# Patient Record
Sex: FEMALE | Race: WHITE | NOT HISPANIC OR LATINO | Employment: OTHER | URBAN - METROPOLITAN AREA
[De-identification: names, ages, dates, MRNs, and addresses within clinical notes are randomized per-mention and may not be internally consistent; named-entity substitution may affect disease eponyms.]

---

## 2017-01-05 ENCOUNTER — OFFICE VISIT (OUTPATIENT)
Dept: BEHAVIORAL HEALTH | Facility: PHYSICIAN GROUP | Age: 62
End: 2017-01-05
Payer: COMMERCIAL

## 2017-01-05 VITALS — TEMPERATURE: 97.9 F | RESPIRATION RATE: 16 BRPM | HEART RATE: 76 BPM

## 2017-01-05 DIAGNOSIS — F41.1 GENERALIZED ANXIETY DISORDER: ICD-10-CM

## 2017-01-05 DIAGNOSIS — F51.01 PRIMARY INSOMNIA: ICD-10-CM

## 2017-01-05 PROCEDURE — 99204 OFFICE O/P NEW MOD 45 MIN: CPT | Performed by: PSYCHIATRY & NEUROLOGY

## 2017-01-05 RX ORDER — TRAZODONE HYDROCHLORIDE 50 MG/1
TABLET ORAL
Qty: 60 TAB | Refills: 5 | Status: SHIPPED | OUTPATIENT
Start: 2017-01-05 | End: 2017-07-24 | Stop reason: SDUPTHER

## 2017-01-05 RX ORDER — LORAZEPAM 1 MG/1
TABLET ORAL
Qty: 45 TAB | Refills: 2 | Status: SHIPPED | OUTPATIENT
Start: 2017-01-05 | End: 2017-06-08 | Stop reason: SDUPTHER
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
## 2017-01-06 DIAGNOSIS — M10.9 GOUT, ARTHRITIS: ICD-10-CM

## 2017-01-06 RX ORDER — ALLOPURINOL 300 MG/1
TABLET ORAL
Qty: 30 TAB | Refills: 0 | Status: CANCELLED | OUTPATIENT
Start: 2017-01-06

## 2017-01-06 NOTE — TELEPHONE ENCOUNTER
Was the patient seen in the last year in this department? Yes   Last seen Dr. Stephenson 05/29/15    Does patient have an active prescription for medications requested? No     Received Request Via: Patient     RX prescribed by Dr. Good. Patient states she does not know who that M.D. Is.   Patient informed that Dr. Stephenson has not prescribed allopurinol but insist a request be sent stating Dr. Stephenson is her PCP.   Please advise.

## 2017-01-07 NOTE — TELEPHONE ENCOUNTER
I an not willing to auth this or any med until I see her in the office.   Please reprepare this Rx for allopurinol 300 mg with Dr Wallace as provider and route to him for approval or decline since he has been seeing her.  CEFERINO

## 2017-01-09 ENCOUNTER — APPOINTMENT (OUTPATIENT)
Dept: PHYSICAL MEDICINE AND REHAB | Facility: MEDICAL CENTER | Age: 62
End: 2017-01-09
Payer: COMMERCIAL

## 2017-01-09 DIAGNOSIS — M10.9 GOUT, ARTHRITIS: ICD-10-CM

## 2017-01-09 RX ORDER — ALLOPURINOL 300 MG/1
TABLET ORAL
Qty: 30 TAB | Refills: 0 | Status: SHIPPED | OUTPATIENT
Start: 2017-01-09 | End: 2017-01-24 | Stop reason: SDUPTHER

## 2017-01-09 NOTE — TELEPHONE ENCOUNTER
Normal recent kidney function.    Refill done.    Charly Wallace MD  [facility]  [address] [place] [id number]

## 2017-01-09 NOTE — TELEPHONE ENCOUNTER
Was the patient seen in the last year in this department? Yes     Does patient have an active prescription for medications requested? No     Received Request Via: Patient     RX prescribed by Dr. Good. Patient states she does not know who that M.D. Is.  Please advise.

## 2017-01-13 DIAGNOSIS — M54.16 LEFT LUMBAR RADICULOPATHY: ICD-10-CM

## 2017-01-13 RX ORDER — CARISOPRODOL 350 MG/1
350 TABLET ORAL 2 TIMES DAILY PRN
Qty: 90 TAB | Refills: 0 | Status: CANCELLED | OUTPATIENT
Start: 2017-01-13

## 2017-01-13 RX ORDER — CARISOPRODOL 350 MG/1
TABLET ORAL
Qty: 90 TAB | Refills: 0
Start: 2017-01-13

## 2017-01-13 RX ORDER — CARISOPRODOL 350 MG/1
350 TABLET ORAL EVERY 8 HOURS PRN
Qty: 100 TAB | Refills: 0
Start: 2017-01-13

## 2017-01-13 NOTE — TELEPHONE ENCOUNTER
Was the patient seen in the last year in this department? Yes     Does patient have an active prescription for medications requested? No     Received Request Via: Patient      placed in Dr. Stephenson's in-basket

## 2017-01-14 NOTE — TELEPHONE ENCOUNTER
Refill declined.Please advise pt and pharmacy.  1. I have not seen pt in over a year.  2. Controlled substance Rx need to be filled at office visits.   Alix Stephenson MD  [facility] [address] [place] [address]

## 2017-01-14 NOTE — TELEPHONE ENCOUNTER
Refill declined  Controlled substances need to be filled at office visits  Alix Stephenson MD  [facility] [address] [place] [address]

## 2017-01-16 ENCOUNTER — OFFICE VISIT (OUTPATIENT)
Dept: MEDICAL GROUP | Age: 62
End: 2017-01-16
Payer: COMMERCIAL

## 2017-01-16 VITALS
DIASTOLIC BLOOD PRESSURE: 86 MMHG | HEART RATE: 81 BPM | SYSTOLIC BLOOD PRESSURE: 118 MMHG | BODY MASS INDEX: 28.54 KG/M2 | TEMPERATURE: 98.9 F | WEIGHT: 145.4 LBS | HEIGHT: 60 IN | OXYGEN SATURATION: 93 %

## 2017-01-16 DIAGNOSIS — Z12.31 SCREENING MAMMOGRAM, ENCOUNTER FOR: ICD-10-CM

## 2017-01-16 DIAGNOSIS — M62.830 BACK SPASM: ICD-10-CM

## 2017-01-16 DIAGNOSIS — Z74.09 REDUCED MOBILITY: ICD-10-CM

## 2017-01-16 DIAGNOSIS — M81.0 OSTEOPOROSIS, SENILE: ICD-10-CM

## 2017-01-16 DIAGNOSIS — M96.1 FAILED BACK SYNDROME: ICD-10-CM

## 2017-01-16 PROCEDURE — 99214 OFFICE O/P EST MOD 30 MIN: CPT | Performed by: FAMILY MEDICINE

## 2017-01-16 RX ORDER — TIZANIDINE 4 MG/1
4 TABLET ORAL 2 TIMES DAILY
Qty: 60 TAB | Refills: 5 | Status: SHIPPED | OUTPATIENT
Start: 2017-01-16 | End: 2017-04-25

## 2017-01-16 RX ORDER — CARISOPRODOL 350 MG/1
TABLET ORAL
Qty: 13 TAB | Refills: 0 | Status: SHIPPED | OUTPATIENT
Start: 2017-01-16 | End: 2017-04-25

## 2017-01-16 NOTE — MR AVS SNAPSHOT
"        Marium Renteria   2017 1:10 PM   Office Visit   MRN: [id number]    Department:  [address] [place] [address]   Dept Phone:  [phone]    Description:  Female : 1955   Provider:  Alix Stephenson M.D.           Reason for Visit     Back Pain med refill soma      Allergies as of 2017     Allergen Noted Reactions    Bee Venom 2009   Anaphylaxis    Asa [Aspirin] 2013   Unspecified    Pt states \"It tears up my stomach\".    Codeine 2012   Itching    Pt states \"I itch so much\".    Contrast Media With Iodine [Iodine] 2016       Hydrocodone-Ibuprofen 2015       \"Extreme itching\"    Ibuprofen 12/15/2015   Unspecified    Pt states \"It tears up my stomach\".    Milk [Lac Bovis] 2016       Norco [Apap-Fd&C Yellow #10 [place]-Hydrocodone] 2014   Itching    Pt states \"I itch all over\".    Other Drug 2014   Unspecified    VISTRIL  Pt states \"I get forgetful\".    Other Environmental 12/15/2015   Rash    adhesive    Other Food 12/15/2015   Unspecified    sourdough bread  Pt states \"My face and mouth gets all tingling\".      Pcn [Penicillins] 2009   Hives    Sulfa Drugs 2009   Hives    Talwin 2012   Unspecified    Pt states \"I get forgetful\".    Vicodin [Hydrocodone-Acetaminophen] 2013   Itching    Pt states \"I itch so much\".      You were diagnosed with     Back spasm   [914233]       Reduced mobility   [322069]       Failed back syndrome   [825497]       Screening mammogram, encounter for   [9383234]       Osteoporosis, senile   [259366]         Vital Signs     Blood Pressure Pulse Temperature Height Weight Body Mass Index    118/86 mmHg 81 37.2 °C (98.9 °F) 1.524 m (5') 65.953 kg (145 lb 6.4 oz) 28.40 kg/m2    Oxygen Saturation Last Menstrual Period Breastfeeding? Smoking Status          93% 10/02/1980 No Current Every Day Smoker        Basic Information     Date Of Birth Sex Race Ethnicity Preferred Language    1955 Female White " Non- English      Your appointments     Mar 01, 2017  3:30 PM   Follow Up Med Management with Roddy Cooley M.D.   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   12/11/2009 - Present    Symptomatic menopausal or female climacteric states N95.1   12/11/2009 - Present    Hypoparathyroidism (CMS-HCC) E20.9   12/11/2009 - Present    Tobacco abuse Z72.0   12/11/2009 - Present    Sialolithiasis, ductal K11.5   7/21/2012 - Present    Serum calcium elevated E83.52   10/8/2012 - Present    Gout, arthritis M10.9   5/24/2013 - Present    ELISHA (generalized anxiety disorder) F41.1   8/28/2013 - Present    Chronic diarrhea K52.9   9/30/2013 - Present    Insomnia G47.00   5/23/2014 - Present    Hyperlipidemia E78.5   6/5/2015 - Present    History of alcohol abuse Z87.898   12/7/2015 - Present    Lumbar scoliosis M41.9   12/17/2015 - Present    Left lumbar radiculopathy M54.16   6/23/2016 - Present    Neuropathy (CMS-HCC) G62.9   6/23/2016 - Present    Depression F32.9   6/27/2016 - Present    Constipation K59.00   6/27/2016 - Present    Hypokalemia E87.6   6/27/2016 - Present    Osteoarthritis of cervical spine with myelopathy M47.12   9/14/2016 - Present    Failed back syndrome M96.1   9/14/2016 - Present    Preventative health care Z00.00   10/7/2016 - Present    Cervical lymphadenopathy R59.0   10/7/2016 - Present      Health Maintenance        Date Due Completion Dates    MAMMOGRAM 6/24/2014 6/24/2013, 8/5/2010, 8/5/2010    BONE DENSITY 6/7/2015 6/7/2013    PAP  SMEAR 6/20/2016 6/20/2013 (Done)    Override on 6/20/2013: Done (scanned in Media. Read at [facility])    IMM INFLUENZA (1) 9/1/2016 11/21/2006, 10/12/2004, 12/29/2003    IMM ZOSTER VACCINE 10/2/2016 ---    COLONOSCOPY 8/20/2022 8/20/2012 (Done)    Override on 8/20/2012: Done    IMM DTaP/Tdap/Td Vaccine (2 - Td) 1/29/2023 1/29/2013, 5/24/2005            Current Immunizations     Hep A/HEP B Combined Vaccine (TwinRix) 5/24/2005    Influenza TIV (IM) 11/21/2006, 10/12/2004, 12/29/2003    Pneumococcal Vaccine (UF)Historical Data 10/29/2008    Pneumococcal polysaccharide vaccine (PPSV-23) 10/29/2008    TD Vaccine 5/24/2005    Tdap Vaccine 1/29/2013      Below and/or attached are the medications your provider expects you to take. Review all of your home medications and newly ordered medications with your provider and/or pharmacist. Follow medication instructions as directed by your provider and/or pharmacist. Please keep your medication list with you and share with your provider. Update the information when medications are discontinued, doses are changed, or new medications (including over-the-counter products) are added; and carry medication information at all times in the event of emergency situations     Allergies:  BEE VENOM - Anaphylaxis     ASA - Unspecified     CODEINE - Itching     CONTRAST MEDIA WITH IODINE - (reactions not documented)     HYDROCODONE-IBUPROFEN - (reactions not documented)     IBUPROFEN - Unspecified     MILK - (reactions not documented)     NORCO - Itching     OTHER DRUG - Unspecified     OTHER ENVIRONMENTAL - Rash     OTHER FOOD - Unspecified     PCN - Hives     SULFA DRUGS - Hives     TALWIN - Unspecified     VICODIN - Itching               Medications  Valid as of: January 16, 2017 -  2:04 PM    Generic Name Brand Name Tablet Size Instructions for use    Allopurinol (Tab) ZYLOPRIM 300 MG TAKE ONE TABLET BY MOUTH ONCE DAILY        Calcium Carbonate Antacid   Take 3,500 mg by mouth 2 Times a  Day.        Carbidopa-Levodopa (Tab) SINEMET  MG Take 1 Tab by mouth every day.        Carisoprodol (Tab) SOMA 350 MG 1 po daily x 7 days then 1/2 po daily for 8 days then 1/2 po every other day for 4 doses then stop.        Ergocalciferol (Cap) DRISDOL 97390 UNITS Take 5,000 Units by mouth 1 time daily as needed. On Wed and Sun        Gabapentin (Cap) NEURONTIN 300 MG Take 2 Caps by mouth 3 times a day. .        Levothyroxine Sodium (Tab) SYNTHROID 88 MCG Take 88 mcg by mouth Every morning on an empty stomach. 75 mcg        LORazepam (Tab) ATIVAN 1 MG One to two tabs by mouth at night        Meclizine HCl (Tab) ANTIVERT 12.5 MG Take 1 Tab by mouth 3 times a day as needed for Dizziness.        Polyethylene Glycol 3350 (Pack) MIRALAX  Take 1 Packet by mouth every day.        Potassium Chloride Charo CR (Tab CR) K-DUR 10 MEQ Take 10 mEq by mouth 3 times a day. Twice on Mon and Fri., Tuesday, Wednesday,Thursday, Saturday and Sunday once a day        TiZANidine HCl (Tab) ZANAFLEX 4 MG Take 1 Tab by mouth 2 times a day. As needed for back spasm        TraZODone HCl (Tab) DESYREL 50 MG One to two tabs by mouth at night for sleep.        .                 Medicines prescribed today were sent to:     [id number] - [place] [address]    Phone: [phone] Fax: [phone]    Open 24 Hours?: No      Medication refill instructions:       If your prescription bottle indicates you have medication refills left, it is not necessary to call your provider’s office. Please contact your pharmacy and they will refill your medication.    If your prescription bottle indicates you do not have any refills left, you may request refills at any time through one of the following ways: The online [employer] system (except Urgent Care), by calling your provider’s office, or by asking your pharmacy to contact your provider’s office with a refill request. Medication refills are processed only  during regular business hours and may not be available until the next business day. Your provider may request additional information or to have a follow-up visit with you prior to refilling your medication.   *Please Note: Medication refills are assigned a new Rx number when refilled electronically. Your pharmacy may indicate that no refills were authorized even though a new prescription for the same medication is available at the pharmacy. Please request the medicine by name with the pharmacy before contacting your provider for a refill.        Your To Do List     Future Labs/Procedures Complete By Expires    DS-BONE DENSITY STUDY (DEXA)  As directed 7/19/2017    MA-SCREEN MAMMO W/CAD-BILAT  As directed 2/16/2018      Referral     A referral request has been sent to our patient care coordination department. Please allow 3-5 business days for us to process this request and contact you either by phone or mail. If you do not hear from us by the 5th business day, please call us at [phone].        Instructions    Soma taper: 1 po daily x 7 days then 1/2 po daily for 8 days then 1/2 po every other day for 4 doses then stop.    Call [phone] to schedule mammogram/DEXA    Call [phone] to schedule the power chair evaluation       Other Notes About Your Plan     Fall risk done 12/7/15             CoinJar Access Code: [email or web address]  Expires: 1/25/2017  8:46 AM    CoinJar  A secure, online tool to manage your health information     [employer]’s CoinJar® is a secure, online tool that connects you to your personalized health information from the privacy of your home -- day or night - making it very easy for you to manage your healthcare. Once the activation process is completed, you can even access your medical information using the CoinJar micheal, which is available for free in the Apple Micheal store or Google Play store.     CoinJar provides the following levels of access (as shown below):   My Chart Features   [facility]  Primary Care Doctor [facility]  Specialists [facility]  Urgent  Care [facility]  Primary Care  Doctor   Email your healthcare team securely and privately 24/7 X X X    Manage appointments: schedule your next appointment; view details of past/upcoming appointments X      Request prescription refills. X      View recent personal medical records, including lab and immunizations X X X X   View health record, including health history, allergies, medications X X X X   Read reports about your outpatient visits, procedures, consult and ER notes X X X X   See your discharge summary, which is a recap of your hospital and/or ER visit that includes your diagnosis, lab results, and care plan. X X       How to register for Proximex:  1. Go to  [email or web address].  2. Click on the Sign Up Now box, which takes you to the New Member Sign Up page. You will need to provide the following information:  a. Enter your Proximex Access Code exactly as it appears at the top of this page. (You will not need to use this code after you’ve completed the sign-up process. If you do not sign up before the expiration date, you must request a new code.)   b. Enter your date of birth.   c. Enter your home email address.   d. Click Submit, and follow the next screen’s instructions.  3. Create a Proximex ID. This will be your Proximex login ID and cannot be changed, so think of one that is secure and easy to remember.  4. Create a Proximex password. You can change your password at any time.  5. Enter your Password Reset Question and Answer. This can be used at a later time if you forget your password.   6. Enter your e-mail address. This allows you to receive e-mail notifications when new information is available in Proximex.  7. Click Sign Up. You can now view your health information.    For assistance activating your Proximex account, call [phone]

## 2017-01-16 NOTE — PATIENT INSTRUCTIONS
Soma taper: 1 po daily x 7 days then 1/2 po daily for 8 days then 1/2 po every other day for 4 doses then stop.    Call [phone] to schedule mammogram/DEXA    Call [phone] to schedule the power chair evaluation

## 2017-01-16 NOTE — PROGRESS NOTES
Subjective:     Chief Complaint   Patient presents with   • Back Pain     med refill soma     Marium Renteria is a 61 y.o. female here today for issues listed below    Chronic back and neck pain.  Sees Dr Macdonald.  Using lumbar brace and walker.  Had two surgeries last year. Still not doing well. Not on chronic narcotics currently but has been on chronic benzo for anxiety and chronic soma for spasm. REports Baclofen not effective.  Has been referred to psych for medication management to identify non-benzo options or appropriateness of ongoing benzo use.  Eval for power chair and mobility issues.  Marium Renteria is a 61 y.o. year old female who is having mobility issues in her home AND use of a cane, walker, or manual wheelchair will not meet needs  HPI:  • Signs and symptoms that limit ambulation/mobility (muscle weakness from failed back surgery.  • Diagnoses that are responsible for the signs and symptoms (chroinc DDD lumbar)  • Medications or other treatments prescribed for the signs and symptoms: current lumbar support.  • Progression of mobility difficulty since surgery in 2016  • Other diagnoses that may relate to ambulatory problems: lower extremity weakness  Cane or walker will not meet patient's mobility needs in the home for the following reasons:  • Lower Extremity (LE) strength of 2/5 from deconditioning after back surgery  • Poor balance or postural stability from radicular pain and neuropathy  Manual wheelchair will not meet patient's mobility needs in the home for the following reasons:   • Decreased ROM of hips/knees from injury, degenerative disease      Allergies: Bee venom; Asa; Codeine; Contrast media with iodine; Hydrocodone-ibuprofen; Ibuprofen; Milk; Norco; Other drug; Other environmental; Other food; Pcn; Sulfa drugs; Talwin; and Vicodin  Current medicines (including changes today)  Current Outpatient Prescriptions   Medication Sig Dispense Refill   • tizanidine (ZANAFLEX) 4 MG  Tab Take 1 Tab by mouth 2 times a day. As needed for back spasm 60 Tab 5   • carisoprodol (SOMA) 350 MG Tab 1 po daily x 7 days then 1/2 po daily for 8 days then 1/2 po every other day for 4 doses then stop. 13 Tab 0   • allopurinol (ZYLOPRIM) 300 MG Tab TAKE ONE TABLET BY MOUTH ONCE DAILY 30 Tab 0   • trazodone (DESYREL) 50 MG Tab One to two tabs by mouth at night for sleep. 60 Tab 5   • lorazepam (ATIVAN) 1 MG Tab One to two tabs by mouth at night 45 Tab 2   • carbidopa-levodopa (SINEMET)  MG Tab Take 1 Tab by mouth every day. 30 Tab 0   • gabapentin (NEURONTIN) 300 MG Cap Take 2 Caps by mouth 3 times a day. . 180 Cap 3   • potassium chloride SA (K-DUR) 10 MEQ Tab CR Take 10 mEq by mouth 3 times a day. Twice on Mon and Fri., Tuesday, Wednesday,Thursday, Saturday and Sunday once a day     • vitamin D, Ergocalciferol, (DRISDOL) 66797 UNITS Cap capsule Take 5,000 Units by mouth 1 time daily as needed. On Wed and Sun     • Calcium Carbonate Antacid (CALCIUM ANTACID PO) Take 3,500 mg by mouth 2 Times a Day.     • meclizine (ANTIVERT) 12.5 MG TABS Take 1 Tab by mouth 3 times a day as needed for Dizziness. 90 Tab 5   • polyethylene glycol/lytes (MIRALAX) Pack Take 1 Packet by mouth every day. 1 Each 3   • levothyroxine (SYNTHROID) 88 MCG Tab Take 88 mcg by mouth Every morning on an empty stomach. 75 mcg       No current facility-administered medications for this visit.       She  has a past medical history of Mixed hyperlipidemia (12/11/2009); Unspecified essential hypertension (12/11/2009); Postsurgical hypothyroidism (12/11/2009); Osteoporosis, unspecified (12/11/2009); Lumbago (12/11/2009); Symptomatic menopausal or female climacteric states (12/11/2009); Hypoparathyroidism (CMS-HCC) (12/11/2009); Other B-complex deficiencies (12/11/2009); Tobacco abuse (12/11/2009); Alcohol abuse, continuous drinking behavior (12/11/2009); Gout; Parotid cyst; Sialolithiasis, ductal (7/21/2012); Arthritis; Helicobacter pylori  (H. pylori) infection (9/11/2012); Serum calcium elevated (10/8/2012); GOUT (5/24/2013); Chronic diarrhea (9/30/2013); Hyperlipidemia (6/5/2015); Gynecological disorder; Bowel habit changes; High cholesterol; Restless leg syndrome; Psychiatric problem; and Anxiety.  Health Maintenance: none since 2013.   ROS  Denies constipation, nausea.  No red flag of cauda equina.     Objective:     Blood pressure 118/86, pulse 81, temperature 37.2 °C (98.9 °F), height 1.524 m (5'), weight 65.953 kg (145 lb 6.4 oz), last menstrual period 10/02/1980, SpO2 93 %, not currently breastfeeding. Body mass index is 28.4 kg/(m^2).  Physical Exam:  Alert, oriented in no acute distress.  Eye contact is good, speech goal directed, affect calm    Lower extremity weakness. 2-3/5.  Wearing lumbar support. Needs 1 person assist to stand.     Results reviewed from CT 12/28/16. (post-op change in lumbar spine)    Assessment and Plan:     A/P:  Failed back syndrome, lumbar DDD causing mobility issues in their home AND use of a cane, walker, or manual wheelchair will not meet needs  • Dx = Z74.09. Impaired Mobility   • Plan: order mobility / power chair assessment   Treatment Changes: pt advised that will need specialty eval for continued controlled substance Rx.  No benzo for chronic use from PCP. WIll need to be from Psych.  No soma for chronic use from PCP. Will taper over 2 weeks. May use tizanidine. If not effective, will need soma from a pain specialist or spine specialist.    Marium was seen today for back pain.    Diagnoses and all orders for this visit:    Back spasm  -     tizanidine (ZANAFLEX) 4 MG Tab; Take 1 Tab by mouth 2 times a day. As needed for back spasm  -     carisoprodol (SOMA) 350 MG Tab; 1 po daily x 7 days then 1/2 po daily for 8 days then 1/2 po every other day for 4 doses then stop.  -     REFERRAL TO PHYSICAL THERAPY Reason for Therapy: Eval/Treat/Report    Reduced mobility  -     REFERRAL TO PHYSICAL THERAPY Reason for  Therapy: Eval/Treat/Report    Failed back syndrome  -     REFERRAL TO PHYSICAL THERAPY Reason for Therapy: Eval/Treat/Report    Screening mammogram, encounter for  -     MA-SCREEN MAMMO W/CAD-BILAT; Future    Osteoporosis, senile  -     DS-BONE DENSITY STUDY (DEXA); Future        Followup: Return in about 4 months (around 5/16/2017) for recheck DEXA. sooner should new symptoms or problems arise.

## 2017-01-16 NOTE — TELEPHONE ENCOUNTER
Spoke with patient and let them know Alix Stephenson M.D.'s message.   Future Appointments       Provider Department Center    1/16/2017 1:10 PM Alix Stephenson M.D. [place] [address] [address]    3/1/2017 3:30 PM Roddy Cooley M.D. [facility] [address] [place] [address]

## 2017-01-19 ENCOUNTER — TELEPHONE (OUTPATIENT)
Dept: MEDICAL GROUP | Age: 62
End: 2017-01-19

## 2017-01-19 NOTE — Clinical Note
[facility]  Alix Stephenson M.D.  [address]  [facility] [address]  Fax: [phone] Authorization for Release/Disclosure of Protected Health Information   Name: MARIUM RENTERIA : 1955 SSN: [id number]   Address: [address] [place] [address]  [place] [address] Phone:    [phone] (home)    I authorize the entity listed below to release/disclose the PHI below to [facility]/Alix Setphenson M.D.   Provider or Entity Name:  [facility]     Address   City, [facility]   Phone:      Fax:  [phone]   Reason for request: continuity of care   Information to be released:    [  ] LAST COLONOSCOPY, including any PATH REPORT [X  ] LAST DEXA  [  ] LAST MAMMOGRAM  [  ] LAST PAP [  ] RETINA EXAM REPORT  [  ] IMMUNIZATION RECORDS  [  ] Release all info      [  ] Check here and initial the line next to each item to release ALL health information INCLUDING  _____ Care and treatment for drug and / or alcohol abuse  _____ HIV testing, infection status, or AIDS  _____ Genetic Testing    DATES OF SERVICE OR TIME PERIOD TO BE DISCLOSED: _____________  I understand and acknowledge that:  * This Authorization may be revoked at any time by you in writing, except if your health information has already been used or disclosed.  * Your health information that will be used or disclosed as a result of you signing this authorization could be re-disclosed by the recipient. If this occurs, your re-disclosed health information may no longer be protected by State or Federal laws.  * You may refuse to sign this Authorization. Your refusal will not affect your ability to obtain treatment.  * This Authorization becomes effective upon signing and will  on (date) __________. If no date is indicated, this Authorization will  one (1) year from the signature date.    Name: Marium Renteria    Signature:   continuity of care   Date: 2017

## 2017-01-19 NOTE — TELEPHONE ENCOUNTER
1. Caller Name: Marium                                         Call Back Number: [phone] (home)         Patient approves a detailed voicemail message: no    Patient states she refuses to do the mammogram ordered and wants Dr. Stephenson to review her previous MRI and/or DEXA because she doesn't feel this DEXA order is a necessity.  Patient states she is concerned about her finances in regards to medical.  Please advise.

## 2017-01-20 NOTE — TELEPHONE ENCOUNTER
Phone Number Called: [phone] (home)     Message: Spoke with patient she declines to have them done until her feeling come back in legs. She does not want anymore wellness checks until then.      Patient stated she may have had the Dexa done at [facility] in 2015.

## 2017-01-20 NOTE — TELEPHONE ENCOUNTER
As I mentioned at her office visit -  The last DEXA that I have results for was done 2013. It showed osteoporosis so a recheck 2 years later is my recommendation. If she completed a DEXA since then please obtain results.  MRI is not a good indicator of bone density. It shows that she has the cages in her spine but does not tell me the density of the bones in her hips or other areas of her low spine without surgical hardware.   The order and diagnosis code are on my order. She can contact her insurance for information about coverage.  Alix Stephenson MD  [facility] [address] [place] [address]

## 2017-01-24 NOTE — TELEPHONE ENCOUNTER
Was the patient seen in the last year in this department? Yes     Does patient have an active prescription for medications requested? Yes -Allopurinol    Received Request Via: Pharmacy

## 2017-01-25 RX ORDER — ALLOPURINOL 300 MG/1
TABLET ORAL
Qty: 30 TAB | Refills: 0 | Status: SHIPPED | OUTPATIENT
Start: 2017-01-25 | End: 2017-02-07 | Stop reason: SDUPTHER

## 2017-02-07 DIAGNOSIS — M62.830 BACK SPASM: ICD-10-CM

## 2017-02-07 RX ORDER — ALLOPURINOL 300 MG/1
TABLET ORAL
Qty: 30 TAB | Refills: 5 | Status: SHIPPED | OUTPATIENT
Start: 2017-02-07 | End: 2017-08-29 | Stop reason: SDUPTHER

## 2017-02-07 RX ORDER — CARISOPRODOL 350 MG/1
TABLET ORAL
Qty: 13 TAB | Refills: 0 | OUTPATIENT
Start: 2017-02-07

## 2017-02-07 RX ORDER — CARISOPRODOL 350 MG/1
TABLET ORAL
Qty: 13 TAB | Refills: 0
Start: 2017-02-07

## 2017-02-07 NOTE — TELEPHONE ENCOUNTER
Was the patient seen in the last year in this department? Yes     Does patient have an active prescription for medications requested? No     Received Request Via: Pharmacy     Pt states that the SOMA was not suppose to come here the allopurinol was suppose to be faxed to Dr. Stephenson.

## 2017-02-07 NOTE — TELEPHONE ENCOUNTER
Refill declined.  Please advise pt and pharmacy.  Pt has already been advised that primary care will not offer additional fills of this medicine.   Alix Stephenson MD  [facility] [address] [place] [address]

## 2017-02-07 NOTE — TELEPHONE ENCOUNTER
Phone Number Called: [phone] (home)     Message: Pt states that this medication was not suppose to go to Dr. Stephenson.     Left Message for patient to call back: no

## 2017-02-08 NOTE — TELEPHONE ENCOUNTER
Refill done and sent electronically to pharmacy selected for encounter.  Alix Stephenson MD  [facility] [address] [place] [address]

## 2017-02-13 ENCOUNTER — TELEPHONE (OUTPATIENT)
Dept: MEDICAL GROUP | Age: 62
End: 2017-02-13

## 2017-02-13 DIAGNOSIS — G89.29 CHRONIC MIDLINE LOW BACK PAIN WITH SCIATICA, SCIATICA LATERALITY UNSPECIFIED: ICD-10-CM

## 2017-02-13 DIAGNOSIS — M54.40 CHRONIC MIDLINE LOW BACK PAIN WITH SCIATICA, SCIATICA LATERALITY UNSPECIFIED: ICD-10-CM

## 2017-02-13 DIAGNOSIS — M96.1 FAILED BACK SYNDROME: ICD-10-CM

## 2017-02-13 DIAGNOSIS — G25.89 OTHER EXTRAPYRAMIDAL DISEASE AND ABNORMAL MOVEMENT DISORDER: ICD-10-CM

## 2017-02-13 NOTE — TELEPHONE ENCOUNTER
Phone Number Called: [facility]    Message:  Called [facility] and got their faxed # for [phone]    Left Message for patient to call back: N\A

## 2017-02-13 NOTE — TELEPHONE ENCOUNTER
1. Caller Name: Marium Renteria                                         Call Back Number: [phone] (home)       Patient approves a detailed voicemail message: yes    Patient called left a message requesting a letter of verification for her need for a electric wheel chair. She stated that [facility] has one for her and she needs a letter stating the need for the electric wheel chair. Please advise

## 2017-02-13 NOTE — TELEPHONE ENCOUNTER
Face to Face Note  -  Durable Medical Equipment    Alix Stephenson M.D. - NPI: [id number]  I certify that this patient is under my care and that they had a durable medical equipment(DME)face to face encounter by myself that meets the physician DME face-to-face encounter requirements with this patient on: 1/16/17    Date of encounter:   Patient:                    MRN:                       Date of Birth:          1/16/17  Marium Renteria  [id number]  1955     The encounter with the patient was in whole, or in part, for the following medical condition, which is the primary reason for durable medical equipment:  1. Chronic midline low back pain with sciatica, sciatica laterality unspecified  DME Other   2. Other extrapyramidal disease and abnormal movement disorder  DME Other   3. Failed back syndrome  DME Other         I certify that, based on my findings, the following durable medical equipment is medically necessary:  Other DME Equipment - electric wheelchair.           My Clinical findings support the need for the above equipment due to:  Abnormal Gait, Frequent Falls    Supporting Symptoms: pain, movement disorder

## 2017-02-13 NOTE — TELEPHONE ENCOUNTER
Phone Number Called: [phone] (home)     Message: Spoke with patient and relayed message that letter was done faxed to [facility].    Left Message for patient to call back: N\A

## 2017-02-13 NOTE — TELEPHONE ENCOUNTER
Order written Please print, then fax to [facility] or contact pt to  order   Alix Stephenson MD  [facility] [address] [place] [address]

## 2017-02-13 NOTE — TELEPHONE ENCOUNTER
Phone Number Called: [phone] (home)     Message: Spoke with patient to let her know the letter was faxed to [facility].    Left Message for patient to call back: N\A

## 2017-03-01 ENCOUNTER — OFFICE VISIT (OUTPATIENT)
Dept: BEHAVIORAL HEALTH | Facility: PHYSICIAN GROUP | Age: 62
End: 2017-03-01
Payer: COMMERCIAL

## 2017-03-01 VITALS
WEIGHT: 145 LBS | TEMPERATURE: 98.1 F | BODY MASS INDEX: 28.47 KG/M2 | HEIGHT: 60 IN | DIASTOLIC BLOOD PRESSURE: 80 MMHG | RESPIRATION RATE: 18 BRPM | HEART RATE: 78 BPM | SYSTOLIC BLOOD PRESSURE: 120 MMHG

## 2017-03-01 DIAGNOSIS — F41.1 GAD (GENERALIZED ANXIETY DISORDER): ICD-10-CM

## 2017-03-01 DIAGNOSIS — F51.01 PRIMARY INSOMNIA: ICD-10-CM

## 2017-03-01 PROCEDURE — 99213 OFFICE O/P EST LOW 20 MIN: CPT | Performed by: PSYCHIATRY & NEUROLOGY

## 2017-03-02 NOTE — PROGRESS NOTES
[facility]  PSYCHIATRIC FOLLOW-UP NOTE    Name: Marium Renteria  MRN: [id number]  : 1955  Age: 61 y.o.  Date of assessment: 3/1/2017  PCP: Alix Stephenson M.D.  Persons in attendance: Patient    REASON FOR VISIT/CHIEF COMPLAINT (as stated by Patient):  Marium Renteria is a 61 y.o., White female, attending follow-up appointment for   Chief Complaint   Patient presents with   • Anxiety     The patient is seen today for follow up on her chronic pain, anxiety, and insomnia.    .      HISTORY OF PRESENT ILLNESS:    This is a 62 yo female, on wheel chair, in pain all the time, and on pain medication. The patient is not sleeping well with one trazodone 50 mg at night and she is taking lorazepam 0.5 mg at night. The patient is waking up early morning a she takes the other half of lorazepam. The patient has an appointment with ortho in three weeks.       PSYCHOSOCIAL CHANGES SINCE PREVIOUS CONTACT:  Anxiety and insomnia    RESPONSE TO TREATMENT:  Fair.    MEDICATION SIDE EFFECTS:  Pain medication and benzo.    REVIEW OF SYSTEMS:        Constitutional positive - chronic back pain   Eyes negative   Ears/Nose/Mouth/Throat negative   Cardiovascular positive - hypertension   Respiratory negative   Gastrointestinal negative   Genitourinary negative   Muscular negative   Integumentary negative   Neurological positive - RLS,    Endocrine positive - hyperlipidemia, hypoparathyroidism, GOUT   Hematologic/Lymphatic negative       PSYCHIATRIC EXAMINATION/MENTAL STATUS  /80 mmHg  Pulse 78  Temp(Src) 36.7 °C (98.1 °F)  Resp 18  Ht 1.524 m (5')  Wt 65.772 kg (145 lb)  BMI 28.32 kg/m2  [facility] 10/02/1980  Participation: Active verbal participation, Attentive and Engaged  Grooming:Casual  Orientation: Alert and Fully Oriented  Eye contact: Good  Behavior:Calm   Mood: Anxious  Affect: Flexible and Full range  Thought process: Logical and Goal-directed  Thought content:  Within normal limits  Speech:  Rate within normal limits and Volume within normal limits  Perception:  Within normal limits  Memory:  No gross evidence of memory deficits  Insight: Good  Judgment: Good  Family/couple interaction observations:   Other:    Current risk:    Suicide: Low   Homicide: Low   Self-harm: Low  Relapse: Low  Other:   Crisis Safety Plan reviewed?Yes  If evidence of imminent risk is present, intervention/plan:    Medical Records/Labs/Diagnostic Tests Reviewed: yes.    Medical Records/Labs/Diagnostic Tests Ordered: none.    DIAGNOSTIC IMPRESSION(S):  1. ELISHA (generalized anxiety disorder)    2. Primary insomnia           ASSESSMENT AND PLAN:  ELISHA  Insomnia.    Increase trazodone 50 mg two at night  Lorazepam 1 mh as needed at night for sleep  Follow up in two months.    More than 50% of face-to-face time in this 30 minute visit was spent in psychotherapy/counseling.    Topics addressed include:  The patient came on a wheel chair and she is in pain all the time.  Good sleep hygiene  The patient is on opioid and lorazepam and she understood the interaction. The patient agreed to cut down on lorazepam.    Roddy Cooley M.D.

## 2017-03-14 RX ORDER — GABAPENTIN 300 MG/1
CAPSULE ORAL
Qty: 180 CAP | Refills: 3 | Status: SHIPPED | OUTPATIENT
Start: 2017-03-14 | End: 2017-08-13 | Stop reason: SDUPTHER

## 2017-04-21 ENCOUNTER — HOSPITAL ENCOUNTER (OUTPATIENT)
Facility: MEDICAL CENTER | Age: 62
End: 2017-04-21
Attending: ORTHOPAEDIC SURGERY | Admitting: ORTHOPAEDIC SURGERY
Payer: COMMERCIAL

## 2017-04-25 ENCOUNTER — OFFICE VISIT (OUTPATIENT)
Dept: MEDICAL GROUP | Facility: PHYSICIAN GROUP | Age: 62
End: 2017-04-25
Payer: COMMERCIAL

## 2017-04-25 VITALS
OXYGEN SATURATION: 92 % | WEIGHT: 163 LBS | DIASTOLIC BLOOD PRESSURE: 60 MMHG | HEIGHT: 63 IN | HEART RATE: 74 BPM | RESPIRATION RATE: 16 BRPM | BODY MASS INDEX: 28.88 KG/M2 | SYSTOLIC BLOOD PRESSURE: 100 MMHG | TEMPERATURE: 99.3 F

## 2017-04-25 DIAGNOSIS — Z76.89 ENCOUNTER TO ESTABLISH CARE: ICD-10-CM

## 2017-04-25 DIAGNOSIS — F33.1 MODERATE EPISODE OF RECURRENT MAJOR DEPRESSIVE DISORDER (HCC): ICD-10-CM

## 2017-04-25 DIAGNOSIS — E20.9 HYPOPARATHYROIDISM, UNSPECIFIED HYPOPARATHYROIDISM TYPE (HCC): ICD-10-CM

## 2017-04-25 DIAGNOSIS — G62.9 NEUROPATHY: ICD-10-CM

## 2017-04-25 DIAGNOSIS — F13.20 BENZODIAZEPINE DEPENDENCE, CONTINUOUS (HCC): ICD-10-CM

## 2017-04-25 DIAGNOSIS — F41.1 GAD (GENERALIZED ANXIETY DISORDER): ICD-10-CM

## 2017-04-25 DIAGNOSIS — M96.1 FAILED BACK SYNDROME: ICD-10-CM

## 2017-04-25 DIAGNOSIS — F17.210 CIGARETTE NICOTINE DEPENDENCE, UNCOMPLICATED: ICD-10-CM

## 2017-04-25 DIAGNOSIS — F11.20 OPIOID DEPENDENCE, CONTINUOUS (HCC): ICD-10-CM

## 2017-04-25 DIAGNOSIS — M10.9 GOUT, ARTHRITIS: ICD-10-CM

## 2017-04-25 DIAGNOSIS — G25.89 OTHER EXTRAPYRAMIDAL DISEASE AND ABNORMAL MOVEMENT DISORDER: ICD-10-CM

## 2017-04-25 DIAGNOSIS — E89.0 POSTSURGICAL HYPOTHYROIDISM: ICD-10-CM

## 2017-04-25 DIAGNOSIS — E83.52 SERUM CALCIUM ELEVATED: ICD-10-CM

## 2017-04-25 DIAGNOSIS — F51.01 PRIMARY INSOMNIA: ICD-10-CM

## 2017-04-25 PROCEDURE — 99215 OFFICE O/P EST HI 40 MIN: CPT | Performed by: NURSE PRACTITIONER

## 2017-04-25 RX ORDER — CALCITRIOL 0.25 UG/1
0.25 CAPSULE, LIQUID FILLED ORAL DAILY
COMMUNITY
End: 2018-09-19

## 2017-04-25 RX ORDER — MORPHINE SULFATE 30 MG/1
30 TABLET, FILM COATED, EXTENDED RELEASE ORAL 3 TIMES DAILY
COMMUNITY
Start: 2017-09-02 | End: 2018-09-19

## 2017-04-25 RX ORDER — POTASSIUM CHLORIDE 1125 MG/1
15 TABLET, EXTENDED RELEASE ORAL
COMMUNITY
End: 2017-07-20

## 2017-04-25 RX ORDER — LEVOTHYROXINE SODIUM 0.07 MG/1
75 TABLET ORAL
COMMUNITY
End: 2017-07-20

## 2017-04-25 RX ORDER — OXYCODONE HYDROCHLORIDE 20 MG/1
20 TABLET ORAL EVERY 4 HOURS PRN
Status: ON HOLD | COMMUNITY
End: 2017-08-09

## 2017-04-25 RX ORDER — LEVOTHYROXINE SODIUM 112 UG/1
112 TABLET ORAL
COMMUNITY
End: 2017-07-20

## 2017-04-25 NOTE — ASSESSMENT & PLAN NOTE
Followed by endocrinology, managed with Synthroid 75 mcg x 6 days, 112 mcg Sunday, brand name only

## 2017-04-25 NOTE — PROGRESS NOTES
Chief Complaint   Patient presents with   • Establish Care     New Pt est care/ Seema Pt     Marium Renteria is a 61 y.o. female here to establish care and for evaluation and management of the following:  HPI:    Previous PCP Alix Stephenson MD, [facility], [facility]  Postsurgical hypothyroidism  Followed by endocrinology, managed with Synthroid 75 mcg x 6 days, 112 mcg Sunday, brand name only      Gout, arthritis  Managed with allopurinol 300 mg daily, no recent flares.    Depression  Followed by psychiatry.    ELISHA (generalized anxiety disorder)  Followed by psychiatry.    Insomnia  Followed by psychiatry. Managed with trazodone  mg.    Hypoparathyroidism (CMS-HCC)  Followed by endocrinology, accidental parathyroidectomy hypoparathyroidism secondary to that. Serial calcium and albumin levels are monitored.     Serum calcium elevated  Followed by endocrinology, accidental parathyroidectomy hypoparathyroidism secondary to that. Serial calcium and albumin levels are monitored.     Other extrapyramidal disease and abnormal movement disorder   Managed with sinemet    Benzodiazepine dependence, continuous (CMS-HCC)  Followed by psychiatry.    Opioid dependence, continuous (CMS-HCC)  Followed by pain management.    Neuropathy (CMS-HCC)  Followed by neurology/pain management.      She would like to have pain and psychiatric medications prescribed by PCP -    Current medicines (including changes today)  Current Outpatient Prescriptions   Medication Sig Dispense Refill   • carisoprodol (SOMA) 350 MG Tab Take 350 mg by mouth every 8 hours as needed for Muscle Spasms.     • calcitRIOL (ROCALTROL) 0.25 MCG Cap Take 0.25 mcg by mouth every day.     • Cholecalciferol (VITAMIN D3) 1000 UNITS Cap Take  by mouth.     • Calcium Carbonate Antacid (TUMS PO) Take  by mouth.     • morphine ER (MS CONTIN) 30 MG Tab CR tablet Take 30 mg by mouth every 12 hours.     • levothyroxine (SYNTHROID) 112 MCG Tab Take 112 mcg by mouth Every morning  on an empty stomach.     • levothyroxine (SYNTHROID) 75 MCG Tab Take 75 mcg by mouth Every morning on an empty stomach.     • Oxycodone HCl 20 MG Tab Take  by mouth.     • potassium chloride SA (KLOR-CON M15) 15 MEQ tablet Take 15 mEq by mouth 2 times a day.     • EPINEPHrine (EPIPEN 2-MIRELA INJ) by Injection route.     • gabapentin (NEURONTIN) 300 MG Cap TAKE TWO CAPSULES BY MOUTH THREE TIMES A  Cap 3   • allopurinol (ZYLOPRIM) 300 MG Tab TAKE ONE TABLET BY MOUTH DAILY 30 Tab 5   • trazodone (DESYREL) 50 MG Tab One to two tabs by mouth at night for sleep. 60 Tab 5   • lorazepam (ATIVAN) 1 MG Tab One to two tabs by mouth at night 45 Tab 2   • polyethylene glycol/lytes (MIRALAX) Pack Take 1 Packet by mouth every day. 1 Each 3   • carbidopa-levodopa (SINEMET)  MG Tab TAKE ONE TABLET BY MOUTH DAILY (GENERIC SINEMET) 30 Tab 3   • vitamin D, Ergocalciferol, (DRISDOL) 01955 UNITS Cap capsule Take 5,000 Units by mouth 1 time daily as needed. On Wed and Sun     • Calcium Carbonate Antacid (CALCIUM ANTACID PO) Take 3,500 mg by mouth 2 Times a Day.       No current facility-administered medications for this visit.     She  has a past medical history of Mixed hyperlipidemia (12/11/2009); Unspecified essential hypertension (12/11/2009); Postsurgical hypothyroidism (12/11/2009); Osteoporosis, unspecified (12/11/2009); Lumbago (12/11/2009); Symptomatic menopausal or female climacteric states (12/11/2009); Hypoparathyroidism (CMS-[facility]) (12/11/2009); Other B-complex deficiencies (12/11/2009); Tobacco abuse (12/11/2009); Alcohol abuse, continuous drinking behavior (12/11/2009); Gout; Parotid cyst; Sialolithiasis, ductal (7/21/2012); Arthritis; Helicobacter pylori (H. pylori) infection (9/11/2012); Serum calcium elevated (10/8/2012); GOUT (5/24/2013); Chronic diarrhea (9/30/2013); Hyperlipidemia (6/5/2015); Gynecological disorder; Bowel habit changes; High cholesterol; Restless leg syndrome; Psychiatric problem; and  Anxiety.  She  has past surgical history that includes thyroidectomy (); parathyroidectomy (); salpingo-oophorectomy, unilateral; primary c section; hysterectomy, vaginal; dental surgery; colon resection (); appendectomy; colonoscopy with polyp (2012); gastroscopy with biopsy (2012); lumbar fusion posterior (2015); lumbar decompression (2015); and lumbar laminectomy diskectomy (2015).  Social History     Social History   • Marital Status:      Spouse Name: N/A   • Number of Children: 1   • Years of Education: N/A     Occupational History   • none Other     Social History Main Topics   • Smoking status: Current Every Day Smoker -- 1.00 packs/day for 47 years     Types: Cigarettes     Start date: 1970   • Smokeless tobacco: Never Used      Comment: quit during pregnancy, now 2-3 cig daily   • Alcohol Use: 1.2 oz/week     2 Cans of beer per week      Comment: quit drinking daily 3 years ago, now drinks only on weekend   • Drug Use: No   • Sexual Activity: Not Currently     Other Topics Concern   • None     Social History Narrative     Family History   Problem Relation Age of Onset   • Genetic Mother      Alzheimer's   • Alcohol/Drug Father      Family Status   Relation Status Death Age   • Mother  74     pneumo, alzheimers   • Father  37     etoh   • Daughter Alive    • Sister Alive      estranged   • Brother Alive      estranged - never met       Health maintenance reviewed and updated. Mammogram ordered by previous pcp , she declines, last done 6. Educated on the importance of health maintenance including cancer screenings and immunizations.     ROS  Constitutional: Negative for fever, chills, and unexplained weight loss.   HENT: Negative for ear pain, nosebleeds, and sore throat.  Eyes: Negative for blurred vision.   Respiratory: Negative for cough, sputum production, and wheezing.   Cardiovascular: Negative for chest pain, palpitations.  "  Gastrointestinal: Negative for nausea, vomiting, abdominal pain,  diarrhea.   Genitourinary: Negative for dysuria, urgency, and frequency.   Musculoskeletal: positive for myalgias and chronic back pain.   Skin: Negative for rash and itching.   Neurological: Negative for dizziness.   Endo/Heme/Allergies: Does not bruise/bleed easily.   All other systems reviewed and are negative except as in HPI.     Objective:   Blood pressure 100/60, pulse 74, temperature 37.4 °C (99.3 °F), resp. rate 16, height 1.588 m (5' 2.52\"), weight 73.936 kg (163 lb), last menstrual period 10/02/1980, SpO2 92 %. Body mass index is 29.32 kg/(m^2).  Physical Exam:  Constitutional: Alert, oriented, in no acute distress.    Psych: Eye contact is good.  Skin: Warm, dry, no rashes in visible areas.  HEENT: PERRL, conjunctiva and sclera clear.  Nares patent with no significant congestion or drainage.  Normal pinnae  Neck: Supple, trachea midline.   Lungs: Normal effort and respirations. Clear to auscultation bilaterally.  CV: Regular rate and rhythm.  Lower extremities: no edema.  MS: seated in wheelchair.      Assessment and Plan:   The following treatment plan was discussed    She would like to have pain and psychiatric medications prescribed by PCP - discussed upcoming surgery, high dose opiate therapy and need for ongoing pain management. Since she continues benzodiazapine medications with opiates she will continue to follow with psychiatry. Reviewed risks of medications today.    1. Gout, arthritis  Labs ordered, continue current medications and f/u with any acute flares.    2. Postsurgical hypothyroidism  3. Hypoparathyroidism, unspecified hypoparathyroidism type (CMS-HCC)  4. Serum calcium elevated  Stable. Continue current medicines. Monitor labs regularly. Follow-up with specialist as directed.    5. Moderate episode of recurrent major depressive disorder (CMS-HCC)  6. Primary insomnia  7. ELISHA (generalized anxiety disorder)  8. " Benzodiazepine dependence, continuous (CMS-[facility])  Stable. Continue current medicines. Monitor labs regularly. Follow-up with specialist as directed.    9. Failed back syndrome  10. Opioid dependence, continuous (CMS-[facility])  11. Neuropathy (CMS-[facility])  12. Other extrapyramidal disease and abnormal movement disorder  Stable. Continue current medicines. Monitor labs regularly. Follow-up with specialist as directed.    13. Cigarette nicotine dependence, uncomplicated  Advised smoking cessation.    14. Encounter to establish care    Records requested. Will be reviewed upon receipt.   Followup: Return in about 6 months (around 10/25/2017).  Sooner if needed or with any new problems.       Patient was seen for 40 minutes, more than 50% of time spent in face to face review, consultation, counseling, and arranging future evaluation and follow up of medical conditions and care.     Take all medications as directed.  Report any side effects of medications.   Report any new symptoms.  Follow up as advised.  Have labs or other studies as ordered done as directed prior to follow up.  Please keep all appointments for any referrals given.    Please note this dictation was created using voice recognition software. Every reasonable attempt has been made to correct obvious errors, however there may be errors of grammar and possibly content that were not discovered before finalizing the note.

## 2017-04-25 NOTE — MR AVS SNAPSHOT
"        Marium Renteria   2017 12:55 PM   Office Visit   MRN: [id number]    Department:  [facility], [place]   Dept Phone:  [phone]    Description:  Female : 1955   Provider:  OLIMPIA Edwards           Reason for Visit     Establish Care New Pt est care/ [place] Pt      Allergies as of 2017     Allergen Noted Reactions    Bee Venom 2009   Anaphylaxis    Asa [Aspirin] 2013   Unspecified    Pt states \"It tears up my stomach\".    Codeine 2012   Itching    Pt states \"I itch so much\".    Contrast Media With Iodine [Iodine] 2016       Hydrocodone-Ibuprofen 2015       \"Extreme itching\"    Ibuprofen 12/15/2015   Unspecified    Pt states \"It tears up my stomach\".    Milk [Lac Bovis] 2016       Norco [Apap-Fd&C Yellow #10 [place]-Hydrocodone] 2014   Itching    Pt states \"I itch all over\".    Other Drug 2014   Unspecified    VISTRIL  Pt states \"I get forgetful\".    Other Environmental 12/15/2015   Rash    adhesive    Other Food 12/15/2015   Unspecified    sourdough bread  Pt states \"My face and mouth gets all tingling\".      Pcn [Penicillins] 2009   Hives    Sulfa Drugs 2009   Hives    Talwin 2012   Unspecified    Pt states \"I get forgetful\".    Vicodin [Hydrocodone-Acetaminophen] 2013   Itching    Pt states \"I itch so much\".      You were diagnosed with     Postsurgical hypothyroidism   [244.0.ICD-9-CM]       Gout, arthritis   [062737]       Failed back syndrome   [254737]         Vital Signs     Blood Pressure Pulse Temperature Respirations Height Weight    100/60 mmHg 74 37.4 °C (99.3 °F) 16 1.588 m (5' 2.52\") 73.936 kg (163 lb)    Body Mass Index Oxygen Saturation Last Menstrual Period Smoking Status          29.32 kg/m2 92% 10/02/1980 Current Every Day Smoker        Basic Information     Date Of Birth Sex Race Ethnicity Preferred Language    1955 Female White Non- English      Your appointments     Apr " 27, 2017  3:00 PM   Follow Up Med Management with Roddy Cooley M.D.   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   12/11/2009 - Present    Symptomatic menopausal or female climacteric states N95.1   12/11/2009 - Present    Hypoparathyroidism (CMS-HCC) E20.9   12/11/2009 - Present    Tobacco abuse Z72.0   12/11/2009 - Present    Sialolithiasis, ductal K11.5   7/21/2012 - Present    Serum calcium elevated E83.52   10/8/2012 - Present    Gout, arthritis M10.9   5/24/2013 - Present    ELISHA (generalized anxiety disorder) F41.1   8/28/2013 - Present    Insomnia G47.00   5/23/2014 - Present    Hyperlipidemia E78.5   6/5/2015 - Present    History of alcohol abuse Z87.898   12/7/2015 - Present    Lumbar scoliosis M41.9   12/17/2015 - Present    Left lumbar radiculopathy M54.16   6/23/2016 - Present    Neuropathy (CMS-HCC) G62.9   6/23/2016 - Present    Depression F32.9   6/27/2016 - Present    Constipation K59.00   6/27/2016 - Present    Hypokalemia E87.6   6/27/2016 - Present    Osteoarthritis of cervical spine with myelopathy M47.12   9/14/2016 - Present    Failed back syndrome M96.1   9/14/2016 - Present    Preventative health care Z00.00   10/7/2016 - Present    Cervical lymphadenopathy R59.0   10/7/2016 - Present      Health Maintenance        Date Due Completion Dates    MAMMOGRAM 6/24/2014 6/24/2013, 8/5/2010, 8/5/2010    IMM ZOSTER VACCINE 10/2/2016 ---    BONE DENSITY 2/2/2017 2/2/2015, 6/7/2013    COLONOSCOPY 8/20/2022 8/20/2012 (Done), 8/1/2012    Override  on 8/20/2012: Done    IMM DTaP/Tdap/Td Vaccine (2 - Td) 1/29/2023 1/29/2013, 5/24/2005            Current Immunizations     Hep A/HEP B Combined Vaccine (TwinRix) 5/24/2005    Influenza TIV (IM) 11/21/2006, 10/12/2004, 12/29/2003    Pneumococcal Vaccine (UF)Historical Data 10/29/2008    Pneumococcal polysaccharide vaccine (PPSV-23) 10/29/2008    TD Vaccine 5/24/2005    Tdap Vaccine 1/29/2013      Below and/or attached are the medications your provider expects you to take. Review all of your home medications and newly ordered medications with your provider and/or pharmacist. Follow medication instructions as directed by your provider and/or pharmacist. Please keep your medication list with you and share with your provider. Update the information when medications are discontinued, doses are changed, or new medications (including over-the-counter products) are added; and carry medication information at all times in the event of emergency situations     Allergies:  BEE VENOM - Anaphylaxis     ASA - Unspecified     CODEINE - Itching     CONTRAST MEDIA WITH IODINE - (reactions not documented)     HYDROCODONE-IBUPROFEN - (reactions not documented)     IBUPROFEN - Unspecified     MILK - (reactions not documented)     NORCO - Itching     OTHER DRUG - Unspecified     OTHER ENVIRONMENTAL - Rash     OTHER FOOD - Unspecified     PCN - Hives     SULFA DRUGS - Hives     TALWIN - Unspecified     VICODIN - Itching               Medications  Valid as of: April 25, 2017 -  2:13 PM    Generic Name Brand Name Tablet Size Instructions for use    Allopurinol (Tab) ZYLOPRIM 300 MG TAKE ONE TABLET BY MOUTH DAILY        Calcitriol (Cap) ROCALTROL 0.25 MCG Take 0.25 mcg by mouth every day.        Calcium Carbonate Antacid   Take 3,500 mg by mouth 2 Times a Day.        Calcium Carbonate Antacid   Take  by mouth.        Carbidopa-Levodopa (Tab) SINEMET  MG TAKE ONE TABLET BY MOUTH DAILY (GENERIC SINEMET)        Cholecalciferol (Cap)  Vitamin D3 1000 UNITS Take  by mouth.        EPINEPHrine   by Injection route.        Ergocalciferol (Cap) DRISDOL 93181 UNITS Take 5,000 Units by mouth 1 time daily as needed. On Wed and Sun        Gabapentin (Cap) NEURONTIN 300 MG TAKE TWO CAPSULES BY MOUTH THREE TIMES A DAY        Levothyroxine Sodium (Tab) SYNTHROID 112 MCG Take 112 mcg by mouth Every morning on an empty stomach.        Levothyroxine Sodium (Tab) SYNTHROID 75 MCG Take 75 mcg by mouth Every morning on an empty stomach.        LORazepam (Tab) ATIVAN 1 MG One to two tabs by mouth at night        Meclizine HCl (Tab) ANTIVERT 12.5 MG Take 1 Tab by mouth 3 times a day as needed for Dizziness.        Morphine Sulfate (Tab CR) MS CONTIN 30 MG Take 30 mg by mouth every 12 hours.        OxyCODONE HCl (Tab) Oxycodone HCl 20 MG Take  by mouth.        Polyethylene Glycol 3350 (Pack) MIRALAX  Take 1 Packet by mouth every day.        Potassium Chloride Charo CR (Tab CR) K-DUR 10 MEQ Take 10 mEq by mouth 3 times a day. Twice on Mon and Fri., Tuesday, Wednesday,Thursday, Saturday and Sunday once a day        Potassium Chloride Charo CR (Tab CR) KLOR-CON M15 15 MEQ Take 15 mEq by mouth 2 times a day.        TraZODone HCl (Tab) DESYREL 50 MG One to two tabs by mouth at night for sleep.        .                 Medicines prescribed today were sent to:     [id number] - [place] - [address] [place] [address]    Phone: [phone] Fax: [phone]    Open 24 Hours?: No      Medication refill instructions:       If your prescription bottle indicates you have medication refills left, it is not necessary to call your provider’s office. Please contact your pharmacy and they will refill your medication.    If your prescription bottle indicates you do not have any refills left, you may request refills at any time through one of the following ways: The online myMedScore system (except Urgent Care), by calling your provider’s office, or by asking  your pharmacy to contact your provider’s office with a refill request. Medication refills are processed only during regular business hours and may not be available until the next business day. Your provider may request additional information or to have a follow-up visit with you prior to refilling your medication.   *Please Note: Medication refills are assigned a new Rx number when refilled electronically. Your pharmacy may indicate that no refills were authorized even though a new prescription for the same medication is available at the pharmacy. Please request the medicine by name with the pharmacy before contacting your provider for a refill.        Other Notes About Your Plan     Fall risk done 12/7/15             [email or web address] Access Code: [email or web address]  Expires: 5/21/2017 10:54 AM    Wukong.com  A secure, online tool to manage your health information     [employer]’s Wukong.com® is a secure, online tool that connects you to your personalized health information from the privacy of your home -- day or night - making it very easy for you to manage your healthcare. Once the activation process is completed, you can even access your medical information using the Wukong.com micheal, which is available for free in the Apple Micheal store or Google Play store.     Wukong.com provides the following levels of access (as shown below):   My Chart Features   [facility] Doctor [facility]  Specialists [facility]  Urgent  Care Non-[facility]  Primary Care  Doctor   Email your healthcare team securely and privately 24/7 X X X    Manage appointments: schedule your next appointment; view details of past/upcoming appointments X      Request prescription refills. X      View recent personal medical records, including lab and immunizations X X X X   View health record, including health history, allergies, medications X X X X   Read reports about your outpatient visits, procedures, consult and ER notes X X X X   See your discharge summary, which is a  recap of your hospital and/or ER visit that includes your diagnosis, lab results, and care plan. X X       How to register for Flapshare:  1. Go to  [email or web address].  2. Click on the Sign Up Now box, which takes you to the New Member Sign Up page. You will need to provide the following information:  a. Enter your Flapshare Access Code exactly as it appears at the top of this page. (You will not need to use this code after you’ve completed the sign-up process. If you do not sign up before the expiration date, you must request a new code.)   b. Enter your date of birth.   c. Enter your home email address.   d. Click Submit, and follow the next screen’s instructions.  3. Create a Flapshare ID. This will be your Flapshare login ID and cannot be changed, so think of one that is secure and easy to remember.  4. Create a Flapshare password. You can change your password at any time.  5. Enter your Password Reset Question and Answer. This can be used at a later time if you forget your password.   6. Enter your e-mail address. This allows you to receive e-mail notifications when new information is available in Flapshare.  7. Click Sign Up. You can now view your health information.    For assistance activating your Flapshare account, call [phone]        Quit Tobacco Information     Do you want to quit using tobacco?    Quitting tobacco decreases risks of cancer, heart and lung disease, increases life expectancy, improves sense of taste and smell, and increases spending money, among other benefits.    If you are thinking about quitting, we can help.  • [employer] Quit Tobacco Program: 489.788.6845  o Program occurs weekly for four weeks and includes pharmacist consultation on products to support quitting smoking or chewing tobacco. A provider referral is needed for pharmacist consultation.  • Tobacco Users Help Hotline: 4-800-QUIT-NOW (975-7032) or https://[place].quitlogix.org/  o Free, confidential telephone and online  coaching for [place] residents. Sessions are designed on a schedule that is convenient for you. Eligible clients receive free nicotine replacement therapy.  • Nationally: www.smokefree.gov  o Information and professional assistance to support both immediate and long-term needs as you become, and remain, a non-smoker. Smokefree.gov allows you to choose the help that best fits your needs.

## 2017-04-26 ENCOUNTER — HOSPITAL ENCOUNTER (OUTPATIENT)
Facility: MEDICAL CENTER | Age: 62
End: 2017-04-26
Attending: NURSE PRACTITIONER
Payer: COMMERCIAL

## 2017-04-26 ENCOUNTER — HOSPITAL ENCOUNTER (OUTPATIENT)
Dept: RADIOLOGY | Facility: MEDICAL CENTER | Age: 62
End: 2017-04-26
Attending: ORTHOPAEDIC SURGERY | Admitting: ORTHOPAEDIC SURGERY
Payer: COMMERCIAL

## 2017-04-26 DIAGNOSIS — M10.9 GOUT, ARTHRITIS: ICD-10-CM

## 2017-04-26 DIAGNOSIS — Z01.812 PRE-PROCEDURAL LABORATORY EXAMINATION: ICD-10-CM

## 2017-04-26 DIAGNOSIS — Z01.810 PRE-OPERATIVE CARDIOVASCULAR EXAMINATION: ICD-10-CM

## 2017-04-26 DIAGNOSIS — Z01.811 PRE-OPERATIVE RESPIRATORY EXAMINATION: ICD-10-CM

## 2017-04-26 PROBLEM — F11.20 OPIOID DEPENDENCE, CONTINUOUS (HCC): Status: ACTIVE | Noted: 2017-04-26

## 2017-04-26 PROBLEM — F13.20 BENZODIAZEPINE DEPENDENCE, CONTINUOUS (HCC): Status: ACTIVE | Noted: 2017-04-26

## 2017-04-26 LAB
ANION GAP SERPL CALC-SCNC: 7 MMOL/L (ref 0–11.9)
APPEARANCE UR: ABNORMAL
APTT PPP: 33.5 SEC (ref 24.7–36)
BACTERIA #/AREA URNS HPF: ABNORMAL /HPF
BASOPHILS # BLD AUTO: 0.5 % (ref 0–1.8)
BASOPHILS # BLD: 0.03 K/UL (ref 0–0.12)
BILIRUB UR QL STRIP.AUTO: NEGATIVE
BUN SERPL-MCNC: 7 MG/DL (ref 8–22)
CALCIUM SERPL-MCNC: 9.1 MG/DL (ref 8.5–10.5)
CHLORIDE SERPL-SCNC: 100 MMOL/L (ref 96–112)
CO2 SERPL-SCNC: 30 MMOL/L (ref 20–33)
COLOR UR: YELLOW
CREAT SERPL-MCNC: 0.68 MG/DL (ref 0.5–1.4)
CULTURE IF INDICATED INDCX: NO UA CULTURE
EKG IMPRESSION: NORMAL
EOSINOPHIL # BLD AUTO: 0.05 K/UL (ref 0–0.51)
EOSINOPHIL NFR BLD: 0.8 % (ref 0–6.9)
EPI CELLS #/AREA URNS HPF: ABNORMAL /HPF
ERYTHROCYTE [DISTWIDTH] IN BLOOD BY AUTOMATED COUNT: 49.7 FL (ref 35.9–50)
ERYTHROCYTE [SEDIMENTATION RATE] IN BLOOD BY WESTERGREN METHOD: 36 MM/HOUR (ref 0–30)
GFR SERPL CREATININE-BSD FRML MDRD: >60 ML/MIN/1.73 M 2
GLUCOSE SERPL-MCNC: 92 MG/DL (ref 65–99)
GLUCOSE UR STRIP.AUTO-MCNC: NEGATIVE MG/DL
HCT VFR BLD AUTO: 46.8 % (ref 37–47)
HGB BLD-MCNC: 16.1 G/DL (ref 12–16)
IMM GRANULOCYTES # BLD AUTO: 0.03 K/UL (ref 0–0.11)
IMM GRANULOCYTES NFR BLD AUTO: 0.5 % (ref 0–0.9)
INR PPP: 1.02 (ref 0.87–1.13)
KETONES UR STRIP.AUTO-MCNC: NEGATIVE MG/DL
LEUKOCYTE ESTERASE UR QL STRIP.AUTO: NEGATIVE
LYMPHOCYTES # BLD AUTO: 1.44 K/UL (ref 1–4.8)
LYMPHOCYTES NFR BLD: 23.9 % (ref 22–41)
MCH RBC QN AUTO: 35.7 PG (ref 27–33)
MCHC RBC AUTO-ENTMCNC: 34.4 G/DL (ref 33.6–35)
MCV RBC AUTO: 103.8 FL (ref 81.4–97.8)
MICRO URNS: ABNORMAL
MONOCYTES # BLD AUTO: 0.36 K/UL (ref 0–0.85)
MONOCYTES NFR BLD AUTO: 6 % (ref 0–13.4)
NEUTROPHILS # BLD AUTO: 4.11 K/UL (ref 2–7.15)
NEUTROPHILS NFR BLD: 68.3 % (ref 44–72)
NITRITE UR QL STRIP.AUTO: NEGATIVE
NRBC # BLD AUTO: 0 K/UL
NRBC BLD AUTO-RTO: 0 /100 WBC
PH UR STRIP.AUTO: 6.5 [PH]
PLATELET # BLD AUTO: 169 K/UL (ref 164–446)
PMV BLD AUTO: 9.9 FL (ref 9–12.9)
POTASSIUM SERPL-SCNC: 3.6 MMOL/L (ref 3.6–5.5)
PROT UR QL STRIP: NEGATIVE MG/DL
PROTHROMBIN TIME: 13.7 SEC (ref 12–14.6)
RBC # BLD AUTO: 4.51 M/UL (ref 4.2–5.4)
RBC # URNS HPF: ABNORMAL /HPF
RBC UR QL AUTO: NEGATIVE
SODIUM SERPL-SCNC: 137 MMOL/L (ref 135–145)
SP GR UR STRIP.AUTO: 1
URATE SERPL-MCNC: 2.2 MG/DL (ref 1.9–8.2)
WBC # BLD AUTO: 6 K/UL (ref 4.8–10.8)

## 2017-04-26 PROCEDURE — 93010 ELECTROCARDIOGRAM REPORT: CPT | Performed by: INTERNAL MEDICINE

## 2017-04-26 PROCEDURE — 85025 COMPLETE CBC W/AUTO DIFF WBC: CPT

## 2017-04-26 PROCEDURE — 36415 COLL VENOUS BLD VENIPUNCTURE: CPT

## 2017-04-26 PROCEDURE — 85652 RBC SED RATE AUTOMATED: CPT

## 2017-04-26 PROCEDURE — 81001 URINALYSIS AUTO W/SCOPE: CPT

## 2017-04-26 PROCEDURE — 80048 BASIC METABOLIC PNL TOTAL CA: CPT

## 2017-04-26 PROCEDURE — 85610 PROTHROMBIN TIME: CPT

## 2017-04-26 PROCEDURE — 85730 THROMBOPLASTIN TIME PARTIAL: CPT

## 2017-04-26 PROCEDURE — 84550 ASSAY OF BLOOD/URIC ACID: CPT

## 2017-04-26 PROCEDURE — 71020 DX-CHEST-2 VIEWS: CPT

## 2017-04-26 PROCEDURE — 93005 ELECTROCARDIOGRAM TRACING: CPT

## 2017-04-26 RX ORDER — CARISOPRODOL 350 MG/1
350 TABLET ORAL 3 TIMES DAILY PRN
COMMUNITY
End: 2018-05-01 | Stop reason: SDUPTHER

## 2017-04-26 NOTE — ASSESSMENT & PLAN NOTE
Followed by endocrinology, accidental parathyroidectomy hypoparathyroidism secondary to that. Serial calcium and albumin levels are monitored.

## 2017-04-27 ENCOUNTER — APPOINTMENT (OUTPATIENT)
Dept: BEHAVIORAL HEALTH | Facility: PHYSICIAN GROUP | Age: 62
End: 2017-04-27
Payer: COMMERCIAL

## 2017-06-08 RX ORDER — LORAZEPAM 1 MG/1
TABLET ORAL
Qty: 45 TAB | Refills: 1 | Status: ON HOLD
Start: 2017-06-08 | End: 2017-08-07

## 2017-06-09 NOTE — TELEPHONE ENCOUNTER
Was the patient seen in the last year in this department? Yes     Does patient have an active prescription for medications requested? No     Received Request Via: Pharmacy      Pt met protocol?: Yes, labs and ov 4/17

## 2017-06-22 RX ORDER — EPINEPHRINE 0.3 MG/.3ML
0.3 INJECTION SUBCUTANEOUS ONCE
Qty: 1 EACH | Refills: 1 | Status: SHIPPED | OUTPATIENT
Start: 2017-06-22 | End: 2017-06-22

## 2017-06-22 NOTE — TELEPHONE ENCOUNTER
Was the patient seen in the last year in this department? Yes     Does patient have an active prescription for medications requested? No     Received Request Via: Pharmacy      Pt met protocol?: Yes    LAST OV 04/25/2017

## 2017-07-20 DIAGNOSIS — Z01.812 PRE-OPERATIVE LABORATORY EXAMINATION: ICD-10-CM

## 2017-07-20 DIAGNOSIS — Z01.810 PRE-OPERATIVE CARDIOVASCULAR EXAMINATION: ICD-10-CM

## 2017-07-20 LAB
ANION GAP SERPL CALC-SCNC: 8 MMOL/L (ref 0–11.9)
APPEARANCE UR: CLEAR
APTT PPP: 31.4 SEC (ref 24.7–36)
BASOPHILS # BLD AUTO: 0.5 % (ref 0–1.8)
BASOPHILS # BLD: 0.03 K/UL (ref 0–0.12)
BILIRUB UR QL STRIP.AUTO: NEGATIVE
BUN SERPL-MCNC: 4 MG/DL (ref 8–22)
CALCIUM SERPL-MCNC: 9.5 MG/DL (ref 8.5–10.5)
CHLORIDE SERPL-SCNC: 101 MMOL/L (ref 96–112)
CO2 SERPL-SCNC: 27 MMOL/L (ref 20–33)
COLOR UR: YELLOW
CREAT SERPL-MCNC: 0.6 MG/DL (ref 0.5–1.4)
EOSINOPHIL # BLD AUTO: 0.05 K/UL (ref 0–0.51)
EOSINOPHIL NFR BLD: 0.8 % (ref 0–6.9)
ERYTHROCYTE [DISTWIDTH] IN BLOOD BY AUTOMATED COUNT: 51.1 FL (ref 35.9–50)
ERYTHROCYTE [SEDIMENTATION RATE] IN BLOOD BY WESTERGREN METHOD: 18 MM/HOUR (ref 0–30)
GFR SERPL CREATININE-BSD FRML MDRD: >60 ML/MIN/1.73 M 2
GLUCOSE SERPL-MCNC: 82 MG/DL (ref 65–99)
GLUCOSE UR STRIP.AUTO-MCNC: NEGATIVE MG/DL
HCT VFR BLD AUTO: 48.1 % (ref 37–47)
HGB BLD-MCNC: 16.4 G/DL (ref 12–16)
IMM GRANULOCYTES # BLD AUTO: 0.03 K/UL (ref 0–0.11)
IMM GRANULOCYTES NFR BLD AUTO: 0.5 % (ref 0–0.9)
INR PPP: 1.05 (ref 0.87–1.13)
KETONES UR STRIP.AUTO-MCNC: NEGATIVE MG/DL
LEUKOCYTE ESTERASE UR QL STRIP.AUTO: NEGATIVE
LYMPHOCYTES # BLD AUTO: 1.47 K/UL (ref 1–4.8)
LYMPHOCYTES NFR BLD: 24.9 % (ref 22–41)
MCH RBC QN AUTO: 34.5 PG (ref 27–33)
MCHC RBC AUTO-ENTMCNC: 34.1 G/DL (ref 33.6–35)
MCV RBC AUTO: 101.1 FL (ref 81.4–97.8)
MICRO URNS: NORMAL
MONOCYTES # BLD AUTO: 0.37 K/UL (ref 0–0.85)
MONOCYTES NFR BLD AUTO: 6.3 % (ref 0–13.4)
NEUTROPHILS # BLD AUTO: 3.95 K/UL (ref 2–7.15)
NEUTROPHILS NFR BLD: 67 % (ref 44–72)
NITRITE UR QL STRIP.AUTO: NEGATIVE
NRBC # BLD AUTO: 0 K/UL
NRBC BLD AUTO-RTO: 0 /100 WBC
PH UR STRIP.AUTO: 6.5 [PH]
PLATELET # BLD AUTO: 147 K/UL (ref 164–446)
PMV BLD AUTO: 10.5 FL (ref 9–12.9)
POTASSIUM SERPL-SCNC: 3.8 MMOL/L (ref 3.6–5.5)
PROT UR QL STRIP: NEGATIVE MG/DL
PROTHROMBIN TIME: 14 SEC (ref 12–14.6)
RBC # BLD AUTO: 4.76 M/UL (ref 4.2–5.4)
RBC UR QL AUTO: NEGATIVE
SODIUM SERPL-SCNC: 136 MMOL/L (ref 135–145)
SP GR UR STRIP.AUTO: 1.01
UROBILINOGEN UR STRIP.AUTO-MCNC: 0.2 MG/DL
WBC # BLD AUTO: 5.9 K/UL (ref 4.8–10.8)

## 2017-07-20 PROCEDURE — 80048 BASIC METABOLIC PNL TOTAL CA: CPT

## 2017-07-20 PROCEDURE — 85730 THROMBOPLASTIN TIME PARTIAL: CPT

## 2017-07-20 PROCEDURE — 85610 PROTHROMBIN TIME: CPT

## 2017-07-20 PROCEDURE — 81003 URINALYSIS AUTO W/O SCOPE: CPT

## 2017-07-20 PROCEDURE — 85025 COMPLETE CBC W/AUTO DIFF WBC: CPT

## 2017-07-20 PROCEDURE — 36415 COLL VENOUS BLD VENIPUNCTURE: CPT

## 2017-07-20 PROCEDURE — 85652 RBC SED RATE AUTOMATED: CPT

## 2017-07-20 RX ORDER — POTASSIUM CHLORIDE 750 MG/1
10-20 CAPSULE, EXTENDED RELEASE ORAL SEE ADMIN INSTRUCTIONS
Status: ON HOLD | COMMUNITY
End: 2017-08-07

## 2017-07-20 RX ORDER — ACETAMINOPHEN 500 MG
500-1000 TABLET ORAL EVERY 6 HOURS PRN
COMMUNITY
End: 2018-09-19

## 2017-07-20 RX ORDER — EPINEPHRINE 0.3 MG/.3ML
0.3 INJECTION SUBCUTANEOUS PRN
Status: ON HOLD | COMMUNITY
End: 2017-08-07

## 2017-07-20 RX ORDER — LEVOTHYROXINE SODIUM 88 UG/1
88 TABLET ORAL
Status: ON HOLD | COMMUNITY
End: 2017-08-07

## 2017-07-20 RX ORDER — COVID-19 ANTIGEN TEST
220 KIT MISCELLANEOUS EVERY 12 HOURS
COMMUNITY
End: 2018-09-19

## 2017-07-20 RX ORDER — LIDOCAINE 50 MG/G
2 PATCH TOPICAL SEE ADMIN INSTRUCTIONS
COMMUNITY
End: 2018-09-19

## 2017-07-20 RX ORDER — MECLIZINE HCL 12.5 MG/1
12.5 TABLET ORAL 2 TIMES DAILY
COMMUNITY
End: 2017-12-13 | Stop reason: SDUPTHER

## 2017-07-24 RX ORDER — TRAZODONE HYDROCHLORIDE 50 MG/1
TABLET ORAL
Qty: 60 TAB | Refills: 4 | Status: SHIPPED | OUTPATIENT
Start: 2017-07-24 | End: 2018-01-14 | Stop reason: SDUPTHER

## 2017-08-02 ENCOUNTER — RX ONLY (OUTPATIENT)
Age: 62
Setting detail: RX ONLY
End: 2017-08-02

## 2017-08-02 PROBLEM — C44.311 BASAL CELL CARCINOMA OF SKIN OF NOSE: Status: ACTIVE | Noted: 2017-08-02

## 2017-08-02 PROBLEM — C44.519 BASAL CELL CARCINOMA OF SKIN OF OTHER PART OF TRUNK: Status: ACTIVE | Noted: 2017-08-02

## 2017-08-07 ENCOUNTER — APPOINTMENT (OUTPATIENT)
Dept: RADIOLOGY | Facility: MEDICAL CENTER | Age: 62
DRG: 908 | End: 2017-08-07
Attending: ORTHOPAEDIC SURGERY
Payer: COMMERCIAL

## 2017-08-07 ENCOUNTER — HOSPITAL ENCOUNTER (INPATIENT)
Facility: MEDICAL CENTER | Age: 62
LOS: 4 days | DRG: 908 | End: 2017-08-11
Attending: ORTHOPAEDIC SURGERY | Admitting: ORTHOPAEDIC SURGERY
Payer: COMMERCIAL

## 2017-08-07 DIAGNOSIS — M54.40 ACUTE BILATERAL LOW BACK PAIN WITH SCIATICA, SCIATICA LATERALITY UNSPECIFIED: ICD-10-CM

## 2017-08-07 PROBLEM — Z96.649 PAIN DUE TO HIP JOINT PROSTHESIS (HCC): Status: ACTIVE | Noted: 2017-08-07

## 2017-08-07 PROBLEM — T84.84XA PAIN DUE TO HIP JOINT PROSTHESIS (HCC): Status: ACTIVE | Noted: 2017-08-07

## 2017-08-07 PROCEDURE — A9270 NON-COVERED ITEM OR SERVICE: HCPCS

## 2017-08-07 PROCEDURE — 700102 HCHG RX REV CODE 250 W/ 637 OVERRIDE(OP): Performed by: ORTHOPAEDIC SURGERY

## 2017-08-07 PROCEDURE — 700102 HCHG RX REV CODE 250 W/ 637 OVERRIDE(OP)

## 2017-08-07 PROCEDURE — 502626 HCHG SURGIFLO HEMOSTATIC MATRIX 6ML: Performed by: ORTHOPAEDIC SURGERY

## 2017-08-07 PROCEDURE — 700101 HCHG RX REV CODE 250

## 2017-08-07 PROCEDURE — 160035 HCHG PACU - 1ST 60 MINS PHASE I: Performed by: ORTHOPAEDIC SURGERY

## 2017-08-07 PROCEDURE — 0QP104Z REMOVAL OF INTERNAL FIXATION DEVICE FROM SACRUM, OPEN APPROACH: ICD-10-PCS | Performed by: ORTHOPAEDIC SURGERY

## 2017-08-07 PROCEDURE — 160031 HCHG SURGERY MINUTES - 1ST 30 MINS LEVEL 5: Performed by: ORTHOPAEDIC SURGERY

## 2017-08-07 PROCEDURE — 160002 HCHG RECOVERY MINUTES (STAT): Performed by: ORTHOPAEDIC SURGERY

## 2017-08-07 PROCEDURE — 160036 HCHG PACU - EA ADDL 30 MINS PHASE I: Performed by: ORTHOPAEDIC SURGERY

## 2017-08-07 PROCEDURE — A9270 NON-COVERED ITEM OR SERVICE: HCPCS | Performed by: ORTHOPAEDIC SURGERY

## 2017-08-07 PROCEDURE — 0KBG0ZZ EXCISION OF LEFT TRUNK MUSCLE, OPEN APPROACH: ICD-10-PCS | Performed by: ORTHOPAEDIC SURGERY

## 2017-08-07 PROCEDURE — 0KCF0ZZ EXTIRPATION OF MATTER FROM RIGHT TRUNK MUSCLE, OPEN APPROACH: ICD-10-PCS | Performed by: ORTHOPAEDIC SURGERY

## 2017-08-07 PROCEDURE — 700111 HCHG RX REV CODE 636 W/ 250 OVERRIDE (IP): Performed by: ORTHOPAEDIC SURGERY

## 2017-08-07 PROCEDURE — 700101 HCHG RX REV CODE 250: Performed by: ORTHOPAEDIC SURGERY

## 2017-08-07 PROCEDURE — A4314 CATH W/DRAINAGE 2-WAY LATEX: HCPCS | Performed by: ORTHOPAEDIC SURGERY

## 2017-08-07 PROCEDURE — 0KBF0ZZ EXCISION OF RIGHT TRUNK MUSCLE, OPEN APPROACH: ICD-10-PCS | Performed by: ORTHOPAEDIC SURGERY

## 2017-08-07 PROCEDURE — 0SG00K1 FUSION OF LUMBAR VERTEBRAL JOINT WITH NONAUTOLOGOUS TISSUE SUBSTITUTE, POSTERIOR APPROACH, POSTERIOR COLUMN, OPEN APPROACH: ICD-10-PCS | Performed by: ORTHOPAEDIC SURGERY

## 2017-08-07 PROCEDURE — 501838 HCHG SUTURE GENERAL: Performed by: ORTHOPAEDIC SURGERY

## 2017-08-07 PROCEDURE — 500885 HCHG PACK, JACKSON TABLE: Performed by: ORTHOPAEDIC SURGERY

## 2017-08-07 PROCEDURE — L0631 LSO SAG R AN/POS PNL PRE CST: HCPCS

## 2017-08-07 PROCEDURE — 160048 HCHG OR STATISTICAL LEVEL 1-5: Performed by: ORTHOPAEDIC SURGERY

## 2017-08-07 PROCEDURE — 0QP004Z REMOVAL OF INTERNAL FIXATION DEVICE FROM LUMBAR VERTEBRA, OPEN APPROACH: ICD-10-PCS | Performed by: ORTHOPAEDIC SURGERY

## 2017-08-07 PROCEDURE — 500811 HCHG LENS/HOOD FOR SPACESUIT: Performed by: ORTHOPAEDIC SURGERY

## 2017-08-07 PROCEDURE — 72020 X-RAY EXAM OF SPINE 1 VIEW: CPT

## 2017-08-07 PROCEDURE — 502000 HCHG MISC OR IMPLANTS RC 0278: Performed by: ORTHOPAEDIC SURGERY

## 2017-08-07 PROCEDURE — 500858 HCHG NEEDLE, KYPHOPLASTY 11GA: Performed by: ORTHOPAEDIC SURGERY

## 2017-08-07 PROCEDURE — 160042 HCHG SURGERY MINUTES - EA ADDL 1 MIN LEVEL 5: Performed by: ORTHOPAEDIC SURGERY

## 2017-08-07 PROCEDURE — 700112 HCHG RX REV CODE 229: Performed by: ORTHOPAEDIC SURGERY

## 2017-08-07 PROCEDURE — 770006 HCHG ROOM/CARE - MED/SURG/GYN SEMI*

## 2017-08-07 PROCEDURE — 700111 HCHG RX REV CODE 636 W/ 250 OVERRIDE (IP)

## 2017-08-07 PROCEDURE — 0KCG0ZZ EXTIRPATION OF MATTER FROM LEFT TRUNK MUSCLE, OPEN APPROACH: ICD-10-PCS | Performed by: ORTHOPAEDIC SURGERY

## 2017-08-07 PROCEDURE — 160009 HCHG ANES TIME/MIN: Performed by: ORTHOPAEDIC SURGERY

## 2017-08-07 DEVICE — IMPLANTABLE DEVICE: Type: IMPLANTABLE DEVICE | Site: SPINE LUMBAR | Status: FUNCTIONAL

## 2017-08-07 RX ORDER — CALCITRIOL 0.25 UG/1
0.25 CAPSULE, LIQUID FILLED ORAL DAILY
Status: DISCONTINUED | OUTPATIENT
Start: 2017-08-08 | End: 2017-08-11 | Stop reason: HOSPADM

## 2017-08-07 RX ORDER — BISACODYL 10 MG
10 SUPPOSITORY, RECTAL RECTAL
Status: DISCONTINUED | OUTPATIENT
Start: 2017-08-07 | End: 2017-08-11 | Stop reason: HOSPADM

## 2017-08-07 RX ORDER — BACITRACIN 50000 [IU]/1
INJECTION, POWDER, FOR SOLUTION INTRAMUSCULAR
Status: DISCONTINUED | OUTPATIENT
Start: 2017-08-07 | End: 2017-08-07 | Stop reason: HOSPADM

## 2017-08-07 RX ORDER — MECLIZINE HYDROCHLORIDE 25 MG/1
12.5 TABLET ORAL 2 TIMES DAILY
Status: DISCONTINUED | OUTPATIENT
Start: 2017-08-07 | End: 2017-08-11 | Stop reason: HOSPADM

## 2017-08-07 RX ORDER — ONDANSETRON 2 MG/ML
4 INJECTION INTRAMUSCULAR; INTRAVENOUS EVERY 4 HOURS PRN
Status: DISCONTINUED | OUTPATIENT
Start: 2017-08-07 | End: 2017-08-11 | Stop reason: HOSPADM

## 2017-08-07 RX ORDER — OXYCODONE HYDROCHLORIDE 10 MG/1
20 TABLET ORAL
Status: DISCONTINUED | OUTPATIENT
Start: 2017-08-07 | End: 2017-08-08

## 2017-08-07 RX ORDER — LORAZEPAM 0.5 MG/1
1-2 TABLET ORAL
Status: DISCONTINUED | OUTPATIENT
Start: 2017-08-07 | End: 2017-08-07

## 2017-08-07 RX ORDER — DIPHENHYDRAMINE HCL 25 MG
25 TABLET ORAL EVERY 6 HOURS PRN
Status: DISCONTINUED | OUTPATIENT
Start: 2017-08-07 | End: 2017-08-11 | Stop reason: HOSPADM

## 2017-08-07 RX ORDER — POLYETHYLENE GLYCOL 3350 17 G/17G
17 POWDER, FOR SOLUTION ORAL DAILY
COMMUNITY
End: 2018-09-12

## 2017-08-07 RX ORDER — OXYCODONE HYDROCHLORIDE 10 MG/1
10 TABLET ORAL
Status: DISCONTINUED | OUTPATIENT
Start: 2017-08-07 | End: 2017-08-11 | Stop reason: HOSPADM

## 2017-08-07 RX ORDER — DIPHENHYDRAMINE HYDROCHLORIDE 50 MG/ML
25 INJECTION INTRAMUSCULAR; INTRAVENOUS EVERY 6 HOURS PRN
Status: DISCONTINUED | OUTPATIENT
Start: 2017-08-07 | End: 2017-08-11 | Stop reason: HOSPADM

## 2017-08-07 RX ORDER — NICOTINE 21 MG/24HR
21 PATCH, TRANSDERMAL 24 HOURS TRANSDERMAL
Status: DISCONTINUED | OUTPATIENT
Start: 2017-08-07 | End: 2017-08-11 | Stop reason: HOSPADM

## 2017-08-07 RX ORDER — SODIUM CHLORIDE, SODIUM LACTATE, POTASSIUM CHLORIDE, CALCIUM CHLORIDE 600; 310; 30; 20 MG/100ML; MG/100ML; MG/100ML; MG/100ML
INJECTION, SOLUTION INTRAVENOUS CONTINUOUS
Status: DISCONTINUED | OUTPATIENT
Start: 2017-08-07 | End: 2017-08-11 | Stop reason: HOSPADM

## 2017-08-07 RX ORDER — VANCOMYCIN HYDROCHLORIDE 500 MG/10ML
INJECTION, POWDER, LYOPHILIZED, FOR SOLUTION INTRAVENOUS
Status: DISCONTINUED | OUTPATIENT
Start: 2017-08-07 | End: 2017-08-07 | Stop reason: HOSPADM

## 2017-08-07 RX ORDER — CALCIUM CARBONATE 500 MG/1
1000 TABLET, CHEWABLE ORAL DAILY
Status: DISCONTINUED | OUTPATIENT
Start: 2017-08-08 | End: 2017-08-11 | Stop reason: HOSPADM

## 2017-08-07 RX ORDER — POTASSIUM CHLORIDE 750 MG/1
10-20 TABLET, EXTENDED RELEASE ORAL DAILY
COMMUNITY
End: 2017-10-12 | Stop reason: SDUPTHER

## 2017-08-07 RX ORDER — MORPHINE SULFATE 30 MG/1
30 TABLET, FILM COATED, EXTENDED RELEASE ORAL EVERY 12 HOURS
Status: DISCONTINUED | OUTPATIENT
Start: 2017-08-07 | End: 2017-08-11 | Stop reason: HOSPADM

## 2017-08-07 RX ORDER — GABAPENTIN 300 MG/1
CAPSULE ORAL
Status: COMPLETED
Start: 2017-08-07 | End: 2017-08-07

## 2017-08-07 RX ORDER — ACETAMINOPHEN 500 MG
TABLET ORAL
Status: COMPLETED
Start: 2017-08-07 | End: 2017-08-07

## 2017-08-07 RX ORDER — LEVOTHYROXINE SODIUM 88 UG/1
44-88 TABLET ORAL
COMMUNITY
End: 2017-11-03 | Stop reason: SDUPTHER

## 2017-08-07 RX ORDER — SODIUM CHLORIDE AND POTASSIUM CHLORIDE 150; 900 MG/100ML; MG/100ML
INJECTION, SOLUTION INTRAVENOUS CONTINUOUS
Status: DISCONTINUED | OUTPATIENT
Start: 2017-08-07 | End: 2017-08-11 | Stop reason: HOSPADM

## 2017-08-07 RX ORDER — AMOXICILLIN 250 MG
1 CAPSULE ORAL NIGHTLY
Status: DISCONTINUED | OUTPATIENT
Start: 2017-08-07 | End: 2017-08-11 | Stop reason: HOSPADM

## 2017-08-07 RX ORDER — LORAZEPAM 1 MG/1
2 TABLET ORAL
Status: DISCONTINUED | OUTPATIENT
Start: 2017-08-07 | End: 2017-08-11 | Stop reason: HOSPADM

## 2017-08-07 RX ORDER — POTASSIUM CHLORIDE 20 MEQ/1
10-20 TABLET, EXTENDED RELEASE ORAL DAILY
Status: DISCONTINUED | OUTPATIENT
Start: 2017-08-07 | End: 2017-08-11 | Stop reason: HOSPADM

## 2017-08-07 RX ORDER — NAPROXEN 250 MG/1
250 TABLET ORAL EVERY 12 HOURS
Status: DISCONTINUED | OUTPATIENT
Start: 2017-08-07 | End: 2017-08-11 | Stop reason: HOSPADM

## 2017-08-07 RX ORDER — POLYETHYLENE GLYCOL 3350 17 G/17G
1 POWDER, FOR SOLUTION ORAL 2 TIMES DAILY PRN
Status: DISCONTINUED | OUTPATIENT
Start: 2017-08-07 | End: 2017-08-11 | Stop reason: HOSPADM

## 2017-08-07 RX ORDER — ACETAMINOPHEN 500 MG
1000 TABLET ORAL EVERY 6 HOURS PRN
Status: DISCONTINUED | OUTPATIENT
Start: 2017-08-07 | End: 2017-08-11 | Stop reason: HOSPADM

## 2017-08-07 RX ORDER — CARISOPRODOL 350 MG/1
350 TABLET ORAL 3 TIMES DAILY PRN
Status: DISCONTINUED | OUTPATIENT
Start: 2017-08-07 | End: 2017-08-11 | Stop reason: HOSPADM

## 2017-08-07 RX ORDER — LIDOCAINE 50 MG/G
2 PATCH TOPICAL SEE ADMIN INSTRUCTIONS
Status: DISCONTINUED | OUTPATIENT
Start: 2017-08-07 | End: 2017-08-11 | Stop reason: HOSPADM

## 2017-08-07 RX ORDER — ACETAMINOPHEN 500 MG
500-1000 TABLET ORAL EVERY 6 HOURS PRN
Status: DISCONTINUED | OUTPATIENT
Start: 2017-08-07 | End: 2017-08-11 | Stop reason: HOSPADM

## 2017-08-07 RX ORDER — LEVOTHYROXINE SODIUM 88 UG/1
88 TABLET ORAL
Status: DISCONTINUED | OUTPATIENT
Start: 2017-08-08 | End: 2017-08-11 | Stop reason: HOSPADM

## 2017-08-07 RX ORDER — MORPHINE SULFATE 10 MG/ML
5 INJECTION, SOLUTION INTRAMUSCULAR; INTRAVENOUS
Status: DISCONTINUED | OUTPATIENT
Start: 2017-08-07 | End: 2017-08-11 | Stop reason: HOSPADM

## 2017-08-07 RX ORDER — LORAZEPAM 1 MG/1
1 TABLET ORAL
Status: DISCONTINUED | OUTPATIENT
Start: 2017-08-07 | End: 2017-08-11 | Stop reason: HOSPADM

## 2017-08-07 RX ORDER — ONDANSETRON 4 MG/1
4 TABLET, ORALLY DISINTEGRATING ORAL EVERY 4 HOURS PRN
Status: DISCONTINUED | OUTPATIENT
Start: 2017-08-07 | End: 2017-08-11 | Stop reason: HOSPADM

## 2017-08-07 RX ORDER — POLYETHYLENE GLYCOL 3350 17 G/17G
1 POWDER, FOR SOLUTION ORAL DAILY
Status: DISCONTINUED | OUTPATIENT
Start: 2017-08-07 | End: 2017-08-11 | Stop reason: HOSPADM

## 2017-08-07 RX ORDER — LIDOCAINE AND PRILOCAINE 25; 25 MG/G; MG/G
1 CREAM TOPICAL
Status: COMPLETED | OUTPATIENT
Start: 2017-08-07 | End: 2017-08-07

## 2017-08-07 RX ORDER — LORAZEPAM 1 MG/1
1-2 TABLET ORAL
COMMUNITY
End: 2018-05-14 | Stop reason: SDUPTHER

## 2017-08-07 RX ORDER — BUPIVACAINE HYDROCHLORIDE AND EPINEPHRINE 5; 5 MG/ML; UG/ML
INJECTION, SOLUTION EPIDURAL; INTRACAUDAL; PERINEURAL
Status: DISCONTINUED | OUTPATIENT
Start: 2017-08-07 | End: 2017-08-07 | Stop reason: HOSPADM

## 2017-08-07 RX ORDER — OXYCODONE HCL 20 MG/1
TABLET, FILM COATED, EXTENDED RELEASE ORAL
Status: COMPLETED
Start: 2017-08-07 | End: 2017-08-07

## 2017-08-07 RX ORDER — OXYCODONE HCL 5 MG/5 ML
SOLUTION, ORAL ORAL
Status: COMPLETED
Start: 2017-08-07 | End: 2017-08-07

## 2017-08-07 RX ORDER — KETOROLAC TROMETHAMINE 30 MG/ML
30 INJECTION, SOLUTION INTRAMUSCULAR; INTRAVENOUS EVERY 6 HOURS PRN
Status: DISCONTINUED | OUTPATIENT
Start: 2017-08-07 | End: 2017-08-11 | Stop reason: HOSPADM

## 2017-08-07 RX ORDER — ENEMA 19; 7 G/133ML; G/133ML
1 ENEMA RECTAL
Status: DISCONTINUED | OUTPATIENT
Start: 2017-08-07 | End: 2017-08-11 | Stop reason: HOSPADM

## 2017-08-07 RX ORDER — CEFAZOLIN SODIUM 2 G/100ML
2 INJECTION, SOLUTION INTRAVENOUS EVERY 8 HOURS
Status: COMPLETED | OUTPATIENT
Start: 2017-08-07 | End: 2017-08-07

## 2017-08-07 RX ORDER — LABETALOL HYDROCHLORIDE 5 MG/ML
10 INJECTION, SOLUTION INTRAVENOUS
Status: DISCONTINUED | OUTPATIENT
Start: 2017-08-07 | End: 2017-08-11 | Stop reason: HOSPADM

## 2017-08-07 RX ORDER — AMOXICILLIN 250 MG
1 CAPSULE ORAL
Status: DISCONTINUED | OUTPATIENT
Start: 2017-08-07 | End: 2017-08-11 | Stop reason: HOSPADM

## 2017-08-07 RX ORDER — HYDRALAZINE HYDROCHLORIDE 20 MG/ML
10 INJECTION INTRAMUSCULAR; INTRAVENOUS
Status: DISCONTINUED | OUTPATIENT
Start: 2017-08-07 | End: 2017-08-11 | Stop reason: HOSPADM

## 2017-08-07 RX ORDER — DEXAMETHASONE SODIUM PHOSPHATE 4 MG/ML
6 INJECTION, SOLUTION INTRA-ARTICULAR; INTRALESIONAL; INTRAMUSCULAR; INTRAVENOUS; SOFT TISSUE EVERY 6 HOURS PRN
Status: DISCONTINUED | OUTPATIENT
Start: 2017-08-07 | End: 2017-08-11 | Stop reason: HOSPADM

## 2017-08-07 RX ORDER — DOCUSATE SODIUM 100 MG/1
100 CAPSULE, LIQUID FILLED ORAL 2 TIMES DAILY
Status: DISCONTINUED | OUTPATIENT
Start: 2017-08-07 | End: 2017-08-11 | Stop reason: HOSPADM

## 2017-08-07 RX ORDER — PROMETHAZINE HYDROCHLORIDE 25 MG/1
12.5-25 TABLET ORAL EVERY 4 HOURS PRN
Status: DISCONTINUED | OUTPATIENT
Start: 2017-08-07 | End: 2017-08-11 | Stop reason: HOSPADM

## 2017-08-07 RX ORDER — DIAZEPAM 5 MG/ML
5 INJECTION, SOLUTION INTRAMUSCULAR; INTRAVENOUS EVERY 6 HOURS PRN
Status: DISCONTINUED | OUTPATIENT
Start: 2017-08-07 | End: 2017-08-07

## 2017-08-07 RX ORDER — LIDOCAINE HYDROCHLORIDE 10 MG/ML
INJECTION, SOLUTION INFILTRATION; PERINEURAL
Status: COMPLETED
Start: 2017-08-07 | End: 2017-08-07

## 2017-08-07 RX ORDER — DIAZEPAM 5 MG/1
5 TABLET ORAL EVERY 6 HOURS PRN
Status: DISCONTINUED | OUTPATIENT
Start: 2017-08-07 | End: 2017-08-11 | Stop reason: HOSPADM

## 2017-08-07 RX ORDER — PROMETHAZINE HYDROCHLORIDE 25 MG/1
12.5-25 SUPPOSITORY RECTAL EVERY 4 HOURS PRN
Status: DISCONTINUED | OUTPATIENT
Start: 2017-08-07 | End: 2017-08-11 | Stop reason: HOSPADM

## 2017-08-07 RX ORDER — LABETALOL HYDROCHLORIDE 5 MG/ML
INJECTION, SOLUTION INTRAVENOUS
Status: COMPLETED
Start: 2017-08-07 | End: 2017-08-07

## 2017-08-07 RX ORDER — TRAZODONE HYDROCHLORIDE 50 MG/1
100 TABLET ORAL
Status: DISCONTINUED | OUTPATIENT
Start: 2017-08-07 | End: 2017-08-11 | Stop reason: HOSPADM

## 2017-08-07 RX ORDER — GABAPENTIN 300 MG/1
600 CAPSULE ORAL 3 TIMES DAILY
Status: DISCONTINUED | OUTPATIENT
Start: 2017-08-07 | End: 2017-08-11 | Stop reason: HOSPADM

## 2017-08-07 RX ORDER — LIDOCAINE HYDROCHLORIDE 10 MG/ML
0.5 INJECTION, SOLUTION INFILTRATION; PERINEURAL
Status: COMPLETED | OUTPATIENT
Start: 2017-08-07 | End: 2017-08-07

## 2017-08-07 RX ORDER — ALLOPURINOL 300 MG/1
300 TABLET ORAL DAILY
Status: DISCONTINUED | OUTPATIENT
Start: 2017-08-07 | End: 2017-08-11 | Stop reason: HOSPADM

## 2017-08-07 RX ADMIN — OXYCODONE HYDROCHLORIDE 10 MG: 5 SOLUTION ORAL at 11:55

## 2017-08-07 RX ADMIN — ALLOPURINOL 300 MG: 300 TABLET ORAL at 13:21

## 2017-08-07 RX ADMIN — CARISOPRODOL 350 MG: 350 TABLET ORAL at 15:40

## 2017-08-07 RX ADMIN — SODIUM CHLORIDE, SODIUM LACTATE, POTASSIUM CHLORIDE, CALCIUM CHLORIDE 730 ML: 600; 310; 30; 20 INJECTION, SOLUTION INTRAVENOUS at 07:45

## 2017-08-07 RX ADMIN — OXYCODONE HYDROCHLORIDE 20 MG: 10 TABLET ORAL at 22:59

## 2017-08-07 RX ADMIN — CEFAZOLIN SODIUM 2 G: 2 INJECTION, SOLUTION INTRAVENOUS at 15:38

## 2017-08-07 RX ADMIN — OXYCODONE HYDROCHLORIDE 20 MG: 10 TABLET ORAL at 17:34

## 2017-08-07 RX ADMIN — CARBIDOPA AND LEVODOPA 3 TABLET: 25; 100 TABLET ORAL at 13:23

## 2017-08-07 RX ADMIN — POLYETHYLENE GLYCOL 3350 1 PACKET: 17 POWDER, FOR SOLUTION ORAL at 13:24

## 2017-08-07 RX ADMIN — MORPHINE SULFATE 30 MG: 30 TABLET, EXTENDED RELEASE ORAL at 20:17

## 2017-08-07 RX ADMIN — LIDOCAINE HYDROCHLORIDE 0.5 ML: 10 INJECTION, SOLUTION INFILTRATION; PERINEURAL at 07:30

## 2017-08-07 RX ADMIN — OXYCODONE HYDROCHLORIDE 20 MG: 20 TABLET, FILM COATED, EXTENDED RELEASE ORAL at 07:15

## 2017-08-07 RX ADMIN — MORPHINE SULFATE 5 MG: 10 INJECTION INTRAVENOUS at 20:11

## 2017-08-07 RX ADMIN — MECLIZINE HYDROCHLORIDE 12.5 MG: 25 TABLET ORAL at 20:18

## 2017-08-07 RX ADMIN — CARBIDOPA AND LEVODOPA 2 TABLET: 25; 100 TABLET ORAL at 20:19

## 2017-08-07 RX ADMIN — CARISOPRODOL 350 MG: 350 TABLET ORAL at 22:59

## 2017-08-07 RX ADMIN — MORPHINE SULFATE 30 MG: 30 TABLET, EXTENDED RELEASE ORAL at 13:21

## 2017-08-07 RX ADMIN — GABAPENTIN 600 MG: 300 CAPSULE ORAL at 15:19

## 2017-08-07 RX ADMIN — LABETALOL HYDROCHLORIDE 10 MG: 5 INJECTION, SOLUTION INTRAVENOUS at 10:10

## 2017-08-07 RX ADMIN — ACETAMINOPHEN 100 MG: 500 TABLET, FILM COATED ORAL at 07:15

## 2017-08-07 RX ADMIN — DOCUSATE SODIUM 100 MG: 100 CAPSULE ORAL at 20:18

## 2017-08-07 RX ADMIN — CEFAZOLIN SODIUM 2 G: 2 INJECTION, SOLUTION INTRAVENOUS at 22:07

## 2017-08-07 RX ADMIN — STANDARDIZED SENNA CONCENTRATE AND DOCUSATE SODIUM 1 TABLET: 8.6; 5 TABLET, FILM COATED ORAL at 20:19

## 2017-08-07 RX ADMIN — NICOTINE 21 MG: 21 PATCH, EXTENDED RELEASE TRANSDERMAL at 13:21

## 2017-08-07 RX ADMIN — GABAPENTIN 600 MG: 300 CAPSULE ORAL at 20:18

## 2017-08-07 RX ADMIN — NAPROXEN 250 MG: 250 TABLET ORAL at 20:19

## 2017-08-07 RX ADMIN — POTASSIUM CHLORIDE AND SODIUM CHLORIDE: 900; 150 INJECTION, SOLUTION INTRAVENOUS at 13:20

## 2017-08-07 RX ADMIN — GABAPENTIN 300 MG: 300 CAPSULE ORAL at 07:00

## 2017-08-07 RX ADMIN — POTASSIUM CHLORIDE 20 MEQ: 1500 TABLET, EXTENDED RELEASE ORAL at 13:20

## 2017-08-07 ASSESSMENT — PATIENT HEALTH QUESTIONNAIRE - PHQ9
SUM OF ALL RESPONSES TO PHQ9 QUESTIONS 1 AND 2: 0
SUM OF ALL RESPONSES TO PHQ QUESTIONS 1-9: 0
1. LITTLE INTEREST OR PLEASURE IN DOING THINGS: NOT AT ALL
2. FEELING DOWN, DEPRESSED, IRRITABLE, OR HOPELESS: NOT AT ALL

## 2017-08-07 ASSESSMENT — PAIN SCALES - GENERAL
PAINLEVEL_OUTOF10: 0
PAINLEVEL_OUTOF10: 10
PAINLEVEL_OUTOF10: 0
PAINLEVEL_OUTOF10: 8

## 2017-08-07 ASSESSMENT — LIFESTYLE VARIABLES
TOTAL SCORE: 0
CONSUMPTION TOTAL: NEGATIVE
ALCOHOL_USE: YES
AVERAGE NUMBER OF DAYS PER WEEK YOU HAVE A DRINK CONTAINING ALCOHOL: 1
HAVE YOU EVER FELT YOU SHOULD CUT DOWN ON YOUR DRINKING: NO
PACK_YEARS: 25
TOTAL SCORE: 0
EVER_SMOKED: YES
EVER_SMOKED: YES
EVER HAD A DRINK FIRST THING IN THE MORNING TO STEADY YOUR NERVES TO GET RID OF A HANGOVER: NO
EVER FELT BAD OR GUILTY ABOUT YOUR DRINKING: NO
ON A TYPICAL DAY WHEN YOU DRINK ALCOHOL HOW MANY DRINKS DO YOU HAVE: 1
TOTAL SCORE: 0
HAVE PEOPLE ANNOYED YOU BY CRITICIZING YOUR DRINKING: NO
HOW MANY TIMES IN THE PAST YEAR HAVE YOU HAD 5 OR MORE DRINKS IN A DAY: 0

## 2017-08-07 ASSESSMENT — COPD QUESTIONNAIRES
HAVE YOU SMOKED AT LEAST 100 CIGARETTES IN YOUR ENTIRE LIFE: YES
DURING THE PAST 4 WEEKS HOW MUCH DID YOU FEEL SHORT OF BREATH: NONE/LITTLE OF THE TIME
COPD SCREENING SCORE: 4
DO YOU EVER COUGH UP ANY MUCUS OR PHLEGM?: NO/ONLY WITH OCCASIONAL COLDS OR INFECTIONS

## 2017-08-07 NOTE — IP AVS SNAPSHOT
Kobojo Access Code: [email or web address]  Expires: 8/16/2017  4:09 AM    Your email address is not on file at [facility].  Email Addresses are required for you to sign up for Kobojo, please contact [phone] to verify your personal information and to provide your email address prior to attempting to register for Kobojo.    Marium Hartmann Dexter  [address] [address] [place] [address]    Kobojo  A secure, online tool to manage your health information     [employer]’s Kobojo® is a secure, online tool that connects you to your personalized health information from the privacy of your home -- day or night - making it very easy for you to manage your healthcare. Once the activation process is completed, you can even access your medical information using the Kobojo micheal, which is available for free in the Apple Micheal store or Google Play store.     To learn more about Kobojo, visit [email or web address]    There are two levels of access available (as shown below):   My Chart Features  [facility] Primary Care Doctor [facility]  Specialists [facility]  Urgent  Care Non-[facility] Primary Care Doctor   Email your healthcare team securely and privately 24/7 X X X    Manage appointments: schedule your next appointment; view details of past/upcoming appointments X      Request prescription refills. X      View recent personal medical records, including lab and immunizations X X X X   View health record, including health history, allergies, medications X X X X   Read reports about your outpatient visits, procedures, consult and ER notes X X X X   See your discharge summary, which is a recap of your hospital and/or ER visit that includes your diagnosis, lab results, and care plan X X  X     How to register for Kobojo:  Once your e-mail address has been verified, follow the following steps to sign up for Kobojo.     1. Go to  [email or web address]  2. Click on the Sign Up Now box, which takes you to the New Member Sign Up page.  You will need to provide the following information:  a. Enter your Buzzmove Access Code exactly as it appears at the top of this page. (You will not need to use this code after you’ve completed the sign-up process. If you do not sign up before the expiration date, you must request a new code.)   b. Enter your date of birth.   c. Enter your home email address.   d. Click Submit, and follow the next screen’s instructions.  3. Create a [employer] Alarist ID. This will be your Buzzmove login ID and cannot be changed, so think of one that is secure and easy to remember.  4. Create a Buzzmove password. You can change your password at any time.  5. Enter your Password Reset Question and Answer. This can be used at a later time if you forget your password.   6. Enter your e-mail address. This allows you to receive e-mail notifications when new information is available in Buzzmove.  7. Click Sign Up. You can now view your health information.    For assistance activating your Buzzmove account, call [phone]

## 2017-08-07 NOTE — IP AVS SNAPSHOT
Home Care Instructions                                                                                                                  Name:Marium Renteria  Medical Record Number:[id number]  CSN: [id number]    YOB: 1955   Age: 61 y.o.  Sex: female  HT:1.524 m (5') WT: 71.668 kg (158 lb)          Admit Date: 8/7/2017     Discharge Date:   Today's Date: 8/11/2017  Attending Doctor:  Garrett Sexton M.D.                  Allergies:  Bee venom; Asa; Coconut oil; Codeine; Contrast media with iodine; Hydrocodone-ibuprofen; Ibuprofen; Milk; Norco; Other drug; Other environmental; Other food; Pcn; Sulfa drugs; Talwin; Tape; and Vicodin            Discharge Instructions       Discharge Instructions    Discharged to other by medical transportation with self. Discharged via wheelchair, hospital escort: Yes.  Special equipment needed: Not Applicable    Be sure to schedule a follow-up appointment with your primary care doctor or any specialists as instructed.     Discharge Plan:   Diet Plan: Discussed  Activity Level: Discussed  Smoking Cessation Offered: Patient Refused  Confirmed Follow up Appointment: Patient to Call and Schedule Appointment  Confirmed Symptoms Management: Discussed  Medication Reconciliation Updated: Yes  Influenza Vaccine Indication: Patient Refuses    I understand that a diet low in cholesterol, fat, and sodium is recommended for good health. Unless I have been given specific instructions below for another diet, I accept this instruction as my diet prescription.   Other diet: Regluar    Special Instructions: None    · Is patient discharged on Warfarin / Coumadin?   No     · Is patient Post Blood Transfusion?  No    Depression / Suicide Risk    As you are discharged from this [facility] facility, it is important to learn how to keep safe from harming yourself.    Recognize the warning signs:  · Abrupt changes in personality, positive or negative- including increase in energy   · Giving  away possessions  · Change in eating patterns- significant weight changes-  positive or negative  · Change in sleeping patterns- unable to sleep or sleeping all the time   · Unwillingness or inability to communicate  · Depression  · Unusual sadness, discouragement and loneliness  · Talk of wanting to die  · Neglect of personal appearance   · Rebelliousness- reckless behavior  · Withdrawal from people/activities they love  · Confusion- inability to concentrate     If you or a loved one observes any of these behaviors or has concerns about self-harm, here's what you can do:  · Talk about it- your feelings and reasons for harming yourself  · Remove any means that you might use to hurt yourself (examples: pills, rope, extension cords, firearm)  · Get professional help from the community (Mental Health, Substance Abuse, psychological counseling)  · Do not be alone:Call your Safe Contact- someone whom you trust who will be there for you.  · Call your local CRISIS HOTLINE 153-5012 or 023-984-5561  · Call your local Children's Mobile Crisis Response Team [place] (063) 633-6209 or wwwTouchOfModern.com  · Call the toll free National Suicide Prevention Hotlines   · National Suicide Prevention Lifeline 621-571-THEF (1228)  · National Hope Line Network 800-SUICIDE (586-1600)    Spinal Fusion  Spinal fusion is a procedure to make 2 or more of the bones in your spinal column (vertebrae) grow together (fuse). This procedure stops movement between the vertebrae and can relieve pain and prevent deformity.   Spinal fusion is used to treat the following conditions:  · Fractures of the spine.  · Herniated disk (the spongy material [cartilage] between the vertebrae).  · Abnormal curvatures of the spine, such as scoliosis or kyphosis.  · A weak or an unstable spine, caused by infections or tumor.  RISKS AND COMPLICATIONS  Complications associated with spinal fusion are rare, but they can occur. Possible complications  include:  · Bleeding.  · Infection near the incision.  · Nerve damage. Signs of nerve damage are back pain, pain in one or both legs, weakness, or numbness.  · Spinal fluid leakage.  · Blood clot in your leg, which can move to your lungs.  · Difficulty controlling urination or bowel movements.  BEFORE THE PROCEDURE  · A medical evaluation will be done. This will include a physical exam, blood tests, and imaging exams.  · You will talk with an anesthesiologist. This is the person who will be in charge of the anesthesia during the procedure. Spinal fusion usually requires that you are asleep during the procedure (general anesthesia).  · You will need to stop taking certain medicines, particularly those associated with an increased risk of bleeding. Ask your caregiver about changing or stopping your regular medicines.  · If you smoke, you will need to stop at least 2 weeks before the procedure. Smoking can slow down the healing process, especially fusion of the vertebrae, and increase the risk of complications.  · Do not eat or drink anything for at least 8 hours before the procedure.  PROCEDURE   A cut (incision) is made over the vertebrae that will be fused. The back muscles are  from the vertebrae. If you are having this procedure to treat a herniated disk, the disc material pressing on the nerve root is removed (decompression). The area where the disk is removed is then filled with extra bone. Bone from another part of your body (autogenous bone) or bone from a bone donor (allograft bone) may be used. The extra bone promotes fusion between the vertebrae. Sometimes, specific medicines are added to the fusion area to promote bone healing. In most cases, screws and rods or metal plates will be used to attach the vertebrae to stabilize them while they fuse.   AFTER THE PROCEDURE   · You will stay in a recovery area until the anesthesia has worn off. Your blood pressure and pulse will be checked  "frequently.  · You will be given antibiotics to prevent infection.  · You may continue to receive fluids through an intravenous (IV) tube while you are still in the hospital.  · Pain after surgery is normal. You will be given pain medicine.  · You will be taught how to move correctly and how to stand and walk. While in bed, you will be instructed to turn frequently, using a \"log rolling\" technique, in which the entire body is moved without twisting the back.     This information is not intended to replace advice given to you by your health care provider. Make sure you discuss any questions you have with your health care provider.     Document Released: 09/15/2004 Document Revised: 03/11/2013 Document Reviewed: 03/01/2012  [employer] Interactive Patient Education ©2016 [employer]      Follow-up Information     1. Schedule an appointment as soon as possible for a visit with Garrett Sexton M.D..    Specialty:  Orthopaedics    Why:  Follow up appointment    Contact information    [address]  [address]  [place] [address]  [phone]          2. Follow up with OLIMPIA Edwards.    Specialty:  Family Medicine    Contact information    [address]  [address]  [place] [address] [phone]           Discharge Medication Instructions:    Below are the medications your physician expects you to take upon discharge:    Review all your home medications and newly ordered medications with your doctor and/or pharmacist. Follow medication instructions as directed by your doctor and/or pharmacist.    Please keep your medication list with you and share with your physician.               Medication List      START taking these medications        Instructions    Morning Afternoon Evening Bedtime    diphenhydrAMINE 25 MG Tabs   Commonly known as:  BENADRYL        Take 1 tablet by mouth every 6 hours as needed for Itching.   Dose:  25 mg                         MG Caps   Last time this was given:  100 " mg on 8/11/2017  8:53 AM        Take 100 mg by mouth 2 Times a Day.   Dose:  100 mg                        nicotine 21 MG/24HR Pt24   Last time this was given:  21 mg on 8/11/2017  5:29 AM   Commonly known as:  NICODERM        Apply 1 Patch to skin as directed every 24 hours.   Dose:  1 Patch                        nicotine polacrilex 2 MG Gum   Commonly known as:  NICORETTE        Take 1 Each by mouth every 1 hour as needed (For nicotine urge).   Dose:  2 mg                          CHANGE how you take these medications        Instructions    Morning Afternoon Evening Bedtime    * lorazepam 1 MG Tabs   What changed:  Another medication with the same name was added. Make sure you understand how and when to take each.   Last time this was given:  2 mg on 8/10/2017  8:55 PM   Commonly known as:  ATIVAN        Take 1-2 mg by mouth at bedtime as needed for Anxiety.   Dose:  1-2 mg                        * lorazepam 2 MG tablet   What changed:  You were already taking a medication with the same name, and this prescription was added. Make sure you understand how and when to take each.   Last time this was given:  2 mg on 8/10/2017  8:55 PM   Commonly known as:  ATIVAN        Take 1 Tab by mouth at bedtime as needed (Anxiety, Agitatoin, Insomnia).   Dose:  2 mg                        oxycodone 30 MG immediate release tablet   What changed:    - medication strength  - how much to take  - when to take this  - reasons to take this   Last time this was given:  30 mg on 8/11/2017  1:35 PM   Commonly known as:  OXY-IR        Take 1 Tab by mouth every 3 hours as needed for Moderate Pain.   Dose:  30 mg                        * Notice:  This list has 2 medication(s) that are the same as other medications prescribed for you. Read the directions carefully, and ask your doctor or other care provider to review them with you.      CONTINUE taking these medications        Instructions    Morning Afternoon Evening Bedtime     acetaminophen 500 MG Tabs   Last time this was given:  1,000 mg on 8/9/2017  5:13 AM   Commonly known as:  TYLENOL        Take 500-1,000 mg by mouth every 6 hours as needed.   Dose:  500-1000 mg                        ALEVE 220 MG Caps   Generic drug:  Naproxen Sodium        Take 220 mg by mouth every 12 hours.   Dose:  220 mg                        allopurinol 300 MG Tabs   Last time this was given:  300 mg on 8/10/2017  8:56 PM   Commonly known as:  ZYLOPRIM        TAKE ONE TABLET BY MOUTH DAILY                        calcitRIOL 0.25 MCG Caps   Last time this was given:  0.25 mcg on 8/11/2017  8:53 AM   Commonly known as:  ROCALTROL        Take 0.25 mcg by mouth every day.   Dose:  0.25 mcg                        carbidopa-levodopa  MG Tabs   Last time this was given:  3 Tabs on 8/11/2017  2:28 PM   Commonly known as:  SINEMET        TAKE ONE TABLET BY MOUTH DAILY                        carisoprodol 350 MG Tabs   Last time this was given:  350 mg on 8/10/2017  1:15 PM   Commonly known as:  SOMA        Take 350 mg by mouth 3 times a day as needed for Muscle Spasms.   Dose:  350 mg                        gabapentin 300 MG Caps   Last time this was given:  600 mg on 8/11/2017  2:28 PM   Commonly known as:  NEURONTIN        TAKE TWO CAPSULES BY MOUTH THREE TIMES A DAY                        levothyroxine 88 MCG Tabs   Last time this was given:  88 mcg on 8/11/2017  5:29 AM   Commonly known as:  SYNTHROID        Take 44-88 mcg by mouth Every morning on an empty stomach. Brand name only . 44 mcg on Sundays. All other days 88 mcg   Dose:  44-88 mcg                        lidocaine 5 % Ptch   Commonly known as:  LIDODERM        Apply 2 Patches to skin as directed See Admin Instructions. 2 at a time 12 hrs on, 12 hrs off   Dose:  2 Patch                        meclizine 12.5 MG Tabs   Last time this was given:  12.5 mg on 8/10/2017  8:54 PM   Commonly known as:  ANTIVERT        Take 12.5 mg by mouth 2 Times a Day.    Dose:  12.5 mg                        morphine ER 30 MG Tbcr tablet   Last time this was given:  30 mg on 8/11/2017  8:53 AM   Commonly known as:  MS CONTIN        Take 30 mg by mouth every 12 hours.   Dose:  30 mg                        polyethylene glycol/lytes Pack   Last time this was given:  1 Packet on 8/11/2017 12:09 PM   Commonly known as:  MIRALAX        Take 17 g by mouth every day.   Dose:  17 g                        potassium chloride SA 10 MEQ Tbcr   Last time this was given:  20 mEq on 8/11/2017  8:53 AM   Commonly known as:  K-DUR        Take 10-20 mEq by mouth every day. 20 meq Mondays and Fridays. All other days 10 meq   Dose:  10-20 mEq                        trazodone 50 MG Tabs   Last time this was given:  100 mg on 8/10/2017  8:56 PM   Commonly known as:  DESYREL        TAKE ONE TO TWO TABLETS BY MOUTH EVERY NIGHT AT BEDTIME FOR SLEEP                        TUMS PO   Last time this was given:  1,000 mg on 8/11/2017  8:53 AM        Take 1,200 mg by mouth every day. Needs to take BERRY flavor   Dose:  1200 mg                        Vitamin D3 1000 UNITS Caps        Take 5,000 Units by mouth every day.   Dose:  5000 Units                             Where to Get Your Medications      Information about where to get these medications is not yet available     ! Ask your nurse or doctor about these medications    - diphenhydrAMINE 25 MG Tabs  -  MG Caps  - lorazepam 2 MG tablet  - nicotine 21 MG/24HR Pt24  - nicotine polacrilex 2 MG Gum  - oxycodone 30 MG immediate release tablet            Orders for after discharge     DME Walker    Complete by:  As directed        REFERRAL TO SKILLED NURSING FACILITY    Complete by:  As directed              Instructions           Diet / Nutrition:    Follow any diet instructions given to you by your doctor or the dietician, including how much salt (sodium) you are allowed each day.    If you are overweight, talk to your doctor about a weight reduction  plan.    Activity:    Remain physically active following your doctor's instructions about exercise and activity.    Rest often.     Any time you become even a little tired or short of breath, SIT DOWN and rest.    Worsening Symptoms:    Report any of the following signs and symptoms to the doctor's office immediately:    *Pain of jaw, arm, or neck  *Chest pain not relieved by medication                               *Dizziness or loss of consciousness  *Difficulty breathing even when at rest   *More tired than usual                                       *Bleeding drainage or swelling of surgical site  *Swelling of feet, ankles, legs or stomach                 *Fever (>100ºF)  *Pink or blood tinged sputum  *Weight gain (3lbs/day or 5lbs /week)           *Shock from internal defibrillator (if applicable)  *Palpitations or irregular heartbeats                *Cool and/or numb extremities    Stroke Awareness    Common Risk Factors for Stroke include:    Age  Atrial Fibrillation  Carotid Artery Stenosis  Diabetes Mellitus  Excessive alcohol consumption  High blood pressure  Overweight   Physical inactivity  Smoking    Warning signs and symptoms of a stroke include:    *Sudden numbness or weakness of the face, arm or leg (especially on one side of the body).  *Sudden confusion, trouble speaking or understanding.  *Sudden trouble seeing in one or both eyes.  *Sudden trouble walking, dizziness, loss of balance or coordination.Sudden severe headache with no known cause.    It is very important to get treatment quickly when a stroke occurs. If you experience any of the above warning signs, call 911 immediately.                   Disclaimer         Quit Smoking / Tobacco Use:    I understand the use of any tobacco products increases my chance of suffering from future heart disease or stroke and could cause other illnesses which may shorten my life. Quitting the use of tobacco products is the single most important thing I can  do to improve my health. For further information on smoking / tobacco cessation call a Toll Free Quit Line at 1-371.817.6026 (*National Cancer [place]) or 1-587.522.9878 (American Lung Association) or you can access the web based program at www.lungusa.org.    [place] Tobacco Users Help Line:  (849) 516-8338       Toll Free: 1-983.553.3981  Quit Tobacco Program [facility] (390)025-4433    Crisis Hotline:    [place] Crisis Hotline:  9-622-QURFKCN or 1-835.206.7147    [place] Crisis Hotline:    1-328.559.3518 or 763-319-9149    Discharge Survey:   Thank you for choosing [facility]. We hope we did everything we could to make your hospital stay a pleasant one. You may be receiving a phone survey and we would appreciate your time and participation in answering the questions. Your input is very valuable to us in our efforts to improve our service to our patients and their families.        My signature on this form indicates that:    1. I have reviewed and understand the above information.  2. My questions regarding this information have been answered to my satisfaction.  3. I have formulated a plan with my discharge nurse to obtain my prescribed medications for home.                  Disclaimer         __________________________________                     __________       ________                       Patient Signature                                                 Date                    Time

## 2017-08-07 NOTE — OP REPORT
DATE OF SERVICE:  08/07/2017    SERVICE:  Spinal surgery.    PREOPERATIVE DIAGNOSES:  1.  Painful hardware, L1-S1.  2.  Presence of postoperative seroma.    POSTOPERATIVE DIAGNOSES:  1.  Painful hardware, L1-S1.  2.  Presence of postoperative seroma.  3.  Solid fusion, L1-L3 and L4-S1.    PROCEDURES PERFORMED:  1.  Deep segmental hardware removal, L1-S1  2.  Fusion exploration, L1-L3 and L4-S1.  3.  Greater than 1 hour use of operating room fluoroscopy.  4.  Irrigation and debridement.    SURGEON:  Garrett Sexton MD    ASSISTANT SURGEON:  Moncho Quinonez MD    ANESTHETIC:  General.    ANESTHESIOLOGIST:  Maryse Licona MD    COMPLICATIONS:  None.    BLOOD LOSS:  Less than 50 mL.    DESCRIPTION OF PROCEDURE:  After informed consent was obtained, the patient   was brought to the operating room and given general endotracheal anesthetic.    A Parker catheter was placed.  She was then turned into the prone position and   prepped and draped in the usual sterile fashion after Ancef, vancomycin, and   tranexamic acid were given.  After the field was draped, a time-out was called   correctly identifying the patient and the procedure to be done.  We then   brought in C-arm fluoroscopy to localize our incision.  We injected 10 mL of   0.5% Marcaine with epinephrine in the area of the incision.  We then made our   incision in the midline through the patient's old scar and then carefully   dissected down through subcutaneous tissue to the fascial layer.  The fascia   was then incised.  We dissected down on a crosslink.  We then followed the   crosslink out laterally and extended our dissection proximally and distally   carefully exposing the hardware and being careful to avoid the central canal.    The hardware was then completely exposed.  There did appear to be a   significant amount of bone graft.  Because of the fact that the patient is a   smoker and physiologically compromises to some degree, I believe the fusion   was not  as exuberant as it would be most patients; however, there did appear   to be a solid fusion.  We then proceeded to remove all hardware.  First, we   removed the set screws, then we removed the rods and crosslinks and then the   pedicle screws were then removed.  We then debrided any nonviable looking   tissue.  Excisional debridement was carried out.  We then examined the fusion   mass.  There did appear to be sparse fusion mass, but it did appear to be   complete; we placed gearshifts in L1 and L2, stressed the articulation and   found no significant movement.  We did the same thing at L2-3.  It was then   checked at L4-L5 and L5-S1 as well.  Again, there appeared to be no motion and   a solid fusion, but because of this sparse nature of the bone, we did place   bone graft in the voids left by the removed screws and the debridement   performed of any nonviable looking soft tissue.  So, we did place allograft   bone and beta tricalcium phosphate in the troughs.  I did extend the fusion   through L3-L4, which had not previously been fused because of the potential   for breakdown at L3 because of the fusion segments above and below it.  This   was done after irrigating.  Bone graft was placed, and we then closed the   wound in layers over 1000 mg of vancomycin powder, which was distributed   equally below and above the fascia.  The fascia was closed with interrupted #1   Vicryl sutures, subcutaneous tissue was closed with 2-0 Vicryl, and skin was   closed with staples.  We did inject 20 mL of 0.5% Marcaine with epinephrine as   local anesthetic.  The procedure was terminated.  The wound dressing was   applied.  No complications were experienced.  C-arm fluoroscopy was used   throughout the procedure to localize our hardware and document that it had   been removed.  The patient was aroused from general anesthesia and brought in   stable condition to the recovery room.  No complications were experienced and   blood loss  was less than 50 mL and this was confirmed with the   anesthesiologist.       ____________________________________     MD DIMPLE MON / JATIN    DD:  08/07/2017 09:35:16  DT:  08/07/2017 09:57:24    D#:  [id number]  Job#:  [id number]

## 2017-08-07 NOTE — OR SURGEON
Operative Report    PreOp Diagnosis: L1-s1 painful hardware    PostOp Diagnosis: same    Procedure(s):  LUMBAR FUSION POSTERIOR EXPLORATION - Wound Class: Clean with Drain  HARDWARE REMOVAL NEURO DEEP L1-L3, L4-S1, SEROMA REMOVAL - Wound Class: Clean with Drain    Surgeon(s):  ZAID Cat M.D.    Anesthesiologist/Type of Anesthesia:  Anesthesiologist: Maryse Licona M.D./General    Surgical Staff:  Circulator: Tiffani South R.N.  Relief Circulator: Nicky Rios R.N.  Scrub Person: Jia Pop  Radiology Technologist: Mustapha KEY Gross    Specimens:  none    Estimated Blood Loss: less than 50cc    Findings: solid fusion    Complications: none        8/7/2017 9:27 AM Grarett Sexton

## 2017-08-07 NOTE — IP AVS SNAPSHOT
" <p align=\"LEFT\"><IMG SRC=\"//[email or web address]\" alt=\"Image\" WIDTH=\"50%\" HEIGHT=\"200\" BORDER=\"\"></p>                   Name:Marium Renteria  Medical Record Number:[id number]  CSN: [id number]    YOB: 1955   Age: 61 y.o.  Sex: female  HT:1.524 m (5') WT: 71.668 kg (158 lb)          Admit Date: 8/7/2017     Discharge Date:   Today's Date: 8/11/2017  Attending Doctor:  Garrett Sexton M.D.                  Allergies:  Bee venom; Asa; Coconut oil; Codeine; Contrast media with iodine; Hydrocodone-ibuprofen; Ibuprofen; Milk; Norco; Other drug; Other environmental; Other food; Pcn; Sulfa drugs; Talwin; Tape; and Vicodin          Follow-up Information     1. Schedule an appointment as soon as possible for a visit with Garrett Sexton M.D..    Specialty:  Orthopaedics    Why:  Follow up appointment    Contact information    [address] [address]  [address]  [place] [address]  [phone]          2. Follow up with OLIMPIA Edwards.    Specialty:  Family Medicine    Contact information    [address]  [address]  [place] [address] [phone]           Medication List      Take these Medications        Instructions    acetaminophen 500 MG Tabs   Commonly known as:  TYLENOL    Take 500-1,000 mg by mouth every 6 hours as needed.   Dose:  500-1000 mg       ALEVE 220 MG Caps   Generic drug:  Naproxen Sodium    Take 220 mg by mouth every 12 hours.   Dose:  220 mg       allopurinol 300 MG Tabs   Commonly known as:  ZYLOPRIM    TAKE ONE TABLET BY MOUTH DAILY       calcitRIOL 0.25 MCG Caps   Commonly known as:  ROCALTROL    Take 0.25 mcg by mouth every day.   Dose:  0.25 mcg       carbidopa-levodopa  MG Tabs   Commonly known as:  SINEMET    TAKE ONE TABLET BY MOUTH DAILY       carisoprodol 350 MG Tabs   Commonly known as:  SOMA    Take 350 mg by mouth 3 times a day as needed for Muscle Spasms.   Dose:  350 mg       diphenhydrAMINE 25 MG Tabs   Commonly known as:  BENADRYL    Take 1 tablet " by mouth every 6 hours as needed for Itching.   Dose:  25 mg        MG Caps    Take 100 mg by mouth 2 Times a Day.   Dose:  100 mg       gabapentin 300 MG Caps   Commonly known as:  NEURONTIN    TAKE TWO CAPSULES BY MOUTH THREE TIMES A DAY       levothyroxine 88 MCG Tabs   Commonly known as:  SYNTHROID    Take 44-88 mcg by mouth Every morning on an empty stomach. Brand name only . 44 mcg on Sundays. All other days 88 mcg   Dose:  44-88 mcg       lidocaine 5 % Ptch   Commonly known as:  LIDODERM    Apply 2 Patches to skin as directed See Admin Instructions. 2 at a time 12 hrs on, 12 hrs off   Dose:  2 Patch       * lorazepam 1 MG Tabs   What changed:  Another medication with the same name was added. Make sure you understand how and when to take each.   Commonly known as:  ATIVAN    Take 1-2 mg by mouth at bedtime as needed for Anxiety.   Dose:  1-2 mg       * lorazepam 2 MG tablet   What changed:  You were already taking a medication with the same name, and this prescription was added. Make sure you understand how and when to take each.   Commonly known as:  ATIVAN    Take 1 Tab by mouth at bedtime as needed (Anxiety, Agitatoin, Insomnia).   Dose:  2 mg       meclizine 12.5 MG Tabs   Commonly known as:  ANTIVERT    Take 12.5 mg by mouth 2 Times a Day.   Dose:  12.5 mg       morphine ER 30 MG Tbcr tablet   Commonly known as:  MS CONTIN    Take 30 mg by mouth every 12 hours.   Dose:  30 mg       nicotine 21 MG/24HR Pt24   Commonly known as:  NICODERM    Apply 1 Patch to skin as directed every 24 hours.   Dose:  1 Patch       nicotine polacrilex 2 MG Gum   Commonly known as:  NICORETTE    Take 1 Each by mouth every 1 hour as needed (For nicotine urge).   Dose:  2 mg       oxycodone 30 MG immediate release tablet   What changed:    - medication strength  - how much to take  - when to take this  - reasons to take this   Commonly known as:  OXY-IR    Take 1 Tab by mouth every 3 hours as needed for Moderate Pain.      Dose:  30 mg       polyethylene glycol/lytes Pack   Commonly known as:  MIRALAX    Take 17 g by mouth every day.   Dose:  17 g       potassium chloride SA 10 MEQ Tbcr   Commonly known as:  K-DUR    Take 10-20 mEq by mouth every day. 20 meq Mondays and Fridays. All other days 10 meq   Dose:  10-20 mEq       trazodone 50 MG Tabs   Commonly known as:  DESYREL    TAKE ONE TO TWO TABLETS BY MOUTH EVERY NIGHT AT BEDTIME FOR SLEEP       TUMS PO    Take 1,200 mg by mouth every day. Needs to take BERRY flavor   Dose:  1200 mg       Vitamin D3 1000 UNITS Caps    Take 5,000 Units by mouth every day.   Dose:  5000 Units       * Notice:  This list has 2 medication(s) that are the same as other medications prescribed for you. Read the directions carefully, and ask your doctor or other care provider to review them with you.

## 2017-08-07 NOTE — PROGRESS NOTES
Size large off the shelf Deroyal lumbar corset back support brace has been delivered to pt's bedside to be worn when up out of bed.

## 2017-08-08 LAB
ANION GAP SERPL CALC-SCNC: 8 MMOL/L (ref 0–11.9)
BUN SERPL-MCNC: 6 MG/DL (ref 8–22)
CALCIUM SERPL-MCNC: 8.2 MG/DL (ref 8.5–10.5)
CHLORIDE SERPL-SCNC: 105 MMOL/L (ref 96–112)
CO2 SERPL-SCNC: 26 MMOL/L (ref 20–33)
CREAT SERPL-MCNC: 0.64 MG/DL (ref 0.5–1.4)
ERYTHROCYTE [DISTWIDTH] IN BLOOD BY AUTOMATED COUNT: 52.1 FL (ref 35.9–50)
GFR SERPL CREATININE-BSD FRML MDRD: >60 ML/MIN/1.73 M 2
GLUCOSE SERPL-MCNC: 133 MG/DL (ref 65–99)
HCT VFR BLD AUTO: 43.5 % (ref 37–47)
HGB BLD-MCNC: 14.7 G/DL (ref 12–16)
MCH RBC QN AUTO: 34 PG (ref 27–33)
MCHC RBC AUTO-ENTMCNC: 33.8 G/DL (ref 33.6–35)
MCV RBC AUTO: 100.7 FL (ref 81.4–97.8)
PLATELET # BLD AUTO: 119 K/UL (ref 164–446)
PMV BLD AUTO: 10.5 FL (ref 9–12.9)
POTASSIUM SERPL-SCNC: 3.7 MMOL/L (ref 3.6–5.5)
RBC # BLD AUTO: 4.32 M/UL (ref 4.2–5.4)
SODIUM SERPL-SCNC: 139 MMOL/L (ref 135–145)
WBC # BLD AUTO: 8.4 K/UL (ref 4.8–10.8)

## 2017-08-08 PROCEDURE — 700102 HCHG RX REV CODE 250 W/ 637 OVERRIDE(OP): Performed by: ORTHOPAEDIC SURGERY

## 2017-08-08 PROCEDURE — 85027 COMPLETE CBC AUTOMATED: CPT

## 2017-08-08 PROCEDURE — A9270 NON-COVERED ITEM OR SERVICE: HCPCS | Performed by: ORTHOPAEDIC SURGERY

## 2017-08-08 PROCEDURE — 36415 COLL VENOUS BLD VENIPUNCTURE: CPT

## 2017-08-08 PROCEDURE — 80048 BASIC METABOLIC PNL TOTAL CA: CPT

## 2017-08-08 PROCEDURE — 700111 HCHG RX REV CODE 636 W/ 250 OVERRIDE (IP): Performed by: ORTHOPAEDIC SURGERY

## 2017-08-08 PROCEDURE — G8979 MOBILITY GOAL STATUS: HCPCS | Mod: CI

## 2017-08-08 PROCEDURE — G8978 MOBILITY CURRENT STATUS: HCPCS | Mod: CK

## 2017-08-08 PROCEDURE — 770006 HCHG ROOM/CARE - MED/SURG/GYN SEMI*

## 2017-08-08 PROCEDURE — 700112 HCHG RX REV CODE 229: Performed by: ORTHOPAEDIC SURGERY

## 2017-08-08 PROCEDURE — 700101 HCHG RX REV CODE 250: Performed by: ORTHOPAEDIC SURGERY

## 2017-08-08 PROCEDURE — 97162 PT EVAL MOD COMPLEX 30 MIN: CPT

## 2017-08-08 RX ORDER — OXYCODONE HYDROCHLORIDE 10 MG/1
25 TABLET ORAL
Status: DISCONTINUED | OUTPATIENT
Start: 2017-08-08 | End: 2017-08-09

## 2017-08-08 RX ADMIN — OXYCODONE HYDROCHLORIDE 25 MG: 10 TABLET ORAL at 17:26

## 2017-08-08 RX ADMIN — VITAMIN D, TAB 1000IU (100/BT) 5000 UNITS: 25 TAB at 08:57

## 2017-08-08 RX ADMIN — NICOTINE 21 MG: 21 PATCH, EXTENDED RELEASE TRANSDERMAL at 04:10

## 2017-08-08 RX ADMIN — DOCUSATE SODIUM 100 MG: 100 CAPSULE ORAL at 08:57

## 2017-08-08 RX ADMIN — CARISOPRODOL 350 MG: 350 TABLET ORAL at 21:59

## 2017-08-08 RX ADMIN — MECLIZINE HYDROCHLORIDE 12.5 MG: 25 TABLET ORAL at 08:55

## 2017-08-08 RX ADMIN — MECLIZINE HYDROCHLORIDE 12.5 MG: 25 TABLET ORAL at 21:04

## 2017-08-08 RX ADMIN — POLYETHYLENE GLYCOL 3350 1 PACKET: 17 POWDER, FOR SOLUTION ORAL at 08:57

## 2017-08-08 RX ADMIN — LORAZEPAM 2 MG: 1 TABLET ORAL at 00:37

## 2017-08-08 RX ADMIN — MORPHINE SULFATE 5 MG: 10 INJECTION INTRAVENOUS at 00:30

## 2017-08-08 RX ADMIN — CALCITRIOL 0.25 MCG: 0.25 CAPSULE, LIQUID FILLED ORAL at 08:58

## 2017-08-08 RX ADMIN — ALLOPURINOL 300 MG: 300 TABLET ORAL at 21:00

## 2017-08-08 RX ADMIN — MORPHINE SULFATE 30 MG: 30 TABLET, EXTENDED RELEASE ORAL at 20:55

## 2017-08-08 RX ADMIN — OXYCODONE HYDROCHLORIDE 20 MG: 10 TABLET ORAL at 11:14

## 2017-08-08 RX ADMIN — POTASSIUM CHLORIDE AND SODIUM CHLORIDE: 900; 150 INJECTION, SOLUTION INTRAVENOUS at 04:07

## 2017-08-08 RX ADMIN — CARBIDOPA AND LEVODOPA 3 TABLET: 25; 100 TABLET ORAL at 21:04

## 2017-08-08 RX ADMIN — GABAPENTIN 600 MG: 300 CAPSULE ORAL at 08:56

## 2017-08-08 RX ADMIN — CARBIDOPA AND LEVODOPA 3 TABLET: 25; 100 TABLET ORAL at 04:17

## 2017-08-08 RX ADMIN — LEVOTHYROXINE SODIUM 88 MCG: 88 TABLET ORAL at 04:17

## 2017-08-08 RX ADMIN — POTASSIUM CHLORIDE 20 MEQ: 1500 TABLET, EXTENDED RELEASE ORAL at 08:56

## 2017-08-08 RX ADMIN — OXYCODONE HYDROCHLORIDE 20 MG: 10 TABLET ORAL at 07:55

## 2017-08-08 RX ADMIN — NAPROXEN 250 MG: 250 TABLET ORAL at 08:57

## 2017-08-08 RX ADMIN — POLYETHYLENE GLYCOL 3350 1 PACKET: 17 POWDER, FOR SOLUTION ORAL at 17:17

## 2017-08-08 RX ADMIN — GABAPENTIN 600 MG: 300 CAPSULE ORAL at 14:22

## 2017-08-08 RX ADMIN — MORPHINE SULFATE 30 MG: 30 TABLET, EXTENDED RELEASE ORAL at 07:55

## 2017-08-08 RX ADMIN — GABAPENTIN 600 MG: 300 CAPSULE ORAL at 20:58

## 2017-08-08 RX ADMIN — DOCUSATE SODIUM 100 MG: 100 CAPSULE ORAL at 20:56

## 2017-08-08 RX ADMIN — STANDARDIZED SENNA CONCENTRATE AND DOCUSATE SODIUM 1 TABLET: 8.6; 5 TABLET, FILM COATED ORAL at 20:55

## 2017-08-08 RX ADMIN — OXYCODONE HYDROCHLORIDE 25 MG: 10 TABLET ORAL at 14:22

## 2017-08-08 RX ADMIN — NAPROXEN 250 MG: 250 TABLET ORAL at 20:55

## 2017-08-08 RX ADMIN — MORPHINE SULFATE 5 MG: 10 INJECTION INTRAVENOUS at 03:49

## 2017-08-08 ASSESSMENT — COGNITIVE AND FUNCTIONAL STATUS - GENERAL
SUGGESTED CMS G CODE MODIFIER MOBILITY: CK
CLIMB 3 TO 5 STEPS WITH RAILING: A LOT
STANDING UP FROM CHAIR USING ARMS: A LITTLE
TURNING FROM BACK TO SIDE WHILE IN FLAT BAD: A LOT
MOBILITY SCORE: 15
WALKING IN HOSPITAL ROOM: A LITTLE
MOVING FROM LYING ON BACK TO SITTING ON SIDE OF FLAT BED: A LITTLE
MOVING TO AND FROM BED TO CHAIR: A LOT

## 2017-08-08 ASSESSMENT — PAIN SCALES - GENERAL
PAINLEVEL_OUTOF10: 10
PAINLEVEL_OUTOF10: 9
PAINLEVEL_OUTOF10: 10
PAINLEVEL_OUTOF10: 8
PAINLEVEL_OUTOF10: 8
PAINLEVEL_OUTOF10: 10
PAINLEVEL_OUTOF10: 8
PAINLEVEL_OUTOF10: 9
PAINLEVEL_OUTOF10: 8
PAINLEVEL_OUTOF10: 10

## 2017-08-08 ASSESSMENT — GAIT ASSESSMENTS
ASSISTIVE DEVICE: FRONT WHEEL WALKER
GAIT LEVEL OF ASSIST: MINIMAL ASSIST
DISTANCE (FEET): 5

## 2017-08-08 NOTE — DISCHARGE PLANNING
TCN met with patient and  at bedside to discuss the care teams recommendation for SNF. Following an extensive discussion patient is agreeable to SNF and elected sending out a blanket referral to all local facilities but states [facility] or any facility that allows smoking would be preferred. Choice signed and faxed to [facility]. TCN to follow for DC planning.

## 2017-08-08 NOTE — CARE PLAN
Problem: Communication  Goal: The ability to communicate needs accurately and effectively will improve  Outcome: PROGRESSING AS EXPECTED    Problem: Skin Integrity  Goal: Risk for impaired skin integrity will decrease  Outcome: PROGRESSING AS EXPECTED

## 2017-08-08 NOTE — THERAPY
"Physical Therapy Evaluation completed.   Bed Mobility:  Supine to Sit: Moderate Assist  Transfers: Sit to Stand: Minimal Assist  Gait: Level Of Assist: Minimal Assist with Front-Wheel Walker       Plan of Care: Will benefit from Physical Therapy 3 times per week  Discharge Recommendations: Equipment: Front-Wheel Walker. Post-acute therapy Discharge to a transitional care facility for continued skilled therapy services or Discharge to home with outpatient or home health for additional skilled therapy services.    Ms. Renteria is a 60 y/o female who presents s/p L1-S1 hardwear removal, fushion posteroir exploration,L1-L3, L4-S1, and seroma removal. She presents with significant lower extremity weakness, impaired dynamic balance, gross motor coordination deficits, and decreased safety awareness. These impairments negatively impact her ability to perform gait, transfers, and bed mobility at her prior level of function and prevent her safe discharge home. She demonstrated drug seeking behaviors, requesting morphine shot multiple times througout session despite being pre-medicated by nursing. No facial grimace or overt signs of pain. She was resistant to LSO initially, educated on stability it provides and how it can decresae pain. Was then agreeable. Pt is self limiting and demonstrated improved mobility than her self report. Continually stated her L LE was paralyzed, yet moved it without assist for minimal gait and transfers. At this time she is not safe to discharge home with spouse, but with physical therapy services has potential to improve her mobility and allow for discharge home. Will continue to follow for improved funcitonal mobility and more accurate DC plan.     See \"Rehab Therapy-Acute\" Patient Summary Report for complete documentation.     "

## 2017-08-08 NOTE — PROGRESS NOTES
Progress Note               Author: Garrett Sexton Date & Time created: 2017  12:40 PM     Interval History:  Pt with multiple complaints pain included    Review of Systems:  ROS    Physical Exam:  Physical Exam   Neurological:   Sensation and motor intact  Dressing changed and dry       Labs:        Invalid input(s): [id number]      Recent Labs      17   0832   SODIUM  139   POTASSIUM  3.7   CHLORIDE  105   CO2  26   BUN  6*   CREATININE  0.64   CALCIUM  8.2*     Recent Labs      17   0832   GLUCOSE  133*     Recent Labs      17   0147   RBC  4.32   HEMOGLOBIN  14.7   HEMATOCRIT  43.5   PLATELETCT  119*     Recent Labs      17   0147   WBC  8.4     Hemodynamics:  Temp (24hrs), Av.5 °C (97.7 °F), Min:36 °C (96.8 °F), Max:37.1 °C (98.8 °F)  Temperature: 36.6 °C (97.8 °F)  Pulse  Av.4  Min: 61  Max: 97   Blood Pressure: 107/81 mmHg     Respiratory:    Respiration: 16, Pulse Oximetry: 99 %     Work Of Breathing / Effort: Mild  RUL Breath Sounds: Clear, RML Breath Sounds: Diminished, RLL Breath Sounds: Diminished, RADHA Breath Sounds: Clear, LLL Breath Sounds: Diminished  Fluids:    Intake/Output Summary (Last 24 hours) at 17 1240  Last data filed at 17 1200   Gross per 24 hour   Intake    600 ml   Output   4500 ml   Net  -3900 ml     Weight: 71.668 kg (158 lb)  GI/Nutrition:  Orders Placed This Encounter   Procedures   • DIET ORDER     Standing Status: Standing      Number of Occurrences: 1      Standing Expiration Date:      Order Specific Question:  Diet:     Answer:  Regular [1]     Medical Decision Making, by Problem:  Active Hospital Problems    Diagnosis   • 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

## 2017-08-08 NOTE — PROGRESS NOTES
The night has been primarily pain management, she has been able to fall asleep in-between doses. She continues to reports high pain scores (8-10). She is requesting IV meds, I have emphasized the importance of trying oral before IV when possible.

## 2017-08-08 NOTE — PROGRESS NOTES
Pt arrived on floor, drowsy but easy to arouse, and oriented x 4. On mask 5L NC and all lung sounds are clear and diminished. Pain is 10 of 10. Skin has surgical incision on lumbar and sacral region with dressing clean, dry and intact. Bandage on right side of nose, left lateral clavicle, top of right hand, and calf of right leg. Pt stated a doctor did a biopsy on these four sites. Will administer all meds and complete pt admission chart. Will continue to monitor pt.

## 2017-08-09 LAB
ANION GAP SERPL CALC-SCNC: 6 MMOL/L (ref 0–11.9)
BUN SERPL-MCNC: 6 MG/DL (ref 8–22)
CALCIUM SERPL-MCNC: 7.9 MG/DL (ref 8.5–10.5)
CHLORIDE SERPL-SCNC: 106 MMOL/L (ref 96–112)
CO2 SERPL-SCNC: 26 MMOL/L (ref 20–33)
CREAT SERPL-MCNC: 0.58 MG/DL (ref 0.5–1.4)
ERYTHROCYTE [DISTWIDTH] IN BLOOD BY AUTOMATED COUNT: 58.6 FL (ref 35.9–50)
GFR SERPL CREATININE-BSD FRML MDRD: >60 ML/MIN/1.73 M 2
GLUCOSE SERPL-MCNC: 144 MG/DL (ref 65–99)
HCT VFR BLD AUTO: 42.6 % (ref 37–47)
HGB BLD-MCNC: 13.7 G/DL (ref 12–16)
MCH RBC QN AUTO: 34.8 PG (ref 27–33)
MCHC RBC AUTO-ENTMCNC: 32.2 G/DL (ref 33.6–35)
MCV RBC AUTO: 108.1 FL (ref 81.4–97.8)
PLATELET # BLD AUTO: 95 K/UL (ref 164–446)
PMV BLD AUTO: 11.3 FL (ref 9–12.9)
POTASSIUM SERPL-SCNC: 4 MMOL/L (ref 3.6–5.5)
RBC # BLD AUTO: 3.94 M/UL (ref 4.2–5.4)
SODIUM SERPL-SCNC: 138 MMOL/L (ref 135–145)
WBC # BLD AUTO: 6 K/UL (ref 4.8–10.8)

## 2017-08-09 PROCEDURE — 51798 US URINE CAPACITY MEASURE: CPT

## 2017-08-09 PROCEDURE — 700111 HCHG RX REV CODE 636 W/ 250 OVERRIDE (IP): Performed by: ORTHOPAEDIC SURGERY

## 2017-08-09 PROCEDURE — 85027 COMPLETE CBC AUTOMATED: CPT

## 2017-08-09 PROCEDURE — A9270 NON-COVERED ITEM OR SERVICE: HCPCS | Performed by: ORTHOPAEDIC SURGERY

## 2017-08-09 PROCEDURE — G8988 SELF CARE GOAL STATUS: HCPCS | Mod: CI

## 2017-08-09 PROCEDURE — 770006 HCHG ROOM/CARE - MED/SURG/GYN SEMI*

## 2017-08-09 PROCEDURE — 80048 BASIC METABOLIC PNL TOTAL CA: CPT

## 2017-08-09 PROCEDURE — 36415 COLL VENOUS BLD VENIPUNCTURE: CPT

## 2017-08-09 PROCEDURE — 700102 HCHG RX REV CODE 250 W/ 637 OVERRIDE(OP): Performed by: ORTHOPAEDIC SURGERY

## 2017-08-09 PROCEDURE — 97165 OT EVAL LOW COMPLEX 30 MIN: CPT

## 2017-08-09 PROCEDURE — G8987 SELF CARE CURRENT STATUS: HCPCS | Mod: CJ

## 2017-08-09 PROCEDURE — 700105 HCHG RX REV CODE 258: Performed by: ORTHOPAEDIC SURGERY

## 2017-08-09 PROCEDURE — 700112 HCHG RX REV CODE 229: Performed by: ORTHOPAEDIC SURGERY

## 2017-08-09 RX ORDER — NICOTINE 21 MG/24HR
1 PATCH, TRANSDERMAL 24 HOURS TRANSDERMAL EVERY 24 HOURS
Qty: 30 PATCH
Start: 2017-08-09 | End: 2017-09-11

## 2017-08-09 RX ORDER — OXYCODONE HYDROCHLORIDE 30 MG/1
30 TABLET ORAL
Status: DISCONTINUED | OUTPATIENT
Start: 2017-08-09 | End: 2017-08-11 | Stop reason: HOSPADM

## 2017-08-09 RX ORDER — SODIUM CHLORIDE 9 MG/ML
INJECTION, SOLUTION INTRAVENOUS
Status: ACTIVE
Start: 2017-08-09 | End: 2017-08-09

## 2017-08-09 RX ORDER — OXYCODONE HYDROCHLORIDE 30 MG/1
30 TABLET ORAL
Qty: 30 TAB | Refills: 0 | Status: ON HOLD
Start: 2017-08-09 | End: 2018-10-02

## 2017-08-09 RX ORDER — LORAZEPAM 2 MG/1
2 TABLET ORAL
Qty: 30 TAB | Refills: 0
Start: 2017-08-09 | End: 2017-12-22 | Stop reason: SDUPTHER

## 2017-08-09 RX ORDER — SODIUM CHLORIDE 9 MG/ML
1000 INJECTION, SOLUTION INTRAVENOUS ONCE
Status: COMPLETED | OUTPATIENT
Start: 2017-08-09 | End: 2017-08-09

## 2017-08-09 RX ORDER — DIPHENHYDRAMINE HCL 25 MG
25 TABLET ORAL EVERY 6 HOURS PRN
Qty: 30 TAB | Refills: 0
Start: 2017-08-09 | End: 2018-09-19

## 2017-08-09 RX ORDER — PSEUDOEPHEDRINE HCL 30 MG
100 TABLET ORAL 2 TIMES DAILY
Qty: 60 CAP
Start: 2017-08-09 | End: 2018-09-19

## 2017-08-09 RX ADMIN — POTASSIUM CHLORIDE 20 MEQ: 1500 TABLET, EXTENDED RELEASE ORAL at 08:25

## 2017-08-09 RX ADMIN — GABAPENTIN 600 MG: 300 CAPSULE ORAL at 21:51

## 2017-08-09 RX ADMIN — SODIUM CHLORIDE 1000 ML: 9 INJECTION, SOLUTION INTRAVENOUS at 08:19

## 2017-08-09 RX ADMIN — CARBIDOPA AND LEVODOPA 3 TABLET: 25; 100 TABLET ORAL at 05:13

## 2017-08-09 RX ADMIN — MAGNESIUM HYDROXIDE 30 ML: 400 SUSPENSION ORAL at 08:25

## 2017-08-09 RX ADMIN — NAPROXEN 250 MG: 250 TABLET ORAL at 21:51

## 2017-08-09 RX ADMIN — NICOTINE 21 MG: 21 PATCH, EXTENDED RELEASE TRANSDERMAL at 05:13

## 2017-08-09 RX ADMIN — MORPHINE SULFATE 5 MG: 10 INJECTION INTRAVENOUS at 12:33

## 2017-08-09 RX ADMIN — DOCUSATE SODIUM 100 MG: 100 CAPSULE ORAL at 08:24

## 2017-08-09 RX ADMIN — GABAPENTIN 600 MG: 300 CAPSULE ORAL at 08:24

## 2017-08-09 RX ADMIN — MECLIZINE HYDROCHLORIDE 12.5 MG: 25 TABLET ORAL at 21:51

## 2017-08-09 RX ADMIN — KETOROLAC TROMETHAMINE 30 MG: 30 INJECTION, SOLUTION INTRAMUSCULAR at 08:21

## 2017-08-09 RX ADMIN — GABAPENTIN 600 MG: 300 CAPSULE ORAL at 15:39

## 2017-08-09 RX ADMIN — VITAMIN D, TAB 1000IU (100/BT) 5000 UNITS: 25 TAB at 08:25

## 2017-08-09 RX ADMIN — ALLOPURINOL 300 MG: 300 TABLET ORAL at 21:51

## 2017-08-09 RX ADMIN — OXYCODONE HYDROCHLORIDE 25 MG: 10 TABLET ORAL at 10:38

## 2017-08-09 RX ADMIN — POLYETHYLENE GLYCOL 3350 1 PACKET: 17 POWDER, FOR SOLUTION ORAL at 08:18

## 2017-08-09 RX ADMIN — KETOROLAC TROMETHAMINE 30 MG: 30 INJECTION, SOLUTION INTRAMUSCULAR at 17:32

## 2017-08-09 RX ADMIN — CARBIDOPA AND LEVODOPA 3 TABLET: 25; 100 TABLET ORAL at 21:51

## 2017-08-09 RX ADMIN — LEVOTHYROXINE SODIUM 88 MCG: 88 TABLET ORAL at 05:13

## 2017-08-09 RX ADMIN — CALCITRIOL 0.25 MCG: 0.25 CAPSULE, LIQUID FILLED ORAL at 08:24

## 2017-08-09 RX ADMIN — MECLIZINE HYDROCHLORIDE 12.5 MG: 25 TABLET ORAL at 08:24

## 2017-08-09 RX ADMIN — ACETAMINOPHEN 1000 MG: 500 TABLET ORAL at 05:13

## 2017-08-09 RX ADMIN — OXYCODONE HYDROCHLORIDE 30 MG: 30 TABLET ORAL at 17:32

## 2017-08-09 RX ADMIN — MORPHINE SULFATE 5 MG: 10 INJECTION INTRAVENOUS at 22:05

## 2017-08-09 RX ADMIN — CARBIDOPA AND LEVODOPA 3 TABLET: 25; 100 TABLET ORAL at 15:39

## 2017-08-09 RX ADMIN — CARISOPRODOL 350 MG: 350 TABLET ORAL at 15:40

## 2017-08-09 ASSESSMENT — COGNITIVE AND FUNCTIONAL STATUS - GENERAL
DRESSING REGULAR LOWER BODY CLOTHING: A LITTLE
TOILETING: A LITTLE
SUGGESTED CMS G CODE MODIFIER DAILY ACTIVITY: CJ
HELP NEEDED FOR BATHING: A LITTLE
DAILY ACTIVITIY SCORE: 21

## 2017-08-09 ASSESSMENT — PAIN SCALES - GENERAL
PAINLEVEL_OUTOF10: 10
PAINLEVEL_OUTOF10: 10
PAINLEVEL_OUTOF10: ASSUMED PAIN PRESENT
PAINLEVEL_OUTOF10: 10
PAINLEVEL_OUTOF10: 9
PAINLEVEL_OUTOF10: 10

## 2017-08-09 ASSESSMENT — ACTIVITIES OF DAILY LIVING (ADL): TOILETING: INDEPENDENT

## 2017-08-09 NOTE — DISCHARGE PLANNING
[facility] is able to take the patient if insurance will auth the stay.

## 2017-08-09 NOTE — DISCHARGE PLANNING
Current documentation indicates a potential for acute inpatient rehabilitation. Please consider a rehabilitation consult/referral to assist with discharge planning.

## 2017-08-09 NOTE — DISCHARGE SUMMARY
DATE OF ADMISSION:  08/07/2017    DATE OF DISCHARGE:  08/10/2017    DISCHARGE DIAGNOSES:  Status post L1-S1 deep segmental hardware removal and   postoperative seroma evacuation for painful hardware and seroma.    DISCHARGE MEDICATIONS:  Please see the Epic reconciliation medication list for   all the patient's medications.    FOLLOWUP:  She is to follow with me Dr. Garrett Sexton, [phone] in approximately   2 weeks.    HISTORY OF PRESENT ILLNESS AND HOSPITAL COURSE:  Patient is a 61-year-old lady   with a chief complaint of pain in the area of hardware.  She also had a very   small seroma on MRI.  I explained to her that the seroma is very unlikely to   be causing any symptoms at all.  However, she did complain of hardware pain   and requested the seroma be evacuated and the hardware be removed.  This took   place on the day of admission underwent without complication.  She did have a   solid fusion and was seen on evaluation during surgery.    She does have a long history of having to use high dose very strong narcotics,   so she has a tremendous amount of tolerance and dependence.  While in the   hospital, she has done relatively well.  She has had no operative or   postoperative complications.  At this point in time, we are discharging her to   skilled nursing facility.  She is to be seen by me in approximately 2 weeks   in my clinic.  She is weightbearing as tolerated and should use a walker as   needed.       ____________________________________     MD DIMPLE MON / JATIN    DD:  08/09/2017 12:45:34  DT:  08/09/2017 13:21:54    D#:  [id number]  Job#:  [id number]

## 2017-08-09 NOTE — PROGRESS NOTES
Patient has had an uneventful night, I have lectured again on the importance of sticking to oral pain medications. Plans to malia/rose marie duran in the am.

## 2017-08-09 NOTE — DISCHARGE PLANNING
Medical Social Work    SW met with pt and spouse to obtain final SNF choice. Pt selected [facility]. Pt would like to know co-pay amount before being transferred. If it is too much she will go home instead. SW notified Elidia from [facility] to obtain auth.

## 2017-08-09 NOTE — THERAPY
"Occupational Therapy Evaluation completed.   Functional Status:  CGA supine>sit EOB vc for log roll, SBA to don socks seated EOB using figure 4, mod A to don LSO reports several different braces ( pt very inattentive through out hyper verbose) SBA sit>Stand walking in room w/fww, self limiting bx, declined to stay up in chair, SBA sit>supine BTB vc for log roll, spouse present and very distracted w/poor application of spinal precautions even after education and verbal/tactile directions;  RN aware of session and pts efforts   Plan of Care: Will benefit from Occupational Therapy 3 times per week  Discharge Recommendations:  Equipment: Will Continue to Assess for Equipment Needs. Post-acute therapy Discharge to a transitional care facility for continued skilled therapy services. and Discharge to home with outpatient or home health for additional skilled therapy services.    61 yr old female w/hx of chronic back pain and previous sx, now post op hardware removal, pt presents w/poor pain control, poor activity tolerance and poor application of spinal precautions impacting independence and safety w/ADL's pt will benefit from acute OT currently recommend post acute prior to d/c home     See \"Rehab Therapy-Acute\" Patient Summary Report for complete documentation.    "

## 2017-08-09 NOTE — DISCHARGE PLANNING
[facility] can take the patient if they can obtain insurance auth.

## 2017-08-09 NOTE — PROGRESS NOTES
Report received from the night nurse at 0700. Assumed care. Reviewed labs and medications.   Pt is A&O x 4.   Medicated per MAR  toradol given per MAR for pain  Pt recent BP 93/62. Getting bolus right now. Will recheck the BP  CS intact  Parker cathetar out this morning at 0530. Needs to void by 1130.     Patient in bed resting. Safety precautions in place. Bed in lowered and locked position. Call light within reach. Updated on plan of care.

## 2017-08-09 NOTE — PROGRESS NOTES
Progress Note               Author: Garrett Sexton Date & Time created: 2017  12:40 PM     Interval History:  Pt c/o pain  Review of Systems:  ROS    Physical Exam:  Physical Exam   Neurological:   Sensation and motor intact b le  Dressing dry       Labs:        Invalid input(s): [id number]      Recent Labs      17   0832  17   0846   SODIUM  139  138   POTASSIUM  3.7  4.0   CHLORIDE  105  106   CO2  26  26   BUN  6*  6*   CREATININE  0.64  0.58   CALCIUM  8.2*  7.9*     Recent Labs      17   0832  17   0846   GLUCOSE  133*  144*     Recent Labs      17   0147  17   0305   RBC  4.32  3.94*   HEMOGLOBIN  14.7  13.7   HEMATOCRIT  43.5  42.6   PLATELETCT  119*  95*     Recent Labs      17   0147  17   0305   WBC  8.4  6.0     Hemodynamics:  Temp (24hrs), Av.7 °C (98 °F), Min:36.1 °C (96.9 °F), Max:36.9 °C (98.5 °F)  Temperature: 36.9 °C (98.4 °F)  Pulse  Av.9  Min: 60  Max: 97   Blood Pressure: 110/71 mmHg     Respiratory:    Respiration: 18, Pulse Oximetry: 95 %        RUL Breath Sounds: Clear, RML Breath Sounds: Diminished, RLL Breath Sounds: Diminished, RADHA Breath Sounds: Clear, LLL Breath Sounds: Diminished  Fluids:    Intake/Output Summary (Last 24 hours) at 17 1240  Last data filed at 17 0400   Gross per 24 hour   Intake      0 ml   Output   3700 ml   Net  -3700 ml        GI/Nutrition:  Orders Placed This Encounter   Procedures   • DIET ORDER     Standing Status: Standing      Number of Occurrences: 1      Standing Expiration Date:      Order Specific Question:  Diet:     Answer:  Regular [1]     Medical Decision Making, by Problem:  Active Hospital Problems    Diagnosis   • 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

## 2017-08-09 NOTE — PROGRESS NOTES
Pt  Govind called and very upset regarding her pain and that she is not able to get the ice packs. RN told her  that RN provided pt 2 ice packs for the back that are in place and extra pillows for comfort. RN told the  that pt has received toradol, gabapentin, oxycodone 25 mg and morphine for the pain. Educated the pt on deep breathing exercise to help relieve some pain.   Discussed plan with the .

## 2017-08-10 LAB
ANION GAP SERPL CALC-SCNC: 11 MMOL/L (ref 0–11.9)
BUN SERPL-MCNC: 7 MG/DL (ref 8–22)
CALCIUM SERPL-MCNC: 8.1 MG/DL (ref 8.5–10.5)
CHLORIDE SERPL-SCNC: 103 MMOL/L (ref 96–112)
CO2 SERPL-SCNC: 23 MMOL/L (ref 20–33)
CREAT SERPL-MCNC: 0.55 MG/DL (ref 0.5–1.4)
ERYTHROCYTE [DISTWIDTH] IN BLOOD BY AUTOMATED COUNT: 56 FL (ref 35.9–50)
GFR SERPL CREATININE-BSD FRML MDRD: >60 ML/MIN/1.73 M 2
GLUCOSE SERPL-MCNC: 110 MG/DL (ref 65–99)
HCT VFR BLD AUTO: 41.8 % (ref 37–47)
HGB BLD-MCNC: 13.8 G/DL (ref 12–16)
MCH RBC QN AUTO: 34.9 PG (ref 27–33)
MCHC RBC AUTO-ENTMCNC: 33 G/DL (ref 33.6–35)
MCV RBC AUTO: 105.8 FL (ref 81.4–97.8)
PLATELET # BLD AUTO: 108 K/UL (ref 164–446)
PMV BLD AUTO: 10.7 FL (ref 9–12.9)
POTASSIUM SERPL-SCNC: 3.7 MMOL/L (ref 3.6–5.5)
RBC # BLD AUTO: 3.95 M/UL (ref 4.2–5.4)
SODIUM SERPL-SCNC: 137 MMOL/L (ref 135–145)
WBC # BLD AUTO: 6.1 K/UL (ref 4.8–10.8)

## 2017-08-10 PROCEDURE — 80048 BASIC METABOLIC PNL TOTAL CA: CPT

## 2017-08-10 PROCEDURE — 700102 HCHG RX REV CODE 250 W/ 637 OVERRIDE(OP): Performed by: ORTHOPAEDIC SURGERY

## 2017-08-10 PROCEDURE — 700111 HCHG RX REV CODE 636 W/ 250 OVERRIDE (IP): Performed by: ORTHOPAEDIC SURGERY

## 2017-08-10 PROCEDURE — 85027 COMPLETE CBC AUTOMATED: CPT

## 2017-08-10 PROCEDURE — A9270 NON-COVERED ITEM OR SERVICE: HCPCS | Performed by: ORTHOPAEDIC SURGERY

## 2017-08-10 PROCEDURE — 770006 HCHG ROOM/CARE - MED/SURG/GYN SEMI*

## 2017-08-10 PROCEDURE — 36415 COLL VENOUS BLD VENIPUNCTURE: CPT

## 2017-08-10 RX ADMIN — TRAZODONE HYDROCHLORIDE 100 MG: 50 TABLET ORAL at 20:56

## 2017-08-10 RX ADMIN — CARBIDOPA AND LEVODOPA 3 TABLET: 25; 100 TABLET ORAL at 13:15

## 2017-08-10 RX ADMIN — KETOROLAC TROMETHAMINE 30 MG: 30 INJECTION, SOLUTION INTRAMUSCULAR at 16:43

## 2017-08-10 RX ADMIN — GABAPENTIN 600 MG: 300 CAPSULE ORAL at 20:54

## 2017-08-10 RX ADMIN — KETOROLAC TROMETHAMINE 30 MG: 30 INJECTION, SOLUTION INTRAMUSCULAR at 02:48

## 2017-08-10 RX ADMIN — NICOTINE 21 MG: 21 PATCH, EXTENDED RELEASE TRANSDERMAL at 05:51

## 2017-08-10 RX ADMIN — ANTACID TABLETS 1000 MG: 500 TABLET, CHEWABLE ORAL at 09:03

## 2017-08-10 RX ADMIN — OXYCODONE HYDROCHLORIDE 30 MG: 30 TABLET ORAL at 19:55

## 2017-08-10 RX ADMIN — MORPHINE SULFATE 5 MG: 10 INJECTION INTRAVENOUS at 11:03

## 2017-08-10 RX ADMIN — POTASSIUM CHLORIDE 20 MEQ: 1500 TABLET, EXTENDED RELEASE ORAL at 09:04

## 2017-08-10 RX ADMIN — VITAMIN D, TAB 1000IU (100/BT) 5000 UNITS: 25 TAB at 09:08

## 2017-08-10 RX ADMIN — GABAPENTIN 600 MG: 300 CAPSULE ORAL at 09:04

## 2017-08-10 RX ADMIN — KETOROLAC TROMETHAMINE 30 MG: 30 INJECTION, SOLUTION INTRAMUSCULAR at 09:22

## 2017-08-10 RX ADMIN — POLYETHYLENE GLYCOL 3350 1 PACKET: 17 POWDER, FOR SOLUTION ORAL at 19:55

## 2017-08-10 RX ADMIN — GABAPENTIN 600 MG: 300 CAPSULE ORAL at 16:43

## 2017-08-10 RX ADMIN — OXYCODONE HYDROCHLORIDE 30 MG: 30 TABLET ORAL at 13:15

## 2017-08-10 RX ADMIN — NAPROXEN 250 MG: 250 TABLET ORAL at 20:57

## 2017-08-10 RX ADMIN — LEVOTHYROXINE SODIUM 88 MCG: 88 TABLET ORAL at 05:51

## 2017-08-10 RX ADMIN — CALCITRIOL 0.25 MCG: 0.25 CAPSULE, LIQUID FILLED ORAL at 09:03

## 2017-08-10 RX ADMIN — MECLIZINE HYDROCHLORIDE 12.5 MG: 25 TABLET ORAL at 20:54

## 2017-08-10 RX ADMIN — MECLIZINE HYDROCHLORIDE 12.5 MG: 25 TABLET ORAL at 09:14

## 2017-08-10 RX ADMIN — CARBIDOPA AND LEVODOPA 1 TABLET: 25; 100 TABLET ORAL at 05:51

## 2017-08-10 RX ADMIN — CARISOPRODOL 350 MG: 350 TABLET ORAL at 13:15

## 2017-08-10 RX ADMIN — ALLOPURINOL 300 MG: 300 TABLET ORAL at 20:56

## 2017-08-10 RX ADMIN — LORAZEPAM 2 MG: 1 TABLET ORAL at 20:55

## 2017-08-10 ASSESSMENT — PAIN SCALES - GENERAL
PAINLEVEL_OUTOF10: 10
PAINLEVEL_OUTOF10: 10
PAINLEVEL_OUTOF10: 9
PAINLEVEL_OUTOF10: 10
PAINLEVEL_OUTOF10: 10
PAINLEVEL_OUTOF10: 6
PAINLEVEL_OUTOF10: 9
PAINLEVEL_OUTOF10: 10

## 2017-08-10 NOTE — CARE PLAN
Problem: Safety  Goal: Will remain free from injury  Outcome: PROGRESSING AS EXPECTED  Pt able to ambulate with FWW and one person assist, tolerating well.     Problem: Pain Management  Goal: Pain level will decrease to patient’s comfort goal  Outcome: PROGRESSING AS EXPECTED  Pain is well managed with prn pain medication, tolerating well

## 2017-08-10 NOTE — DISCHARGE PLANNING
Medical Social Work    SW attempted to call Elidia from [facility] at [phone] to obtain co-pay amount. No answer, left message.

## 2017-08-10 NOTE — PROGRESS NOTES
Pt was requesting gel ice pack prescription. RN received the prescription from Dr. Sexton and put it in pt drawer and it got mixed up with the another pt in bed 2 prescriptions. RN is unable to find the prescription. Will have to obtain a new prescription tomorrow. Pt notified.

## 2017-08-10 NOTE — DISCHARGE PLANNING
Medical Social Work    CNA came to pt stating that pt and pt's spouse were upset with this SW and feel that this SW was not doing her job. Pt and spouse found that their insurance [employer] only contracts with [facility] and [facility]. They are upset that this SW did not inform them of this. Additionally, they state that this SW did not inform them that [facility] had accepted them. At the time of choice, this SW did not know insurance's preferred providers. When WAQAR met with pt and spouse to inform of facilities that had accepted them, [facility] had not accepted pt yet.     WAQAR spoke with Mark from [facility] at [phone] who reported that she did not know [facility] a preferred provider until pt's spouse had called her. Mark verified that they have accepted pt pending auth.     WAQAR attempted to call [facility] admissions at to inform them to no longer pursue auth.    WAQAR notified charge nurse of D/C plan.     Plan: Pt to transfer to [facility] pending insurance auth.

## 2017-08-10 NOTE — PROGRESS NOTES
Patient was reporting severe pain on her mid lower back. Patient states pain was a 10/10 aching. Patient was grimacing and moaning. Patient was given morphine and her pain as relieved instantaneously. Ambulated patient to bathroom and she voided. Patient did not report and dizziness upon ambulation. Patient was taken back to bed where she took a nap.

## 2017-08-10 NOTE — CARE PLAN
Problem: Communication  Goal: The ability to communicate needs accurately and effectively will improve  Intervention: Educate patient and significant other/support system about the plan of care, procedures, treatments, medications and allow for questions  Discussed POC with pt including medications and night shift routine.       Problem: Pain Management  Goal: Pain level will decrease to patient’s comfort goal  Intervention: Follow pain managment plan developed in collaboration with patient and Interdisciplinary Team  Pt states that oral pain medications are not working for her, spent some time educating pt on the medications and the need to try and stay away from IV narcotics. Pt still refusing oral pain medications.

## 2017-08-10 NOTE — CARE PLAN
"Problem: Safety  Goal: Will remain free from injury  Outcome: PROGRESSING AS EXPECTED  Safety precautions in place. Call light within reach. Bed is low and in locked position. Hourly rounding in place.     Problem: Discharge Barriers/Planning  Goal: Patient’s continuum of care needs will be met  Outcome: PROGRESSING AS EXPECTED  Pt notified that PT/OT is recommending SNF. Pt  wants her to go to home with home health. Pt states \"we have not decided yet what we need to do\". Pt is pending insurance authorization for SNF.     Problem: Pain Management  Goal: Pain level will decrease to patient’s comfort goal  Outcome: PROGRESSING SLOWER THAN EXPECTED  Pt complains of 9-10/10 pain. Pt was given scheduled gabapentin, morphine, soma, toradol, and oxycodone. Pt still rates pain 10/10. Ice pack provided for comfort. Pt educated on deep breathing exercise to help relieve some pain. Extra pillows were provided. Frequent repositioning to help with the pain in use. Dr. Sexton notified of 10/10 pain during rounds in the morning. Oxycodone increased from 25 mg to 30 mg.         "

## 2017-08-10 NOTE — DISCHARGE PLANNING
Medical Social Work    SW spoke with Elidia from [facility] who reported that insurance stated their preferred providers are [facility] and [facility]. [facility] had accepted pt earlier. SW called [facility] admissions at [phone] and notified them to obtain insurance auth.

## 2017-08-10 NOTE — PROGRESS NOTES
Just received report from night shift nurse. Patient is A&Ox4 and is sitting up on bed eating breakfast. Patient is on RA. Patient reported severe pain on lower back. Pain reported a 10/10, will look into what pain meds to give. Patient is eating as a distraction and is resting with pillows for comfort. Patient is still deciding if she would liked to be discharged home or go to a SNF. Will see if SW can come and see her and answer her concerns about the costs and all the options she has. Bed rails are up, bed is locked and call light is within reach.

## 2017-08-10 NOTE — PROGRESS NOTES
Progress Note               Author: Garrett Sexton Date & Time created: 8/10/2017  3:03 PM     Interval History:  Working on finding skilled facility    Review of Systems:  ROS    Physical Exam:  Physical Exam   Neurological:   Sensation and motor intact  Dressing dry       Labs:        Invalid input(s): [id number]      Recent Labs      17   0832  17   0846  08/10/17   0921   SODIUM  139  138  137   POTASSIUM  3.7  4.0  3.7   CHLORIDE  105  106  103   CO2  26  26   --    BUN  6*  6*   --    CREATININE  0.64  0.58   --    CALCIUM  8.2*  7.9*   --      Recent Labs      17   0832  17   0846   GLUCOSE  133*  144*     Recent Labs      17   0147  17   0305  08/10/17   0224   RBC  4.32  3.94*  3.95*   HEMOGLOBIN  14.7  13.7  13.8   HEMATOCRIT  43.5  42.6  41.8   PLATELETCT  119*  95*  108*     Recent Labs      17   01417   0305  08/10/17   0224   WBC  8.4  6.0  6.1     Hemodynamics:  Temp (24hrs), Av.8 °C (98.3 °F), Min:36.5 °C (97.7 °F), Max:37.3 °C (99.1 °F)  Temperature: 37.3 °C (99.1 °F)  Pulse  Av.4  Min: 50  Max: 97   Blood Pressure: 118/71 mmHg     Respiratory:    Respiration: 18, Pulse Oximetry: 95 %     Work Of Breathing / Effort: Mild  RUL Breath Sounds: Clear, RML Breath Sounds: Diminished, RLL Breath Sounds: Diminished, RADHA Breath Sounds: Clear, LLL Breath Sounds: Diminished  Fluids:    Intake/Output Summary (Last 24 hours) at 08/10/17 1503  Last data filed at 08/10/17 1140   Gross per 24 hour   Intake    600 ml   Output   1550 ml   Net   -950 ml        GI/Nutrition:  Orders Placed This Encounter   Procedures   • DIET ORDER     Standing Status: Standing      Number of Occurrences: 1      Standing Expiration Date:      Order Specific Question:  Diet:     Answer:  Regular [1]     Medical Decision Making, by Problem:  Active Hospital Problems    Diagnosis   • 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

## 2017-08-11 VITALS
DIASTOLIC BLOOD PRESSURE: 81 MMHG | TEMPERATURE: 97.3 F | SYSTOLIC BLOOD PRESSURE: 132 MMHG | HEART RATE: 62 BPM | OXYGEN SATURATION: 92 % | RESPIRATION RATE: 17 BRPM | BODY MASS INDEX: 31.02 KG/M2 | WEIGHT: 158 LBS | HEIGHT: 60 IN

## 2017-08-11 LAB
ANION GAP SERPL CALC-SCNC: 10 MMOL/L (ref 0–11.9)
BUN SERPL-MCNC: 8 MG/DL (ref 8–22)
CALCIUM SERPL-MCNC: 9.4 MG/DL (ref 8.5–10.5)
CHLORIDE SERPL-SCNC: 104 MMOL/L (ref 96–112)
CO2 SERPL-SCNC: 27 MMOL/L (ref 20–33)
CREAT SERPL-MCNC: 0.52 MG/DL (ref 0.5–1.4)
ERYTHROCYTE [DISTWIDTH] IN BLOOD BY AUTOMATED COUNT: 52.9 FL (ref 35.9–50)
GFR SERPL CREATININE-BSD FRML MDRD: >60 ML/MIN/1.73 M 2
GLUCOSE SERPL-MCNC: 84 MG/DL (ref 65–99)
HCT VFR BLD AUTO: 43.7 % (ref 37–47)
HGB BLD-MCNC: 14.8 G/DL (ref 12–16)
MCH RBC QN AUTO: 34.3 PG (ref 27–33)
MCHC RBC AUTO-ENTMCNC: 33.9 G/DL (ref 33.6–35)
MCV RBC AUTO: 101.4 FL (ref 81.4–97.8)
PLATELET # BLD AUTO: 128 K/UL (ref 164–446)
PMV BLD AUTO: 10.4 FL (ref 9–12.9)
POTASSIUM SERPL-SCNC: 3.3 MMOL/L (ref 3.6–5.5)
RBC # BLD AUTO: 4.31 M/UL (ref 4.2–5.4)
SODIUM SERPL-SCNC: 141 MMOL/L (ref 135–145)
WBC # BLD AUTO: 4.8 K/UL (ref 4.8–10.8)

## 2017-08-11 PROCEDURE — 700111 HCHG RX REV CODE 636 W/ 250 OVERRIDE (IP): Performed by: ORTHOPAEDIC SURGERY

## 2017-08-11 PROCEDURE — 80048 BASIC METABOLIC PNL TOTAL CA: CPT

## 2017-08-11 PROCEDURE — 97116 GAIT TRAINING THERAPY: CPT

## 2017-08-11 PROCEDURE — 85027 COMPLETE CBC AUTOMATED: CPT

## 2017-08-11 PROCEDURE — 97530 THERAPEUTIC ACTIVITIES: CPT

## 2017-08-11 PROCEDURE — 36415 COLL VENOUS BLD VENIPUNCTURE: CPT

## 2017-08-11 PROCEDURE — 700102 HCHG RX REV CODE 250 W/ 637 OVERRIDE(OP): Performed by: ORTHOPAEDIC SURGERY

## 2017-08-11 PROCEDURE — A9270 NON-COVERED ITEM OR SERVICE: HCPCS | Performed by: ORTHOPAEDIC SURGERY

## 2017-08-11 PROCEDURE — 97535 SELF CARE MNGMENT TRAINING: CPT

## 2017-08-11 PROCEDURE — 700112 HCHG RX REV CODE 229: Performed by: ORTHOPAEDIC SURGERY

## 2017-08-11 RX ADMIN — CARBIDOPA AND LEVODOPA 3 TABLET: 25; 100 TABLET ORAL at 05:29

## 2017-08-11 RX ADMIN — NICOTINE 21 MG: 21 PATCH, EXTENDED RELEASE TRANSDERMAL at 05:29

## 2017-08-11 RX ADMIN — CARBIDOPA AND LEVODOPA 3 TABLET: 25; 100 TABLET ORAL at 14:28

## 2017-08-11 RX ADMIN — CALCITRIOL 0.25 MCG: 0.25 CAPSULE, LIQUID FILLED ORAL at 08:53

## 2017-08-11 RX ADMIN — DOCUSATE SODIUM 100 MG: 100 CAPSULE ORAL at 08:53

## 2017-08-11 RX ADMIN — OXYCODONE HYDROCHLORIDE 30 MG: 30 TABLET ORAL at 09:20

## 2017-08-11 RX ADMIN — GABAPENTIN 600 MG: 300 CAPSULE ORAL at 08:52

## 2017-08-11 RX ADMIN — POLYETHYLENE GLYCOL 3350 1 PACKET: 17 POWDER, FOR SOLUTION ORAL at 12:09

## 2017-08-11 RX ADMIN — KETOROLAC TROMETHAMINE 30 MG: 30 INJECTION, SOLUTION INTRAMUSCULAR at 09:00

## 2017-08-11 RX ADMIN — VITAMIN D, TAB 1000IU (100/BT) 5000 UNITS: 25 TAB at 08:53

## 2017-08-11 RX ADMIN — ANTACID TABLETS 1000 MG: 500 TABLET, CHEWABLE ORAL at 08:53

## 2017-08-11 RX ADMIN — POTASSIUM CHLORIDE 20 MEQ: 1500 TABLET, EXTENDED RELEASE ORAL at 08:53

## 2017-08-11 RX ADMIN — MORPHINE SULFATE 30 MG: 30 TABLET, EXTENDED RELEASE ORAL at 08:53

## 2017-08-11 RX ADMIN — LEVOTHYROXINE SODIUM 88 MCG: 88 TABLET ORAL at 05:29

## 2017-08-11 RX ADMIN — OXYCODONE HYDROCHLORIDE 30 MG: 30 TABLET ORAL at 13:35

## 2017-08-11 RX ADMIN — POLYETHYLENE GLYCOL 3350 1 PACKET: 17 POWDER, FOR SOLUTION ORAL at 08:54

## 2017-08-11 RX ADMIN — GABAPENTIN 600 MG: 300 CAPSULE ORAL at 14:28

## 2017-08-11 ASSESSMENT — COGNITIVE AND FUNCTIONAL STATUS - GENERAL
DRESSING REGULAR LOWER BODY CLOTHING: A LITTLE
SUGGESTED CMS G CODE MODIFIER DAILY ACTIVITY: CJ
CLIMB 3 TO 5 STEPS WITH RAILING: A LOT
MOVING TO AND FROM BED TO CHAIR: A LITTLE
DRESSING REGULAR UPPER BODY CLOTHING: A LITTLE
SUGGESTED CMS G CODE MODIFIER MOBILITY: CJ
DAILY ACTIVITIY SCORE: 21
HELP NEEDED FOR BATHING: A LITTLE
MOBILITY SCORE: 21

## 2017-08-11 ASSESSMENT — PAIN SCALES - GENERAL
PAINLEVEL_OUTOF10: 9
PAINLEVEL_OUTOF10: 7
PAINLEVEL_OUTOF10: 9
PAINLEVEL_OUTOF10: 9
PAINLEVEL_OUTOF10: 8
PAINLEVEL_OUTOF10: 8

## 2017-08-11 ASSESSMENT — GAIT ASSESSMENTS
DISTANCE (FEET): 50
DEVIATION: ANTALGIC
GAIT LEVEL OF ASSIST: SUPERVISED
ASSISTIVE DEVICE: FRONT WHEEL WALKER

## 2017-08-11 ASSESSMENT — LIFESTYLE VARIABLES: EVER_SMOKED: NEVER

## 2017-08-11 NOTE — THERAPY
"Pt agreeable to tx today. Pt presents w/functional limitations due to back pain and numbness. Pt able to amb further today w/1 rest break. Pt is aware of her body mechanics and her L foot drag and does not require verbal cuing to correct. Pt able to sit<->stand from various surfaces. Pt required verbal cuing for spine precautions. Pt able to recall 2/3. Pt able to properly demonstrate spine precautions duing activity. Pt educated on importance of PT and OT. Pt declined use of LSO. Pt has met acute goals and is a good candidate for post acute placement to progress towards higher functioning goals and independence.    Physical Therapy Treatment completed.   Bed Mobility:  Supine to Sit:  (up in room )  Transfers: Sit to Stand: Stand by Assist  Gait: Level Of Assist: Supervised with Front-Wheel Walker       Plan of Care: Will benefit from Physical Therapy 3 times per week  Discharge Recommendations: Equipment: Will Continue to Assess for Equipment Needs. Post-acute therapy Discharge to a transitional care facility for continued skilled therapy services.     See \"Rehab Therapy-Acute\" Patient Summary Report for complete documentation.       "

## 2017-08-11 NOTE — THERAPY
"Occupational Therapy Treatment completed with focus on ADLs, ADL transfers, patient education and cognition.  Functional Status:  Up in hallway w/PT walking in room w/fww and close SBA, need review of spina precautions especially in regards to lifting and twisting, CGA w/LB dressing, min A w/UB dressing to don brace, reports improved pain control but still painful, declined to stand at sink for grooming complete seated w/set up set up for lunch seated in chair RN aware   Plan of Care: Will benefit from Occupational Therapy 3 times per week  Discharge Recommendations:  Equipment Will Continue to Assess for Equipment Needs. Post-acute therapy Discharge to a transitional care facility for continued skilled therapy services.    See \"Rehab Therapy-Acute\" Patient Summary Report for complete documentation.   "

## 2017-08-11 NOTE — CARE PLAN
Problem: Safety  Goal: Will remain free from injury  Outcome: PROGRESSING AS EXPECTED  Call light within reach. Pt calls for assistance at all times.  Bed in lowest position, upper bedrails up, personal possessions within reach, treaded socks on.  Hourly rounding in place.          Problem: Skin Integrity  Goal: Risk for impaired skin integrity will decrease  Outcome: PROGRESSING AS EXPECTED  Skin assessed at the start of shift. Pt able to reposition/ turn self. Dressing to mid lower back, skin intact.

## 2017-08-11 NOTE — DISCHARGE PLANNING
Transport arranged with [facility]. Patient will be leaving today @1730 via [email or web address] going to [facility]. WAQAR Casillas notified.

## 2017-08-11 NOTE — CARE PLAN
Problem: Respiratory:  Goal: Respiratory status will improve  Pt resp status will remain stable

## 2017-08-11 NOTE — DISCHARGE PLANNING
Medical Social Work    Discharge Plan: Pt is medically cleared to transfer to SNF today. Pt has been accepted to [facility]. Transport arranged for 1730 via [employer]. WAQAR met with pt at bedside. Pt agreeable to transfer and signed cobra. WAQAR notified the charge nurse and attending nurse of the transport time via phone.    Number to call for report: [phone].     Care Transition Team Final Discharge Disposition    Actual Discharge Information  Actual Discharge Date: 08/11/17  Care Transitions Team Assisting with Transportation: Yes  Method of Transportation: Van  Scheduled Transportation Date: 08/11/17  Scheduled Transportation Time: 1730  [employer]: [facility]

## 2017-08-11 NOTE — DISCHARGE INSTRUCTIONS
Discharge Instructions    Discharged to other by medical transportation with self. Discharged via wheelchair, hospital escort: Yes.  Special equipment needed: Not Applicable    Be sure to schedule a follow-up appointment with your primary care doctor or any specialists as instructed.     Discharge Plan:   Diet Plan: Discussed  Activity Level: Discussed  Smoking Cessation Offered: Patient Refused  Confirmed Follow up Appointment: Patient to Call and Schedule Appointment  Confirmed Symptoms Management: Discussed  Medication Reconciliation Updated: Yes  Influenza Vaccine Indication: Patient Refuses    I understand that a diet low in cholesterol, fat, and sodium is recommended for good health. Unless I have been given specific instructions below for another diet, I accept this instruction as my diet prescription.   Other diet: Regluar    Special Instructions: None    · Is patient discharged on Warfarin / Coumadin?   No     · Is patient Post Blood Transfusion?  No    Depression / Suicide Risk    As you are discharged from this [facility] facility, it is important to learn how to keep safe from harming yourself.    Recognize the warning signs:  · Abrupt changes in personality, positive or negative- including increase in energy   · Giving away possessions  · Change in eating patterns- significant weight changes-  positive or negative  · Change in sleeping patterns- unable to sleep or sleeping all the time   · Unwillingness or inability to communicate  · Depression  · Unusual sadness, discouragement and loneliness  · Talk of wanting to die  · Neglect of personal appearance   · Rebelliousness- reckless behavior  · Withdrawal from people/activities they love  · Confusion- inability to concentrate     If you or a loved one observes any of these behaviors or has concerns about self-harm, here's what you can do:  · Talk about it- your feelings and reasons for harming yourself  · Remove any means that you might use to hurt  yourself (examples: pills, rope, extension cords, firearm)  · Get professional help from the community (Mental Health, Substance Abuse, psychological counseling)  · Do not be alone:Call your Safe Contact- someone whom you trust who will be there for you.  · Call your local CRISIS HOTLINE 558-5593 or 214-788-3490  · Call your local Children's Mobile Crisis Response Team [place] (445) 663-1864 or www.[employer]  · Call the toll free National Suicide Prevention Hotlines   · National Suicide Prevention Lifeline 434-183-NNZF (8102)  · [employer] 800-SUICIDE (226-9975)    Spinal Fusion  Spinal fusion is a procedure to make 2 or more of the bones in your spinal column (vertebrae) grow together (fuse). This procedure stops movement between the vertebrae and can relieve pain and prevent deformity.   Spinal fusion is used to treat the following conditions:  · Fractures of the spine.  · Herniated disk (the spongy material [cartilage] between the vertebrae).  · Abnormal curvatures of the spine, such as scoliosis or kyphosis.  · A weak or an unstable spine, caused by infections or tumor.  RISKS AND COMPLICATIONS  Complications associated with spinal fusion are rare, but they can occur. Possible complications include:  · Bleeding.  · Infection near the incision.  · Nerve damage. Signs of nerve damage are back pain, pain in one or both legs, weakness, or numbness.  · Spinal fluid leakage.  · Blood clot in your leg, which can move to your lungs.  · Difficulty controlling urination or bowel movements.  BEFORE THE PROCEDURE  · A medical evaluation will be done. This will include a physical exam, blood tests, and imaging exams.  · You will talk with an anesthesiologist. This is the person who will be in charge of the anesthesia during the procedure. Spinal fusion usually requires that you are asleep during the procedure (general anesthesia).  · You will need to stop taking certain medicines, particularly  "those associated with an increased risk of bleeding. Ask your caregiver about changing or stopping your regular medicines.  · If you smoke, you will need to stop at least 2 weeks before the procedure. Smoking can slow down the healing process, especially fusion of the vertebrae, and increase the risk of complications.  · Do not eat or drink anything for at least 8 hours before the procedure.  PROCEDURE   A cut (incision) is made over the vertebrae that will be fused. The back muscles are  from the vertebrae. If you are having this procedure to treat a herniated disk, the disc material pressing on the nerve root is removed (decompression). The area where the disk is removed is then filled with extra bone. Bone from another part of your body (autogenous bone) or bone from a bone donor (allograft bone) may be used. The extra bone promotes fusion between the vertebrae. Sometimes, specific medicines are added to the fusion area to promote bone healing. In most cases, screws and rods or metal plates will be used to attach the vertebrae to stabilize them while they fuse.   AFTER THE PROCEDURE   · You will stay in a recovery area until the anesthesia has worn off. Your blood pressure and pulse will be checked frequently.  · You will be given antibiotics to prevent infection.  · You may continue to receive fluids through an intravenous (IV) tube while you are still in the hospital.  · Pain after surgery is normal. You will be given pain medicine.  · You will be taught how to move correctly and how to stand and walk. While in bed, you will be instructed to turn frequently, using a \"log rolling\" technique, in which the entire body is moved without twisting the back.     This information is not intended to replace advice given to you by your health care provider. Make sure you discuss any questions you have with your health care provider.     Document Released: 09/15/2004 Document Revised: 03/11/2013 Document Reviewed: " 03/01/2012  Elsevier Interactive Patient Education ©2016 Elsevier Inc.

## 2017-08-11 NOTE — DISCHARGE PLANNING
Medical Social Work    SW attempted to call Mark from [facility] [place] admissions at [phone]. No answer, left message.

## 2017-08-11 NOTE — DISCHARGE PLANNING
Medical Social Work    WAQAR spoke with Mark from [facility] who requested updated PT/OT notes for insurance auth. WAQAR notified nurse who had PT/OT re-evaluate. Mark from [facility] used updated PT/OT notes to submit to insurance and was able to obtain auth. They have an available bed for pt today.     WAQAR faxed over transportation communication form to [facility].

## 2017-08-11 NOTE — PROGRESS NOTES
Patient in stable condition, sw working on discharge process,  at bedside, pt is in good spirits and excited to get out of hospital

## 2017-08-11 NOTE — CARE PLAN
Problem: Skin Integrity  Goal: Risk for impaired skin integrity will decrease  Skin will remain intake during stay

## 2017-08-12 NOTE — PROGRESS NOTES
Pt dc'd in stable condition with all personal belongings, [facility] with [facility] taking her to [facility],  at patient side, all discharge paper work covered and signed

## 2017-08-13 DIAGNOSIS — G62.9 NEUROPATHY: ICD-10-CM

## 2017-08-14 RX ORDER — GABAPENTIN 300 MG/1
CAPSULE ORAL
Qty: 180 CAP | Refills: 3 | Status: SHIPPED | OUTPATIENT
Start: 2017-08-14 | End: 2018-05-01 | Stop reason: SDUPTHER

## 2017-08-15 NOTE — DOCUMENTATION QUERY
DOCUMENTATION QUERY    PROVIDERS: Please select “Cosign w/ note”to reply to query.    To better represent the severity of illness of your patient, please review the following information and exercise your independent professional judgment in responding to this query.     Patient presented with painful hardware and postoperative seroma. Postoperative note documents that the seroma was removed but there is no description of this in the body of the Operative report. Please clarify if seroma was removed; and if so, please add an  addendum to the Operative Report to describe the removal of the seroma..    Please specify for each of the followin. Seroma remove from subcutaneous and fascia(please describe how removed)  2. Seroma removed from muscle (please document right or left side)(please describe how removed)  3. Seroma removed from other layer (please document)(please describe how removed)  4. Unable to determined      The medical record reflects the following:   Clinical Findings  postoperative seroma   nonviable tissue   painful hardware   Treatment  excisional debridement and seroma removal   Risk Factors     Location within medical record  Operative Note     Thank you,   Lindsay Jones

## 2017-08-16 PROBLEM — D49.2 NEOPLASM OF UNSPECIFIED BEHAVIOR OF BONE, SOFT TISSUE, AND SKIN: Status: RESOLVED | Noted: 2017-08-02 | Resolved: 2017-08-16

## 2017-08-17 NOTE — DISCHARGE SUMMARY
DATE OF ADMISSION:  08/07/2017    DATE OF DISCHARGE:  08/11/2017    DISCHARGE DIAGNOSES:  Status post L1-S1 deep segmental hardware removal,   fusion exploration and seroma evacuation.    HISTORY OF PRESENT ILLNESS:  I have previously dictated a discharge summary   with discharge date of 08/10/2017.  However, because the patient had to remain   in the hospital one additional day before she could be accepted to a skilled   nursing facility, her actual date of discharge was 08/11/2017.  All other   parameters of the discharge remain the same including medications,   restrictions, followup, etc.  The only difference between the initial   discharge summary and this one is the date of discharge, which is 08/11/2017.       ____________________________________     MD DIMPLE MON / JATIN    DD:  08/17/2017 11:01:14  DT:  08/17/2017 11:09:42    D#:  [id number]  Job#:  [id number]

## 2017-08-17 NOTE — DOCUMENTATION QUERY
DOCUMENTATION QUERY    DR. LOZANO, please review this Documentation Query and reply with an addendum. You co-signed only, which does not address the needed clarification from you to be able to code and complete this account.      To better represent the severity of illness of your patient, please review the following information and exercise your independent professional judgment in responding to this query.     Patient presented with painful hardware and postoperative seroma. Postoperative note documents that the seroma was removed but there is no description of this in the body of the Operative report. Please clarify if seroma was removed; and if so, please add an  addendum to the Operative Report to describe the removal of the seroma..    Please specify for each of the followin. Seroma remove from subcutaneous and fascia(please describe how removed)  2. Seroma removed from muscle (please document right or left side)(please describe how removed)  3. Seroma removed from other layer (please document)(please describe how removed)  4. Unable to determined      The medical record reflects the following:    Clinical Findings  postoperative seroma    nonviable tissue   painful hardware    Treatment   excisional debridement and seroma removal    Risk Factors       Location within medical record   Operative Note      Thank you,    Lindsay Jones

## 2017-08-28 ENCOUNTER — HOME HEALTH ADMISSION (OUTPATIENT)
Dept: HOME HEALTH SERVICES | Facility: HOME HEALTHCARE | Age: 62
End: 2017-08-28
Payer: COMMERCIAL

## 2017-08-29 ENCOUNTER — TELEPHONE (OUTPATIENT)
Dept: MEDICAL GROUP | Facility: PHYSICIAN GROUP | Age: 62
End: 2017-08-29

## 2017-08-29 RX ORDER — ALLOPURINOL 300 MG/1
TABLET ORAL
Qty: 90 TAB | Refills: 0 | Status: SHIPPED | OUTPATIENT
Start: 2017-08-29 | End: 2017-11-03 | Stop reason: SDUPTHER

## 2017-08-29 NOTE — TELEPHONE ENCOUNTER
Please check status of home care referral for patient. She was referred for home care services but if she prefers outpatient we can refer to wound care.  ERIC Edwards.

## 2017-08-29 NOTE — TELEPHONE ENCOUNTER
Pt called stating she recently had surgery on her back and was discharged from [facility] on 8/24/17, she has had her first follow up with the surgeon and her next is in October. She states they referred her to Home care for the bandage change but that has not gone through yet. She would like to set up an appointment with you for the meantime to check the area and change the bandage. Please advise.

## 2017-08-29 NOTE — TELEPHONE ENCOUNTER
Refilled x 3 months. Please advise patient to schedule follow-up for future fills. Pt was recently discharged from [facility].

## 2017-08-29 NOTE — TELEPHONE ENCOUNTER
I was able to contact [facility] and get Pt an appt tomorrow to change the bandage.

## 2017-09-02 ENCOUNTER — HOME CARE VISIT (OUTPATIENT)
Dept: HOME HEALTH SERVICES | Facility: HOME HEALTHCARE | Age: 62
End: 2017-09-02
Payer: COMMERCIAL

## 2017-09-02 PROCEDURE — G0162 HHC RN E&M PLAN SVS, 15 MIN: HCPCS

## 2017-09-02 PROCEDURE — 665001 SOC-HOME HEALTH

## 2017-09-05 VITALS
OXYGEN SATURATION: 91 % | HEART RATE: 62 BPM | HEIGHT: 60 IN | BODY MASS INDEX: 29.45 KG/M2 | WEIGHT: 150 LBS | SYSTOLIC BLOOD PRESSURE: 112 MMHG | DIASTOLIC BLOOD PRESSURE: 74 MMHG | RESPIRATION RATE: 18 BRPM | TEMPERATURE: 98.9 F

## 2017-09-05 SDOH — ECONOMIC STABILITY: HOUSING INSECURITY: UNSAFE APPLIANCES: 0

## 2017-09-05 SDOH — ECONOMIC STABILITY: HOUSING INSECURITY: HOME SAFETY: NO WORKING FIRE EXTINGUIESHERS

## 2017-09-05 SDOH — ECONOMIC STABILITY: HOUSING INSECURITY: UNSAFE COOKING RANGE AREA: 0

## 2017-09-05 ASSESSMENT — ACTIVITIES OF DAILY LIVING (ADL)
HOME_HEALTH_OASIS: 02
OASIS_M1830: 03
HOME_HEALTH_OASIS: 01

## 2017-09-05 ASSESSMENT — PATIENT HEALTH QUESTIONNAIRE - PHQ9
1. LITTLE INTEREST OR PLEASURE IN DOING THINGS: 00
2. FEELING DOWN, DEPRESSED, IRRITABLE, OR HOPELESS: 00

## 2017-09-11 ENCOUNTER — OFFICE VISIT (OUTPATIENT)
Dept: MEDICAL GROUP | Facility: CLINIC | Age: 62
End: 2017-09-11
Payer: COMMERCIAL

## 2017-09-11 ENCOUNTER — TELEPHONE (OUTPATIENT)
Dept: MEDICAL GROUP | Facility: CLINIC | Age: 62
End: 2017-09-11

## 2017-09-11 VITALS
HEART RATE: 76 BPM | TEMPERATURE: 97.4 F | HEIGHT: 62 IN | OXYGEN SATURATION: 94 % | SYSTOLIC BLOOD PRESSURE: 110 MMHG | RESPIRATION RATE: 16 BRPM | WEIGHT: 151 LBS | BODY MASS INDEX: 27.79 KG/M2 | DIASTOLIC BLOOD PRESSURE: 60 MMHG

## 2017-09-11 DIAGNOSIS — F17.200 SMOKER: ICD-10-CM

## 2017-09-11 DIAGNOSIS — R53.81 DEBILITY: ICD-10-CM

## 2017-09-11 DIAGNOSIS — S21.209S OPEN WOUND OF BACK, UNSPECIFIED LATERALITY, SEQUELA: ICD-10-CM

## 2017-09-11 DIAGNOSIS — R21 RASH AND NONSPECIFIC SKIN ERUPTION: ICD-10-CM

## 2017-09-11 DIAGNOSIS — Z28.21 INFLUENZA VACCINATION DECLINED: ICD-10-CM

## 2017-09-11 DIAGNOSIS — Z98.890 STATUS POST LUMBAR SPINE SURGERY FOR DECOMPRESSION OF SPINAL CORD: ICD-10-CM

## 2017-09-11 DIAGNOSIS — F33.9 EPISODE OF RECURRENT MAJOR DEPRESSIVE DISORDER, UNSPECIFIED DEPRESSION EPISODE SEVERITY (HCC): ICD-10-CM

## 2017-09-11 PROBLEM — F32.9 MAJOR DEPRESSIVE DISORDER: Status: ACTIVE | Noted: 2017-09-11

## 2017-09-11 PROCEDURE — 99214 OFFICE O/P EST MOD 30 MIN: CPT | Performed by: NURSE PRACTITIONER

## 2017-09-11 ASSESSMENT — PATIENT HEALTH QUESTIONNAIRE - PHQ9
SUM OF ALL RESPONSES TO PHQ QUESTIONS 1-9: 21
CLINICAL INTERPRETATION OF PHQ2 SCORE: 6
5. POOR APPETITE OR OVEREATING: 2 - MORE THAN HALF THE DAYS

## 2017-09-11 NOTE — TELEPHONE ENCOUNTER
Have her contact her insurance to see if they cover alt med.  In the meantime she already has patches at home.

## 2017-09-11 NOTE — PROGRESS NOTES
"CC: Follow-Up (Referrals needed \"Skilled Nursing and Skilled PT\" )        HPI:     Marium is a patient of Tamara Joshi, all problems are new to me today, presents today for the followin. Rash and nonspecific skin eruption  Complaining of a red/burning rash on her right flank. Present for approximate 5-7 days. This has improved when she put Neosporin on it.    2. Status post lumbar spine surgery for decompression of spinal cord/Open wound of back, unspecified laterality, sequela/Debility  Stable. Status post lumbar surgery to remove hardware with a few days of skilled nursing facility follow-up. She is now at home is having difficulty getting a home health referral to help with physical therapy, occupational therapy and wound/nursing care. She is here today primarily for that referral    5. Episode of recurrent major depressive disorder, unspecified depression episode severity (CMS-HCC)  Flag for depression. She denies significant depression SI or HI. She states her medications were recently adjusted when she was inpatient     Smoker  States has prescription for patches at home however she is unsure how to use them. She currently smokes 10 or less cigarettes a day.    Current Outpatient Prescriptions   Medication Sig Dispense Refill   • varenicline (CHANTIX STARTING MONTH ) 0.5 MG X 11 & 1 MG X 42 tablet Per packet 56 Tab 3   • EPINEPHrine (EPIPEN) 0.3 MG/0.3ML Solution Auto-injector solution for injection 0.3 mg by Intramuscular route 1 time daily as needed (Bee stings). Inject into the middle of the outer thigh and hold for 3 secons as needed for severe allergic reaction, then call 911 if used.     • allopurinol (ZYLOPRIM) 300 MG Tab TAKE ONE TABLET BY MOUTH DAILY 90 Tab 0   • gabapentin (NEURONTIN) 300 MG Cap TAKE TWO CAPSULES BY MOUTH THREE TIMES A  Cap 3   • oxycodone (OXY-IR) 30 MG immediate release tablet Take 1 Tab by mouth every 3 hours as needed for Moderate Pain. 30 Tab 0   • lorazepam " (ATIVAN) 2 MG tablet Take 1 Tab by mouth at bedtime as needed (Anxiety, Agitatoin, Insomnia). (Patient not taking: Reported on 9/2/2017) 30 Tab 0   • diphenhydrAMINE (BENADRYL) 25 MG Tab Take 1 tablet by mouth every 6 hours as needed for Itching. (Patient not taking: Reported on 9/2/2017) 30 Tab 0   • docusate sodium 100 MG Cap Take 100 mg by mouth 2 Times a Day. (Patient not taking: Reported on 9/2/2017) 60 Cap    • levothyroxine (SYNTHROID) 88 MCG Tab Take 44-88 mcg by mouth Every morning on an empty stomach. Brand name only . 44 mcg on Sundays. All other days 88 mcg     • lorazepam (ATIVAN) 1 MG Tab Take 1-2 mg by mouth at bedtime as needed for Anxiety.     • polyethylene glycol/lytes (MIRALAX) Pack Take 17 g by mouth every day.     • potassium chloride SA (K-DUR) 10 MEQ Tab CR Take 10-20 mEq by mouth every day. 20 meq Mondays and Fridays. All other days 10 meq     • trazodone (DESYREL) 50 MG Tab TAKE ONE TO TWO TABLETS BY MOUTH EVERY NIGHT AT BEDTIME FOR SLEEP 60 Tab 4   • meclizine (ANTIVERT) 12.5 MG Tab Take 12.5 mg by mouth 2 Times a Day.     • acetaminophen (TYLENOL) 500 MG Tab Take 500-1,000 mg by mouth every 6 hours as needed.     • Naproxen Sodium (ALEVE) 220 MG Cap Take 220 mg by mouth every 12 hours.     • lidocaine (LIDODERM) 5 % Patch Apply 2 Patches to skin as directed See Admin Instructions. 2 at a time 12 hrs on, 12 hrs off     • carbidopa-levodopa (SINEMET)  MG Tab TAKE ONE TABLET BY MOUTH DAILY 90 Tab 1   • carisoprodol (SOMA) 350 MG Tab Take 350 mg by mouth 3 times a day as needed for Muscle Spasms.     • calcitRIOL (ROCALTROL) 0.25 MCG Cap Take 0.25 mcg by mouth every day.     • Cholecalciferol (VITAMIN D3) 1000 UNITS Cap Take 5,000 Units by mouth every day.     • Calcium Carbonate Antacid (TUMS PO) Take 1,200 mg by mouth every day. Needs to take BERRY flavor     • morphine ER (MS CONTIN) 30 MG Tab CR tablet Take 30 mg by mouth 3 times a day. 1 tab PO 3 times daily       No current  "facility-administered medications for this visit.      Social History   Substance Use Topics   • Smoking status: Current Every Day Smoker     Packs/day: 0.50     Years: 47.00     Types: Cigarettes     Start date: 4/25/1970   • Smokeless tobacco: Never Used      Comment: quit during pregnancy, now 2-3 cig daily   • Alcohol use 1.2 oz/week     2 Cans of beer per week      Comment: quit drinking daily 3 years ago, now drinks 3-4 beers on weekends     I reviewed patients allergies, problem list and medications today in EPIC.    ROS: Any/all pertinent positives listed in the HPI, otherwise all others reviewed are negative today.      /60   Pulse 76   Temp 36.3 °C (97.4 °F)   Resp 16   Ht 1.575 m (5' 2\")   Wt 68.5 kg (151 lb)   LMP 10/02/1980   SpO2 94%   Breastfeeding? No   BMI 27.62 kg/m²     Exam  Gen: Alert and oriented, No apparent distress. WDWN  Psych: A+Ox3, normal affect and mood  Skin: Warm, dry and intact. Good turgor   Right leg shows an approximate 4- 5 cm length ×2 cm width red rash with 2 apparent papules versus excoriations from scratching. No current vesicles  Eye: Conjunctiva clear, lids normal  ENMT: Lips without lesions, poor dentition  Lungs: Clear to auscultation bilaterally, no rales or rhonchi   Unlabored respiratory effort.   CV: Regular rate and rhythm, S1, S2. No murmurs.   No Edema   Surgical wound on the back is covered with a clean dry dressing.        Assessment and Plan.   61 y.o. female with the following issues.    1. Rash and nonspecific skin eruption  Stable. Possible resolving shingles versus some random nonspecific rash. Given the Neosporin topical continue that. We will not pursue vacations for shingles given that she is 5-7 days out    2. Status post lumbar spine surgery for decompression of spinal cord/Open wound of back, unspecified laterality, sequela/Debility  Stable. Continue care with her orthopedic surgeon. Referral for home health placed  - REFERRAL TO HOME " HEALTH    5. Episode of recurrent major depressive disorder, unspecified depression episode severity (CMS-HCC)  Stable. She denies SI or HI. Further follow-up with PCP  - Patient has been identified as being depressed and appropriate orders and counseling have been given    6. Influenza vaccination declined  Not interested in vaccines today    7. Smoker  Would like to try Chantix.  - varenicline (CHANTIX STARTING MONTH MIRELA) 0.5 MG X 11 & 1 MG X 42 tablet; Per packet  Dispense: 56 Tab; Refill: 3

## 2017-09-11 NOTE — TELEPHONE ENCOUNTER
FAX  1. Caller Name: esther fax                      Call Back Number: [phone]    2. Message: [facility] fax back the varenicline :CHANTIX stating this is a plan exclusion. Please advise what to informed pt?    3. Patient approves office to leave a detailed voicemail/MyChart message: N\A

## 2017-09-12 ENCOUNTER — HOME CARE VISIT (OUTPATIENT)
Dept: HOME HEALTH SERVICES | Facility: HOME HEALTHCARE | Age: 62
End: 2017-09-12
Payer: COMMERCIAL

## 2017-09-12 VITALS
DIASTOLIC BLOOD PRESSURE: 80 MMHG | OXYGEN SATURATION: 92 % | SYSTOLIC BLOOD PRESSURE: 140 MMHG | RESPIRATION RATE: 18 BRPM | HEART RATE: 77 BPM | TEMPERATURE: 97.6 F

## 2017-09-12 PROCEDURE — G0152 HHCP-SERV OF OT,EA 15 MIN: HCPCS

## 2017-09-12 ASSESSMENT — ACTIVITIES OF DAILY LIVING (ADL)
MEAL_PREP_ASSISTANCE: 5
TELEPHONE_ASSISTANCE: 0
LAUNDRY_ASSISTANCE: 6
TRANSPORTATION_ASSISTANCE: 6
SHOPPING_ASSISTANCE: 6
TOILETING_ASSISTANCE: 4
DRESSING_UB_ASSISTANCE: 4
EATING_ASSISTANCE: 4
BATHING_ASSISTANCE: 6
HOUSEKEEPING_ASSISTANCE: 6

## 2017-09-12 ASSESSMENT — ENCOUNTER SYMPTOMS: SEVERE DYSPNEA: 1

## 2017-09-13 ENCOUNTER — HOME CARE VISIT (OUTPATIENT)
Dept: HOME HEALTH SERVICES | Facility: HOME HEALTHCARE | Age: 62
End: 2017-09-13
Payer: COMMERCIAL

## 2017-09-13 VITALS
TEMPERATURE: 99.2 F | HEART RATE: 76 BPM | RESPIRATION RATE: 16 BRPM | DIASTOLIC BLOOD PRESSURE: 60 MMHG | OXYGEN SATURATION: 90 % | SYSTOLIC BLOOD PRESSURE: 112 MMHG

## 2017-09-13 PROCEDURE — G0151 HHCP-SERV OF PT,EA 15 MIN: HCPCS

## 2017-09-14 ASSESSMENT — ENCOUNTER SYMPTOMS: DEBILITATING PAIN: 1

## 2017-09-18 ENCOUNTER — HOME CARE VISIT (OUTPATIENT)
Dept: HOME HEALTH SERVICES | Facility: HOME HEALTHCARE | Age: 62
End: 2017-09-18
Payer: COMMERCIAL

## 2017-09-19 ENCOUNTER — HOME CARE VISIT (OUTPATIENT)
Dept: HOME HEALTH SERVICES | Facility: HOME HEALTHCARE | Age: 62
End: 2017-09-19
Payer: COMMERCIAL

## 2017-09-19 PROCEDURE — G0180 MD CERTIFICATION HHA PATIENT: HCPCS | Performed by: FAMILY MEDICINE

## 2017-09-20 ENCOUNTER — HOME CARE VISIT (OUTPATIENT)
Dept: HOME HEALTH SERVICES | Facility: HOME HEALTHCARE | Age: 62
End: 2017-09-20
Payer: COMMERCIAL

## 2017-09-21 ENCOUNTER — HOME CARE VISIT (OUTPATIENT)
Dept: HOME HEALTH SERVICES | Facility: HOME HEALTHCARE | Age: 62
End: 2017-09-21
Payer: COMMERCIAL

## 2017-09-25 ENCOUNTER — HOME CARE VISIT (OUTPATIENT)
Dept: HOME HEALTH SERVICES | Facility: HOME HEALTHCARE | Age: 62
End: 2017-09-25
Payer: COMMERCIAL

## 2017-09-26 ENCOUNTER — APPOINTMENT (RX ONLY)
Dept: URBAN - METROPOLITAN AREA CLINIC 36 | Facility: CLINIC | Age: 62
Setting detail: DERMATOLOGY
End: 2017-09-26

## 2017-09-26 VITALS — DIASTOLIC BLOOD PRESSURE: 87 MMHG | HEART RATE: 83 BPM | SYSTOLIC BLOOD PRESSURE: 130 MMHG

## 2017-09-26 DIAGNOSIS — L57.8 OTHER SKIN CHANGES DUE TO CHRONIC EXPOSURE TO NONIONIZING RADIATION: ICD-10-CM

## 2017-09-26 PROBLEM — C44.311 BASAL CELL CARCINOMA OF SKIN OF NOSE: Status: ACTIVE | Noted: 2017-09-26

## 2017-09-26 PROBLEM — I10 ESSENTIAL (PRIMARY) HYPERTENSION: Status: ACTIVE | Noted: 2017-09-26

## 2017-09-26 PROBLEM — F32.9 MAJOR DEPRESSIVE DISORDER, SINGLE EPISODE, UNSPECIFIED: Status: ACTIVE | Noted: 2017-09-26

## 2017-09-26 PROBLEM — F41.9 ANXIETY DISORDER, UNSPECIFIED: Status: ACTIVE | Noted: 2017-09-26

## 2017-09-26 PROCEDURE — ? MOHS SURGERY

## 2017-09-26 PROCEDURE — 99212 OFFICE O/P EST SF 10 MIN: CPT | Mod: 25

## 2017-09-26 PROCEDURE — 14060 TIS TRNFR E/N/E/L 10 SQ CM/<: CPT

## 2017-09-26 PROCEDURE — 17312 MOHS ADDL STAGE: CPT

## 2017-09-26 PROCEDURE — ? COUNSELING

## 2017-09-26 PROCEDURE — 17311 MOHS 1 STAGE H/N/HF/G: CPT

## 2017-09-26 NOTE — PROCEDURE: MOHS SURGERY
Consent (Scalp)/Introductory Paragraph: The rationale for Mohs was explained to the patient and consent was obtained. The risks, benefits and alternatives to therapy were discussed in detail. Specifically, the risks of changes in hair growth pattern secondary to repair, infection, scarring, bleeding, prolonged wound healing, incomplete removal, allergy to anesthesia, nerve injury and recurrence were addressed. Prior to the procedure, the treatment site was clearly identified and confirmed by the patient. All components of Universal Protocol/PAUSE Rule completed.
Stage 11: Additional Anesthesia Type: 1% lidocaine with epinephrine
Bcc Histology Text: There were numerous aggregates of basaloid cells.
Consent 2/Introductory Paragraph: Mohs surgery was explained to the patient and consent was obtained. The risks, benefits and alternatives to therapy were discussed in detail. Specifically, the risks of infection, scarring, bleeding, prolonged wound healing, incomplete removal, allergy to anesthesia, nerve injury and recurrence were addressed. Prior to the procedure, the treatment site was clearly identified and confirmed by the patient. All components of Universal Protocol/PAUSE Rule completed.
Incorporate Mauc Into Note After Indications: Yes
Consent (Marginal Mandibular)/Introductory Paragraph: The rationale for Mohs was explained to the patient and consent was obtained. The risks, benefits and alternatives to therapy were discussed in detail. Specifically, the risks of damage to the marginal mandibular branch of the facial nerve, infection, scarring, bleeding, prolonged wound healing, incomplete removal, allergy to anesthesia, and recurrence were addressed. Prior to the procedure, the treatment site was clearly identified and confirmed by the patient. All components of Universal Protocol/PAUSE Rule completed.
Intermediate Repair Preamble Text (Leave Blank If You Do Not Want): Undermining was performed with blunt dissection.
Stage 2: Additional Anesthesia Volume In Cc: 0
Deep Sutures: 5-0 Polysorb
Closure 2 Information: This tab is for additional flaps and grafts, including complex repair and grafts and complex repair and flaps. You can also specify a different location for the additional defect, if the location is the same you do not need to select a new one. We will insert the automated text for the repair you select below just as we do for solitary flaps and grafts. Please note that at this time if you select a location with a different insurance zone you will need to override the ICD10 and CPT if appropriate.
Suture Removal: 7 days
Closure 4 Information: This tab is for additional flaps and grafts above and beyond our usual structured repairs.  Please note if you enter information here it will not currently bill and you will need to add the billing information manually.
Advancement Flap (Double) Text: The defect edges were debeveled with a #15 scalpel blade.  Given the location of the defect and the proximity to free margins a double advancement flap was deemed most appropriate.  Using a sterile surgical marker, the appropriate advancement flaps were drawn incorporating the defect and placing the expected incisions within the relaxed skin tension lines where possible.    The area thus outlined was incised deep to adipose tissue with a #15 scalpel blade.  The skin margins were undermined to an appropriate distance in all directions utilizing iris scissors.
Eye Shield Used: No
Mastoid Interpolation Flap Text: A decision was made to reconstruct the defect utilizing an interpolation axial flap and a staged reconstruction.  A telfa template was made of the defect.  This telfa template was then used to outline the mastoid interpolation flap.  The donor area for the pedicle flap was then injected with anesthesia.  The flap was excised through the skin and subcutaneous tissue down to the layer of the underlying musculature.  The pedicle flap was carefully excised within this deep plane to maintain its blood supply.  The edges of the donor site were undermined.   The donor site was closed in a primary fashion.  The pedicle was then rotated into position and sutured.  Once the tube was sutured into place, adequate blood supply was confirmed with blanching and refill.  The pedicle was then wrapped with xeroform gauze and dressed appropriately with a telfa and gauze bandage to ensure continued blood supply and protect the attached pedicle.
Repair Performed By Another Provider Text (Leave Blank If You Do Not Want): After obtaining clear surgical margins the defect was repaired by another provider.
Trilobed Flap Text: The defect edges were debeveled with a #15 scalpel blade.  Given the location of the defect and the proximity to free margins a trilobed flap was deemed most appropriate.  Using a sterile surgical marker, an appropriate trilobed flap drawn around the defect.    The area thus outlined was incised deep to adipose tissue with a #15 scalpel blade.  The skin margins were undermined to an appropriate distance in all directions utilizing iris scissors.
Mucosal Advancement Flap Text: Given the location of the defect, shape of the defect and the proximity to free margins a mucosal advancement flap was deemed most appropriate. Incisions were made with a 15 blade scalpel in the appropriate fashion along the cutaneous vermilion border and the mucosal lip. The remaining actinically damaged mucosal tissue was excised.  The mucosal advancement flap was then elevated to the gingival sulcus with care taken to preserve the neurovascular structures and advanced into the primary defect. Care was taken to ensure that precise realignment of the vermilion border was achieved.
Consent (Ear)/Introductory Paragraph: The rationale for Mohs was explained to the patient and consent was obtained. The risks, benefits and alternatives to therapy were discussed in detail. Specifically, the risks of ear deformity, infection, scarring, bleeding, prolonged wound healing, incomplete removal, allergy to anesthesia, nerve injury and recurrence were addressed. Prior to the procedure, the treatment site was clearly identified and confirmed by the patient. All components of Universal Protocol/PAUSE Rule completed.
Transposition Flap Text: The defect edges were debeveled with a #15 scalpel blade.  Given the location of the defect and the proximity to free margins a transposition flap was deemed most appropriate.  Using a sterile surgical marker, an appropriate transposition flap was drawn incorporating the defect.    The area thus outlined was incised deep to adipose tissue with a #15 scalpel blade.  The skin margins were undermined to an appropriate distance in all directions utilizing iris scissors.
Anesthesia Volume In Cc: 6
Helical Rim Advancement Flap Text: The defect edges were debeveled with a #15 blade scalpel.  Given the location of the defect and the proximity to free margins (helical rim) a double helical rim advancement flap was deemed most appropriate.  Using a sterile surgical marker, the appropriate advancement flaps were drawn incorporating the defect and placing the expected incisions between the helical rim and antihelix where possible.  The area thus outlined was incised through and through with a #15 scalpel blade.  With a skin hook and iris scissors, the flaps were gently and sharply undermined and freed up.
Repair Type: Flap
O-T Plasty Text: The defect edges were debeveled with a #15 scalpel blade.  Given the location of the defect, shape of the defect and the proximity to free margins an O-T plasty was deemed most appropriate.  Using a sterile surgical marker, an appropriate O-T plasty was drawn incorporating the defect and placing the expected incisions within the relaxed skin tension lines where possible.    The area thus outlined was incised deep to adipose tissue with a #15 scalpel blade.  The skin margins were undermined to an appropriate distance in all directions utilizing iris scissors.
Consent (Near Eyelid Margin)/Introductory Paragraph: The rationale for Mohs was explained to the patient and consent was obtained. The risks, benefits and alternatives to therapy were discussed in detail. Specifically, the risks of ectropion or eyelid deformity, infection, scarring, bleeding, prolonged wound healing, incomplete removal, allergy to anesthesia, nerve injury and recurrence were addressed. Prior to the procedure, the treatment site was clearly identified and confirmed by the patient. All components of Universal Protocol/PAUSE Rule completed.
Spiral Flap Text: The defect edges were debeveled with a #15 scalpel blade.  Given the location of the defect, shape of the defect and the proximity to free margins a spiral flap was deemed most appropriate.  Using a sterile surgical marker, an appropriate rotation flap was drawn incorporating the defect and placing the expected incisions within the relaxed skin tension lines where possible. The area thus outlined was incised deep to adipose tissue with a #15 scalpel blade.  The skin margins were undermined to an appropriate distance in all directions utilizing iris scissors.
Lazy S Complex Repair Preamble Text (Leave Blank If You Do Not Want): Extensive wide undermining was performed.
O-L Flap Text: The defect edges were debeveled with a #15 scalpel blade.  Given the location of the defect, shape of the defect and the proximity to free margins an O-L flap was deemed most appropriate.  Using a sterile surgical marker, an appropriate advancement flap was drawn incorporating the defect and placing the expected incisions within the relaxed skin tension lines where possible.    The area thus outlined was incised deep to adipose tissue with a #15 scalpel blade.  The skin margins were undermined to an appropriate distance in all directions utilizing iris scissors.
Referred To Asc For Closure Text (Leave Blank If You Do Not Want): After obtaining clear surgical margins the patient was sent to an ASC for surgical repair.  The patient understands they will receive post-surgical care and follow-up from the ASC physician.
Referred To Otolaryngology For Closure Text (Leave Blank If You Do Not Want): After obtaining clear surgical margins the patient was sent to otolaryngology for surgical repair.  The patient understands they will receive post-surgical care and follow-up from the referring physician's office.
Hemostasis: Electrocautery
Flap Type: Rhombic Flap
Purse String (Intermediate) Text: Given the location of the defect and the characteristics of the surrounding skin a purse string intermediate closure was deemed most appropriate.  Undermining was performed circumfirentially around the surgical defect.  A purse string suture was then placed and tightened.
Island Pedicle Flap With Canthal Suspension Text: The defect edges were debeveled with a #15 scalpel blade.  Given the location of the defect, shape of the defect and the proximity to free margins an island pedicle advancement flap was deemed most appropriate.  Using a sterile surgical marker, an appropriate advancement flap was drawn incorporating the defect, outlining the appropriate donor tissue and placing the expected incisions within the relaxed skin tension lines where possible. The area thus outlined was incised deep to adipose tissue with a #15 scalpel blade.  The skin margins were undermined to an appropriate distance in all directions around the primary defect and laterally outward around the island pedicle utilizing iris scissors.  There was minimal undermining beneath the pedicle flap. A suspension suture was placed in the canthal tendon to prevent tension and prevent ectropion.
Interpolation Flap Text: A decision was made to reconstruct the defect utilizing an interpolation axial flap and a staged reconstruction.  A telfa template was made of the defect.  This telfa template was then used to outline the interpolation flap.  The donor area for the pedicle flap was then injected with anesthesia.  The flap was excised through the skin and subcutaneous tissue down to the layer of the underlying musculature.  The interpolation flap was carefully excised within this deep plane to maintain its blood supply.  The edges of the donor site were undermined.   The donor site was closed in a primary fashion.  The pedicle was then rotated into position and sutured.  Once the tube was sutured into place, adequate blood supply was confirmed with blanching and refill.  The pedicle was then wrapped with xeroform gauze and dressed appropriately with a telfa and gauze bandage to ensure continued blood supply and protect the attached pedicle.
Number Of Stages: 2
Consent Type: Consent 1 (Standard)
Tissue Cultured Epidermal Autograft Text: The defect edges were debeveled with a #15 scalpel blade.  Given the location of the defect, shape of the defect and the proximity to free margins a tissue cultured epidermal autograft was deemed most appropriate.  The graft was then trimmed to fit the size of the defect.  The graft was then placed in the primary defect and oriented appropriately.
Ftsg Text: The defect edges were debeveled with a #15 scalpel blade.  Given the location of the defect, shape of the defect and the proximity to free margins a full thickness skin graft was deemed most appropriate.  Using a sterile surgical marker, the primary defect shape was transferred to the donor site. The area thus outlined was incised deep to adipose tissue with a #15 scalpel blade.  The harvested graft was then trimmed of adipose tissue until only dermis and epidermis was left.  The skin margins of the secondary defect were undermined to an appropriate distance in all directions utilizing iris scissors.  The secondary defect was closed with interrupted buried subcutaneous sutures.  The skin edges were then re-apposed with running  sutures.  The skin graft was then placed in the primary defect and oriented appropriately.
Initial Size Of Lesion: 0.6
Alternatives Discussed Intro (Do Not Add Period): I discussed alternative treatments to Mohs surgery and specifically discussed the risks and benefits of
Purse String (Simple) Text: Given the location of the defect and the characteristics of the surrounding skin a purse string closure was deemed most appropriate.  Undermining was performed circumfirentially around the surgical defect.  A purse string suture was then placed and tightened.
Secondary Defect Width In Cm (Required For Flaps): 0.8
Wound Check: 6 weeks
Modified Advancement Flap Text: The defect edges were debeveled with a #15 scalpel blade.  Given the location of the defect, shape of the defect and the proximity to free margins a modified advancement flap was deemed most appropriate.  Using a sterile surgical marker, an appropriate advancement flap was drawn incorporating the defect and placing the expected incisions within the relaxed skin tension lines where possible.    The area thus outlined was incised deep to adipose tissue with a #15 scalpel blade.  The skin margins were undermined to an appropriate distance in all directions utilizing iris scissors.
Ear Star Wedge Flap Text: The defect edges were debeveled with a #15 blade scalpel.  Given the location of the defect and the proximity to free margins (helical rim) an ear star wedge flap was deemed most appropriate.  Using a sterile surgical marker, the appropriate flap was drawn incorporating the defect and placing the expected incisions between the helical rim and antihelix where possible.  The area thus outlined was incised through and through with a #15 scalpel blade.
Island Pedicle Flap-Requiring Vessel Identification Text: The defect edges were debeveled with a #15 scalpel blade.  Given the location of the defect, shape of the defect and the proximity to free margins an island pedicle advancement flap was deemed most appropriate.  Using a sterile surgical marker, an appropriate advancement flap was drawn, based on the axial vessel mentioned above, incorporating the defect, outlining the appropriate donor tissue and placing the expected incisions within the relaxed skin tension lines where possible.    The area thus outlined was incised deep to adipose tissue with a #15 scalpel blade.  The skin margins were undermined to an appropriate distance in all directions around the primary defect and laterally outward around the island pedicle utilizing iris scissors.  There was minimal undermining beneath the pedicle flap.
Cartilage Graft Text: The defect edges were debeveled with a #15 scalpel blade.  Given the location of the defect, shape of the defect, the fact the defect involved a full thickness cartilage defect a cartilage graft was deemed most appropriate.  An appropriate donor site was identified, cleansed, and anesthetized. The cartilage graft was then harvested and transferred to the recipient site, oriented appropriately and then sutured into place.  The secondary defect was then repaired using a primary closure.
Mohs Histo Method Verbiage: Each section was then chromacoded and processed in the Mohs lab using the Mohs protocol and submitted for frozen section.
Detail Level: Detailed
Area H Indication Text: Tumors in this location are included in Area H (eyelids, eyebrows, nose, lips, chin, ear, pre-auricular, post-auricular, temple, genitalia, hands, feet, ankles and areola).  Tissue conservation is critical in these anatomic locations.
W Plasty Text: The lesion was extirpated to the level of the fat with a #15 scalpel blade.  Given the location of the defect, shape of the defect and the proximity to free margins a W-plasty was deemed most appropriate for repair.  Using a sterile surgical marker, the appropriate transposition arms of the W-plasty were drawn incorporating the defect and placing the expected incisions within the relaxed skin tension lines where possible.    The area thus outlined was incised deep to adipose tissue with a #15 scalpel blade.  The skin margins were undermined to an appropriate distance in all directions utilizing iris scissors.  The opposing transposition arms were then transposed into place in opposite direction and anchored with interrupted buried subcutaneous sutures.
Cheiloplasty (Complex) Text: A decision was made to reconstruct the defect with a  cheiloplasty.  The defect was undermined extensively.  Additional obicularis oris muscle was excised with a 15 blade scalpel.  The defect was converted into a full thickness wedge to facilite a better cosmetic result.  Small vessels were then tied off with 5-0 monocyrl. The obicularis oris, superficial fascia, adipose and dermis were then reapproximated.  After the deeper layers were approximated the epidermis was reapproximated with particular care given to realign the vermilion border.
Rotation Flap Text: The defect edges were debeveled with a #15 scalpel blade.  Given the location of the defect, shape of the defect and the proximity to free margins a rotation flap was deemed most appropriate.  Using a sterile surgical marker, an appropriate rotation flap was drawn incorporating the defect and placing the expected incisions within the relaxed skin tension lines where possible.    The area thus outlined was incised deep to adipose tissue with a #15 scalpel blade.  The skin margins were undermined to an appropriate distance in all directions utilizing iris scissors.
O-Z Plasty Text: The defect edges were debeveled with a #15 scalpel blade.  Given the location of the defect, shape of the defect and the proximity to free margins an O-Z plasty (double transposition flap) was deemed most appropriate.  Using a sterile surgical marker, the appropriate transposition flaps were drawn incorporating the defect and placing the expected incisions within the relaxed skin tension lines where possible.    The area thus outlined was incised deep to adipose tissue with a #15 scalpel blade.  The skin margins were undermined to an appropriate distance in all directions utilizing iris scissors.  Hemostasis was achieved with electrocautery.  The flaps were then transposed into place, one clockwise and the other counterclockwise, and anchored with interrupted buried subcutaneous sutures.
Home Suture Removal Text: Patient was provided instructions on removing sutures and will remove their sutures at home.  If they have any questions or difficulties they will call the office.
Cheek Interpolation Flap Text: A decision was made to reconstruct the defect utilizing an interpolation axial flap and a staged reconstruction.  A telfa template was made of the defect.  This telfa template was then used to outline the Cheek Interpolation flap.  The donor area for the pedicle flap was then injected with anesthesia.  The flap was excised through the skin and subcutaneous tissue down to the layer of the underlying musculature.  The interpolation flap was carefully excised within this deep plane to maintain its blood supply.  The edges of the donor site were undermined.   The donor site was closed in a primary fashion.  The pedicle was then rotated into position and sutured.  Once the tube was sutured into place, adequate blood supply was confirmed with blanching and refill.  The pedicle was then wrapped with xeroform gauze and dressed appropriately with a telfa and gauze bandage to ensure continued blood supply and protect the attached pedicle.
A-T Advancement Flap Text: The defect edges were debeveled with a #15 scalpel blade.  Given the location of the defect, shape of the defect and the proximity to free margins an A-T advancement flap was deemed most appropriate.  Using a sterile surgical marker, an appropriate advancement flap was drawn incorporating the defect and placing the expected incisions within the relaxed skin tension lines where possible.    The area thus outlined was incised deep to adipose tissue with a #15 scalpel blade.  The skin margins were undermined to an appropriate distance in all directions utilizing iris scissors.
No Repair - Repaired With Adjacent Surgical Defect Text (Leave Blank If You Do Not Want): After obtaining clear surgical margins the defect was repaired concurrently with another surgical defect which was in close approximation.
Partial Purse String (Intermediate) Text: Given the location of the defect and the characteristics of the surrounding skin an intermediate purse string closure was deemed most appropriate.  Undermining was performed circumfirentially around the surgical defect.  A purse string suture was then placed and tightened. Wound tension only allowed a partial closure of the circular defect.
Melolabial Transposition Flap Text: The defect edges were debeveled with a #15 scalpel blade.  Given the location of the defect and the proximity to free margins a melolabial flap was deemed most appropriate.  Using a sterile surgical marker, an appropriate melolabial transposition flap was drawn incorporating the defect.    The area thus outlined was incised deep to adipose tissue with a #15 scalpel blade.  The skin margins were undermined to an appropriate distance in all directions utilizing iris scissors.
Consent 3/Introductory Paragraph: I gave the patient a chance to ask questions they had about the procedure.  Following this I explained the Mohs procedure and consent was obtained. The risks, benefits and alternatives to therapy were discussed in detail. Specifically, the risks of infection, scarring, bleeding, prolonged wound healing, incomplete removal, allergy to anesthesia, nerve injury and recurrence were addressed. Prior to the procedure, the treatment site was clearly identified and confirmed by the patient. All components of Universal Protocol/PAUSE Rule completed.
No Residual Tumor Seen Histology Text: There were no malignant cells seen in the sections examined.
Alar Island Pedicle Flap Text: The defect edges were debeveled with a #15 scalpel blade.  Given the location of the defect, shape of the defect and the proximity to the alar rim an island pedicle advancement flap was deemed most appropriate.  Using a sterile surgical marker, an appropriate advancement flap was drawn incorporating the defect, outlining the appropriate donor tissue and placing the expected incisions within the nasal ala running parallel to the alar rim. The area thus outlined was incised with a #15 scalpel blade.  The skin margins were undermined minimally to an appropriate distance in all directions around the primary defect and laterally outward around the island pedicle utilizing iris scissors.  There was minimal undermining beneath the pedicle flap.
Wound Care: Aquaphor
Mohs Method Verbiage: An incision at a 45 degree angle following the standard Mohs approach was done and the specimen was harvested as a microscopic controlled layer.
Postop Diagnosis: same
Complex Repair And Graft Additional Text (Will Appearing After The Standard Complex Repair Text): The complex repair was not sufficient to completely close the primary defect. The remaining additional defect was repaired with the graft mentioned below.
Island Pedicle Flap Text: The defect edges were debeveled with a #15 scalpel blade.  Given the location of the defect, shape of the defect and the proximity to free margins an island pedicle advancement flap was deemed most appropriate.  Using a sterile surgical marker, an appropriate advancement flap was drawn incorporating the defect, outlining the appropriate donor tissue and placing the expected incisions within the relaxed skin tension lines where possible.    The area thus outlined was incised deep to adipose tissue with a #15 scalpel blade.  The skin margins were undermined to an appropriate distance in all directions around the primary defect and laterally outward around the island pedicle utilizing iris scissors.  There was minimal undermining beneath the pedicle flap.
Consent (Nose)/Introductory Paragraph: The rationale for Mohs was explained to the patient and consent was obtained. The risks, benefits and alternatives to therapy were discussed in detail. Specifically, the risks of nasal deformity, changes in the flow of air through the nose, infection, scarring, bleeding, prolonged wound healing, incomplete removal, allergy to anesthesia, nerve injury and recurrence were addressed. Prior to the procedure, the treatment site was clearly identified and confirmed by the patient. All components of Universal Protocol/PAUSE Rule completed.
Posterior Auricular Interpolation Flap Text: A decision was made to reconstruct the defect utilizing an interpolation axial flap and a staged reconstruction.  A telfa template was made of the defect.  This telfa template was then used to outline the posterior auricular interpolation flap.  The donor area for the pedicle flap was then injected with anesthesia.  The flap was excised through the skin and subcutaneous tissue down to the layer of the underlying musculature.  The pedicle flap was carefully excised within this deep plane to maintain its blood supply.  The edges of the donor site were undermined.   The donor site was closed in a primary fashion.  The pedicle was then rotated into position and sutured.  Once the tube was sutured into place, adequate blood supply was confirmed with blanching and refill.  The pedicle was then wrapped with xeroform gauze and dressed appropriately with a telfa and gauze bandage to ensure continued blood supply and protect the attached pedicle.
Dermal Autograft Text: The defect edges were debeveled with a #15 scalpel blade.  Given the location of the defect, shape of the defect and the proximity to free margins a dermal autograft was deemed most appropriate.  Using a sterile surgical marker, the primary defect shape was transferred to the donor site. The area thus outlined was incised deep to adipose tissue with a #15 scalpel blade.  The harvested graft was then trimmed of adipose and epidermal tissue until only dermis was left.  The skin graft was then placed in the primary defect and oriented appropriately.
Previous Accession (Optional): 
V-Y Plasty Text: The defect edges were debeveled with a #15 scalpel blade.  Given the location of the defect, shape of the defect and the proximity to free margins an V-Y advancement flap was deemed most appropriate.  Using a sterile surgical marker, an appropriate advancement flap was drawn incorporating the defect and placing the expected incisions within the relaxed skin tension lines where possible.    The area thus outlined was incised deep to adipose tissue with a #15 scalpel blade.  The skin margins were undermined to an appropriate distance in all directions utilizing iris scissors.
Primary Defect Length In Cm (Final Defect Size - Required For Flaps/Grafts): 1.5
Unna Boot Text: An Unna boot was placed to help immobilize the limb and facilitate more rapid healing.
Epidermal Closure: running cuticular
Bcc Infiltrative Histology Text: There were numerous aggregates of basaloid cells demonstrating an infiltrative pattern.
Complicating Clinical Features Indication Override: location
Star Wedge Flap Text: The defect edges were debeveled with a #15 scalpel blade.  Given the location of the defect, shape of the defect and the proximity to free margins a star wedge flap was deemed most appropriate.  Using a sterile surgical marker, an appropriate rotation flap was drawn incorporating the defect and placing the expected incisions within the relaxed skin tension lines where possible. The area thus outlined was incised deep to adipose tissue with a #15 scalpel blade.  The skin margins were undermined to an appropriate distance in all directions utilizing iris scissors.
H Plasty Text: Given the location of the defect, shape of the defect and the proximity to free margins a H-plasty was deemed most appropriate for repair.  Using a sterile surgical marker, the appropriate advancement arms of the H-plasty were drawn incorporating the defect and placing the expected incisions within the relaxed skin tension lines where possible. The area thus outlined was incised deep to adipose tissue with a #15 scalpel blade. The skin margins were undermined to an appropriate distance in all directions utilizing iris scissors.  The opposing advancement arms were then advanced into place in opposite direction and anchored with interrupted buried subcutaneous sutures.
Epidermal Autograft Text: The defect edges were debeveled with a #15 scalpel blade.  Given the location of the defect, shape of the defect and the proximity to free margins an epidermal autograft was deemed most appropriate.  Using a sterile surgical marker, the primary defect shape was transferred to the donor site. The epidermal graft was then harvested.  The skin graft was then placed in the primary defect and oriented appropriately.
Area L Indication Text: Tumors in this location are included in Area L (trunk and extremities).  Mohs surgery is indicated for larger tumors, or tumors with aggressive histologic features, in these anatomic locations.
Consent (Spinal Accessory)/Introductory Paragraph: The rationale for Mohs was explained to the patient and consent was obtained. The risks, benefits and alternatives to therapy were discussed in detail. Specifically, the risks of damage to the spinal accessory nerve, infection, scarring, bleeding, prolonged wound healing, incomplete removal, allergy to anesthesia, and recurrence were addressed. Prior to the procedure, the treatment site was clearly identified and confirmed by the patient. All components of Universal Protocol/PAUSE Rule completed.
Consent (Lip)/Introductory Paragraph: The rationale for Mohs was explained to the patient and consent was obtained. The risks, benefits and alternatives to therapy were discussed in detail. Specifically, the risks of lip deformity, changes in the oral aperture, infection, scarring, bleeding, prolonged wound healing, incomplete removal, allergy to anesthesia, nerve injury and recurrence were addressed. Prior to the procedure, the treatment site was clearly identified and confirmed by the patient. All components of Universal Protocol/PAUSE Rule completed.
Bi-Rhombic Flap Text: The defect edges were debeveled with a #15 scalpel blade.  Given the location of the defect and the proximity to free margins a bi-rhombic flap was deemed most appropriate.  Using a sterile surgical marker, an appropriate rhombic flap was drawn incorporating the defect. The area thus outlined was incised deep to adipose tissue with a #15 scalpel blade.  The skin margins were undermined to an appropriate distance in all directions utilizing iris scissors.
Partial Purse String (Simple) Text: Given the location of the defect and the characteristics of the surrounding skin a simple purse string closure was deemed most appropriate.  Undermining was performed circumfirentially around the surgical defect.  A purse string suture was then placed and tightened. Wound tension only allowed a partial closure of the circular defect.
Consent 1/Introductory Paragraph: The rationale for Mohs was explained to the patient and consent was obtained. The risks, benefits and alternatives to therapy were discussed in detail. Specifically, the risks of infection, scarring, bleeding, prolonged wound healing, incomplete removal, allergy to anesthesia, nerve injury and recurrence were addressed. Prior to the procedure, the treatment site was clearly identified and confirmed by the patient. All components of Universal Protocol/PAUSE Rule completed.
Dorsal Nasal Flap Text: The defect edges were debeveled with a #15 scalpel blade.  Given the location of the defect and the proximity to free margins a dorsal nasal flap was deemed most appropriate.  Using a sterile surgical marker, an appropriate dorsal nasal flap was drawn around the defect.    The area thus outlined was incised deep to adipose tissue with a #15 scalpel blade.  The skin margins were undermined to an appropriate distance in all directions utilizing iris scissors.
Epidermal Sutures: 6-0 Surgipro
Mohs Case Number: [id number]
Xenograft Text: The defect edges were debeveled with a #15 scalpel blade.  Given the location of the defect, shape of the defect and the proximity to free margins a xenograft was deemed most appropriate.  The graft was then trimmed to fit the size of the defect.  The graft was then placed in the primary defect and oriented appropriately.
V-Y Flap Text: The defect edges were debeveled with a #15 scalpel blade.  Given the location of the defect, shape of the defect and the proximity to free margins a V-Y flap was deemed most appropriate.  Using a sterile surgical marker, an appropriate advancement flap was drawn incorporating the defect and placing the expected incisions within the relaxed skin tension lines where possible.    The area thus outlined was incised deep to adipose tissue with a #15 scalpel blade.  The skin margins were undermined to an appropriate distance in all directions utilizing iris scissors.
Mauc Instructions: By selecting yes to the question below the MAUC number will be added into the note.  This will be calculated automatically based on the diagnosis chosen, the size entered, the body zone selected (H,M,L) and the specific indications you chose. You will also have the option to override the Mohs AUC if you disagree with the automatically calculated number and this option is found in the Case Summary tab.
Referred To Mid-Level For Closure Text (Leave Blank If You Do Not Want): After obtaining clear surgical margins the patient was sent to a mid-level provider for surgical repair.  The patient understands they will receive post-surgical care and follow-up from the mid-level provider.
Melolabial Interpolation Flap Text: A decision was made to reconstruct the defect utilizing an interpolation axial flap and a staged reconstruction.  A telfa template was made of the defect.  This telfa template was then used to outline the melolabial interpolation flap.  The donor area for the pedicle flap was then injected with anesthesia.  The flap was excised through the skin and subcutaneous tissue down to the layer of the underlying musculature.  The pedicle flap was carefully excised within this deep plane to maintain its blood supply.  The edges of the donor site were undermined.   The donor site was closed in a primary fashion.  The pedicle was then rotated into position and sutured.  Once the tube was sutured into place, adequate blood supply was confirmed with blanching and refill.  The pedicle was then wrapped with xeroform gauze and dressed appropriately with a telfa and gauze bandage to ensure continued blood supply and protect the attached pedicle.
Keystone Flap Text: The defect edges were debeveled with a #15 scalpel blade.  Given the location of the defect, shape of the defect a keystone flap was deemed most appropriate.  Using a sterile surgical marker, an appropriate keystone flap was drawn incorporating the defect, outlining the appropriate donor tissue and placing the expected incisions within the relaxed skin tension lines where possible. The area thus outlined was incised deep to adipose tissue with a #15 scalpel blade.  The skin margins were undermined to an appropriate distance in all directions around the primary defect and laterally outward around the flap utilizing iris scissors.
Tarsorrhaphy Text: A tarsorrhaphy was performed using Frost sutures.
Split-Thickness Skin Graft Text: The defect edges were debeveled with a #15 scalpel blade.  Given the location of the defect, shape of the defect and the proximity to free margins a split thickness skin graft was deemed most appropriate.  Using a sterile surgical marker, the primary defect shape was transferred to the donor site. The split thickness graft was then harvested.  The skin graft was then placed in the primary defect and oriented appropriately.
Full Thickness Lip Wedge Repair (Flap) Text: Given the location of the defect and the proximity to free margins a full thickness wedge repair was deemed most appropriate.  Using a sterile surgical marker, the appropriate repair was drawn incorporating the defect and placing the expected incisions perpendicular to the vermilion border.  The vermilion border was also meticulously outlined to ensure appropriate reapproximation during the repair.  The area thus outlined was incised through and through with a #15 scalpel blade.  The muscularis and dermis were reaproximated with deep sutures following hemostasis. Care was taken to realign the vermilion border before proceeding with the superficial closure.  Once the vermilion was realigned the superfical and mucosal closure was finished.
Burow's Advancement Flap Text: The defect edges were debeveled with a #15 scalpel blade.  Given the location of the defect and the proximity to free margins a Burow's advancement flap was deemed most appropriate.  Using a sterile surgical marker, the appropriate advancement flap was drawn incorporating the defect and placing the expected incisions within the relaxed skin tension lines where possible.    The area thus outlined was incised deep to adipose tissue with a #15 scalpel blade.  The skin margins were undermined to an appropriate distance in all directions utilizing iris scissors.
Medical Necessity Statement: Based on my medical judgement, Mohs surgery is the most appropriate treatment for this cancer compared to other treatments.
Subsequent Stages Histo Method Verbiage: Using a similar technique to that described above, a thin layer of tissue was removed from all areas where tumor was visible on the previous stage.  The tissue was again oriented, mapped, dyed, and processed as above.
Manual Repair Warning Statement: We plan on removing the manually selected variable below in favor of our much easier automatic structured text blocks found in the previous tab. We decided to do this to help make the flow better and give you the full power of structured data. Manual selection is never going to be ideal in our platform and I would encourage you to avoid using manual selection from this point on, especially since I will be sunsetting this feature. It is important that you do one of two things with the customized text below. First, you can save all of the text in a word file so you can have it for future reference. Second, transfer the text to the appropriate area in the Library tab. Lastly, if there is a flap or graft type which we do not have you need to let us know right away so I can add it in before the variable is hidden. No need to panic, we plan to give you roughly 6 months to make the change.
Referred To Plastics For Closure Text (Leave Blank If You Do Not Want): After obtaining clear surgical margins the patient was sent to plastics for surgical repair.  The patient understands they will receive post-surgical care and follow-up from the referring physician's office.
Bilobed Transposition Flap Text: The defect edges were debeveled with a #15 scalpel blade.  Given the location of the defect and the proximity to free margins a bilobed transposition flap was deemed most appropriate.  Using a sterile surgical marker, an appropriate bilobe flap drawn around the defect.    The area thus outlined was incised deep to adipose tissue with a #15 scalpel blade.  The skin margins were undermined to an appropriate distance in all directions utilizing iris scissors.
Epidermal Closure Graft Donor Site (Optional): simple interrupted
Eye Protection Verbiage: Before proceeding with the stage, a plastic scleral shield was inserted. The globe was anesthetized with a few drops of 1% lidocaine with 1:100,000 epinephrine. Then, an appropriate sized scleral shield was chosen and coated with lacrilube ointment. The shield was gently inserted and left in place for the duration of each stage. After the stage was completed, the shield was gently removed.
Cheiloplasty (Less Than 50%) Text: A decision was made to reconstruct the defect with a  cheiloplasty.  The defect was undermined extensively.  Additional obicularis oris muscle was excised with a 15 blade scalpel.  The defect was converted into a full thickness wedge, of less than 50% of the vertical height of the lip, to facilite a better cosmetic result.  Small vessels were then tied off with 5-0 monocyrl. The obicularis oris, superficial fascia, adipose and dermis were then reapproximated.  After the deeper layers were approximated the epidermis was reapproximated with particular care given to realign the vermilion border.
Bilateral Helical Rim Advancement Flap Text: The defect edges were debeveled with a #15 blade scalpel.  Given the location of the defect and the proximity to free margins (helical rim) a bilateral helical rim advancement flap was deemed most appropriate.  Using a sterile surgical marker, the appropriate advancement flaps were drawn incorporating the defect and placing the expected incisions between the helical rim and antihelix where possible.  The area thus outlined was incised through and through with a #15 scalpel blade.  With a skin hook and iris scissors, the flaps were gently and sharply undermined and freed up.
Area M Indication Text: Tumors in this location are included in Area M (cheek, forehead, scalp, neck, jawline and pretibial skin).  Mohs surgery is indicated for tumors in these anatomic locations.
O-T Advancement Flap Text: The defect edges were debeveled with a #15 scalpel blade.  Given the location of the defect, shape of the defect and the proximity to free margins an O-T advancement flap was deemed most appropriate.  Using a sterile surgical marker, an appropriate advancement flap was drawn incorporating the defect and placing the expected incisions within the relaxed skin tension lines where possible.    The area thus outlined was incised deep to adipose tissue with a #15 scalpel blade.  The skin margins were undermined to an appropriate distance in all directions utilizing iris scissors.
Z Plasty Text: The lesion was extirpated to the level of the fat with a #15 scalpel blade.  Given the location of the defect, shape of the defect and the proximity to free margins a Z-plasty was deemed most appropriate for repair.  Using a sterile surgical marker, the appropriate transposition arms of the Z-plasty were drawn incorporating the defect and placing the expected incisions within the relaxed skin tension lines where possible.    The area thus outlined was incised deep to adipose tissue with a #15 scalpel blade.  The skin margins were undermined to an appropriate distance in all directions utilizing iris scissors.  The opposing transposition arms were then transposed into place in opposite direction and anchored with interrupted buried subcutaneous sutures.
Crescentic Advancement Flap Text: The defect edges were debeveled with a #15 scalpel blade.  Given the location of the defect and the proximity to free margins a crescentic advancement flap was deemed most appropriate.  Using a sterile surgical marker, the appropriate advancement flap was drawn incorporating the defect and placing the expected incisions within the relaxed skin tension lines where possible.    The area thus outlined was incised deep to adipose tissue with a #15 scalpel blade.  The skin margins were undermined to an appropriate distance in all directions utilizing iris scissors.
Referred To Oculoplastics For Closure Text (Leave Blank If You Do Not Want): After obtaining clear surgical margins the patient was sent to oculoplastics for surgical repair.  The patient understands they will receive post-surgical care and follow-up from the referring physician's office.
Skin Substitute Text: The defect edges were debeveled with a #15 scalpel blade.  Given the location of the defect, shape of the defect and the proximity to free margins a skin substitute graft was deemed most appropriate.  The graft material was trimmed to fit the size of the defect. The graft was then placed in the primary defect and oriented appropriately.
Advancement-Rotation Flap Text: The defect edges were debeveled with a #15 scalpel blade.  Given the location of the defect, shape of the defect and the proximity to free margins an advancement-rotation flap was deemed most appropriate.  Using a sterile surgical marker, an appropriate flap was drawn incorporating the defect and placing the expected incisions within the relaxed skin tension lines where possible. The area thus outlined was incised deep to adipose tissue with a #15 scalpel blade.  The skin margins were undermined to an appropriate distance in all directions utilizing iris scissors.
Paramedian Forehead Flap Text: A decision was made to reconstruct the defect utilizing an interpolation axial flap and a staged reconstruction.  A telfa template was made of the defect.  This telfa template was then used to outline the paramedian forehead pedicle flap.  The donor area for the pedicle flap was then injected with anesthesia.  The flap was excised through the skin and subcutaneous tissue down to the layer of the underlying musculature.  The pedicle flap was carefully excised within this deep plane to maintain its blood supply.  The edges of the donor site were undermined.   The donor site was closed in a primary fashion.  The pedicle was then rotated into position and sutured.  Once the tube was sutured into place, adequate blood supply was confirmed with blanching and refill.  The pedicle was then wrapped with xeroform gauze and dressed appropriately with a telfa and gauze bandage to ensure continued blood supply and protect the attached pedicle.
Complex Repair And Flap Additional Text (Will Appearing After The Standard Complex Repair Text): The complex repair was not sufficient to completely close the primary defect. The remaining additional defect was repaired with the flap mentioned below.
Double Island Pedicle Flap Text: The defect edges were debeveled with a #15 scalpel blade.  Given the location of the defect, shape of the defect and the proximity to free margins a double island pedicle advancement flap was deemed most appropriate.  Using a sterile surgical marker, an appropriate advancement flap was drawn incorporating the defect, outlining the appropriate donor tissue and placing the expected incisions within the relaxed skin tension lines where possible.    The area thus outlined was incised deep to adipose tissue with a #15 scalpel blade.  The skin margins were undermined to an appropriate distance in all directions around the primary defect and laterally outward around the island pedicle utilizing iris scissors.  There was minimal undermining beneath the pedicle flap.
Dressing: pressure dressing with telfa
S Plasty Text: Given the location and shape of the defect, and the orientation of relaxed skin tension lines, an S-plasty was deemed most appropriate for repair.  Using a sterile surgical marker, the appropriate outline of the S-plasty was drawn, incorporating the defect and placing the expected incisions within the relaxed skin tension lines where possible.  The area thus outlined was incised deep to adipose tissue with a #15 scalpel blade.  The skin margins were undermined to an appropriate distance in all directions utilizing iris scissors. The skin flaps were advanced over the defect.  The opposing margins were then approximated with interrupted buried subcutaneous sutures.
Mohs Rapid Report Verbiage: The area of clinically evident tumor was marked with skin marking ink and appropriately hatched.  The initial incision was made following the Mohs approach through the skin.  The specimen was taken to the lab, divided into the necessary number of pieces, chromacoded and processed according to the Mohs protocol.  This was repeated in successive stages until a tumor free defect was achieved.
Muscle Hinge Flap Text: The defect edges were debeveled with a #15 scalpel blade.  Given the size, depth and location of the defect and the proximity to free margins a muscle hinge flap was deemed most appropriate.  Using a sterile surgical marker, an appropriate hinge flap was drawn incorporating the defect. The area thus outlined was incised with a #15 scalpel blade.  The skin margins were undermined to an appropriate distance in all directions utilizing iris scissors.
Consent (Temporal Branch)/Introductory Paragraph: The rationale for Mohs was explained to the patient and consent was obtained. The risks, benefits and alternatives to therapy were discussed in detail. Specifically, the risks of damage to the temporal branch of the facial nerve, infection, scarring, bleeding, prolonged wound healing, incomplete removal, allergy to anesthesia, and recurrence were addressed. Prior to the procedure, the treatment site was clearly identified and confirmed by the patient. All components of Universal Protocol/PAUSE Rule completed.
Bilobed Flap Text: The defect edges were debeveled with a #15 scalpel blade.  Given the location of the defect and the proximity to free margins a bilobe flap was deemed most appropriate.  Using a sterile surgical marker, an appropriate bilobe flap drawn around the defect.    The area thus outlined was incised deep to adipose tissue with a #15 scalpel blade.  The skin margins were undermined to an appropriate distance in all directions utilizing iris scissors.
Ear Wedge Repair Text: A wedge excision was completed by carrying down an excision through the full thickness of the ear and cartilage with an inward facing Burow's triangle. The wound was then closed in a layered fashion.
Post-Care Instructions: I reviewed with the patient in detail post-care instructions. Patient is not to engage in any heavy lifting, exercise, or swimming for the next 14 days. Should the patient develop any fevers, chills, bleeding, severe pain patient will contact the office immediately.
Cheek-To-Nose Interpolation Flap Text: A decision was made to reconstruct the defect utilizing an interpolation axial flap and a staged reconstruction.  A telfa template was made of the defect.  This telfa template was then used to outline the Cheek-To-Nose Interpolation flap.  The donor area for the pedicle flap was then injected with anesthesia.  The flap was excised through the skin and subcutaneous tissue down to the layer of the underlying musculature.  The interpolation flap was carefully excised within this deep plane to maintain its blood supply.  The edges of the donor site were undermined.   The donor site was closed in a primary fashion.  The pedicle was then rotated into position and sutured.  Once the tube was sutured into place, adequate blood supply was confirmed with blanching and refill.  The pedicle was then wrapped with xeroform gauze and dressed appropriately with a telfa and gauze bandage to ensure continued blood supply and protect the attached pedicle.
Composite Graft Text: The defect edges were debeveled with a #15 scalpel blade.  Given the location of the defect, shape of the defect, the proximity to free margins and the fact the defect was full thickness a composite graft was deemed most appropriate.  The defect was outline and then transferred to the donor site.  A full thickness graft was then excised from the donor site. The graft was then placed in the primary defect, oriented appropriately and then sutured into place.  The secondary defect was then repaired using a primary closure.
Same Histology In Subsequent Stages Text: The pattern and morphology of the tumor is as described in the first stage.
Secondary Intention Text (Leave Blank If You Do Not Want): The defect will heal with secondary intention.
Hatchet Flap Text: The defect edges were debeveled with a #15 scalpel blade.  Given the location of the defect, shape of the defect and the proximity to free margins a hatchet flap was deemed most appropriate.  Using a sterile surgical marker, an appropriate hatchet flap was drawn incorporating the defect and placing the expected incisions within the relaxed skin tension lines where possible.    The area thus outlined was incised deep to adipose tissue with a #15 scalpel blade.  The skin margins were undermined to an appropriate distance in all directions utilizing iris scissors.
Localized Dermabrasion With Wire Brush Text: The patient was draped in routine manner.  Localized dermabrasion using 3 x 17 mm wire brush was performed in routine manner to papillary dermis. This spot dermabrasion is being performed to complete skin cancer reconstruction. It also will eliminate the other sun damaged precancerous cells that are known to be part of the regional effect of a lifetime's worth of sun exposure. This localized dermabrasion is therapeutic and should not be considered cosmetic in any regard.
Advancement Flap (Single) Text: The defect edges were debeveled with a #15 scalpel blade.  Given the location of the defect and the proximity to free margins a single advancement flap was deemed most appropriate.  Using a sterile surgical marker, an appropriate advancement flap was drawn incorporating the defect and placing the expected incisions within the relaxed skin tension lines where possible.    The area thus outlined was incised deep to adipose tissue with a #15 scalpel blade.  The skin margins were undermined to an appropriate distance in all directions utilizing iris scissors.
Inflammation Suggestive Of Cancer Camouflage Histology Text: There was a dense lymphocytic infiltrate which prevented adequate histologic evaluation of adjacent structures.
Body Location Override (Optional - Billing Will Still Be Based On Selected Body Map Location If Applicable): right nasal ala
Graft Donor Site Bandage (Optional-Leave Blank If You Don't Want In Note): Steri-strips and a pressure bandage were applied to the donor site.
Rhombic Flap Text: The defect edges were debeveled with a #15 scalpel blade.  Given the location of the defect and the proximity to free margins a rhombic flap was deemed most appropriate.  Using a sterile surgical marker, an appropriate rhombic flap was drawn incorporating the defect.    The area thus outlined was incised deep to adipose tissue with a #15 scalpel blade.  The skin margins were undermined to an appropriate distance in all directions utilizing iris scissors.
Surgeon/Pathologist Verbiage (Will Incorporate Name Of Surgeon From Intro If Not Blank): operated in two distinct and integrated capacities as the surgeon and pathologist.
Estimated Blood Loss (Cc): minimal

## 2017-09-27 ENCOUNTER — HOME CARE VISIT (OUTPATIENT)
Dept: HOME HEALTH SERVICES | Facility: HOME HEALTHCARE | Age: 62
End: 2017-09-27
Payer: COMMERCIAL

## 2017-09-28 ENCOUNTER — HOME CARE VISIT (OUTPATIENT)
Dept: HOME HEALTH SERVICES | Facility: HOME HEALTHCARE | Age: 62
End: 2017-09-28
Payer: COMMERCIAL

## 2017-09-28 VITALS
OXYGEN SATURATION: 94 % | DIASTOLIC BLOOD PRESSURE: 65 MMHG | TEMPERATURE: 98.9 F | HEART RATE: 75 BPM | RESPIRATION RATE: 18 BRPM | SYSTOLIC BLOOD PRESSURE: 105 MMHG

## 2017-09-28 VITALS
DIASTOLIC BLOOD PRESSURE: 74 MMHG | TEMPERATURE: 97.7 F | OXYGEN SATURATION: 92 % | SYSTOLIC BLOOD PRESSURE: 112 MMHG | RESPIRATION RATE: 16 BRPM | HEART RATE: 70 BPM

## 2017-09-28 PROCEDURE — G0151 HHCP-SERV OF PT,EA 15 MIN: HCPCS

## 2017-09-28 PROCEDURE — G0152 HHCP-SERV OF OT,EA 15 MIN: HCPCS

## 2017-09-28 ASSESSMENT — ENCOUNTER SYMPTOMS: DEBILITATING PAIN: 1

## 2017-09-29 ENCOUNTER — HOME CARE VISIT (OUTPATIENT)
Dept: HOME HEALTH SERVICES | Facility: HOME HEALTHCARE | Age: 62
End: 2017-09-29
Payer: COMMERCIAL

## 2017-09-29 SDOH — ECONOMIC STABILITY: HOUSING INSECURITY: HOME SAFETY: RAMP WITH VARIATIONS, MULTI CLUTTER ABOUT HOME INSIDE AND OUT

## 2017-10-03 ENCOUNTER — APPOINTMENT (RX ONLY)
Dept: URBAN - METROPOLITAN AREA CLINIC 36 | Facility: CLINIC | Age: 62
Setting detail: DERMATOLOGY
End: 2017-10-03

## 2017-10-03 DIAGNOSIS — Z48.02 ENCOUNTER FOR REMOVAL OF SUTURES: ICD-10-CM

## 2017-10-03 PROCEDURE — ? SUTURE REMOVAL (GLOBAL PERIOD)

## 2017-10-03 PROCEDURE — 99024 POSTOP FOLLOW-UP VISIT: CPT

## 2017-10-03 ASSESSMENT — LOCATION SIMPLE DESCRIPTION DERM: LOCATION SIMPLE: NOSE

## 2017-10-03 ASSESSMENT — LOCATION DETAILED DESCRIPTION DERM: LOCATION DETAILED: NASAL TIP

## 2017-10-03 ASSESSMENT — LOCATION ZONE DERM: LOCATION ZONE: NOSE

## 2017-10-03 NOTE — PROCEDURE: SUTURE REMOVAL (GLOBAL PERIOD)
Detail Level: Detailed
Add 34978 Cpt? (Important Note: In 2017 The Use Of 00271 Is Being Tracked By Cms To Determine Future Global Period Reimbursement For Global Periods): yes

## 2017-10-04 ENCOUNTER — HOME CARE VISIT (OUTPATIENT)
Dept: HOME HEALTH SERVICES | Facility: HOME HEALTHCARE | Age: 62
End: 2017-10-04
Payer: COMMERCIAL

## 2017-10-04 VITALS
SYSTOLIC BLOOD PRESSURE: 110 MMHG | OXYGEN SATURATION: 91 % | TEMPERATURE: 97.3 F | HEART RATE: 72 BPM | DIASTOLIC BLOOD PRESSURE: 70 MMHG

## 2017-10-04 VITALS
TEMPERATURE: 97.8 F | DIASTOLIC BLOOD PRESSURE: 70 MMHG | SYSTOLIC BLOOD PRESSURE: 125 MMHG | RESPIRATION RATE: 18 BRPM | OXYGEN SATURATION: 94 % | HEART RATE: 77 BPM

## 2017-10-04 PROCEDURE — G0151 HHCP-SERV OF PT,EA 15 MIN: HCPCS

## 2017-10-04 PROCEDURE — G0152 HHCP-SERV OF OT,EA 15 MIN: HCPCS

## 2017-10-05 ENCOUNTER — HOME CARE VISIT (OUTPATIENT)
Dept: HOME HEALTH SERVICES | Facility: HOME HEALTHCARE | Age: 62
End: 2017-10-05
Payer: COMMERCIAL

## 2017-10-05 VITALS
OXYGEN SATURATION: 93 % | SYSTOLIC BLOOD PRESSURE: 130 MMHG | DIASTOLIC BLOOD PRESSURE: 84 MMHG | TEMPERATURE: 98.5 F | RESPIRATION RATE: 16 BRPM

## 2017-10-05 VITALS
HEART RATE: 72 BPM | TEMPERATURE: 99.7 F | OXYGEN SATURATION: 92 % | RESPIRATION RATE: 16 BRPM | SYSTOLIC BLOOD PRESSURE: 115 MMHG | DIASTOLIC BLOOD PRESSURE: 85 MMHG

## 2017-10-05 PROCEDURE — G0151 HHCP-SERV OF PT,EA 15 MIN: HCPCS

## 2017-10-05 PROCEDURE — G0152 HHCP-SERV OF OT,EA 15 MIN: HCPCS

## 2017-10-05 PROCEDURE — A9300 EXERCISE EQUIPMENT: HCPCS

## 2017-10-05 ASSESSMENT — ENCOUNTER SYMPTOMS
DEBILITATING PAIN: 1
DEBILITATING PAIN: 1

## 2017-10-10 ENCOUNTER — HOME CARE VISIT (OUTPATIENT)
Dept: HOME HEALTH SERVICES | Facility: HOME HEALTHCARE | Age: 62
End: 2017-10-10
Payer: COMMERCIAL

## 2017-10-10 VITALS
HEART RATE: 61 BPM | DIASTOLIC BLOOD PRESSURE: 80 MMHG | SYSTOLIC BLOOD PRESSURE: 125 MMHG | OXYGEN SATURATION: 96 % | TEMPERATURE: 98.6 F | RESPIRATION RATE: 18 BRPM

## 2017-10-10 VITALS
DIASTOLIC BLOOD PRESSURE: 80 MMHG | RESPIRATION RATE: 18 BRPM | HEART RATE: 60 BPM | OXYGEN SATURATION: 90 % | SYSTOLIC BLOOD PRESSURE: 125 MMHG | TEMPERATURE: 98.6 F

## 2017-10-10 PROCEDURE — G0151 HHCP-SERV OF PT,EA 15 MIN: HCPCS

## 2017-10-10 PROCEDURE — G0152 HHCP-SERV OF OT,EA 15 MIN: HCPCS

## 2017-10-11 ENCOUNTER — APPOINTMENT (RX ONLY)
Dept: URBAN - METROPOLITAN AREA CLINIC 4 | Facility: CLINIC | Age: 62
Setting detail: DERMATOLOGY
End: 2017-10-11

## 2017-10-11 PROBLEM — C44.619 BASAL CELL CARCINOMA OF SKIN OF LEFT UPPER LIMB, INCLUDING SHOULDER: Status: ACTIVE | Noted: 2017-10-11

## 2017-10-11 PROBLEM — C44.712 BASAL CELL CARCINOMA OF SKIN OF RIGHT LOWER LIMB, INCLUDING HIP: Status: ACTIVE | Noted: 2017-10-11

## 2017-10-11 PROCEDURE — 11603 EXC TR-EXT MAL+MARG 2.1-3 CM: CPT | Mod: 79

## 2017-10-11 PROCEDURE — ? ADDITIONAL NOTES

## 2017-10-11 PROCEDURE — ? EXCISION

## 2017-10-11 PROCEDURE — 99212 OFFICE O/P EST SF 10 MIN: CPT | Mod: 24,25

## 2017-10-11 PROCEDURE — 13121 CMPLX RPR S/A/L 2.6-7.5 CM: CPT | Mod: 79

## 2017-10-11 PROCEDURE — ? COUNSELING

## 2017-10-11 ASSESSMENT — ENCOUNTER SYMPTOMS: DEBILITATING PAIN: 1

## 2017-10-11 NOTE — PROCEDURE: EXCISION
Show Primary And Secondary Defect Sizes Variable (Do Not Hide If You Perform Flaps Or Graft Closures): Yes
Epidermal Closure Graft Donor Site (Optional): simple interrupted
Rotation Flap Text: The defect edges were debeveled with a #15 scalpel blade.  Given the location of the defect, shape of the defect and the proximity to free margins a rotation flap was deemed most appropriate.  Using a sterile surgical marker, an appropriate rotation flap was drawn incorporating the defect and placing the expected incisions within the relaxed skin tension lines where possible.    The area thus outlined was incised deep to adipose tissue with a #15 scalpel blade.  The skin margins were undermined to an appropriate distance in all directions utilizing iris scissors.
Complex Repair And Tissue Cultured Epidermal Autograft Text: The defect edges were debeveled with a #15 scalpel blade.  The primary defect was closed partially with a complex linear closure.  Given the location of the defect, shape of the defect and the proximity to free margins an tissue cultured epidermal autograft was deemed most appropriate to repair the remaining defect.  The graft was trimmed to fit the size of the remaining defect.  The graft was then placed in the primary defect, oriented appropriately, and sutured into place.
O-T Advancement Flap Text: The defect edges were debeveled with a #15 scalpel blade.  Given the location of the defect, shape of the defect and the proximity to free margins an O-T advancement flap was deemed most appropriate.  Using a sterile surgical marker, an appropriate advancement flap was drawn incorporating the defect and placing the expected incisions within the relaxed skin tension lines where possible.    The area thus outlined was incised deep to adipose tissue with a #15 scalpel blade.  The skin margins were undermined to an appropriate distance in all directions utilizing iris scissors.
Elliptical Excision Additional Text (Leave Blank If You Do Not Want): The margin was drawn around the clinically apparent lesion.  An elliptical shape was then drawn on the skin incorporating the lesion and margins.  Incisions were then made along these lines to the appropriate tissue plane and the lesion was extirpated.
Keystone Flap Text: The defect edges were debeveled with a #15 scalpel blade.  Given the location of the defect, shape of the defect a keystone flap was deemed most appropriate.  Using a sterile surgical marker, an appropriate keystone flap was drawn incorporating the defect, outlining the appropriate donor tissue and placing the expected incisions within the relaxed skin tension lines where possible. The area thus outlined was incised deep to adipose tissue with a #15 scalpel blade.  The skin margins were undermined to an appropriate distance in all directions around the primary defect and laterally outward around the flap utilizing iris scissors.
V-Y Plasty Text: The defect edges were debeveled with a #15 scalpel blade.  Given the location of the defect, shape of the defect and the proximity to free margins an V-Y advancement flap was deemed most appropriate.  Using a sterile surgical marker, an appropriate advancement flap was drawn incorporating the defect and placing the expected incisions within the relaxed skin tension lines where possible.    The area thus outlined was incised deep to adipose tissue with a #15 scalpel blade.  The skin margins were undermined to an appropriate distance in all directions utilizing iris scissors.
Slit Excision Additional Text (Leave Blank If You Do Not Want): A linear line was drawn on the skin overlying the lesion. An incision was made slowly until the lesion was visualized.  Once visualized, the lesion was removed with blunt dissection.
Advancement Flap (Double) Text: The defect edges were debeveled with a #15 scalpel blade.  Given the location of the defect and the proximity to free margins a double advancement flap was deemed most appropriate.  Using a sterile surgical marker, the appropriate advancement flaps were drawn incorporating the defect and placing the expected incisions within the relaxed skin tension lines where possible.    The area thus outlined was incised deep to adipose tissue with a #15 scalpel blade.  The skin margins were undermined to an appropriate distance in all directions utilizing iris scissors.
Hatchet Flap Text: The defect edges were debeveled with a #15 scalpel blade.  Given the location of the defect, shape of the defect and the proximity to free margins a hatchet flap was deemed most appropriate.  Using a sterile surgical marker, an appropriate hatchet flap was drawn incorporating the defect and placing the expected incisions within the relaxed skin tension lines where possible.    The area thus outlined was incised deep to adipose tissue with a #15 scalpel blade.  The skin margins were undermined to an appropriate distance in all directions utilizing iris scissors.
Helical Rim Advancement Flap Text: The defect edges were debeveled with a #15 blade scalpel.  Given the location of the defect and the proximity to free margins (helical rim) a double helical rim advancement flap was deemed most appropriate.  Using a sterile surgical marker, the appropriate advancement flaps were drawn incorporating the defect and placing the expected incisions between the helical rim and antihelix where possible.  The area thus outlined was incised through and through with a #15 scalpel blade.  With a skin hook and iris scissors, the flaps were gently and sharply undermined and freed up.
Home Suture Removal Text: Patient was provided a home suture removal kit and will remove their sutures at home.  If they have any questions or difficulties they will call the office.
Positioning (Leave Blank If You Do Not Want): The patient was placed in a comfortable position exposing the surgical site.
Epidermal Autograft Text: The defect edges were debeveled with a #15 scalpel blade.  Given the location of the defect, shape of the defect and the proximity to free margins an epidermal autograft was deemed most appropriate.  Using a sterile surgical marker, the primary defect shape was transferred to the donor site. The epidermal graft was then harvested.  The skin graft was then placed in the primary defect and oriented appropriately.
Composite Graft Text: The defect edges were debeveled with a #15 scalpel blade.  Given the location of the defect, shape of the defect, the proximity to free margins and the fact the defect was full thickness a composite graft was deemed most appropriate.  The defect was outline and then transferred to the donor site.  A full thickness graft was then excised from the donor site. The graft was then placed in the primary defect, oriented appropriately and then sutured into place.  The secondary defect was then repaired using a primary closure.
Anesthesia Type: 1% lidocaine with epinephrine
Graft Donor Site Will Heal By Secondary Intention: No
Mastoid Interpolation Flap Text: A decision was made to reconstruct the defect utilizing an interpolation axial flap and a staged reconstruction.  A telfa template was made of the defect.  This telfa template was then used to outline the mastoid interpolation flap.  The donor area for the pedicle flap was then injected with anesthesia.  The flap was excised through the skin and subcutaneous tissue down to the layer of the underlying musculature.  The pedicle flap was carefully excised within this deep plane to maintain its blood supply.  The edges of the donor site were undermined.   The donor site was closed in a primary fashion.  The pedicle was then rotated into position and sutured.  Once the tube was sutured into place, adequate blood supply was confirmed with blanching and refill.  The pedicle was then wrapped with xeroform gauze and dressed appropriately with a telfa and gauze bandage to ensure continued blood supply and protect the attached pedicle.
Scalpel Size: 15 blade
Repair Type: Complex
Burow's Advancement Flap Text: The defect edges were debeveled with a #15 scalpel blade.  Given the location of the defect and the proximity to free margins a Burow's advancement flap was deemed most appropriate.  Using a sterile surgical marker, the appropriate advancement flap was drawn incorporating the defect and placing the expected incisions within the relaxed skin tension lines where possible.    The area thus outlined was incised deep to adipose tissue with a #15 scalpel blade.  The skin margins were undermined to an appropriate distance in all directions utilizing iris scissors.
Partial Purse String (Intermediate) Text: Given the location of the defect and the characteristics of the surrounding skin an intermediate purse string closure was deemed most appropriate.  Undermining was performed circumferentially around the surgical defect.  A purse string suture was then placed and tightened. Wound tension of the circular defect prevented complete closure of the wound.
Previous Accession (Optional): 
Saucerization Excision Additional Text (Leave Blank If You Do Not Want): The margin was drawn around the clinically apparent lesion.  Incisions were then made along these lines, in a tangential fashion, to the appropriate tissue plane and the lesion was extirpated.
Bilobed Flap Text: The defect edges were debeveled with a #15 scalpel blade.  Given the location of the defect and the proximity to free margins a bilobe flap was deemed most appropriate.  Using a sterile surgical marker, an appropriate bilobe flap drawn around the defect.    The area thus outlined was incised deep to adipose tissue with a #15 scalpel blade.  The skin margins were undermined to an appropriate distance in all directions utilizing iris scissors.
Dermal Closure: buried vertical mattress
Bilobed Transposition Flap Text: The defect edges were debeveled with a #15 scalpel blade.  Given the location of the defect and the proximity to free margins a bilobed transposition flap was deemed most appropriate.  Using a sterile surgical marker, an appropriate bilobe flap drawn around the defect.    The area thus outlined was incised deep to adipose tissue with a #15 scalpel blade.  The skin margins were undermined to an appropriate distance in all directions utilizing iris scissors.
Complex Repair And Rhombic Flap Text: The defect edges were debeveled with a #15 scalpel blade.  The primary defect was closed partially with a complex linear closure.  Given the location of the remaining defect, shape of the defect and the proximity to free margins a rhombic flap was deemed most appropriate for complete closure of the defect.  Using a sterile surgical marker, an appropriate advancement flap was drawn incorporating the defect and placing the expected incisions within the relaxed skin tension lines where possible.    The area thus outlined was incised deep to adipose tissue with a #15 scalpel blade.  The skin margins were undermined to an appropriate distance in all directions utilizing iris scissors.
Mucosal Advancement Flap Text: Given the location of the defect, shape of the defect and the proximity to free margins a mucosal advancement flap was deemed most appropriate. Incisions were made with a 15 blade scalpel in the appropriate fashion along the cutaneous vermilion border and the mucosal lip. The remaining actinically damaged mucosal tissue was excised.  The mucosal advancement flap was then elevated to the gingival sulcus with care taken to preserve the neurovascular structures and advanced into the primary defect. Care was taken to ensure that precise realignment of the vermilion border was achieved.
Complex Repair And Melolabial Flap Text: The defect edges were debeveled with a #15 scalpel blade.  The primary defect was closed partially with a complex linear closure.  Given the location of the remaining defect, shape of the defect and the proximity to free margins a melolabial flap was deemed most appropriate for complete closure of the defect.  Using a sterile surgical marker, an appropriate advancement flap was drawn incorporating the defect and placing the expected incisions within the relaxed skin tension lines where possible.    The area thus outlined was incised deep to adipose tissue with a #15 scalpel blade.  The skin margins were undermined to an appropriate distance in all directions utilizing iris scissors.
Additional Anesthesia Volume In Cc: 6
Size Of Margin In Cm: 0.2
Secondary Defect Length (In Cm): 0
A-T Advancement Flap Text: The defect edges were debeveled with a #15 scalpel blade.  Given the location of the defect, shape of the defect and the proximity to free margins an A-T advancement flap was deemed most appropriate.  Using a sterile surgical marker, an appropriate advancement flap was drawn incorporating the defect and placing the expected incisions within the relaxed skin tension lines where possible.    The area thus outlined was incised deep to adipose tissue with a #15 scalpel blade.  The skin margins were undermined to an appropriate distance in all directions utilizing iris scissors.
Excisional Biopsy Additional Text (Leave Blank If You Do Not Want): The margin was drawn around the clinically apparent lesion. An elliptical shape was then drawn on the skin incorporating the lesion and margins.  Incisions were then made along these lines to the appropriate tissue plane and the lesion was extirpated.
Deep Sutures: 4-0 Vicryl
Undermining Location (Optional): in the superficial subcutaneous fat
Epidermal Sutures: 5-0 Vicryl Rapide
Wound Care: Bacitracin
Detail Level: Detailed
Modified Advancement Flap Text: The defect edges were debeveled with a #15 scalpel blade.  Given the location of the defect, shape of the defect and the proximity to free margins a modified advancement flap was deemed most appropriate.  Using a sterile surgical marker, an appropriate advancement flap was drawn incorporating the defect and placing the expected incisions within the relaxed skin tension lines where possible.    The area thus outlined was incised deep to adipose tissue with a #15 scalpel blade.  The skin margins were undermined to an appropriate distance in all directions utilizing iris scissors.
Complex Repair And Z Plasty Text: The defect edges were debeveled with a #15 scalpel blade.  The primary defect was closed partially with a complex linear closure.  Given the location of the remaining defect, shape of the defect and the proximity to free margins a Z plasty was deemed most appropriate for complete closure of the defect.  Using a sterile surgical marker, an appropriate advancement flap was drawn incorporating the defect and placing the expected incisions within the relaxed skin tension lines where possible.    The area thus outlined was incised deep to adipose tissue with a #15 scalpel blade.  The skin margins were undermined to an appropriate distance in all directions utilizing iris scissors.
Cartilage Graft Text: The defect edges were debeveled with a #15 scalpel blade.  Given the location of the defect, shape of the defect, the fact the defect involved a full thickness cartilage defect a cartilage graft was deemed most appropriate.  An appropriate donor site was identified, cleansed, and anesthetized. The cartilage graft was then harvested and transferred to the recipient site, oriented appropriately and then sutured into place.  The secondary defect was then repaired using a primary closure.
Pre-Excision Curettage Text (Leave Blank If You Do Not Want): Prior to drawing the surgical margin the visible lesion was removed with electrodesiccation and curettage to clearly define the lesion size.
Post-Care Instructions: I reviewed with the patient in detail post-care instructions:\\n1. Apply bacitracin over the steri-strips.  \\n2. Cut non-stick pad (Telfa) to cover the steri-strips\\n3. Apply tape (hypafix) over the non-stick pad\\n4. Change once per day for 5 days\\n5. Shower with bandage on, change bandage after shower\\n\\nPatient is not to engage in any heavy lifting, exercise, hot tub, or swimming for the next 14 days. Should the patient develop any fevers, chills, bleeding, severe pain patient will contact the office immediately.
Cheek Interpolation Flap Text: A decision was made to reconstruct the defect utilizing an interpolation axial flap and a staged reconstruction.  A telfa template was made of the defect.  This telfa template was then used to outline the Cheek Interpolation flap.  The donor area for the pedicle flap was then injected with anesthesia.  The flap was excised through the skin and subcutaneous tissue down to the layer of the underlying musculature.  The interpolation flap was carefully excised within this deep plane to maintain its blood supply.  The edges of the donor site were undermined.   The donor site was closed in a primary fashion.  The pedicle was then rotated into position and sutured.  Once the tube was sutured into place, adequate blood supply was confirmed with blanching and refill.  The pedicle was then wrapped with xeroform gauze and dressed appropriately with a telfa and gauze bandage to ensure continued blood supply and protect the attached pedicle.
Island Pedicle Flap Text: The defect edges were debeveled with a #15 scalpel blade.  Given the location of the defect, shape of the defect and the proximity to free margins an island pedicle advancement flap was deemed most appropriate.  Using a sterile surgical marker, an appropriate advancement flap was drawn incorporating the defect, outlining the appropriate donor tissue and placing the expected incisions within the relaxed skin tension lines where possible.    The area thus outlined was incised deep to adipose tissue with a #15 scalpel blade.  The skin margins were undermined to an appropriate distance in all directions around the primary defect and laterally outward around the island pedicle utilizing iris scissors.  There was minimal undermining beneath the pedicle flap.
Cheek-To-Nose Interpolation Flap Text: A decision was made to reconstruct the defect utilizing an interpolation axial flap and a staged reconstruction.  A telfa template was made of the defect.  This telfa template was then used to outline the Cheek-To-Nose Interpolation flap.  The donor area for the pedicle flap was then injected with anesthesia.  The flap was excised through the skin and subcutaneous tissue down to the layer of the underlying musculature.  The interpolation flap was carefully excised within this deep plane to maintain its blood supply.  The edges of the donor site were undermined.   The donor site was closed in a primary fashion.  The pedicle was then rotated into position and sutured.  Once the tube was sutured into place, adequate blood supply was confirmed with blanching and refill.  The pedicle was then wrapped with xeroform gauze and dressed appropriately with a telfa and gauze bandage to ensure continued blood supply and protect the attached pedicle.
Complex Repair And Ftsg Text: The defect edges were debeveled with a #15 scalpel blade.  The primary defect was closed partially with a complex linear closure.  Given the location of the defect, shape of the defect and the proximity to free margins a full thickness skin graft was deemed most appropriate to repair the remaining defect.  The graft was trimmed to fit the size of the remaining defect.  The graft was then placed in the primary defect, oriented appropriately, and sutured into place.
Complex Repair And O-T Advancement Flap Text: The defect edges were debeveled with a #15 scalpel blade.  The primary defect was closed partially with a complex linear closure.  Given the location of the remaining defect, shape of the defect and the proximity to free margins an O-T advancement flap was deemed most appropriate for complete closure of the defect.  Using a sterile surgical marker, an appropriate advancement flap was drawn incorporating the defect and placing the expected incisions within the relaxed skin tension lines where possible.    The area thus outlined was incised deep to adipose tissue with a #15 scalpel blade.  The skin margins were undermined to an appropriate distance in all directions utilizing iris scissors.
Lab: [id number]
Island Pedicle Flap With Canthal Suspension Text: The defect edges were debeveled with a #15 scalpel blade.  Given the location of the defect, shape of the defect and the proximity to free margins an island pedicle advancement flap was deemed most appropriate.  Using a sterile surgical marker, an appropriate advancement flap was drawn incorporating the defect, outlining the appropriate donor tissue and placing the expected incisions within the relaxed skin tension lines where possible. The area thus outlined was incised deep to adipose tissue with a #15 scalpel blade.  The skin margins were undermined to an appropriate distance in all directions around the primary defect and laterally outward around the island pedicle utilizing iris scissors.  There was minimal undermining beneath the pedicle flap. A suspension suture was placed in the canthal tendon to prevent tension and prevent ectropion.
Complex Repair And A-T Advancement Flap Text: The defect edges were debeveled with a #15 scalpel blade.  The primary defect was closed partially with a complex linear closure.  Given the location of the remaining defect, shape of the defect and the proximity to free margins an A-T advancement flap was deemed most appropriate for complete closure of the defect.  Using a sterile surgical marker, an appropriate advancement flap was drawn incorporating the defect and placing the expected incisions within the relaxed skin tension lines where possible.    The area thus outlined was incised deep to adipose tissue with a #15 scalpel blade.  The skin margins were undermined to an appropriate distance in all directions utilizing iris scissors.
Partial Purse String (Simple) Text: Given the location of the defect and the characteristics of the surrounding skin a simple purse string closure was deemed most appropriate.  Undermining was performed circumferentially around the surgical defect.  A purse string suture was then placed and tightened. Wound tension of the circular defect prevented complete closure of the wound.
Interpolation Flap Text: A decision was made to reconstruct the defect utilizing an interpolation axial flap and a staged reconstruction.  A telfa template was made of the defect.  This telfa template was then used to outline the interpolation flap.  The donor area for the pedicle flap was then injected with anesthesia.  The flap was excised through the skin and subcutaneous tissue down to the layer of the underlying musculature.  The interpolation flap was carefully excised within this deep plane to maintain its blood supply.  The edges of the donor site were undermined.   The donor site was closed in a primary fashion.  The pedicle was then rotated into position and sutured.  Once the tube was sutured into place, adequate blood supply was confirmed with blanching and refill.  The pedicle was then wrapped with xeroform gauze and dressed appropriately with a telfa and gauze bandage to ensure continued blood supply and protect the attached pedicle.
Paramedian Forehead Flap Text: A decision was made to reconstruct the defect utilizing an interpolation axial flap and a staged reconstruction.  A telfa template was made of the defect.  This telfa template was then used to outline the paramedian forehead pedicle flap.  The donor area for the pedicle flap was then injected with anesthesia.  The flap was excised through the skin and subcutaneous tissue down to the layer of the underlying musculature.  The pedicle flap was carefully excised within this deep plane to maintain its blood supply.  The edges of the donor site were undermined.   The donor site was closed in a primary fashion.  The pedicle was then rotated into position and sutured.  Once the tube was sutured into place, adequate blood supply was confirmed with blanching and refill.  The pedicle was then wrapped with xeroform gauze and dressed appropriately with a telfa and gauze bandage to ensure continued blood supply and protect the attached pedicle.
Spiral Flap Text: The defect edges were debeveled with a #15 scalpel blade.  Given the location of the defect, shape of the defect and the proximity to free margins a spiral flap was deemed most appropriate.  Using a sterile surgical marker, an appropriate rotation flap was drawn incorporating the defect and placing the expected incisions within the relaxed skin tension lines where possible. The area thus outlined was incised deep to adipose tissue with a #15 scalpel blade.  The skin margins were undermined to an appropriate distance in all directions utilizing iris scissors.
Melolabial Interpolation Flap Text: A decision was made to reconstruct the defect utilizing an interpolation axial flap and a staged reconstruction.  A telfa template was made of the defect.  This telfa template was then used to outline the melolabial interpolation flap.  The donor area for the pedicle flap was then injected with anesthesia.  The flap was excised through the skin and subcutaneous tissue down to the layer of the underlying musculature.  The pedicle flap was carefully excised within this deep plane to maintain its blood supply.  The edges of the donor site were undermined.   The donor site was closed in a primary fashion.  The pedicle was then rotated into position and sutured.  Once the tube was sutured into place, adequate blood supply was confirmed with blanching and refill.  The pedicle was then wrapped with xeroform gauze and dressed appropriately with a telfa and gauze bandage to ensure continued blood supply and protect the attached pedicle.
Anesthesia Volume In Cc: 5
Fusiform Excision Additional Text (Leave Blank If You Do Not Want): The margin was drawn around the clinically apparent lesion.  A fusiform shape was then drawn on the skin incorporating the lesion and margins.  Incisions were then made along these lines to the appropriate tissue plane and the lesion was extirpated.
Complex Repair And Dorsal Nasal Flap Text: The defect edges were debeveled with a #15 scalpel blade.  The primary defect was closed partially with a complex linear closure.  Given the location of the remaining defect, shape of the defect and the proximity to free margins a dorsal nasal flap was deemed most appropriate for complete closure of the defect.  Using a sterile surgical marker, an appropriate flap was drawn incorporating the defect and placing the expected incisions within the relaxed skin tension lines where possible.    The area thus outlined was incised deep to adipose tissue with a #15 scalpel blade.  The skin margins were undermined to an appropriate distance in all directions utilizing iris scissors.
Ear Star Wedge Flap Text: The defect edges were debeveled with a #15 blade scalpel.  Given the location of the defect and the proximity to free margins (helical rim) an ear star wedge flap was deemed most appropriate.  Using a sterile surgical marker, the appropriate flap was drawn incorporating the defect and placing the expected incisions between the helical rim and antihelix where possible.  The area thus outlined was incised through and through with a #15 scalpel blade.
O-T Plasty Text: The defect edges were debeveled with a #15 scalpel blade.  Given the location of the defect, shape of the defect and the proximity to free margins an O-T plasty was deemed most appropriate.  Using a sterile surgical marker, an appropriate O-T plasty was drawn incorporating the defect and placing the expected incisions within the relaxed skin tension lines where possible.    The area thus outlined was incised deep to adipose tissue with a #15 scalpel blade.  The skin margins were undermined to an appropriate distance in all directions utilizing iris scissors.
Billing Type: Third-Party Bill
Path Notes (To The Dermatopathologist): Please check margins.
Purse String (Simple) Text: Given the location of the defect and the characteristics of the surrounding skin a purse string simple closure was deemed most appropriate.  Undermining was performed circumferentially around the surgical defect.  A purse string suture was then placed and tightened.
Repair Performed By Another Provider Text (Leave Blank If You Do Not Want): After the tissue was excised the defect was repaired by another provider.
Perilesional Excision Additional Text (Leave Blank If You Do Not Want): The margin was drawn around the clinically apparent lesion. Incisions were then made along these lines to the appropriate tissue plane and the lesion was extirpated.
Bilateral Helical Rim Advancement Flap Text: The defect edges were debeveled with a #15 blade scalpel.  Given the location of the defect and the proximity to free margins (helical rim) a bilateral helical rim advancement flap was deemed most appropriate.  Using a sterile surgical marker, the appropriate advancement flaps were drawn incorporating the defect and placing the expected incisions between the helical rim and antihelix where possible.  The area thus outlined was incised through and through with a #15 scalpel blade.  With a skin hook and iris scissors, the flaps were gently and sharply undermined and freed up.
Consent was obtained from the patient. The risks and benefits to therapy were discussed in detail. Specifically, the risks of infection, scarring, bleeding, prolonged wound healing, incomplete removal, allergy to anesthesia, nerve injury and recurrence were addressed. Prior to the procedure, the treatment site was clearly identified and confirmed by the patient. All components of Universal Protocol/PAUSE Rule completed.
Complex Repair And Rotation Flap Text: The defect edges were debeveled with a #15 scalpel blade.  The primary defect was closed partially with a complex linear closure.  Given the location of the remaining defect, shape of the defect and the proximity to free margins a rotation flap was deemed most appropriate for complete closure of the defect.  Using a sterile surgical marker, an appropriate advancement flap was drawn incorporating the defect and placing the expected incisions within the relaxed skin tension lines where possible.    The area thus outlined was incised deep to adipose tissue with a #15 scalpel blade.  The skin margins were undermined to an appropriate distance in all directions utilizing iris scissors.
Complex Repair And O-L Flap Text: The defect edges were debeveled with a #15 scalpel blade.  The primary defect was closed partially with a complex linear closure.  Given the location of the remaining defect, shape of the defect and the proximity to free margins an O-L flap was deemed most appropriate for complete closure of the defect.  Using a sterile surgical marker, an appropriate flap was drawn incorporating the defect and placing the expected incisions within the relaxed skin tension lines where possible.    The area thus outlined was incised deep to adipose tissue with a #15 scalpel blade.  The skin margins were undermined to an appropriate distance in all directions utilizing iris scissors.
Size Of Lesion In Cm: 2.2
Ftsg Text: The defect edges were debeveled with a #15 scalpel blade.  Given the location of the defect, shape of the defect and the proximity to free margins a full thickness skin graft was deemed most appropriate.  Using a sterile surgical marker, the primary defect shape was transferred to the donor site. The area thus outlined was incised deep to adipose tissue with a #15 scalpel blade.  The harvested graft was then trimmed of adipose tissue until only dermis and epidermis was left.  The skin margins of the secondary defect were undermined to an appropriate distance in all directions utilizing iris scissors.  The secondary defect was closed with interrupted buried subcutaneous sutures.  The skin edges were then re-apposed with running  sutures.  The skin graft was then placed in the primary defect and oriented appropriately.
Lip Wedge Excision Repair Text: Given the location of the defect and the proximity to free margins a full thickness wedge repair was deemed most appropriate.  Using a sterile surgical marker, the appropriate repair was drawn incorporating the defect and placing the expected incisions perpendicular to the vermilion border.  The vermilion border was also meticulously outlined to ensure appropriate reapproximation during the repair.  The area thus outlined was incised through and through with a #15 scalpel blade.  The muscularis and dermis were reaproximated with deep sutures following hemostasis. Care was taken to realign the vermilion border before proceeding with the superficial closure.  Once the vermilion was realigned the superfical and mucosal closure was finished.
Z Plasty Text: The lesion was extirpated to the level of the fat with a #15 scalpel blade.  Given the location of the defect, shape of the defect and the proximity to free margins a Z-plasty was deemed most appropriate for repair.  Using a sterile surgical marker, the appropriate transposition arms of the Z-plasty were drawn incorporating the defect and placing the expected incisions within the relaxed skin tension lines where possible.    The area thus outlined was incised deep to adipose tissue with a #15 scalpel blade.  The skin margins were undermined to an appropriate distance in all directions utilizing iris scissors.  The opposing transposition arms were then transposed into place in opposite direction and anchored with interrupted buried subcutaneous sutures.
O-Z Plasty Text: The defect edges were debeveled with a #15 scalpel blade.  Given the location of the defect, shape of the defect and the proximity to free margins an O-Z plasty (double transposition flap) was deemed most appropriate.  Using a sterile surgical marker, the appropriate transposition flaps were drawn incorporating the defect and placing the expected incisions within the relaxed skin tension lines where possible.    The area thus outlined was incised deep to adipose tissue with a #15 scalpel blade.  The skin margins were undermined to an appropriate distance in all directions utilizing iris scissors.  Hemostasis was achieved with electrocautery.  The flaps were then transposed into place, one clockwise and the other counterclockwise, and anchored with interrupted buried subcutaneous sutures.
Estimated Blood Loss (Cc): minimal
Complex Repair And M Plasty Text: The defect edges were debeveled with a #15 scalpel blade.  The primary defect was closed partially with a complex linear closure.  Given the location of the remaining defect, shape of the defect and the proximity to free margins an M plasty was deemed most appropriate for complete closure of the defect.  Using a sterile surgical marker, an appropriate advancement flap was drawn incorporating the defect and placing the expected incisions within the relaxed skin tension lines where possible.    The area thus outlined was incised deep to adipose tissue with a #15 scalpel blade.  The skin margins were undermined to an appropriate distance in all directions utilizing iris scissors.
Crescentic Advancement Flap Text: The defect edges were debeveled with a #15 scalpel blade.  Given the location of the defect and the proximity to free margins a crescentic advancement flap was deemed most appropriate.  Using a sterile surgical marker, the appropriate advancement flap was drawn incorporating the defect and placing the expected incisions within the relaxed skin tension lines where possible.    The area thus outlined was incised deep to adipose tissue with a #15 scalpel blade.  The skin margins were undermined to an appropriate distance in all directions utilizing iris scissors.
Intermediate Repair Preamble Text (Leave Blank If You Do Not Want): Undermining was performed with blunt dissection.
Melolabial Transposition Flap Text: The defect edges were debeveled with a #15 scalpel blade.  Given the location of the defect and the proximity to free margins a melolabial flap was deemed most appropriate.  Using a sterile surgical marker, an appropriate melolabial transposition flap was drawn incorporating the defect.    The area thus outlined was incised deep to adipose tissue with a #15 scalpel blade.  The skin margins were undermined to an appropriate distance in all directions utilizing iris scissors.
Complex Repair And Split-Thickness Skin Graft Text: The defect edges were debeveled with a #15 scalpel blade.  The primary defect was closed partially with a complex linear closure.  Given the location of the defect, shape of the defect and the proximity to free margins a split thickness skin graft was deemed most appropriate to repair the remaining defect.  The graft was trimmed to fit the size of the remaining defect.  The graft was then placed in the primary defect, oriented appropriately, and sutured into place.
Complex Repair And V-Y Plasty Text: The defect edges were debeveled with a #15 scalpel blade.  The primary defect was closed partially with a complex linear closure.  Given the location of the remaining defect, shape of the defect and the proximity to free margins a V-Y plasty was deemed most appropriate for complete closure of the defect.  Using a sterile surgical marker, an appropriate advancement flap was drawn incorporating the defect and placing the expected incisions within the relaxed skin tension lines where possible.    The area thus outlined was incised deep to adipose tissue with a #15 scalpel blade.  The skin margins were undermined to an appropriate distance in all directions utilizing iris scissors.
No Repair - Repaired With Adjacent Surgical Defect Text (Leave Blank If You Do Not Want): After the excision the defect was repaired concurrently with another surgical defect which was in close approximation.
Purse String (Intermediate) Text: Given the location of the defect and the characteristics of the surrounding skin a purse string intermediate closure was deemed most appropriate.  Undermining was performed circumfirentially around the surgical defect.  A purse string suture was then placed and tightened.
Lab Facility: 
Complex Repair And W Plasty Text: The defect edges were debeveled with a #15 scalpel blade.  The primary defect was closed partially with a complex linear closure.  Given the location of the remaining defect, shape of the defect and the proximity to free margins a W plasty was deemed most appropriate for complete closure of the defect.  Using a sterile surgical marker, an appropriate advancement flap was drawn incorporating the defect and placing the expected incisions within the relaxed skin tension lines where possible.    The area thus outlined was incised deep to adipose tissue with a #15 scalpel blade.  The skin margins were undermined to an appropriate distance in all directions utilizing iris scissors.
Transposition Flap Text: The defect edges were debeveled with a #15 scalpel blade.  Given the location of the defect and the proximity to free margins a transposition flap was deemed most appropriate.  Using a sterile surgical marker, an appropriate transposition flap was drawn incorporating the defect.    The area thus outlined was incised deep to adipose tissue with a #15 scalpel blade.  The skin margins were undermined to an appropriate distance in all directions utilizing iris scissors.
Advancement-Rotation Flap Text: The defect edges were debeveled with a #15 scalpel blade.  Given the location of the defect, shape of the defect and the proximity to free margins an advancement-rotation flap was deemed most appropriate.  Using a sterile surgical marker, an appropriate flap was drawn incorporating the defect and placing the expected incisions within the relaxed skin tension lines where possible. The area thus outlined was incised deep to adipose tissue with a #15 scalpel blade.  The skin margins were undermined to an appropriate distance in all directions utilizing iris scissors.
Star Wedge Flap Text: The defect edges were debeveled with a #15 scalpel blade.  Given the location of the defect, shape of the defect and the proximity to free margins a star wedge flap was deemed most appropriate.  Using a sterile surgical marker, an appropriate rotation flap was drawn incorporating the defect and placing the expected incisions within the relaxed skin tension lines where possible. The area thus outlined was incised deep to adipose tissue with a #15 scalpel blade.  The skin margins were undermined to an appropriate distance in all directions utilizing iris scissors.
S Plasty Text: Given the location and shape of the defect, and the orientation of relaxed skin tension lines, an S-plasty was deemed most appropriate for repair.  Using a sterile surgical marker, the appropriate outline of the S-plasty was drawn, incorporating the defect and placing the expected incisions within the relaxed skin tension lines where possible.  The area thus outlined was incised deep to adipose tissue with a #15 scalpel blade.  The skin margins were undermined to an appropriate distance in all directions utilizing iris scissors. The skin flaps were advanced over the defect.  The opposing margins were then approximated with interrupted buried subcutaneous sutures.
Complex Repair And Single Advancement Flap Text: The defect edges were debeveled with a #15 scalpel blade.  The primary defect was closed partially with a complex linear closure.  Given the location of the remaining defect, shape of the defect and the proximity to free margins a single advancement flap was deemed most appropriate for complete closure of the defect.  Using a sterile surgical marker, an appropriate advancement flap was drawn incorporating the defect and placing the expected incisions within the relaxed skin tension lines where possible.    The area thus outlined was incised deep to adipose tissue with a #15 scalpel blade.  The skin margins were undermined to an appropriate distance in all directions utilizing iris scissors.
Dressing: dry sterile dressing
Complex Repair And Xenograft Text: The defect edges were debeveled with a #15 scalpel blade.  The primary defect was closed partially with a complex linear closure.  Given the location of the defect, shape of the defect and the proximity to free margins a xenograft was deemed most appropriate to repair the remaining defect.  The graft was trimmed to fit the size of the remaining defect.  The graft was then placed in the primary defect, oriented appropriately, and sutured into place.
Complex Repair Preamble Text (Leave Blank If You Do Not Want): Extensive wide undermining was performed.
W Plasty Text: The lesion was extirpated to the level of the fat with a #15 scalpel blade.  Given the location of the defect, shape of the defect and the proximity to free margins a W-plasty was deemed most appropriate for repair.  Using a sterile surgical marker, the appropriate transposition arms of the W-plasty were drawn incorporating the defect and placing the expected incisions within the relaxed skin tension lines where possible.    The area thus outlined was incised deep to adipose tissue with a #15 scalpel blade.  The skin margins were undermined to an appropriate distance in all directions utilizing iris scissors.  The opposing transposition arms were then transposed into place in opposite direction and anchored with interrupted buried subcutaneous sutures.
Repair Anesthesia Method: local infiltration
Complex Repair And Skin Substitute Graft Text: The defect edges were debeveled with a #15 scalpel blade.  The primary defect was closed partially with a complex linear closure.  Given the location of the remaining defect, shape of the defect and the proximity to free margins a skin substitute graft was deemed most appropriate to repair the remaining defect.  The graft was trimmed to fit the size of the remaining defect.  The graft was then placed in the primary defect, oriented appropriately, and sutured into place.
Curvilinear Excision Additional Text (Leave Blank If You Do Not Want): The margin was drawn around the clinically apparent lesion.  A curvilinear shape was then drawn on the skin incorporating the lesion and margins.  Incisions were then made along these lines to the appropriate tissue plane and the lesion was extirpated.
Island Pedicle Flap-Requiring Vessel Identification Text: The defect edges were debeveled with a #15 scalpel blade.  Given the location of the defect, shape of the defect and the proximity to free margins an island pedicle advancement flap was deemed most appropriate.  Using a sterile surgical marker, an appropriate advancement flap was drawn, based on the axial vessel mentioned above, incorporating the defect, outlining the appropriate donor tissue and placing the expected incisions within the relaxed skin tension lines where possible.    The area thus outlined was incised deep to adipose tissue with a #15 scalpel blade.  The skin margins were undermined to an appropriate distance in all directions around the primary defect and laterally outward around the island pedicle utilizing iris scissors.  There was minimal undermining beneath the pedicle flap.
Excision Method: Fusiform
Alar Island Pedicle Flap Text: The defect edges were debeveled with a #15 scalpel blade.  Given the location of the defect, shape of the defect and the proximity to the alar rim an island pedicle advancement flap was deemed most appropriate.  Using a sterile surgical marker, an appropriate advancement flap was drawn incorporating the defect, outlining the appropriate donor tissue and placing the expected incisions within the nasal ala running parallel to the alar rim. The area thus outlined was incised with a #15 scalpel blade.  The skin margins were undermined minimally to an appropriate distance in all directions around the primary defect and laterally outward around the island pedicle utilizing iris scissors.  There was minimal undermining beneath the pedicle flap.
Graft Donor Site Bandage (Optional-Leave Blank If You Don't Want In Note): Steri-strips and a pressure bandage were applied to the donor site.
Dermal Autograft Text: The defect edges were debeveled with a #15 scalpel blade.  Given the location of the defect, shape of the defect and the proximity to free margins a dermal autograft was deemed most appropriate.  Using a sterile surgical marker, the primary defect shape was transferred to the donor site. The area thus outlined was incised deep to adipose tissue with a #15 scalpel blade.  The harvested graft was then trimmed of adipose and epidermal tissue until only dermis was left.  The skin graft was then placed in the primary defect and oriented appropriately.
O-L Flap Text: The defect edges were debeveled with a #15 scalpel blade.  Given the location of the defect, shape of the defect and the proximity to free margins an O-L flap was deemed most appropriate.  Using a sterile surgical marker, an appropriate advancement flap was drawn incorporating the defect and placing the expected incisions within the relaxed skin tension lines where possible.    The area thus outlined was incised deep to adipose tissue with a #15 scalpel blade.  The skin margins were undermined to an appropriate distance in all directions utilizing iris scissors.
Anesthesia Volume In Cc: 9
Posterior Auricular Interpolation Flap Text: A decision was made to reconstruct the defect utilizing an interpolation axial flap and a staged reconstruction.  A telfa template was made of the defect.  This telfa template was then used to outline the posterior auricular interpolation flap.  The donor area for the pedicle flap was then injected with anesthesia.  The flap was excised through the skin and subcutaneous tissue down to the layer of the underlying musculature.  The pedicle flap was carefully excised within this deep plane to maintain its blood supply.  The edges of the donor site were undermined.   The donor site was closed in a primary fashion.  The pedicle was then rotated into position and sutured.  Once the tube was sutured into place, adequate blood supply was confirmed with blanching and refill.  The pedicle was then wrapped with xeroform gauze and dressed appropriately with a telfa and gauze bandage to ensure continued blood supply and protect the attached pedicle.
Split-Thickness Skin Graft Text: The defect edges were debeveled with a #15 scalpel blade.  Given the location of the defect, shape of the defect and the proximity to free margins a split thickness skin graft was deemed most appropriate.  Using a sterile surgical marker, the primary defect shape was transferred to the donor site. The split thickness graft was then harvested.  The skin graft was then placed in the primary defect and oriented appropriately.
Xenograft Text: The defect edges were debeveled with a #15 scalpel blade.  Given the location of the defect, shape of the defect and the proximity to free margins a xenograft was deemed most appropriate.  The graft was then trimmed to fit the size of the defect.  The graft was then placed in the primary defect and oriented appropriately.
Complex Repair And Double M Plasty Text: The defect edges were debeveled with a #15 scalpel blade.  The primary defect was closed partially with a complex linear closure.  Given the location of the remaining defect, shape of the defect and the proximity to free margins a double M plasty was deemed most appropriate for complete closure of the defect.  Using a sterile surgical marker, an appropriate advancement flap was drawn incorporating the defect and placing the expected incisions within the relaxed skin tension lines where possible.    The area thus outlined was incised deep to adipose tissue with a #15 scalpel blade.  The skin margins were undermined to an appropriate distance in all directions utilizing iris scissors.
Complex Repair And Bilobe Flap Text: The defect edges were debeveled with a #15 scalpel blade.  The primary defect was closed partially with a complex linear closure.  Given the location of the remaining defect, shape of the defect and the proximity to free margins a bilobe flap was deemed most appropriate for complete closure of the defect.  Using a sterile surgical marker, an appropriate advancement flap was drawn incorporating the defect and placing the expected incisions within the relaxed skin tension lines where possible.    The area thus outlined was incised deep to adipose tissue with a #15 scalpel blade.  The skin margins were undermined to an appropriate distance in all directions utilizing iris scissors.
Intermediate / Complex Repair - Final Wound Length In Cm: 6.1
Skin Substitute Text: The defect edges were debeveled with a #15 scalpel blade.  Given the location of the defect, shape of the defect and the proximity to free margins a skin substitute graft was deemed most appropriate.  The graft material was trimmed to fit the size of the defect. The graft was then placed in the primary defect and oriented appropriately.
Hemostasis: Electrocautery
Complex Repair And Epidermal Autograft Text: The defect edges were debeveled with a #15 scalpel blade.  The primary defect was closed partially with a complex linear closure.  Given the location of the defect, shape of the defect and the proximity to free margins an epidermal autograft was deemed most appropriate to repair the remaining defect.  The graft was trimmed to fit the size of the remaining defect.  The graft was then placed in the primary defect, oriented appropriately, and sutured into place.
Complex Repair And Modified Advancement Flap Text: The defect edges were debeveled with a #15 scalpel blade.  The primary defect was closed partially with a complex linear closure.  Given the location of the remaining defect, shape of the defect and the proximity to free margins a modified advancement flap was deemed most appropriate for complete closure of the defect.  Using a sterile surgical marker, an appropriate advancement flap was drawn incorporating the defect and placing the expected incisions within the relaxed skin tension lines where possible.    The area thus outlined was incised deep to adipose tissue with a #15 scalpel blade.  The skin margins were undermined to an appropriate distance in all directions utilizing iris scissors.
Complex Repair And Dermal Autograft Text: The defect edges were debeveled with a #15 scalpel blade.  The primary defect was closed partially with a complex linear closure.  Given the location of the defect, shape of the defect and the proximity to free margins an dermal autograft was deemed most appropriate to repair the remaining defect.  The graft was trimmed to fit the size of the remaining defect.  The graft was then placed in the primary defect, oriented appropriately, and sutured into place.
Rhombic Flap Text: The defect edges were debeveled with a #15 scalpel blade.  Given the location of the defect and the proximity to free margins a rhombic flap was deemed most appropriate.  Using a sterile surgical marker, an appropriate rhombic flap was drawn incorporating the defect.    The area thus outlined was incised deep to adipose tissue with a #15 scalpel blade.  The skin margins were undermined to an appropriate distance in all directions utilizing iris scissors.
H Plasty Text: Given the location of the defect, shape of the defect and the proximity to free margins a H-plasty was deemed most appropriate for repair.  Using a sterile surgical marker, the appropriate advancement arms of the H-plasty were drawn incorporating the defect and placing the expected incisions within the relaxed skin tension lines where possible. The area thus outlined was incised deep to adipose tissue with a #15 scalpel blade. The skin margins were undermined to an appropriate distance in all directions utilizing iris scissors.  The opposing advancement arms were then advanced into place in opposite direction and anchored with interrupted buried subcutaneous sutures.
Epidermal Closure: running subcuticular
Tissue Cultured Epidermal Autograft Text: The defect edges were debeveled with a #15 scalpel blade.  Given the location of the defect, shape of the defect and the proximity to free margins a tissue cultured epidermal autograft was deemed most appropriate.  The graft was then trimmed to fit the size of the defect.  The graft was then placed in the primary defect and oriented appropriately.
Muscle Hinge Flap Text: The defect edges were debeveled with a #15 scalpel blade.  Given the size, depth and location of the defect and the proximity to free margins a muscle hinge flap was deemed most appropriate.  Using a sterile surgical marker, an appropriate hinge flap was drawn incorporating the defect. The area thus outlined was incised with a #15 scalpel blade.  The skin margins were undermined to an appropriate distance in all directions utilizing iris scissors.
Trilobed Flap Text: The defect edges were debeveled with a #15 scalpel blade.  Given the location of the defect and the proximity to free margins a trilobed flap was deemed most appropriate.  Using a sterile surgical marker, an appropriate trilobed flap drawn around the defect.    The area thus outlined was incised deep to adipose tissue with a #15 scalpel blade.  The skin margins were undermined to an appropriate distance in all directions utilizing iris scissors.
Complex Repair And Transposition Flap Text: The defect edges were debeveled with a #15 scalpel blade.  The primary defect was closed partially with a complex linear closure.  Given the location of the remaining defect, shape of the defect and the proximity to free margins a transposition flap was deemed most appropriate for complete closure of the defect.  Using a sterile surgical marker, an appropriate advancement flap was drawn incorporating the defect and placing the expected incisions within the relaxed skin tension lines where possible.    The area thus outlined was incised deep to adipose tissue with a #15 scalpel blade.  The skin margins were undermined to an appropriate distance in all directions utilizing iris scissors.
Surgeon Performing The Repair (Optional): Jennifer
Advancement Flap (Single) Text: The defect edges were debeveled with a #15 scalpel blade.  Given the location of the defect and the proximity to free margins a single advancement flap was deemed most appropriate.  Using a sterile surgical marker, an appropriate advancement flap was drawn incorporating the defect and placing the expected incisions within the relaxed skin tension lines where possible.    The area thus outlined was incised deep to adipose tissue with a #15 scalpel blade.  The skin margins were undermined to an appropriate distance in all directions utilizing iris scissors.
Dorsal Nasal Flap Text: The defect edges were debeveled with a #15 scalpel blade.  Given the location of the defect and the proximity to free margins a dorsal nasal flap was deemed most appropriate.  Using a sterile surgical marker, an appropriate dorsal nasal flap was drawn around the defect.    The area thus outlined was incised deep to adipose tissue with a #15 scalpel blade.  The skin margins were undermined to an appropriate distance in all directions utilizing iris scissors.
Bi-Rhombic Flap Text: The defect edges were debeveled with a #15 scalpel blade.  Given the location of the defect and the proximity to free margins a bi-rhombic flap was deemed most appropriate.  Using a sterile surgical marker, an appropriate rhombic flap was drawn incorporating the defect. The area thus outlined was incised deep to adipose tissue with a #15 scalpel blade.  The skin margins were undermined to an appropriate distance in all directions utilizing iris scissors.
Complex Repair And Double Advancement Flap Text: The defect edges were debeveled with a #15 scalpel blade.  The primary defect was closed partially with a complex linear closure.  Given the location of the remaining defect, shape of the defect and the proximity to free margins a double advancement flap was deemed most appropriate for complete closure of the defect.  Using a sterile surgical marker, an appropriate advancement flap was drawn incorporating the defect and placing the expected incisions within the relaxed skin tension lines where possible.    The area thus outlined was incised deep to adipose tissue with a #15 scalpel blade.  The skin margins were undermined to an appropriate distance in all directions utilizing iris scissors.
Double Island Pedicle Flap Text: The defect edges were debeveled with a #15 scalpel blade.  Given the location of the defect, shape of the defect and the proximity to free margins a double island pedicle advancement flap was deemed most appropriate.  Using a sterile surgical marker, an appropriate advancement flap was drawn incorporating the defect, outlining the appropriate donor tissue and placing the expected incisions within the relaxed skin tension lines where possible.    The area thus outlined was incised deep to adipose tissue with a #15 scalpel blade.  The skin margins were undermined to an appropriate distance in all directions around the primary defect and laterally outward around the island pedicle utilizing iris scissors.  There was minimal undermining beneath the pedicle flap.
Excision Depth: adipose tissue
V-Y Flap Text: The defect edges were debeveled with a #15 scalpel blade.  Given the location of the defect, shape of the defect and the proximity to free margins a V-Y flap was deemed most appropriate.  Using a sterile surgical marker, an appropriate advancement flap was drawn incorporating the defect and placing the expected incisions within the relaxed skin tension lines where possible.    The area thus outlined was incised deep to adipose tissue with a #15 scalpel blade.  The skin margins were undermined to an appropriate distance in all directions utilizing iris scissors.

## 2017-10-11 NOTE — HPI: PROCEDURE (SKIN SURGERY)
Has The Growth Been Previously Biopsied?: has been previously biopsied
Additional History: //sbcc
Body Location Override (Optional): R post lower leg
Additional History: 
Body Location Override (Optional): L lateral clavicle

## 2017-10-12 ENCOUNTER — HOME CARE VISIT (OUTPATIENT)
Dept: HOME HEALTH SERVICES | Facility: HOME HEALTHCARE | Age: 62
End: 2017-10-12
Payer: COMMERCIAL

## 2017-10-12 PROCEDURE — G0152 HHCP-SERV OF OT,EA 15 MIN: HCPCS

## 2017-10-13 ENCOUNTER — APPOINTMENT (RX ONLY)
Dept: URBAN - METROPOLITAN AREA CLINIC 4 | Facility: CLINIC | Age: 62
Setting detail: DERMATOLOGY
End: 2017-10-13

## 2017-10-13 VITALS
TEMPERATURE: 100.3 F | SYSTOLIC BLOOD PRESSURE: 105 MMHG | OXYGEN SATURATION: 92 % | DIASTOLIC BLOOD PRESSURE: 60 MMHG | HEART RATE: 80 BPM | RESPIRATION RATE: 18 BRPM

## 2017-10-13 DIAGNOSIS — Z48.817 ENCOUNTER FOR SURGICAL AFTERCARE FOLLOWING SURGERY ON THE SKIN AND SUBCUTANEOUS TISSUE: ICD-10-CM

## 2017-10-13 PROCEDURE — ? POST-OP WOUND EVALUATION

## 2017-10-13 PROCEDURE — 99212 OFFICE O/P EST SF 10 MIN: CPT | Mod: 24

## 2017-10-13 RX ORDER — LORAZEPAM 2 MG/1
TABLET ORAL
Refills: 0 | OUTPATIENT
Start: 2017-10-13

## 2017-10-13 RX ORDER — LORAZEPAM 1 MG/1
TABLET ORAL
Refills: 0 | OUTPATIENT
Start: 2017-10-13

## 2017-10-13 RX ORDER — POTASSIUM CHLORIDE 750 MG/1
TABLET, EXTENDED RELEASE ORAL
Qty: 20 TAB | Refills: 0 | Status: SHIPPED | OUTPATIENT
Start: 2017-10-13 | End: 2017-11-03 | Stop reason: SDUPTHER

## 2017-10-13 ASSESSMENT — LOCATION ZONE DERM: LOCATION ZONE: LEG

## 2017-10-13 ASSESSMENT — LOCATION SIMPLE DESCRIPTION DERM: LOCATION SIMPLE: RIGHT CALF

## 2017-10-13 ASSESSMENT — LOCATION DETAILED DESCRIPTION DERM: LOCATION DETAILED: RIGHT DISTAL CALF

## 2017-10-13 NOTE — PROCEDURE: POST-OP WOUND EVALUATION
Wound Evaluated By (Optional): j Reyes
Wound Crusting?: clean
Follow Up Units (Optional): 0
Body Location Override (Optional): rt distal calf
Add 36629 Cpt? (Important Note: In 2017 The Use Of 32982 Is Being Tracked By Cms To Determine Future Global Period Reimbursement For Global Periods): no
Detail Level: Detailed
Sutures?: intact

## 2017-10-13 NOTE — TELEPHONE ENCOUNTER
K-dur refilled. Patient is due for an appointment this month. Needs to be seen for further refills. Lorazepam denied, as this is prescribed by psychiatry. Needs to request from that department.  Tiffani Nielsen P.A.-C.

## 2017-10-17 ENCOUNTER — HOME CARE VISIT (OUTPATIENT)
Dept: HOME HEALTH SERVICES | Facility: HOME HEALTHCARE | Age: 62
End: 2017-10-17
Payer: COMMERCIAL

## 2017-10-17 VITALS
SYSTOLIC BLOOD PRESSURE: 144 MMHG | TEMPERATURE: 98.8 F | OXYGEN SATURATION: 94 % | RESPIRATION RATE: 18 BRPM | HEART RATE: 88 BPM | DIASTOLIC BLOOD PRESSURE: 78 MMHG

## 2017-10-17 PROCEDURE — G0151 HHCP-SERV OF PT,EA 15 MIN: HCPCS

## 2017-10-17 ASSESSMENT — ENCOUNTER SYMPTOMS: DEBILITATING PAIN: 1

## 2017-10-19 ENCOUNTER — HOME CARE VISIT (OUTPATIENT)
Dept: HOME HEALTH SERVICES | Facility: HOME HEALTHCARE | Age: 62
End: 2017-10-19
Payer: COMMERCIAL

## 2017-10-19 PROCEDURE — G0151 HHCP-SERV OF PT,EA 15 MIN: HCPCS

## 2017-10-20 VITALS
TEMPERATURE: 98.4 F | HEART RATE: 84 BPM | RESPIRATION RATE: 18 BRPM | OXYGEN SATURATION: 92 % | SYSTOLIC BLOOD PRESSURE: 100 MMHG | DIASTOLIC BLOOD PRESSURE: 64 MMHG

## 2017-10-20 ASSESSMENT — ACTIVITIES OF DAILY LIVING (ADL)
OASIS_M1830: 02
HOME_HEALTH_OASIS: 01

## 2017-10-20 ASSESSMENT — ENCOUNTER SYMPTOMS: DEBILITATING PAIN: 1

## 2017-11-03 ENCOUNTER — OFFICE VISIT (OUTPATIENT)
Dept: MEDICAL GROUP | Facility: PHYSICIAN GROUP | Age: 62
End: 2017-11-03
Payer: COMMERCIAL

## 2017-11-03 VITALS
HEART RATE: 76 BPM | OXYGEN SATURATION: 94 % | SYSTOLIC BLOOD PRESSURE: 124 MMHG | TEMPERATURE: 97.6 F | BODY MASS INDEX: 25.76 KG/M2 | HEIGHT: 62 IN | WEIGHT: 140 LBS | DIASTOLIC BLOOD PRESSURE: 76 MMHG

## 2017-11-03 DIAGNOSIS — G25.9 EXTRAPYRAMIDAL AND MOVEMENT DISORDER: ICD-10-CM

## 2017-11-03 DIAGNOSIS — M54.16 LEFT LUMBAR RADICULOPATHY: ICD-10-CM

## 2017-11-03 DIAGNOSIS — M10.9 GOUT, ARTHRITIS: ICD-10-CM

## 2017-11-03 DIAGNOSIS — F13.20 BENZODIAZEPINE DEPENDENCE, CONTINUOUS (HCC): ICD-10-CM

## 2017-11-03 DIAGNOSIS — K52.9 CHRONIC DIARRHEA: ICD-10-CM

## 2017-11-03 DIAGNOSIS — E89.0 POSTSURGICAL HYPOTHYROIDISM: ICD-10-CM

## 2017-11-03 DIAGNOSIS — F11.20 OPIOID DEPENDENCE, CONTINUOUS (HCC): ICD-10-CM

## 2017-11-03 DIAGNOSIS — E87.6 HYPOKALEMIA: ICD-10-CM

## 2017-11-03 PROCEDURE — 99215 OFFICE O/P EST HI 40 MIN: CPT | Performed by: PHYSICIAN ASSISTANT

## 2017-11-03 RX ORDER — MORPHINE SULFATE 30 MG/1
30 TABLET, FILM COATED, EXTENDED RELEASE ORAL 3 TIMES DAILY
Qty: 60 TAB | Status: CANCELLED | OUTPATIENT
Start: 2017-11-03

## 2017-11-03 RX ORDER — LEVOTHYROXINE SODIUM 88 UG/1
88 TABLET ORAL
Qty: 30 TAB | Refills: 0 | Status: SHIPPED | OUTPATIENT
Start: 2017-11-03 | End: 2018-11-28

## 2017-11-03 RX ORDER — POTASSIUM CHLORIDE 750 MG/1
TABLET, EXTENDED RELEASE ORAL
Qty: 90 TAB | Refills: 1 | Status: SHIPPED | OUTPATIENT
Start: 2017-11-03 | End: 2018-04-13 | Stop reason: SDUPTHER

## 2017-11-03 RX ORDER — CHOLESTYRAMINE 4 G/9G
1 POWDER, FOR SUSPENSION ORAL DAILY
Qty: 30 EACH | Refills: 2 | Status: SHIPPED | OUTPATIENT
Start: 2017-11-03 | End: 2018-07-18 | Stop reason: SDUPTHER

## 2017-11-03 RX ORDER — LORAZEPAM 1 MG/1
1-2 TABLET ORAL
Qty: 30 TAB | Status: CANCELLED | OUTPATIENT
Start: 2017-11-03

## 2017-11-03 RX ORDER — OXYCODONE HYDROCHLORIDE 30 MG/1
30 TABLET ORAL
Qty: 30 TAB | Refills: 0 | Status: CANCELLED | OUTPATIENT
Start: 2017-11-03

## 2017-11-03 RX ORDER — ALLOPURINOL 300 MG/1
TABLET ORAL
Qty: 90 TAB | Refills: 1 | Status: SHIPPED | OUTPATIENT
Start: 2017-11-03 | End: 2018-05-09 | Stop reason: SDUPTHER

## 2017-11-03 ASSESSMENT — PATIENT HEALTH QUESTIONNAIRE - PHQ9
CLINICAL INTERPRETATION OF PHQ2 SCORE: 5
5. POOR APPETITE OR OVEREATING: 0 - NOT AT ALL
SUM OF ALL RESPONSES TO PHQ QUESTIONS 1-9: 9

## 2017-11-03 NOTE — ASSESSMENT & PLAN NOTE
Wanting to get potassium re-checked as it was low recently. Also needing refills of her potassium supplement. She takes one tablet every Sunday, Tuesday, Wednesday, Thursday, and Saturday.

## 2017-11-03 NOTE — ASSESSMENT & PLAN NOTE
Sees Dr. Juarez. States dose was recently increased to 88 mcg daily. Needing refills. Is due to see Dr. Juarez again in the next couple of weeks, but only has 3 pills left.

## 2017-11-06 NOTE — PROGRESS NOTES
"Subjective:   Marium Renteria is a 62 y.o. female here today for chronic pain follow-up, chronic anxiety follow-up, hypothyroidism, gout, diarrhea. [facility]    She is here today with her . Her pain objective today seems to be getting refills of several of her medications, including her pain medications. She states that she will be leaving soon on a trip to [place].    HPI: Patient has the following current medical problems/concerns:    Hypokalemia  Wanting to get potassium re-checked as it was low recently. Also needing refills of her potassium supplement. She takes one tablet every Sunday, Tuesday, Wednesday, Thursday, and Saturday.    Postsurgical hypothyroidism  Sees Dr. Juarez. States dose was recently increased to 88 mcg daily. Needing refills. Is due to see Dr. Juarez again in the next couple of weeks, but only has 3 pills left.    Extrapyramidal and movement disorder  Patient describes her symptoms as \"restless legs.\" States she has been on Sinemet for a long time for these symptoms which has been very helpful for her. Needing refills today.    Gout, arthritis  Takes Allopurinol 300 mg daily. Denies recent flares. Needing refills today.    Chronic diarrhea  Patient states this issue comes and goes. She has found cholestyramine to be very helpful for her. Has a big bottle of the powder at home, but is requesting the single-use packs to take on her trip.    Left lumbar radiculopathy  Patient complains of chronic low back pain. Endorses history of multiple prior back surgeries. Has been following with Dr. Garrett Sexton. Currently on TID Morphine Er, PRN Oxycodone, and Soma 2-3 times daily. States that right now, is only taking the Oxycodone once per day. She is wanting refills of all of these medications before her trip. She states Dr. Sexton has told her that he will no longer be prescribing these medications for her. Patient states that she is wanting to get " off of some of her pain medication. She is requesting a prescription for Toradol, as she believes that this may help with her back pain enough that she could get off the opioid medication.    Benzodiazepine dependence, continuous (CMS-HCC)  On chronic Lorazepam. She is requesting refills today.       Current medicines (including changes today)  Current Outpatient Prescriptions   Medication Sig Dispense Refill   • potassium chloride SA (K-DUR) 10 MEQ Tab CR Take 1 tablet PO daily on Sunday, Tuesday, Wednesday, Thursday, and Saturday 90 Tab 1   • levothyroxine (SYNTHROID) 88 MCG Tab Take 1 Tab by mouth Every morning on an empty stomach. Brand name only. 30 Tab 0   • cholestyramine (QUESTRAN) 4 g packet Take 4 g by mouth every day. 30 Each 2   • allopurinol (ZYLOPRIM) 300 MG Tab TAKE ONE TABLET BY MOUTH DAILY 90 Tab 1   • carbidopa-levodopa (SINEMET)  MG Tab TAKE ONE TABLET BY MOUTH DAILY 90 Tab 1   • varenicline (CHANTIX STARTING MONTH PAK) 0.5 MG X 11 & 1 MG X 42 tablet Per packet 56 Tab 3   • EPINEPHrine (EPIPEN) 0.3 MG/0.3ML Solution Auto-injector solution for injection 0.3 mg by Intramuscular route 1 time daily as needed (Bee stings). Inject into the middle of the outer thigh and hold for 3 secons as needed for severe allergic reaction, then call 911 if used.      • gabapentin (NEURONTIN) 300 MG Cap TAKE TWO CAPSULES BY MOUTH THREE TIMES A  Cap 3   • oxycodone (OXY-IR) 30 MG immediate release tablet Take 1 Tab by mouth every 3 hours as needed for Moderate Pain. 30 Tab 0   • lorazepam (ATIVAN) 2 MG tablet Take 1 Tab by mouth at bedtime as needed (Anxiety, Agitatoin, Insomnia). 30 Tab 0   • diphenhydrAMINE (BENADRYL) 25 MG Tab Take 1 tablet by mouth every 6 hours as needed for Itching. 30 Tab 0   • docusate sodium 100 MG Cap Take 100 mg by mouth 2 Times a Day. 60 Cap    • lorazepam (ATIVAN) 1 MG Tab Take 1-2 mg by mouth at bedtime as needed for Anxiety.     • polyethylene glycol/lytes (MIRALAX) Pack  Take 17 g by mouth every day.     • trazodone (DESYREL) 50 MG Tab TAKE ONE TO TWO TABLETS BY MOUTH EVERY NIGHT AT BEDTIME FOR SLEEP 60 Tab 4   • meclizine (ANTIVERT) 12.5 MG Tab Take 12.5 mg by mouth 2 Times a Day.     • acetaminophen (TYLENOL) 500 MG Tab Take 500-1,000 mg by mouth every 6 hours as needed.     • Naproxen Sodium (ALEVE) 220 MG Cap Take 220 mg by mouth every 12 hours.     • lidocaine (LIDODERM) 5 % Patch Apply 2 Patches to skin as directed See Admin Instructions. 2 at a time 12 hrs on, 12 hrs off      • carisoprodol (SOMA) 350 MG Tab Take 350 mg by mouth 3 times a day as needed for Muscle Spasms.     • calcitRIOL (ROCALTROL) 0.25 MCG Cap Take 0.25 mcg by mouth every day.     • Cholecalciferol (VITAMIN D3) 1000 UNITS Cap Take 5,000 Units by mouth every day.     • Calcium Carbonate Antacid (TUMS PO) Take 1,200 mg by mouth every day. Needs to take BERRY flavor     • morphine ER (MS CONTIN) 30 MG Tab CR tablet Take 30 mg by mouth 3 times a day. 1 tab PO 3 times daily       No current facility-administered medications for this visit.      She  has a past medical history of Alcohol abuse, continuous drinking behavior (12/11/2009); Anesthesia (4-26-17); Anxiety; Arthritis (4-26-17); Chronic diarrhea (9/30/2013); Dental disorder (4-26-17); Gout; Gynecological disorder; Helicobacter pylori (H. pylori) infection (9/11/2012); Hemorrhagic disorder (CMS-HCC); Hyperlipidemia (6/5/2015); Hypoparathyroidism (CMS-HCC) (12/11/2009); Lumbago (2017); Mixed hyperlipidemia (12/11/2009); Osteoporosis, unspecified (12/11/2009); Other B-complex deficiencies (12/11/2009); Parotid cyst; Postsurgical hypothyroidism (12/11/2009); Psychiatric problem (4-26-17); Restless leg syndrome; S/P tooth extraction (4-25-17); Serum calcium elevated (10/8/2012); Sialolithiasis, ductal (7/21/2012); Snoring; Symptomatic menopausal or female climacteric states (12/11/2009); Tobacco abuse (12/11/2009); Unspecified essential hypertension  "(12/11/2009); and Urinary incontinence.      ROS  Gastrointestinal ROS: Positive for diarrhea   Musculoskeletal/Extremities ROS: Positive for backache (chronic)     Objective:     Blood pressure 124/76, pulse 76, temperature 36.4 °C (97.6 °F), height 1.575 m (5' 2\"), weight 63.5 kg (140 lb), last menstrual period 10/02/1980, SpO2 94 %. Body mass index is 25.61 kg/m².   Physical Exam:  Constitutional: Alert, no distress, appears older than biological age. Sitting in wheelchair.  Skin: Warm, dry, good turgor, no rashes in visible areas.  Eye: Pupils are equal and round, conjunctiva clear, lids normal.  ENMT: Lips without lesions, moist mucus membranes.      Assessment and Plan:   The following treatment plan was discussed    1. Opioid dependence, continuous (CMS-[facility])  Checked [facility] and see that patient has had the controlled substances that she is requesting refilled quite recently. Specifically, filled #45 tablets of Lorazepam on 10/25/17, filled #120 tablets of Oxycodone on 10/22/17, and filled #90 tablets of Soma on 10/18/17. Morphine has not been filled since 8/28/17, but I explained to patient that I would not be able to prescribe this for her given that I do not prescribe long-acting opiates, as well as the fact that she is not on a pain contract with us. I also had desi discussion with patient that she is on a dangerous amount of controlled substances. Not only has she been taking up to 270 morphine equivalents per day of opiate medication, but she is also on high amount of muscle relaxant medication and benzodiazepine, both of which increase the risk of respiratory depression and death. On review of , also notice that multiple providers from different offices have been filling her controlled substances. In regards to her request for Toradol, explained that this is not a medication that we use for chronic pain given its proclivity for causing kidney dysfunction. She is telling me that she wants to get " off her opiate medication, so for that, I will refer to pain management clinic to discuss weaning and non-opiate management of her pain.  - REFERRAL TO PAIN MANAGEMENT    2. Left lumbar radiculopathy  See above.  - REFERRAL TO PAIN MANAGEMENT    3. Benzodiazepine dependence, continuous (CMS-HCC)  See above.    4. Postsurgical hypothyroidism  Levothyroxine refilled. Follow up with Dr. Juarez as scheduled.  - levothyroxine (SYNTHROID) 88 MCG Tab; Take 1 Tab by mouth Every morning on an empty stomach. Brand name only.  Dispense: 30 Tab; Refill: 0    5. Hypokalemia  Potassium slightly low on last BMP from 8/1//17. Will re-check BMP. K-dur refilled.  - potassium chloride SA (K-DUR) 10 MEQ Tab CR; Take 1 tablet PO daily on Sunday, Tuesday, Wednesday, Thursday, and Saturday  Dispense: 90 Tab; Refill: 1  - BASIC METABOLIC PANEL; Future    6. Chronic diarrhea  - cholestyramine (QUESTRAN) 4 g packet; Take 4 g by mouth every day.  Dispense: 30 Each; Refill: 2    7. Gout, arthritis  Gout appears to be well-controlled. Allopurinol refilled.  - allopurinol (ZYLOPRIM) 300 MG Tab; TAKE ONE TABLET BY MOUTH DAILY  Dispense: 90 Tab; Refill: 1    8. Extrapyramidal and movement disorder  Chronic, stable. Sinemet refilled.  - carbidopa-levodopa (SINEMET)  MG Tab; TAKE ONE TABLET BY MOUTH DAILY  Dispense: 90 Tab; Refill: 1    Total 40 minutes face-to-face time spent with patient, with greater than 50% of the total time discussing patient's issues and symptoms as listed above in assessment and plan, as well as managing coordination of care for future evaluation and treatment.    Followup: with PCP as needed    Tiffani Nielsen P.A.-C.

## 2017-11-06 NOTE — ASSESSMENT & PLAN NOTE
"Patient describes her symptoms as \"restless legs.\" States she has been on Sinemet for a long time for these symptoms which has been very helpful for her. Needing refills today.  "

## 2017-11-06 NOTE — ASSESSMENT & PLAN NOTE
Patient states this issue comes and goes. She has found cholestyramine to be very helpful for her. Has a big bottle of the powder at home, but is requesting the single-use packs to take on her trip.

## 2017-11-06 NOTE — ASSESSMENT & PLAN NOTE
Patient complains of chronic low back pain. Endorses history of multiple prior back surgeries. Has been following with Dr. Garrett Sexton. Currently on TID Morphine Er, PRN Oxycodone, and Soma 2-3 times daily. States that right now, is only taking the Oxycodone once per day. She is wanting refills of all of these medications before her trip. She states Dr. Sexton has told her that he will no longer be prescribing these medications for her. Patient states that she is wanting to get off of some of her pain medication. She is requesting a prescription for Toradol, as she believes that this may help with her back pain enough that she could get off the opioid medication.

## 2017-12-06 DIAGNOSIS — E89.0 POSTSURGICAL HYPOTHYROIDISM: ICD-10-CM

## 2017-12-07 RX ORDER — LEVOTHYROXINE SODIUM 88 MCG
TABLET ORAL
Refills: 0 | OUTPATIENT
Start: 2017-12-07

## 2017-12-07 NOTE — TELEPHONE ENCOUNTER
Was the patient seen in the last year in this department? Yes     Does patient have an active prescription for medications requested? No     Received Request Via: Pharmacy      Pt met protocol?: Yes, tsh labs 8/16, ov pcp 4/17 ov op 11/17

## 2017-12-13 ENCOUNTER — OFFICE VISIT (OUTPATIENT)
Dept: MEDICAL GROUP | Facility: PHYSICIAN GROUP | Age: 62
End: 2017-12-13
Payer: COMMERCIAL

## 2017-12-13 VITALS
TEMPERATURE: 98.3 F | HEIGHT: 62 IN | DIASTOLIC BLOOD PRESSURE: 80 MMHG | WEIGHT: 162 LBS | HEART RATE: 72 BPM | SYSTOLIC BLOOD PRESSURE: 136 MMHG | OXYGEN SATURATION: 96 % | BODY MASS INDEX: 29.81 KG/M2

## 2017-12-13 DIAGNOSIS — M96.1 FAILED BACK SYNDROME: ICD-10-CM

## 2017-12-13 DIAGNOSIS — R42 VERTIGO: ICD-10-CM

## 2017-12-13 DIAGNOSIS — F13.20 BENZODIAZEPINE DEPENDENCE, CONTINUOUS (HCC): ICD-10-CM

## 2017-12-13 DIAGNOSIS — F11.20 OPIOID DEPENDENCE, CONTINUOUS (HCC): ICD-10-CM

## 2017-12-13 DIAGNOSIS — F41.1 GAD (GENERALIZED ANXIETY DISORDER): ICD-10-CM

## 2017-12-13 PROCEDURE — 99214 OFFICE O/P EST MOD 30 MIN: CPT | Performed by: NURSE PRACTITIONER

## 2017-12-13 RX ORDER — MECLIZINE HCL 12.5 MG/1
12.5 TABLET ORAL
Qty: 30 TAB | Refills: 1 | Status: SHIPPED | OUTPATIENT
Start: 2017-12-13 | End: 2018-02-01 | Stop reason: SDUPTHER

## 2017-12-14 DIAGNOSIS — F41.1 GAD (GENERALIZED ANXIETY DISORDER): ICD-10-CM

## 2017-12-14 RX ORDER — LORAZEPAM 2 MG/1
TABLET ORAL
Refills: 0 | OUTPATIENT
Start: 2017-12-14

## 2017-12-14 NOTE — PROGRESS NOTES
Subjective:     Chief Complaint   Patient presents with   • Medication Refill   • Chronic Care Management     Marium Renteria is a 62 y.o. female here today for evaluation and management of issues listed below.  She is here today Requesting refills of all of her medications however review of her recent visit and charted in epic indicates that most of these prescriptions were sent to the pharmacy at her last office visit. She is requesting a refill of meclizine which she takes daily for dizziness. Reports it works well without any adverse effects. She is also requesting a refill of lorazepam however she's been referred to psychiatry and is instructed to follow-up with them. She does continue chronic opiate medication for chronic pain and follows with pain management for this. She would prefer to get all of her medications at primary care.  1. Vertigo    2. ELISHA (generalized anxiety disorder)    3. Benzodiazepine dependence, continuous (CMS-HCC)    4. Failed back syndrome    5. Opioid dependence, continuous (CMS-HCC)        ROS   Denies HA, chest pain, shortness of breath, abdominal pain, bladder or bowel changes, lower extremity edema. All other pertinent systems reviewed and are negative except as in HPI.    Allergies: Bee venom; Latex; Asa [aspirin]; Coconut oil; Codeine; Contrast media with iodine [iodine]; Hydrocodone-ibuprofen; Ibuprofen; Milk [lac bovis]; Norco [apap-fd&c yellow #10 [place]-hydrocodone]; Other drug; Other environmental; Other food; Pcn [penicillins]; Sulfa drugs; Talwin; Tape; and Vicodin [hydrocodone-acetaminophen]  Current medicines (including changes today)  Current Outpatient Prescriptions   Medication Sig Dispense Refill   • meclizine (ANTIVERT) 12.5 MG Tab Take 1 Tab by mouth 1 time daily as needed. 30 Tab 1   • potassium chloride SA (K-DUR) 10 MEQ Tab CR Take 1 tablet PO daily on Sunday, Tuesday, Wednesday, Thursday, and Saturday 90 Tab 1   • levothyroxine (SYNTHROID) 88 MCG Tab  Take 1 Tab by mouth Every morning on an empty stomach. Brand name only. 30 Tab 0   • cholestyramine (QUESTRAN) 4 g packet Take 4 g by mouth every day. 30 Each 2   • allopurinol (ZYLOPRIM) 300 MG Tab TAKE ONE TABLET BY MOUTH DAILY 90 Tab 1   • carbidopa-levodopa (SINEMET)  MG Tab TAKE ONE TABLET BY MOUTH DAILY 90 Tab 1   • gabapentin (NEURONTIN) 300 MG Cap TAKE TWO CAPSULES BY MOUTH THREE TIMES A  Cap 3   • oxycodone (OXY-IR) 30 MG immediate release tablet Take 1 Tab by mouth every 3 hours as needed for Moderate Pain. 30 Tab 0   • lorazepam (ATIVAN) 2 MG tablet Take 1 Tab by mouth at bedtime as needed (Anxiety, Agitatoin, Insomnia). 30 Tab 0   • docusate sodium 100 MG Cap Take 100 mg by mouth 2 Times a Day. 60 Cap    • lorazepam (ATIVAN) 1 MG Tab Take 1-2 mg by mouth at bedtime as needed for Anxiety.     • polyethylene glycol/lytes (MIRALAX) Pack Take 17 g by mouth every day.     • trazodone (DESYREL) 50 MG Tab TAKE ONE TO TWO TABLETS BY MOUTH EVERY NIGHT AT BEDTIME FOR SLEEP 60 Tab 4   • Naproxen Sodium (ALEVE) 220 MG Cap Take 220 mg by mouth every 12 hours.     • lidocaine (LIDODERM) 5 % Patch Apply 2 Patches to skin as directed See Admin Instructions. 2 at a time 12 hrs on, 12 hrs off      • carisoprodol (SOMA) 350 MG Tab Take 350 mg by mouth 3 times a day as needed for Muscle Spasms.     • calcitRIOL (ROCALTROL) 0.25 MCG Cap Take 0.25 mcg by mouth every day.     • Cholecalciferol (VITAMIN D3) 1000 UNITS Cap Take 5,000 Units by mouth every day.     • Calcium Carbonate Antacid (TUMS PO) Take 1,200 mg by mouth every day. Needs to take BERRY flavor     • morphine ER (MS CONTIN) 30 MG Tab CR tablet Take 30 mg by mouth 3 times a day. 1 tab PO 3 times daily     • varenicline (CHANTIX STARTING MONTH PAK) 0.5 MG X 11 & 1 MG X 42 tablet Per packet (Patient not taking: Reported on 12/13/2017) 56 Tab 3   • EPINEPHrine (EPIPEN) 0.3 MG/0.3ML Solution Auto-injector solution for injection 0.3 mg by Intramuscular  "route 1 time daily as needed (Bee stings). Inject into the middle of the outer thigh and hold for 3 secons as needed for severe allergic reaction, then call 911 if used.      • diphenhydrAMINE (BENADRYL) 25 MG Tab Take 1 tablet by mouth every 6 hours as needed for Itching. 30 Tab 0   • acetaminophen (TYLENOL) 500 MG Tab Take 500-1,000 mg by mouth every 6 hours as needed.       No current facility-administered medications for this visit.        She  has a past medical history of Alcohol abuse, continuous drinking behavior (12/11/2009); Anesthesia (4-26-17); Anxiety; Arthritis (4-26-17); Chronic diarrhea (9/30/2013); Dental disorder (4-26-17); Gout; Gynecological disorder; Helicobacter pylori (H. pylori) infection (9/11/2012); Hemorrhagic disorder (CMS-HCC); Hyperlipidemia (6/5/2015); Hypoparathyroidism (CMS-HCC) (12/11/2009); Lumbago (2017); Mixed hyperlipidemia (12/11/2009); Osteoporosis, unspecified (12/11/2009); Other B-complex deficiencies (12/11/2009); Parotid cyst; Postsurgical hypothyroidism (12/11/2009); Psychiatric problem (4-26-17); Restless leg syndrome; S/P tooth extraction (4-25-17); Serum calcium elevated (10/8/2012); Sialolithiasis, ductal (7/21/2012); Snoring; Symptomatic menopausal or female climacteric states (12/11/2009); Tobacco abuse (12/11/2009); Unspecified essential hypertension (12/11/2009); and Urinary incontinence.      Health Maintenance: Reviewed and updated.      Objective:   Blood pressure 136/80, pulse 72, temperature 36.8 °C (98.3 °F), height 1.575 m (5' 2\"), weight 73.5 kg (162 lb), last menstrual period 10/02/1980, SpO2 96 %. Body mass index is 29.63 kg/m².  Physical Exam   Alert, no acute distress. Pleasant and cooperative with the examination.  Eye contact is good, affect calm.  Skin: Warm, dry, no rashes in visible areas.    Eyes: Pupils equal, round. Conjunctiva and sclera clear, lids normal.  ENT: Pinna normal.  Neck: Supple, trachea midline.  Lungs: Normal effort and " respirations. Clear to auscultation bilaterally.   Abdomen: No CVAT   CV: Regular rate and rhythm.  MS/Ext: Steady gait, no edema.    Results reviewed  Recent office visits    Assessment and Plan:   Treatment:   Marium was seen today for medication refill and chronic care management.    Diagnoses and all orders for this visit:    Vertigo  Comments:  Stable chronic problem. Continue meclizine when necessary  Orders:  -     meclizine (ANTIVERT) 12.5 MG Tab; Take 1 Tab by mouth 1 time daily as needed.    ELISHA (generalized anxiety disorder)  Comments:  Chronic. She is advised to continue follow-up with psychiatry. Reviewed risks of benzodiazepines at great length today.    Benzodiazepine dependence, continuous (CMS-[facility])    Failed back syndrome  Comments:  She is advised to continue follow-up with pain management. Reviewed risks of opiate pain medications at length today.    Opioid dependence, continuous (CMS-[facility])    Reviewed recommendations and guidelines regarding treatment of chronic pain and use of opiate and benzodiazepine medications. She is advised to continue follow-up with pain management for opiates and psychiatry for benzodiazepines.    Followup: Return in about 6 months (around 6/13/2018) for multiple chronic problems. Sooner should new symptoms or problems arise, or as previously scheduled.           Reviewed side effects, risks, and benefits of medications prescribed today.  Advised to take all medications as instructed and report any side effects.   The patient voices understanding and agrees.  Report any new or worsening symptoms.  Have labs or other diagnostic studies prior to follow up.  Keep all appointments for any referrals given.        Please note this dictation was created using voice recognition software. Every reasonable attempt has been made to correct obvious errors, however there may be errors of grammar and possibly content that were not discovered before finalizing the note.

## 2017-12-15 NOTE — TELEPHONE ENCOUNTER
Please call pharmacy  Patient is followed by psychiatry for this medication  RX refused, NOT sent to pharmacy.  Requested Prescriptions     Refused Prescriptions Disp Refills   • lorazepam (ATIVAN) 2 MG tablet [Pharmacy Med Name: LORAZEPAM 2MG TABLETS]  0     Sig: TAKE 1 TABLET BY MOUTH ONCE DAILY AT BEDTIME AS NEEDED FOR INCREASED ANXIETY     Refused By: DANIEL ARREGUIN     Reason for Refusal: Refill not appropriate.       OLIMPIA Edwards

## 2017-12-20 NOTE — TELEPHONE ENCOUNTER
Was the patient seen in the last year in this department? Yes     Does patient have an active prescription for medications requested? Yes     Received Request Via: Patient       FYI: Pt has an appointment 1/31/2018

## 2017-12-22 ENCOUNTER — TELEPHONE (OUTPATIENT)
Dept: MEDICAL GROUP | Facility: PHYSICIAN GROUP | Age: 62
End: 2017-12-22

## 2017-12-22 DIAGNOSIS — F51.01 PRIMARY INSOMNIA: ICD-10-CM

## 2017-12-22 DIAGNOSIS — F40.01 PANIC DISORDER WITH AGORAPHOBIA: ICD-10-CM

## 2017-12-22 LAB
BUN SERPL-MCNC: 10 MG/DL (ref 8–27)
BUN/CREAT SERPL: 12 (ref 12–28)
CALCIUM SERPL-MCNC: 8.6 MG/DL (ref 8.7–10.3)
CHLORIDE SERPL-SCNC: 98 MMOL/L (ref 96–106)
CO2 SERPL-SCNC: 25 MMOL/L (ref 18–29)
CREAT SERPL-MCNC: 0.82 MG/DL (ref 0.57–1)
GLUCOSE SERPL-MCNC: 90 MG/DL (ref 65–99)
IF AFRICAN AMERICAN  100797: 89 ML/MIN/1.73
IF NON AFRICAN AMER 100791: 77 ML/MIN/1.73
POTASSIUM SERPL-SCNC: 4 MMOL/L (ref 3.5–5.2)
SODIUM SERPL-SCNC: 140 MMOL/L (ref 134–144)

## 2017-12-22 RX ORDER — LORAZEPAM 2 MG/1
2 TABLET ORAL
Qty: 30 TAB | Refills: 0 | Status: SHIPPED
Start: 2017-12-22 | End: 2018-01-21

## 2017-12-22 RX ORDER — LORAZEPAM 2 MG/1
2 TABLET ORAL
Qty: 30 TAB | Refills: 0 | OUTPATIENT
Start: 2017-12-22

## 2017-12-22 NOTE — TELEPHONE ENCOUNTER
Pt. Notified. Pt. Requested labs be sent to endocrinologist. Pt. Is requesting a refill on her Lorazepam. Pt. Was advised by Tamara Joshi to refill  Her lorazepam with Dr. ESQUIVEL. Pt. Does not have apt until 1/31/2017. She is wondering if we can refill her medication for a month until she can see Dr. CONTE

## 2017-12-22 NOTE — TELEPHONE ENCOUNTER
Pt states her endocrinologist is Dr. Danni Cardoza. I advised Pt that Tiffani Nielsen will not be refilling Lorazepam due to high doses of opiate medications she is taking. Pt continued to argue that she has not been taking any of her pain medication.  shows Pt just refilled both her oxycodone and morphine this month but Pt still continues to argue and state she has not taken her pain meds for over a month. I advised Pt that Tiffani will not be refilling her Lorazepam. Pt continued to argue and started being very inapropriate stating she will jason our clinic and Tiffani for not giving her, her medication, stating she will get seizures if not on this medication, Pt is on medication for anxiety and trouble sleeping and was taken off of this medication by previous prescriber in October 2017. I advised Pt that she is welcome to speak with our supervisor about this situation since she continued to be inappropriate and disconnected the phone call.

## 2017-12-22 NOTE — TELEPHONE ENCOUNTER
Who is her endocrinologist? I will not refill her Lorazepam. Had discussion with patient at last appointment that benzodiazepines are not safe to take with high doses of opiate medications like she is on.  Tiffani Nielsen P.A.-C.

## 2017-12-22 NOTE — TELEPHONE ENCOUNTER
----- Message from Tiffani Nielsen P.A.-C. sent at 12/22/2017  8:16 AM PST -----  Please inform of normal results.  Tiffani Nielsen P.A.-C.

## 2018-01-15 RX ORDER — TRAZODONE HYDROCHLORIDE 50 MG/1
TABLET ORAL
Qty: 60 TAB | Refills: 2 | Status: SHIPPED | OUTPATIENT
Start: 2018-01-15 | End: 2018-04-19 | Stop reason: SDUPTHER

## 2018-01-31 ENCOUNTER — OFFICE VISIT (OUTPATIENT)
Dept: BEHAVIORAL HEALTH | Facility: PHYSICIAN GROUP | Age: 63
End: 2018-01-31
Payer: COMMERCIAL

## 2018-01-31 VITALS
HEART RATE: 72 BPM | TEMPERATURE: 98.1 F | BODY MASS INDEX: 29.81 KG/M2 | SYSTOLIC BLOOD PRESSURE: 130 MMHG | WEIGHT: 162 LBS | RESPIRATION RATE: 16 BRPM | DIASTOLIC BLOOD PRESSURE: 78 MMHG | HEIGHT: 62 IN

## 2018-01-31 DIAGNOSIS — F51.01 PRIMARY INSOMNIA: ICD-10-CM

## 2018-01-31 DIAGNOSIS — F41.1 GAD (GENERALIZED ANXIETY DISORDER): ICD-10-CM

## 2018-01-31 PROCEDURE — 90833 PSYTX W PT W E/M 30 MIN: CPT | Performed by: PSYCHIATRY & NEUROLOGY

## 2018-01-31 PROCEDURE — 99213 OFFICE O/P EST LOW 20 MIN: CPT | Performed by: PSYCHIATRY & NEUROLOGY

## 2018-01-31 ASSESSMENT — PATIENT HEALTH QUESTIONNAIRE - PHQ9
SUM OF ALL RESPONSES TO PHQ9 QUESTIONS 1 AND 2: 0
1. LITTLE INTEREST OR PLEASURE IN DOING THINGS: 0
2. FEELING DOWN, DEPRESSED, IRRITABLE, OR HOPELESS: 0

## 2018-02-01 DIAGNOSIS — R42 VERTIGO: ICD-10-CM

## 2018-02-01 NOTE — PROGRESS NOTES
"[facility]  PSYCHIATRIC FOLLOW-UP NOTE    Name: Marium Renteria  MRN: [id number]  : 1955  Age: 62 y.o.  Date of assessment: 2018  PCP: OLIMPIA Edwards  Persons in attendance: Patient  REASON FOR VISIT/CHIEF COMPLAINT (as stated by Patient):  Marium Renteria is a 62 y.o., White female, attending follow-up appointment for   Chief Complaint   Patient presents with   • Medication Management     I am using lorazepam 2 to 1 mg at night for sleep.   .      HISTORY OF PRESENT ILLNESS:    This is a 63 yo female, , came with her  for follow up. The patient is on wheel chair and she has been taking lorazepam and trazodone for sleep. The patient had multiple back surgery and she is not working. The patient last work few years ago. The patients  lost his job and they are in financial trouble. The patient is in pain all the time and she is on meloxicam 15 mg two three times a day, morphine sulfate 30 mg BID, oxycodone 30 mg four times a day. She takes lorazepam 2 mg one at night for sleep with trazodone 50 mg two at night.    PSYCHOSOCIAL CHANGES SINCE PREVIOUS CONTACT:  The patient denied depression and she is using lorazepam and trazodone for sleep.    RESPONSE TO TREATMENT:  Lorazepam 2 mg one at night, trazodone 50 mg two at night.    MEDICATION SIDE EFFECTS:  Denied.    REVIEW OF SYSTEMS:        Constitutional positive - chronic back surgery.   Eyes negative   Ears/Nose/Mouth/Throat negative   Cardiovascular positive - hypertension   Respiratory negative   Gastrointestinal negative   Genitourinary negative   Muscular negative   Integumentary negative   Neurological positive - RLS.   Endocrine positive - hyperlipidemia, hypoparathyroidism, gout   Hematologic/Lymphatic negative       PSYCHIATRIC EXAMINATION/MENTAL STATUS  /78   Pulse 72   Temp 36.7 °C (98.1 °F)   Resp 16   Ht 1.575 m (5' 2.01\")   Wt 73.5 kg (162 lb)   LMP 10/02/1980   BMI 29.62 " kg/m²   Participation: Active verbal participation, Attentive and Engaged  Grooming:Neat  Orientation: Alert and Fully Oriented  Eye contact: Good  Behavior:Tense   Mood: Anxious  Affect: Flexible and Full range  Thought process: Logical and Goal-directed  Thought content:  Within normal limits  Speech: Rate within normal limits and Volume within normal limits  Perception:  Within normal limits  Memory:  No gross evidence of memory deficits  Insight: Good  Judgment: Good  Family/couple interaction observations:   Other:    Current risk:    Suicide: Low   Homicide: Low   Self-harm: Low  Relapse: Low  Other:   Crisis Safety Plan reviewed?Yes  If evidence of imminent risk is present, intervention/plan:    Medical Records/Labs/Diagnostic Tests Reviewed: yes.    Medical Records/Labs/Diagnostic Tests Ordered: none.    DIAGNOSTIC IMPRESSION(S):  1. ELISHA (generalized anxiety disorder)    2. Primary insomnia           ASSESSMENT AND PLAN:  1. ELISHA.  2. Primary insomnia.    Lorazepam 2 mg one at night.  Trazodone 50 mg two at night.    3. Follow up in three months.    16 minutes were spent in psychotherapy.  (If greater than 16 minutes, add [id number] code)   Topics addressed in psychotherapy include:  Cognitive restructuring and behavioral changes.  Keeping a daily routine and a good sleep cycle.  Continue relaxation and meditation.  Roddy Cooley M.D.

## 2018-02-02 DIAGNOSIS — G47.00 INSOMNIA, UNSPECIFIED TYPE: ICD-10-CM

## 2018-02-02 DIAGNOSIS — R45.1 AGITATION: ICD-10-CM

## 2018-02-02 DIAGNOSIS — F41.9 ANXIETY: ICD-10-CM

## 2018-02-02 RX ORDER — LORAZEPAM 2 MG/1
2 TABLET ORAL EVERY EVENING
Qty: 30 TAB | Refills: 0 | Status: SHIPPED
Start: 2018-02-02 | End: 2018-02-20 | Stop reason: SDUPTHER

## 2018-02-05 RX ORDER — MECLIZINE HCL 12.5 MG/1
TABLET ORAL
Qty: 30 TAB | Refills: 1 | Status: SHIPPED | OUTPATIENT
Start: 2018-02-05 | End: 2018-04-19 | Stop reason: SDUPTHER

## 2018-02-05 NOTE — TELEPHONE ENCOUNTER
Was the patient seen in the last year in this department? Yes     Does patient have an active prescription for medications requested? No     Received Request Via: Pharmacy      Pt met protocol?: Yes, OV 12/17

## 2018-02-20 DIAGNOSIS — F41.9 ANXIETY: ICD-10-CM

## 2018-02-20 DIAGNOSIS — R45.1 AGITATION: ICD-10-CM

## 2018-02-20 DIAGNOSIS — G47.00 INSOMNIA, UNSPECIFIED TYPE: ICD-10-CM

## 2018-02-20 RX ORDER — LORAZEPAM 2 MG/1
2 TABLET ORAL EVERY EVENING
Qty: 30 TAB | Refills: 2 | Status: SHIPPED
Start: 2018-03-02 | End: 2018-05-31

## 2018-03-13 ENCOUNTER — TELEPHONE (OUTPATIENT)
Dept: MEDICAL GROUP | Facility: PHYSICIAN GROUP | Age: 63
End: 2018-03-13

## 2018-03-13 NOTE — TELEPHONE ENCOUNTER
Reviewed chart. Patient does not have known h/o migraines. Would need to be evaluated. Recommend urgent care if OTC treatments are not helping.  Tiffani Nielsen P.A.-C.

## 2018-03-13 NOTE — TELEPHONE ENCOUNTER
Pt called saying she had an epidural yesterday and accidentally fell asleep in her recliner and now she has a migraine and nothing is working. Pt wants to know if theres anything she can get prescribed for the migraine. Please advise, Thank you

## 2018-03-14 NOTE — TELEPHONE ENCOUNTER
Spoke to pt, told her that the only way she can get prescribed anything for a migraine is to be either seen in the office, or at urgent care. Pt continued to tell me that there was no way she was going to come into Urgent care and sit around for 2 hours, she then got mad that I had not gotten back to her in the last 45 minutes when I first spoke to her. Pt also told me the only way she was coming into town was in an ambulance. I also offered her the only appointment we have available tomorrow, in which she declined.

## 2018-04-13 DIAGNOSIS — E87.6 HYPOKALEMIA: ICD-10-CM

## 2018-04-13 RX ORDER — POTASSIUM CHLORIDE 750 MG/1
TABLET, EXTENDED RELEASE ORAL
Qty: 60 TAB | Refills: 1 | Status: SHIPPED | OUTPATIENT
Start: 2018-04-13 | End: 2018-08-31 | Stop reason: SDUPTHER

## 2018-04-13 NOTE — TELEPHONE ENCOUNTER
Refill X 6 months, sent to pharmacy.Pt. Seen in the last 6 months per protocol.   Lab Results   Component Value Date/Time    SODIUM 140 12/21/2017 03:02 PM    SODIUM 141 08/11/2017 06:53 AM    POTASSIUM 4.0 12/21/2017 03:02 PM    POTASSIUM 3.3 (L) 08/11/2017 06:53 AM    CHLORIDE 98 12/21/2017 03:02 PM    CHLORIDE 104 08/11/2017 06:53 AM    CO2 25 12/21/2017 03:02 PM    CO2 27 08/11/2017 06:53 AM    GLUCOSE 90 12/21/2017 03:02 PM    GLUCOSE 84 08/11/2017 06:53 AM    BUN 10 12/21/2017 03:02 PM    BUN 8 08/11/2017 06:53 AM    CREATININE 0.82 12/21/2017 03:02 PM    CREATININE 0.52 08/11/2017 06:53 AM    BUNCREATRAT 12 12/21/2017 03:02 PM

## 2018-04-13 NOTE — TELEPHONE ENCOUNTER
Was the patient seen in the last year in this department? Yes     Does patient have an active prescription for medications requested? No     Received Request Via: Pharmacy    Pt met protocol?: Yes     Last OV 12/2017  Lab Results   Component Value Date/Time    SODIUM 140 12/21/2017 03:02 PM    SODIUM 141 08/11/2017 06:53 AM    POTASSIUM 4.0 12/21/2017 03:02 PM    POTASSIUM 3.3 (L) 08/11/2017 06:53 AM    CHLORIDE 98 12/21/2017 03:02 PM    CHLORIDE 104 08/11/2017 06:53 AM    CO2 25 12/21/2017 03:02 PM    CO2 27 08/11/2017 06:53 AM    GLUCOSE 90 12/21/2017 03:02 PM    GLUCOSE 84 08/11/2017 06:53 AM    BUN 10 12/21/2017 03:02 PM    BUN 8 08/11/2017 06:53 AM    CREATININE 0.82 12/21/2017 03:02 PM    CREATININE 0.52 08/11/2017 06:53 AM    BUNCREATRAT 12 12/21/2017 03:02 PM

## 2018-04-19 DIAGNOSIS — R42 VERTIGO: ICD-10-CM

## 2018-04-19 RX ORDER — MECLIZINE HCL 12.5 MG/1
TABLET ORAL
Qty: 30 TAB | Refills: 0 | Status: SHIPPED | OUTPATIENT
Start: 2018-04-19 | End: 2018-05-31 | Stop reason: SDUPTHER

## 2018-04-19 RX ORDER — TRAZODONE HYDROCHLORIDE 50 MG/1
TABLET ORAL
Qty: 60 TAB | Refills: 0 | Status: SHIPPED | OUTPATIENT
Start: 2018-04-19 | End: 2018-05-31 | Stop reason: SDUPTHER

## 2018-04-19 NOTE — TELEPHONE ENCOUNTER
Was the patient seen in the last year in this department? Yes     Does patient have an active prescription for medications requested? No     Received Request Via: Pharmacy      Pt met protocol?: Yes, last ov 12/17

## 2018-04-26 RX ORDER — CARISOPRODOL 350 MG/1
350 TABLET ORAL 3 TIMES DAILY PRN
Qty: 30 TAB | OUTPATIENT
Start: 2018-04-26

## 2018-04-26 NOTE — TELEPHONE ENCOUNTER
reviewed. Soma has never been prescribed by OLIMPIA Edwards Patient was receiving from Dr. Sexton (orthopedic surgeon). She will need to speak with this provider or be seen in the office.  Dr. Maher covering for ERIC Edwards.

## 2018-05-01 ENCOUNTER — OFFICE VISIT (OUTPATIENT)
Dept: MEDICAL GROUP | Age: 63
End: 2018-05-01
Payer: COMMERCIAL

## 2018-05-01 VITALS
BODY MASS INDEX: 30.18 KG/M2 | HEIGHT: 62 IN | WEIGHT: 164 LBS | HEART RATE: 94 BPM | OXYGEN SATURATION: 95 % | DIASTOLIC BLOOD PRESSURE: 60 MMHG | TEMPERATURE: 98.9 F | SYSTOLIC BLOOD PRESSURE: 104 MMHG

## 2018-05-01 DIAGNOSIS — M62.830 BACK MUSCLE SPASM: ICD-10-CM

## 2018-05-01 DIAGNOSIS — G62.9 NEUROPATHY: ICD-10-CM

## 2018-05-01 DIAGNOSIS — M25.511 ACUTE PAIN OF RIGHT SHOULDER: Primary | ICD-10-CM

## 2018-05-01 PROCEDURE — 99214 OFFICE O/P EST MOD 30 MIN: CPT | Performed by: FAMILY MEDICINE

## 2018-05-01 RX ORDER — MELOXICAM 15 MG/1
TABLET ORAL
Refills: 0 | COMMUNITY
Start: 2018-04-19 | End: 2018-05-14 | Stop reason: SDUPTHER

## 2018-05-01 RX ORDER — CARISOPRODOL 350 MG/1
350 TABLET ORAL 3 TIMES DAILY PRN
Qty: 90 TAB | Refills: 0 | Status: SHIPPED | OUTPATIENT
Start: 2018-05-01 | End: 2018-05-14 | Stop reason: SDUPTHER

## 2018-05-01 RX ORDER — GABAPENTIN 300 MG/1
600 CAPSULE ORAL 3 TIMES DAILY
Qty: 180 CAP | Refills: 2 | Status: SHIPPED | OUTPATIENT
Start: 2018-05-01 | End: 2018-08-13 | Stop reason: SDUPTHER

## 2018-05-01 NOTE — PROGRESS NOTES
Subjective:   CC: med refill    HPI:     Marium Renteria is a 62 y.o. female, established patient of the clinic, presents with the following concerns:     1. Back muscle spasm  2. Neuropathy (HCC)  Patient had history of chronic cervical and lumbosacral pain and muscle spasm. Patient is currently taking morphine, oxycodone, gabapentin, meloxicam and Soma for her symptoms. His medications are currently being managed by pain management. Patient ran out of Soma and has trouble getting refill. Patient wanted to schedule appointment to see PCP. However, she was informed that her PCP is on extended leave. She is therefore scheduled appointment to see me today for refills of Soma as well as gabapentin. Patient denies any problems with medications. Patient had been on these medications for years.     3. Acute pain of right shoulder  Patient also complains of acute right shoulder pain for 2 weeks. Pain is described as sharp, radiating proximally to the neck and distally to the right elbow. Pain severity is 8/10, worse with over-the-shoulder activities, somewhat better with medications and rest. Patient attributes her symptoms to recent increased activities with recent adoption of 2 [place] [employer] dogs. Two weeks ago, 2 dogs might have too hard on her right shoulder leading to acute pain. Patient had history of rotator cuff tear of the left shoulder requiring surgery. Her left shoulder is doing fairly well.    Current medicines (including changes today)  Current Outpatient Prescriptions   Medication Sig Dispense Refill   • URI CONTOUR NEXT TEST strip USE TO TEST BLOOD GLUCOSE LEVELS ONCE D  1   • meloxicam (MOBIC) 15 MG tablet TK 1 T PO QD  0   • carisoprodol (SOMA) 350 MG Tab Take 1 Tab by mouth 3 times a day as needed for Muscle Spasms for up to 30 days. 90 Tab 0   • gabapentin (NEURONTIN) 300 MG Cap Take 2 Caps by mouth 3 times a day. 180 Cap 2   • meclizine (ANTIVERT) 12.5 MG Tab TAKE 1 TABLET BY MOUTH ONCE  DAILY AS NEEDED 30 Tab 0   • traZODone (DESYREL) 50 MG Tab TAKE 1 TO 2 TABLETS BY MOUTH ONCE DAILY AT BEDTIME AS NEEDED FOR SLEEP 60 Tab 0   • potassium chloride SA (K-DUR) 10 MEQ Tab CR TAKE 1 TABLET BY MOUTH ONCE DAILY EVERY SUNDAY, TUESDAY, WEDNESDAY, THURSDAY, AND SATURDAY. 60 Tab 1   • lorazepam (ATIVAN) 2 MG tablet Take 1 Tab by mouth every evening for 90 days. 30 Tab 2   • levothyroxine (SYNTHROID) 88 MCG Tab Take 1 Tab by mouth Every morning on an empty stomach. Brand name only. 30 Tab 0   • cholestyramine (QUESTRAN) 4 g packet Take 4 g by mouth every day. 30 Each 2   • allopurinol (ZYLOPRIM) 300 MG Tab TAKE ONE TABLET BY MOUTH DAILY 90 Tab 1   • carbidopa-levodopa (SINEMET)  MG Tab TAKE ONE TABLET BY MOUTH DAILY 90 Tab 1   • EPINEPHrine (EPIPEN) 0.3 MG/0.3ML Solution Auto-injector solution for injection 0.3 mg by Intramuscular route 1 time daily as needed (Bee stings). Inject into the middle of the outer thigh and hold for 3 secons as needed for severe allergic reaction, then call 911 if used.      • diphenhydrAMINE (BENADRYL) 25 MG Tab Take 1 tablet by mouth every 6 hours as needed for Itching. 30 Tab 0   • docusate sodium 100 MG Cap Take 100 mg by mouth 2 Times a Day. 60 Cap    • lorazepam (ATIVAN) 1 MG Tab Take 1-2 mg by mouth at bedtime as needed for Anxiety.     • polyethylene glycol/lytes (MIRALAX) Pack Take 17 g by mouth every day.     • acetaminophen (TYLENOL) 500 MG Tab Take 500-1,000 mg by mouth every 6 hours as needed.     • Naproxen Sodium (ALEVE) 220 MG Cap Take 220 mg by mouth every 12 hours.     • lidocaine (LIDODERM) 5 % Patch Apply 2 Patches to skin as directed See Admin Instructions. 2 at a time 12 hrs on, 12 hrs off      • calcitRIOL (ROCALTROL) 0.25 MCG Cap Take 0.25 mcg by mouth every day.     • Cholecalciferol (VITAMIN D3) 1000 UNITS Cap Take 5,000 Units by mouth every day.     • Calcium Carbonate Antacid (TUMS PO) Take 1,200 mg by mouth every day. Needs to take BERRY flavor    "  • morphine ER (MS CONTIN) 30 MG Tab CR tablet Take 30 mg by mouth 3 times a day. 1 tab PO 3 times daily     • oxycodone (OXY-IR) 30 MG immediate release tablet Take 1 Tab by mouth every 3 hours as needed for Moderate Pain. 30 Tab 0     No current facility-administered medications for this visit.      She  has a past medical history of Alcohol abuse, continuous drinking behavior (12/11/2009); Anesthesia (4-26-17); Anxiety; Arthritis (4-26-17); Chronic diarrhea (9/30/2013); Dental disorder (4-26-17); Gout; Gynecological disorder; Helicobacter pylori (H. pylori) infection (9/11/2012); Hemorrhagic disorder (HCC); Hyperlipidemia (6/5/2015); Hypoparathyroidism (HCC) (12/11/2009); Lumbago (2017); Mixed hyperlipidemia (12/11/2009); Osteoporosis, unspecified (12/11/2009); Other B-complex deficiencies (12/11/2009); Parotid cyst; Postsurgical hypothyroidism (12/11/2009); Psychiatric problem (4-26-17); Restless leg syndrome; S/P tooth extraction (4-25-17); Serum calcium elevated (10/8/2012); Sialolithiasis, ductal (7/21/2012); Snoring; Symptomatic menopausal or female climacteric states (12/11/2009); Tobacco abuse (12/11/2009); Unspecified essential hypertension (12/11/2009); and Urinary incontinence.    I personally reviewed patient's problem list, allergies, medications, family hx, social hx with patient and update EPIC.     REVIEW OF SYSTEMS:  CONSTITUTIONAL:  Denies night sweats, fatigue, malaise, lethargy, fever or chills.  RESPIRATORY:  Denies cough, wheeze, hemoptysis, or shortness of breath.  CARDIOVASCULAR:  Denies chest pains, palpitations, pedal edema     Objective:     Blood pressure 104/60, pulse 94, temperature 37.2 °C (98.9 °F), height 1.575 m (5' 2\"), weight 74.4 kg (164 lb), last menstrual period 10/02/1980, SpO2 95 %. Body mass index is 30 kg/m².    Physical Exam:  Constitutional: awake, alert, in no distress.  Skin: Warm, dry, good turgor, no rashes, bruises, ulcers in visible areas.  Eye: conjunctiva " clear, lids neg for edema or lesions.  Neck: Trachea midline, no masses, no thyromegaly. No cervical or supraclavicular lymphadenopathy  Respiratory: Unlabored respiratory effort, lungs clear to auscultation, no wheezes, no rhonchi, no rales.  Cardiovascular: Normal S1, S2, no murmur, no pedal edema.  Psych: Oriented x3, affect and mood wnl, intact judgement and insight.   Musculoskeletal exam:  Right Shoulder exam:  No visible deformity or asymmetry. No joint effusion, bruises, hematoma. No crepitus. Right shoulder range of motion is moderately limited secondary to pain  Apley’s scratch test: Asymmetrical  Cross-body test: Negative   Drop arm test: Negative   Empty can test: Positive  Infraspinatus/teres minor exam: Positive   Lift-off test: Positive  Neer’s impingement test: Positive .   Yergason’s test: Negative   Sulcus sign: Negative   Relocation: Negative   Neck exam: Tender to palpation, but unchanged from baseline.    Assessment and Plan:   The following treatment plan was discussed    1. Back muscle spasm  2. Neuropathy (HCC)  - carisoprodol (SOMA) 350 MG Tab; Take 1 Tab by mouth 3 times a day as needed for Muscle Spasms for up to 30 days.  Dispense: 90 Tab; Refill: 0  - gabapentin (NEURONTIN) 300 MG Cap; Take 2 Caps by mouth 3 times a day.  Dispense: 180 Cap; Refill: 2  - Risks, benefits, potential side effects of all medications discussed with patient. Patient is advised to follow-up with PCP as well as pain management for future refills.    3. Acute pain of right shoulder  Lately secondary to rotator cuff strain from recent increased in activities with the right shoulder. Plans:   - REFERRAL TO PHYSICAL THERAPY Reason for Therapy: Eval/Treat/Report  - Continue muscle relaxant, NSAID, and opiates that she is taking chronically to manage cervical and lumbosacral pain.  - Follow-up with PCP in 2-3 months or sooner if symptoms fail to improve.  - Rest, activity modification to prevent worsening of  symptoms.    Mona Dial M.D.      Followup: Return for As needed.    Please note that this dictation was created using voice recognition software. I have made every reasonable attempt to correct obvious errors, but I expect that there are errors of grammar and possibly content that I did not discover before finalizing the note.

## 2018-05-09 ENCOUNTER — APPOINTMENT (OUTPATIENT)
Dept: BEHAVIORAL HEALTH | Facility: PHYSICIAN GROUP | Age: 63
End: 2018-05-09
Payer: COMMERCIAL

## 2018-05-09 DIAGNOSIS — M10.9 GOUT, ARTHRITIS: ICD-10-CM

## 2018-05-09 DIAGNOSIS — M62.830 BACK MUSCLE SPASM: ICD-10-CM

## 2018-05-10 DIAGNOSIS — M62.830 BACK MUSCLE SPASM: ICD-10-CM

## 2018-05-10 RX ORDER — CARISOPRODOL 350 MG/1
TABLET ORAL
Qty: 90 TAB | Refills: 0 | Status: CANCELLED
Start: 2018-05-10 | End: 2018-08-08

## 2018-05-10 RX ORDER — ALLOPURINOL 300 MG/1
TABLET ORAL
Qty: 90 TAB | Refills: 0 | Status: SHIPPED | OUTPATIENT
Start: 2018-05-10 | End: 2018-07-04 | Stop reason: SDUPTHER

## 2018-05-11 ENCOUNTER — TELEPHONE (OUTPATIENT)
Dept: MEDICAL GROUP | Facility: PHYSICIAN GROUP | Age: 63
End: 2018-05-11

## 2018-05-11 NOTE — TELEPHONE ENCOUNTER
Future Appointments       Provider Department Center    5/14/2018 4:25 PM Tiffani Nielsen P.A.-C. [facility]    9/14/2018 5:20 PM Huma Moon M.D. [facility]        ESTABLISHED PATIENT PRE-VISIT PLANNING     Note: Patient will not be contacted if there is no indication to call.     1.  Reviewed notes from the last few office visits within the medical group: Yes    2.  If any orders were placed at last visit or intended to be done for this visit (i.e. 6 mos follow-up), do we have Results/Consult Notes?        •  Labs - Labs were not ordered at last office visit.       •  Imaging - Imaging was not ordered at last office visit.       •  Referrals - Referral ordered, patient has NOT been seen.    3. Is this appointment scheduled as a Hospital Follow-Up? No    4.  Immunizations were updated in [employer] using WebIZ?: Yes       •  Web Iz Recommendations: FLU, HEPATITIS A , HEPATITIS B, TD and ZOSTAVAX (Shingles)    5.  Patient is due for the following Health Maintenance Topics:   Health Maintenance Due   Topic Date Due   • MAMMOGRAM  06/24/2014   • BONE DENSITY  02/02/2017       6.  MDX printed for Provider? NO INS laborers     7.  Patient was informed to arrive 15 min prior to their scheduled appointment and bring in their medication bottles. VERONICA

## 2018-05-14 ENCOUNTER — OFFICE VISIT (OUTPATIENT)
Dept: MEDICAL GROUP | Facility: PHYSICIAN GROUP | Age: 63
End: 2018-05-14
Payer: COMMERCIAL

## 2018-05-14 VITALS
DIASTOLIC BLOOD PRESSURE: 70 MMHG | OXYGEN SATURATION: 90 % | HEART RATE: 65 BPM | BODY MASS INDEX: 30.18 KG/M2 | WEIGHT: 164 LBS | HEIGHT: 62 IN | TEMPERATURE: 97.4 F | SYSTOLIC BLOOD PRESSURE: 110 MMHG

## 2018-05-14 DIAGNOSIS — F41.1 GAD (GENERALIZED ANXIETY DISORDER): ICD-10-CM

## 2018-05-14 DIAGNOSIS — M62.830 BACK MUSCLE SPASM: ICD-10-CM

## 2018-05-14 DIAGNOSIS — F13.20 BENZODIAZEPINE DEPENDENCE, CONTINUOUS (HCC): ICD-10-CM

## 2018-05-14 PROCEDURE — 99214 OFFICE O/P EST MOD 30 MIN: CPT | Performed by: PHYSICIAN ASSISTANT

## 2018-05-14 RX ORDER — CARISOPRODOL 350 MG/1
350 TABLET ORAL 3 TIMES DAILY PRN
Qty: 90 TAB | Refills: 0 | Status: SHIPPED | OUTPATIENT
Start: 2018-05-31 | End: 2018-06-30

## 2018-05-14 RX ORDER — LORAZEPAM 1 MG/1
1-2 TABLET ORAL
Qty: 30 TAB | Status: CANCELLED | OUTPATIENT
Start: 2018-05-14

## 2018-05-14 RX ORDER — MELOXICAM 15 MG/1
TABLET ORAL
Qty: 30 TAB | Refills: 2 | Status: SHIPPED | OUTPATIENT
Start: 2018-05-14 | End: 2018-08-14 | Stop reason: SDUPTHER

## 2018-05-14 RX ORDER — LORAZEPAM 1 MG/1
1-2 TABLET ORAL
Qty: 60 TAB | Refills: 0 | Status: SHIPPED | OUTPATIENT
Start: 2018-05-20 | End: 2018-06-19

## 2018-05-15 NOTE — PROGRESS NOTES
Subjective:   Marium Renteria is a 62 y.o. female here today for chronic back pain and anxiety, medication refills. She is a former patient of [facility] Red[facility] and will be formally established with Dr. Red MD in 9/2018.    HPI:    Patient presents today requesting refills of 3 different medications. She has history of chronic back pain/multiple prior surgeries for which she follows with Dr. Lynn at [facility]. Has been on chronic opiates and Soma for many years. States that Dr. Lynn refills her pain medication but her primary doctor has always refilled her Soma. She also takes meloxicam for her back pain which she is also requesting refills of.    Also has chronic anxiety treated with lorazepam at bedtime. Follows with psychiatry but has been unable to get in to see her psychiatrist as he is [place] out of the [place]. Is out of medication and requesting refills.      Current medicines (including changes today)  Current Outpatient Prescriptions   Medication Sig Dispense Refill   • meloxicam (MOBIC) 15 MG tablet TK 1 T PO QD 30 Tab 2   • [START ON 5/31/2018] carisoprodol (SOMA) 350 MG Tab Take 1 Tab by mouth 3 times a day as needed for Muscle Spasms for up to 30 days. 90 Tab 0   • [START ON 5/20/2018] LORazepam (ATIVAN) 1 MG Tab Take 1-2 Tabs by mouth at bedtime as needed for Anxiety for up to 30 days. 60 Tab 0   • allopurinol (ZYLOPRIM) 300 MG Tab TAKE 1 TABLET BY MOUTH ONCE DAILY 90 Tab 0   • URI CONTOUR NEXT TEST strip USE TO TEST BLOOD GLUCOSE LEVELS ONCE D  1   • gabapentin (NEURONTIN) 300 MG Cap Take 2 Caps by mouth 3 times a day. 180 Cap 2   • meclizine (ANTIVERT) 12.5 MG Tab TAKE 1 TABLET BY MOUTH ONCE DAILY AS NEEDED 30 Tab 0   • traZODone (DESYREL) 50 MG Tab TAKE 1 TO 2 TABLETS BY MOUTH ONCE DAILY AT BEDTIME AS NEEDED FOR SLEEP 60 Tab 0   • potassium chloride SA (K-DUR) 10 MEQ Tab CR TAKE 1 TABLET BY MOUTH ONCE DAILY EVERY SUNDAY, TUESDAY, WEDNESDAY, THURSDAY, AND SATURDAY. 60 Tab 1   •  lorazepam (ATIVAN) 2 MG tablet Take 1 Tab by mouth every evening for 90 days. 30 Tab 2   • levothyroxine (SYNTHROID) 88 MCG Tab Take 1 Tab by mouth Every morning on an empty stomach. Brand name only. 30 Tab 0   • cholestyramine (QUESTRAN) 4 g packet Take 4 g by mouth every day. 30 Each 2   • carbidopa-levodopa (SINEMET)  MG Tab TAKE ONE TABLET BY MOUTH DAILY 90 Tab 1   • EPINEPHrine (EPIPEN) 0.3 MG/0.3ML Solution Auto-injector solution for injection 0.3 mg by Intramuscular route 1 time daily as needed (Bee stings). Inject into the middle of the outer thigh and hold for 3 secons as needed for severe allergic reaction, then call 911 if used.      • oxycodone (OXY-IR) 30 MG immediate release tablet Take 1 Tab by mouth every 3 hours as needed for Moderate Pain. 30 Tab 0   • diphenhydrAMINE (BENADRYL) 25 MG Tab Take 1 tablet by mouth every 6 hours as needed for Itching. 30 Tab 0   • docusate sodium 100 MG Cap Take 100 mg by mouth 2 Times a Day. 60 Cap    • acetaminophen (TYLENOL) 500 MG Tab Take 500-1,000 mg by mouth every 6 hours as needed.     • Naproxen Sodium (ALEVE) 220 MG Cap Take 220 mg by mouth every 12 hours.     • lidocaine (LIDODERM) 5 % Patch Apply 2 Patches to skin as directed See Admin Instructions. 2 at a time 12 hrs on, 12 hrs off      • calcitRIOL (ROCALTROL) 0.25 MCG Cap Take 0.25 mcg by mouth every day.     • Cholecalciferol (VITAMIN D3) 1000 UNITS Cap Take 5,000 Units by mouth every day.     • Calcium Carbonate Antacid (TUMS PO) Take 1,200 mg by mouth every day. Needs to take BERRY flavor     • morphine ER (MS CONTIN) 30 MG Tab CR tablet Take 30 mg by mouth 3 times a day. 1 tab PO 3 times daily     • polyethylene glycol/lytes (MIRALAX) Pack Take 17 g by mouth every day.       No current facility-administered medications for this visit.      She  has a past medical history of Alcohol abuse, continuous drinking behavior (12/11/2009); Anesthesia (4-26-17); Anxiety; Arthritis (4-26-17); Chronic  "diarrhea (9/30/2013); Dental disorder (4-26-17); Gout; Gynecological disorder; Helicobacter pylori (H. pylori) infection (9/11/2012); Hemorrhagic disorder (HCC); Hyperlipidemia (6/5/2015); Hypoparathyroidism (HCC) (12/11/2009); Lumbago (2017); Mixed hyperlipidemia (12/11/2009); Osteoporosis, unspecified (12/11/2009); Other B-complex deficiencies (12/11/2009); Parotid cyst; Postsurgical hypothyroidism (12/11/2009); Psychiatric problem (4-26-17); Restless leg syndrome; S/P tooth extraction (4-25-17); Serum calcium elevated (10/8/2012); Sialolithiasis, ductal (7/21/2012); Snoring; Symptomatic menopausal or female climacteric states (12/11/2009); Tobacco abuse (12/11/2009); Unspecified essential hypertension (12/11/2009); and Urinary incontinence.    ROS  As per HPI.       Objective:     Blood pressure 110/70, pulse 65, temperature 36.3 °C (97.4 °F), height 1.575 m (5' 2\"), weight 74.4 kg (164 lb), last menstrual period 10/02/1980, SpO2 90 %. Body mass index is 30 kg/m².     Physical Exam:  Constitutional: Alert, no distress.  Skin: No rashes in visible areas.  Eye: Conjunctiva clear, lids normal.  ENMT: Lips without lesions, moist mucus membranes.      Assessment and Plan:   The following treatment plan was discussed    1. Back muscle spasm  Will do one-time refill of Soma. Per , last fill was on 5/1, so isn't due for refill until 5/31, so Rx was future-dated. Should limit use. Explained that Soma is not intended to be taken TID every day long-term. Recommend she discuss alternatives with her pain doctor. Discussed negative risks associated with combining all of the various pain and anxiety medications she takes, including increased sedation, dizziness, impaired cognition, respiratory depression, and death.  - carisoprodol (SOMA) 350 MG Tab; Take 1 Tab by mouth 3 times a day as needed for Muscle Spasms for up to 30 days.  Dispense: 90 Tab; Refill: 0    2. Benzodiazepine dependence, continuous (HCC)  3. ELISHA " (generalized anxiety disorder)  Anxiety is chronic issue. I have agreed to one-time refill of lorazepam to get her through until she can see her psychiatrist again. Per , last fill was on 4/19/18. Explained that this medication needs to come from psychiatry in the future. Risks associated with concomitant opiate/BZD discussed as per above.  - LORazepam (ATIVAN) 1 MG Tab; Take 1-2 Tabs by mouth at bedtime as needed for Anxiety for up to 30 days.  Dispense: 60 Tab; Refill: 0      Followup: Return for establish new PCP.    Tiffani Nielsen P.A.-C.

## 2018-05-31 DIAGNOSIS — F41.1 GAD (GENERALIZED ANXIETY DISORDER): ICD-10-CM

## 2018-05-31 DIAGNOSIS — R42 VERTIGO: ICD-10-CM

## 2018-05-31 DIAGNOSIS — F13.20 BENZODIAZEPINE DEPENDENCE, CONTINUOUS (HCC): ICD-10-CM

## 2018-05-31 DIAGNOSIS — G25.9 EXTRAPYRAMIDAL AND MOVEMENT DISORDER: ICD-10-CM

## 2018-06-01 RX ORDER — MECLIZINE HCL 12.5 MG/1
TABLET ORAL
Qty: 30 TAB | Refills: 0 | Status: SHIPPED | OUTPATIENT
Start: 2018-06-01 | End: 2018-07-19 | Stop reason: SDUPTHER

## 2018-06-01 RX ORDER — LORAZEPAM 1 MG/1
TABLET ORAL
Refills: 0 | OUTPATIENT
Start: 2018-06-01

## 2018-06-01 RX ORDER — TRAZODONE HYDROCHLORIDE 50 MG/1
TABLET ORAL
Qty: 60 TAB | Refills: 0 | Status: SHIPPED | OUTPATIENT
Start: 2018-06-01 | End: 2018-08-14 | Stop reason: SDUPTHER

## 2018-06-01 NOTE — TELEPHONE ENCOUNTER
Was the patient seen in the last year in this department? Yes     Does patient have an active prescription for medications requested? No     Received Request Via: Pharmacy      Pt met protocol?: Yes, OV last week

## 2018-06-01 NOTE — TELEPHONE ENCOUNTER
Was the patient seen in the last year in this department? Yes     Does patient have an active prescription for medications requested? No     Received Request Via: Pharmacy      Pt met protocol?: Yes, OV last month

## 2018-07-04 ENCOUNTER — TELEPHONE (OUTPATIENT)
Dept: MEDICAL GROUP | Facility: PHYSICIAN GROUP | Age: 63
End: 2018-07-04

## 2018-07-04 DIAGNOSIS — I10 ESSENTIAL HYPERTENSION: ICD-10-CM

## 2018-07-04 DIAGNOSIS — M10.9 GOUT, ARTHRITIS: ICD-10-CM

## 2018-07-04 DIAGNOSIS — E78.2 MIXED HYPERLIPIDEMIA: ICD-10-CM

## 2018-07-05 RX ORDER — ALLOPURINOL 300 MG/1
TABLET ORAL
Qty: 90 TAB | Refills: 0 | Status: SHIPPED | OUTPATIENT
Start: 2018-07-05 | End: 2018-11-11 | Stop reason: SDUPTHER

## 2018-07-05 NOTE — TELEPHONE ENCOUNTER
Please remind pt to get labs done, especially prior to upcoming appt. Will send 3 months to pharmacy.

## 2018-07-05 NOTE — TELEPHONE ENCOUNTER
Was the patient seen in the last year in this department? Yes     Does patient have an active prescription for medications requested? No     Received Request Via: [facility]      Pt met protocol?: Yes       OV 5/18

## 2018-07-17 ENCOUNTER — TELEPHONE (OUTPATIENT)
Dept: MEDICAL GROUP | Facility: PHYSICIAN GROUP | Age: 63
End: 2018-07-17

## 2018-07-17 NOTE — TELEPHONE ENCOUNTER
Dr. Gunnar Nixon recently denied refill request for Soma. At her last OV you advised Pt to follow up with back specialist for medication. Pt states she has changed insurance and her back specialist no longer accepts her insurance. She is stephan for and appt with Dr. Moon and says you agreed to refill her medications until she is able to get in with another provider. Pt requesting refill for limited supple until her upcoming appt on 9/14/18, Please advise.

## 2018-07-18 DIAGNOSIS — K52.9 CHRONIC DIARRHEA: ICD-10-CM

## 2018-07-18 DIAGNOSIS — F17.200 SMOKER: ICD-10-CM

## 2018-07-18 NOTE — TELEPHONE ENCOUNTER
Pt states she has not taken Oxycodone or percocet for 2 months now. I advised Pt she would need an appointment to refill medication either way since Soma is a controlled substance. Pt states she has medicaid now and since we do not accept her insurance she cannot make the appointment. I transferred Pt to scheduling to talk to someone about establishing at the Healthcare center.

## 2018-07-18 NOTE — TELEPHONE ENCOUNTER
I had told patient at recent appt that I would give one-time refill of Soma. I had told her to discuss alternatives with her pain doctor, as Soma is high-risk/not recommended given all of the other pain and anxiety medication she is taking.  Tiffani Nielsen P.A.-C.

## 2018-07-19 DIAGNOSIS — R42 VERTIGO: ICD-10-CM

## 2018-07-19 RX ORDER — EPINEPHRINE 0.3 MG/.3ML
0.3 INJECTION SUBCUTANEOUS
Qty: 1 EACH | Refills: 1 | Status: SHIPPED | OUTPATIENT
Start: 2018-07-19 | End: 2018-09-19

## 2018-07-19 RX ORDER — CHOLESTYRAMINE 4 G/9G
1 POWDER, FOR SUSPENSION ORAL DAILY
Qty: 90 EACH | Refills: 0 | Status: SHIPPED | OUTPATIENT
Start: 2018-07-19 | End: 2018-09-19

## 2018-07-19 NOTE — TELEPHONE ENCOUNTER
Dr Moon- You have upcoming appt to establish with pt 9/18 and Chantix has never been prescribed by [facility]. Please refill as you see fit.  - Has upcoming appt w/ PCP new. Will send 3 months of fills to pharmacy.

## 2018-07-20 RX ORDER — MECLIZINE HCL 12.5 MG/1
TABLET ORAL
Qty: 30 TAB | Refills: 0 | Status: SHIPPED | OUTPATIENT
Start: 2018-07-20 | End: 2018-08-15 | Stop reason: SDUPTHER

## 2018-07-20 NOTE — TELEPHONE ENCOUNTER
Was the patient seen in the last year in this department? Yes     Does patient have an active prescription for medications requested? No     Received Request Via: Pharmacy      Pt met protocol?: Yes    LAST OV 05/14/2018

## 2018-08-13 DIAGNOSIS — G62.9 NEUROPATHY: ICD-10-CM

## 2018-08-13 RX ORDER — GABAPENTIN 300 MG/1
CAPSULE ORAL
Qty: 180 CAP | Refills: 0 | Status: SHIPPED | OUTPATIENT
Start: 2018-08-13 | End: 2018-09-11 | Stop reason: SDUPTHER

## 2018-08-14 RX ORDER — TRAZODONE HYDROCHLORIDE 50 MG/1
TABLET ORAL
Qty: 60 TAB | Refills: 2 | Status: ON HOLD | OUTPATIENT
Start: 2018-08-14 | End: 2018-10-02

## 2018-08-15 DIAGNOSIS — R42 VERTIGO: ICD-10-CM

## 2018-08-15 DIAGNOSIS — G47.00 INSOMNIA, UNSPECIFIED TYPE: ICD-10-CM

## 2018-08-15 DIAGNOSIS — F41.9 ANXIETY: ICD-10-CM

## 2018-08-15 RX ORDER — LORAZEPAM 2 MG/1
TABLET ORAL
Qty: 30 TAB | Refills: 0 | Status: SHIPPED
Start: 2018-08-15 | End: 2018-09-04 | Stop reason: SDUPTHER

## 2018-08-15 RX ORDER — MELOXICAM 15 MG/1
TABLET ORAL
Qty: 90 TAB | Refills: 0 | Status: SHIPPED | OUTPATIENT
Start: 2018-08-15 | End: 2018-09-19

## 2018-08-16 RX ORDER — MECLIZINE HCL 12.5 MG/1
TABLET ORAL
Qty: 30 TAB | Refills: 0 | Status: SHIPPED | OUTPATIENT
Start: 2018-08-16 | End: 2018-09-19

## 2018-08-16 NOTE — TELEPHONE ENCOUNTER
Was the patient seen in the last year in this department? Yes    Does patient have an active prescription for medications requested? No     Received Request Via: Pharmacy      Pt met protocol?: Yes pt last ov 5/2018

## 2018-08-30 ENCOUNTER — TELEPHONE (OUTPATIENT)
Dept: MEDICAL GROUP | Facility: PHYSICIAN GROUP | Age: 63
End: 2018-08-30

## 2018-08-30 DIAGNOSIS — R06.00 DYSPNEA, UNSPECIFIED TYPE: ICD-10-CM

## 2018-08-30 NOTE — TELEPHONE ENCOUNTER
VOICEMAIL  1. Caller Name: Marium                      Call Back Number: [phone] (home)       2. Message: Pt called and LVM stating she was recently in the [facility] ER and diagnosed with COPD and the powder inhaler she was given is not working. Pt is requesting a new emergency albuterol inhaler and states [facility] will not give her a new prescription. Pt currently has medicaid which we do not accept and does not have the money to do a self pay. Pt is asking that you please write her a new script and she is scheduled to establish with Dr. Moon 9/14/18 and will have her new insurance at this time. Please advise.     3. Patient approves office to leave a detailed voicemail/MyChart message: N\A

## 2018-08-31 DIAGNOSIS — E87.6 HYPOKALEMIA: ICD-10-CM

## 2018-08-31 RX ORDER — ALBUTEROL SULFATE 90 UG/1
2 AEROSOL, METERED RESPIRATORY (INHALATION) EVERY 6 HOURS PRN
Qty: 8.5 G | Refills: 0 | Status: ON HOLD | OUTPATIENT
Start: 2018-08-31 | End: 2018-09-29 | Stop reason: SDUPTHER

## 2018-08-31 RX ORDER — POTASSIUM CHLORIDE 750 MG/1
TABLET, EXTENDED RELEASE ORAL
Qty: 60 TAB | Refills: 1 | Status: SHIPPED | OUTPATIENT
Start: 2018-08-31 | End: 2018-09-19

## 2018-08-31 NOTE — TELEPHONE ENCOUNTER
Patient notified via Phone. Stated she wanted a refill for lorazepam, but already got 30 tablets from Dr. Cooley on 8/15/18 (1 qd). Was told no prescriber in our office would be able to give her a new Rx for lorazepam without being seen first.

## 2018-08-31 NOTE — TELEPHONE ENCOUNTER
Reviewed records from [facility] which are scanned in the chart. It looks like they already prescribed her ProAir, which is an albuterol inhaler. Please inform patient.  Tiffani Nielsen P.A.-C.

## 2018-08-31 NOTE — TELEPHONE ENCOUNTER
It looks like the proair they ordered is the powder inhaler that she is having trouble with. She may not be using it correctly, or something along those lines. Do you want me to tell her to keep using it until her appointment, or are you ok prescribing a normal liquid inhaler for her? Thank you.

## 2018-08-31 NOTE — TELEPHONE ENCOUNTER
Okay, I am fine switching her to ProAir HFA. I have sent order to her pharmacy. Should follow-up as scheduled for her appointment.  Tiffani Nielsen P.A.-C.

## 2018-09-04 ENCOUNTER — TELEPHONE (OUTPATIENT)
Dept: BEHAVIORAL HEALTH | Facility: PHYSICIAN GROUP | Age: 63
End: 2018-09-04

## 2018-09-04 DIAGNOSIS — F41.9 ANXIETY: ICD-10-CM

## 2018-09-04 DIAGNOSIS — G47.00 INSOMNIA, UNSPECIFIED TYPE: ICD-10-CM

## 2018-09-04 RX ORDER — LORAZEPAM 2 MG/1
TABLET ORAL
Qty: 30 TAB | Refills: 0 | Status: ON HOLD
Start: 2018-09-04 | End: 2018-10-02

## 2018-09-11 DIAGNOSIS — G62.9 NEUROPATHY: ICD-10-CM

## 2018-09-11 RX ORDER — GABAPENTIN 300 MG/1
CAPSULE ORAL
Qty: 180 CAP | Refills: 0 | Status: SHIPPED | OUTPATIENT
Start: 2018-09-11 | End: 2018-12-28 | Stop reason: SDUPTHER

## 2018-09-12 ENCOUNTER — HOSPITAL ENCOUNTER (INPATIENT)
Facility: MEDICAL CENTER | Age: 63
LOS: 4 days | DRG: 603 | End: 2018-09-16
Attending: EMERGENCY MEDICINE | Admitting: HOSPITALIST
Payer: COMMERCIAL

## 2018-09-12 ENCOUNTER — APPOINTMENT (OUTPATIENT)
Dept: RADIOLOGY | Facility: MEDICAL CENTER | Age: 63
DRG: 603 | End: 2018-09-12
Attending: EMERGENCY MEDICINE
Payer: COMMERCIAL

## 2018-09-12 DIAGNOSIS — R07.9 CHEST PAIN, UNSPECIFIED TYPE: ICD-10-CM

## 2018-09-12 DIAGNOSIS — R22.32 LOCALIZED SWELLING ON LEFT HAND: ICD-10-CM

## 2018-09-12 LAB
ALBUMIN SERPL BCP-MCNC: 4.1 G/DL (ref 3.2–4.9)
ALBUMIN/GLOB SERPL: 1.3 G/DL
ALP SERPL-CCNC: 76 U/L (ref 30–99)
ALT SERPL-CCNC: 17 U/L (ref 2–50)
ANION GAP SERPL CALC-SCNC: 14 MMOL/L (ref 0–11.9)
APTT PPP: 32.6 SEC (ref 24.7–36)
AST SERPL-CCNC: 18 U/L (ref 12–45)
BASOPHILS # BLD AUTO: 0.5 % (ref 0–1.8)
BASOPHILS # BLD: 0.03 K/UL (ref 0–0.12)
BILIRUB SERPL-MCNC: 0.5 MG/DL (ref 0.1–1.5)
BNP SERPL-MCNC: 56 PG/ML (ref 0–100)
BUN SERPL-MCNC: 5 MG/DL (ref 8–22)
CALCIUM SERPL-MCNC: 8.5 MG/DL (ref 8.5–10.5)
CHLORIDE SERPL-SCNC: 98 MMOL/L (ref 96–112)
CO2 SERPL-SCNC: 25 MMOL/L (ref 20–33)
CREAT SERPL-MCNC: 0.72 MG/DL (ref 0.5–1.4)
CRP SERPL HS-MCNC: 14.44 MG/DL (ref 0–0.75)
EKG IMPRESSION: NORMAL
EOSINOPHIL # BLD AUTO: 0.03 K/UL (ref 0–0.51)
EOSINOPHIL NFR BLD: 0.5 % (ref 0–6.9)
ERYTHROCYTE [DISTWIDTH] IN BLOOD BY AUTOMATED COUNT: 49.1 FL (ref 35.9–50)
ERYTHROCYTE [SEDIMENTATION RATE] IN BLOOD BY WESTERGREN METHOD: 81 MM/HOUR (ref 0–30)
GLOBULIN SER CALC-MCNC: 3.1 G/DL (ref 1.9–3.5)
GLUCOSE SERPL-MCNC: 261 MG/DL (ref 65–99)
HCT VFR BLD AUTO: 42.8 % (ref 37–47)
HGB BLD-MCNC: 14.8 G/DL (ref 12–16)
IMM GRANULOCYTES # BLD AUTO: 0.01 K/UL (ref 0–0.11)
IMM GRANULOCYTES NFR BLD AUTO: 0.2 % (ref 0–0.9)
INR PPP: 1.2 (ref 0.87–1.13)
LIPASE SERPL-CCNC: 26 U/L (ref 11–82)
LYMPHOCYTES # BLD AUTO: 1.04 K/UL (ref 1–4.8)
LYMPHOCYTES NFR BLD: 18 % (ref 22–41)
MCH RBC QN AUTO: 35.6 PG (ref 27–33)
MCHC RBC AUTO-ENTMCNC: 34.6 G/DL (ref 33.6–35)
MCV RBC AUTO: 102.9 FL (ref 81.4–97.8)
MONOCYTES # BLD AUTO: 0.29 K/UL (ref 0–0.85)
MONOCYTES NFR BLD AUTO: 5 % (ref 0–13.4)
NEUTROPHILS # BLD AUTO: 4.39 K/UL (ref 2–7.15)
NEUTROPHILS NFR BLD: 75.8 % (ref 44–72)
NRBC # BLD AUTO: 0 K/UL
NRBC BLD-RTO: 0 /100 WBC
PLATELET # BLD AUTO: 205 K/UL (ref 164–446)
PMV BLD AUTO: 10.8 FL (ref 9–12.9)
POTASSIUM SERPL-SCNC: 3 MMOL/L (ref 3.6–5.5)
PROT SERPL-MCNC: 7.2 G/DL (ref 6–8.2)
PROTHROMBIN TIME: 14.9 SEC (ref 12–14.6)
RBC # BLD AUTO: 4.16 M/UL (ref 4.2–5.4)
SODIUM SERPL-SCNC: 137 MMOL/L (ref 135–145)
TROPONIN I SERPL-MCNC: <0.01 NG/ML (ref 0–0.04)
WBC # BLD AUTO: 5.8 K/UL (ref 4.8–10.8)

## 2018-09-12 PROCEDURE — 700105 HCHG RX REV CODE 258: Performed by: EMERGENCY MEDICINE

## 2018-09-12 PROCEDURE — 700102 HCHG RX REV CODE 250 W/ 637 OVERRIDE(OP): Performed by: EMERGENCY MEDICINE

## 2018-09-12 PROCEDURE — 770006 HCHG ROOM/CARE - MED/SURG/GYN SEMI*

## 2018-09-12 PROCEDURE — 85025 COMPLETE CBC W/AUTO DIFF WBC: CPT

## 2018-09-12 PROCEDURE — 83880 ASSAY OF NATRIURETIC PEPTIDE: CPT

## 2018-09-12 PROCEDURE — 36415 COLL VENOUS BLD VENIPUNCTURE: CPT

## 2018-09-12 PROCEDURE — 85730 THROMBOPLASTIN TIME PARTIAL: CPT

## 2018-09-12 PROCEDURE — 80053 COMPREHEN METABOLIC PANEL: CPT

## 2018-09-12 PROCEDURE — 700111 HCHG RX REV CODE 636 W/ 250 OVERRIDE (IP): Performed by: EMERGENCY MEDICINE

## 2018-09-12 PROCEDURE — 86140 C-REACTIVE PROTEIN: CPT

## 2018-09-12 PROCEDURE — A9270 NON-COVERED ITEM OR SERVICE: HCPCS | Performed by: EMERGENCY MEDICINE

## 2018-09-12 PROCEDURE — 71045 X-RAY EXAM CHEST 1 VIEW: CPT

## 2018-09-12 PROCEDURE — 96376 TX/PRO/DX INJ SAME DRUG ADON: CPT

## 2018-09-12 PROCEDURE — 85652 RBC SED RATE AUTOMATED: CPT

## 2018-09-12 PROCEDURE — 96365 THER/PROPH/DIAG IV INF INIT: CPT

## 2018-09-12 PROCEDURE — 93005 ELECTROCARDIOGRAM TRACING: CPT

## 2018-09-12 PROCEDURE — 99285 EMERGENCY DEPT VISIT HI MDM: CPT

## 2018-09-12 PROCEDURE — 93005 ELECTROCARDIOGRAM TRACING: CPT | Performed by: EMERGENCY MEDICINE

## 2018-09-12 PROCEDURE — 96375 TX/PRO/DX INJ NEW DRUG ADDON: CPT

## 2018-09-12 PROCEDURE — 99223 1ST HOSP IP/OBS HIGH 75: CPT | Mod: 25 | Performed by: HOSPITALIST

## 2018-09-12 PROCEDURE — 93971 EXTREMITY STUDY: CPT

## 2018-09-12 PROCEDURE — 83690 ASSAY OF LIPASE: CPT

## 2018-09-12 PROCEDURE — 84484 ASSAY OF TROPONIN QUANT: CPT

## 2018-09-12 PROCEDURE — 99406 BEHAV CHNG SMOKING 3-10 MIN: CPT | Performed by: HOSPITALIST

## 2018-09-12 PROCEDURE — 85610 PROTHROMBIN TIME: CPT

## 2018-09-12 RX ORDER — MORPHINE SULFATE 4 MG/ML
2-4 INJECTION, SOLUTION INTRAMUSCULAR; INTRAVENOUS EVERY 4 HOURS PRN
Status: DISCONTINUED | OUTPATIENT
Start: 2018-09-12 | End: 2018-09-16 | Stop reason: HOSPADM

## 2018-09-12 RX ORDER — MORPHINE SULFATE 4 MG/ML
4 INJECTION, SOLUTION INTRAMUSCULAR; INTRAVENOUS ONCE
Status: COMPLETED | OUTPATIENT
Start: 2018-09-12 | End: 2018-09-12

## 2018-09-12 RX ORDER — KETOROLAC TROMETHAMINE 30 MG/ML
30 INJECTION, SOLUTION INTRAMUSCULAR; INTRAVENOUS EVERY 6 HOURS PRN
Status: DISCONTINUED | OUTPATIENT
Start: 2018-09-12 | End: 2018-09-16 | Stop reason: HOSPADM

## 2018-09-12 RX ORDER — GABAPENTIN 300 MG/1
600 CAPSULE ORAL 3 TIMES DAILY
Status: DISCONTINUED | OUTPATIENT
Start: 2018-09-13 | End: 2018-09-16 | Stop reason: HOSPADM

## 2018-09-12 RX ORDER — DEXTROSE MONOHYDRATE 25 G/50ML
25 INJECTION, SOLUTION INTRAVENOUS
Status: DISCONTINUED | OUTPATIENT
Start: 2018-09-12 | End: 2018-09-14

## 2018-09-12 RX ORDER — BISACODYL 10 MG
10 SUPPOSITORY, RECTAL RECTAL
Status: DISCONTINUED | OUTPATIENT
Start: 2018-09-12 | End: 2018-09-16 | Stop reason: HOSPADM

## 2018-09-12 RX ORDER — SODIUM CHLORIDE 9 MG/ML
500 INJECTION, SOLUTION INTRAVENOUS
Status: DISCONTINUED | OUTPATIENT
Start: 2018-09-12 | End: 2018-09-16 | Stop reason: HOSPADM

## 2018-09-12 RX ORDER — LEVOTHYROXINE SODIUM 88 UG/1
88 TABLET ORAL
Status: DISCONTINUED | OUTPATIENT
Start: 2018-09-13 | End: 2018-09-16 | Stop reason: HOSPADM

## 2018-09-12 RX ORDER — MORPHINE SULFATE 30 MG/1
30 TABLET, FILM COATED, EXTENDED RELEASE ORAL 3 TIMES DAILY
Status: DISCONTINUED | OUTPATIENT
Start: 2018-09-13 | End: 2018-09-16 | Stop reason: HOSPADM

## 2018-09-12 RX ORDER — TRAZODONE HYDROCHLORIDE 50 MG/1
50 TABLET ORAL
Status: DISCONTINUED | OUTPATIENT
Start: 2018-09-12 | End: 2018-09-16 | Stop reason: HOSPADM

## 2018-09-12 RX ORDER — POLYETHYLENE GLYCOL 3350 17 G/17G
1 POWDER, FOR SOLUTION ORAL
Status: DISCONTINUED | OUTPATIENT
Start: 2018-09-12 | End: 2018-09-16 | Stop reason: HOSPADM

## 2018-09-12 RX ORDER — OXYCODONE HYDROCHLORIDE 30 MG/1
30 TABLET ORAL
Status: DISCONTINUED | OUTPATIENT
Start: 2018-09-12 | End: 2018-09-16 | Stop reason: HOSPADM

## 2018-09-12 RX ORDER — SODIUM CHLORIDE AND POTASSIUM CHLORIDE 300; 900 MG/100ML; MG/100ML
INJECTION, SOLUTION INTRAVENOUS ONCE
Status: COMPLETED | OUTPATIENT
Start: 2018-09-12 | End: 2018-09-13

## 2018-09-12 RX ORDER — POTASSIUM CHLORIDE 20 MEQ/1
40 TABLET, EXTENDED RELEASE ORAL ONCE
Status: COMPLETED | OUTPATIENT
Start: 2018-09-12 | End: 2018-09-13

## 2018-09-12 RX ORDER — AMOXICILLIN 250 MG
2 CAPSULE ORAL 2 TIMES DAILY
Status: DISCONTINUED | OUTPATIENT
Start: 2018-09-12 | End: 2018-09-16 | Stop reason: HOSPADM

## 2018-09-12 RX ADMIN — MORPHINE SULFATE 4 MG: 4 INJECTION INTRAVENOUS at 21:03

## 2018-09-12 RX ADMIN — LIDOCAINE HYDROCHLORIDE 30 ML: 20 SOLUTION OROPHARYNGEAL at 21:03

## 2018-09-12 RX ADMIN — MORPHINE SULFATE 4 MG: 4 INJECTION INTRAVENOUS at 22:20

## 2018-09-12 RX ADMIN — CEFTRIAXONE SODIUM 1 G: 1 INJECTION, POWDER, FOR SOLUTION INTRAMUSCULAR; INTRAVENOUS at 22:20

## 2018-09-12 ASSESSMENT — PAIN SCALES - GENERAL
PAINLEVEL_OUTOF10: 10
PAINLEVEL_OUTOF10: 10

## 2018-09-13 ENCOUNTER — TELEPHONE (OUTPATIENT)
Dept: MEDICAL GROUP | Facility: PHYSICIAN GROUP | Age: 63
End: 2018-09-13

## 2018-09-13 PROBLEM — L03.114 CELLULITIS OF LEFT HAND: Status: ACTIVE | Noted: 2018-09-13

## 2018-09-13 PROBLEM — R73.9 HYPERGLYCEMIA: Status: ACTIVE | Noted: 2018-09-13

## 2018-09-13 PROBLEM — E66.9 CLASS 1 OBESITY IN ADULT: Status: ACTIVE | Noted: 2018-09-13

## 2018-09-13 PROBLEM — E66.811 CLASS 1 OBESITY IN ADULT: Status: ACTIVE | Noted: 2018-09-13

## 2018-09-13 PROBLEM — Z72.0 TOBACCO USE: Status: ACTIVE | Noted: 2018-09-13

## 2018-09-13 LAB
ANION GAP SERPL CALC-SCNC: 9 MMOL/L (ref 0–11.9)
BUN SERPL-MCNC: 5 MG/DL (ref 8–22)
CALCIUM SERPL-MCNC: 7.4 MG/DL (ref 8.5–10.5)
CHLORIDE SERPL-SCNC: 103 MMOL/L (ref 96–112)
CO2 SERPL-SCNC: 25 MMOL/L (ref 20–33)
CREAT SERPL-MCNC: 0.55 MG/DL (ref 0.5–1.4)
ERYTHROCYTE [DISTWIDTH] IN BLOOD BY AUTOMATED COUNT: 48.8 FL (ref 35.9–50)
EST. AVERAGE GLUCOSE BLD GHB EST-MCNC: 120 MG/DL
GLUCOSE BLD-MCNC: 116 MG/DL (ref 65–99)
GLUCOSE BLD-MCNC: 134 MG/DL (ref 65–99)
GLUCOSE BLD-MCNC: 88 MG/DL (ref 65–99)
GLUCOSE BLD-MCNC: 98 MG/DL (ref 65–99)
GLUCOSE SERPL-MCNC: 95 MG/DL (ref 65–99)
HBA1C MFR BLD: 5.8 % (ref 0–5.6)
HCT VFR BLD AUTO: 39.6 % (ref 37–47)
HGB BLD-MCNC: 13.9 G/DL (ref 12–16)
MAGNESIUM SERPL-MCNC: 1.5 MG/DL (ref 1.5–2.5)
MCH RBC QN AUTO: 36.4 PG (ref 27–33)
MCHC RBC AUTO-ENTMCNC: 35.1 G/DL (ref 33.6–35)
MCV RBC AUTO: 103.7 FL (ref 81.4–97.8)
PLATELET # BLD AUTO: 162 K/UL (ref 164–446)
PMV BLD AUTO: 10.7 FL (ref 9–12.9)
POTASSIUM SERPL-SCNC: 4.2 MMOL/L (ref 3.6–5.5)
RBC # BLD AUTO: 3.82 M/UL (ref 4.2–5.4)
SODIUM SERPL-SCNC: 137 MMOL/L (ref 135–145)
URATE SERPL-MCNC: 3.4 MG/DL (ref 1.9–8.2)
WBC # BLD AUTO: 4.4 K/UL (ref 4.8–10.8)

## 2018-09-13 PROCEDURE — 84550 ASSAY OF BLOOD/URIC ACID: CPT

## 2018-09-13 PROCEDURE — 700101 HCHG RX REV CODE 250: Performed by: HOSPITALIST

## 2018-09-13 PROCEDURE — 80048 BASIC METABOLIC PNL TOTAL CA: CPT

## 2018-09-13 PROCEDURE — 700105 HCHG RX REV CODE 258: Performed by: HOSPITALIST

## 2018-09-13 PROCEDURE — 99232 SBSQ HOSP IP/OBS MODERATE 35: CPT | Performed by: INTERNAL MEDICINE

## 2018-09-13 PROCEDURE — 83036 HEMOGLOBIN GLYCOSYLATED A1C: CPT

## 2018-09-13 PROCEDURE — A9270 NON-COVERED ITEM OR SERVICE: HCPCS | Performed by: HOSPITALIST

## 2018-09-13 PROCEDURE — 82962 GLUCOSE BLOOD TEST: CPT | Mod: 91

## 2018-09-13 PROCEDURE — 770006 HCHG ROOM/CARE - MED/SURG/GYN SEMI*

## 2018-09-13 PROCEDURE — 700111 HCHG RX REV CODE 636 W/ 250 OVERRIDE (IP): Performed by: HOSPITALIST

## 2018-09-13 PROCEDURE — 85027 COMPLETE CBC AUTOMATED: CPT

## 2018-09-13 PROCEDURE — 96376 TX/PRO/DX INJ SAME DRUG ADON: CPT

## 2018-09-13 PROCEDURE — A9270 NON-COVERED ITEM OR SERVICE: HCPCS | Performed by: INTERNAL MEDICINE

## 2018-09-13 PROCEDURE — 96367 TX/PROPH/DG ADDL SEQ IV INF: CPT

## 2018-09-13 PROCEDURE — 96375 TX/PRO/DX INJ NEW DRUG ADDON: CPT

## 2018-09-13 PROCEDURE — 700102 HCHG RX REV CODE 250 W/ 637 OVERRIDE(OP): Performed by: HOSPITALIST

## 2018-09-13 PROCEDURE — 83735 ASSAY OF MAGNESIUM: CPT

## 2018-09-13 PROCEDURE — 700102 HCHG RX REV CODE 250 W/ 637 OVERRIDE(OP): Performed by: INTERNAL MEDICINE

## 2018-09-13 RX ORDER — CALCIUM CARBONATE 500 MG/1
2800 TABLET, CHEWABLE ORAL DAILY
Status: DISCONTINUED | OUTPATIENT
Start: 2018-09-13 | End: 2018-09-16 | Stop reason: HOSPADM

## 2018-09-13 RX ORDER — DOXYCYCLINE 100 MG/1
100 TABLET ORAL EVERY 12 HOURS
Status: DISCONTINUED | OUTPATIENT
Start: 2018-09-13 | End: 2018-09-16 | Stop reason: HOSPADM

## 2018-09-13 RX ADMIN — AMPICILLIN AND SULBACTAM 3 G: 2; 1 INJECTION, POWDER, FOR SOLUTION INTRAVENOUS at 17:31

## 2018-09-13 RX ADMIN — AMPICILLIN AND SULBACTAM 3 G: 2; 1 INJECTION, POWDER, FOR SOLUTION INTRAVENOUS at 23:51

## 2018-09-13 RX ADMIN — GABAPENTIN 600 MG: 300 CAPSULE ORAL at 12:18

## 2018-09-13 RX ADMIN — OXYCODONE HYDROCHLORIDE 30 MG: 30 TABLET ORAL at 03:14

## 2018-09-13 RX ADMIN — DOXYCYCLINE 100 MG: 100 TABLET ORAL at 17:29

## 2018-09-13 RX ADMIN — GABAPENTIN 600 MG: 300 CAPSULE ORAL at 17:29

## 2018-09-13 RX ADMIN — NYSTATIN 500000 UNITS: 100000 SUSPENSION ORAL at 17:29

## 2018-09-13 RX ADMIN — AMPICILLIN AND SULBACTAM 3 G: 2; 1 INJECTION, POWDER, FOR SOLUTION INTRAVENOUS at 12:20

## 2018-09-13 RX ADMIN — MORPHINE SULFATE 30 MG: 30 TABLET, EXTENDED RELEASE ORAL at 05:34

## 2018-09-13 RX ADMIN — TRAZODONE HYDROCHLORIDE 50 MG: 50 TABLET ORAL at 21:56

## 2018-09-13 RX ADMIN — MORPHINE SULFATE 4 MG: 4 INJECTION INTRAVENOUS at 14:35

## 2018-09-13 RX ADMIN — AMPICILLIN AND SULBACTAM 3 G: 2; 1 INJECTION, POWDER, FOR SOLUTION INTRAVENOUS at 05:37

## 2018-09-13 RX ADMIN — ANTACID TABLETS 2750 MG: 500 TABLET, CHEWABLE ORAL at 15:32

## 2018-09-13 RX ADMIN — MORPHINE SULFATE 4 MG: 4 INJECTION INTRAVENOUS at 06:31

## 2018-09-13 RX ADMIN — VITAMIN D, TAB 1000IU (100/BT) 4000 UNITS: 25 TAB at 15:31

## 2018-09-13 RX ADMIN — KETOROLAC TROMETHAMINE 30 MG: 30 INJECTION, SOLUTION INTRAMUSCULAR at 08:18

## 2018-09-13 RX ADMIN — ENOXAPARIN SODIUM 40 MG: 40 INJECTION SUBCUTANEOUS at 05:35

## 2018-09-13 RX ADMIN — LEVOTHYROXINE SODIUM 88 MCG: 88 TABLET ORAL at 05:33

## 2018-09-13 RX ADMIN — KETOROLAC TROMETHAMINE 30 MG: 30 INJECTION, SOLUTION INTRAMUSCULAR at 14:35

## 2018-09-13 RX ADMIN — POTASSIUM CHLORIDE AND SODIUM CHLORIDE: 900; 300 INJECTION, SOLUTION INTRAVENOUS at 03:16

## 2018-09-13 RX ADMIN — TRAZODONE HYDROCHLORIDE 50 MG: 50 TABLET ORAL at 01:09

## 2018-09-13 RX ADMIN — POTASSIUM CHLORIDE 40 MEQ: 1500 TABLET, EXTENDED RELEASE ORAL at 01:09

## 2018-09-13 RX ADMIN — MORPHINE SULFATE 4 MG: 4 INJECTION INTRAVENOUS at 20:42

## 2018-09-13 RX ADMIN — GABAPENTIN 600 MG: 300 CAPSULE ORAL at 05:33

## 2018-09-13 RX ADMIN — MORPHINE SULFATE 4 MG: 4 INJECTION INTRAVENOUS at 01:09

## 2018-09-13 RX ADMIN — DOXYCYCLINE 100 MG: 100 INJECTION, POWDER, LYOPHILIZED, FOR SOLUTION INTRAVENOUS at 04:04

## 2018-09-13 RX ADMIN — NYSTATIN 500000 UNITS: 100000 SUSPENSION ORAL at 20:41

## 2018-09-13 RX ADMIN — KETOROLAC TROMETHAMINE 30 MG: 30 INJECTION, SOLUTION INTRAMUSCULAR at 20:43

## 2018-09-13 RX ADMIN — MORPHINE SULFATE 30 MG: 30 TABLET, EXTENDED RELEASE ORAL at 12:18

## 2018-09-13 RX ADMIN — MORPHINE SULFATE 4 MG: 4 INJECTION INTRAVENOUS at 10:36

## 2018-09-13 RX ADMIN — AMPICILLIN AND SULBACTAM 3 G: 2; 1 INJECTION, POWDER, FOR SOLUTION INTRAVENOUS at 01:15

## 2018-09-13 RX ADMIN — MORPHINE SULFATE 30 MG: 30 TABLET, EXTENDED RELEASE ORAL at 17:29

## 2018-09-13 RX ADMIN — KETOROLAC TROMETHAMINE 30 MG: 30 INJECTION, SOLUTION INTRAMUSCULAR at 01:09

## 2018-09-13 ASSESSMENT — COPD QUESTIONNAIRES
IN THE PAST 12 MONTHS DO YOU DO LESS THAN YOU USED TO BECAUSE OF YOUR BREATHING PROBLEMS: DISAGREE/UNSURE
DURING THE PAST 4 WEEKS HOW MUCH DID YOU FEEL SHORT OF BREATH: NONE/LITTLE OF THE TIME
HAVE YOU SMOKED AT LEAST 100 CIGARETTES IN YOUR ENTIRE LIFE: YES
DO YOU EVER COUGH UP ANY MUCUS OR PHLEGM?: YES, A FEW DAYS A WEEK OR MONTH
COPD SCREENING SCORE: 5

## 2018-09-13 ASSESSMENT — ENCOUNTER SYMPTOMS
ABDOMINAL PAIN: 0
SORE THROAT: 0
DIARRHEA: 0
VOMITING: 0
SENSORY CHANGE: 0
CHILLS: 0
COUGH: 0
FEVER: 0
MYALGIAS: 0
DEPRESSION: 0
TREMORS: 0
SHORTNESS OF BREATH: 0
WHEEZING: 0
PHOTOPHOBIA: 0
SINUS PAIN: 0
NAUSEA: 0
TINGLING: 0
FOCAL WEAKNESS: 0
DIZZINESS: 0
HEADACHES: 0
PALPITATIONS: 0

## 2018-09-13 ASSESSMENT — COGNITIVE AND FUNCTIONAL STATUS - GENERAL
TURNING FROM BACK TO SIDE WHILE IN FLAT BAD: A LOT
MOVING FROM LYING ON BACK TO SITTING ON SIDE OF FLAT BED: A LOT
PERSONAL GROOMING: A LITTLE
WALKING IN HOSPITAL ROOM: A LOT
DAILY ACTIVITIY SCORE: 14
SUGGESTED CMS G CODE MODIFIER MOBILITY: CL
MOVING TO AND FROM BED TO CHAIR: A LITTLE
DRESSING REGULAR UPPER BODY CLOTHING: A LOT
MOBILITY SCORE: 13
EATING MEALS: A LITTLE
TOILETING: A LOT
CLIMB 3 TO 5 STEPS WITH RAILING: A LOT
SUGGESTED CMS G CODE MODIFIER DAILY ACTIVITY: CK
HELP NEEDED FOR BATHING: A LOT
STANDING UP FROM CHAIR USING ARMS: A LOT
DRESSING REGULAR LOWER BODY CLOTHING: A LOT

## 2018-09-13 ASSESSMENT — LIFESTYLE VARIABLES
EVER HAD A DRINK FIRST THING IN THE MORNING TO STEADY YOUR NERVES TO GET RID OF A HANGOVER: NO
ALCOHOL_USE: YES
TOTAL SCORE: 0
HAVE YOU EVER FELT YOU SHOULD CUT DOWN ON YOUR DRINKING: NO
EVER_SMOKED: YES
HAVE PEOPLE ANNOYED YOU BY CRITICIZING YOUR DRINKING: NO
CONSUMPTION TOTAL: INCOMPLETE
EVER FELT BAD OR GUILTY ABOUT YOUR DRINKING: NO
TOTAL SCORE: 0
TOTAL SCORE: 0

## 2018-09-13 ASSESSMENT — PAIN SCALES - GENERAL
PAINLEVEL_OUTOF10: 10
PAINLEVEL_OUTOF10: 6
PAINLEVEL_OUTOF10: 10
PAINLEVEL_OUTOF10: 8
PAINLEVEL_OUTOF10: 10

## 2018-09-13 ASSESSMENT — PATIENT HEALTH QUESTIONNAIRE - PHQ9
1. LITTLE INTEREST OR PLEASURE IN DOING THINGS: NOT AT ALL
SUM OF ALL RESPONSES TO PHQ9 QUESTIONS 1 AND 2: 0
2. FEELING DOWN, DEPRESSED, IRRITABLE, OR HOPELESS: NOT AT ALL

## 2018-09-13 NOTE — PROGRESS NOTES
Pt med rec and MAR updated w/ current vitamin D, calcium and oral suspension nystatin home dose. Pt had epinephrine pens and her oral nystatin from home in her purse, gave to  to take home.

## 2018-09-13 NOTE — TELEPHONE ENCOUNTER
Future Appointments       Provider Department Center    9/14/2018 5:20 PM Huma Moon M.D. [facility]        ESTABLISHED PATIENT PRE-VISIT PLANNING     Note: Patient will not be contacted if there is no indication to call.     1.  Reviewed notes from the last few office visits within the medical group: Yes    2.  If any orders were placed at last visit or intended to be done for this visit (i.e. 6 mos follow-up), do we have Results/Consult Notes?        •  Labs - Labs ordered, NOT completed. Patient advised to complete prior to next appointment.       •  Imaging - Imaging ordered, completed and results are in chart.       •  Referrals - No referrals were ordered at last office visit.    3. Is this appointment scheduled as a Hospital Follow-Up? No    4.  Immunizations were updated in [employer] using WebIZ?: Yes       •  Web Iz Recommendations: FLU, HEPATITIS A , HEPATITIS B, TD and ZOSTAVAX (Shingles)    5.  Patient is due for the following Health Maintenance Topics:   Health Maintenance Due   Topic Date Due   • IMM HEP B VACCINE (2 of 3 - Hep B Twinrix risk 3-dose series) 06/21/2005   • IMM ZOSTER VACCINES (1 of 2) 10/02/2006   • MAMMOGRAM  06/24/2014   • BONE DENSITY  02/02/2017   • IMM INFLUENZA (1) 09/01/2018       6.  MDX printed for Provider? NO  INS laborers     7.  Patient was informed to arrive 15 min prior to their scheduled appointment and bring in their medication bottles.

## 2018-09-13 NOTE — ED NOTES
Bedside report received from Edilberto PERSAUD. Vitals re obtained. Pt requesting another ice pack. Ice pack given.

## 2018-09-13 NOTE — ASSESSMENT & PLAN NOTE
Smoking cessation counseling was provided.  Patient states that she is trying to cut back and previously has been on Chantix and has nicotine patches at home as well.  We will continue to encourage cessation and discuss other supportive resources including community groups and prescription medications.

## 2018-09-13 NOTE — ASSESSMENT & PLAN NOTE
Patient denies a history of diabetes mellitus but given her elevated blood glucose was 261 on admission. Improved   A1c 5.8  For now I will monitor with Accu-Cheks and cover with insulin sliding scale.

## 2018-09-13 NOTE — PROGRESS NOTES
"Pt A&O x4, c/o pain to left arm see care plan for pain control. L arm still very swollen w/ 2+ edema, no redness noted, pulse WNL, w/ very poor dexterity w/ hand/arm, partial to non-weight bearing. Elevated on pillow w/ ice packs applied for comfort/swelling.    Up to BR in morning to void, she is a one assist at the moment w/ her arm swelling/pain. Bed alarm applied, pt calls appropriately. Aware of her POC, pt frustrated w/ \"how long its taking.\" Explained medication regiment, but pt very impatient, wants immediate relief.  "

## 2018-09-13 NOTE — CARE PLAN
Problem: Pain Management  Goal: Pain level will decrease to patient's comfort goal  Outcome: PROGRESSING AS EXPECTED  Pt is getting PO and two kinds of IV pain medication, pt says it has only decreased her pain from 10/10 to 9/10. Informed pt IV antibiotics will also help decrease the pain overtime. Hospitalist aware of pt's pain level.    Problem: Safety  Goal: Will remain free from falls  Outcome: PROGRESSING AS EXPECTED  Pt will be free from falls w/ hourly rounding, appropriate signs placed and call lights remaining within reach.

## 2018-09-13 NOTE — ED TRIAGE NOTES
Pt to blue 16 via WC,  present at bedside.  Pt states arm started swelling yesterday morning at 900. Pt states chest pain over multiple weeks, was seen at [place] and diagnosed with ulcers in the GI tract. Chest pain worsening this afternoon. Pt states pain that radiates from hand up through arm and shoulder and inability to move arm without extreme pain.

## 2018-09-13 NOTE — ED TRIAGE NOTES
".  Chief Complaint   Patient presents with   • Hand Swelling     Left hand swelling, warm to touch, denies trauma   • Chest Pain     Substernal chest pain x 1 month, hx of ulcers     ./98   Pulse 98   Temp 37.4 °C (99.4 °F)   Resp 14   Ht 1.549 m (5' 1\")   Wt 72.2 kg (159 lb 2.8 oz)   LMP 10/02/1980   SpO2 93%   BMI 30.08 kg/m²     Patient to triage with above complaints, called for EKG  "

## 2018-09-13 NOTE — ASSESSMENT & PLAN NOTE
This is chronic, unclear if patient is taking her home potassium supplementation.  Mg level pending and correct as needed.  Patient will be placed on IV and oral potassium.

## 2018-09-13 NOTE — PROGRESS NOTES
Two RN skin assessment completed with Jolanta PERSAUD. Pt presents redness on the right groin and swelling and redness to LUE. Otherwise, skin is intact. No open wounds or rash noted.

## 2018-09-13 NOTE — ASSESSMENT & PLAN NOTE
Continue home MS Contin and oxycodone for breakthrough,  will add additional IV breakthrough pain medication in light of her acute injury.

## 2018-09-13 NOTE — DISCHARGE PLANNING
Transitional Care Navigator:    Chart reviewed for post acute needs. Pt is a 62 yof admitted with cellulitis of rhe left hand and arm. She was noted in ER to have an elevated blood glucose.    This patient is an appropriate candidate for home health support for follow up with DM, medication management, skin condition, and possibly PT to improve her functional mobility- pt currently can travel up to the bathroom, approx 8 feet.    Please consider an order for HH. She has been on service with [facility].

## 2018-09-13 NOTE — H&P
Hospital Medicine History & Physical Note    Date of Service  9/12/2018    Primary Care Physician  OLIMPIA Edwards    Consultants  None    Code Status  Full    Chief Complaint  Left hand pain and swelling    History of Presenting Illness  62 y.o. female who presented on 9/12/2018 with left hand pain and swelling.  Patient states that she was discharged from [facility] on August 23 after an admission for chest discomfort and epigastric pain.  She states that she was worked up and was diagnosed with peptic ulcer disease.  She also was prescribed some type of antibiotic which she did not start immediately because of apparent issues with obtaining the medication but states that she has been on them for the last several days although she does not remember the name.  She was feeling well until yesterday when she had new left hand swelling with redness.  She denies any recent trauma to the hand, no insect bites, no cuts or bruises.  She does carry a history of gout but has not had a flare in several years and her last flare was in her ankles.  She denies any fevers, chills, nausea, or vomiting.  She has had no headache, shortness of breath, abdominal pain, diarrhea or dysuria.      Review of Systems  Review of Systems   Constitutional: Negative for chills and fever.   HENT: Negative for congestion and sore throat.    Eyes: Negative for photophobia.   Respiratory: Negative for cough, shortness of breath and wheezing.    Cardiovascular: Negative for chest pain and palpitations.   Gastrointestinal: Negative for abdominal pain, diarrhea, nausea and vomiting.   Genitourinary: Negative for dysuria.   Musculoskeletal: Negative for myalgias.   Skin: Negative.         Swollen and painful left hand   Neurological: Negative for dizziness, tingling, focal weakness and headaches.   Psychiatric/Behavioral: Negative for depression and suicidal ideas.       Past Medical History  Past Medical History:   Diagnosis Date  "  • Anesthesia 4-26-17    \"Hard to put to sleep\"   • Arthritis 4-26-17    \"Hands\"   • Dental disorder 4-26-17    \"Upper permanent right side bridge\"   • Psychiatric problem 4-26-17    depression, anxiety   • S/P tooth extraction 4-25-17    Pt. states Dr. Sexton is aware.   • Lumbago 2017    back and neck   • Hyperlipidemia 6/5/2015   • Chronic diarrhea 9/30/2013   • Serum calcium elevated 10/8/2012   • Helicobacter pylori (H. pylori) infection 9/11/2012   • Sialolithiasis, ductal 7/21/2012   • Mixed hyperlipidemia 12/11/2009   • Osteoporosis, unspecified 12/11/2009   • Symptomatic menopausal or female climacteric states 12/11/2009   • Hypoparathyroidism (HCC) 12/11/2009   • Other B-complex deficiencies 12/11/2009   • Tobacco abuse 12/11/2009   • Alcohol abuse, continuous drinking behavior 12/11/2009   • Unspecified essential hypertension 12/11/2009   • Postsurgical hypothyroidism 12/11/2009   • Anxiety    • Gout    • Gynecological disorder     \"HX Gonorrhea, was treated\"   • Hemorrhagic disorder (HCC)     \"needed a lot of blood with back surgery\"   • Parotid cyst    • Restless leg syndrome    • Snoring    • Urinary incontinence     urgency       Surgical History  Past Surgical History:   Procedure Laterality Date   • LUMBAR FUSION POSTERIOR  8/7/2017    Procedure: LUMBAR FUSION POSTERIOR EXPLORATION;  Surgeon: Garrett Sexton M.D.;  Location: [facility];  Service:    • HARDWARE REMOVAL NEURO  8/7/2017    Procedure: HARDWARE REMOVAL NEURO DEEP L1-L3, L4-S1, SEROMA REMOVAL;  Surgeon: Garrett Sexton M.D.;  Location: [facility];  Service:    • LUMBAR FUSION POSTERIOR  12/17/2015    Procedure: LUMBAR FUSION POSTERIOR L1-S1;  Surgeon: Garrett Sexton M.D.;  Location: [facility];  Service:    • LUMBAR LAMINECTOMY DISKECTOMY  12/17/2015    Procedure: LUMBAR LAMINECTOMY DISKECTOMY;  Surgeon: Garrett Sexton M.D.;  Location: [facility];  Service:    • LUMBAR DECOMPRESSION  " "12/17/2015    Procedure: LUMBAR DECOMPRESSION L1-L3;  Surgeon: Garrett Sexton M.D.;  Location: SURGERY [facility];  Service:    • COLONOSCOPY WITH POLYP  8/1/2012    Performed by ARIA EMERSON at ENDOSCOPY [facility]   • GASTROSCOPY WITH BIOPSY  8/1/2012    Performed by ARIA EMERSON at ENDOSCOPY [facility] [place]   • THYROIDECTOMY  2001   • PARATHYROIDECTOMY  2001   • COLON RESECTION  1985    7 feet of intestine   • APPENDECTOMY     • DENTAL SURGERY     • HYSTERECTOMY, VAGINAL      partial, benign   • PRIMARY C SECTION     • SALPINGO-OOPHORECTOMY, UNILATERAL         Family History  Family History   Problem Relation Age of Onset   • Genetic Mother         Alzheimer's   • Alcohol/Drug Father        Social History  Social History   Substance Use Topics   • Smoking status: Current Every Day Smoker     Packs/day: 0.50     Years: 47.00     Types: Cigarettes     Start date: 4/25/1970   • Smokeless tobacco: Never Used      Comment: quit during pregnancy, now 2-3 cig daily   • Alcohol use No      Comment: quit drinking daily 3 years ago, now drinks 3-4 beers on weekends       Allergies  Allergies   Allergen Reactions   • Bee Venom Anaphylaxis   • Latex Rash   • Asa [Aspirin] Unspecified     Pt states \"It tears up my stomach\".   • Coconut Oil Itching and Swelling     Raw coconut   • Codeine Itching     Pt states \"I itch so much\".   • Contrast Media With Iodine [Iodine] Vomiting and Nausea     Not sure of reaction   • Hydrocodone-Ibuprofen      \"Extreme itching\"   • Ibuprofen Unspecified     Pt states \"It tears up my stomach\".   • Milk [Lac Bovis]      Can drink or eat small amount   • Norco [Apap-Fd&C Yellow #[place]-Hydrocodone] Itching     Pt states \"I itch all over\".   • Other Drug Unspecified     VISTRIL  Pt states \"I get forgetful\".   • Other Environmental Rash     adhesive   • Other Food Unspecified     sourdough bread  Pt states \"My face and mouth gets all tingling\".     • Pcn [Penicillins] Hives   • " "Sulfa Drugs Hives   • Talwin Unspecified     Pt states \"I get forgetful\".   • Tape Swelling     Red swelling   • Vicodin [Hydrocodone-Acetaminophen] Itching     Pt states \"I itch so much\".       Medications  No current facility-administered medications on file prior to encounter.      Current Outpatient Prescriptions on File Prior to Encounter   Medication Sig Dispense Refill   • gabapentin (NEURONTIN) 300 MG Cap TAKE 2 CAPSULES BY MOUTH THREE TIMES DAILY 180 Cap 0   • lorazepam (ATIVAN) 2 MG tablet Take 1 tablet by mouth once daily in the evening for 90 days. 30 Tab 0   • potassium chloride SA (K-DUR) 10 MEQ Tab CR TAKE 1 TABLET BY MOUTH ONCE DAILY ON SUNDAY, TUESDAY, WEDNESDAY, THURSDAY AND SATURDAY. 60 Tab 1   • albuterol 108 (90 Base) MCG/ACT Aero Soln inhalation aerosol Inhale 2 Puffs by mouth every 6 hours as needed for Shortness of Breath. 8.5 g 0   • meclizine (ANTIVERT) 12.5 MG Tab TAKE 1 TABLET BY MOUTH EVERY DAY AS NEEDED 30 Tab 0   • meloxicam (MOBIC) 15 MG tablet TAKE 1 TABLET BY MOUTH ONCE DAILY 90 Tab 0   • traZODone (DESYREL) 50 MG Tab TAKE 1 TO 2 TABLETS BY MOUTH EVERY DAY AT BEDTIME AS NEEDED FOR SLEEP 60 Tab 2   • EPINEPHrine (EPIPEN) 0.3 MG/0.3ML Solution Auto-injector solution for injection 0.3 mL by Intramuscular route 1 time daily as needed (Bee stings). Inject into the middle of the outer thigh & hold for 3secs 1 Each 1   • varenicline (CHANTIX STARTING MONTH PAK) 0.5 MG X 11 & 1 MG X 42 tablet Per packet 56 Tab 3   • cholestyramine (QUESTRAN) 4 g packet Take 4 g by mouth every day. 90 Each 0   • allopurinol (ZYLOPRIM) 300 MG Tab TAKE 1 TABLET BY MOUTH EVERY DAY 90 Tab 0   • carbidopa-levodopa (SINEMET)  MG Tab TAKE 1 TABLET BY MOUTH DAILY 90 Tab 1   • URI CONTOUR NEXT TEST strip USE TO TEST BLOOD GLUCOSE LEVELS ONCE D  1   • levothyroxine (SYNTHROID) 88 MCG Tab Take 1 Tab by mouth Every morning on an empty stomach. Brand name only. 30 Tab 0   • oxycodone (OXY-IR) 30 MG immediate " release tablet Take 1 Tab by mouth every 3 hours as needed for Moderate Pain. 30 Tab 0   • diphenhydrAMINE (BENADRYL) 25 MG Tab Take 1 tablet by mouth every 6 hours as needed for Itching. 30 Tab 0   • docusate sodium 100 MG Cap Take 100 mg by mouth 2 Times a Day. 60 Cap    • acetaminophen (TYLENOL) 500 MG Tab Take 500-1,000 mg by mouth every 6 hours as needed.     • Naproxen Sodium (ALEVE) 220 MG Cap Take 220 mg by mouth every 12 hours.     • lidocaine (LIDODERM) 5 % Patch Apply 2 Patches to skin as directed See Admin Instructions. 2 at a time 12 hrs on, 12 hrs off      • calcitRIOL (ROCALTROL) 0.25 MCG Cap Take 0.25 mcg by mouth every day.     • Cholecalciferol (VITAMIN D3) 1000 UNITS Cap Take 5,000 Units by mouth every day.     • Calcium Carbonate Antacid (TUMS PO) Take 1,200 mg by mouth every day. Needs to take BERRY flavor     • morphine ER (MS CONTIN) 30 MG Tab CR tablet Take 30 mg by mouth 3 times a day. 1 tab PO 3 times daily         Physical Exam  Hemodynamics  Temp (24hrs), Av.4 °C (99.4 °F), Min:37.4 °C (99.4 °F), Max:37.4 °C (99.4 °F)   Temperature: 37.4 °C (99.4 °F)  Pulse  Av.6  Min: 90  Max: 107 Heart Rate (Monitored): 90  Blood Pressure: (!) 170/116      Respiratory      Respiration: 15, Pulse Oximetry: 95 %             Physical Exam   Constitutional: She is oriented to person, place, and time. No distress.   HENT:   Head: Normocephalic and atraumatic.   Right Ear: External ear normal.   Left Ear: External ear normal.   Eyes: EOM are normal. Right eye exhibits no discharge. Left eye exhibits no discharge.   Neck: Neck supple. No JVD present.   Cardiovascular: Normal rate, regular rhythm and normal heart sounds.    Pulmonary/Chest: Effort normal and breath sounds normal. No respiratory distress. She exhibits no tenderness.   Abdominal: Soft. Bowel sounds are normal. She exhibits no distension. There is no tenderness.   Musculoskeletal: Normal range of motion. She exhibits no edema.    Neurological: She is alert and oriented to person, place, and time. No cranial nerve deficit.   Skin: Skin is warm and dry. She is not diaphoretic. There is erythema.   Left hand is swollen and painful, dorsum has area of erythema which extends toward her wrist but no streaking evident to indicate lymphangitis.   Psychiatric: She has a normal mood and affect. Her behavior is normal.   Nursing note and vitals reviewed.    Capillary refill less than 3 seconds, distal pulses intact    Laboratory:  Recent Labs      09/12/18   1806   WBC  5.8   RBC  4.16*   HEMOGLOBIN  14.8   HEMATOCRIT  42.8   MCV  102.9*   MCH  35.6*   MCHC  34.6   RDW  49.1   PLATELETCT  205   MPV  10.8     Recent Labs      09/12/18   1806   SODIUM  137   POTASSIUM  3.0*   CHLORIDE  98   CO2  25   GLUCOSE  261*   BUN  5*   CREATININE  0.72   CALCIUM  8.5     Recent Labs      09/12/18   1806   ALTSGPT  17   ASTSGOT  18   ALKPHOSPHAT  76   TBILIRUBIN  0.5   LIPASE  26   GLUCOSE  261*     Recent Labs      09/12/18   1806   APTT  32.6   INR  1.20*     Recent Labs      09/12/18   1806   BNPBTYPENAT  56         Lab Results   Component Value Date    TROPONINI <0.01 09/12/2018       Imaging  Dx-chest-limited (1 View)    Result Date: 9/12/2018 9/12/2018 6:12 PM HISTORY/REASON FOR EXAM:  Chest Pain TECHNIQUE/EXAM DESCRIPTION AND NUMBER OF VIEWS: Single portable view of the chest. COMPARISON: 4/26/2017 FINDINGS: Heart size is within normal limits. No focal infiltrates or consolidations are identified in the lungs. No pleural fluid collections are identified. No pneumothorax is appreciated.     No acute cardiac or pulmonary abnormality is identified.    Ue Venous Duplex (specify In Comments Left, Right Or Bilateral)    Result Date: 9/12/2018   Upper Extremity  Venous Duplex Report  Vascular Laboratory  CONCLUSIONS  CHESTER PRAJAPATI  Exam Date:     09/12/2018 21:21  Room #:     Inpatient  Priority:     Call Back  Ht (in):             Wt (lb):  Ordering  Physician:        FARZANA DESOUZA  Referring Physician:       [id number]  Sonographer:               Elio Quiroga RVT  Study Type:                Complete Unilateral  Technical Quality:         Adequate  Age:    62    Gender:     F  MRN:    [id number]  :    1955      BSA:  Indications:     Localized swelling, mass and lump, left upper limb  CPT Codes:       12376  ICD Codes:         History:         Left upper extremity pain and hand swelling x 2 days  Limitations:  PROCEDURES:  Left upper extremity venous duplex imaging.  The following venous structures were evaluated: internal jugular,  subclavian, axillary, brachial, radial, ulnar, cephalic and basilic veins.  Serial compression, augmentation maneuvers,  color and spectral Doppler  flow evaluations were performed.  FINDINGS:  Left upper extremity.  Complete color filling and compressibility with normal venous flow dynamics  including spontaneous flow, response to augmentation maneuvers, and  respiratory phasicity.  Flow was evaluated in the contralateral subclavian vein and normal venous  flow dynamics including spontaneous flow, respiratory phasic variation and  augmentation were demonstrated.         Assessment/Plan:  Anticipate that patient will need greater than 2 midnights for management of the discussed medical issues.    * Cellulitis of left hand   Assessment & Plan    Patient carries a history of gout, while this does appear clinically to be cellulitis, underlying gouty flare cannot be ruled out.  I will check a uric acid level in light of her elevated CRP.  Ultrasound shows no DVT.  Fortunately she has no leukocytosis.  I will start on empiric antibiotic therapy with Unasyn and doxycycline, will monitor for clinical improvement.        Hyperglycemia   Assessment & Plan    Patient denies a history of diabetes mellitus but given her elevated blood glucose of greater than 220 with a nonfasting sample, this is likely undiagnosed disease.   I will check an HbA1c.  For now I will monitor with Accu-Cheks and cover with insulin sliding scale.  If HbA1c confirms diabetes, then we will start her on oral hypoglycemics and she would benefit from inpatient diabetic education.        Hypokalemia- (present on admission)   Assessment & Plan    This is chronic, unclear if patient is taking her home potassium supplementation.  Check magnesium and correct as needed.  Patient will be placed on IV and oral potassium.        Tobacco use   Assessment & Plan    This patient continues to smoke cigarettes. 3 minutes were spent counseling the patient in cessation techniques. Patient understands smoking increases risk factors for stroke and death. Patient is open to counseling.  The benefits of stopping were presented and nicotine replacement has been offered but declined.  We will continue to encourage cessation and discuss other supportive resources including community groups and prescription medications.        Opioid dependence, continuous (HCC)- (present on admission)   Assessment & Plan    Continue home MS Contin and oxycodone for breakthrough,  will add additional IV breakthrough pain medication in light of her acute injury.        Hypoparathyroidism (HCC)- (present on admission)   Assessment & Plan    Continue home Synthroid.            Prophylaxis: Lovenox for DVT prophylaxis, no PPI indicated, bowel protocol as needed

## 2018-09-13 NOTE — ED NOTES
Pt states her pain is now 6/10 in left hand, left arm and epigastric region. Pt given Ice pack and taken to floor by transport. Chart and belongings with patient.

## 2018-09-13 NOTE — ASSESSMENT & PLAN NOTE
Patient carries a history of gout, while this does appear clinically to be cellulitis, underlying gouty flare cannot be ruled out.    Uric acid level wnl  Elevated CRP.  Ultrasound shows no DVT.    She has no leukocytosis. On empiric antibiotic therapy with Unasyn dc'ed, switched to augmentin and doxycycline, will monitor for clinical improvement.  Pain management prn

## 2018-09-13 NOTE — PROGRESS NOTES
[facility] Hospitalist Progress Note    Date of Service: 2018    Chief Complaint  62 y.o. female admitted 2018 with left hand cellulitis    Interval Problem Update  Patient complains of pain in her left hand radiating to her left shoulder.  Patient has chronic pain and is continued on her home regimen with additional IV pain medications for breakthrough.  Patient noted to have significant tobacco use with last use day of admission, cessation reiterated, encourage patient to use incentive spirometer.    Consultants/Specialty  None     Disposition  TBD         Review of Systems   Constitutional: Negative for chills and fever.   HENT: Negative for hearing loss, sinus pain and sore throat.    Respiratory: Negative for cough and shortness of breath.    Cardiovascular: Negative for chest pain, palpitations and leg swelling.   Gastrointestinal: Negative for abdominal pain, nausea and vomiting.   Genitourinary: Negative for dysuria and urgency.   Musculoskeletal: Positive for joint pain (chronic).        L hand pain and decreased ROM     Neurological: Negative for dizziness, tingling, tremors, sensory change and headaches.   Psychiatric/Behavioral: Negative for depression.      Physical Exam  Laboratory/Imaging   Hemodynamics  Temp (24hrs), Av.7 °C (98 °F), Min:35.8 °C (96.5 °F), Max:37.4 °C (99.4 °F)   Temperature: 35.8 °C (96.5 °F)  Pulse  Av.1  Min: 77  Max: 107 Heart Rate (Monitored): 85  Blood Pressure: 145/91, NIBP: (!) 176/99      Respiratory      Respiration: 20, Pulse Oximetry: 95 %        RUL Breath Sounds: Clear, RML Breath Sounds: Crackles;Diminished, RLL Breath Sounds: Crackles;Diminished, RADHA Breath Sounds: Clear, LLL Breath Sounds: Crackles;Diminished    Fluids    Intake/Output Summary (Last 24 hours) at 18 0940  Last data filed at 18 0600   Gross per 24 hour   Intake              715 ml   Output              600 ml   Net              115 ml       Nutrition  Orders Placed This  Encounter   Procedures   • Diet Order Diabetic     Standing Status:   Standing     Number of Occurrences:   1     Order Specific Question:   Diet:     Answer:   Diabetic [3]     Physical Exam   Constitutional: She is oriented to person, place, and time. No distress.   Obese, disheveled female   HENT:   Head: Normocephalic and atraumatic.   Right Ear: External ear normal.   Left Ear: External ear normal.   Eyes: Conjunctivae are normal. Right eye exhibits no discharge. Left eye exhibits no discharge.   Neck: Normal range of motion. Neck supple.   Cardiovascular: Normal rate and regular rhythm.    Pulmonary/Chest: Effort normal. No respiratory distress.   Decreased breath sounds with scattered rales throughout all lung fields   Abdominal: Soft. Bowel sounds are normal. There is no tenderness.   Musculoskeletal: She exhibits edema and tenderness.   Edema noted left hand.  2+ radial pulses bilaterally  Warmth noted, no erythema or discharge noted.  Tenderness to palpation   Neurological: She is alert and oriented to person, place, and time. No cranial nerve deficit.   Skin: Skin is warm and dry. She is not diaphoretic.   Psychiatric: She has a normal mood and affect. Her behavior is normal. Thought content normal.       Recent Labs      09/12/18   1806   WBC  5.8   RBC  4.16*   HEMOGLOBIN  14.8   HEMATOCRIT  42.8   MCV  102.9*   MCH  35.6*   MCHC  34.6   RDW  49.1   PLATELETCT  205   MPV  10.8     Recent Labs      09/12/18   1806   SODIUM  137   POTASSIUM  3.0*   CHLORIDE  98   CO2  25   GLUCOSE  261*   BUN  5*   CREATININE  0.72   CALCIUM  8.5     Recent Labs      09/12/18   1806   APTT  32.6   INR  1.20*     Recent Labs      09/12/18   1806   BNPBTYPENAT  56              Assessment/Plan     * Cellulitis of left hand   Assessment & Plan    Patient carries a history of gout, while this does appear clinically to be cellulitis, underlying gouty flare cannot be ruled out.    Uric acid level results pending  Elevated CRP.   Ultrasound shows no DVT.    She has no leukocytosis. On empiric antibiotic therapy with Unasyn and doxycycline, will monitor for clinical improvement.  Pain management prn        Hyperglycemia   Assessment & Plan    Patient denies a history of diabetes mellitus but given her elevated blood glucose was 261 on admission.  A1c pending  For now I will monitor with Accu-Cheks and cover with insulin sliding scale.  If HbA1c confirms diabetes, then we will start her on oral hypoglycemics and she would benefit from inpatient diabetic education.        Class 1 obesity in adult   Assessment & Plan    Patient's BMI of 30.08, outpatient weight loss encouraged        Hypokalemia- (present on admission)   Assessment & Plan    This is chronic, unclear if patient is taking her home potassium supplementation.  Mg level pending and correct as needed.  Patient will be placed on IV and oral potassium.        Tobacco use   Assessment & Plan    Smoking cessation counseling was provided.  Patient states that she is trying to cut back and previously has been on Chantix and has nicotine patches at home as well.  We will continue to encourage cessation and discuss other supportive resources including community groups and prescription medications.        Opioid dependence, continuous (HCC)- (present on admission)   Assessment & Plan    Continue home MS Contin and oxycodone for breakthrough,  will add additional IV breakthrough pain medication in light of her acute injury.        Hypoparathyroidism (HCC)- (present on admission)   Assessment & Plan    Continue home Synthroid.          Quality-Core Measures   Reviewed items::  Labs reviewed and Medications reviewed  Parker catheter::  No Parker  DVT prophylaxis pharmacological::  Enoxaparin (Lovenox)  Antibiotics:  Treating active infection/contamination beyond 24 hours perioperative coverage

## 2018-09-14 PROCEDURE — 700105 HCHG RX REV CODE 258: Performed by: HOSPITALIST

## 2018-09-14 PROCEDURE — 99232 SBSQ HOSP IP/OBS MODERATE 35: CPT | Performed by: INTERNAL MEDICINE

## 2018-09-14 PROCEDURE — 700102 HCHG RX REV CODE 250 W/ 637 OVERRIDE(OP): Performed by: HOSPITALIST

## 2018-09-14 PROCEDURE — A9270 NON-COVERED ITEM OR SERVICE: HCPCS | Performed by: HOSPITALIST

## 2018-09-14 PROCEDURE — 700102 HCHG RX REV CODE 250 W/ 637 OVERRIDE(OP): Performed by: INTERNAL MEDICINE

## 2018-09-14 PROCEDURE — 700111 HCHG RX REV CODE 636 W/ 250 OVERRIDE (IP): Performed by: HOSPITALIST

## 2018-09-14 PROCEDURE — 770006 HCHG ROOM/CARE - MED/SURG/GYN SEMI*

## 2018-09-14 PROCEDURE — A9270 NON-COVERED ITEM OR SERVICE: HCPCS | Performed by: INTERNAL MEDICINE

## 2018-09-14 RX ORDER — OMEPRAZOLE 20 MG/1
20 CAPSULE, DELAYED RELEASE ORAL 2 TIMES DAILY
Status: DISCONTINUED | OUTPATIENT
Start: 2018-09-14 | End: 2018-09-16 | Stop reason: HOSPADM

## 2018-09-14 RX ORDER — NICOTINE 21 MG/24HR
21 PATCH, TRANSDERMAL 24 HOURS TRANSDERMAL
Status: DISCONTINUED | OUTPATIENT
Start: 2018-09-14 | End: 2018-09-16 | Stop reason: HOSPADM

## 2018-09-14 RX ADMIN — STANDARDIZED SENNA CONCENTRATE AND DOCUSATE SODIUM 2 TABLET: 8.6; 5 TABLET, FILM COATED ORAL at 06:00

## 2018-09-14 RX ADMIN — DOXYCYCLINE 100 MG: 100 TABLET ORAL at 05:41

## 2018-09-14 RX ADMIN — MORPHINE SULFATE 4 MG: 4 INJECTION INTRAVENOUS at 10:02

## 2018-09-14 RX ADMIN — MORPHINE SULFATE 30 MG: 30 TABLET, EXTENDED RELEASE ORAL at 17:10

## 2018-09-14 RX ADMIN — MORPHINE SULFATE 4 MG: 4 INJECTION INTRAVENOUS at 03:59

## 2018-09-14 RX ADMIN — MORPHINE SULFATE 30 MG: 30 TABLET, EXTENDED RELEASE ORAL at 05:42

## 2018-09-14 RX ADMIN — MORPHINE SULFATE 4 MG: 4 INJECTION INTRAVENOUS at 19:30

## 2018-09-14 RX ADMIN — KETOROLAC TROMETHAMINE 30 MG: 30 INJECTION, SOLUTION INTRAMUSCULAR at 10:02

## 2018-09-14 RX ADMIN — AMPICILLIN AND SULBACTAM 3 G: 2; 1 INJECTION, POWDER, FOR SOLUTION INTRAVENOUS at 05:34

## 2018-09-14 RX ADMIN — AMPICILLIN AND SULBACTAM 3 G: 2; 1 INJECTION, POWDER, FOR SOLUTION INTRAVENOUS at 23:34

## 2018-09-14 RX ADMIN — MORPHINE SULFATE 4 MG: 4 INJECTION INTRAVENOUS at 14:11

## 2018-09-14 RX ADMIN — OXYCODONE HYDROCHLORIDE 30 MG: 30 TABLET ORAL at 21:17

## 2018-09-14 RX ADMIN — OMEPRAZOLE 20 MG: 20 CAPSULE, DELAYED RELEASE ORAL at 17:49

## 2018-09-14 RX ADMIN — NYSTATIN 500000 UNITS: 100000 SUSPENSION ORAL at 21:17

## 2018-09-14 RX ADMIN — KETOROLAC TROMETHAMINE 30 MG: 30 INJECTION, SOLUTION INTRAMUSCULAR at 03:59

## 2018-09-14 RX ADMIN — AMPICILLIN AND SULBACTAM 3 G: 2; 1 INJECTION, POWDER, FOR SOLUTION INTRAVENOUS at 17:09

## 2018-09-14 RX ADMIN — LEVOTHYROXINE SODIUM 88 MCG: 88 TABLET ORAL at 05:40

## 2018-09-14 RX ADMIN — AMPICILLIN AND SULBACTAM 3 G: 2; 1 INJECTION, POWDER, FOR SOLUTION INTRAVENOUS at 12:04

## 2018-09-14 RX ADMIN — NYSTATIN 500000 UNITS: 100000 SUSPENSION ORAL at 05:38

## 2018-09-14 RX ADMIN — NYSTATIN 500000 UNITS: 100000 SUSPENSION ORAL at 12:04

## 2018-09-14 RX ADMIN — DOXYCYCLINE 100 MG: 100 TABLET ORAL at 17:10

## 2018-09-14 RX ADMIN — ANTACID TABLETS 2750 MG: 500 TABLET, CHEWABLE ORAL at 05:38

## 2018-09-14 RX ADMIN — GABAPENTIN 600 MG: 300 CAPSULE ORAL at 12:04

## 2018-09-14 RX ADMIN — NICOTINE 21 MG: 21 PATCH, EXTENDED RELEASE TRANSDERMAL at 10:01

## 2018-09-14 RX ADMIN — KETOROLAC TROMETHAMINE 30 MG: 30 INJECTION, SOLUTION INTRAMUSCULAR at 19:30

## 2018-09-14 RX ADMIN — MORPHINE SULFATE 30 MG: 30 TABLET, EXTENDED RELEASE ORAL at 12:04

## 2018-09-14 RX ADMIN — MORPHINE SULFATE 4 MG: 4 INJECTION INTRAVENOUS at 23:34

## 2018-09-14 RX ADMIN — OXYCODONE HYDROCHLORIDE 30 MG: 30 TABLET ORAL at 00:04

## 2018-09-14 RX ADMIN — GABAPENTIN 600 MG: 300 CAPSULE ORAL at 05:41

## 2018-09-14 RX ADMIN — VITAMIN D, TAB 1000IU (100/BT) 4000 UNITS: 25 TAB at 05:41

## 2018-09-14 RX ADMIN — GABAPENTIN 600 MG: 300 CAPSULE ORAL at 17:10

## 2018-09-14 RX ADMIN — NYSTATIN 500000 UNITS: 100000 SUSPENSION ORAL at 17:10

## 2018-09-14 ASSESSMENT — PAIN SCALES - GENERAL
PAINLEVEL_OUTOF10: 10
PAINLEVEL_OUTOF10: 7
PAINLEVEL_OUTOF10: 10

## 2018-09-14 ASSESSMENT — ENCOUNTER SYMPTOMS
DIZZINESS: 0
CHILLS: 0
SHORTNESS OF BREATH: 0
SORE THROAT: 0
SENSORY CHANGE: 0
HEADACHES: 0
FEVER: 0
TINGLING: 0
NAUSEA: 0
SINUS PAIN: 0
ABDOMINAL PAIN: 0
TREMORS: 0
VOMITING: 0
DEPRESSION: 0

## 2018-09-14 NOTE — CONSULTS
"Diabetes education: Order received for diabetes education for newly dx diabetes. Pt is followed by Dr. Danni Juarez for her thyroid, who had given her a Contour meter to \"monitor \" her blood sugars. Per patient, she would check once or twice a month with blood sugars at their highest, 112. Pt stated her machine was not working right, her  bought new batteries but still did not work.  Pt was admitted with blood sugar of 261, and cellulitis of her left hand.  Hg A1 c results now available at 5.8%.    Pt is currently on Regular insulin sliding scale coverage ac and hs with blood sugars of 88, 98, and 116. Pt last glucose was 95. Though patient had a random blood sugar of over 200 (261) it was in light of cellulitis/infection. Pt does not meet criteria for diabetes but for prediabetes.  Discussed with patient what diabetes was, difference of type two and prediabetes. Pt was given handouts on pre diabetes.  Pt was given and instructed on a new Contour next ez meter and instructed on its use.  Plan: Recommend patient to test once a day for a week or until blood sugar remain consistent and then follow instructions from Dr. Juarez. Pt should have a prescription for Contour next strips already. When meter given pt's insurance was listed as [employer] , but now is listed as [employer]. This insurance will not cover Contour test strips and if Dr. Juarez wants her to continue testing ( after she finishes using strips she has ) she will need a prescription for True Metrix meter and test strips. Please call [phone] if needs change.  "

## 2018-09-14 NOTE — CARE PLAN
Problem: Pain Management  Goal: Pain level will decrease to patient's comfort goal  Pain assessment completed. PRN pain meds administered. Pt educated on pain management, med regimen.     Problem: Infection  Goal: Will remain free from infection  Antibiotics administered per MAR. Pt educated on s/sx and prevention of infection. Standard precautions in place.

## 2018-09-14 NOTE — PROGRESS NOTES
Diabetes education: Met with patient this afternoon. Please see consult note.  Plan: Recommend patient to test once a day for a week or until blood sugar remain consistent and then follow instructions from Dr. Juarez. Pt should have a prescription for Contour next strips already. When meter given pt's insurance was listed as [employer] , but now is listed as [employer]. This insurance will not cover Contour test strips and if Dr. Juarez wants her to continue testing ( after she finishes using strips she has ) she will need a prescription for True Metrix meter and test strips. Please call [phone] if needs change.

## 2018-09-14 NOTE — PROGRESS NOTES
Pt A&O x4, still large c/o of left hand pain-see care plan for management. Improvements observed in swelling and motion of hand compared to yesterday, but pt reports to improvement in pain.     Diabetic diet and accu checks discontinued as pt's glucose has been WNL and pt does not have DM as official diagnosis. Did receive diabetic education yesterday, and she is at risk for developing type 2. Education still provided on nutrition w/ carbohydrates and sugars.    Ayleen Marcial: low fall risk today. Bed alarm still applied along w/ all other fall precautions.

## 2018-09-14 NOTE — PROGRESS NOTES
Pt's Enocrinology office w/ Dr. Juarez called checking on pt. Would like hospitalist to call if she has any questions regarding pt's calcium level/thyroid issues, number is [phone].

## 2018-09-14 NOTE — CARE PLAN
Problem: Pain Management  Goal: Pain level will decrease to patient's comfort goal    Intervention: Educate and implement non-pharmacologic comfort measures. Examples: relaxation, distration, play therapy, activity therapy, massage, etc.  Still c/o extreme pain to left hand, managed w/ many forms of pain management including IV/PO medication, ice packs and elevation.        Problem: Skin Integrity  Goal: Risk for impaired skin integrity will decrease  Outcome: PROGRESSING AS EXPECTED  Pt's left hand swelling is better today, hand is less hot, swelling +1 and pt can move fingers better.

## 2018-09-14 NOTE — PROGRESS NOTES
[facility] Hospitalist Progress Note    Date of Service: 2018    Chief Complaint  62 y.o. female admitted 2018 with left hand cellulitis    Interval Problem Update  : Patient complains of pain in her left hand radiating to her left shoulder.  Patient has chronic pain and is continued on her home regimen with additional IV pain medications for breakthrough. Patient noted to have significant tobacco use with last use day of admission, cessation reiterated, encourage patient to use incentive spirometer.    : Patient continues to have left hand swelling and decreased range of motion.  Patient requesting marijuana patch today, patient informed that I am unable to prescribe her that in the hospital and if she is interested she is to follow-up with her PCP for prescription of medical marijuana.    Consultants/Specialty  None     Disposition  TBD         Review of Systems   Constitutional: Negative for chills and fever.   HENT: Negative for hearing loss, sinus pain and sore throat.    Respiratory: Negative for shortness of breath.    Cardiovascular: Negative for chest pain.   Gastrointestinal: Negative for abdominal pain, nausea and vomiting.   Musculoskeletal: Positive for joint pain (chronic).        L hand pain and decreased ROM     Neurological: Negative for dizziness, tingling, tremors, sensory change and headaches.   Psychiatric/Behavioral: Negative for depression.      Physical Exam  Laboratory/Imaging   Hemodynamics  Temp (24hrs), Av.7 °C (98 °F), Min:36.2 °C (97.2 °F), Max:37.1 °C (98.7 °F)   Temperature: 37.1 °C (98.7 °F)  Pulse  Av.1  Min: 66  Max: 107    Blood Pressure: 128/60      Respiratory      Respiration: 18, Pulse Oximetry: 92 %        RUL Breath Sounds: Clear, RML Breath Sounds: Crackles;Diminished, RLL Breath Sounds: Crackles;Diminished, RADHA Breath Sounds: Clear, LLL Breath Sounds: Crackles;Diminished    Fluids    Intake/Output Summary (Last 24 hours) at 18 1035  Last data  filed at 09/14/18 0600   Gross per 24 hour   Intake             1573 ml   Output                0 ml   Net             1573 ml       Nutrition  Orders Placed This Encounter   Procedures   • Diet Order Regular     Standing Status:   Standing     Number of Occurrences:   1     Order Specific Question:   Diet:     Answer:   Regular [1]     Physical Exam   Constitutional: She is oriented to person, place, and time. No distress.   Obese, disheveled female   HENT:   Head: Normocephalic and atraumatic.   Right Ear: External ear normal.   Left Ear: External ear normal.   Eyes: Conjunctivae are normal. Right eye exhibits no discharge. Left eye exhibits no discharge.   Neck: Normal range of motion. Neck supple.   Cardiovascular: Normal rate and regular rhythm.    Pulmonary/Chest: Effort normal. No stridor. No respiratory distress.   Decreased breath sounds with scattered rales throughout all lung fields   Abdominal: Soft. Bowel sounds are normal. There is no tenderness.   Musculoskeletal: She exhibits edema and tenderness.   Edema noted left hand.  2+ radial pulses bilaterally  Warmth noted, no erythema or discharge noted.  Tenderness to palpation   Neurological: She is alert and oriented to person, place, and time. No cranial nerve deficit.   Skin: Skin is warm and dry. She is not diaphoretic.   Psychiatric: She has a normal mood and affect. Her behavior is normal. Thought content normal.       Recent Labs      09/12/18   1806 09/13/18   1223   WBC  5.8  4.4*   RBC  4.16*  3.82*   HEMOGLOBIN  14.8  13.9   HEMATOCRIT  42.8  39.6   MCV  102.9*  103.7*   MCH  35.6*  36.4*   MCHC  34.6  35.1*   RDW  49.1  48.8   PLATELETCT  205  162*   MPV  10.8  10.7     Recent Labs      09/12/18   1806  09/13/18   1223   SODIUM  137  137   POTASSIUM  3.0*  4.2   CHLORIDE  98  103   CO2  25  25   GLUCOSE  261*  95   BUN  5*  5*   CREATININE  0.72  0.55   CALCIUM  8.5  7.4*     Recent Labs      09/12/18   1806   APTT  32.6   INR  1.20*      Recent Labs      09/12/18   1806   BNPBTYPENAT  56              Assessment/Plan     * Cellulitis of left hand   Assessment & Plan    Patient carries a history of gout, while this does appear clinically to be cellulitis, underlying gouty flare cannot be ruled out.    Uric acid level wnl  Elevated CRP.  Ultrasound shows no DVT.    She has no leukocytosis. On empiric antibiotic therapy with Unasyn and doxycycline, will monitor for clinical improvement.  Pain management prn        Hyperglycemia   Assessment & Plan    Patient denies a history of diabetes mellitus but given her elevated blood glucose was 261 on admission. Improved   A1c 5.8  For now I will monitor with Accu-Cheks and cover with insulin sliding scale.         Class 1 obesity in adult   Assessment & Plan    Patient's BMI of 30.08, outpatient weight loss encouraged        Hypokalemia- (present on admission)   Assessment & Plan    This is chronic, unclear if patient is taking her home potassium supplementation.  Mg level pending and correct as needed.  Patient will be placed on IV and oral potassium.        Tobacco use   Assessment & Plan    Smoking cessation counseling was provided.  Patient states that she is trying to cut back and previously has been on Chantix and has nicotine patches at home as well.  We will continue to encourage cessation and discuss other supportive resources including community groups and prescription medications.        Opioid dependence, continuous (HCC)- (present on admission)   Assessment & Plan    Continue home MS Contin and oxycodone for breakthrough,  will add additional IV breakthrough pain medication in light of her acute injury.        Hypoparathyroidism (HCC)- (present on admission)   Assessment & Plan    Continue home Synthroid.          Quality-Core Measures   Reviewed items::  Labs reviewed and Medications reviewed  Parker catheter::  No Parker  DVT prophylaxis pharmacological::  Enoxaparin (Lovenox)  Antibiotics:   Treating active infection/contamination beyond 24 hours perioperative coverage

## 2018-09-14 NOTE — PROGRESS NOTES
"Assumed care. Pt lying in bed. Reports pain 10/10 to LUE. Morphine and toradol administered per MAR. Pt requesting to have calcium supplement dosage increased because she \"can feel her sodium dropping\". RN educated on current dose of calcium and that it will be monitored and MD will make changes as necessary. Verbalized understanding. Discussed plan of care. Bed alarm in use, call light and personal belongings within reach, bed kept low, treaded socks on. Assisted as necessary. Kept rested and comfortable at all times. Hourly rounds.     "

## 2018-09-15 PROCEDURE — 700102 HCHG RX REV CODE 250 W/ 637 OVERRIDE(OP): Performed by: INTERNAL MEDICINE

## 2018-09-15 PROCEDURE — 700102 HCHG RX REV CODE 250 W/ 637 OVERRIDE(OP): Performed by: HOSPITALIST

## 2018-09-15 PROCEDURE — 700111 HCHG RX REV CODE 636 W/ 250 OVERRIDE (IP): Performed by: HOSPITALIST

## 2018-09-15 PROCEDURE — 700105 HCHG RX REV CODE 258: Performed by: HOSPITALIST

## 2018-09-15 PROCEDURE — 99232 SBSQ HOSP IP/OBS MODERATE 35: CPT | Performed by: INTERNAL MEDICINE

## 2018-09-15 PROCEDURE — A9270 NON-COVERED ITEM OR SERVICE: HCPCS | Performed by: HOSPITALIST

## 2018-09-15 PROCEDURE — A9270 NON-COVERED ITEM OR SERVICE: HCPCS | Performed by: INTERNAL MEDICINE

## 2018-09-15 PROCEDURE — 770006 HCHG ROOM/CARE - MED/SURG/GYN SEMI*

## 2018-09-15 RX ORDER — BISMUTH SUBCITRATE POTASSIUM, METRONIDAZOLE, TETRACYCLINE HYDROCHLORIDE 140; 125; 125 MG/1; MG/1; MG/1
1 CAPSULE ORAL 4 TIMES DAILY
Status: DISCONTINUED | OUTPATIENT
Start: 2018-09-15 | End: 2018-09-15

## 2018-09-15 RX ORDER — AMOXICILLIN AND CLAVULANATE POTASSIUM 875; 125 MG/1; MG/1
1 TABLET, FILM COATED ORAL EVERY 12 HOURS
Status: DISCONTINUED | OUTPATIENT
Start: 2018-09-15 | End: 2018-09-16 | Stop reason: HOSPADM

## 2018-09-15 RX ORDER — BISMUTH SUBCITRATE POTASSIUM, METRONIDAZOLE, TETRACYCLINE HYDROCHLORIDE 140; 125; 125 MG/1; MG/1; MG/1
3 CAPSULE ORAL 4 TIMES DAILY
Status: DISCONTINUED | OUTPATIENT
Start: 2018-09-15 | End: 2018-09-16 | Stop reason: HOSPADM

## 2018-09-15 RX ADMIN — GABAPENTIN 600 MG: 300 CAPSULE ORAL at 17:41

## 2018-09-15 RX ADMIN — LEVOTHYROXINE SODIUM 88 MCG: 88 TABLET ORAL at 05:29

## 2018-09-15 RX ADMIN — GABAPENTIN 600 MG: 300 CAPSULE ORAL at 12:31

## 2018-09-15 RX ADMIN — VITAMIN D, TAB 1000IU (100/BT) 4000 UNITS: 25 TAB at 06:28

## 2018-09-15 RX ADMIN — AMPICILLIN AND SULBACTAM 3 G: 2; 1 INJECTION, POWDER, FOR SOLUTION INTRAVENOUS at 05:28

## 2018-09-15 RX ADMIN — BISMUTH SUBCITRATE POTASSIUM, METRONIDAZOLE, TETRACYCLINE HYDROCHLORIDE 3 CAPSULE: 140; 125; 125 CAPSULE ORAL at 08:57

## 2018-09-15 RX ADMIN — DOXYCYCLINE 100 MG: 100 TABLET ORAL at 17:39

## 2018-09-15 RX ADMIN — DOXYCYCLINE 100 MG: 100 TABLET ORAL at 06:28

## 2018-09-15 RX ADMIN — MORPHINE SULFATE 4 MG: 4 INJECTION INTRAVENOUS at 21:24

## 2018-09-15 RX ADMIN — BISMUTH SUBCITRATE POTASSIUM, METRONIDAZOLE, TETRACYCLINE HYDROCHLORIDE 3 CAPSULE: 140; 125; 125 CAPSULE ORAL at 17:43

## 2018-09-15 RX ADMIN — BISMUTH SUBCITRATE POTASSIUM, METRONIDAZOLE, TETRACYCLINE HYDROCHLORIDE 3 CAPSULE: 140; 125; 125 CAPSULE ORAL at 12:30

## 2018-09-15 RX ADMIN — AMOXICILLIN AND CLAVULANATE POTASSIUM 1 TABLET: 875; 125 TABLET, FILM COATED ORAL at 10:09

## 2018-09-15 RX ADMIN — MORPHINE SULFATE 30 MG: 30 TABLET, EXTENDED RELEASE ORAL at 12:31

## 2018-09-15 RX ADMIN — AMOXICILLIN AND CLAVULANATE POTASSIUM 1 TABLET: 875; 125 TABLET, FILM COATED ORAL at 17:40

## 2018-09-15 RX ADMIN — KETOROLAC TROMETHAMINE 30 MG: 30 INJECTION, SOLUTION INTRAMUSCULAR at 23:50

## 2018-09-15 RX ADMIN — ANTACID TABLETS 2750 MG: 500 TABLET, CHEWABLE ORAL at 06:28

## 2018-09-15 RX ADMIN — NYSTATIN 500000 UNITS: 100000 SUSPENSION ORAL at 12:31

## 2018-09-15 RX ADMIN — TRAZODONE HYDROCHLORIDE 50 MG: 50 TABLET ORAL at 21:24

## 2018-09-15 RX ADMIN — MORPHINE SULFATE 4 MG: 4 INJECTION INTRAVENOUS at 03:47

## 2018-09-15 RX ADMIN — BISMUTH SUBCITRATE POTASSIUM, METRONIDAZOLE, TETRACYCLINE HYDROCHLORIDE 3 CAPSULE: 140; 125; 125 CAPSULE ORAL at 21:24

## 2018-09-15 RX ADMIN — MORPHINE SULFATE 30 MG: 30 TABLET, EXTENDED RELEASE ORAL at 05:31

## 2018-09-15 RX ADMIN — ENOXAPARIN SODIUM 40 MG: 40 INJECTION SUBCUTANEOUS at 05:35

## 2018-09-15 RX ADMIN — OXYCODONE HYDROCHLORIDE 30 MG: 30 TABLET ORAL at 10:09

## 2018-09-15 RX ADMIN — KETOROLAC TROMETHAMINE 30 MG: 30 INJECTION, SOLUTION INTRAMUSCULAR at 03:47

## 2018-09-15 RX ADMIN — OMEPRAZOLE 20 MG: 20 CAPSULE, DELAYED RELEASE ORAL at 06:27

## 2018-09-15 RX ADMIN — KETOROLAC TROMETHAMINE 30 MG: 30 INJECTION, SOLUTION INTRAMUSCULAR at 17:39

## 2018-09-15 RX ADMIN — OMEPRAZOLE 20 MG: 20 CAPSULE, DELAYED RELEASE ORAL at 17:40

## 2018-09-15 RX ADMIN — NYSTATIN 500000 UNITS: 100000 SUSPENSION ORAL at 08:41

## 2018-09-15 RX ADMIN — NICOTINE 21 MG: 21 PATCH, EXTENDED RELEASE TRANSDERMAL at 05:33

## 2018-09-15 RX ADMIN — MORPHINE SULFATE 30 MG: 30 TABLET, EXTENDED RELEASE ORAL at 17:39

## 2018-09-15 RX ADMIN — GABAPENTIN 600 MG: 300 CAPSULE ORAL at 06:27

## 2018-09-15 RX ADMIN — OXYCODONE HYDROCHLORIDE 30 MG: 30 TABLET ORAL at 14:36

## 2018-09-15 RX ADMIN — NYSTATIN 500000 UNITS: 100000 SUSPENSION ORAL at 17:39

## 2018-09-15 RX ADMIN — KETOROLAC TROMETHAMINE 30 MG: 30 INJECTION, SOLUTION INTRAMUSCULAR at 11:13

## 2018-09-15 ASSESSMENT — ENCOUNTER SYMPTOMS
FEVER: 0
HEADACHES: 0
ABDOMINAL PAIN: 0
DEPRESSION: 0
SHORTNESS OF BREATH: 0
SENSORY CHANGE: 0
SORE THROAT: 0
CONSTIPATION: 0
CHILLS: 0
SINUS PAIN: 0

## 2018-09-15 ASSESSMENT — LIFESTYLE VARIABLES
HOW MANY TIMES IN THE PAST YEAR HAVE YOU HAD 5 OR MORE DRINKS IN A DAY: 0
HAVE YOU EVER FELT YOU SHOULD CUT DOWN ON YOUR DRINKING: NO
TOTAL SCORE: 0
ALCOHOL_USE: NO
EVER FELT BAD OR GUILTY ABOUT YOUR DRINKING: NO
CONSUMPTION TOTAL: NEGATIVE
HAVE PEOPLE ANNOYED YOU BY CRITICIZING YOUR DRINKING: NO
AVERAGE NUMBER OF DAYS PER WEEK YOU HAVE A DRINK CONTAINING ALCOHOL: 0
EVER HAD A DRINK FIRST THING IN THE MORNING TO STEADY YOUR NERVES TO GET RID OF A HANGOVER: NO
TOTAL SCORE: 0
ON A TYPICAL DAY WHEN YOU DRINK ALCOHOL HOW MANY DRINKS DO YOU HAVE: 0
TOTAL SCORE: 0

## 2018-09-15 ASSESSMENT — PAIN SCALES - GENERAL
PAINLEVEL_OUTOF10: 10
PAINLEVEL_OUTOF10: 10
PAINLEVEL_OUTOF10: 6
PAINLEVEL_OUTOF10: 10
PAINLEVEL_OUTOF10: 5
PAINLEVEL_OUTOF10: 8
PAINLEVEL_OUTOF10: 10
PAINLEVEL_OUTOF10: 10
PAINLEVEL_OUTOF10: 8
PAINLEVEL_OUTOF10: 7
PAINLEVEL_OUTOF10: 10

## 2018-09-15 NOTE — CARE PLAN
Problem: Pain Management  Goal: Pain level will decrease to patient's comfort goal    Intervention: Follow pain managment plan developed in collaboration with patient and Interdisciplinary Team  Encouraged pt to take pain pill only.  However, pt's pain level is 10/10 since this RN came on duty.        Problem: Safety  Goal: Will remain free from falls    Intervention: Assess risk factors for falls  Continues to have bed alarm

## 2018-09-15 NOTE — PROGRESS NOTES
At bedside report, pt requested to have morphine and toradol.  Pt claimed that it's overdue.  RN grabbed those and went back to give them to pt.  Flushed saline prior to give those medications.  Pt c/o burning with flush.  Pt requested to flush slowly.  Morphine and Toradol given slowly.  Then pt called about 20 minutes later that iv site more painful.  Explained to pt that this iv will be remove and new iv will be inserted later.  Charge RN inserted new iv and pt requested to get another dose of morphine and toradol.  Explained to pt that medications might leaked from vein but she still got those medications.  Pt claimed that no pain relief from those medications.  Pain pill offered.  Pt wants to make sure she gets morphine on time.  Explained to pt that those pain medications are as needed and if she needs it, she can call this RN at [phone].  Pt agreed to take oxy that time.  Pt claimed that her left hand seems more swollen that earlier.  Encouraged pt to elevate on pillow as much as possible.

## 2018-09-15 NOTE — PROGRESS NOTES
[facility] Hospitalist Progress Note    Date of Service: 9/15/2018    Chief Complaint  62 y.o. female admitted 2018 with left hand cellulitis    Interval Problem Update  : Patient complains of pain in her left hand radiating to her left shoulder.  Patient has chronic pain and is continued on her home regimen with additional IV pain medications for breakthrough. Patient noted to have significant tobacco use with last use day of admission, cessation reiterated, encourage patient to use incentive spirometer.    : Patient continues to have left hand swelling and decreased range of motion.  Patient requesting marijuana patch today, patient informed that I am unable to prescribe her that in the hospital and if she is interested she is to follow-up with her PCP for prescription of medical marijuana.    9/15: Patient's left hand edema improved compared to previous exam.  No acute events overnight.  Patient continued on antibiotics.  IV Unasyn discontinued and switched to Augmentin.    Consultants/Specialty  None     Disposition  TBD         Review of Systems   Constitutional: Negative for chills and fever.   HENT: Negative for hearing loss, sinus pain and sore throat.    Respiratory: Negative for shortness of breath.    Cardiovascular: Negative for chest pain.   Gastrointestinal: Negative for abdominal pain and constipation.   Musculoskeletal: Positive for joint pain (chronic).        L hand pain and decreased ROM     Neurological: Negative for sensory change and headaches.   Psychiatric/Behavioral: Negative for depression.      Physical Exam  Laboratory/Imaging   Hemodynamics  Temp (24hrs), Av.4 °C (97.6 °F), Min:36.1 °C (97 °F), Max:36.8 °C (98.2 °F)   Temperature: 36.1 °C (97 °F)  Pulse  Av  Min: 66  Max: 107    Blood Pressure: 123/79      Respiratory      Respiration: 16, Pulse Oximetry: 96 %        RUL Breath Sounds: Clear, RML Breath Sounds: Clear, RLL Breath Sounds: Clear, RADHA Breath Sounds: Clear,  LLL Breath Sounds: Clear    Fluids    Intake/Output Summary (Last 24 hours) at 09/15/18 0943  Last data filed at 09/15/18 0600   Gross per 24 hour   Intake             1240 ml   Output                0 ml   Net             1240 ml       Nutrition  Orders Placed This Encounter   Procedures   • Diet Order Regular     Standing Status:   Standing     Number of Occurrences:   1     Order Specific Question:   Diet:     Answer:   Regular [1]     Physical Exam   Constitutional: She is oriented to person, place, and time. No distress.   Obese, disheveled female   HENT:   Head: Normocephalic and atraumatic.   Right Ear: External ear normal.   Left Ear: External ear normal.   Eyes: Conjunctivae are normal. Right eye exhibits no discharge. Left eye exhibits no discharge.   Neck: Normal range of motion. Neck supple.   Cardiovascular: Normal rate and regular rhythm.    Pulmonary/Chest: Effort normal. No stridor. No respiratory distress.   Decreased breath sounds with scattered rales throughout all lung fields   Abdominal: Soft. Bowel sounds are normal. There is no tenderness.   Musculoskeletal: She exhibits edema and tenderness.   Edema noted left hand, improved compared to previous exam.  2+ radial pulses bilaterally  Warmth noted, no erythema or discharge noted.  Tenderness to palpation   Neurological: She is alert and oriented to person, place, and time. No cranial nerve deficit.   Skin: Skin is warm and dry. She is not diaphoretic.   Psychiatric: She has a normal mood and affect. Her behavior is normal. Thought content normal.       Recent Labs      09/12/18   1806  09/13/18   1223   WBC  5.8  4.4*   RBC  4.16*  3.82*   HEMOGLOBIN  14.8  13.9   HEMATOCRIT  42.8  39.6   MCV  102.9*  103.7*   MCH  35.6*  36.4*   MCHC  34.6  35.1*   RDW  49.1  48.8   PLATELETCT  205  162*   MPV  10.8  10.7     Recent Labs      09/12/18   1806  09/13/18   1223   SODIUM  137  137   POTASSIUM  3.0*  4.2   CHLORIDE  98  103   CO2  25  25   GLUCOSE   261*  95   BUN  5*  5*   CREATININE  0.72  0.55   CALCIUM  8.5  7.4*     Recent Labs      09/12/18   1806   APTT  32.6   INR  1.20*     Recent Labs      09/12/18   1806   BNPBTYPENAT  56              Assessment/Plan     * Cellulitis of left hand   Assessment & Plan    Patient carries a history of gout, while this does appear clinically to be cellulitis, underlying gouty flare cannot be ruled out.    Uric acid level wnl  Elevated CRP.  Ultrasound shows no DVT.    She has no leukocytosis. On empiric antibiotic therapy with Unasyn dc'ed, switched to augmentin and doxycycline, will monitor for clinical improvement.  Pain management prn        Hyperglycemia   Assessment & Plan    Patient denies a history of diabetes mellitus but given her elevated blood glucose was 261 on admission. Improved   A1c 5.8  For now I will monitor with Accu-Cheks and cover with insulin sliding scale.         Class 1 obesity in adult   Assessment & Plan    Patient's BMI of 30.08, outpatient weight loss encouraged        Hypokalemia- (present on admission)   Assessment & Plan    This is chronic, unclear if patient is taking her home potassium supplementation.  Mg level pending and correct as needed.  Patient will be placed on IV and oral potassium.        Tobacco use   Assessment & Plan    Smoking cessation counseling was provided.  Patient states that she is trying to cut back and previously has been on Chantix and has nicotine patches at home as well.  We will continue to encourage cessation and discuss other supportive resources including community groups and prescription medications.        Opioid dependence, continuous (HCC)- (present on admission)   Assessment & Plan    Continue home MS Contin and oxycodone for breakthrough,  will add additional IV breakthrough pain medication in light of her acute injury.        Hypoparathyroidism (HCC)- (present on admission)   Assessment & Plan    Continue home Synthroid.          Quality-Core Measures    Reviewed items::  Labs reviewed and Medications reviewed  Parker catheter::  No Parker  DVT prophylaxis pharmacological::  Enoxaparin (Lovenox)  Antibiotics:  Treating active infection/contamination beyond 24 hours perioperative coverage

## 2018-09-15 NOTE — CARE PLAN
Problem: Pain Management  Goal: Pain level will decrease to patient's comfort goal  Outcome: PROGRESSING SLOWER THAN EXPECTED  Pt pain level not controlled to comfort level consistently this shift. Pt BP and Pulse stable throughout regimen. Continue to assess pain level, medication efficiency, and suggest non-pharmacological interventions such as distraction, repositioning, and elevation.    Problem: Knowledge Deficit  Goal: Knowledge of disease process/condition, treatment plan, diagnostic tests, and medications will improve  Outcome: PROGRESSING AS EXPECTED  Pt exhibits understanding of disease process, treatment plan, and medications, though does not express satisfaction with treatment plan or medication regimen. Continue to assess knowledge and understanding of treatment plan using teach-back method.

## 2018-09-15 NOTE — RESPIRATORY CARE
COPD EDUCATION by COPD CLINICAL EDUCATOR  9/15/2018 at 7:04 AM by Laura Guerra     Patient reviewed by COPD education team. Patient does not qualify for COPD program.

## 2018-09-16 VITALS
TEMPERATURE: 97.8 F | WEIGHT: 159.17 LBS | HEART RATE: 64 BPM | SYSTOLIC BLOOD PRESSURE: 130 MMHG | RESPIRATION RATE: 18 BRPM | OXYGEN SATURATION: 91 % | DIASTOLIC BLOOD PRESSURE: 85 MMHG | BODY MASS INDEX: 30.05 KG/M2 | HEIGHT: 61 IN

## 2018-09-16 PROBLEM — R73.9 HYPERGLYCEMIA: Status: RESOLVED | Noted: 2018-09-13 | Resolved: 2018-09-16

## 2018-09-16 PROBLEM — L03.114 CELLULITIS OF LEFT HAND: Status: RESOLVED | Noted: 2018-09-13 | Resolved: 2018-09-16

## 2018-09-16 PROCEDURE — A9270 NON-COVERED ITEM OR SERVICE: HCPCS | Performed by: INTERNAL MEDICINE

## 2018-09-16 PROCEDURE — 700102 HCHG RX REV CODE 250 W/ 637 OVERRIDE(OP): Performed by: INTERNAL MEDICINE

## 2018-09-16 PROCEDURE — 99239 HOSP IP/OBS DSCHRG MGMT >30: CPT | Performed by: INTERNAL MEDICINE

## 2018-09-16 PROCEDURE — 700111 HCHG RX REV CODE 636 W/ 250 OVERRIDE (IP): Performed by: HOSPITALIST

## 2018-09-16 PROCEDURE — 700102 HCHG RX REV CODE 250 W/ 637 OVERRIDE(OP): Performed by: HOSPITALIST

## 2018-09-16 PROCEDURE — A9270 NON-COVERED ITEM OR SERVICE: HCPCS | Performed by: HOSPITALIST

## 2018-09-16 RX ORDER — AMOXICILLIN AND CLAVULANATE POTASSIUM 875; 125 MG/1; MG/1
1 TABLET, FILM COATED ORAL EVERY 12 HOURS
Qty: 6 TAB | Refills: 0 | Status: SHIPPED | OUTPATIENT
Start: 2018-09-16 | End: 2018-09-19

## 2018-09-16 RX ORDER — DOXYCYCLINE 100 MG/1
100 TABLET ORAL EVERY 12 HOURS
Qty: 6 TAB | Refills: 0 | Status: SHIPPED | OUTPATIENT
Start: 2018-09-16 | End: 2018-09-19

## 2018-09-16 RX ORDER — AMOXICILLIN AND CLAVULANATE POTASSIUM 875; 125 MG/1; MG/1
1 TABLET, FILM COATED ORAL EVERY 12 HOURS
Qty: 6 TAB | Refills: 0 | Status: SHIPPED | OUTPATIENT
Start: 2018-09-16 | End: 2018-09-16

## 2018-09-16 RX ADMIN — BISMUTH SUBCITRATE POTASSIUM, METRONIDAZOLE, TETRACYCLINE HYDROCHLORIDE 3 CAPSULE: 140; 125; 125 CAPSULE ORAL at 09:54

## 2018-09-16 RX ADMIN — LEVOTHYROXINE SODIUM 88 MCG: 88 TABLET ORAL at 05:44

## 2018-09-16 RX ADMIN — AMOXICILLIN AND CLAVULANATE POTASSIUM 1 TABLET: 875; 125 TABLET, FILM COATED ORAL at 05:48

## 2018-09-16 RX ADMIN — DOXYCYCLINE 100 MG: 100 TABLET ORAL at 05:48

## 2018-09-16 RX ADMIN — NICOTINE 21 MG: 21 PATCH, EXTENDED RELEASE TRANSDERMAL at 05:49

## 2018-09-16 RX ADMIN — MORPHINE SULFATE 4 MG: 4 INJECTION INTRAVENOUS at 01:41

## 2018-09-16 RX ADMIN — GABAPENTIN 600 MG: 300 CAPSULE ORAL at 05:45

## 2018-09-16 RX ADMIN — MORPHINE SULFATE 30 MG: 30 TABLET, EXTENDED RELEASE ORAL at 05:49

## 2018-09-16 RX ADMIN — OMEPRAZOLE 20 MG: 20 CAPSULE, DELAYED RELEASE ORAL at 05:49

## 2018-09-16 RX ADMIN — ANTACID TABLETS 2750 MG: 500 TABLET, CHEWABLE ORAL at 05:46

## 2018-09-16 RX ADMIN — VITAMIN D, TAB 1000IU (100/BT) 4000 UNITS: 25 TAB at 05:44

## 2018-09-16 RX ADMIN — ENOXAPARIN SODIUM 40 MG: 40 INJECTION SUBCUTANEOUS at 05:49

## 2018-09-16 ASSESSMENT — PAIN SCALES - GENERAL: PAINLEVEL_OUTOF10: 10

## 2018-09-16 NOTE — PROGRESS NOTES
"Assumed care of Pt at shift change. Pt is A&Ox4. Pt reports pain at a 10/10 and states she has had pain the \"whole hospitalization\" medications as ordered, ice given. Call light with in reach. Traction socks on pt and bed in lowest position.  "

## 2018-09-16 NOTE — CARE PLAN
Problem: Pain Management  Goal: Pain level will decrease to patient's comfort goal  Outcome: PROGRESSING SLOWER THAN EXPECTED  Given PRN medications for pain. Encouraged to do distracting activities like watching television.     Problem: Knowledge Deficit  Goal: Knowledge of disease process/condition, treatment plan, diagnostic tests, and medications will improve  Outcome: PROGRESSING AS EXPECTED  Educated about the side effect of having morphine and ketorolac IV at the same time.

## 2018-09-16 NOTE — PROGRESS NOTES
"Pt anxious about possible discharge in the morning, stating that \"she doesn't want to go home until this has cleared\". Pt still complaining of pain between administrations of pain medications and asking for \"Torodol and Morphine together because they help more\". This RN explained that both meds shouldn't be given together and that these meds wouldn't be available after discharge. ABX switched from IV to oral, pt tolerating well. Pt also states that she \"wants to have ABX in hand before discharge because last time it took over a week to get them\"  "

## 2018-09-16 NOTE — DISCHARGE SUMMARY
Discharge Summary    CHIEF COMPLAINT ON ADMISSION  Chief Complaint   Patient presents with   • Hand Swelling     Left hand swelling, warm to touch, denies trauma   • Chest Pain     Substernal chest pain x 1 month, hx of ulcers       Reason for Admission  CHest Pain, Hand swelling     Admission Date  9/12/2018    CODE STATUS  Full Code    HPI & HOSPITAL COURSE  Please see H&P dictated by Dr. Bansal for further details.  This is a 62 y.o. female here with left hand cellulitis.  Patient was admitted and started on empiric antibiotics.  Patient had a ultrasound of her left upper extremity which is negative for DVT.  Patient's edema and cellulitis improved during her hospital stay and she is discharged home with doxycycline and Augmentin until 9/19/18 to complete her antibiotic course.  Patient demonstrated drug-seeking behavior during her hospital stay in particular narcotics.  Patient informed bedside RN that she takes greater dosages of her narcotics than prescribed by her PCP peer patient is currently receiving chronic pain meds and is advised to follow-up with her PCP for refill of her chronic pain meds.  Patient continues to smoke and cessation counseling was provided.       Therefore, she is discharged in good and stable condition to home with close outpatient follow-up.    The patient met 2-midnight criteria for an inpatient stay at the time of discharge.    Discharge Date  9/16/18        DISCHARGE DIAGNOSES  Principal Problem (Resolved):    Cellulitis of left hand POA: Unknown  Active Problems:    Opioid dependence, continuous (HCC) POA: Yes      Overview: Followed by pain management.    Tobacco use POA: Unknown    Class 1 obesity in adult POA: Unknown  Resolved Problems:    Hyperglycemia POA: Unknown    Hypoparathyroidism (HCC) POA: Yes      Overview: Followed by endocrinology, accidental parathyroidectomy hypoparathyroidism       secondary to that. Serial calcium and albumin levels are monitored.      Hypokalemia POA: Yes      FOLLOW UP  Future Appointments  Date Time Provider Department Center   9/20/2018 4:20 PM Huma Moon M.D. [facility]         MEDICATIONS ON DISCHARGE     Medication List      START taking these medications      Instructions   amoxicillin-clavulanate 875-125 MG Tabs  Commonly known as:  AUGMENTIN   Take 1 Tab by mouth every 12 hours for 3 days.  Dose:  1 Tab     doxycycline monohydrate 100 MG tablet  Commonly known as:  ADOXA   Take 1 Tab by mouth every 12 hours for 3 days.  Dose:  100 mg        CONTINUE taking these medications      Instructions   acetaminophen 500 MG Tabs  Commonly known as:  TYLENOL   Take 500-1,000 mg by mouth every 6 hours as needed.  Dose:  500-1000 mg     albuterol 108 (90 Base) MCG/ACT Aers inhalation aerosol   Inhale 2 Puffs by mouth every 6 hours as needed for Shortness of Breath.  Dose:  2 Puff     ALEVE 220 MG Caps  Generic drug:  Naproxen Sodium   Take 220 mg by mouth every 12 hours.  Dose:  220 mg     allopurinol 300 MG Tabs  Commonly known as:  ZYLOPRIM   Doctor's comments:  Please remind pt to get labs done  TAKE 1 TABLET BY MOUTH EVERY DAY     URI CONTOUR NEXT TEST strip  Generic drug:  glucose blood   USE TO TEST BLOOD GLUCOSE LEVELS ONCE D     calcitRIOL 0.25 MCG Caps  Commonly known as:  ROCALTROL   Take 0.25 mcg by mouth every day.  Dose:  0.25 mcg     carbidopa-levodopa  MG Tabs  Commonly known as:  SINEMET   TAKE 1 TABLET BY MOUTH DAILY     cholestyramine 4 g packet  Commonly known as:  QUESTRAN   Take 4 g by mouth every day.  Dose:  1 Packet     diphenhydrAMINE 25 MG Tabs  Commonly known as:  BENADRYL   Take 1 tablet by mouth every 6 hours as needed for Itching.  Dose:  25 mg     docusate sodium 100 MG Caps   Take 100 mg by mouth 2 Times a Day.  Dose:  100 mg     EPINEPHrine 0.3 MG/0.3ML Soaj solution for injection  Commonly known as:  EPIPEN   Doctor's comments:  PRN severe allergic reaction, then call 911 if used  0.3 mL by  "Intramuscular route 1 time daily as needed (Bee stings). Inject into the middle of the outer thigh & hold for 3secs  Dose:  0.3 mg     gabapentin 300 MG Caps  Commonly known as:  NEURONTIN   TAKE 2 CAPSULES BY MOUTH THREE TIMES DAILY     levothyroxine 88 MCG Tabs  Commonly known as:  SYNTHROID   Take 1 Tab by mouth Every morning on an empty stomach. Brand name only.  Dose:  88 mcg     lidocaine 5 % Ptch  Commonly known as:  LIDODERM   Apply 2 Patches to skin as directed See Admin Instructions. 2 at a time 12 hrs on, 12 hrs off  Dose:  2 Patch     lorazepam 2 MG tablet  Commonly known as:  ATIVAN   Take 1 tablet by mouth once daily in the evening for 90 days.     meclizine 12.5 MG Tabs  Commonly known as:  ANTIVERT   Doctor's comments:  Authorization of a refill request.  TAKE 1 TABLET BY MOUTH EVERY DAY AS NEEDED     meloxicam 15 MG tablet  Commonly known as:  MOBIC   TAKE 1 TABLET BY MOUTH ONCE DAILY     morphine ER 30 MG Tbcr tablet  Commonly known as:  MS CONTIN   Take 30 mg by mouth 3 times a day. 1 tab PO 3 times daily  Dose:  30 mg     oxyCODONE 30 MG immediate release tablet  Commonly known as:  OXY-IR   Take 1 Tab by mouth every 3 hours as needed for Moderate Pain.  Dose:  30 mg     potassium chloride SA 10 MEQ Tbcr  Commonly known as:  K-DUR   TAKE 1 TABLET BY MOUTH ONCE DAILY ON SUNDAY, TUESDAY, WEDNESDAY, THURSDAY AND SATURDAY.     traZODone 50 MG Tabs  Commonly known as:  DESYREL   TAKE 1 TO 2 TABLETS BY MOUTH EVERY DAY AT BEDTIME AS NEEDED FOR SLEEP     TUMS PO   Take 2,800 mg by mouth every day. Needs to take BERRY flavor  Dose:  2800 mg     varenicline 0.5 MG X 11 & 1 MG X 42 tablet  Commonly known as:  CHANTIX STARTING MONTH PAK   Per packet     Vitamin D3 1000 units Caps   Take 4,000 Units by mouth every day.  Dose:  4000 Units            Allergies  Allergies   Allergen Reactions   • Bee Venom Anaphylaxis   • Latex Rash   • Asa [Aspirin] Unspecified     Pt states \"It tears up my stomach\".   • " "Coconut Oil Itching and Swelling     Raw coconut   • Codeine Itching     Pt states \"I itch so much\".   • Contrast Media With Iodine [Iodine] Vomiting and Nausea     Not sure of reaction   • Hydrocodone-Ibuprofen      \"Extreme itching\"   • Ibuprofen Unspecified     Pt states \"It tears up my stomach\".   • Norco [Apap-Fd&C Yellow #10 [place]-Hydrocodone] Itching     Pt states \"I itch all over\".   • Other Drug Unspecified     VISTRIL  Pt states \"I get forgetful\".   • Other Environmental Rash     adhesive   • Other Food Unspecified     sourdough bread  Pt states \"My face and mouth gets all tingling\".    Lactose intolerant per patient.    • Pcn [Penicillins] Hives   • Sulfa Drugs Hives   • Talwin Unspecified     Pt states \"I get forgetful\".   • Tape Swelling     Red swelling   • Vicodin [Hydrocodone-Acetaminophen] Itching     Pt states \"I itch so much\".       DIET  Orders Placed This Encounter   Procedures   • Diet Order Regular     Standing Status:   Standing     Number of Occurrences:   1     Order Specific Question:   Diet:     Answer:   Regular [1]       ACTIVITY  As tolerated.  Weight bearing as tolerated    CONSULTATIONS  None    PROCEDURES  None    LABORATORY  Lab Results   Component Value Date    SODIUM 137 09/13/2018    POTASSIUM 4.2 09/13/2018    CHLORIDE 103 09/13/2018    CO2 25 09/13/2018    GLUCOSE 95 09/13/2018    BUN 5 (L) 09/13/2018    CREATININE 0.55 09/13/2018        Lab Results   Component Value Date    WBC 4.4 (L) 09/13/2018    HEMOGLOBIN 13.9 09/13/2018    HEMATOCRIT 39.6 09/13/2018    PLATELETCT 162 (L) 09/13/2018      This dictation was created using voice recognition software. The accuracy of the dictation is limited to the abilities of the software. I expect there may be some errors of grammar and possibly content.    Total time of the discharge process exceeds 37 minutes.  "

## 2018-09-16 NOTE — DISCHARGE INSTRUCTIONS
Discharge Instructions    Discharged to home by car with relative. Discharged via wheelchair, hospital escort: Yes.  Special equipment needed: Not Applicable    Be sure to schedule a follow-up appointment with your primary care doctor or any specialists as instructed.     Discharge Plan:   Diet Plan: Discussed  Activity Level: Discussed  Smoking Cessation Offered: Patient Counseled  Confirmed Follow up Appointment: Appointment Scheduled  Confirmed Symptoms Management: Discussed  Medication Reconciliation Updated: Yes  Influenza Vaccine Indication: Patient Refuses    I understand that a diet low in cholesterol, fat, and sodium is recommended for good health. Unless I have been given specific instructions below for another diet, I accept this instruction as my diet prescription.   Other diet: regular     Special Instructions: None    · Is patient discharged on Warfarin / Coumadin?   No   Depression / Suicide Risk    As [facility] are [facility], it is important to learn how to keep safe from harming yourself.    Recognize the warning signs:  · Abrupt changes in personality, positive or negative- including increase in energy   · Giving away possessions  · Change in eating patterns- significant weight changes-  positive or negative  · Change in sleeping patterns- unable to sleep or sleeping all the time   · Unwillingness or inability to communicate  · Depression  · Unusual sadness, discouragement and loneliness  · Talk of wanting to die  · Neglect of personal appearance   · Rebelliousness- reckless behavior  · Withdrawal from people/activities they love  · Confusion- inability to concentrate     If you or a loved one observes any of these behaviors or has concerns about self-harm, here's what you can do:  · Talk about it- your feelings and reasons for harming yourself  · Remove any means that you might use to hurt yourself (examples: pills, rope, extension cords, firearm)  · Get professional  help from the community (Mental Health, Substance Abuse, psychological counseling)  · Do not be alone:Call your Safe Contact- someone whom you trust who will be there for you.  · Call your local CRISIS HOTLINE 705-6114 or 175-281-0766  · Call your local Children's Mobile Crisis Response Team [place] (054) 192-7546 or www.[employer]  · Call the toll free National Suicide Prevention Hotlines   · National Suicide Prevention Lifeline 053-941-MEIX (0371)  · [employer] Hope Line Network 800-SUICIDE (554-2364)      Cellulitis, Adult  Introduction  Cellulitis is a skin infection. The infected area is usually red and sore. This condition occurs most often in the arms and lower legs. It is very important to get treated for this condition.  Follow these instructions at home:  Take over-the-counter and prescription medicines only as told by your doctor.  If you were prescribed an antibiotic medicine, take it as told by your doctor. Do not stop taking the antibiotic even if you start to feel better.  Drink enough fluid to keep your pee (urine) clear or pale yellow.  Do not touch or rub the infected area.  Raise (elevate) the infected area above the level of your heart while you are sitting or lying down.  Place warm or cold wet cloths (warm or cold compresses) on the infected area. Do this as told by your doctor.  Keep all follow-up visits as told by your doctor. This is important. These visits let your doctor make sure your infection is not getting worse.  Contact a doctor if:  You have a fever.  Your symptoms do not get better after 1-2 days of treatment.  Your bone or joint under the infected area starts to hurt after the skin has healed.  Your infection comes back. This can happen in the same area or another area.  You have a swollen bump in the infected area.  You have new symptoms.  You feel ill and also have muscle aches and pains.  Get help right away if:  Your symptoms get worse.  You feel very sleepy.  You  throw up (vomit) or have watery poop (diarrhea) for a long time.  There are red streaks coming from the infected area.  Your red area gets larger.  Your red area turns darker.  This information is not intended to replace advice given to you by your health care provider. Make sure you discuss any questions you have with your health care provider.  Document Released: 06/05/2009 Document Revised: 05/25/2017 Document Reviewed: 10/26/2016  © 2017 Elsevier        Antibiotic Medicine  Antibiotic medicines are used to treat infections caused by bacteria. They work by injuring or killing the bacteria that is making you sick.  HOW IS AN ANTIBIOTIC CHOSEN?  An antibiotic is chosen based on many factors. To help your health care provider choose one for you, tell your health care provider if:  · You have any allergies.  · You are pregnant or plan to get pregnant.  · You are breastfeeding.  · You are taking any medicines. These include over-the-counter medicines, prescription medicines, and herbal remedies.  · You have a medical condition or problem you have not already discussed.  Your health care provider will also consider:  · How often the medicine has to be taken.  · Common side effects of the medicine.  · The cost of the medicine.  · The taste of the medicine.  If you have questions about why an antibiotic was chosen, make sure to ask.  FOR HOW LONG SHOULD I TAKE MY ANTIBIOTIC?  Continue to take your antibiotic for as long as told by your health care provider. Do not stop taking it when you feel better. If you stop taking it too soon:  · You may start to feel sick again.  · Your infection may become harder to treat.  · Complications may develop.  WHAT IF I MISS A DOSE?  Try not to miss any doses of medicine. If you miss a dose, take it as soon as possible. However, if it is almost time for the next dose:  · If you are taking 2 doses per day, take the missed dose and the next dose 5 to 6 hours apart.  · If you are taking 3  or more doses per day, take the missed dose and the next dose 2 to 4 hours apart, then go back to the normal schedule.  If you cannot make up a missed dose, take the next scheduled dose on time. Then take the missed dose after you have taken all the doses as recommended by your health care provider, as if you had one more dose left.  DO ANTIBIOTICS AFFECT BIRTH CONTROL?  Birth control pills may not work while you are on antibiotics. If you are taking birth control pills, continue taking them as usual and use a second form of birth control, such as a condom, to avoid unwanted pregnancy. Continue using the second form of birth control until you are finished with your current 1 month cycle of birth control pills.  OTHER INFORMATION  · If there is any medicine left over, throw it away.  · Never take someone else's antibiotics.  · Never take leftover antibiotics.  SEEK MEDICAL CARE IF:  · You get worse.  · You do not feel better within a few days of starting the antibiotic medicine.  · You vomit.  · White patches appear in your mouth.  · You have new joint pain that begins after starting the antibiotic.  · You have new muscle aches that begin after starting the antibiotic.  · You had a fever before starting the antibiotic and it returns.  · You have any symptoms of an allergic reaction, such as an itchy rash. If this happens, stop taking the antibiotic.  SEEK IMMEDIATE MEDICAL CARE IF:  · Your urine turns dark or becomes blood-colored.  · Your skin turns yellow.  · You bruise or bleed easily.  · You have severe diarrhea and abdominal cramps.  · You have a severe headache.  · You have signs of a severe allergic reaction, such as:  ¨ Trouble breathing.  ¨ Wheezing.  ¨ Swelling of the lips, tongue, or face.  ¨ Fainting.  ¨ Blisters on the skin or in the mouth.  If you have signs of a severe allergic reaction, stop taking the antibiotic right away.  This information is not intended to replace advice given to you by your  health care provider. Make sure you discuss any questions you have with your health care provider.  Document Released: 08/30/2005 Document Revised: 09/07/2016 Document Reviewed: 05/04/2016  Elsevier Interactive Patient Education © 2017 Elsevier Inc.

## 2018-09-16 NOTE — PROGRESS NOTES
Patient alert and oriented x4. Complains of pain at left hand. Wanting to have ketorolac 30 mg and morphine 4 mg thru IV at the same time at midnight. Explained the side effect of this medicines and over sedation. Still, patient insist that she had been taking it at the same time in the past 3 to 4 days. Explained the risk to her. Given ketorolac at 2350 and morphine at 0141. Assisted to commode, and patient urinates. Safety precautions in placed. Clutter free environment provided. Bed on lowest position. Reinforced the use of call light when needing assistance.

## 2018-09-16 NOTE — PROGRESS NOTES
Pt leving floor with RN escort in wheel chair,  is present. Personal ice packs and Plyera (orignially brought from home) in her possession. Pt expressed concern that she was allergic to augmentin. MAR indicated that she has taken 3 doses and their is not record of an allergic reaction both per chart and pt's own statments. Spoke to MD about pts claim to an allergy MD confirmed that she will take augmentin at home because she is not allergic to augmentin. Pt was notified of that she is not allergic to plyera and that her prescription did not change pt verbally stated that she understood. Pt is also concerned that her follow up appointment with [facility] will not not accept her insurance. Spoke with social work and social work stated that scheduling would not have scheduled an appointment if the pt's insurance would not cover the appointment. Pt was notified and stated that she understood.

## 2018-09-17 DIAGNOSIS — R42 VERTIGO: ICD-10-CM

## 2018-09-19 ENCOUNTER — APPOINTMENT (OUTPATIENT)
Dept: RADIOLOGY | Facility: MEDICAL CENTER | Age: 63
DRG: 330 | End: 2018-09-19
Attending: EMERGENCY MEDICINE
Payer: COMMERCIAL

## 2018-09-19 ENCOUNTER — HOSPITAL ENCOUNTER (INPATIENT)
Facility: MEDICAL CENTER | Age: 63
LOS: 23 days | DRG: 330 | End: 2018-10-12
Attending: EMERGENCY MEDICINE | Admitting: INTERNAL MEDICINE
Payer: COMMERCIAL

## 2018-09-19 ENCOUNTER — TELEPHONE (OUTPATIENT)
Dept: MEDICAL GROUP | Facility: PHYSICIAN GROUP | Age: 63
End: 2018-09-19

## 2018-09-19 DIAGNOSIS — E87.6 HYPOKALEMIA: ICD-10-CM

## 2018-09-19 DIAGNOSIS — E89.0 POSTSURGICAL HYPOTHYROIDISM: ICD-10-CM

## 2018-09-19 DIAGNOSIS — K52.9 CHRONIC DIARRHEA: ICD-10-CM

## 2018-09-19 DIAGNOSIS — R11.14 BILIOUS VOMITING WITH NAUSEA: ICD-10-CM

## 2018-09-19 DIAGNOSIS — F41.1 GAD (GENERALIZED ANXIETY DISORDER): ICD-10-CM

## 2018-09-19 DIAGNOSIS — F17.210 CIGARETTE NICOTINE DEPENDENCE, UNCOMPLICATED: ICD-10-CM

## 2018-09-19 DIAGNOSIS — F33.1 MODERATE EPISODE OF RECURRENT MAJOR DEPRESSIVE DISORDER (HCC): ICD-10-CM

## 2018-09-19 DIAGNOSIS — N95.1 SYMPTOMATIC MENOPAUSAL OR FEMALE CLIMACTERIC STATES: ICD-10-CM

## 2018-09-19 DIAGNOSIS — M10.9 GOUT, ARTHRITIS: ICD-10-CM

## 2018-09-19 DIAGNOSIS — M81.0 OSTEOPOROSIS, SENILE: ICD-10-CM

## 2018-09-19 DIAGNOSIS — M47.12 OSTEOARTHRITIS OF CERVICAL SPINE WITH MYELOPATHY: ICD-10-CM

## 2018-09-19 DIAGNOSIS — K22.10 ULCERATIVE ESOPHAGITIS: ICD-10-CM

## 2018-09-19 DIAGNOSIS — G25.9 EXTRAPYRAMIDAL AND MOVEMENT DISORDER: ICD-10-CM

## 2018-09-19 DIAGNOSIS — Z72.0 TOBACCO USE: ICD-10-CM

## 2018-09-19 DIAGNOSIS — E83.52 SERUM CALCIUM ELEVATED: ICD-10-CM

## 2018-09-19 DIAGNOSIS — R10.84 GENERALIZED ABDOMINAL PAIN: ICD-10-CM

## 2018-09-19 DIAGNOSIS — G89.28 OTHER CHRONIC POSTPROCEDURAL PAIN: ICD-10-CM

## 2018-09-19 DIAGNOSIS — E87.1 HYPONATREMIA: ICD-10-CM

## 2018-09-19 DIAGNOSIS — R10.9 ABDOMINAL PAIN, ACUTE: ICD-10-CM

## 2018-09-19 DIAGNOSIS — R53.81 DEBILITY: ICD-10-CM

## 2018-09-19 DIAGNOSIS — F10.11 HISTORY OF ALCOHOL ABUSE: ICD-10-CM

## 2018-09-19 DIAGNOSIS — I10 ESSENTIAL HYPERTENSION: ICD-10-CM

## 2018-09-19 DIAGNOSIS — E83.42 HYPOMAGNESEMIA: ICD-10-CM

## 2018-09-19 DIAGNOSIS — G62.9 NEUROPATHY: ICD-10-CM

## 2018-09-19 DIAGNOSIS — E03.9 HYPOTHYROIDISM, UNSPECIFIED TYPE: ICD-10-CM

## 2018-09-19 DIAGNOSIS — E78.2 MIXED HYPERLIPIDEMIA: ICD-10-CM

## 2018-09-19 DIAGNOSIS — F33.9 EPISODE OF RECURRENT MAJOR DEPRESSIVE DISORDER, UNSPECIFIED DEPRESSION EPISODE SEVERITY (HCC): ICD-10-CM

## 2018-09-19 DIAGNOSIS — E66.9 CLASS 1 OBESITY WITH BODY MASS INDEX (BMI) OF 30.0 TO 30.9 IN ADULT, UNSPECIFIED OBESITY TYPE, UNSPECIFIED WHETHER SERIOUS COMORBIDITY PRESENT: ICD-10-CM

## 2018-09-19 DIAGNOSIS — F51.01 PRIMARY INSOMNIA: ICD-10-CM

## 2018-09-19 DIAGNOSIS — J98.11 ATELECTASIS: ICD-10-CM

## 2018-09-19 DIAGNOSIS — M41.9 SCOLIOSIS OF LUMBAR SPINE, UNSPECIFIED SCOLIOSIS TYPE: ICD-10-CM

## 2018-09-19 DIAGNOSIS — K11.5 SIALOLITHIASIS, DUCTAL: ICD-10-CM

## 2018-09-19 DIAGNOSIS — M54.40 ACUTE BILATERAL LOW BACK PAIN WITH SCIATICA, SCIATICA LATERALITY UNSPECIFIED: ICD-10-CM

## 2018-09-19 DIAGNOSIS — K56.7 ILEUS (HCC): ICD-10-CM

## 2018-09-19 DIAGNOSIS — K56.609 SMALL BOWEL OBSTRUCTION (HCC): ICD-10-CM

## 2018-09-19 LAB
ALBUMIN SERPL BCP-MCNC: 4.3 G/DL (ref 3.2–4.9)
ALBUMIN/GLOB SERPL: 1.4 G/DL
ALP SERPL-CCNC: 74 U/L (ref 30–99)
ALT SERPL-CCNC: 13 U/L (ref 2–50)
ANION GAP SERPL CALC-SCNC: 14 MMOL/L (ref 0–11.9)
APPEARANCE UR: CLEAR
AST SERPL-CCNC: 20 U/L (ref 12–45)
BASOPHILS # BLD AUTO: 0.8 % (ref 0–1.8)
BASOPHILS # BLD: 0.04 K/UL (ref 0–0.12)
BILIRUB SERPL-MCNC: 0.5 MG/DL (ref 0.1–1.5)
BILIRUB UR QL STRIP.AUTO: NEGATIVE
BNP SERPL-MCNC: 47 PG/ML (ref 0–100)
BUN SERPL-MCNC: 4 MG/DL (ref 8–22)
CALCIUM SERPL-MCNC: 9.6 MG/DL (ref 8.5–10.5)
CHLORIDE SERPL-SCNC: 92 MMOL/L (ref 96–112)
CO2 SERPL-SCNC: 28 MMOL/L (ref 20–33)
COLOR UR: YELLOW
CREAT SERPL-MCNC: 0.71 MG/DL (ref 0.5–1.4)
EOSINOPHIL # BLD AUTO: 0.15 K/UL (ref 0–0.51)
EOSINOPHIL NFR BLD: 3.1 % (ref 0–6.9)
ERYTHROCYTE [DISTWIDTH] IN BLOOD BY AUTOMATED COUNT: 47.6 FL (ref 35.9–50)
GLOBULIN SER CALC-MCNC: 3.1 G/DL (ref 1.9–3.5)
GLUCOSE SERPL-MCNC: 81 MG/DL (ref 65–99)
GLUCOSE UR STRIP.AUTO-MCNC: NEGATIVE MG/DL
HCT VFR BLD AUTO: 44.2 % (ref 37–47)
HGB BLD-MCNC: 15.5 G/DL (ref 12–16)
IMM GRANULOCYTES # BLD AUTO: 0.03 K/UL (ref 0–0.11)
IMM GRANULOCYTES NFR BLD AUTO: 0.6 % (ref 0–0.9)
KETONES UR STRIP.AUTO-MCNC: 15 MG/DL
LACTATE BLD-SCNC: 1.6 MMOL/L (ref 0.5–2)
LEUKOCYTE ESTERASE UR QL STRIP.AUTO: NEGATIVE
LIPASE SERPL-CCNC: 24 U/L (ref 11–82)
LYMPHOCYTES # BLD AUTO: 1.75 K/UL (ref 1–4.8)
LYMPHOCYTES NFR BLD: 35.9 % (ref 22–41)
MCH RBC QN AUTO: 35.6 PG (ref 27–33)
MCHC RBC AUTO-ENTMCNC: 35.1 G/DL (ref 33.6–35)
MCV RBC AUTO: 101.6 FL (ref 81.4–97.8)
MICRO URNS: ABNORMAL
MONOCYTES # BLD AUTO: 0.42 K/UL (ref 0–0.85)
MONOCYTES NFR BLD AUTO: 8.6 % (ref 0–13.4)
NEUTROPHILS # BLD AUTO: 2.49 K/UL (ref 2–7.15)
NEUTROPHILS NFR BLD: 51 % (ref 44–72)
NITRITE UR QL STRIP.AUTO: NEGATIVE
NRBC # BLD AUTO: 0 K/UL
NRBC BLD-RTO: 0 /100 WBC
PH UR STRIP.AUTO: 7.5 [PH]
PLATELET # BLD AUTO: 271 K/UL (ref 164–446)
PMV BLD AUTO: 10.8 FL (ref 9–12.9)
POTASSIUM SERPL-SCNC: 3.4 MMOL/L (ref 3.6–5.5)
PROT SERPL-MCNC: 7.4 G/DL (ref 6–8.2)
PROT UR QL STRIP: NEGATIVE MG/DL
RBC # BLD AUTO: 4.35 M/UL (ref 4.2–5.4)
RBC UR QL AUTO: NEGATIVE
SODIUM SERPL-SCNC: 134 MMOL/L (ref 135–145)
SP GR UR STRIP.AUTO: 1.01
TROPONIN I SERPL-MCNC: <0.01 NG/ML (ref 0–0.04)
UROBILINOGEN UR STRIP.AUTO-MCNC: 0.2 MG/DL
WBC # BLD AUTO: 4.9 K/UL (ref 4.8–10.8)

## 2018-09-19 PROCEDURE — 96376 TX/PRO/DX INJ SAME DRUG ADON: CPT

## 2018-09-19 PROCEDURE — 700111 HCHG RX REV CODE 636 W/ 250 OVERRIDE (IP): Performed by: EMERGENCY MEDICINE

## 2018-09-19 PROCEDURE — 83690 ASSAY OF LIPASE: CPT

## 2018-09-19 PROCEDURE — 700105 HCHG RX REV CODE 258: Performed by: EMERGENCY MEDICINE

## 2018-09-19 PROCEDURE — 83605 ASSAY OF LACTIC ACID: CPT

## 2018-09-19 PROCEDURE — 700111 HCHG RX REV CODE 636 W/ 250 OVERRIDE (IP): Performed by: INTERNAL MEDICINE

## 2018-09-19 PROCEDURE — 84443 ASSAY THYROID STIM HORMONE: CPT

## 2018-09-19 PROCEDURE — 99285 EMERGENCY DEPT VISIT HI MDM: CPT

## 2018-09-19 PROCEDURE — 80053 COMPREHEN METABOLIC PANEL: CPT

## 2018-09-19 PROCEDURE — 81003 URINALYSIS AUTO W/O SCOPE: CPT

## 2018-09-19 PROCEDURE — 96374 THER/PROPH/DIAG INJ IV PUSH: CPT

## 2018-09-19 PROCEDURE — 84484 ASSAY OF TROPONIN QUANT: CPT

## 2018-09-19 PROCEDURE — 84439 ASSAY OF FREE THYROXINE: CPT

## 2018-09-19 PROCEDURE — 304561 HCHG STAT O2

## 2018-09-19 PROCEDURE — 96375 TX/PRO/DX INJ NEW DRUG ADDON: CPT

## 2018-09-19 PROCEDURE — 83880 ASSAY OF NATRIURETIC PEPTIDE: CPT

## 2018-09-19 PROCEDURE — 85025 COMPLETE CBC W/AUTO DIFF WBC: CPT

## 2018-09-19 PROCEDURE — 99223 1ST HOSP IP/OBS HIGH 75: CPT | Performed by: INTERNAL MEDICINE

## 2018-09-19 PROCEDURE — 770001 HCHG ROOM/CARE - MED/SURG/GYN PRIV*

## 2018-09-19 PROCEDURE — 74176 CT ABD & PELVIS W/O CONTRAST: CPT

## 2018-09-19 RX ORDER — HYDROMORPHONE HYDROCHLORIDE 2 MG/ML
0.5 INJECTION, SOLUTION INTRAMUSCULAR; INTRAVENOUS; SUBCUTANEOUS
Status: DISCONTINUED | OUTPATIENT
Start: 2018-09-19 | End: 2018-09-20

## 2018-09-19 RX ORDER — POLYETHYLENE GLYCOL 3350 17 G/17G
17 POWDER, FOR SOLUTION ORAL 3 TIMES DAILY PRN
COMMUNITY
End: 2024-01-24

## 2018-09-19 RX ORDER — SODIUM CHLORIDE 9 MG/ML
INJECTION, SOLUTION INTRAVENOUS CONTINUOUS
Status: DISCONTINUED | OUTPATIENT
Start: 2018-09-19 | End: 2018-09-20

## 2018-09-19 RX ORDER — POTASSIUM CHLORIDE 750 MG/1
10 TABLET, FILM COATED, EXTENDED RELEASE ORAL DAILY
Status: ON HOLD | COMMUNITY
End: 2018-10-02

## 2018-09-19 RX ORDER — PROMETHAZINE HYDROCHLORIDE 25 MG/1
12.5-25 TABLET ORAL EVERY 4 HOURS PRN
Status: DISCONTINUED | OUTPATIENT
Start: 2018-09-19 | End: 2018-09-28

## 2018-09-19 RX ORDER — PROMETHAZINE HYDROCHLORIDE 12.5 MG/1
12.5-25 SUPPOSITORY RECTAL EVERY 4 HOURS PRN
Status: DISCONTINUED | OUTPATIENT
Start: 2018-09-19 | End: 2018-09-28

## 2018-09-19 RX ORDER — OXYCODONE HYDROCHLORIDE 10 MG/1
10 TABLET ORAL
Status: DISCONTINUED | OUTPATIENT
Start: 2018-09-19 | End: 2018-09-20

## 2018-09-19 RX ORDER — ONDANSETRON 4 MG/1
4 TABLET, ORALLY DISINTEGRATING ORAL EVERY 4 HOURS PRN
Status: DISCONTINUED | OUTPATIENT
Start: 2018-09-19 | End: 2018-09-28 | Stop reason: ALTCHOICE

## 2018-09-19 RX ORDER — DIPHENHYDRAMINE HYDROCHLORIDE 50 MG/ML
25 INJECTION INTRAMUSCULAR; INTRAVENOUS ONCE
Status: COMPLETED | OUTPATIENT
Start: 2018-09-19 | End: 2018-09-19

## 2018-09-19 RX ORDER — MORPHINE SULFATE 4 MG/ML
4 INJECTION, SOLUTION INTRAMUSCULAR; INTRAVENOUS ONCE
Status: COMPLETED | OUTPATIENT
Start: 2018-09-19 | End: 2018-09-19

## 2018-09-19 RX ORDER — MECLIZINE HCL 12.5 MG/1
12.5 TABLET ORAL 3 TIMES DAILY PRN
Status: ON HOLD | COMMUNITY
End: 2018-10-02

## 2018-09-19 RX ORDER — ONDANSETRON 2 MG/ML
4 INJECTION INTRAMUSCULAR; INTRAVENOUS ONCE
Status: COMPLETED | OUTPATIENT
Start: 2018-09-19 | End: 2018-09-19

## 2018-09-19 RX ORDER — HYDRALAZINE HYDROCHLORIDE 20 MG/ML
20 INJECTION INTRAMUSCULAR; INTRAVENOUS EVERY 6 HOURS PRN
Status: DISCONTINUED | OUTPATIENT
Start: 2018-09-19 | End: 2018-09-30

## 2018-09-19 RX ORDER — ONDANSETRON 2 MG/ML
4 INJECTION INTRAMUSCULAR; INTRAVENOUS EVERY 4 HOURS PRN
Status: DISCONTINUED | OUTPATIENT
Start: 2018-09-19 | End: 2018-09-28 | Stop reason: ALTCHOICE

## 2018-09-19 RX ORDER — SODIUM CHLORIDE, SODIUM LACTATE, POTASSIUM CHLORIDE, CALCIUM CHLORIDE 600; 310; 30; 20 MG/100ML; MG/100ML; MG/100ML; MG/100ML
INJECTION, SOLUTION INTRAVENOUS CONTINUOUS
Status: DISCONTINUED | OUTPATIENT
Start: 2018-09-19 | End: 2018-09-20

## 2018-09-19 RX ORDER — BISMUTH SUBCITRATE POTASSIUM, METRONIDAZOLE, TETRACYCLINE HYDROCHLORIDE 140; 125; 125 MG/1; MG/1; MG/1
3 CAPSULE ORAL 3 TIMES DAILY
Status: ON HOLD | COMMUNITY
End: 2018-10-02

## 2018-09-19 RX ORDER — OXYCODONE HYDROCHLORIDE 5 MG/1
5 TABLET ORAL
Status: DISCONTINUED | OUTPATIENT
Start: 2018-09-19 | End: 2018-09-20

## 2018-09-19 RX ADMIN — MORPHINE SULFATE 4 MG: 4 INJECTION INTRAVENOUS at 18:47

## 2018-09-19 RX ADMIN — HYDROMORPHONE HYDROCHLORIDE 0.5 MG: 2 INJECTION INTRAMUSCULAR; INTRAVENOUS; SUBCUTANEOUS at 23:25

## 2018-09-19 RX ADMIN — MORPHINE SULFATE 4 MG: 4 INJECTION INTRAVENOUS at 21:47

## 2018-09-19 RX ADMIN — ONDANSETRON 4 MG: 2 INJECTION INTRAMUSCULAR; INTRAVENOUS at 18:47

## 2018-09-19 RX ADMIN — DIPHENHYDRAMINE HYDROCHLORIDE 25 MG: 50 INJECTION INTRAMUSCULAR; INTRAVENOUS at 18:47

## 2018-09-19 RX ADMIN — SODIUM CHLORIDE: 9 INJECTION, SOLUTION INTRAVENOUS at 18:47

## 2018-09-19 ASSESSMENT — PAIN SCALES - GENERAL: PAINLEVEL_OUTOF10: 9

## 2018-09-19 NOTE — TELEPHONE ENCOUNTER
1. Caller Name: Marium Hartmann Dexter                                           Call Back Number: [phone] (home)

## 2018-09-19 NOTE — TELEPHONE ENCOUNTER
1. Caller Name: Marium             Call Back Number: [phone]      Patient approves a detailed voicemail message: yes    2. What are the patient's symptoms (location & severity)? Vomiting bile and constipation  X about 4 days     3. Is this a new symptom Yes    4. When did it start? After hospital discharge     5. Action taken per Active Symptom Guide: Emergency Department recommended    6. Patient agrees to recommended action per Active Symptom Escalation Protocol.

## 2018-09-19 NOTE — ED TRIAGE NOTES
"Chief Complaint   Patient presents with   • Arm Pain     c/o pain to LT arm from hand into shoulders and chest. states she was bit by a spider last week to LT hand. admitted for cellulitis recently.    • Constipation     x1 week, \"with little diarrhea\"   • N/V     since yesterday.      pt to triage for above. Denies fevers. Currently taking an antibiotic.    Pt returned to [facility]. Educated on triage process and to inform staff of any changes.     /93   Pulse 83   Temp 36.4 °C (97.5 °F)   Resp 19   Ht 1.549 m (5' 1\")   Wt 71.7 kg (158 lb)   LMP 10/02/1980   SpO2 92%   BMI 29.85 kg/m²     "

## 2018-09-19 NOTE — TELEPHONE ENCOUNTER
Future Appointments       Provider Department Center    9/20/2018 4:20 PM Huma Moon M.D. [facility]        ESTABLISHED PATIENT PRE-VISIT PLANNING     Note: Patient will not be contacted if there is no indication to call.     1.  Reviewed notes from the last few office visits within the medical group: Yes    2.  If any orders were placed at last visit or intended to be done for this visit (i.e. 6 mos follow-up), do we have Results/Consult Notes?        •  Labs - Labs ordered, completed on 09/13/18 and results are in chart.       •  Imaging - Imaging ordered, completed and results are in chart.       •  Referrals - No referrals were ordered at last office visit.    3. Is this appointment scheduled as a Hospital Follow-Up? No    4.  Immunizations were updated in [facility] using WebIZ?: Yes       •  Web Iz Recommendations: FLU, HEPATITIS A , HEPATITIS B, TD and ZOSTAVAX (Shingles)    5.  Patient is due for the following Health Maintenance Topics:   Health Maintenance Due   Topic Date Due   • IMM HEP B VACCINE (2 of 3 - Hep B Twinrix risk 3-dose series) 06/21/2005   • IMM ZOSTER VACCINES (1 of 2) 10/02/2006   • MAMMOGRAM  06/24/2014   • BONE DENSITY  02/02/2017   • IMM INFLUENZA (1) 09/01/2018       6.  MDX printed for Provider? NO    7.  Patient was informed to arrive 15 min prior to their scheduled appointment and bring in their medication bottles. VERONICA

## 2018-09-20 ENCOUNTER — APPOINTMENT (OUTPATIENT)
Dept: RADIOLOGY | Facility: MEDICAL CENTER | Age: 63
DRG: 330 | End: 2018-09-20
Attending: HOSPITALIST
Payer: COMMERCIAL

## 2018-09-20 PROBLEM — E87.1 HYPONATREMIA: Status: ACTIVE | Noted: 2018-09-20

## 2018-09-20 PROBLEM — K22.10 ULCERATIVE ESOPHAGITIS: Status: ACTIVE | Noted: 2018-09-20

## 2018-09-20 PROBLEM — G89.28 OTHER CHRONIC POSTPROCEDURAL PAIN: Status: ACTIVE | Noted: 2018-09-20

## 2018-09-20 PROBLEM — K56.609 SMALL BOWEL OBSTRUCTION (HCC): Status: ACTIVE | Noted: 2018-09-20

## 2018-09-20 PROBLEM — E87.6 HYPOKALEMIA: Status: ACTIVE | Noted: 2018-09-20

## 2018-09-20 PROBLEM — E03.9 HYPOTHYROIDISM: Status: ACTIVE | Noted: 2018-09-20

## 2018-09-20 LAB
BASOPHILS # BLD AUTO: 0.9 % (ref 0–1.8)
BASOPHILS # BLD: 0.04 K/UL (ref 0–0.12)
EOSINOPHIL # BLD AUTO: 0.14 K/UL (ref 0–0.51)
EOSINOPHIL NFR BLD: 3.1 % (ref 0–6.9)
ERYTHROCYTE [DISTWIDTH] IN BLOOD BY AUTOMATED COUNT: 48.7 FL (ref 35.9–50)
HCT VFR BLD AUTO: 39.4 % (ref 37–47)
HGB BLD-MCNC: 13.2 G/DL (ref 12–16)
IMM GRANULOCYTES # BLD AUTO: 0.02 K/UL (ref 0–0.11)
IMM GRANULOCYTES NFR BLD AUTO: 0.4 % (ref 0–0.9)
LYMPHOCYTES # BLD AUTO: 1.32 K/UL (ref 1–4.8)
LYMPHOCYTES NFR BLD: 29.6 % (ref 22–41)
MCH RBC QN AUTO: 34.7 PG (ref 27–33)
MCHC RBC AUTO-ENTMCNC: 33.5 G/DL (ref 33.6–35)
MCV RBC AUTO: 103.7 FL (ref 81.4–97.8)
MONOCYTES # BLD AUTO: 0.46 K/UL (ref 0–0.85)
MONOCYTES NFR BLD AUTO: 10.3 % (ref 0–13.4)
NEUTROPHILS # BLD AUTO: 2.48 K/UL (ref 2–7.15)
NEUTROPHILS NFR BLD: 55.7 % (ref 44–72)
NRBC # BLD AUTO: 0 K/UL
NRBC BLD-RTO: 0 /100 WBC
PLATELET # BLD AUTO: 209 K/UL (ref 164–446)
PMV BLD AUTO: 10.4 FL (ref 9–12.9)
RBC # BLD AUTO: 3.8 M/UL (ref 4.2–5.4)
T4 FREE SERPL-MCNC: 0.74 NG/DL (ref 0.53–1.43)
TSH SERPL DL<=0.005 MIU/L-ACNC: 12.06 UIU/ML (ref 0.38–5.33)
WBC # BLD AUTO: 4.5 K/UL (ref 4.8–10.8)

## 2018-09-20 PROCEDURE — A9270 NON-COVERED ITEM OR SERVICE: HCPCS | Performed by: INTERNAL MEDICINE

## 2018-09-20 PROCEDURE — 700105 HCHG RX REV CODE 258: Performed by: HOSPITALIST

## 2018-09-20 PROCEDURE — 36415 COLL VENOUS BLD VENIPUNCTURE: CPT

## 2018-09-20 PROCEDURE — 700111 HCHG RX REV CODE 636 W/ 250 OVERRIDE (IP): Performed by: HOSPITALIST

## 2018-09-20 PROCEDURE — 700101 HCHG RX REV CODE 250: Performed by: INTERNAL MEDICINE

## 2018-09-20 PROCEDURE — 700111 HCHG RX REV CODE 636 W/ 250 OVERRIDE (IP): Performed by: NURSE PRACTITIONER

## 2018-09-20 PROCEDURE — 700101 HCHG RX REV CODE 250: Performed by: HOSPITALIST

## 2018-09-20 PROCEDURE — 99233 SBSQ HOSP IP/OBS HIGH 50: CPT | Performed by: HOSPITALIST

## 2018-09-20 PROCEDURE — 700111 HCHG RX REV CODE 636 W/ 250 OVERRIDE (IP): Performed by: INTERNAL MEDICINE

## 2018-09-20 PROCEDURE — 770001 HCHG ROOM/CARE - MED/SURG/GYN PRIV*

## 2018-09-20 PROCEDURE — 85025 COMPLETE CBC W/AUTO DIFF WBC: CPT

## 2018-09-20 PROCEDURE — C9113 INJ PANTOPRAZOLE SODIUM, VIA: HCPCS | Performed by: HOSPITALIST

## 2018-09-20 PROCEDURE — 700102 HCHG RX REV CODE 250 W/ 637 OVERRIDE(OP): Performed by: INTERNAL MEDICINE

## 2018-09-20 PROCEDURE — 700105 HCHG RX REV CODE 258: Performed by: INTERNAL MEDICINE

## 2018-09-20 RX ORDER — HYDROMORPHONE HYDROCHLORIDE 2 MG/ML
1 INJECTION, SOLUTION INTRAMUSCULAR; INTRAVENOUS; SUBCUTANEOUS
Status: DISCONTINUED | OUTPATIENT
Start: 2018-09-20 | End: 2018-09-20

## 2018-09-20 RX ORDER — HYDROMORPHONE HYDROCHLORIDE 2 MG/ML
1 INJECTION, SOLUTION INTRAMUSCULAR; INTRAVENOUS; SUBCUTANEOUS
Status: DISCONTINUED | OUTPATIENT
Start: 2018-09-20 | End: 2018-09-21

## 2018-09-20 RX ORDER — ALBUTEROL SULFATE 90 UG/1
2 AEROSOL, METERED RESPIRATORY (INHALATION) EVERY 6 HOURS PRN
Status: DISCONTINUED | OUTPATIENT
Start: 2018-09-20 | End: 2018-10-12 | Stop reason: HOSPADM

## 2018-09-20 RX ORDER — PANTOPRAZOLE SODIUM 40 MG/10ML
40 INJECTION, POWDER, LYOPHILIZED, FOR SOLUTION INTRAVENOUS 2 TIMES DAILY
Status: DISCONTINUED | OUTPATIENT
Start: 2018-09-20 | End: 2018-09-26

## 2018-09-20 RX ORDER — LORAZEPAM 2 MG/ML
0.5 INJECTION INTRAMUSCULAR 2 TIMES DAILY PRN
Status: DISCONTINUED | OUTPATIENT
Start: 2018-09-20 | End: 2018-09-21

## 2018-09-20 RX ADMIN — HYDROMORPHONE HYDROCHLORIDE 1 MG: 2 INJECTION INTRAMUSCULAR; INTRAVENOUS; SUBCUTANEOUS at 15:42

## 2018-09-20 RX ADMIN — HYDROMORPHONE HYDROCHLORIDE 1 MG: 2 INJECTION INTRAMUSCULAR; INTRAVENOUS; SUBCUTANEOUS at 21:43

## 2018-09-20 RX ADMIN — POTASSIUM CHLORIDE: 2 INJECTION, SOLUTION, CONCENTRATE INTRAVENOUS at 02:46

## 2018-09-20 RX ADMIN — HYDROMORPHONE HYDROCHLORIDE 0.5 MG: 2 INJECTION INTRAMUSCULAR; INTRAVENOUS; SUBCUTANEOUS at 05:36

## 2018-09-20 RX ADMIN — METRONIDAZOLE 500 MG: 500 INJECTION, SOLUTION INTRAVENOUS at 16:57

## 2018-09-20 RX ADMIN — HYDRALAZINE HYDROCHLORIDE 20 MG: 20 INJECTION INTRAMUSCULAR; INTRAVENOUS at 01:22

## 2018-09-20 RX ADMIN — OXYCODONE HYDROCHLORIDE 10 MG: 10 TABLET ORAL at 04:31

## 2018-09-20 RX ADMIN — FAMOTIDINE 20 MG: 10 INJECTION, SOLUTION INTRAVENOUS at 12:28

## 2018-09-20 RX ADMIN — PANTOPRAZOLE SODIUM 40 MG: 40 INJECTION, POWDER, LYOPHILIZED, FOR SOLUTION INTRAVENOUS at 18:40

## 2018-09-20 RX ADMIN — SODIUM CHLORIDE, POTASSIUM CHLORIDE, SODIUM LACTATE AND CALCIUM CHLORIDE: 600; 310; 30; 20 INJECTION, SOLUTION INTRAVENOUS at 01:25

## 2018-09-20 RX ADMIN — OXYCODONE HYDROCHLORIDE 10 MG: 10 TABLET ORAL at 01:22

## 2018-09-20 RX ADMIN — LEVOTHYROXINE SODIUM ANHYDROUS 50 MCG: 100 INJECTION, POWDER, LYOPHILIZED, FOR SOLUTION INTRAVENOUS at 05:36

## 2018-09-20 RX ADMIN — HYDROMORPHONE HYDROCHLORIDE 0.5 MG: 2 INJECTION INTRAMUSCULAR; INTRAVENOUS; SUBCUTANEOUS at 02:38

## 2018-09-20 RX ADMIN — HYDROMORPHONE HYDROCHLORIDE 1 MG: 2 INJECTION INTRAMUSCULAR; INTRAVENOUS; SUBCUTANEOUS at 12:24

## 2018-09-20 RX ADMIN — OXYCODONE HYDROCHLORIDE 10 MG: 10 TABLET ORAL at 07:56

## 2018-09-20 RX ADMIN — POTASSIUM CHLORIDE: 2 INJECTION, SOLUTION, CONCENTRATE INTRAVENOUS at 21:43

## 2018-09-20 RX ADMIN — METRONIDAZOLE 500 MG: 500 INJECTION, SOLUTION INTRAVENOUS at 20:58

## 2018-09-20 RX ADMIN — DOXYCYCLINE 100 MG: 100 INJECTION, POWDER, LYOPHILIZED, FOR SOLUTION INTRAVENOUS at 18:40

## 2018-09-20 RX ADMIN — HYDROMORPHONE HYDROCHLORIDE 1 MG: 2 INJECTION INTRAMUSCULAR; INTRAVENOUS; SUBCUTANEOUS at 18:43

## 2018-09-20 RX ADMIN — HYDROMORPHONE HYDROCHLORIDE 0.5 MG: 2 INJECTION INTRAMUSCULAR; INTRAVENOUS; SUBCUTANEOUS at 09:07

## 2018-09-20 ASSESSMENT — ENCOUNTER SYMPTOMS
BLOOD IN STOOL: 0
PALPITATIONS: 0
COUGH: 0
SORE THROAT: 0
NECK PAIN: 0
TINGLING: 0
DIZZINESS: 0
HEMOPTYSIS: 0
BACK PAIN: 1
CONSTIPATION: 1
SENSORY CHANGE: 0
WEIGHT LOSS: 0
ABDOMINAL PAIN: 1
NERVOUS/ANXIOUS: 1
HALLUCINATIONS: 0
BRUISES/BLEEDS EASILY: 0
CLAUDICATION: 0
DEPRESSION: 0
BLURRED VISION: 0
DOUBLE VISION: 0
HEARTBURN: 0
TREMORS: 0
SPUTUM PRODUCTION: 0
ORTHOPNEA: 0
INSOMNIA: 0
FLANK PAIN: 0
WHEEZING: 0
MYALGIAS: 1
EYES NEGATIVE: 1
DIAPHORESIS: 0
SHORTNESS OF BREATH: 0
WEAKNESS: 1
FOCAL WEAKNESS: 0
CHILLS: 0
SEIZURES: 0
BACK PAIN: 0
FALLS: 0
NAUSEA: 1
DIARRHEA: 0
VOMITING: 1
HEADACHES: 0
STRIDOR: 0
PND: 0
SINUS PAIN: 0
FEVER: 0
VOMITING: 0

## 2018-09-20 ASSESSMENT — COPD QUESTIONNAIRES
HAVE YOU SMOKED AT LEAST 100 CIGARETTES IN YOUR ENTIRE LIFE: YES
COPD SCREENING SCORE: 5
DO YOU EVER COUGH UP ANY MUCUS OR PHLEGM?: NO/ONLY WITH OCCASIONAL COLDS OR INFECTIONS
DURING THE PAST 4 WEEKS HOW MUCH DID YOU FEEL SHORT OF BREATH: SOME OF THE TIME

## 2018-09-20 ASSESSMENT — LIFESTYLE VARIABLES
EVER_SMOKED: YES
PACK_YEARS: HALF
ALCOHOL_USE: NO
EVER_SMOKED: YES

## 2018-09-20 ASSESSMENT — PAIN SCALES - GENERAL
PAINLEVEL_OUTOF10: 10
PAINLEVEL_OUTOF10: 8
PAINLEVEL_OUTOF10: 10
PAINLEVEL_OUTOF10: 8
PAINLEVEL_OUTOF10: 10
PAINLEVEL_OUTOF10: 8
PAINLEVEL_OUTOF10: 10

## 2018-09-20 ASSESSMENT — PATIENT HEALTH QUESTIONNAIRE - PHQ9
2. FEELING DOWN, DEPRESSED, IRRITABLE, OR HOPELESS: NOT AT ALL
SUM OF ALL RESPONSES TO PHQ9 QUESTIONS 1 AND 2: 0
1. LITTLE INTEREST OR PLEASURE IN DOING THINGS: NOT AT ALL

## 2018-09-20 NOTE — RESPIRATORY CARE
COPD EDUCATION by COPD CLINICAL EDUCATOR  9/20/2018 at 7:27 AM by Gabrielle Solo     Patient reviewed by COPD education team. Patient does not qualify for COPD program.

## 2018-09-20 NOTE — CONSULTS
"SURGERY CONSULTATION    Date of service: 9/20/2018    Consult requested by: Dr. Godfrey from the hospitalist service    CHIEF COMPLAINT: Small bowel obstruction    HISTORY OF PRESENT ILLNESS:  Is a 62-year-old female admitted in the evening on on 19 September with abdominal pain and nausea for 2 days with voluminous bilious vomiting earlier in the day prior to going to the ER. Imaging suggested a small bowel obstruction in the mid ileum and patient was admitted to the hospitalist service. Nasogastric tube was placed earlier today. Patient states symptoms started 2 days prior to admission and worsened until she had vomiting episodes. She has not vomited since admission. She states she is somewhat hungry. Pain is better. Patient states she had recent admission for spider bite of the left hand which was treated with antibiotics and she still has residual pain extending from the left wrist up to the shoulder onto her chest wall. She denies cough, shortness of breath or chest pain. Patient states she was admitted to [facility] recently and had a diagnosis of esophagitis with ulcerations for which she was started on \"pyloria' regimen and she is concerned that she needs to be on these medications at this time. She has some discomfort at the nasogastric tube site. She has some chronic low back pain that is unchanged. She denies any frequency, urgency or dysuria. She has no other complaints    REVIEW OF SYSTEMS:  As per HPI is otherwise negative    Past Medical History   has a past medical history of Alcohol abuse, continuous drinking behavior (12/11/2009); Anesthesia (4-26-17); Anxiety; Arthritis (4-26-17); Chronic diarrhea (9/30/2013); Dental disorder (4-26-17); Gout; Gynecological disorder; Helicobacter pylori (H. pylori) infection (9/11/2012); Hemorrhagic disorder (HCC); Hyperlipidemia (6/5/2015); Hypoparathyroidism (HCC) (12/11/2009); Lumbago (2017); Mixed hyperlipidemia (12/11/2009); Osteoporosis, unspecified (12/11/2009); " "Other B-complex deficiencies (12/11/2009); Parotid cyst; Postsurgical hypothyroidism (12/11/2009); Psychiatric problem (4-26-17); Restless leg syndrome; S/P tooth extraction (4-25-17); Serum calcium elevated (10/8/2012); Sialolithiasis, ductal (7/21/2012); Snoring; Symptomatic menopausal or female climacteric states (12/11/2009); Tobacco abuse (12/11/2009); Unspecified essential hypertension (12/11/2009); and Urinary incontinence.     Surgical History   has a past surgical history that includes thyroidectomy (2001); parathyroidectomy (2001); salpingo-oophorectomy, unilateral; hysterectomy, vaginal; dental surgery; colonoscopy with polyp (8/1/2012); gastroscopy with biopsy (8/1/2012); lumbar fusion posterior (12/17/2015); lumbar laminectomy diskectomy (12/17/2015); primary c section; lumbar decompression (12/17/2015); colon resection (1985); appendectomy; lumbar fusion posterior (8/7/2017); and hardware removal neuro (8/7/2017).      Family History  family history includes Alcohol/Drug in her father; Genetic in her mother.      Social History   reports that she has been smoking Cigarettes.  She started smoking about 48 years ago. She has a 23.50 pack-year smoking history. She has never used smokeless tobacco. She reports that she does not drink alcohol or use drugs.     Allergies        Allergies   Allergen Reactions   • Bee Venom Anaphylaxis   • Cillins [Penicillins] Anaphylaxis and Hives   • Latex Rash       rash   • Asa [Aspirin] Unspecified       Pt states \"It tears up my stomach\".   • Coconut Oil Itching and Swelling       Raw coconut   • Codeine Itching       Pt states \"I itch so much\".   • Contrast Media With Iodine [Iodine] Vomiting and Nausea       Not sure of reaction   • Hydrocodone-Ibuprofen         \"Extreme itching\"   • Hydroxyzine Unspecified       \"loss of memory\"   • Ibuprofen Unspecified       Pt states \"It tears up my stomach\".   • Norco [Apap-Fd&C Yellow #10 [place]-Hydrocodone] Itching       Pt " "states \"I itch all over\".   • Other Food Unspecified       sourdough bread  Pt states \"My face and mouth gets all tingling\".    Lactose intolerant per patient.    • Sulfa Drugs Hives   • Talwin Unspecified       Pt states \"I get forgetful\".   • Tape Swelling       Red swelling   • Vicodin [Hydrocodone-Acetaminophen] Itching       Pt states \"I itch so much\".        MEDICATIONS:  No current facility-administered medications on file prior to encounter.      Current Outpatient Prescriptions on File Prior to Encounter   Medication Sig Dispense Refill   • gabapentin (NEURONTIN) 300 MG Cap TAKE 2 CAPSULES BY MOUTH THREE TIMES DAILY 180 Cap 0   • lorazepam (ATIVAN) 2 MG tablet Take 1 tablet by mouth once daily in the evening for 90 days. 30 Tab 0   • albuterol 108 (90 Base) MCG/ACT Aero Soln inhalation aerosol Inhale 2 Puffs by mouth every 6 hours as needed for Shortness of Breath. 8.5 g 0   • traZODone (DESYREL) 50 MG Tab TAKE 1 TO 2 TABLETS BY MOUTH EVERY DAY AT BEDTIME AS NEEDED FOR SLEEP 60 Tab 2   • varenicline (CHANTIX STARTING MONTH PAK) 0.5 MG X 11 & 1 MG X 42 tablet Per packet 56 Tab 3   • allopurinol (ZYLOPRIM) 300 MG Tab TAKE 1 TABLET BY MOUTH EVERY DAY 90 Tab 0   • levothyroxine (SYNTHROID) 88 MCG Tab Take 1 Tab by mouth Every morning on an empty stomach. Brand name only. 30 Tab 0   • oxycodone (OXY-IR) 30 MG immediate release tablet Take 1 Tab by mouth every 3 hours as needed for Moderate Pain. 30 Tab 0   • Cholecalciferol (VITAMIN D3) 1000 UNITS Cap Take 4,000 Units by mouth every day.     • Calcium Carbonate Antacid (TUMS PO) Take 3,200 mg by mouth every day. Needs to take BERRY flavor           VITAL SIGNS:  /78   Pulse 62   Temp 36.9 °C (98.4 °F)   Resp 16   Ht 1.549 m (5' 1\")   Wt 84.1 kg (185 lb 6.5 oz)   LMP 10/02/1980   SpO2 96%   BMI 35.03 kg/m²     PHYSICAL EXAMINATION:  Normocephalic and atraumatic, no jaundice or icterus. The oropharynx is clear and moist. Nasogastric tube in place " with thick bilious material draining. neck is supple. Chest is clear bilaterally with equal rise. Heart is regular without murmur. Pulses palpable in the upper extremity. Abdomen is soft but distended with minimal tenderness. There is a midline scar with no obvious hernia. Extremities have no cyanosis clubbing or edema. Neurologic examination is nonfocal    LABORATORY STUDIES:  Recent Labs      09/19/18   1845  09/20/18   1106   WBC  4.9  4.5*   RBC  4.35  3.80*   HEMOGLOBIN  15.5  13.2   HEMATOCRIT  44.2  39.4   MCV  101.6*  103.7*   MCH  35.6*  34.7*   RDW  47.6  48.7   PLATELETCT  271  209   MPV  10.8  10.4   NEUTSPOLYS  51.00  55.70   LYMPHOCYTES  35.90  29.60   MONOCYTES  8.60  10.30   EOSINOPHILS  3.10  3.10   BASOPHILS  0.80  0.90     Recent Labs      09/19/18   1845   SODIUM  134*   POTASSIUM  3.4*   CHLORIDE  92*   CO2  28   GLUCOSE  81   BUN  4*       Recent Labs      09/19/18   1845   ASTSGOT  20   ALTSGPT  13   TBILIRUBIN  0.5   ALKPHOSPHAT  74   GLOBULIN  3.1         DIAGNOSTIC STUDIES:  CT abdomen: 1. Small bowel obstruction with transition in the mid ileum. Small amount of reactive ascites.  2. Right basilar opacity with volume loss, favor atelectasis over pneumonia.    X-ray abdomen: NG tube terminates over the proximal stomach. Sidehole is above the expected position of the GE junction    ASSESSMENT AND PLAN:  62-year-old female with prior history of small bowel resection who is admitted with small bowel obstruction. Patient may also have erosive esophagitis versus gastric ulcers per history of recent hospitalization. Patient seen at bedside with nurse. Nasogastric tube to be advanced approximately 6-8 inches. Given the quality of the nasogastric tube drainage, would recommend against giving oral medications with tube clamping at least for now. Continue IV fluid resuscitation. Management of comorbid conditions but per the medical service. Obstruction does not appear to be high-grade on CT scan and  we should have a trial of decompression and resuscitation with bowel rest for 48-72 hours. If there is no resolution at that point, I will order a contrast CT scan to reevaluate for obstructive process. If there is significant obstruction at that point, would favor proceeding to a surgical management. Will follow.    We are awaiting records from outside hospital regarding ulcers to determine details and whether or not therapy needs to be continued. For now, continue PPI and consider converting tetracycline to IV.    Case discussed with hospitalist, nursing as well as patient and family member at bedside    This note was dictated using voice recognition software and as such may have  inaccuracies that do not reflect patient care plan.

## 2018-09-20 NOTE — PROGRESS NOTES
[facility]    Date of Service: 9/20/2018    Chief Complaint  62 y.o. female admitted 9/19/2018 with chief complaint of diffuse abdominal pain with associated nausea and bilious vomiting.    Interval Problem Update  -CT abdomen without IV contrast revealed small bowel obstruction with transition in the mid ileum. Small amount of reactive ascites.  -Dr. Amanda surgery was consulted with recommendations to keep the patient n.p.o. and NG tube, and rest bowel and if no flatulence or bowel movement in the next 72 hours will likely proceed with surgery.  -Patient currently with pain to her back and abdomen which currently is uncontrolled as she is on a chronic narcotic regimen at home for her back pain and will utilize IV narcotics as stated below.    Consultants/Specialty  Surgery -Dr. Amanda    Disposition  To be determined        Review of Systems   Constitutional: Positive for malaise/fatigue. Negative for chills, diaphoresis, fever and weight loss.   HENT: Negative.  Negative for sinus pain and sore throat.    Eyes: Negative.  Negative for blurred vision and double vision.   Respiratory: Negative for cough, hemoptysis, sputum production, shortness of breath, wheezing and stridor.    Cardiovascular: Negative for chest pain, palpitations, orthopnea, claudication, leg swelling and PND.   Gastrointestinal: Positive for abdominal pain, constipation and nausea. Negative for blood in stool, diarrhea, heartburn, melena and vomiting.   Genitourinary: Negative for dysuria, flank pain, frequency, hematuria and urgency.   Musculoskeletal: Positive for back pain and myalgias. Negative for falls, joint pain and neck pain.   Skin: Negative.    Neurological: Positive for weakness. Negative for dizziness, tingling, tremors, sensory change and headaches.   Endo/Heme/Allergies: Negative.    Psychiatric/Behavioral: Negative for depression and hallucinations. The patient is nervous/anxious. The patient does not  have insomnia.       Physical Exam  Laboratory/Imaging   Hemodynamics  Temp (24hrs), Av.6 °C (97.8 °F), Min:36.3 °C (97.3 °F), Max:36.9 °C (98.4 °F)   Temperature: 36.3 °C (97.3 °F)  Pulse  Av.9  Min: 61  Max: 83 Heart Rate (Monitored): 68  Blood Pressure: 118/71, NIBP: 156/99      Respiratory      Respiration: 16, Pulse Oximetry: 97 %        RUL Breath Sounds: Clear, RML Breath Sounds: Clear;Diminished, RLL Breath Sounds: Diminished, RADHA Breath Sounds: Clear, LLL Breath Sounds: Diminished    Fluids    Intake/Output Summary (Last 24 hours) at 18 1553  Last data filed at 18 0600   Gross per 24 hour   Intake              600 ml   Output                0 ml   Net              600 ml       Nutrition  Orders Placed This Encounter   Procedures   • Diet NPO     Standing Status:   Standing     Number of Occurrences:   1     Order Specific Question:   Restrict to:     Answer:   Sips with Medications [3]     Physical Exam   Constitutional: She is oriented to person, place, and time. Vital signs are normal. She appears ill. No distress. Nasal cannula in place.   HENT:   Head: Normocephalic and atraumatic.   Eyes: Pupils are equal, round, and reactive to light. Conjunctivae and EOM are normal.   Neck: Normal range of motion. Neck supple. No JVD present. No tracheal deviation present.   Cardiovascular: Normal rate, regular rhythm and intact distal pulses.    Murmur heard.  Pulmonary/Chest: Effort normal and breath sounds normal. No stridor. No respiratory distress. She has no wheezes. She has no rales. She exhibits no tenderness.   Abdominal: Soft. She exhibits distension. She exhibits no mass. There is no hepatosplenomegaly. There is tenderness in the right upper quadrant, right lower quadrant, left upper quadrant and left lower quadrant. There is guarding. There is no rebound and no CVA tenderness.   Musculoskeletal: Normal range of motion. She exhibits no edema or tenderness.   Neurological: She is alert  and oriented to person, place, and time.   BUE strength 5/5  BLE strength 4/5   Skin: Skin is warm and dry.   Psychiatric: Her speech is normal and behavior is normal. Judgment and thought content normal. Her mood appears anxious. Cognition and memory are normal.   Nursing note and vitals reviewed.      Recent Labs      09/19/18 1845 09/20/18   1106   WBC  4.9  4.5*   RBC  4.35  3.80*   HEMOGLOBIN  15.5  13.2   HEMATOCRIT  44.2  39.4   MCV  101.6*  103.7*   MCH  35.6*  34.7*   MCHC  35.1*  33.5*   RDW  47.6  48.7   PLATELETCT  271  209   MPV  10.8  10.4     Recent Labs      09/19/18 1845   SODIUM  134*   POTASSIUM  3.4*   CHLORIDE  92*   CO2  28   GLUCOSE  81   BUN  4*   CREATININE  0.71   CALCIUM  9.6         Recent Labs      09/19/18 1845   BNPBTYPENAT  47     DX-ABDOMEN FOR TUBE PLACEMENT   Final Result      NG tube terminates over the proximal stomach. Sidehole is above the expected position of the GE junction      CT-ABDOMEN-PELVIS W/O   Final Result         1. Small bowel obstruction with transition in the mid ileum. Small amount of reactive ascites.   2. Right basilar opacity with volume loss, fever atelectasis over pneumonia.               Assessment/Plan     Small bowel obstruction (HCC)- (present on admission)   Assessment & Plan    Patient reports a history of bowel obstruction and had colon resection 30 years ago.  Now presents with 2 day h/o diffuse abdominal pain with associated nausea and bilious vomiting. CT abdomen without IV contrast revealed small bowel obstruction with transition in the mid ileum. Small amount of reactive ascites.    -Continue NPO status  -IVF with LR with KCL  -Place NGT to low intermittent suction  -Pain control   -Surgery consulted (Dr. Amanda) and recommendations to keep NG tube in place and rest bowel for the next 72 hours and if patient with no flatulence or bowel movement will likely proceed with surgery on 9/22/2018.  Also with recommendations to not put any pills  down NG tube and to keep everything IV as much as possible.            Other chronic postprocedural pain   Assessment & Plan    Patient with extensive back surgeries including: lumbar fusion posterior (12/17/2015); lumbar laminectomy diskectomy (12/17/2015); lumbar decompression (12/17/2015); lumbar fusion posterior (8/7/2017); and hardware removal neuro (8/7/2017).     -Chronic pain and on chronic narcotics at home, however unable to continue home oral narcotic regimen due to current small bowel obstruction and will utilize IV at this time.        Ulcerative esophagitis   Assessment & Plan    With a history of infected ulceration to esophagus in which she been treated for approximately 1 week ago at [facility] and placed on antibiotics.  No documented notes, however patient does have prescription in which she did bring in with her for Pylera, and has only taken approximately 5 days worth.    -Records requested from [facility]  -Pharmacy consulted and patient will be placed on IV Flagyl, and IV doxycycline  -Started on IV Protonix  -Continue n.p.o.  -IV fluids as indicated previous        Hyponatremia- (present on admission)   Assessment & Plan    Mildly low at 134 and patient asymptomatic likely related to recent nausea and vomiting.    -Continue IV fluids as stated previous and will recheck level in a.m.        Hypokalemia- (present on admission)   Assessment & Plan    Likely related to recent/frequent nausea and vomiting.  Currently at 3.4 and patient asymptomatic     -Patient receiving IV fluids LR with 20 mEq KCl @ 125ml/hr and will continue and recheck level in a.m.        Hypothyroidism- (present on admission)   Assessment & Plan    Patient status post thyroidectomy and parathyroidectomy and takes Synthroid 88 MCG daily.  TSH level elevated at 12.060 and T4 within normal limits.      -Will continue Synthroid injection 50 MCG daily in the setting of bowel obstruction and NG tube placement  -Will have patient  follow-up outpatient          ELISHA (generalized anxiety disorder)- (present on admission)   Assessment & Plan    Patient with history of anxiety on Ativan nightly at home.  Patient currently with intermittent periods of anxiety.    -Ativan IV as needed  -Monitor          Quality-Core Measures   Reviewed items::  EKG reviewed, Radiology images reviewed, Labs reviewed and Medications reviewed  Parker catheter::  No Parker  DVT prophylaxis pharmacological::  Enoxaparin (Lovenox)  DVT prophylaxis - mechanical:  SCDs  Ulcer Prophylaxis::  Yes  Antibiotics:  Treating active infection/contamination beyond 24 hours perioperative coverage          ERIC Manrique.

## 2018-09-20 NOTE — PROGRESS NOTES
2 RN skin checks done. No skin breakdown/pressure ulcers noted. Bruise noted on left side of abdomen. Blanchable redness noted on coccyx area.

## 2018-09-20 NOTE — DISCHARGE PLANNING
Care Transition Team Assessment    Information Source  Orientation : Oriented x 4  Information Given By: Patient  Who is responsible for making decisions for patient? : Patient    Readmission Evaluation  Is this a readmission?: No    Elopement Risk  Legal Hold: No  Ambulatory or Self Mobile in Wheelchair: Yes  Disoriented: No  Psychiatric Symptoms: None  History of Wandering: No  Elopement this Admit: No  Vocalizing Wanting to Leave: No  Displays Behaviors, Body Language Wanting to Leave: No-Not at Risk for Elopement  Elopement Risk: Not at Risk for Elopement    Interdisciplinary Discharge Planning  Does Admitting Nurse Feel This Could be a Complex Discharge?: Yes  Primary Care Physician: Dr. Curry  Lives with - Patient's Self Care Capacity: Spouse  Patient or legal guardian wants to designate a caregiver (see row info): Yes  Caregiver name: Govind Evelin  Caregiver relationship to patient:    Support Systems: Spouse / Significant Other  Housing / Facility: [address] [place] [address]  Do You Take your Prescribed Medications Regularly: Yes  Able to Return to Previous ADL's: Yes  Mobility Issues: Yes  Prior Services: None  Patient Expects to be Discharged to:: Home  Assistance Needed: Yes    Discharge Preparedness  What is your plan after discharge?: Home with help  What are your discharge supports?: Spouse  Prior Functional Level: Wheelchair Dependent  Difficulity with ADLs: Walking  Difficulity with IADLs: Cooking, Driving, Laundry    Functional Assesment  Prior Functional Level: Wheelchair Dependent    Finances  Financial Barriers to Discharge: Yes  Average Monthly Income:  ( is not working at this time)  Prescription Coverage: Yes    Vision / Hearing Impairment  Vision Impairment : No  Hearing Impairment : No    Values / Beliefs / Concerns  Values / Beliefs Concerns : No    Advance Directive  Advance Directive?: None    Domestic Abuse  Have you ever been the victim of abuse or violence?: No  Physical Abuse  or Sexual Abuse: No  Verbal Abuse or Emotional Abuse: No  Possible Abuse Reported to:: Not Applicable         Discharge Risks or Barriers  Discharge risks or barriers?: No PCP (about to see a new MD)    Anticipated Discharge Information  Anticipated discharge disposition: [facility], Home

## 2018-09-20 NOTE — ASSESSMENT & PLAN NOTE
Patient reports a history of bowel obstruction and had colon resection 30 years ago.  Now presents with 2 day h/o diffuse abdominal pain with associated nausea and bilious vomiting. CT abdomen without IV contrast revealed small bowel obstruction with transition in the mid ileum. Small amount of reactive ascites. Patient continues with no bowel movement or flatulence since admission.    -Continue NPO status - STRICT  -Continue IV fluids as stated previous  -Continue NGT to low intermittent suction  -Pain control as stated previous  -Surgery Dr. Amanda following patient with possible adhesive SBO with no signs of bowel function.  -CT performed last evening showing complete obstruction to the mid small bowel.  -OR today for ex lap, JUANITA, and possible small bowel resection  -Likely transfer to surgical floor post-op

## 2018-09-20 NOTE — ASSESSMENT & PLAN NOTE
Patient status post thyroidectomy and parathyroidectomy and takes Synthroid 88 MCG daily.  TSH level elevated at 12.060 and T4 within normal limits.      -Will continue Synthroid injection 50 MCG daily in the setting of bowel obstruction and NG tube placement  -Will have patient follow-up outpatient

## 2018-09-20 NOTE — H&P
Hospital Medicine History & Physical Note    Date of Service  9/19/2018    Primary Care Physician  Huma Moon M.D.    Consultants  None    Code Status  Full code    Chief Complaint  Abdominal pain    History of Presenting Illness  62 y.o. female who presented 9/19/2018 with diffuse abdominal pain that started earlier today.  Patient reports associated nausea and bilious vomiting that continued all day today.  She has been unable to keep anything down.  She denies any fevers, chest pain, shortness of breath, cough or diarrhea.  Patient reports a history of bowel obstruction and had colon resection 30 years ago.  Patient was recently hospitalized for left hand cellulitis after spider bite for which she was treated with IV Unasyn and doxycycline and completed a course of Augmentin and doxycycline.  She continues to report pain in her left arm that radiates upwards however redness and swelling have resolved.    CT abdomen without IV contrast revealed small bowel obstruction with transition in the mid ileum. Small amount of reactive ascites.    Review of Systems  Review of Systems   Constitutional: Negative for chills, diaphoresis and fever.   HENT: Negative for hearing loss and sore throat.    Eyes: Negative for blurred vision.   Respiratory: Negative for cough, sputum production, shortness of breath and wheezing.    Cardiovascular: Negative for chest pain, palpitations and leg swelling.   Gastrointestinal: Positive for abdominal pain, nausea and vomiting. Negative for blood in stool and diarrhea.   Genitourinary: Negative for dysuria, flank pain and urgency.   Musculoskeletal: Positive for myalgias. Negative for back pain, joint pain and neck pain.   Skin: Negative for rash.   Neurological: Negative for dizziness, focal weakness, seizures and headaches.   Endo/Heme/Allergies: Does not bruise/bleed easily.   Psychiatric/Behavioral: Negative for suicidal ideas.   All other systems reviewed and are  negative.      Past Medical History   has a past medical history of Alcohol abuse, continuous drinking behavior (12/11/2009); Anesthesia (4-26-17); Anxiety; Arthritis (4-26-17); Chronic diarrhea (9/30/2013); Dental disorder (4-26-17); Gout; Gynecological disorder; Helicobacter pylori (H. pylori) infection (9/11/2012); Hemorrhagic disorder (HCC); Hyperlipidemia (6/5/2015); Hypoparathyroidism (HCC) (12/11/2009); Lumbago (2017); Mixed hyperlipidemia (12/11/2009); Osteoporosis, unspecified (12/11/2009); Other B-complex deficiencies (12/11/2009); Parotid cyst; Postsurgical hypothyroidism (12/11/2009); Psychiatric problem (4-26-17); Restless leg syndrome; S/P tooth extraction (4-25-17); Serum calcium elevated (10/8/2012); Sialolithiasis, ductal (7/21/2012); Snoring; Symptomatic menopausal or female climacteric states (12/11/2009); Tobacco abuse (12/11/2009); Unspecified essential hypertension (12/11/2009); and Urinary incontinence.    Surgical History   has a past surgical history that includes thyroidectomy (2001); parathyroidectomy (2001); salpingo-oophorectomy, unilateral; hysterectomy, vaginal; dental surgery; colonoscopy with polyp (8/1/2012); gastroscopy with biopsy (8/1/2012); lumbar fusion posterior (12/17/2015); lumbar laminectomy diskectomy (12/17/2015); primary c section; lumbar decompression (12/17/2015); colon resection (1985); appendectomy; lumbar fusion posterior (8/7/2017); and hardware removal neuro (8/7/2017).     Family History  family history includes Alcohol/Drug in her father; Genetic in her mother.     Social History   reports that she has been smoking Cigarettes.  She started smoking about 48 years ago. She has a 23.50 pack-year smoking history. She has never used smokeless tobacco. She reports that she does not drink alcohol or use drugs.    Allergies  Allergies   Allergen Reactions   • Bee Venom Anaphylaxis   • Cillins [Penicillins] Anaphylaxis and Hives   • Latex Rash     rash   • Asa [Aspirin]  "Unspecified     Pt states \"It tears up my stomach\".   • Coconut Oil Itching and Swelling     Raw coconut   • Codeine Itching     Pt states \"I itch so much\".   • Contrast Media With Iodine [Iodine] Vomiting and Nausea     Not sure of reaction   • Hydrocodone-Ibuprofen      \"Extreme itching\"   • Hydroxyzine Unspecified     \"loss of memory\"   • Ibuprofen Unspecified     Pt states \"It tears up my stomach\".   • Norco [Apap-Fd&C Yellow #10 [place]-Hydrocodone] Itching     Pt states \"I itch all over\".   • Other Food Unspecified     sourdough bread  Pt states \"My face and mouth gets all tingling\".    Lactose intolerant per patient.    • Sulfa Drugs Hives   • Talwin Unspecified     Pt states \"I get forgetful\".   • Tape Swelling     Red swelling   • Vicodin [Hydrocodone-Acetaminophen] Itching     Pt states \"I itch so much\".       Medications  Prior to Admission Medications   Prescriptions Last Dose Informant Patient Reported? Taking?   Calcium Carbonate Antacid (TUMS PO) 9/19/2018 at 1100 Patient Yes No   Sig: Take 3,200 mg by mouth every day. Needs to take BERRY flavor   Cholecalciferol (VITAMIN D3) 1000 UNITS Cap 9/19/2018 at 1000 Patient Yes No   Sig: Take 4,000 Units by mouth every day.   NON SPECIFIED 9/17/2018 at unknown  Yes Yes   Sig: Apply 1 Patch to affected area(s) every day. CVD PATCHX   OMEPRAZOLE PO 9/18/2018 at 1900  Yes Yes   Sig: Take 1 Cap by mouth 2 Times a Day.   albuterol 108 (90 Base) MCG/ACT Aero Soln inhalation aerosol 9/19/2018 at 1100 Patient No No   Sig: Inhale 2 Puffs by mouth every 6 hours as needed for Shortness of Breath.   allopurinol (ZYLOPRIM) 300 MG Tab 9/18/2018 at 2200 Patient No No   Sig: TAKE 1 TABLET BY MOUTH EVERY DAY   bismuth-metronidazole-tetracycline (PYLERA) 140-125-125 MG per capsule 9/19/2018 at 1000  Yes Yes   Sig: Take 3 Caps by mouth 3 times a day.   carbidopa-levodopa (SINEMET)  MG Tab 9/18/2018 at 2200 Patient Yes Yes   Sig: Take 1 Tab by mouth every bedtime. "   gabapentin (NEURONTIN) 300 MG Cap 9/19/2018 at 1000 Patient No No   Sig: TAKE 2 CAPSULES BY MOUTH THREE TIMES DAILY   levothyroxine (SYNTHROID) 88 MCG Tab 9/19/2018 at 1000 Patient No No   Sig: Take 1 Tab by mouth Every morning on an empty stomach. Brand name only.   lorazepam (ATIVAN) 2 MG tablet 9/18/2018 at 2200 Patient No No   Sig: Take 1 tablet by mouth once daily in the evening for 90 days.   meclizine (ANTIVERT) 12.5 MG Tab 9/18/2018 at 2200 Patient Yes Yes   Sig: Take 12.5 mg by mouth 3 times a day as needed.   oxycodone (OXY-IR) 30 MG immediate release tablet 9/19/2018 at 1000 Patient No No   Sig: Take 1 Tab by mouth every 3 hours as needed for Moderate Pain.   polyethylene glycol 3350 (MIRALAX) Powder 9/18/2018 at 2200 Patient Yes Yes   Sig: Take 17 g by mouth 3 times a day as needed.   potassium chloride ER (KLOR-CON) 10 MEQ tablet 9/18/2018 at 2200 Patient Yes Yes   Sig: Take 10 mEq by mouth every day.   traZODone (DESYREL) 50 MG Tab  Patient No No   Sig: TAKE 1 TO 2 TABLETS BY MOUTH EVERY DAY AT BEDTIME AS NEEDED FOR SLEEP   varenicline (CHANTIX STARTING MONTH MIRELA) 0.5 MG X 11 & 1 MG X 42 tablet  Patient No No   Sig: Per packet      Facility-Administered Medications: None       Physical Exam  Temp:  [36.4 °C (97.5 °F)] 36.4 °C (97.5 °F)  Pulse:  [63-83] 63  Resp:  [16-19] 16  BP: (134-137)/(90-93) 137/90    Physical Exam   Constitutional: She is oriented to person, place, and time. She appears well-developed and well-nourished. No distress.   HENT:   Head: Normocephalic and atraumatic.   Mouth/Throat: Oropharynx is clear and moist.   Eyes: Pupils are equal, round, and reactive to light. Conjunctivae are normal.   Neck: Neck supple. No thyromegaly present.   Cardiovascular: Normal rate, regular rhythm and normal heart sounds.    Pulmonary/Chest: Effort normal and breath sounds normal. No respiratory distress. She has no wheezes. She has no rales.   Abdominal: She exhibits distension. There is  tenderness (Mild diffuse). There is no rebound and no guarding.   Musculoskeletal: Normal range of motion. She exhibits no edema or tenderness.   Neurological: She is alert and oriented to person, place, and time. No cranial nerve deficit. Coordination normal.   Skin: Skin is warm and dry.   Psychiatric: She has a normal mood and affect. Her behavior is normal.   Nursing note and vitals reviewed.      Laboratory:  Recent Labs      09/19/18 1845   WBC  4.9   RBC  4.35   HEMOGLOBIN  15.5   HEMATOCRIT  44.2   MCV  101.6*   MCH  35.6*   MCHC  35.1*   RDW  47.6   PLATELETCT  271   MPV  10.8     Recent Labs      09/19/18 1845   SODIUM  134*   POTASSIUM  3.4*   CHLORIDE  92*   CO2  28   GLUCOSE  81   BUN  4*   CREATININE  0.71   CALCIUM  9.6     Recent Labs      09/19/18 1845   ALTSGPT  13   ASTSGOT  20   ALKPHOSPHAT  74   TBILIRUBIN  0.5   LIPASE  24   GLUCOSE  81         Recent Labs      09/19/18 1845   BNPBTYPENAT  47         Lab Results   Component Value Date    TROPONINI <0.01 09/19/2018       Urinalysis:    Recent Labs      09/19/18 2005   SPECGRAVITY  1.008   GLUCOSEUR  Negative   KETONES  15*   NITRITE  Negative   LEUKESTERAS  Negative        Imaging:  CT-ABDOMEN-PELVIS W/O   Final Result         1. Small bowel obstruction with transition in the mid ileum. Small amount of reactive ascites.   2. Right basilar opacity with volume loss, fever atelectasis over pneumonia.            Assessment/Plan:  I anticipate this patient will require at least two midnights for appropriate medical management, necessitating inpatient admission.    Small bowel obstruction (HCC)- (present on admission)   Assessment & Plan    N.p.o.  NG tube placement with low intermittent suction  IV fluid hydration with lactated Ringer's  Pain control with oxycodone and IV morphine, monitor respiratory status closely  We will consult surgery in the morning        Hyponatremia- (present on admission)   Assessment & Plan    IV fluid hydration  with lactated Ringer's  Monitor BMP        Hypokalemia- (present on admission)   Assessment & Plan    Replace with IV KCl        Hypothyroidism- (present on admission)   Assessment & Plan    Patient takes Synthroid 88 MCG daily, therefore will administer Synthroid injection 50 MCG daily in the setting of bowel obstruction and NG tube placement  Check TSH            VTE prophylaxis: Heparin

## 2018-09-20 NOTE — ASSESSMENT & PLAN NOTE
Patient with extensive back surgeries including: lumbar fusion posterior (12/17/2015); lumbar laminectomy diskectomy (12/17/2015); lumbar decompression (12/17/2015); lumbar fusion posterior (8/7/2017); and hardware removal neuro (8/7/2017).     -Chronic back pain controlled with current regimen and on chronic narcotics at home, however unable to continue home oral narcotic regimen due to current small bowel obstruction and will utilize IV narcotics.  -Plan as stated previous

## 2018-09-20 NOTE — ASSESSMENT & PLAN NOTE
Mildly low currently at 134 and patient asymptomatic likely related to increased NGT output    -We will change IV fluids to NS with 40 mEq of KCl at 125 ml/hr  -Follow BMP

## 2018-09-20 NOTE — ED NOTES
KINDER 1 Fall Risk Assessment        Risk Yes No Fall Protocol Intervention   Present to ED because of fall (syncope, seizure, ALOC)  x    Age >70  x    Altered Mental Status (substance abuse, confusion, unable to follow direction)  x    Impaired Mobility  (Assistive devices, unsteady gait, unable to ambulate or transfer x     Nursing Judgement (bowel and bladder problems, Diarrhea, vs, dizziness)  x      Yes to any risk = High fall Risk    Interventions  1. Move Patient closer to nurses' station.  2. Familiarize the patient with environment  3. Place call light with in reach and instruct call light instructions.  4. Keep the patient's personal possessions within safe reach when appropriate.  5. Place stretcher in lowest position with wheels locked.  6. Place yellow socks and armband on patient.  7. Place green triangle on patient's door.  8. Give patient urinal if applicable.  9. Keep floor surfaces clean and dry.  10. Keep patient care areas uncluttered.  11. Use a lap belt or poesy vest.  12. Assess patient hourly for pain, personal needs, positioning, and call light access

## 2018-09-20 NOTE — PROGRESS NOTES
Patient was admitted to unit via gurney. Patient ambulated from [facility] to bed with SBA. Patient has reported constant pain. Has pain management in place. PRN given. Has baseline numbness on left hip, butt and leg from prior back surgery. Abdomen is noted rigid with distant sound. Per patient her last BM was a week ago. IV fluids started. Bed alarm in place. Call light within reach.

## 2018-09-20 NOTE — ASSESSMENT & PLAN NOTE
With a history of infected ulceration to esophagus in which she been treated for approximately 1 week ago at [facility] and placed on antibiotics.  No documented notes, however per patient she has only taken approximately 5 days of the 14 day course.    -Records requested from [facility] still pending  -Having has been bring in Pylera medication to clarify how much patient has actually taken, and due to not having Pylera on formulary with spoke with pharmacy and will continue on IV Flagyl, and IV doxycycline for an additional 5 days and have patient f/u outpatient with GI at [facility] once d/c.  -Continue on IV Protonix  -Continue n.p.o.  -IV fluids as indicated previous  -Pain control as indicated previous

## 2018-09-20 NOTE — ED NOTES
Received report on pt, assuming care at this time. pts sats were 80% on room air. This RN placed pt on 2L of O2. Provided pt with chapstick. Pt has no questions or concerns at this time. Call light in reach. Will continue to monitor.

## 2018-09-20 NOTE — DISCHARGE PLANNING
Anticipated Discharge Disposition:   Home possible need for homehealth    Action:   Met with pt.  She said that she is wheelchair bound, unable to walk at all. However, CN said that pt was able to transfer to the [facility] with one person assistance.     During interview pt and  started talking about surgery that pt had 2 years ago which they feel ruined pt. Pt said that she is paralyzed on her left side due to this. Pt said that she has been in the hospital frequently for several different reasons which they feel stems from the back surgery. They started to become pretty heated and upset when talking about the back surgery and wanted CM to file a report. However, this surgery was 2 years ago. Notified unit manager.    Pt said although she is wheelchair bound, she is able to manage at home. They have a wheelchair ramp at home. Her  is currently not working due to a recent hernia surgery so he helps pt at home. They are hopeful to go back to working soon. CM met  who is able bodied and able to help pt.     NGT noted to be in left nostril.  wants it connected to suction. Notified CN.     Assessment competed.     Barriers to Discharge:   Medical clearance     Plan:   Follow.

## 2018-09-20 NOTE — ED NOTES
Med rec complete per pt at bedside  Ok per Pt to discuss medications with visitor present  Allergies have been verified and updated

## 2018-09-20 NOTE — ASSESSMENT & PLAN NOTE
Patient with history of anxiety on Ativan nightly at home.  Patient continues with intermittent periods of anxiety.    -Ativan IV increased to 1 mg every 12 hours as needed  -Monitor

## 2018-09-20 NOTE — ASSESSMENT & PLAN NOTE
Decreased today with level currently at 3.4 and patient asymptomatic- likely related to NG tube output    -Will change IV fluids to NS with 40 mEq KCl @ 125ml/hr and give additional 20 mEq KCl IV as patient's NG tube output is increased.  -Monitor BMP

## 2018-09-20 NOTE — ED PROVIDER NOTES
"ED Provider Note    Scribed for Deepak Dinh M.D. by Lolis Hammer. 9/19/2018  6:00 PM    Primary Care Provider: OLIMPIA Edwards  Means of arrival: wheel chair  History limited by: none     CHIEF COMPLAINT  Chief Complaint   Patient presents with   • Arm Pain     c/o pain to LT arm from hand into shoulders and chest. states she was bit by a spider last week to LT hand. admitted for cellulitis recently.    • Constipation     x1 week, \"with little diarrhea\"   • N/V     since yesterday.        HPI  Marium Renteria is a 62 y.o. female who presents to the ED complaining of left arm pain which began several days ago. Her pain radiates to her left hand and into her left shoulder and to her left chest. Patient was bit by an insect last week to her left hand and was recently admitted for cellulitis. Her swelling has improved, however, her pain has been persistent since. Patient is also complaining of constipation with associated abdominal pain, nausea, vomiting, and a headache which began yesterday. She is thirsty on exam. Patient describes her vomiting as very foul smelling and states \"she is vomiting her bowels\". She states the last time she had a bowel movement was 2 days ago and states it was \"small and watery\". Patient has been hospitalized 3 times in the past couple of months and was recently hospitalized for chest pain at [facility]. She was diagnosed with ulcers at that time. Patient also has bruising to her abdomen, worse to her left lower abdomen which she believes is secondary to her heparin shots. Patient reports chronic back problems and states she experiences positional vertigo, however, this began several years ago. Per , the patient also has chronic ileus problems. No complaints of vision changes, sore throat, rashes, dysuria, weakness or numbness,     REVIEW OF SYSTEMS    CONSTITUTIONAL:  Denies weakness.  EYES:  Denies vision changes.   ENT:  Denies sore throat.  CARDIOVASCULAR: " " Left sided chest pain.   RESPIRATORY:  Denies cough, shortness of breath, difficulty breathing.  GI: Constipation, abdominal pain, nausea, vomiting, watery stools.   : Denies dysuria.   MUSCULOSKELETAL: Left arm and left hand pain, left shoulder pain. Back pain (chronic). Denies weakness.  SKIN:  Bruising to abdomen, worse to the left lower abdomen. Denies rashes.   NEUROLOGIC:  Headache, positional vertigo. Denies focal weakness, or numbness.    PAST MEDICAL HISTORY  Past Medical History:   Diagnosis Date   • Alcohol abuse, continuous drinking behavior 12/11/2009   • Anesthesia 4-26-17    \"Hard to put to sleep\"   • Anxiety    • Arthritis 4-26-17    \"Hands\"   • Chronic diarrhea 9/30/2013   • Dental disorder 4-26-17    \"Upper permanent right side bridge\"   • Gout    • Gynecological disorder     \"HX Gonorrhea, was treated\"   • Helicobacter pylori (H. pylori) infection 9/11/2012   • Hemorrhagic disorder (HCC)     \"needed a lot of blood with back surgery\"   • Hyperlipidemia 6/5/2015   • Hypoparathyroidism (HCC) 12/11/2009   • Lumbago 2017    back and neck   • Mixed hyperlipidemia 12/11/2009   • Osteoporosis, unspecified 12/11/2009   • Other B-complex deficiencies 12/11/2009   • Parotid cyst    • Postsurgical hypothyroidism 12/11/2009   • Psychiatric problem 4-26-17    depression, anxiety   • Restless leg syndrome    • S/P tooth extraction 4-25-17    Pt. states Dr. Sexton is aware.   • Serum calcium elevated 10/8/2012   • Sialolithiasis, ductal 7/21/2012   • Snoring    • Symptomatic menopausal or female climacteric states 12/11/2009   • Tobacco abuse 12/11/2009   • Unspecified essential hypertension 12/11/2009   • Urinary incontinence     urgency       FAMILY HISTORY  Family History   Problem Relation Age of Onset   • Genetic Mother         Alzheimer's   • Alcohol/Drug Father        SOCIAL HISTORY   reports that she has been smoking Cigarettes.  She started smoking about 48 years ago. She has a 23.50 pack-year smoking " history. She has never used smokeless tobacco. She reports that she does not drink alcohol or use drugs.    SURGICAL HISTORY  Past Surgical History:   Procedure Laterality Date   • LUMBAR FUSION POSTERIOR  8/7/2017    Procedure: LUMBAR FUSION POSTERIOR EXPLORATION;  Surgeon: Garrett Sexton M.D.;  Location: SURGERY [facility];  Service:    • HARDWARE REMOVAL NEURO  8/7/2017    Procedure: HARDWARE REMOVAL NEURO DEEP L1-L3, L4-S1, SEROMA REMOVAL;  Surgeon: Garrett Sexton M.D.;  Location: SURGERY [facility];  Service:    • LUMBAR FUSION POSTERIOR  12/17/2015    Procedure: LUMBAR FUSION POSTERIOR L1-S1;  Surgeon: Garrett Sexton M.D.;  Location: SURGERY [facility];  Service:    • LUMBAR LAMINECTOMY DISKECTOMY  12/17/2015    Procedure: LUMBAR LAMINECTOMY DISKECTOMY;  Surgeon: Garrett Sexton M.D.;  Location: SURGERY [facility];  Service:    • LUMBAR DECOMPRESSION  12/17/2015    Procedure: LUMBAR DECOMPRESSION L1-L3;  Surgeon: Garrett Sexton M.D.;  Location: SURGERY [facility];  Service:    • COLONOSCOPY WITH POLYP  8/1/2012    Performed by ARIA EMERSON at ENDOSCOPY [facility]   • GASTROSCOPY WITH BIOPSY  8/1/2012    Performed by ARIA EMERSON at ENDOSCOPY [facility]   • THYROIDECTOMY  2001   • PARATHYROIDECTOMY  2001   • COLON RESECTION  1985    7 feet of intestine   • APPENDECTOMY     • DENTAL SURGERY     • HYSTERECTOMY, VAGINAL      partial, benign   • PRIMARY C SECTION     • SALPINGO-OOPHORECTOMY, UNILATERAL         CURRENT MEDICATIONS  Home Medications     Reviewed by Jose Hernandez R.N. (Registered Nurse) on 09/19/18 at 1619  Med List Status: Partial   Medication Last Dose Status   acetaminophen (TYLENOL) 500 MG Tab 5/14/2018 Active   albuterol 108 (90 Base) MCG/ACT Aero Soln inhalation aerosol  Active   allopurinol (ZYLOPRIM) 300 MG Tab  Active   amoxicillin-clavulanate (AUGMENTIN) 875-125 MG Tab  Active   URI CONTOUR NEXT TEST strip 5/14/2018 Active   calcitRIOL (ROCALTROL)  "0.25 MCG Cap 5/14/2018 Active   Calcium Carbonate Antacid (TUMS PO) 5/14/2018 Active   carbidopa-levodopa (SINEMET)  MG Tab  Active   Cholecalciferol (VITAMIN D3) 1000 UNITS Cap 5/14/2018 Active   cholestyramine (QUESTRAN) 4 g packet  Active   diphenhydrAMINE (BENADRYL) 25 MG Tab 5/14/2018 Active   docusate sodium 100 MG Cap 5/14/2018 Active   doxycycline monohydrate (ADOXA) 100 MG tablet  Active   EPINEPHrine (EPIPEN) 0.3 MG/0.3ML Solution Auto-injector solution for injection  Active   gabapentin (NEURONTIN) 300 MG Cap  Active   levothyroxine (SYNTHROID) 88 MCG Tab 5/14/2018 Active   lidocaine (LIDODERM) 5 % Patch 5/14/2018 Active   lorazepam (ATIVAN) 2 MG tablet  Active   meclizine (ANTIVERT) 12.5 MG Tab  Active   meloxicam (MOBIC) 15 MG tablet 8/21/2018 Active   morphine ER (MS CONTIN) 30 MG Tab CR tablet 5/14/2018 Active   Naproxen Sodium (ALEVE) 220 MG Cap 5/14/2018 Active   oxycodone (OXY-IR) 30 MG immediate release tablet 5/14/2018 Active   potassium chloride SA (K-DUR) 10 MEQ Tab CR  Active   traZODone (DESYREL) 50 MG Tab  Active   varenicline (CHANTIX STARTING MONTH PAK) 0.5 MG X 11 & 1 MG X 42 tablet  Active                ALLERGIES  Allergies   Allergen Reactions   • Bee Venom Anaphylaxis   • Latex Rash   • Asa [Aspirin] Unspecified     Pt states \"It tears up my stomach\".   • Cillins [Penicillins] Hives   • Coconut Oil Itching and Swelling     Raw coconut   • Codeine Itching     Pt states \"I itch so much\".   • Contrast Media With Iodine [Iodine] Vomiting and Nausea     Not sure of reaction   • Hydrocodone-Ibuprofen      \"Extreme itching\"   • Ibuprofen Unspecified     Pt states \"It tears up my stomach\".   • Norco [Apap-Fd&C Yellow #10 [place]-Hydrocodone] Itching     Pt states \"I itch all over\".   • Other Drug Unspecified     VISTRIL  Pt states \"I get forgetful\".   • Other Environmental Rash     adhesive   • Other Food Unspecified     sourdough bread  Pt states \"My face and mouth gets all tingling\".  " "  Lactose intolerant per patient.    • Sulfa Drugs Hives   • Talwin Unspecified     Pt states \"I get forgetful\".   • Tape Swelling     Red swelling   • Vicodin [Hydrocodone-Acetaminophen] Itching     Pt states \"I itch so much\".       PHYSICAL EXAM  VITAL SIGNS: /93   Pulse 83   Temp 36.4 °C (97.5 °F)   Resp 19   Ht 1.549 m (5' 1\")   Wt 71.7 kg (158 lb)   LMP 10/02/1980   SpO2 92%   BMI 29.85 kg/m²      Constitutional: Patient is awake and alert. No acute respiratory distress. Well developed, Well nourished, Non-toxic appearance.  HENT: Normocephalic, Atraumatic, Bilateral external ears normal, Oropharynx pink and dry with no exudates, Nose patent.  Eyes: , No discharge. Sclera and conjunctiva are injected.   Neck:  Supple no nuchal rigidity, no thyromegaly or mass. Non-tender. Anterior neck is midline.   Lymphatic: No supraclavicular lymph nodes.   Cardiovascular: Heart is regular rate and rhythm no murmur, rub or thrill.   Thorax & Lungs: Chest is symmetrical, with good breath sounds. No wheezing or crackles. No respiratory distress, No chest tenderness.   Abdomen: Soft, diffuse tenderness, worse to her epigastrium, no hepatosplenomegaly there is no guarding or rebound, No masses, No pulsatile masses.   Skin: Warm, Dry. Bruising on her abdomen secondary to heparin shots, left lower abdomen is worse.   Back: Non tender with palpation, No CVA tenderness. Back has a well healed surgical scar.   Extremities: No edema to extremities.   Musculoskeletal: Good range of motion to the lower extremities.  She has pain in her right shoulder she says is a grinding sensation in there that is chronic.  Also chronic pain to the left hand forearm and upper arm since her insect bite.  There is no redness or swelling.  Pulses are 2+ radial 1+ ulnar to the left arm.    Neurologic: Alert & oriented to person, time, and place.  Strength is 5 over 5 and symmetric in bilateral upper and lower extremities.  Sensory is intact " to light touch to face, arms, and legs. Numbness to the left hip area.  DTRs are symmetrical in biceps brachioradialis, patella and Achilles.     LABS  Results for orders placed or performed during the hospital encounter of 09/19/18   CBC WITH DIFFERENTIAL   Result Value Ref Range    WBC 4.9 4.8 - 10.8 K/uL    RBC 4.35 4.20 - 5.40 M/uL    Hemoglobin 15.5 12.0 - 16.0 g/dL    Hematocrit 44.2 37.0 - 47.0 %    .6 (H) 81.4 - 97.8 fL    MCH 35.6 (H) 27.0 - 33.0 pg    MCHC 35.1 (H) 33.6 - 35.0 g/dL    RDW 47.6 35.9 - 50.0 fL    Platelet Count 271 164 - 446 K/uL    MPV 10.8 9.0 - 12.9 fL    Neutrophils-Polys 51.00 44.00 - 72.00 %    Lymphocytes 35.90 22.00 - 41.00 %    Monocytes 8.60 0.00 - 13.40 %    Eosinophils 3.10 0.00 - 6.90 %    Basophils 0.80 0.00 - 1.80 %    Immature Granulocytes 0.60 0.00 - 0.90 %    Nucleated RBC 0.00 /100 WBC    Neutrophils (Absolute) 2.49 2.00 - 7.15 K/uL    Lymphs (Absolute) 1.75 1.00 - 4.80 K/uL    Monos (Absolute) 0.42 0.00 - 0.85 K/uL    Eos (Absolute) 0.15 0.00 - 0.51 K/uL    Baso (Absolute) 0.04 0.00 - 0.12 K/uL    Immature Granulocytes (abs) 0.03 0.00 - 0.11 K/uL    NRBC (Absolute) 0.00 K/uL   COMP METABOLIC PANEL   Result Value Ref Range    Sodium 134 (L) 135 - 145 mmol/L    Potassium 3.4 (L) 3.6 - 5.5 mmol/L    Chloride 92 (L) 96 - 112 mmol/L    Co2 28 20 - 33 mmol/L    Anion Gap 14.0 (H) 0.0 - 11.9    Glucose 81 65 - 99 mg/dL    Bun 4 (L) 8 - 22 mg/dL    Creatinine 0.71 0.50 - 1.40 mg/dL    Calcium 9.6 8.5 - 10.5 mg/dL    AST(SGOT) 20 12 - 45 U/L    ALT(SGPT) 13 2 - 50 U/L    Alkaline Phosphatase 74 30 - 99 U/L    Total Bilirubin 0.5 0.1 - 1.5 mg/dL    Albumin 4.3 3.2 - 4.9 g/dL    Total Protein 7.4 6.0 - 8.2 g/dL    Globulin 3.1 1.9 - 3.5 g/dL    A-G Ratio 1.4 g/dL   LIPASE   Result Value Ref Range    Lipase 24 11 - 82 U/L   TROPONIN   Result Value Ref Range    Troponin I <0.01 0.00 - 0.04 ng/mL   LACTIC ACID   Result Value Ref Range    Lactic Acid 1.6 0.5 - 2.0 mmol/L    ESTIMATED GFR   Result Value Ref Range    GFR If African American >60 >60 mL/min/1.73 m 2    GFR If Non African American >60 >60 mL/min/1.73 m 2     All labs reviewed by me.    RADIOLOGY/PROCEDURES  CT-ABDOMEN-PELVIS W/O    (Results Pending)     CT-ABDOMEN-PELVIS W/O   Final Result         1. Small bowel obstruction with transition in the mid ileum. Small amount of reactive ascites.   2. Right basilar opacity with volume loss, fever atelectasis over pneumonia.          The radiologist's interpretations of all radiological studies have been reviewed by me.     COURSE & MEDICAL DECISION MAKING  Pertinent Labs & Imaging studies reviewed. (See chart for details)        6:00 PM - Patient seen and examined at bedside. Ordered CT abdomen, estimated GFR, CBC, CMP, lipase, troponin, BNP, lactic acid, urinalysis culture.  Patient will be medicated with morphine 4 mg, Zofran 4 mg, and Benadryl 25 mg for her symptoms. Patient will be given NS infusion for her dry mucous membranes and nausea/vomiting.     8:14 PM- Reviewed the patient's lab and imaging results.     - Patient was reevaluated at bedside. There was a good response to IV fluids as the patient now has moist mucous membranes.  Discussed lab and radiology results with the patient and informed them that .          Decision Making  Patient who has had a bad infection in the left arm with admission the hospital for this in the past.  Now with a long history of ileus is however she is now vomiting up brown material.  Feeling much more ill than usual.  Her x-ray show what may be a small bowel obstruction or ileus.  Also some atelectasis to the right lung.  I have given her IV fluids here because of her dehydration.  I also given her some pain medications as well and antiemetics she is resting more comfortably but continues to vomit was unable to hold down oral contrast.  I discussed the case with Dr. Haas and he will admit the patient to the hospital.  He states he  will contact [facility] about the abdominal discomfort.  Patient has had surgeries in the past where she had a large amount of intestine removed due to obstructions.  Initial surgeries were gynecologic.  She has not seen a surgeon over 10 years and these were out of town.      IV fluid resuscitation reevaluation patient was given the IV fluids seem to help her discomfort somewhat but she will require continued IV fluids for her dehydration and the fact that she is unable to tolerate oral fluids.      FINAL IMPRESSION  1. Generalized abdominal pain    2. Bilious vomiting with nausea    3. Atelectasis    4. Small bowel obstruction (HCC)         PLAN  1.  Admission [facility]  2.  Consultation per surgery.  Dr. Yadav states he will consult surgery       Lolis AMAYA (Scribe), am scribing for, and in the presence of, Deepak Dinh M.D..    Electronically signed by: Lolis Hammer (Scribjake), 9/19/2018    Deepak AMAYA M.D. personally performed the services described in this documentation, as scribed by Lolis Hammer in my presence, and it is both accurate and complete. C.     The note accurately reflects work and decisions made by me.  Deepak Dinh  9/19/2018  10:21 PM

## 2018-09-21 ENCOUNTER — APPOINTMENT (OUTPATIENT)
Dept: RADIOLOGY | Facility: MEDICAL CENTER | Age: 63
DRG: 330 | End: 2018-09-21
Attending: SURGERY
Payer: COMMERCIAL

## 2018-09-21 PROBLEM — R10.9 ABDOMINAL PAIN, ACUTE: Status: ACTIVE | Noted: 2018-09-21

## 2018-09-21 LAB
ALBUMIN SERPL BCP-MCNC: 3.7 G/DL (ref 3.2–4.9)
ALBUMIN/GLOB SERPL: 1.4 G/DL
ALP SERPL-CCNC: 66 U/L (ref 30–99)
ALT SERPL-CCNC: 11 U/L (ref 2–50)
ANION GAP SERPL CALC-SCNC: 16 MMOL/L (ref 0–11.9)
AST SERPL-CCNC: 17 U/L (ref 12–45)
BASOPHILS # BLD AUTO: 0.7 % (ref 0–1.8)
BASOPHILS # BLD: 0.03 K/UL (ref 0–0.12)
BILIRUB SERPL-MCNC: 0.6 MG/DL (ref 0.1–1.5)
BUN SERPL-MCNC: 6 MG/DL (ref 8–22)
CALCIUM SERPL-MCNC: 9.1 MG/DL (ref 8.5–10.5)
CHLORIDE SERPL-SCNC: 98 MMOL/L (ref 96–112)
CO2 SERPL-SCNC: 23 MMOL/L (ref 20–33)
CREAT SERPL-MCNC: 0.58 MG/DL (ref 0.5–1.4)
EOSINOPHIL # BLD AUTO: 0.14 K/UL (ref 0–0.51)
EOSINOPHIL NFR BLD: 3.2 % (ref 0–6.9)
ERYTHROCYTE [DISTWIDTH] IN BLOOD BY AUTOMATED COUNT: 47.8 FL (ref 35.9–50)
GLOBULIN SER CALC-MCNC: 2.7 G/DL (ref 1.9–3.5)
GLUCOSE SERPL-MCNC: 72 MG/DL (ref 65–99)
HCT VFR BLD AUTO: 41 % (ref 37–47)
HGB BLD-MCNC: 14.1 G/DL (ref 12–16)
IMM GRANULOCYTES # BLD AUTO: 0.05 K/UL (ref 0–0.11)
IMM GRANULOCYTES NFR BLD AUTO: 1.1 % (ref 0–0.9)
LYMPHOCYTES # BLD AUTO: 1.38 K/UL (ref 1–4.8)
LYMPHOCYTES NFR BLD: 31.4 % (ref 22–41)
MAGNESIUM SERPL-MCNC: 1.4 MG/DL (ref 1.5–2.5)
MCH RBC QN AUTO: 35.9 PG (ref 27–33)
MCHC RBC AUTO-ENTMCNC: 34.4 G/DL (ref 33.6–35)
MCV RBC AUTO: 104.3 FL (ref 81.4–97.8)
MONOCYTES # BLD AUTO: 0.33 K/UL (ref 0–0.85)
MONOCYTES NFR BLD AUTO: 7.5 % (ref 0–13.4)
NEUTROPHILS # BLD AUTO: 2.46 K/UL (ref 2–7.15)
NEUTROPHILS NFR BLD: 56.1 % (ref 44–72)
NRBC # BLD AUTO: 0 K/UL
NRBC BLD-RTO: 0 /100 WBC
PLATELET # BLD AUTO: 220 K/UL (ref 164–446)
PMV BLD AUTO: 10.6 FL (ref 9–12.9)
POTASSIUM SERPL-SCNC: 3.7 MMOL/L (ref 3.6–5.5)
PROT SERPL-MCNC: 6.4 G/DL (ref 6–8.2)
RBC # BLD AUTO: 3.93 M/UL (ref 4.2–5.4)
SODIUM SERPL-SCNC: 137 MMOL/L (ref 135–145)
WBC # BLD AUTO: 4.4 K/UL (ref 4.8–10.8)

## 2018-09-21 PROCEDURE — 700105 HCHG RX REV CODE 258: Performed by: HOSPITALIST

## 2018-09-21 PROCEDURE — 80053 COMPREHEN METABOLIC PANEL: CPT

## 2018-09-21 PROCEDURE — 36415 COLL VENOUS BLD VENIPUNCTURE: CPT

## 2018-09-21 PROCEDURE — 700117 HCHG RX CONTRAST REV CODE 255: Performed by: SURGERY

## 2018-09-21 PROCEDURE — 770001 HCHG ROOM/CARE - MED/SURG/GYN PRIV*

## 2018-09-21 PROCEDURE — 700101 HCHG RX REV CODE 250: Performed by: HOSPITALIST

## 2018-09-21 PROCEDURE — 700105 HCHG RX REV CODE 258: Performed by: INTERNAL MEDICINE

## 2018-09-21 PROCEDURE — 85025 COMPLETE CBC W/AUTO DIFF WBC: CPT

## 2018-09-21 PROCEDURE — C9113 INJ PANTOPRAZOLE SODIUM, VIA: HCPCS | Performed by: HOSPITALIST

## 2018-09-21 PROCEDURE — 700101 HCHG RX REV CODE 250: Performed by: INTERNAL MEDICINE

## 2018-09-21 PROCEDURE — 74176 CT ABD & PELVIS W/O CONTRAST: CPT

## 2018-09-21 PROCEDURE — 700111 HCHG RX REV CODE 636 W/ 250 OVERRIDE (IP): Performed by: HOSPITALIST

## 2018-09-21 PROCEDURE — 83735 ASSAY OF MAGNESIUM: CPT

## 2018-09-21 PROCEDURE — 700111 HCHG RX REV CODE 636 W/ 250 OVERRIDE (IP): Performed by: INTERNAL MEDICINE

## 2018-09-21 PROCEDURE — 99232 SBSQ HOSP IP/OBS MODERATE 35: CPT | Performed by: HOSPITALIST

## 2018-09-21 PROCEDURE — 700102 HCHG RX REV CODE 250 W/ 637 OVERRIDE(OP): Performed by: HOSPITALIST

## 2018-09-21 PROCEDURE — 700111 HCHG RX REV CODE 636 W/ 250 OVERRIDE (IP): Performed by: NURSE PRACTITIONER

## 2018-09-21 PROCEDURE — A9270 NON-COVERED ITEM OR SERVICE: HCPCS | Performed by: HOSPITALIST

## 2018-09-21 RX ORDER — HYDROMORPHONE HYDROCHLORIDE 2 MG/ML
1 INJECTION, SOLUTION INTRAMUSCULAR; INTRAVENOUS; SUBCUTANEOUS EVERY 4 HOURS PRN
Status: DISCONTINUED | OUTPATIENT
Start: 2018-09-21 | End: 2018-09-21

## 2018-09-21 RX ORDER — LORAZEPAM 2 MG/ML
1 INJECTION INTRAMUSCULAR 2 TIMES DAILY PRN
Status: DISCONTINUED | OUTPATIENT
Start: 2018-09-21 | End: 2018-09-28

## 2018-09-21 RX ORDER — HYDROMORPHONE HYDROCHLORIDE 2 MG/ML
1.5 INJECTION, SOLUTION INTRAMUSCULAR; INTRAVENOUS; SUBCUTANEOUS EVERY 4 HOURS PRN
Status: DISCONTINUED | OUTPATIENT
Start: 2018-09-21 | End: 2018-09-22

## 2018-09-21 RX ORDER — NICOTINE 21 MG/24HR
21 PATCH, TRANSDERMAL 24 HOURS TRANSDERMAL
Status: DISCONTINUED | OUTPATIENT
Start: 2018-09-21 | End: 2018-10-05

## 2018-09-21 RX ORDER — MAGNESIUM SULFATE HEPTAHYDRATE 40 MG/ML
2 INJECTION, SOLUTION INTRAVENOUS ONCE
Status: COMPLETED | OUTPATIENT
Start: 2018-09-21 | End: 2018-09-21

## 2018-09-21 RX ADMIN — POTASSIUM CHLORIDE: 2 INJECTION, SOLUTION, CONCENTRATE INTRAVENOUS at 09:04

## 2018-09-21 RX ADMIN — HYDROMORPHONE HYDROCHLORIDE 1 MG: 2 INJECTION INTRAMUSCULAR; INTRAVENOUS; SUBCUTANEOUS at 06:45

## 2018-09-21 RX ADMIN — HYDROMORPHONE HYDROCHLORIDE 1 MG: 2 INJECTION INTRAMUSCULAR; INTRAVENOUS; SUBCUTANEOUS at 10:54

## 2018-09-21 RX ADMIN — HYDROMORPHONE HYDROCHLORIDE 1 MG: 2 INJECTION INTRAMUSCULAR; INTRAVENOUS; SUBCUTANEOUS at 00:48

## 2018-09-21 RX ADMIN — HYDROMORPHONE HYDROCHLORIDE 1 MG: 2 INJECTION INTRAMUSCULAR; INTRAVENOUS; SUBCUTANEOUS at 03:44

## 2018-09-21 RX ADMIN — PANTOPRAZOLE SODIUM 40 MG: 40 INJECTION, POWDER, LYOPHILIZED, FOR SOLUTION INTRAVENOUS at 17:30

## 2018-09-21 RX ADMIN — POTASSIUM CHLORIDE: 2 INJECTION, SOLUTION, CONCENTRATE INTRAVENOUS at 22:48

## 2018-09-21 RX ADMIN — IOHEXOL 50 ML: 240 INJECTION, SOLUTION INTRATHECAL; INTRAVASCULAR; INTRAVENOUS; ORAL at 21:48

## 2018-09-21 RX ADMIN — PANTOPRAZOLE SODIUM 40 MG: 40 INJECTION, POWDER, LYOPHILIZED, FOR SOLUTION INTRAVENOUS at 05:38

## 2018-09-21 RX ADMIN — HYDROMORPHONE HYDROCHLORIDE 1.5 MG: 2 INJECTION INTRAMUSCULAR; INTRAVENOUS; SUBCUTANEOUS at 12:56

## 2018-09-21 RX ADMIN — NICOTINE 21 MG: 21 PATCH, EXTENDED RELEASE TRANSDERMAL at 12:56

## 2018-09-21 RX ADMIN — METRONIDAZOLE 500 MG: 500 INJECTION, SOLUTION INTRAVENOUS at 15:07

## 2018-09-21 RX ADMIN — ONDANSETRON 4 MG: 2 INJECTION INTRAMUSCULAR; INTRAVENOUS at 06:46

## 2018-09-21 RX ADMIN — METRONIDAZOLE 500 MG: 500 INJECTION, SOLUTION INTRAVENOUS at 06:30

## 2018-09-21 RX ADMIN — HYDRALAZINE HYDROCHLORIDE 20 MG: 20 INJECTION INTRAMUSCULAR; INTRAVENOUS at 03:47

## 2018-09-21 RX ADMIN — LEVOTHYROXINE SODIUM ANHYDROUS 50 MCG: 100 INJECTION, POWDER, LYOPHILIZED, FOR SOLUTION INTRAVENOUS at 05:41

## 2018-09-21 RX ADMIN — MAGNESIUM SULFATE IN WATER 2 G: 40 INJECTION, SOLUTION INTRAVENOUS at 10:48

## 2018-09-21 RX ADMIN — LORAZEPAM 1 MG: 2 INJECTION INTRAMUSCULAR; INTRAVENOUS at 16:11

## 2018-09-21 RX ADMIN — DOXYCYCLINE 100 MG: 100 INJECTION, POWDER, LYOPHILIZED, FOR SOLUTION INTRAVENOUS at 17:30

## 2018-09-21 RX ADMIN — DOXYCYCLINE 100 MG: 100 INJECTION, POWDER, LYOPHILIZED, FOR SOLUTION INTRAVENOUS at 05:34

## 2018-09-21 RX ADMIN — HYDROMORPHONE HYDROCHLORIDE 1.5 MG: 2 INJECTION INTRAMUSCULAR; INTRAVENOUS; SUBCUTANEOUS at 19:08

## 2018-09-21 RX ADMIN — METRONIDAZOLE 500 MG: 500 INJECTION, SOLUTION INTRAVENOUS at 22:48

## 2018-09-21 RX ADMIN — LORAZEPAM 0.5 MG: 2 INJECTION INTRAMUSCULAR; INTRAVENOUS at 00:48

## 2018-09-21 ASSESSMENT — ENCOUNTER SYMPTOMS
HALLUCINATIONS: 0
WEIGHT LOSS: 0
FALLS: 0
DIARRHEA: 0
VOMITING: 0
BACK PAIN: 1
CONSTIPATION: 1
FEVER: 0
NECK PAIN: 0
SHORTNESS OF BREATH: 0
SORE THROAT: 0
CHILLS: 0
SENSORY CHANGE: 0
WHEEZING: 0
BLURRED VISION: 0
DIZZINESS: 0
NAUSEA: 1
INSOMNIA: 0
NERVOUS/ANXIOUS: 1
ORTHOPNEA: 0
SINUS PAIN: 0
FLANK PAIN: 0
PALPITATIONS: 0
HEADACHES: 0
ABDOMINAL PAIN: 1
TREMORS: 0
BLOOD IN STOOL: 0
MYALGIAS: 1
WEAKNESS: 1
STRIDOR: 0
DOUBLE VISION: 0
SPUTUM PRODUCTION: 0
DIAPHORESIS: 0
HEARTBURN: 0
COUGH: 0
DEPRESSION: 0
TINGLING: 0
EYES NEGATIVE: 1

## 2018-09-21 ASSESSMENT — COGNITIVE AND FUNCTIONAL STATUS - GENERAL
DRESSING REGULAR UPPER BODY CLOTHING: A LITTLE
TOILETING: A LITTLE
CLIMB 3 TO 5 STEPS WITH RAILING: A LITTLE
DRESSING REGULAR LOWER BODY CLOTHING: A LITTLE
DAILY ACTIVITIY SCORE: 20
SUGGESTED CMS G CODE MODIFIER DAILY ACTIVITY: CJ
WALKING IN HOSPITAL ROOM: A LITTLE
HELP NEEDED FOR BATHING: A LITTLE
MOBILITY SCORE: 21
STANDING UP FROM CHAIR USING ARMS: A LITTLE
SUGGESTED CMS G CODE MODIFIER MOBILITY: CJ

## 2018-09-21 ASSESSMENT — PAIN SCALES - GENERAL
PAINLEVEL_OUTOF10: 4
PAINLEVEL_OUTOF10: 3
PAINLEVEL_OUTOF10: 10
PAINLEVEL_OUTOF10: 8
PAINLEVEL_OUTOF10: 9
PAINLEVEL_OUTOF10: 8
PAINLEVEL_OUTOF10: 10

## 2018-09-21 NOTE — PROGRESS NOTES
"Surgery    Pain unchanged  No bm or flatus    /78   Pulse 65   Temp 36.3 °C (97.3 °F)   Resp 17   Ht 1.549 m (5' 1\")   Wt 84.1 kg (185 lb 6.5 oz)   LMP 10/02/1980   SpO2 90%   BMI 35.03 kg/m²     nad  abd soft, less distended  Min bs    Recent Labs      09/19/18   1845  09/20/18   1106  09/21/18   0448   WBC  4.9  4.5*  4.4*   RBC  4.35  3.80*  3.93*   HEMOGLOBIN  15.5  13.2  14.1   HEMATOCRIT  44.2  39.4  41.0   MCV  101.6*  103.7*  104.3*   MCH  35.6*  34.7*  35.9*   RDW  47.6  48.7  47.8   PLATELETCT  271  209  220   MPV  10.8  10.4  10.6   NEUTSPOLYS  51.00  55.70  56.10   LYMPHOCYTES  35.90  29.60  31.40   MONOCYTES  8.60  10.30  7.50   EOSINOPHILS  3.10  3.10  3.20   BASOPHILS  0.80  0.90  0.70     A/p  Possibly adhesive sbo. No sig bowel function  CT with enteral contrast this evening  Possible laparotomy tomorrow    Continue npo/ngt/ivf  "

## 2018-09-21 NOTE — PROGRESS NOTES
Seen by Dr. Amanda and AKIL De Los Santos at bedside, paln of care discussed, will have CT abdomen tonight.

## 2018-09-21 NOTE — PROGRESS NOTES
[facility] Hospitalist Progress Note    Date of Service: 9/21/2018    Chief Complaint  62 y.o. female admitted 9/19/2018 with chief complaint of diffuse abdominal pain with associated nausea and bilious vomiting.    Interval Problem Update  -Patient with continued diffuse abdominal pain with little relief on current dose of IV narcotics.  Long discussion with patient and compromised with patient as far as IV narcotic dose and frequency along with Ativan dose increased due to continued increased intermittent anxiety.  -Patient continues with no bowel movement or flatulence since admission.  NG tube in place on low intermittent wall suction with good output, however thick in consistency and green/bilious output noted.  -Surgery Dr. Amanda following patient with possible adhesive SBO with no signs of bowel function CT with enteral contrast this evening and possible laparotomy tomorrow, if no bowel movement or flatulence and dependent on CT results.    Consultants/Specialty  Surgery -Dr. Amanda    Disposition  To be determined        Review of Systems   Constitutional: Positive for malaise/fatigue. Negative for chills, diaphoresis, fever and weight loss.   HENT: Negative.  Negative for congestion, sinus pain and sore throat.    Eyes: Negative.  Negative for blurred vision and double vision.   Respiratory: Negative for cough, sputum production, shortness of breath, wheezing and stridor.    Cardiovascular: Negative for chest pain, palpitations, orthopnea and leg swelling.   Gastrointestinal: Positive for abdominal pain, constipation and nausea. Negative for blood in stool, diarrhea, heartburn, melena and vomiting.   Genitourinary: Negative for dysuria, flank pain, frequency, hematuria and urgency.   Musculoskeletal: Positive for back pain and myalgias. Negative for falls, joint pain and neck pain.   Skin: Negative.    Neurological: Positive for weakness. Negative for dizziness, tingling, tremors, sensory change and  headaches.   Endo/Heme/Allergies: Negative.    Psychiatric/Behavioral: Negative for depression and hallucinations. The patient is nervous/anxious. The patient does not have insomnia.       Physical Exam  Laboratory/Imaging   Hemodynamics  Temp (24hrs), Av.3 °C (97.3 °F), Min:35.9 °C (96.7 °F), Max:36.6 °C (97.9 °F)   Temperature: 36.6 °C (97.9 °F)  Pulse  Av.8  Min: 54  Max: 83    Blood Pressure: 149/68      Respiratory      Respiration: 17, Pulse Oximetry: 96 %        RUL Breath Sounds: Clear, RML Breath Sounds: Clear;Diminished, RLL Breath Sounds: Diminished, RADHA Breath Sounds: Clear, LLL Breath Sounds: Diminished    Fluids    Intake/Output Summary (Last 24 hours) at 18 1618  Last data filed at 18 0741   Gross per 24 hour   Intake                0 ml   Output              200 ml   Net             -200 ml       Nutrition  Orders Placed This Encounter   Procedures   • Diet NPO     Standing Status:   Standing     Number of Occurrences:   1     Order Specific Question:   Restrict to:     Answer:   Sips with Medications [3]     Physical Exam   Constitutional: She is oriented to person, place, and time. Vital signs are normal. She appears well-nourished. She appears ill. No distress. Nasal cannula in place.   Disheveled appearance   HENT:   Head: Normocephalic and atraumatic.   Eyes: Pupils are equal, round, and reactive to light. Conjunctivae and EOM are normal.   Neck: Normal range of motion. Neck supple. No JVD present.   Cardiovascular: Normal rate, regular rhythm and intact distal pulses.    No murmur heard.  Pulmonary/Chest: Effort normal and breath sounds normal. No respiratory distress. She has no wheezes. She has no rales. She exhibits no tenderness.   Abdominal: Soft. She exhibits distension. She exhibits no mass. There is no hepatosplenomegaly. There is generalized tenderness. There is guarding. There is no rebound and no CVA tenderness.   NG tube in place to left nare on low intermittent  wall suction with good output, however thick in consistency and green/bilious output noted.   Musculoskeletal: Normal range of motion. She exhibits no edema or tenderness.   Neurological: She is alert and oriented to person, place, and time.   BUE strength 5/5  BLE strength 4/5   Skin: Skin is warm and dry.   Psychiatric: Her speech is normal and behavior is normal. Judgment and thought content normal. Her mood appears anxious. Cognition and memory are normal.   Nursing note and vitals reviewed.      Recent Labs      09/19/18   1845  09/20/18   1106  09/21/18   0448   WBC  4.9  4.5*  4.4*   RBC  4.35  3.80*  3.93*   HEMOGLOBIN  15.5  13.2  14.1   HEMATOCRIT  44.2  39.4  41.0   MCV  101.6*  103.7*  104.3*   MCH  35.6*  34.7*  35.9*   MCHC  35.1*  33.5*  34.4   RDW  47.6  48.7  47.8   PLATELETCT  271  209  220   MPV  10.8  10.4  10.6     Recent Labs      09/19/18   1845  09/21/18   0448   SODIUM  134*  137   POTASSIUM  3.4*  3.7   CHLORIDE  92*  98   CO2  28  23   GLUCOSE  81  72   BUN  4*  6*   CREATININE  0.71  0.58   CALCIUM  9.6  9.1         Recent Labs      09/19/18   1845   BNPBTYPENAT  47     DX-ABDOMEN FOR TUBE PLACEMENT   Final Result      NG tube terminates over the proximal stomach. Sidehole is above the expected position of the GE junction      CT-ABDOMEN-PELVIS W/O   Final Result         1. Small bowel obstruction with transition in the mid ileum. Small amount of reactive ascites.   2. Right basilar opacity with volume loss, fever atelectasis over pneumonia.      CT-ABDOMEN-PELVIS WITH    (Results Pending)            Assessment/Plan     Small bowel obstruction (HCC)- (present on admission)   Assessment & Plan    Patient reports a history of bowel obstruction and had colon resection 30 years ago.  Now presents with 2 day h/o diffuse abdominal pain with associated nausea and bilious vomiting. CT abdomen without IV contrast revealed small bowel obstruction with transition in the mid ileum. Small amount of  reactive ascites. Patient continues with no bowel movement or flatulence since admission.    -Continue NPO status - STRICT  -IVF with LR with KCL  -Continue NGT to low intermittent suction  -Pain control as stated previous  -Surgery Dr. Amanda following patient with possible adhesive SBO with no signs of bowel function  CT with enteral contrast this evening and possible laparotomy tomorrow, if no bowel movement or flatulence and dependent on CT results.                Abdominal pain, acute   Assessment & Plan    Patient with continued generalized abdominal pain secondary to current small bowel obstruction.  Patient on chronic oral narcotics at home for previous complications from back surgeries, however unable to continue oral narcotics due to small bowel obstruction and utilizing IV narcotics.    -Patient with continued generalized abdominal pain 8-9/10 with minimal relief with current Dilaudid 1 mg every 3 hours, and receiving IV narcotics consistently every 3 hours. Extensive discussion with patient about increasing narcotic use in the setting of small bowel obstruction, however due to patient's pain being acutely in the abdomen and justification of small bowel obstruction, Dilaudid was increased to 1.5 mg every 6 hours.  -Will continue to monitor effectiveness        Other chronic postprocedural pain   Assessment & Plan    Patient with extensive back surgeries including: lumbar fusion posterior (12/17/2015); lumbar laminectomy diskectomy (12/17/2015); lumbar decompression (12/17/2015); lumbar fusion posterior (8/7/2017); and hardware removal neuro (8/7/2017).     -Chronic back pain controlled with current regimen and on chronic narcotics at home, however unable to continue home oral narcotic regimen due to current small bowel obstruction and will utilize IV narcotics.  -Plan as stated previous          Ulcerative esophagitis   Assessment & Plan    With a history of infected ulceration to esophagus in which she  been treated for approximately 1 week ago at [place] and placed on antibiotics.  No documented notes, however patient does have prescription in which she did bring in with her for Pylera, and has only taken approximately 5 days worth.    -Records requested from [facility] [place] still pending  -Continue on IV Flagyl, and IV doxycycline  -Continue on IV Protonix  -Continue n.p.o.  -IV fluids as indicated previous  -Pain control as indicated previous        Hyponatremia- (present on admission)   Assessment & Plan    Improved -currently at 137 and patient asymptomatic likely related to recent nausea and vomiting.    -Continue IV fluids as stated previous  -Follow BMP        Hypokalemia- (present on admission)   Assessment & Plan    Improved and currently at 3.7 and patient asymptomatic- likely related to recent/frequent nausea and vomiting.      -Continue IV fluids LR with 20 mEq KCl @ 125ml/hr   -Monitor BMP        Hypothyroidism- (present on admission)   Assessment & Plan    Patient status post thyroidectomy and parathyroidectomy and takes Synthroid 88 MCG daily.  TSH level elevated at 12.060 and T4 within normal limits.      -Will continue Synthroid injection 50 MCG daily in the setting of bowel obstruction and NG tube placement  -Will have patient follow-up outpatient          ELISHA (generalized anxiety disorder)- (present on admission)   Assessment & Plan    Patient with history of anxiety on Ativan nightly at home.  Patient continues with intermittent periods of anxiety.    -Ativan IV increased to 1 mg every 12 hours as needed  -Monitor        Tobacco use- (present on admission)   Assessment & Plan    Current smoker -Tobacco cessation education provided for more than 10 minutes, discussed options of nicotine patch, medical treatment with wellbutrin and chantix. Discussed the benefits of quitting smoking and risks of continued smoking including cardiovascular disease, cancer and COPD.     -Nicotine replacement protocol             Quality-Core Measures   Reviewed items::  Labs reviewed and Medications reviewed  Parker catheter::  No Parker  DVT prophylaxis pharmacological::  Enoxaparin (Lovenox)  DVT prophylaxis - mechanical:  SCDs  Ulcer Prophylaxis::  Yes  Antibiotics:  Treating active infection/contamination beyond 24 hours perioperative coverage          ERIC Manrique.

## 2018-09-21 NOTE — DISCHARGE PLANNING
Anticipated Discharge Disposition:   TBD    Action:   Discussed with IDT :  MD ordered for MRI tonight     Barriers to Discharge:   Possible surgery tomorrow    Plan:   CM to follow.

## 2018-09-21 NOTE — CARE PLAN
Problem: Safety  Goal: Will remain free from injury  Outcome: PROGRESSING AS EXPECTED    Intervention: Provide assistance with mobility  Generalized weakness and pain, encourage to call for any assistance needed, call light within reach.      Problem: Pain Management  Goal: Pain level will decrease to patient's comfort goal  Outcome: PROGRESSING AS EXPECTED    Intervention: Educate and implement non-pharmacologic comfort measures. Examples: relaxation, distration, play therapy, activity therapy, massage, etc.  Pain assessment q 2 hours, medicated as needed, comfort measures provided.

## 2018-09-21 NOTE — ASSESSMENT & PLAN NOTE
Patient with continued generalized abdominal pain secondary to current small bowel obstruction.  Patient on chronic oral narcotics at home for previous complications from back surgeries, however unable to continue oral narcotics due to small bowel obstruction and utilizing IV narcotics.    -Patient with continued generalized abdominal pain 8-9/10 and Dr. Amanda increased frequency of Dilaudid to every 2 hours.  -Patient for surgery today  -Will continue to monitor effectiveness

## 2018-09-21 NOTE — ASSESSMENT & PLAN NOTE
Current smoker -Tobacco cessation education provided for more than 10 minutes, discussed options of nicotine patch, medical treatment with wellbutrin and chantix. Discussed the benefits of quitting smoking and risks of continued smoking including cardiovascular disease, cancer and COPD.     -Nicotine replacement protocol

## 2018-09-22 ENCOUNTER — APPOINTMENT (OUTPATIENT)
Dept: RADIOLOGY | Facility: MEDICAL CENTER | Age: 63
DRG: 330 | End: 2018-09-22
Attending: ANESTHESIOLOGY
Payer: COMMERCIAL

## 2018-09-22 ENCOUNTER — APPOINTMENT (OUTPATIENT)
Dept: RADIOLOGY | Facility: MEDICAL CENTER | Age: 63
DRG: 330 | End: 2018-09-22
Attending: SURGERY
Payer: COMMERCIAL

## 2018-09-22 PROBLEM — E83.42 HYPOMAGNESEMIA: Status: ACTIVE | Noted: 2018-09-22

## 2018-09-22 LAB
ALBUMIN SERPL BCP-MCNC: 3.5 G/DL (ref 3.2–4.9)
ALBUMIN/GLOB SERPL: 1.5 G/DL
ALP SERPL-CCNC: 61 U/L (ref 30–99)
ALT SERPL-CCNC: 12 U/L (ref 2–50)
ANION GAP SERPL CALC-SCNC: 14 MMOL/L (ref 0–11.9)
AST SERPL-CCNC: 16 U/L (ref 12–45)
BASOPHILS # BLD AUTO: 0.9 % (ref 0–1.8)
BASOPHILS # BLD: 0.04 K/UL (ref 0–0.12)
BILIRUB SERPL-MCNC: 0.6 MG/DL (ref 0.1–1.5)
BUN SERPL-MCNC: 4 MG/DL (ref 8–22)
CALCIUM SERPL-MCNC: 8.4 MG/DL (ref 8.5–10.5)
CHLORIDE SERPL-SCNC: 97 MMOL/L (ref 96–112)
CO2 SERPL-SCNC: 23 MMOL/L (ref 20–33)
CREAT SERPL-MCNC: 0.52 MG/DL (ref 0.5–1.4)
EOSINOPHIL # BLD AUTO: 0.1 K/UL (ref 0–0.51)
EOSINOPHIL NFR BLD: 2.3 % (ref 0–6.9)
ERYTHROCYTE [DISTWIDTH] IN BLOOD BY AUTOMATED COUNT: 46.3 FL (ref 35.9–50)
GLOBULIN SER CALC-MCNC: 2.4 G/DL (ref 1.9–3.5)
GLUCOSE SERPL-MCNC: 67 MG/DL (ref 65–99)
HCT VFR BLD AUTO: 39.1 % (ref 37–47)
HGB BLD-MCNC: 13.4 G/DL (ref 12–16)
IMM GRANULOCYTES # BLD AUTO: 0.02 K/UL (ref 0–0.11)
IMM GRANULOCYTES NFR BLD AUTO: 0.5 % (ref 0–0.9)
LYMPHOCYTES # BLD AUTO: 0.96 K/UL (ref 1–4.8)
LYMPHOCYTES NFR BLD: 22 % (ref 22–41)
MAGNESIUM SERPL-MCNC: 1.3 MG/DL (ref 1.5–2.5)
MCH RBC QN AUTO: 34.7 PG (ref 27–33)
MCHC RBC AUTO-ENTMCNC: 34.3 G/DL (ref 33.6–35)
MCV RBC AUTO: 101.3 FL (ref 81.4–97.8)
MONOCYTES # BLD AUTO: 0.38 K/UL (ref 0–0.85)
MONOCYTES NFR BLD AUTO: 8.7 % (ref 0–13.4)
NEUTROPHILS # BLD AUTO: 2.86 K/UL (ref 2–7.15)
NEUTROPHILS NFR BLD: 65.6 % (ref 44–72)
NRBC # BLD AUTO: 0 K/UL
NRBC BLD-RTO: 0 /100 WBC
PLATELET # BLD AUTO: 212 K/UL (ref 164–446)
PMV BLD AUTO: 10.4 FL (ref 9–12.9)
POTASSIUM SERPL-SCNC: 3.4 MMOL/L (ref 3.6–5.5)
PROT SERPL-MCNC: 5.9 G/DL (ref 6–8.2)
RBC # BLD AUTO: 3.86 M/UL (ref 4.2–5.4)
SODIUM SERPL-SCNC: 134 MMOL/L (ref 135–145)
WBC # BLD AUTO: 4.4 K/UL (ref 4.8–10.8)

## 2018-09-22 PROCEDURE — A9270 NON-COVERED ITEM OR SERVICE: HCPCS | Performed by: HOSPITALIST

## 2018-09-22 PROCEDURE — 85025 COMPLETE CBC W/AUTO DIFF WBC: CPT

## 2018-09-22 PROCEDURE — 700101 HCHG RX REV CODE 250: Performed by: INTERNAL MEDICINE

## 2018-09-22 PROCEDURE — C1765 ADHESION BARRIER: HCPCS | Performed by: SURGERY

## 2018-09-22 PROCEDURE — 700111 HCHG RX REV CODE 636 W/ 250 OVERRIDE (IP): Performed by: SURGERY

## 2018-09-22 PROCEDURE — 501838 HCHG SUTURE GENERAL: Performed by: SURGERY

## 2018-09-22 PROCEDURE — 0DN80ZZ RELEASE SMALL INTESTINE, OPEN APPROACH: ICD-10-PCS | Performed by: SURGERY

## 2018-09-22 PROCEDURE — C9113 INJ PANTOPRAZOLE SODIUM, VIA: HCPCS | Performed by: HOSPITALIST

## 2018-09-22 PROCEDURE — 501452 HCHG STAPLES, GIA MULTIFIRE 60/80: Performed by: SURGERY

## 2018-09-22 PROCEDURE — 500424 HCHG DRESSING, AIRSTRIP: Performed by: SURGERY

## 2018-09-22 PROCEDURE — 700101 HCHG RX REV CODE 250

## 2018-09-22 PROCEDURE — 501433 HCHG STAPLER, GIA MULTIFIRE 60/80: Performed by: SURGERY

## 2018-09-22 PROCEDURE — 80053 COMPREHEN METABOLIC PANEL: CPT

## 2018-09-22 PROCEDURE — 0DQV0ZZ REPAIR MESENTERY, OPEN APPROACH: ICD-10-PCS | Performed by: SURGERY

## 2018-09-22 PROCEDURE — 160002 HCHG RECOVERY MINUTES (STAT): Performed by: SURGERY

## 2018-09-22 PROCEDURE — 501445 HCHG STAPLER, SKIN DISP: Performed by: SURGERY

## 2018-09-22 PROCEDURE — 160041 HCHG SURGERY MINUTES - EA ADDL 1 MIN LEVEL 4: Performed by: SURGERY

## 2018-09-22 PROCEDURE — 83735 ASSAY OF MAGNESIUM: CPT

## 2018-09-22 PROCEDURE — 88307 TISSUE EXAM BY PATHOLOGIST: CPT

## 2018-09-22 PROCEDURE — 71045 X-RAY EXAM CHEST 1 VIEW: CPT

## 2018-09-22 PROCEDURE — 700111 HCHG RX REV CODE 636 W/ 250 OVERRIDE (IP): Performed by: NURSE PRACTITIONER

## 2018-09-22 PROCEDURE — 36415 COLL VENOUS BLD VENIPUNCTURE: CPT

## 2018-09-22 PROCEDURE — 700111 HCHG RX REV CODE 636 W/ 250 OVERRIDE (IP): Performed by: HOSPITALIST

## 2018-09-22 PROCEDURE — 700105 HCHG RX REV CODE 258

## 2018-09-22 PROCEDURE — 700101 HCHG RX REV CODE 250: Performed by: NURSE PRACTITIONER

## 2018-09-22 PROCEDURE — 160035 HCHG PACU - 1ST 60 MINS PHASE I: Performed by: SURGERY

## 2018-09-22 PROCEDURE — 700102 HCHG RX REV CODE 250 W/ 637 OVERRIDE(OP): Performed by: HOSPITALIST

## 2018-09-22 PROCEDURE — 700101 HCHG RX REV CODE 250: Performed by: HOSPITALIST

## 2018-09-22 PROCEDURE — 700111 HCHG RX REV CODE 636 W/ 250 OVERRIDE (IP): Performed by: ANESTHESIOLOGY

## 2018-09-22 PROCEDURE — 0DB80ZZ EXCISION OF SMALL INTESTINE, OPEN APPROACH: ICD-10-PCS | Performed by: SURGERY

## 2018-09-22 PROCEDURE — 700105 HCHG RX REV CODE 258: Performed by: HOSPITALIST

## 2018-09-22 PROCEDURE — 700111 HCHG RX REV CODE 636 W/ 250 OVERRIDE (IP): Performed by: INTERNAL MEDICINE

## 2018-09-22 PROCEDURE — 160036 HCHG PACU - EA ADDL 30 MINS PHASE I: Performed by: SURGERY

## 2018-09-22 PROCEDURE — 302118 SHAMPOO,NO RINSE: Performed by: HOSPITALIST

## 2018-09-22 PROCEDURE — 770006 HCHG ROOM/CARE - MED/SURG/GYN SEMI*

## 2018-09-22 PROCEDURE — 160009 HCHG ANES TIME/MIN: Performed by: SURGERY

## 2018-09-22 PROCEDURE — 99232 SBSQ HOSP IP/OBS MODERATE 35: CPT | Performed by: HOSPITALIST

## 2018-09-22 PROCEDURE — 0DNU0ZZ RELEASE OMENTUM, OPEN APPROACH: ICD-10-PCS | Performed by: SURGERY

## 2018-09-22 PROCEDURE — 502704 HCHG DEVICE, LIGASURE IMPACT: Performed by: SURGERY

## 2018-09-22 PROCEDURE — 0DNV0ZZ RELEASE MESENTERY, OPEN APPROACH: ICD-10-PCS | Performed by: SURGERY

## 2018-09-22 PROCEDURE — 770001 HCHG ROOM/CARE - MED/SURG/GYN PRIV*

## 2018-09-22 PROCEDURE — 700111 HCHG RX REV CODE 636 W/ 250 OVERRIDE (IP)

## 2018-09-22 PROCEDURE — 501458: Performed by: SURGERY

## 2018-09-22 PROCEDURE — 160048 HCHG OR STATISTICAL LEVEL 1-5: Performed by: SURGERY

## 2018-09-22 PROCEDURE — 160029 HCHG SURGERY MINUTES - 1ST 30 MINS LEVEL 4: Performed by: SURGERY

## 2018-09-22 RX ORDER — SODIUM CHLORIDE, SODIUM LACTATE, POTASSIUM CHLORIDE, CALCIUM CHLORIDE 600; 310; 30; 20 MG/100ML; MG/100ML; MG/100ML; MG/100ML
INJECTION, SOLUTION INTRAVENOUS CONTINUOUS
Status: DISCONTINUED | OUTPATIENT
Start: 2018-09-22 | End: 2018-09-22 | Stop reason: HOSPADM

## 2018-09-22 RX ORDER — SODIUM CHLORIDE 9 MG/ML
INJECTION, SOLUTION INTRAVENOUS
Status: COMPLETED
Start: 2018-09-22 | End: 2018-09-22

## 2018-09-22 RX ORDER — OXYCODONE HYDROCHLORIDE 5 MG/1
10 TABLET ORAL
Status: DISCONTINUED | OUTPATIENT
Start: 2018-09-22 | End: 2018-09-22 | Stop reason: HOSPADM

## 2018-09-22 RX ORDER — MAGNESIUM SULFATE HEPTAHYDRATE 40 MG/ML
4 INJECTION, SOLUTION INTRAVENOUS ONCE
Status: DISPENSED | OUTPATIENT
Start: 2018-09-22 | End: 2018-09-23

## 2018-09-22 RX ORDER — ONDANSETRON 2 MG/ML
4 INJECTION INTRAMUSCULAR; INTRAVENOUS
Status: DISCONTINUED | OUTPATIENT
Start: 2018-09-22 | End: 2018-09-22 | Stop reason: HOSPADM

## 2018-09-22 RX ORDER — LORAZEPAM 2 MG/ML
INJECTION INTRAMUSCULAR
Status: COMPLETED
Start: 2018-09-22 | End: 2018-09-22

## 2018-09-22 RX ORDER — OXYCODONE HCL 5 MG/5 ML
5 SOLUTION, ORAL ORAL
Status: DISCONTINUED | OUTPATIENT
Start: 2018-09-22 | End: 2018-09-22 | Stop reason: HOSPADM

## 2018-09-22 RX ORDER — IPRATROPIUM BROMIDE AND ALBUTEROL SULFATE 2.5; .5 MG/3ML; MG/3ML
3 SOLUTION RESPIRATORY (INHALATION)
Status: DISCONTINUED | OUTPATIENT
Start: 2018-09-22 | End: 2018-09-22 | Stop reason: HOSPADM

## 2018-09-22 RX ORDER — CARISOPRODOL 350 MG/1
350 TABLET ORAL NIGHTLY PRN
Refills: 0 | Status: ON HOLD | COMMUNITY
Start: 2018-08-24 | End: 2018-10-02

## 2018-09-22 RX ORDER — OXYCODONE HCL 5 MG/5 ML
10 SOLUTION, ORAL ORAL
Status: DISCONTINUED | OUTPATIENT
Start: 2018-09-22 | End: 2018-09-22 | Stop reason: HOSPADM

## 2018-09-22 RX ORDER — SODIUM CHLORIDE AND POTASSIUM CHLORIDE 300; 900 MG/100ML; MG/100ML
INJECTION, SOLUTION INTRAVENOUS CONTINUOUS
Status: DISCONTINUED | OUTPATIENT
Start: 2018-09-22 | End: 2018-09-30

## 2018-09-22 RX ORDER — HYDROMORPHONE HYDROCHLORIDE 2 MG/ML
0.4 INJECTION, SOLUTION INTRAMUSCULAR; INTRAVENOUS; SUBCUTANEOUS
Status: DISCONTINUED | OUTPATIENT
Start: 2018-09-22 | End: 2018-09-22 | Stop reason: HOSPADM

## 2018-09-22 RX ORDER — CEFOTETAN DISODIUM 1 G/10ML
INJECTION, POWDER, FOR SOLUTION INTRAMUSCULAR; INTRAVENOUS
Status: DISCONTINUED | OUTPATIENT
Start: 2018-09-22 | End: 2018-09-22 | Stop reason: HOSPADM

## 2018-09-22 RX ORDER — POTASSIUM CHLORIDE 7.45 MG/ML
10 INJECTION INTRAVENOUS
Status: COMPLETED | OUTPATIENT
Start: 2018-09-22 | End: 2018-09-22

## 2018-09-22 RX ORDER — HYDROMORPHONE HYDROCHLORIDE 2 MG/ML
.5-1 INJECTION, SOLUTION INTRAMUSCULAR; INTRAVENOUS; SUBCUTANEOUS
Status: DISCONTINUED | OUTPATIENT
Start: 2018-09-22 | End: 2018-09-22

## 2018-09-22 RX ORDER — NALOXONE HYDROCHLORIDE 0.4 MG/ML
0.1 INJECTION, SOLUTION INTRAMUSCULAR; INTRAVENOUS; SUBCUTANEOUS PRN
Status: DISCONTINUED | OUTPATIENT
Start: 2018-09-22 | End: 2018-09-22 | Stop reason: HOSPADM

## 2018-09-22 RX ORDER — HYDROMORPHONE HYDROCHLORIDE 2 MG/ML
0.1 INJECTION, SOLUTION INTRAMUSCULAR; INTRAVENOUS; SUBCUTANEOUS
Status: DISCONTINUED | OUTPATIENT
Start: 2018-09-22 | End: 2018-09-22 | Stop reason: HOSPADM

## 2018-09-22 RX ORDER — HALOPERIDOL 5 MG/ML
1 INJECTION INTRAMUSCULAR
Status: DISCONTINUED | OUTPATIENT
Start: 2018-09-22 | End: 2018-09-22 | Stop reason: HOSPADM

## 2018-09-22 RX ORDER — GLYCOPYRROLATE 0.2 MG/ML
0.2 INJECTION INTRAMUSCULAR; INTRAVENOUS
Status: DISCONTINUED | OUTPATIENT
Start: 2018-09-22 | End: 2018-09-22 | Stop reason: HOSPADM

## 2018-09-22 RX ORDER — OXYCODONE HYDROCHLORIDE 5 MG/1
5 TABLET ORAL
Status: DISCONTINUED | OUTPATIENT
Start: 2018-09-22 | End: 2018-09-22 | Stop reason: HOSPADM

## 2018-09-22 RX ORDER — HYDROMORPHONE HYDROCHLORIDE 2 MG/ML
0.2 INJECTION, SOLUTION INTRAMUSCULAR; INTRAVENOUS; SUBCUTANEOUS
Status: DISCONTINUED | OUTPATIENT
Start: 2018-09-22 | End: 2018-09-22 | Stop reason: HOSPADM

## 2018-09-22 RX ADMIN — DOXYCYCLINE 100 MG: 100 INJECTION, POWDER, LYOPHILIZED, FOR SOLUTION INTRAVENOUS at 20:56

## 2018-09-22 RX ADMIN — FENTANYL CITRATE 50 MCG: 50 INJECTION, SOLUTION INTRAMUSCULAR; INTRAVENOUS at 15:50

## 2018-09-22 RX ADMIN — PANTOPRAZOLE SODIUM 40 MG: 40 INJECTION, POWDER, LYOPHILIZED, FOR SOLUTION INTRAVENOUS at 05:02

## 2018-09-22 RX ADMIN — LEVOTHYROXINE SODIUM ANHYDROUS 50 MCG: 100 INJECTION, POWDER, LYOPHILIZED, FOR SOLUTION INTRAVENOUS at 05:02

## 2018-09-22 RX ADMIN — DOXYCYCLINE 100 MG: 100 INJECTION, POWDER, LYOPHILIZED, FOR SOLUTION INTRAVENOUS at 07:21

## 2018-09-22 RX ADMIN — HYDROMORPHONE HYDROCHLORIDE 0.4 MG: 2 INJECTION, SOLUTION INTRAMUSCULAR; INTRAVENOUS; SUBCUTANEOUS at 15:49

## 2018-09-22 RX ADMIN — POTASSIUM CHLORIDE AND SODIUM CHLORIDE: 900; 300 INJECTION, SOLUTION INTRAVENOUS at 19:49

## 2018-09-22 RX ADMIN — FENTANYL CITRATE 50 MCG: 50 INJECTION, SOLUTION INTRAMUSCULAR; INTRAVENOUS at 16:05

## 2018-09-22 RX ADMIN — FENTANYL CITRATE 50 MCG: 50 INJECTION, SOLUTION INTRAMUSCULAR; INTRAVENOUS at 16:10

## 2018-09-22 RX ADMIN — HYDROMORPHONE HYDROCHLORIDE 0.4 MG: 2 INJECTION, SOLUTION INTRAMUSCULAR; INTRAVENOUS; SUBCUTANEOUS at 16:05

## 2018-09-22 RX ADMIN — Medication: at 16:19

## 2018-09-22 RX ADMIN — HYDROMORPHONE HYDROCHLORIDE 0.4 MG: 2 INJECTION, SOLUTION INTRAMUSCULAR; INTRAVENOUS; SUBCUTANEOUS at 16:32

## 2018-09-22 RX ADMIN — HYDROMORPHONE HYDROCHLORIDE 0.4 MG: 2 INJECTION, SOLUTION INTRAMUSCULAR; INTRAVENOUS; SUBCUTANEOUS at 16:14

## 2018-09-22 RX ADMIN — LORAZEPAM 1 MG: 2 INJECTION INTRAMUSCULAR; INTRAVENOUS at 10:56

## 2018-09-22 RX ADMIN — LORAZEPAM 1 MG: 2 INJECTION INTRAMUSCULAR; INTRAVENOUS at 16:30

## 2018-09-22 RX ADMIN — PANTOPRAZOLE SODIUM 40 MG: 40 INJECTION, POWDER, LYOPHILIZED, FOR SOLUTION INTRAVENOUS at 20:56

## 2018-09-22 RX ADMIN — METRONIDAZOLE 500 MG: 500 INJECTION, SOLUTION INTRAVENOUS at 05:02

## 2018-09-22 RX ADMIN — HYDROMORPHONE HYDROCHLORIDE 0.4 MG: 2 INJECTION, SOLUTION INTRAMUSCULAR; INTRAVENOUS; SUBCUTANEOUS at 16:15

## 2018-09-22 RX ADMIN — ENOXAPARIN SODIUM 40 MG: 100 INJECTION SUBCUTANEOUS at 15:01

## 2018-09-22 RX ADMIN — SODIUM CHLORIDE 500 ML: 9 INJECTION, SOLUTION INTRAVENOUS at 19:26

## 2018-09-22 RX ADMIN — HYDROMORPHONE HYDROCHLORIDE 1.5 MG: 2 INJECTION INTRAMUSCULAR; INTRAVENOUS; SUBCUTANEOUS at 07:21

## 2018-09-22 RX ADMIN — FENTANYL CITRATE 50 MCG: 50 INJECTION, SOLUTION INTRAMUSCULAR; INTRAVENOUS at 15:45

## 2018-09-22 RX ADMIN — HYDROMORPHONE HYDROCHLORIDE 1 MG: 2 INJECTION INTRAMUSCULAR; INTRAVENOUS; SUBCUTANEOUS at 10:03

## 2018-09-22 RX ADMIN — NICOTINE 21 MG: 21 PATCH, EXTENDED RELEASE TRANSDERMAL at 05:03

## 2018-09-22 RX ADMIN — HYDROMORPHONE HYDROCHLORIDE 0.4 MG: 2 INJECTION, SOLUTION INTRAMUSCULAR; INTRAVENOUS; SUBCUTANEOUS at 16:20

## 2018-09-22 RX ADMIN — HYDROMORPHONE HYDROCHLORIDE 1.5 MG: 2 INJECTION INTRAMUSCULAR; INTRAVENOUS; SUBCUTANEOUS at 01:10

## 2018-09-22 RX ADMIN — METRONIDAZOLE 500 MG: 500 INJECTION, SOLUTION INTRAVENOUS at 22:33

## 2018-09-22 RX ADMIN — HYDROMORPHONE HYDROCHLORIDE 0.4 MG: 2 INJECTION, SOLUTION INTRAMUSCULAR; INTRAVENOUS; SUBCUTANEOUS at 15:59

## 2018-09-22 RX ADMIN — POTASSIUM CHLORIDE 10 MEQ: 7.46 INJECTION, SOLUTION INTRAVENOUS at 09:53

## 2018-09-22 RX ADMIN — HYDROMORPHONE HYDROCHLORIDE 0.4 MG: 2 INJECTION, SOLUTION INTRAMUSCULAR; INTRAVENOUS; SUBCUTANEOUS at 15:44

## 2018-09-22 RX ADMIN — POTASSIUM CHLORIDE 10 MEQ: 7.46 INJECTION, SOLUTION INTRAVENOUS at 10:44

## 2018-09-22 RX ADMIN — HYDRALAZINE HYDROCHLORIDE 20 MG: 20 INJECTION INTRAMUSCULAR; INTRAVENOUS at 22:32

## 2018-09-22 ASSESSMENT — ENCOUNTER SYMPTOMS
WHEEZING: 0
HEADACHES: 0
ABDOMINAL PAIN: 1
NECK PAIN: 0
DOUBLE VISION: 0
TREMORS: 0
ORTHOPNEA: 0
TINGLING: 0
VOMITING: 0
SHORTNESS OF BREATH: 0
DIAPHORESIS: 0
HALLUCINATIONS: 0
INSOMNIA: 0
DIARRHEA: 0
MYALGIAS: 0
STRIDOR: 0
NAUSEA: 0
EYES NEGATIVE: 1
SPUTUM PRODUCTION: 0
FALLS: 0
DEPRESSION: 0
WEIGHT LOSS: 0
BACK PAIN: 1
PALPITATIONS: 0
COUGH: 0
SORE THROAT: 0
CHILLS: 0
SENSORY CHANGE: 0
SINUS PAIN: 0
FEVER: 0
WEAKNESS: 1
BLURRED VISION: 0
FLANK PAIN: 0
NERVOUS/ANXIOUS: 0
DIZZINESS: 0
HEARTBURN: 0
CONSTIPATION: 1

## 2018-09-22 ASSESSMENT — COGNITIVE AND FUNCTIONAL STATUS - GENERAL
TOILETING: A LITTLE
MOVING FROM LYING ON BACK TO SITTING ON SIDE OF FLAT BED: A LOT
DRESSING REGULAR LOWER BODY CLOTHING: A LOT
DAILY ACTIVITIY SCORE: 13
WALKING IN HOSPITAL ROOM: A LOT
STANDING UP FROM CHAIR USING ARMS: A LOT
SUGGESTED CMS G CODE MODIFIER DAILY ACTIVITY: CL
EATING MEALS: A LOT
CLIMB 3 TO 5 STEPS WITH RAILING: A LOT
HELP NEEDED FOR BATHING: A LOT
MOVING TO AND FROM BED TO CHAIR: A LOT
TURNING FROM BACK TO SIDE WHILE IN FLAT BAD: A LOT
MOBILITY SCORE: 12
PERSONAL GROOMING: A LOT
SUGGESTED CMS G CODE MODIFIER MOBILITY: CL
DRESSING REGULAR UPPER BODY CLOTHING: A LOT

## 2018-09-22 ASSESSMENT — PAIN SCALES - GENERAL
PAINLEVEL_OUTOF10: 0
PAINLEVEL_OUTOF10: ASSUMED PAIN PRESENT
PAINLEVEL_OUTOF10: 8
PAINLEVEL_OUTOF10: 0
PAINLEVEL_OUTOF10: 0
PAINLEVEL_OUTOF10: ASSUMED PAIN PRESENT
PAINLEVEL_OUTOF10: 8

## 2018-09-22 ASSESSMENT — LIFESTYLE VARIABLES: ALCOHOL_USE: NO

## 2018-09-22 ASSESSMENT — COPD QUESTIONNAIRES
DO YOU EVER COUGH UP ANY MUCUS OR PHLEGM?: NO/ONLY WITH OCCASIONAL COLDS OR INFECTIONS
COPD SCREENING SCORE: 5
HAVE YOU SMOKED AT LEAST 100 CIGARETTES IN YOUR ENTIRE LIFE: YES
IN THE PAST 12 MONTHS DO YOU DO LESS THAN YOU USED TO BECAUSE OF YOUR BREATHING PROBLEMS: DISAGREE/UNSURE
DURING THE PAST 4 WEEKS HOW MUCH DID YOU FEEL SHORT OF BREATH: SOME OF THE TIME

## 2018-09-22 ASSESSMENT — PATIENT HEALTH QUESTIONNAIRE - PHQ9
2. FEELING DOWN, DEPRESSED, IRRITABLE, OR HOPELESS: NOT AT ALL
1. LITTLE INTEREST OR PLEASURE IN DOING THINGS: NOT AT ALL
SUM OF ALL RESPONSES TO PHQ9 QUESTIONS 1 AND 2: 0

## 2018-09-22 NOTE — PROGRESS NOTES
[facility] Hospitalist Progress Note    Date of Service: 2018    Chief Complaint  62 y.o. female admitted 2018 with chief complaint of diffuse abdominal pain with associated nausea and bilious vomiting.    Interval Problem Update  -Patient with continued diffuse abdominal pain with little relief on current dose of IV narcotics, and IV pain frequency increased by Dr. Amanda.   -Patient continues with no bowel movement or flatulence since admission.  NG tube in place on low intermittent wall suction with good output, however increased thick green/bilious output noted.  -CT performed last evening showing complete obstruction to the mid small bowel, and OR today for ex lap, JUANITA, and possible small bowel resection.     Consultants/Specialty  Surgery -Dr. Amanda    Disposition  To be determined        Review of Systems   Constitutional: Positive for malaise/fatigue. Negative for chills, diaphoresis, fever and weight loss.   HENT: Negative.  Negative for congestion, sinus pain and sore throat.    Eyes: Negative.  Negative for blurred vision and double vision.   Respiratory: Negative for cough, sputum production, shortness of breath, wheezing and stridor.    Cardiovascular: Negative for chest pain, palpitations, orthopnea and leg swelling.   Gastrointestinal: Positive for abdominal pain and constipation. Negative for diarrhea, heartburn, nausea and vomiting.   Genitourinary: Negative for dysuria, flank pain, frequency, hematuria and urgency.   Musculoskeletal: Positive for back pain. Negative for falls, joint pain, myalgias and neck pain.   Skin: Negative.    Neurological: Positive for weakness. Negative for dizziness, tingling, tremors, sensory change and headaches.   Endo/Heme/Allergies: Negative.    Psychiatric/Behavioral: Negative for depression and hallucinations. The patient is not nervous/anxious and does not have insomnia.       Physical Exam  Laboratory/Imaging   Hemodynamics  Temp (24hrs), Av.4 °C  (97.5 °F), Min:36.2 °C (97.1 °F), Max:36.6 °C (97.9 °F)   Temperature: 36.5 °C (97.7 °F)  Pulse  Av.7  Min: 54  Max: 83    Blood Pressure: 151/81      Respiratory      Respiration: 18, Pulse Oximetry: 90 %        RUL Breath Sounds: Clear, RML Breath Sounds: Clear;Diminished, RLL Breath Sounds: Diminished, RADHA Breath Sounds: Clear, LLL Breath Sounds: Diminished    Fluids    Intake/Output Summary (Last 24 hours) at 18 1236  Last data filed at 18 1100   Gross per 24 hour   Intake             1200 ml   Output             2200 ml   Net            -1000 ml       Nutrition  Orders Placed This Encounter   Procedures   • Diet NPO     Standing Status:   Standing     Number of Occurrences:   1     Order Specific Question:   Restrict to:     Answer:   Sips with Medications [3]     Physical Exam   Constitutional: She is oriented to person, place, and time. Vital signs are normal. She appears well-nourished. She appears ill. No distress. Nasal cannula in place.   Disheveled appearance   HENT:   Head: Normocephalic and atraumatic.   Eyes: Pupils are equal, round, and reactive to light. Conjunctivae and EOM are normal.   Neck: Normal range of motion. Neck supple. No JVD present.   Cardiovascular: Normal rate, regular rhythm and intact distal pulses.    No murmur heard.  Pulmonary/Chest: Effort normal and breath sounds normal. No respiratory distress. She has no wheezes. She has no rales. She exhibits no tenderness.   Abdominal: Soft. She exhibits distension. She exhibits no mass. There is no hepatosplenomegaly. There is generalized tenderness. There is guarding. There is no rebound and no CVA tenderness.   NG tube in place to left nare on low intermittent wall suction with good output, however with continued thick green/bilious output noted.    Very hypoactive BS   Musculoskeletal: Normal range of motion. She exhibits no edema or tenderness.   Neurological: She is alert and oriented to person, place, and time.    BUE strength 5/5  BLE strength 4/5   Skin: Skin is warm and dry.   Psychiatric: She has a normal mood and affect. Her speech is normal and behavior is normal. Judgment and thought content normal. Her mood appears not anxious. Cognition and memory are normal.   Nursing note and vitals reviewed.      Recent Labs      09/20/18   1106  09/21/18   0448  09/22/18   0315   WBC  4.5*  4.4*  4.4*   RBC  3.80*  3.93*  3.86*   HEMOGLOBIN  13.2  14.1  13.4   HEMATOCRIT  39.4  41.0  39.1   MCV  103.7*  104.3*  101.3*   MCH  34.7*  35.9*  34.7*   MCHC  33.5*  34.4  34.3   RDW  48.7  47.8  46.3   PLATELETCT  209  220  212   MPV  10.4  10.6  10.4     Recent Labs      09/19/18   1845  09/21/18   0448  09/22/18   0315   SODIUM  134*  137  134*   POTASSIUM  3.4*  3.7  3.4*   CHLORIDE  92*  98  97   CO2  28  23  23   GLUCOSE  81  72  67   BUN  4*  6*  4*   CREATININE  0.71  0.58  0.52   CALCIUM  9.6  9.1  8.4*         Recent Labs      09/19/18   1845   BNPBTYPENAT  47     CT-ABDOMEN-PELVIS W/O   Final Result         Grossly unchanged appearance of the dilated small bowel, in keeping with small bowel obstruction      The transition point likely in the right mid and lower quadrant with twisting appearance of the bowel and the mesentery (image 24 series 601 and image 84 series 2). A component of closed loop obstruction is of concern given the twisting appearance as    well as small amount of fluid in the right lower quadrant      Current oral contrast has reached the transition point but not yet seen in the distal small bowel and colon.      Prior oral contrast is seen in the distal colon.      DX-ABDOMEN FOR TUBE PLACEMENT   Final Result      NG tube terminates over the proximal stomach. Sidehole is above the expected position of the GE junction      CT-ABDOMEN-PELVIS W/O   Final Result         1. Small bowel obstruction with transition in the mid ileum. Small amount of reactive ascites.   2. Right basilar opacity with volume loss,  fever atelectasis over pneumonia.               Assessment/Plan     Small bowel obstruction (HCC)- (present on admission)   Assessment & Plan    Patient reports a history of bowel obstruction and had colon resection 30 years ago.  Now presents with 2 day h/o diffuse abdominal pain with associated nausea and bilious vomiting. CT abdomen without IV contrast revealed small bowel obstruction with transition in the mid ileum. Small amount of reactive ascites. Patient continues with no bowel movement or flatulence since admission.    -Continue NPO status - STRICT  -Continue IV fluids as stated previous  -Continue NGT to low intermittent suction  -Pain control as stated previous  -Surgery Dr. Amanda following patient with possible adhesive SBO with no signs of bowel function.  -CT performed last evening showing complete obstruction to the mid small bowel.  -OR today for ex lap, JUANITA, and possible small bowel resection  -Likely transfer to surgical floor post-op            Hypomagnesemia   Assessment & Plan    Likely related to increased NG tube output - level this morning at 1.3    -Replaced with 4 g magnesium IV and will repeat level and follow         Abdominal pain, acute   Assessment & Plan    Patient with continued generalized abdominal pain secondary to current small bowel obstruction.  Patient on chronic oral narcotics at home for previous complications from back surgeries, however unable to continue oral narcotics due to small bowel obstruction and utilizing IV narcotics.    -Patient with continued generalized abdominal pain 8-9/10 and Dr. Amanda increased frequency of Dilaudid to every 2 hours.  -Patient for surgery today  -Will continue to monitor effectiveness        Other chronic postprocedural pain   Assessment & Plan    Patient with extensive back surgeries including: lumbar fusion posterior (12/17/2015); lumbar laminectomy diskectomy (12/17/2015); lumbar decompression (12/17/2015); lumbar fusion posterior  (8/7/2017); and hardware removal neuro (8/7/2017).     -Chronic back pain controlled with current regimen and on chronic narcotics at home, however unable to continue home oral narcotic regimen due to current small bowel obstruction and will utilize IV narcotics.  -Plan as stated previous          Ulcerative esophagitis   Assessment & Plan    With a history of infected ulceration to esophagus in which she been treated for approximately 1 week ago at [place] and placed on antibiotics.  No documented notes, however per patient she has only taken approximately 5 days of the 14 day course.    -Records requested from [facility] still pending  -Having has been bring in Pylera medication to clarify how much patient has actually taken, and due to not having Pylera on formulary with spoke with pharmacy and will continue on IV Flagyl, and IV doxycycline for an additional 5 days and have patient f/u outpatient with GI at [facility] once d/c.  -Continue on IV Protonix  -Continue n.p.o.  -IV fluids as indicated previous  -Pain control as indicated previous        Hyponatremia- (present on admission)   Assessment & Plan    Mildly low currently at 134 and patient asymptomatic likely related to increased NGT output    -We will change IV fluids to NS with 40 mEq of KCl at 125 ml/hr  -Follow BMP        Hypokalemia- (present on admission)   Assessment & Plan    Decreased today with level currently at 3.4 and patient asymptomatic- likely related to NG tube output    -Will change IV fluids to NS with 40 mEq KCl @ 125ml/hr and give additional 20 mEq KCl IV as patient's NG tube output is increased.  -Monitor BMP        Hypothyroidism- (present on admission)   Assessment & Plan    Patient status post thyroidectomy and parathyroidectomy and takes Synthroid 88 MCG daily.  TSH level elevated at 12.060 and T4 within normal limits.      -Will continue Synthroid injection 50 MCG daily in the setting of bowel obstruction and NG tube  placement  -Will have patient follow-up outpatient          ELISHA (generalized anxiety disorder)- (present on admission)   Assessment & Plan    Patient with history of anxiety on Ativan nightly at home.  Patient continues with intermittent periods of anxiety.    -Ativan IV increased to 1 mg every 12 hours as needed  -Monitor        Tobacco use- (present on admission)   Assessment & Plan    Current smoker -Tobacco cessation education provided for more than 10 minutes, discussed options of nicotine patch, medical treatment with wellbutrin and chantix. Discussed the benefits of quitting smoking and risks of continued smoking including cardiovascular disease, cancer and COPD.     -Nicotine replacement protocol            Quality-Core Measures   Reviewed items::  Labs reviewed and Medications reviewed  Parker catheter::  No Parker  DVT: Held for surgery.  DVT prophylaxis - mechanical:  SCDs  Ulcer Prophylaxis::  Yes  Antibiotics:  Treating active infection/contamination beyond 24 hours perioperative coverage          ERIC Manrique.

## 2018-09-22 NOTE — CARE PLAN
Problem: Safety  Goal: Will remain free from injury  Outcome: PROGRESSING AS EXPECTED    Intervention: Provide assistance with mobility  Encourage to call for any assistance needed, call light within reach.      Problem: Pain Management  Goal: Pain level will decrease to patient's comfort goal  Outcome: PROGRESSING AS EXPECTED    Intervention: Educate and implement non-pharmacologic comfort measures. Examples: relaxation, distration, play therapy, activity therapy, massage, etc.  Pain assessment q 2 hours, medicated as needed, comfort measures provided.

## 2018-09-22 NOTE — OP REPORT
Operative report    PreOp Diagnosis: Small bowel obstruction    PostOp Diagnosis: Adhesive small bowel obstruction    Procedure(s):  EXPLORATORY LAPAROTOMY - Wound Class: Clean Contaminated  LYSIS ADHESIONS GENERAL - Wound Class: Clean Contaminated  BOWEL RESECTION- POSSIBLE - Wound Class: Clean Contaminated    Surgeon(s):  Houston Amanda D.O.    Asst.:  Shila HENDERSON    Anesthesiologist/Type of Anesthesia:  Anesthesiologist: Vicente Tuttle M.D./General    Surgical Staff:  Circulator: Gabrielle Ford R.N.; Ruben Drew R.N.  Relief Circulator: Romel Reyna R.N.  Scrub Person: Ruben Roberts    Specimens removed if any:  ID Type Source Tests Collected by Time Destination   A : Small Bowel Other Other HISTOLOGY REQUEST Ruben Drew R.N. 9/22/2018  2:23 PM        Estimated Blood Loss: 200 mL    Drains: Nasogastric tube and Parker catheter, no intra-abdominal drains placed    Findings: Extensive intra-abdominal adhesions with a knotted mass of small bowel in the right lower quadrant that was the lead point further obstruction.    Complications: None noted    Indications: 62-year-old female who presented with small bowel obstruction 72 hours prior to surgery. She had no spontaneous resolution despite standard conservative measures and follow-up CT scan with contrast showed complete obstruction.    Operation: The patient was taken to the operating room and placed supine on the operating table. After adequate general anesthesia was achieved she was intubated by the anesthesiologist. A central line was then placed. The abdomen was then prepped and draped in standard fashion. We use the patient's prior midline scar for incision with slight extension superiorly and inferiorly. We dissected through skin and subcutaneous tissues using the Bovie cautery for dissection and hemostasis. The fascia was entered above the midline and we were able to enter the peritoneal cavity without hindrance. We then  carefully opened the incision along the course of the wound while elevating it with cochlear clamps. Omental adhesions to the midline were taken down during the course of this and ultimately the midline was opened. The small bowel was significantly dilated throughout the jejunum and proximal ileum. There are multiple areas of dense adhesions of omentum to the small bowel which were taken down using the Bovie cautery as well as the LigaSure device. We ran the small bowel proximal to distal and at about the level of the proximal ileum we began to encounter dense adhesions of all loops of small bowel to each other, the mesentery and more omental adhesions. These were slowly and carefully taken down using both blunt dissection, sharp dissection with the Metzenbaum scissors as well as a cautery where appropriate. Ultimately we identified a knotted mass of small bowel loops on the right lower quadrant which was clearly the lead point of the obstruction as loops proximal to this were quite dilated the involved loops were relatively normal caliber and the inferior loop was completely decompressed. This extended about 18 inches to the ileocecal valve. Some adhesions to the retroperitoneum and pelvis were taken down using cautery to mobilize the terminal ileum. We then carefully dissected the mass of small bowel until we are able to fully extend it. There was one area of serosal injury that was identified in one of the loops at the transition point. During the course of this dissection all the amount of mesentery had to be dissected off of the loops of bowel in one particular area we had a significant mesenteric defect with some venous bleeding. This was carefully controlled with suture ligature with 3-0 Vicryl. Once this was completed bleeding appeared to have stopped however the adjacent small bowel was definitely compromised. This was only a few inches distal to the area of serosal injury so we decided to perform a small  bowel resection over this segment. The thinned area of bowel wall was used access the intestine and a fair amount of inspissated intestinal contents were evacuated. These were quite thick and paste light. We then made a window in the mesentery just proximal to this site using a clamp and the JOSEF stapler was used to divide the bowel. We chose an area distal that was outside the area of vascular compromise and divided the small bowel with JOSEF stapler there is well. We then used the LigaSure device to divide the mesentery and the specimen was passed off the table. The 2 ends of small bowel were then tacked together in a side-to-side functional anastomosis. 3-0 Vicryl sutures were used in a Lembert fashion to tack the limbs together. We then made enterotomies using the Bovie cautery and the antimesenteric borders of these loops. The JOSEF stapler was inserted and approximated. We then fired the stapler to create the anastomosis. The resulting enterotomy was closed using a JOSEF stapler. The staple line was then inverted using 3-0 Vicryl stitch in a Lembert fashion. The mesenteric defect was closed using a running 2-0 Vicryl stitch. We then irrigated the abdomen. The operative field was inspected for hemostasis and appeared appropriate. We then ran the small bowel proximal to distal and then distal to proximal looking for any other sites of serosal injury or bowel injury and there were none. The mesentery was inspected again and there was no signs of bleeding. We then reduced the small bowel loops into the abdomen, layering them with Seprafilm to help reduce any subsequent adhesive process. Once this was completed we confirm the NG tube location within the stomach. The anterior fascia was then approximated using 0 PDS stitch in a running fashion. The skin wound was then irrigated and the skin was closed with staples. The abdomen was cleaned and dried and a dry sterile dressing was applied. The patient was then awakened from  anesthesia and taken to the postanesthesia care unit in stable condition. The sponge, needle and instrument counts were correct at the end of the case.        9/22/2018 3:20 PM Houston Amanda D.O.

## 2018-09-22 NOTE — OR NURSING
Pt sleeping in between wakefulness with c/o abdominal pain. Ativan IV given per Dr Amanda's orders. Pt PCA running; pt too sleepy to demonstrate understanding of button use.

## 2018-09-22 NOTE — OR NURSING
Pt complains of abdominal pain; medicated with good results. CXR completed. NG tube to LWS putting out brown green fluid.

## 2018-09-22 NOTE — PROGRESS NOTES
Oral contrast started at 1845 -250 ml, clamped,Dilaudid given as ordered. Report given to CORI Ochoa, POC discussed.

## 2018-09-22 NOTE — ASSESSMENT & PLAN NOTE
Likely related to increased NG tube output - level this morning at 1.3    -Replaced with 4 g magnesium IV and will repeat level and follow

## 2018-09-22 NOTE — PROGRESS NOTES
"Surgery    No changes    /87   Pulse 64   Temp 36.2 °C (97.1 °F)   Resp 18   Ht 1.549 m (5' 1\")   Wt 84.1 kg (185 lb 6.5 oz)   LMP 10/02/1980   SpO2 95%   Breastfeeding? No   BMI 35.03 kg/m²     NAD  CTAB  RRR  Abd softly distended  Min BS    CT reviewed: complete obstruction mid small bowel    Recent Labs      09/20/18   1106  09/21/18   0448  09/22/18   0315   WBC  4.5*  4.4*  4.4*   RBC  3.80*  3.93*  3.86*   HEMOGLOBIN  13.2  14.1  13.4   HEMATOCRIT  39.4  41.0  39.1   MCV  103.7*  104.3*  101.3*   MCH  34.7*  35.9*  34.7*   RDW  48.7  47.8  46.3   PLATELETCT  209  220  212   MPV  10.4  10.6  10.4   NEUTSPOLYS  55.70  56.10  65.60   LYMPHOCYTES  29.60  31.40  22.00   MONOCYTES  10.30  7.50  8.70   EOSINOPHILS  3.10  3.20  2.30   BASOPHILS  0.90  0.70  0.90     Recent Labs      09/19/18   1845  09/21/18   0448  09/22/18   0315   SODIUM  134*  137  134*   POTASSIUM  3.4*  3.7  3.4*   CHLORIDE  92*  98  97   CO2  28  23  23   GLUCOSE  81  72  67   BUN  4*  6*  4*         A/P  OR today for ex lap, JUANITA, possible small bowel resection  Likely transfer to surgical floor post op  Consent ordered. Case discussed with pt including risks of bleeding, infection and bowel complications.  On schedule for later this AM  "

## 2018-09-23 LAB
ANION GAP SERPL CALC-SCNC: 13 MMOL/L (ref 0–11.9)
BASOPHILS # BLD AUTO: 0.2 % (ref 0–1.8)
BASOPHILS # BLD: 0.01 K/UL (ref 0–0.12)
BUN SERPL-MCNC: 4 MG/DL (ref 8–22)
CALCIUM SERPL-MCNC: 7.6 MG/DL (ref 8.5–10.5)
CHLORIDE SERPL-SCNC: 102 MMOL/L (ref 96–112)
CO2 SERPL-SCNC: 21 MMOL/L (ref 20–33)
CREAT SERPL-MCNC: 0.61 MG/DL (ref 0.5–1.4)
EOSINOPHIL # BLD AUTO: 0 K/UL (ref 0–0.51)
EOSINOPHIL NFR BLD: 0 % (ref 0–6.9)
ERYTHROCYTE [DISTWIDTH] IN BLOOD BY AUTOMATED COUNT: 47.1 FL (ref 35.9–50)
GLUCOSE SERPL-MCNC: 118 MG/DL (ref 65–99)
HCT VFR BLD AUTO: 42.6 % (ref 37–47)
HGB BLD-MCNC: 14.9 G/DL (ref 12–16)
IMM GRANULOCYTES # BLD AUTO: 0.05 K/UL (ref 0–0.11)
IMM GRANULOCYTES NFR BLD AUTO: 0.9 % (ref 0–0.9)
LYMPHOCYTES # BLD AUTO: 0.58 K/UL (ref 1–4.8)
LYMPHOCYTES NFR BLD: 10.1 % (ref 22–41)
MCH RBC QN AUTO: 35.8 PG (ref 27–33)
MCHC RBC AUTO-ENTMCNC: 35 G/DL (ref 33.6–35)
MCV RBC AUTO: 102.4 FL (ref 81.4–97.8)
MONOCYTES # BLD AUTO: 0.34 K/UL (ref 0–0.85)
MONOCYTES NFR BLD AUTO: 5.9 % (ref 0–13.4)
NEUTROPHILS # BLD AUTO: 4.77 K/UL (ref 2–7.15)
NEUTROPHILS NFR BLD: 82.9 % (ref 44–72)
NRBC # BLD AUTO: 0 K/UL
NRBC BLD-RTO: 0 /100 WBC
PLATELET # BLD AUTO: 294 K/UL (ref 164–446)
PMV BLD AUTO: 10.7 FL (ref 9–12.9)
POTASSIUM SERPL-SCNC: 3.8 MMOL/L (ref 3.6–5.5)
RBC # BLD AUTO: 4.16 M/UL (ref 4.2–5.4)
SODIUM SERPL-SCNC: 136 MMOL/L (ref 135–145)
WBC # BLD AUTO: 5.8 K/UL (ref 4.8–10.8)

## 2018-09-23 PROCEDURE — 700105 HCHG RX REV CODE 258: Performed by: SURGERY

## 2018-09-23 PROCEDURE — 85025 COMPLETE CBC W/AUTO DIFF WBC: CPT

## 2018-09-23 PROCEDURE — 770001 HCHG ROOM/CARE - MED/SURG/GYN PRIV*

## 2018-09-23 PROCEDURE — 80048 BASIC METABOLIC PNL TOTAL CA: CPT

## 2018-09-23 PROCEDURE — C9113 INJ PANTOPRAZOLE SODIUM, VIA: HCPCS | Performed by: HOSPITALIST

## 2018-09-23 PROCEDURE — 700105 HCHG RX REV CODE 258: Performed by: HOSPITALIST

## 2018-09-23 PROCEDURE — 700101 HCHG RX REV CODE 250: Performed by: SURGERY

## 2018-09-23 PROCEDURE — 700102 HCHG RX REV CODE 250 W/ 637 OVERRIDE(OP): Performed by: HOSPITALIST

## 2018-09-23 PROCEDURE — 700101 HCHG RX REV CODE 250: Performed by: FAMILY MEDICINE

## 2018-09-23 PROCEDURE — 700111 HCHG RX REV CODE 636 W/ 250 OVERRIDE (IP): Performed by: HOSPITALIST

## 2018-09-23 PROCEDURE — 700111 HCHG RX REV CODE 636 W/ 250 OVERRIDE (IP): Performed by: SURGERY

## 2018-09-23 PROCEDURE — 770006 HCHG ROOM/CARE - MED/SURG/GYN SEMI*

## 2018-09-23 PROCEDURE — A9270 NON-COVERED ITEM OR SERVICE: HCPCS | Performed by: HOSPITALIST

## 2018-09-23 PROCEDURE — 700101 HCHG RX REV CODE 250: Performed by: HOSPITALIST

## 2018-09-23 PROCEDURE — 700101 HCHG RX REV CODE 250: Performed by: NURSE PRACTITIONER

## 2018-09-23 PROCEDURE — 700111 HCHG RX REV CODE 636 W/ 250 OVERRIDE (IP): Performed by: NURSE PRACTITIONER

## 2018-09-23 PROCEDURE — 700101 HCHG RX REV CODE 250: Performed by: INTERNAL MEDICINE

## 2018-09-23 RX ORDER — KETOROLAC TROMETHAMINE 30 MG/ML
15 INJECTION, SOLUTION INTRAMUSCULAR; INTRAVENOUS EVERY 6 HOURS
Status: DISCONTINUED | OUTPATIENT
Start: 2018-09-23 | End: 2018-09-25

## 2018-09-23 RX ORDER — POLYVINYL ALCOHOL 14 MG/ML
2 SOLUTION/ DROPS OPHTHALMIC
Status: DISCONTINUED | OUTPATIENT
Start: 2018-09-23 | End: 2018-10-12 | Stop reason: HOSPADM

## 2018-09-23 RX ORDER — SODIUM CHLORIDE 9 MG/ML
INJECTION, SOLUTION INTRAVENOUS
Status: COMPLETED
Start: 2018-09-23 | End: 2018-09-23

## 2018-09-23 RX ADMIN — DOXYCYCLINE 100 MG: 100 INJECTION, POWDER, LYOPHILIZED, FOR SOLUTION INTRAVENOUS at 17:44

## 2018-09-23 RX ADMIN — METRONIDAZOLE 500 MG: 500 INJECTION, SOLUTION INTRAVENOUS at 14:50

## 2018-09-23 RX ADMIN — ENOXAPARIN SODIUM 40 MG: 100 INJECTION SUBCUTANEOUS at 14:50

## 2018-09-23 RX ADMIN — LEVOTHYROXINE SODIUM ANHYDROUS 50 MCG: 100 INJECTION, POWDER, LYOPHILIZED, FOR SOLUTION INTRAVENOUS at 05:58

## 2018-09-23 RX ADMIN — DOXYCYCLINE 100 MG: 100 INJECTION, POWDER, LYOPHILIZED, FOR SOLUTION INTRAVENOUS at 05:58

## 2018-09-23 RX ADMIN — KETOROLAC TROMETHAMINE 15 MG: 30 INJECTION, SOLUTION INTRAMUSCULAR; INTRAVENOUS at 17:43

## 2018-09-23 RX ADMIN — NICOTINE 21 MG: 21 PATCH, EXTENDED RELEASE TRANSDERMAL at 05:58

## 2018-09-23 RX ADMIN — METRONIDAZOLE 500 MG: 500 INJECTION, SOLUTION INTRAVENOUS at 05:58

## 2018-09-23 RX ADMIN — LORAZEPAM 1 MG: 2 INJECTION INTRAMUSCULAR; INTRAVENOUS at 21:47

## 2018-09-23 RX ADMIN — PANTOPRAZOLE SODIUM 40 MG: 40 INJECTION, POWDER, LYOPHILIZED, FOR SOLUTION INTRAVENOUS at 05:58

## 2018-09-23 RX ADMIN — HYDROMORPHONE HYDROCHLORIDE: 10 INJECTION, SOLUTION INTRAMUSCULAR; INTRAVENOUS; SUBCUTANEOUS at 21:23

## 2018-09-23 RX ADMIN — KETOROLAC TROMETHAMINE 15 MG: 30 INJECTION, SOLUTION INTRAMUSCULAR; INTRAVENOUS at 11:23

## 2018-09-23 RX ADMIN — KETOROLAC TROMETHAMINE 15 MG: 30 INJECTION, SOLUTION INTRAMUSCULAR; INTRAVENOUS at 23:30

## 2018-09-23 RX ADMIN — PANTOPRAZOLE SODIUM 40 MG: 40 INJECTION, POWDER, LYOPHILIZED, FOR SOLUTION INTRAVENOUS at 17:43

## 2018-09-23 RX ADMIN — POTASSIUM CHLORIDE AND SODIUM CHLORIDE: 900; 300 INJECTION, SOLUTION INTRAVENOUS at 03:48

## 2018-09-23 RX ADMIN — POTASSIUM CHLORIDE AND SODIUM CHLORIDE: 900; 300 INJECTION, SOLUTION INTRAVENOUS at 23:31

## 2018-09-23 RX ADMIN — FOLIC ACID: 5 INJECTION, SOLUTION INTRAMUSCULAR; INTRAVENOUS; SUBCUTANEOUS at 11:25

## 2018-09-23 RX ADMIN — Medication: at 10:05

## 2018-09-23 RX ADMIN — METRONIDAZOLE 500 MG: 500 INJECTION, SOLUTION INTRAVENOUS at 21:28

## 2018-09-23 ASSESSMENT — PAIN SCALES - GENERAL
PAINLEVEL_OUTOF10: 8
PAINLEVEL_OUTOF10: 10
PAINLEVEL_OUTOF10: 7

## 2018-09-23 ASSESSMENT — ENCOUNTER SYMPTOMS
CHILLS: 0
NAUSEA: 0
ABDOMINAL PAIN: 1
SHORTNESS OF BREATH: 0
SPEECH CHANGE: 0
VOMITING: 0
FEVER: 0
ROS GI COMMENTS: BM 9/23

## 2018-09-23 NOTE — PROGRESS NOTES
Trauma / Surgical Daily Progress Note    Date of Service  9/23/2018    Chief Complaint  62 y.o. female admitted 9/19/2018 with Small bowel obstruction (HCC)    POD # 1 Ex lap, lysis of adhesions, small bowel resection    Interval Events  Inadequate pain control    - Continue NPO with NGT to LCWS  - Pain meds adjusted, place abdominal binder  - Rally bag    Review of Systems  Review of Systems   Constitutional: Negative for chills and fever.   Respiratory: Negative for shortness of breath.    Cardiovascular: Negative for chest pain.   Gastrointestinal: Positive for abdominal pain. Negative for nausea and vomiting.        BM 9/23   Skin: Negative for rash.   Neurological: Negative for speech change.        Vital Signs  Temp:  [36.1 °C (96.9 °F)-37.2 °C (98.9 °F)] 36.6 °C (97.9 °F)  Pulse:  [] 94  Resp:  [12-21] 20  BP: (122-154)/() 139/88    Physical Exam  Physical Exam   Constitutional: She appears well-developed.   Neck: Neck supple.   Cardiovascular: Normal rate.    Pulmonary/Chest: Effort normal. No respiratory distress.   Abdominal: She exhibits distension. Bowel sounds are decreased. There is tenderness. There is no guarding.   Midline abdominal dressing intact   Neurological: She is alert.   Skin: Skin is warm and dry.   Nursing note and vitals reviewed.      Laboratory  Recent Results (from the past 24 hour(s))   CBC WITH DIFFERENTIAL    Collection Time: 09/23/18  3:00 AM   Result Value Ref Range    WBC 5.8 4.8 - 10.8 K/uL    RBC 4.16 (L) 4.20 - 5.40 M/uL    Hemoglobin 14.9 12.0 - 16.0 g/dL    Hematocrit 42.6 37.0 - 47.0 %    .4 (H) 81.4 - 97.8 fL    MCH 35.8 (H) 27.0 - 33.0 pg    MCHC 35.0 33.6 - 35.0 g/dL    RDW 47.1 35.9 - 50.0 fL    Platelet Count 294 164 - 446 K/uL    MPV 10.7 9.0 - 12.9 fL    Neutrophils-Polys 82.90 (H) 44.00 - 72.00 %    Lymphocytes 10.10 (L) 22.00 - 41.00 %    Monocytes 5.90 0.00 - 13.40 %    Eosinophils 0.00 0.00 - 6.90 %    Basophils 0.20 0.00 - 1.80 %    Immature  Granulocytes 0.90 0.00 - 0.90 %    Nucleated RBC 0.00 /100 WBC    Neutrophils (Absolute) 4.77 2.00 - 7.15 K/uL    Lymphs (Absolute) 0.58 (L) 1.00 - 4.80 K/uL    Monos (Absolute) 0.34 0.00 - 0.85 K/uL    Eos (Absolute) 0.00 0.00 - 0.51 K/uL    Baso (Absolute) 0.01 0.00 - 0.12 K/uL    Immature Granulocytes (abs) 0.05 0.00 - 0.11 K/uL    NRBC (Absolute) 0.00 K/uL   BASIC METABOLIC PANEL    Collection Time: 09/23/18  3:00 AM   Result Value Ref Range    Sodium 136 135 - 145 mmol/L    Potassium 3.8 3.6 - 5.5 mmol/L    Chloride 102 96 - 112 mmol/L    Co2 21 20 - 33 mmol/L    Glucose 118 (H) 65 - 99 mg/dL    Bun 4 (L) 8 - 22 mg/dL    Creatinine 0.61 0.50 - 1.40 mg/dL    Calcium 7.6 (L) 8.5 - 10.5 mg/dL    Anion Gap 13.0 (H) 0.0 - 11.9   ESTIMATED GFR    Collection Time: 09/23/18  3:00 AM   Result Value Ref Range    GFR If African American >60 >60 mL/min/1.73 m 2    GFR If Non African American >60 >60 mL/min/1.73 m 2       Fluids    Intake/Output Summary (Last 24 hours) at 09/23/18 1017  Last data filed at 09/23/18 0705   Gross per 24 hour   Intake           1903.3 ml   Output             2425 ml   Net           -521.7 ml       Core Measures & Quality Metrics  Labs reviewed, Medications reviewed and Radiology images reviewed  Parker catheter: One or Two Days Post Surgery (Day of Surgery being Day 0)      DVT Prophylaxis: Enoxaparin (Lovenox)  DVT prophylaxis - mechanical: SCDs  Ulcer prophylaxis: Yes  Antibiotics: Treating active infection/contamination beyond 24 hours perioperative coverage      LILIAN Score  ETOH Screening    Assessment/Plan  Small bowel obstruction (HCC)- (present on admission)   Assessment & Plan    9/22 Ex lap, lysis of adhesions, small bowel resection.  9/23 NPO with NGT to LCWS.  Houston Amanda DO, Surgery.            Discussed patient condition with RN, Patient and general surgery. Dr. Amanda

## 2018-09-23 NOTE — PROGRESS NOTES
PACU report received.  Assessment complete.  A&O x 4. Very lethargic after surgery, but able to ask all questions. Patient calls appropriately.  Patient up with two assist. Bed alarm on.   Patient has 9/10 pain. Dilaudid PCA intact.   Denies N&V. Tolerating NPO diet. NG tube to suction in place  Surgical midline is CDI.  + void via duran catheter, - flatus  Patient denies SOB.  Patient  in room, very anxious that his wife gets continued care.  Review plan with of care with patient. Call light and personal belongings with in reach. Hourly rounding in place. All needs met at this time.

## 2018-09-23 NOTE — PROGRESS NOTES
Assumed care of patient.  Up in chair.  Complaining of pain, PCA in use and verified per MAR.  No needs at this time.

## 2018-09-23 NOTE — PROGRESS NOTES
"Pt sleeping but easily aroused upon verbal command. Pt states pca is not helping with pain. This RN will notify MD regarding pain management. NG to suction with minimal output noted. Pt on 3L NC. Encouraged to work on IS at least 10 times an hour. Parker to gravity with clear urine output. Plan for pt to be OOB today. Midline incision with dressing intact. Hypoactive BS, -flatus, -BM. Abdomen soft and tender. Plan of care discussed. Hourly rounding in place. Call light within reach. Blood pressure 139/88, pulse 94, temperature 36.6 °C (97.9 °F), resp. rate 20, height 1.549 m (5' 1\"), weight 84.1 kg (185 lb 6.5 oz), last menstrual period 10/02/1980, SpO2 95 %, not currently breastfeeding.      "

## 2018-09-23 NOTE — ASSESSMENT & PLAN NOTE
9/22 Ex lap, lysis of adhesions, small bowel resection.  9/25 7 day course of antibiotic therapy completed. NG tube clamped  9/26 Interval removal of NG tube.  9/27 Clear liquid diet  9/28 Abdominal wound erythema. Start Doxycycline.    9/29 Incisional erythema improved. Continue antibiotic therapy. Full liquid diet    9/30 GI soft diet  10/4 Wound exploration and seroma drainage in OR, cultures sent  10/5 Continue doxycycline for an additional 7 days   10/6 Wound culture pending positive for pseudomonas, start Cefepime, stop doxycycline  10/7 Pseudomonas resistant to Cefepime, start Cipro  Houston Amanda DO, Surgery.

## 2018-09-23 NOTE — PROGRESS NOTES
2 RN Skin Check with ARLIN Page RN:    Dry cracked heels bilaterally  Redness on sacrum, blanching  Bruising to LLQ and LUQ  Midline incision, old drainage, marked with pen   Scabs to l lateral foot     No breakdown seen on bony prominences

## 2018-09-23 NOTE — OR NURSING
"Pt sleeping in stable condition; has to be awakened for vital signs; pt complains of pain that \"won't go away\"; pt instructed that pain will not go away in total and that she needs to use her PCA more often. Pt transported to floor in stable condition.  "

## 2018-09-23 NOTE — CARE PLAN
Problem: Pain Management  Goal: Pain level will decrease to patient's comfort goal  Pain uncontrolled with pca. MD notified.     Problem: Psychosocial Needs:  Goal: Level of anxiety will decrease  Pt encouraged to ask questions and verbalize concerns.

## 2018-09-23 NOTE — PROGRESS NOTES
Alert and oriented x 4   Pain managed via PCA bolus button   Lungs clear, diminished in lower lobes   Patient on 4L NC, Spo2 > 90%  NG tube to suction, minimal brown/green/clear output  HOB 30 degrees  - flatus   Patient unable to recall last BM  Midline incision to abdomen, old serosang drainage marked with pen.   Parker in place   Triple lumen CVC + blood return x 3 lumens     Fall precautions in place. Bed alarm on. Call light and belongings within reach. Bolus button within reach. Patient educated on bolus button and pain management. Verbalized understanding. No other needs at this time.

## 2018-09-24 LAB
ALBUMIN SERPL BCP-MCNC: 2.6 G/DL (ref 3.2–4.9)
ALBUMIN/GLOB SERPL: 1.3 G/DL
ALP SERPL-CCNC: 37 U/L (ref 30–99)
ALT SERPL-CCNC: 8 U/L (ref 2–50)
ANION GAP SERPL CALC-SCNC: 4 MMOL/L (ref 0–11.9)
ANION GAP SERPL CALC-SCNC: 4 MMOL/L (ref 0–11.9)
AST SERPL-CCNC: 12 U/L (ref 12–45)
BASOPHILS # BLD AUTO: 0.6 % (ref 0–1.8)
BASOPHILS # BLD AUTO: 0.7 % (ref 0–1.8)
BASOPHILS # BLD: 0.04 K/UL (ref 0–0.12)
BASOPHILS # BLD: 0.04 K/UL (ref 0–0.12)
BILIRUB SERPL-MCNC: 0.9 MG/DL (ref 0.1–1.5)
BUN SERPL-MCNC: 6 MG/DL (ref 8–22)
BUN SERPL-MCNC: 6 MG/DL (ref 8–22)
CALCIUM SERPL-MCNC: 7.2 MG/DL (ref 8.5–10.5)
CALCIUM SERPL-MCNC: 7.2 MG/DL (ref 8.5–10.5)
CHLORIDE SERPL-SCNC: 112 MMOL/L (ref 96–112)
CHLORIDE SERPL-SCNC: 112 MMOL/L (ref 96–112)
CO2 SERPL-SCNC: 22 MMOL/L (ref 20–33)
CO2 SERPL-SCNC: 22 MMOL/L (ref 20–33)
CREAT SERPL-MCNC: 0.64 MG/DL (ref 0.5–1.4)
CREAT SERPL-MCNC: 0.64 MG/DL (ref 0.5–1.4)
EOSINOPHIL # BLD AUTO: 0.05 K/UL (ref 0–0.51)
EOSINOPHIL # BLD AUTO: 0.07 K/UL (ref 0–0.51)
EOSINOPHIL NFR BLD: 0.8 % (ref 0–6.9)
EOSINOPHIL NFR BLD: 1.1 % (ref 0–6.9)
ERYTHROCYTE [DISTWIDTH] IN BLOOD BY AUTOMATED COUNT: 51.8 FL (ref 35.9–50)
ERYTHROCYTE [DISTWIDTH] IN BLOOD BY AUTOMATED COUNT: 52.5 FL (ref 35.9–50)
GLOBULIN SER CALC-MCNC: 2 G/DL (ref 1.9–3.5)
GLUCOSE SERPL-MCNC: 99 MG/DL (ref 65–99)
GLUCOSE SERPL-MCNC: 99 MG/DL (ref 65–99)
HCT VFR BLD AUTO: 31.9 % (ref 37–47)
HCT VFR BLD AUTO: 32.3 % (ref 37–47)
HGB BLD-MCNC: 10.6 G/DL (ref 12–16)
HGB BLD-MCNC: 10.8 G/DL (ref 12–16)
IMM GRANULOCYTES # BLD AUTO: 0.05 K/UL (ref 0–0.11)
IMM GRANULOCYTES # BLD AUTO: 0.06 K/UL (ref 0–0.11)
IMM GRANULOCYTES NFR BLD AUTO: 0.8 % (ref 0–0.9)
IMM GRANULOCYTES NFR BLD AUTO: 1 % (ref 0–0.9)
LYMPHOCYTES # BLD AUTO: 0.68 K/UL (ref 1–4.8)
LYMPHOCYTES # BLD AUTO: 0.7 K/UL (ref 1–4.8)
LYMPHOCYTES NFR BLD: 11.1 % (ref 22–41)
LYMPHOCYTES NFR BLD: 11.4 % (ref 22–41)
MCH RBC QN AUTO: 35.5 PG (ref 27–33)
MCH RBC QN AUTO: 35.6 PG (ref 27–33)
MCHC RBC AUTO-ENTMCNC: 33.2 G/DL (ref 33.6–35)
MCHC RBC AUTO-ENTMCNC: 33.4 G/DL (ref 33.6–35)
MCV RBC AUTO: 106.6 FL (ref 81.4–97.8)
MCV RBC AUTO: 106.7 FL (ref 81.4–97.8)
MONOCYTES # BLD AUTO: 0.43 K/UL (ref 0–0.85)
MONOCYTES # BLD AUTO: 0.45 K/UL (ref 0–0.85)
MONOCYTES NFR BLD AUTO: 7.2 % (ref 0–13.4)
MONOCYTES NFR BLD AUTO: 7.2 % (ref 0–13.4)
NEUTROPHILS # BLD AUTO: 4.7 K/UL (ref 2–7.15)
NEUTROPHILS # BLD AUTO: 4.96 K/UL (ref 2–7.15)
NEUTROPHILS NFR BLD: 79 % (ref 44–72)
NEUTROPHILS NFR BLD: 79.1 % (ref 44–72)
NRBC # BLD AUTO: 0 K/UL
NRBC # BLD AUTO: 0 K/UL
NRBC BLD-RTO: 0 /100 WBC
NRBC BLD-RTO: 0 /100 WBC
PLATELET # BLD AUTO: 155 K/UL (ref 164–446)
PLATELET # BLD AUTO: 159 K/UL (ref 164–446)
PMV BLD AUTO: 11.1 FL (ref 9–12.9)
PMV BLD AUTO: 11.3 FL (ref 9–12.9)
POTASSIUM SERPL-SCNC: 4.2 MMOL/L (ref 3.6–5.5)
POTASSIUM SERPL-SCNC: 4.2 MMOL/L (ref 3.6–5.5)
PROT SERPL-MCNC: 4.6 G/DL (ref 6–8.2)
RBC # BLD AUTO: 2.99 M/UL (ref 4.2–5.4)
RBC # BLD AUTO: 3.03 M/UL (ref 4.2–5.4)
SODIUM SERPL-SCNC: 138 MMOL/L (ref 135–145)
SODIUM SERPL-SCNC: 138 MMOL/L (ref 135–145)
WBC # BLD AUTO: 6 K/UL (ref 4.8–10.8)
WBC # BLD AUTO: 6.3 K/UL (ref 4.8–10.8)

## 2018-09-24 PROCEDURE — G8987 SELF CARE CURRENT STATUS: HCPCS | Mod: CK

## 2018-09-24 PROCEDURE — 97162 PT EVAL MOD COMPLEX 30 MIN: CPT

## 2018-09-24 PROCEDURE — 85025 COMPLETE CBC W/AUTO DIFF WBC: CPT | Mod: 91

## 2018-09-24 PROCEDURE — 700111 HCHG RX REV CODE 636 W/ 250 OVERRIDE (IP): Performed by: NURSE PRACTITIONER

## 2018-09-24 PROCEDURE — 700101 HCHG RX REV CODE 250: Performed by: INTERNAL MEDICINE

## 2018-09-24 PROCEDURE — A9270 NON-COVERED ITEM OR SERVICE: HCPCS | Performed by: HOSPITALIST

## 2018-09-24 PROCEDURE — 700111 HCHG RX REV CODE 636 W/ 250 OVERRIDE (IP): Performed by: HOSPITALIST

## 2018-09-24 PROCEDURE — 770001 HCHG ROOM/CARE - MED/SURG/GYN PRIV*

## 2018-09-24 PROCEDURE — 80053 COMPREHEN METABOLIC PANEL: CPT

## 2018-09-24 PROCEDURE — 700102 HCHG RX REV CODE 250 W/ 637 OVERRIDE(OP): Performed by: HOSPITALIST

## 2018-09-24 PROCEDURE — 700105 HCHG RX REV CODE 258: Performed by: HOSPITALIST

## 2018-09-24 PROCEDURE — 700105 HCHG RX REV CODE 258

## 2018-09-24 PROCEDURE — 770006 HCHG ROOM/CARE - MED/SURG/GYN SEMI*

## 2018-09-24 PROCEDURE — 700101 HCHG RX REV CODE 250: Performed by: HOSPITALIST

## 2018-09-24 PROCEDURE — 700111 HCHG RX REV CODE 636 W/ 250 OVERRIDE (IP): Performed by: SURGERY

## 2018-09-24 PROCEDURE — G8978 MOBILITY CURRENT STATUS: HCPCS | Mod: CJ

## 2018-09-24 PROCEDURE — 700101 HCHG RX REV CODE 250: Performed by: FAMILY MEDICINE

## 2018-09-24 PROCEDURE — G8988 SELF CARE GOAL STATUS: HCPCS | Mod: CI

## 2018-09-24 PROCEDURE — 36415 COLL VENOUS BLD VENIPUNCTURE: CPT

## 2018-09-24 PROCEDURE — C9113 INJ PANTOPRAZOLE SODIUM, VIA: HCPCS | Performed by: HOSPITALIST

## 2018-09-24 PROCEDURE — 97166 OT EVAL MOD COMPLEX 45 MIN: CPT

## 2018-09-24 PROCEDURE — G8979 MOBILITY GOAL STATUS: HCPCS | Mod: CI

## 2018-09-24 RX ORDER — SODIUM CHLORIDE 9 MG/ML
INJECTION, SOLUTION INTRAVENOUS
Status: COMPLETED
Start: 2018-09-24 | End: 2018-09-24

## 2018-09-24 RX ORDER — MECLIZINE HCL 12.5 MG/1
TABLET ORAL
Qty: 30 TAB | Refills: 0 | OUTPATIENT
Start: 2018-09-24 | End: 2018-10-02

## 2018-09-24 RX ADMIN — POTASSIUM CHLORIDE AND SODIUM CHLORIDE: 900; 300 INJECTION, SOLUTION INTRAVENOUS at 07:26

## 2018-09-24 RX ADMIN — DOXYCYCLINE 100 MG: 100 INJECTION, POWDER, LYOPHILIZED, FOR SOLUTION INTRAVENOUS at 17:53

## 2018-09-24 RX ADMIN — METRONIDAZOLE 500 MG: 500 INJECTION, SOLUTION INTRAVENOUS at 22:29

## 2018-09-24 RX ADMIN — LEVOTHYROXINE SODIUM ANHYDROUS 50 MCG: 100 INJECTION, POWDER, LYOPHILIZED, FOR SOLUTION INTRAVENOUS at 07:19

## 2018-09-24 RX ADMIN — KETOROLAC TROMETHAMINE 15 MG: 30 INJECTION, SOLUTION INTRAMUSCULAR; INTRAVENOUS at 11:15

## 2018-09-24 RX ADMIN — LORAZEPAM 1 MG: 2 INJECTION INTRAMUSCULAR; INTRAVENOUS at 22:30

## 2018-09-24 RX ADMIN — METRONIDAZOLE 500 MG: 500 INJECTION, SOLUTION INTRAVENOUS at 07:48

## 2018-09-24 RX ADMIN — METRONIDAZOLE 500 MG: 500 INJECTION, SOLUTION INTRAVENOUS at 14:13

## 2018-09-24 RX ADMIN — HYDROMORPHONE HYDROCHLORIDE: 10 INJECTION, SOLUTION INTRAMUSCULAR; INTRAVENOUS; SUBCUTANEOUS at 22:05

## 2018-09-24 RX ADMIN — SODIUM CHLORIDE 1000 ML: 9 INJECTION, SOLUTION INTRAVENOUS at 07:27

## 2018-09-24 RX ADMIN — SODIUM CHLORIDE 500 ML: 9 INJECTION, SOLUTION INTRAVENOUS at 11:23

## 2018-09-24 RX ADMIN — PANTOPRAZOLE SODIUM 40 MG: 40 INJECTION, POWDER, LYOPHILIZED, FOR SOLUTION INTRAVENOUS at 17:53

## 2018-09-24 RX ADMIN — KETOROLAC TROMETHAMINE 15 MG: 30 INJECTION, SOLUTION INTRAMUSCULAR; INTRAVENOUS at 08:15

## 2018-09-24 RX ADMIN — PANTOPRAZOLE SODIUM 40 MG: 40 INJECTION, POWDER, LYOPHILIZED, FOR SOLUTION INTRAVENOUS at 07:17

## 2018-09-24 RX ADMIN — HYDROMORPHONE HYDROCHLORIDE: 10 INJECTION, SOLUTION INTRAMUSCULAR; INTRAVENOUS; SUBCUTANEOUS at 12:59

## 2018-09-24 RX ADMIN — ENOXAPARIN SODIUM 40 MG: 100 INJECTION SUBCUTANEOUS at 08:15

## 2018-09-24 RX ADMIN — KETOROLAC TROMETHAMINE 15 MG: 30 INJECTION, SOLUTION INTRAMUSCULAR; INTRAVENOUS at 17:53

## 2018-09-24 RX ADMIN — NICOTINE 21 MG: 21 PATCH, EXTENDED RELEASE TRANSDERMAL at 07:20

## 2018-09-24 RX ADMIN — DOXYCYCLINE 100 MG: 100 INJECTION, POWDER, LYOPHILIZED, FOR SOLUTION INTRAVENOUS at 07:14

## 2018-09-24 ASSESSMENT — ENCOUNTER SYMPTOMS
ABDOMINAL PAIN: 1
ROS GI COMMENTS: BM PRIOR TO ARRIVAL, NO FLATUS
SPEECH CHANGE: 0
CHILLS: 0
VOMITING: 0
SHORTNESS OF BREATH: 0
NAUSEA: 0
FEVER: 0

## 2018-09-24 ASSESSMENT — PAIN SCALES - GENERAL
PAINLEVEL_OUTOF10: 10

## 2018-09-24 ASSESSMENT — COGNITIVE AND FUNCTIONAL STATUS - GENERAL
DRESSING REGULAR UPPER BODY CLOTHING: A LITTLE
SUGGESTED CMS G CODE MODIFIER MOBILITY: CK
MOVING TO AND FROM BED TO CHAIR: A LOT
MOVING FROM LYING ON BACK TO SITTING ON SIDE OF FLAT BED: A LOT
DAILY ACTIVITIY SCORE: 18
TURNING FROM BACK TO SIDE WHILE IN FLAT BAD: A LOT
WALKING IN HOSPITAL ROOM: A LITTLE
SUGGESTED CMS G CODE MODIFIER DAILY ACTIVITY: CK
DRESSING REGULAR LOWER BODY CLOTHING: A LOT
HELP NEEDED FOR BATHING: A LOT
MOBILITY SCORE: 15
TOILETING: A LITTLE
CLIMB 3 TO 5 STEPS WITH RAILING: A LITTLE
STANDING UP FROM CHAIR USING ARMS: A LITTLE

## 2018-09-24 ASSESSMENT — GAIT ASSESSMENTS
DISTANCE (FEET): 40
ASSISTIVE DEVICE: FRONT WHEEL WALKER
DEVIATION: ANTALGIC;DECREASED BASE OF SUPPORT;DECREASED HEEL STRIKE;DECREASED TOE OFF;OTHER (COMMENT)
GAIT LEVEL OF ASSIST: CONTACT GUARD ASSIST

## 2018-09-24 ASSESSMENT — ACTIVITIES OF DAILY LIVING (ADL): TOILETING: INDEPENDENT

## 2018-09-24 NOTE — CARE PLAN
Problem: Safety  Goal: Will remain free from falls  Outcome: PROGRESSING AS EXPECTED    Intervention: Assess risk factors for falls   09/24/18 0213   OTHER   Fall Risk Risk to Fall - 0 - 1 point   Risk for Injury-Any positive answers results in the pt being at high risk for fall related injury Surgery: Thoracic or Abdominal Surgery or Lower Limb Amputation   Mobility Status Assessment 2-2 Healthcare Providers Required for Assistance with Ambulation & Transfer   History of fall 0   Pt Calls for Assistance Yes     Intervention: Implement fall precautions   09/23/18 2000 09/24/18 0213   OTHER   Environmental Precautions Treaded Slipper Socks on Patient;Personal Belongings, Wastebasket, Call Bell etc. in Easy Reach;Transferred to Stronger Side;Report Given to Other Health Care Providers Regarding Fall Risk;Bed in Low Position;Communication Sign for Patients & Families;Mobility Assessed & Appropriate Sign Placed --    IV Pole on Same Side of Bed as Bathroom --  Yes   Bedrails --  Bedrails Closest to Bathroom Down         Problem: Venous Thromboembolism (VTW)/Deep Vein Thrombosis (DVT) Prevention:  Goal: Patient will participate in Venous Thrombosis (VTE)/Deep Vein Thrombosis (DVT)Prevention Measures  Outcome: PROGRESSING SLOWER THAN EXPECTED   09/23/18 2000   OTHER   Risk Assessment Score 3   VTE RISK High   Pharmacologic Prophylaxis Used LMWH: Enoxaparin(Lovenox)   Mechanical/VTE Prophylaxis   Mechanical Prophylaxis  SCDs, Sequential Compression Device   SCDs, Sequential Compression Device Refused       Problem: Bowel/Gastric:  Goal: Normal bowel function is maintained or improved  Outcome: PROGRESSING SLOWER THAN EXPECTED  Hypoactive bowel sounds in all four quadrants, - flatus, - BM.

## 2018-09-24 NOTE — THERAPY
"Occupational Therapy Evaluation completed.   Functional Status:  Pt in bed upon arrival.  Pt supine to sit EOB with Mod A d/t c/o pain w/ HOB elevated as has hospital bed at home. Sit>stand w/ SBA using FWW. Pt ambulated to toilet in BR and completed toilet txf w/ SBA. Completed toileting and zandra-care with SPV and then sit>stand w/ SBA. Ambulated to sink, completed hand washing w/ SPV. Pt ambulated out of room, req extended rest break and use of PCA before cont. Pt c/o back pain from prior sxs and abdominal pain. Pt ambulated back to room w/ SBA and no LOB. Pt stand>sit into chair with SBA. Pt left seated in chair w/  in room.  Plan of Care: Will benefit from Occupational Therapy 3 times per week  Discharge Recommendations:  Equipment: Will Continue to Assess for Equipment Needs. Post-acute therapy Discharge to a transitional care facility for continued skilled therapy services.    See \"Rehab Therapy-Acute\" Patient Summary Report for complete documentation.      Pt is a 61 yo female, admitted for L arm pain d/t a \"spider bite\" and constipation. Pt found to have SBO req sx for resection. PMHx of EtOH, generalized anxiety disorder, gout, and HTN. Pt demonstrated decreased activity tolerance, reports severe pain, decreased bed mobility and functional ambulation, all which are limiting pt's ability to complete ADLs/ IADLs at [facility]. Pt will benefit from acute OT, as well as recommend post acute OT prior to D/C home w/ .  "

## 2018-09-24 NOTE — PROGRESS NOTES
Trauma / Surgical Daily Progress Note    Date of Service  9/24/2018    Chief Complaint  62 y.o. female admitted 9/19/2018 with Small bowel obstruction (HCC)    POD # 2 Ex lap, lysis of adhesions, small bowel resection    Interval Events  Pt seen and assessed by Dr. Amanda earlier  Ambulated to nurses station, has been up in chair since  Wants to go back to bed, abdominal pain is major complaint  Doxy/Flagyl day 5    - DC duran  - PCA adjusted  - Continue ice chips and NGT to intermittent suction  - Continue mobilization efforts    Review of Systems  Review of Systems   Constitutional: Negative for chills and fever.   Respiratory: Negative for shortness of breath.    Cardiovascular: Negative for chest pain.   Gastrointestinal: Positive for abdominal pain. Negative for nausea and vomiting.        BM prior to arrival, no flatus   Skin: Negative for rash.   Neurological: Negative for speech change.        Vital Signs  Temp:  [36.3 °C (97.3 °F)-36.8 °C (98.3 °F)] 36.8 °C (98.3 °F)  Pulse:  [67-85] 67  Resp:  [18] 18  BP: ()/(58-81) 94/58    Physical Exam  Physical Exam   Constitutional: She is oriented to person, place, and time. She appears well-developed.   Neck: Neck supple.   Cardiovascular: Normal rate.    Pulmonary/Chest: Effort normal. No respiratory distress.   Neurological: She is alert and oriented to person, place, and time.   Skin: Skin is warm and dry.   Psychiatric:   Irritable   Nursing note and vitals reviewed.      Laboratory  Recent Results (from the past 24 hour(s))   BASIC METABOLIC PANEL    Collection Time: 09/24/18  5:01 AM   Result Value Ref Range    Sodium 138 135 - 145 mmol/L    Potassium 4.2 3.6 - 5.5 mmol/L    Chloride 112 96 - 112 mmol/L    Co2 22 20 - 33 mmol/L    Glucose 99 65 - 99 mg/dL    Bun 6 (L) 8 - 22 mg/dL    Creatinine 0.64 0.50 - 1.40 mg/dL    Calcium 7.2 (L) 8.5 - 10.5 mg/dL    Anion Gap 4.0 0.0 - 11.9   CBC WITH DIFFERENTIAL    Collection Time: 09/24/18  5:01 AM   Result  Value Ref Range    WBC 6.0 4.8 - 10.8 K/uL    RBC 3.03 (L) 4.20 - 5.40 M/uL    Hemoglobin 10.8 (L) 12.0 - 16.0 g/dL    Hematocrit 32.3 (L) 37.0 - 47.0 %    .6 (H) 81.4 - 97.8 fL    MCH 35.6 (H) 27.0 - 33.0 pg    MCHC 33.4 (L) 33.6 - 35.0 g/dL    RDW 51.8 (H) 35.9 - 50.0 fL    Platelet Count 155 (L) 164 - 446 K/uL    MPV 11.1 9.0 - 12.9 fL    Neutrophils-Polys 79.10 (H) 44.00 - 72.00 %    Lymphocytes 11.40 (L) 22.00 - 41.00 %    Monocytes 7.20 0.00 - 13.40 %    Eosinophils 0.80 0.00 - 6.90 %    Basophils 0.70 0.00 - 1.80 %    Immature Granulocytes 0.80 0.00 - 0.90 %    Nucleated RBC 0.00 /100 WBC    Neutrophils (Absolute) 4.70 2.00 - 7.15 K/uL    Lymphs (Absolute) 0.68 (L) 1.00 - 4.80 K/uL    Monos (Absolute) 0.43 0.00 - 0.85 K/uL    Eos (Absolute) 0.05 0.00 - 0.51 K/uL    Baso (Absolute) 0.04 0.00 - 0.12 K/uL    Immature Granulocytes (abs) 0.05 0.00 - 0.11 K/uL    NRBC (Absolute) 0.00 K/uL   ESTIMATED GFR    Collection Time: 09/24/18  5:01 AM   Result Value Ref Range    GFR If African American >60 >60 mL/min/1.73 m 2    GFR If Non African American >60 >60 mL/min/1.73 m 2   COMP METABOLIC PANEL    Collection Time: 09/24/18  5:01 AM   Result Value Ref Range    Sodium 138 135 - 145 mmol/L    Potassium 4.2 3.6 - 5.5 mmol/L    Chloride 112 96 - 112 mmol/L    Co2 22 20 - 33 mmol/L    Anion Gap 4.0 0.0 - 11.9    Glucose 99 65 - 99 mg/dL    Bun 6 (L) 8 - 22 mg/dL    Creatinine 0.64 0.50 - 1.40 mg/dL    Calcium 7.2 (L) 8.5 - 10.5 mg/dL    AST(SGOT) 12 12 - 45 U/L    ALT(SGPT) 8 2 - 50 U/L    Alkaline Phosphatase 37 30 - 99 U/L    Total Bilirubin 0.9 0.1 - 1.5 mg/dL    Albumin 2.6 (L) 3.2 - 4.9 g/dL    Total Protein 4.6 (L) 6.0 - 8.2 g/dL    Globulin 2.0 1.9 - 3.5 g/dL    A-G Ratio 1.3 g/dL   CBC WITH DIFFERENTIAL    Collection Time: 09/24/18  7:23 AM   Result Value Ref Range    WBC 6.3 4.8 - 10.8 K/uL    RBC 2.99 (L) 4.20 - 5.40 M/uL    Hemoglobin 10.6 (L) 12.0 - 16.0 g/dL    Hematocrit 31.9 (L) 37.0 - 47.0 %    MCV  106.7 (H) 81.4 - 97.8 fL    MCH 35.5 (H) 27.0 - 33.0 pg    MCHC 33.2 (L) 33.6 - 35.0 g/dL    RDW 52.5 (H) 35.9 - 50.0 fL    Platelet Count 159 (L) 164 - 446 K/uL    MPV 11.3 9.0 - 12.9 fL    Neutrophils-Polys 79.00 (H) 44.00 - 72.00 %    Lymphocytes 11.10 (L) 22.00 - 41.00 %    Monocytes 7.20 0.00 - 13.40 %    Eosinophils 1.10 0.00 - 6.90 %    Basophils 0.60 0.00 - 1.80 %    Immature Granulocytes 1.00 (H) 0.00 - 0.90 %    Nucleated RBC 0.00 /100 WBC    Neutrophils (Absolute) 4.96 2.00 - 7.15 K/uL    Lymphs (Absolute) 0.70 (L) 1.00 - 4.80 K/uL    Monos (Absolute) 0.45 0.00 - 0.85 K/uL    Eos (Absolute) 0.07 0.00 - 0.51 K/uL    Baso (Absolute) 0.04 0.00 - 0.12 K/uL    Immature Granulocytes (abs) 0.06 0.00 - 0.11 K/uL    NRBC (Absolute) 0.00 K/uL       Fluids    Intake/Output Summary (Last 24 hours) at 09/24/18 1226  Last data filed at 09/24/18 0800   Gross per 24 hour   Intake          3509.15 ml   Output              355 ml   Net          3154.15 ml       Core Measures & Quality Metrics  Labs reviewed, Medications reviewed and Radiology images reviewed  Duran Catheter: Trial duran removal.      DVT Prophylaxis: Enoxaparin (Lovenox)  DVT prophylaxis - mechanical: SCDs  Ulcer prophylaxis: Yes  Antibiotics: Treating active infection/contamination beyond 24 hours perioperative coverage      LILIAN Score  ETOH Screening    Assessment/Plan  Small bowel obstruction (HCC)- (present on admission)   Assessment & Plan    9/22 Ex lap, lysis of adhesions, small bowel resection.  9/24 Continue NPO with NGT to intermittent suction.  Doxy/Flagyl day 5.  Houston Amanda DO, Surgery.            Discussed patient condition with RN, , Patient and general surgery. Dr. Amanda

## 2018-09-24 NOTE — PROGRESS NOTES
Bedside report received.  Assessment complete.  A&O x 4. Patient calls appropriately.  Patient up with one assist. Bed alarm on.   Patient has 10/10 pain. Dilaudid PCA, scheduled medications given  Denies N&V. Tolerating NPO, ice chips diet.  Surgical midline with dressing is CDI.  + void but small amounts of dark urine, + flatus  Patient denies SOB.  SCD's on.  Patient constantly asking for more pain medication, will continue to monitor. Patient stat labs drawn and results reviewed. Toradol and Lovenox given per telephone communication with APRN  Review plan with of care with patient. Call light and personal belongings with in reach. Hourly rounding in place. All needs met at this time.

## 2018-09-24 NOTE — PROGRESS NOTES
Pt had a 4 point drop in H&H this morning from 14.9 to 10.8. Pt's urine remains dark brown and output continues to decrease. Updated BEATRIZ Mendez. Received orders from [facility], hold AM Lovenox and Toradol, repeat CBC.

## 2018-09-24 NOTE — PROGRESS NOTES
"Pt A&O x4. Anxious, requires a lot of assistance. PRN Ativan administered per pt request.     Vitals: /63   Pulse 73   Temp 36.3 °C (97.3 °F)   Resp 18   Ht 1.549 m (5' 1\")   Wt 84.1 kg (185 lb 6.5 oz)   LMP 10/02/1980   SpO2 94%   Breastfeeding? No   BMI 35.03 kg/m²     Pt rates pain 10 out of 10. Dilaudid PCA running with basal rate and bolus doses. Q6 Toradol scheduled. Pt declines heat or ice pack to incision site.     Neuro: SCHWAB. Denies new onset of numbness/ tingling.    Cardiac: Denies new onset of chest pain.    Vascular: Pulses 2+ BUE, BLE. No edema noted.    Respiratory: Lungs sound diminished in bases. Refusing to use IS, states, \"Oh I can't do that, I can't breathe\". On 1L O2 NC, humidification added.  on, satting in high 90's. Denies SOB.    GI: Abdomen rounded, distended, tender, obese. Hypoactive bowel sounds, - flatus, - BM today. Pt reports last BM \"10 days ago\". NPO, ice chips, pt tolerating ice chips well. - nausea/ vomiting. NG tube in place to low, continuous wall suction, no output noted. Administered air bolus through blue tube, line patent.     : Parker catheter secured in place, gravity drainage. Pt noted to have brown urine, new finding. Decreased urine output.     MSK: Pt refusing to get OOB, refusing SCD's, pt able to readjust self in bed.     Integumentary: Midline abdominal surgical incision dress dry, intact, small old/ dried serosanguinous drainage noted, observed, island dressing. Abdominal binder in place.     Labs noted. Left subclavian triple lumen central line secured in place, patent, dressing CDI, no s/sx of infection noted at this time.     Fall precautions in place: Bed locked in lowest position, Upper bed rails up, treaded socks in place, personal belongings within reach, call light within reach, appropriate mobility signs in place, - bed alarm, pt refusing to get OOB. Pt calls appropriately.     Pt updated on POC.   "

## 2018-09-24 NOTE — CARE PLAN
Problem: Pain Management  Goal: Pain level will decrease to patient's comfort goal  Outcome: PROGRESSING SLOWER THAN EXPECTED    Intervention: Follow pain managment plan developed in collaboration with patient and Interdisciplinary Team  Patient has Dilaudid PCA. PCA basal rate was doubled to today for better pain management. Patient is calling for extra medications for pain management frequently. Education has been provided, patient verbalizes understanding, but continues to call for more.  Intervention: Educate and implement non-pharmacologic comfort measures. Examples: relaxation, distration, play therapy, activity therapy, massage, etc.  OT gave ice pack to patient      Problem: Mobility  Goal: Risk for activity intolerance will decrease  Outcome: PROGRESSING AS EXPECTED    Intervention: Encourage patient to increase activity level in collaboration with Interdisciplinary Team  RN, MD, PT, OT all encouraging patient to increase activity level. Patient is resistant at first, but then agrees after time. Patient got up 3 times this shift, ambulated 2 of them.

## 2018-09-24 NOTE — THERAPY
"Physical Therapy Evaluation completed.   Bed Mobility:  Supine to Sit: Modified Independent  Transfers: Sit to Stand: Contact Guard Assist  Gait: Level Of Assist: Contact Guard Assist (SBA to CGA; improves with increased distances) with Front-Wheel Walker       Plan of Care: Will benefit from Physical Therapy 3 times per week  Discharge Recommendations: Equipment: Will Continue to Assess for Equipment Needs. See below    Pt presents to PT with impaired balance, functional strength, endurance and general locomotion associated with recent deconditioning and medical co-morbidities. She is able to demonstrate bed mobility with use of bed features (pt has hospital bed at home), sit<>stands and ambulation with FWW with CGA. She is primarily limited 2' to current pain and deconditioning. She improves gait mechanics and sit<>stands within visit and anticiapte with increased OOB activity from both PT and floor staff, pt will continue to progress well. In current condition would recommend skilled PT/placement prior to medical dc to home however anticiapte that pt may progress towards dc to home as pain managed with increased OOB time. Would recommend home PT if pt progressess well and/or declines placement and pain managed. Will continue to visit.     See \"Rehab Therapy-Acute\" Patient Summary Report for complete documentation.     "

## 2018-09-25 LAB
ANION GAP SERPL CALC-SCNC: 10 MMOL/L (ref 0–11.9)
BASOPHILS # BLD AUTO: 0.6 % (ref 0–1.8)
BASOPHILS # BLD: 0.03 K/UL (ref 0–0.12)
BUN SERPL-MCNC: 5 MG/DL (ref 8–22)
CALCIUM SERPL-MCNC: 7.9 MG/DL (ref 8.5–10.5)
CHLORIDE SERPL-SCNC: 113 MMOL/L (ref 96–112)
CO2 SERPL-SCNC: 20 MMOL/L (ref 20–33)
CREAT SERPL-MCNC: 0.58 MG/DL (ref 0.5–1.4)
EOSINOPHIL # BLD AUTO: 0.15 K/UL (ref 0–0.51)
EOSINOPHIL NFR BLD: 3 % (ref 0–6.9)
ERYTHROCYTE [DISTWIDTH] IN BLOOD BY AUTOMATED COUNT: 54.2 FL (ref 35.9–50)
GLUCOSE SERPL-MCNC: 66 MG/DL (ref 65–99)
HCT VFR BLD AUTO: 31.9 % (ref 37–47)
HGB BLD-MCNC: 10.4 G/DL (ref 12–16)
IMM GRANULOCYTES # BLD AUTO: 0.04 K/UL (ref 0–0.11)
IMM GRANULOCYTES NFR BLD AUTO: 0.8 % (ref 0–0.9)
LYMPHOCYTES # BLD AUTO: 1.03 K/UL (ref 1–4.8)
LYMPHOCYTES NFR BLD: 20.7 % (ref 22–41)
MCH RBC QN AUTO: 35.4 PG (ref 27–33)
MCHC RBC AUTO-ENTMCNC: 32.6 G/DL (ref 33.6–35)
MCV RBC AUTO: 108.5 FL (ref 81.4–97.8)
MONOCYTES # BLD AUTO: 0.28 K/UL (ref 0–0.85)
MONOCYTES NFR BLD AUTO: 5.6 % (ref 0–13.4)
NEUTROPHILS # BLD AUTO: 3.44 K/UL (ref 2–7.15)
NEUTROPHILS NFR BLD: 69.3 % (ref 44–72)
NRBC # BLD AUTO: 0 K/UL
NRBC BLD-RTO: 0 /100 WBC
PLATELET # BLD AUTO: 141 K/UL (ref 164–446)
PMV BLD AUTO: 11.3 FL (ref 9–12.9)
POTASSIUM SERPL-SCNC: 3.9 MMOL/L (ref 3.6–5.5)
RBC # BLD AUTO: 2.94 M/UL (ref 4.2–5.4)
SODIUM SERPL-SCNC: 143 MMOL/L (ref 135–145)
WBC # BLD AUTO: 5 K/UL (ref 4.8–10.8)

## 2018-09-25 PROCEDURE — 700102 HCHG RX REV CODE 250 W/ 637 OVERRIDE(OP): Performed by: SURGERY

## 2018-09-25 PROCEDURE — A9270 NON-COVERED ITEM OR SERVICE: HCPCS | Performed by: SURGERY

## 2018-09-25 PROCEDURE — 85025 COMPLETE CBC W/AUTO DIFF WBC: CPT

## 2018-09-25 PROCEDURE — 700105 HCHG RX REV CODE 258

## 2018-09-25 PROCEDURE — 700105 HCHG RX REV CODE 258: Performed by: HOSPITALIST

## 2018-09-25 PROCEDURE — 700111 HCHG RX REV CODE 636 W/ 250 OVERRIDE (IP): Performed by: HOSPITALIST

## 2018-09-25 PROCEDURE — 700102 HCHG RX REV CODE 250 W/ 637 OVERRIDE(OP): Performed by: HOSPITALIST

## 2018-09-25 PROCEDURE — 80048 BASIC METABOLIC PNL TOTAL CA: CPT

## 2018-09-25 PROCEDURE — C9113 INJ PANTOPRAZOLE SODIUM, VIA: HCPCS | Performed by: HOSPITALIST

## 2018-09-25 PROCEDURE — A9270 NON-COVERED ITEM OR SERVICE: HCPCS | Performed by: HOSPITALIST

## 2018-09-25 PROCEDURE — 770001 HCHG ROOM/CARE - MED/SURG/GYN PRIV*

## 2018-09-25 PROCEDURE — 700101 HCHG RX REV CODE 250: Performed by: HOSPITALIST

## 2018-09-25 PROCEDURE — 700111 HCHG RX REV CODE 636 W/ 250 OVERRIDE (IP): Performed by: SURGERY

## 2018-09-25 PROCEDURE — 700111 HCHG RX REV CODE 636 W/ 250 OVERRIDE (IP): Performed by: NURSE PRACTITIONER

## 2018-09-25 PROCEDURE — 700101 HCHG RX REV CODE 250: Performed by: NURSE PRACTITIONER

## 2018-09-25 PROCEDURE — 700101 HCHG RX REV CODE 250: Performed by: INTERNAL MEDICINE

## 2018-09-25 RX ORDER — SODIUM CHLORIDE 9 MG/ML
INJECTION, SOLUTION INTRAVENOUS
Status: COMPLETED
Start: 2018-09-25 | End: 2018-09-25

## 2018-09-25 RX ORDER — HYDROMORPHONE HYDROCHLORIDE 2 MG/ML
1-2 INJECTION, SOLUTION INTRAMUSCULAR; INTRAVENOUS; SUBCUTANEOUS
Status: DISCONTINUED | OUTPATIENT
Start: 2018-09-25 | End: 2018-09-28

## 2018-09-25 RX ORDER — METHADONE HYDROCHLORIDE 5 MG/5ML
10 SOLUTION ORAL EVERY 8 HOURS
Status: DISCONTINUED | OUTPATIENT
Start: 2018-09-25 | End: 2018-09-25

## 2018-09-25 RX ORDER — METHADONE HYDROCHLORIDE 5 MG/5ML
10 SOLUTION ORAL EVERY 8 HOURS
Status: DISCONTINUED | OUTPATIENT
Start: 2018-09-25 | End: 2018-09-29

## 2018-09-25 RX ADMIN — METRONIDAZOLE 500 MG: 500 INJECTION, SOLUTION INTRAVENOUS at 22:04

## 2018-09-25 RX ADMIN — PANTOPRAZOLE SODIUM 40 MG: 40 INJECTION, POWDER, LYOPHILIZED, FOR SOLUTION INTRAVENOUS at 06:27

## 2018-09-25 RX ADMIN — KETOROLAC TROMETHAMINE 15 MG: 30 INJECTION, SOLUTION INTRAMUSCULAR; INTRAVENOUS at 06:02

## 2018-09-25 RX ADMIN — HYDROMORPHONE HYDROCHLORIDE 2 MG: 2 INJECTION INTRAMUSCULAR; INTRAVENOUS; SUBCUTANEOUS at 11:22

## 2018-09-25 RX ADMIN — HYDROMORPHONE HYDROCHLORIDE 2 MG: 2 INJECTION INTRAMUSCULAR; INTRAVENOUS; SUBCUTANEOUS at 23:41

## 2018-09-25 RX ADMIN — LEVOTHYROXINE SODIUM ANHYDROUS 50 MCG: 100 INJECTION, POWDER, LYOPHILIZED, FOR SOLUTION INTRAVENOUS at 06:28

## 2018-09-25 RX ADMIN — DOXYCYCLINE 100 MG: 100 INJECTION, POWDER, LYOPHILIZED, FOR SOLUTION INTRAVENOUS at 17:17

## 2018-09-25 RX ADMIN — METHADONE HYDROCHLORIDE 10 MG: 5 SOLUTION ORAL at 13:43

## 2018-09-25 RX ADMIN — METHYLNALTREXONE BROMIDE 12 MG: 12 INJECTION, SOLUTION SUBCUTANEOUS at 11:22

## 2018-09-25 RX ADMIN — POTASSIUM CHLORIDE AND SODIUM CHLORIDE: 900; 300 INJECTION, SOLUTION INTRAVENOUS at 06:27

## 2018-09-25 RX ADMIN — SODIUM CHLORIDE: 9 INJECTION, SOLUTION INTRAVENOUS at 17:30

## 2018-09-25 RX ADMIN — METHADONE HYDROCHLORIDE 10 MG: 5 SOLUTION ORAL at 09:50

## 2018-09-25 RX ADMIN — DOXYCYCLINE 100 MG: 100 INJECTION, POWDER, LYOPHILIZED, FOR SOLUTION INTRAVENOUS at 06:30

## 2018-09-25 RX ADMIN — NICOTINE 21 MG: 21 PATCH, EXTENDED RELEASE TRANSDERMAL at 06:32

## 2018-09-25 RX ADMIN — HYDROMORPHONE HYDROCHLORIDE 2 MG: 2 INJECTION INTRAMUSCULAR; INTRAVENOUS; SUBCUTANEOUS at 13:42

## 2018-09-25 RX ADMIN — LORAZEPAM 1 MG: 2 INJECTION INTRAMUSCULAR; INTRAVENOUS at 22:04

## 2018-09-25 RX ADMIN — KETOROLAC TROMETHAMINE 15 MG: 30 INJECTION, SOLUTION INTRAMUSCULAR; INTRAVENOUS at 00:37

## 2018-09-25 RX ADMIN — HYDROMORPHONE HYDROCHLORIDE 2 MG: 2 INJECTION INTRAMUSCULAR; INTRAVENOUS; SUBCUTANEOUS at 20:12

## 2018-09-25 RX ADMIN — ENOXAPARIN SODIUM 40 MG: 100 INJECTION SUBCUTANEOUS at 06:32

## 2018-09-25 RX ADMIN — PANTOPRAZOLE SODIUM 40 MG: 40 INJECTION, POWDER, LYOPHILIZED, FOR SOLUTION INTRAVENOUS at 17:13

## 2018-09-25 RX ADMIN — HYDROMORPHONE HYDROCHLORIDE 2 MG: 2 INJECTION INTRAMUSCULAR; INTRAVENOUS; SUBCUTANEOUS at 17:10

## 2018-09-25 RX ADMIN — METRONIDAZOLE 500 MG: 500 INJECTION, SOLUTION INTRAVENOUS at 13:43

## 2018-09-25 RX ADMIN — METHADONE HYDROCHLORIDE 10 MG: 5 SOLUTION ORAL at 22:04

## 2018-09-25 RX ADMIN — METRONIDAZOLE 500 MG: 500 INJECTION, SOLUTION INTRAVENOUS at 06:06

## 2018-09-25 ASSESSMENT — PAIN SCALES - GENERAL
PAINLEVEL_OUTOF10: 9
PAINLEVEL_OUTOF10: 10
PAINLEVEL_OUTOF10: 8

## 2018-09-25 ASSESSMENT — ENCOUNTER SYMPTOMS
SPEECH CHANGE: 0
ROS GI COMMENTS: BM PRIOR TO ARRIVAL, NO FLATUS
FEVER: 0
ABDOMINAL PAIN: 1
SHORTNESS OF BREATH: 0
NAUSEA: 0
VOMITING: 0

## 2018-09-25 ASSESSMENT — LIFESTYLE VARIABLES: EVER_SMOKED: YES

## 2018-09-25 NOTE — DISCHARGE PLANNING
TCN met with patient at bedside to discuss the care teams recommendation for SNF. Patient has been to [facility] and does not want to return to either facility. Patient requested choice be faxed to [facility], [facility], [facility], [facility] and [facility]. Choice faxed to [facility]. Patient also requested to talk to PFA regarding her benefits. PFA emailed. SW aware.

## 2018-09-25 NOTE — DISCHARGE PLANNING
Received Choice form at 5031  Agency/Facility Name: [facility]   Referral sent per Choice form @ [phone]

## 2018-09-25 NOTE — CARE PLAN
Problem: Knowledge Deficit  Goal: Knowledge of disease process/condition, treatment plan, diagnostic tests, and medications will improve  Outcome: PROGRESSING AS EXPECTED    Intervention: Explain information regarding disease process/condition, treatment plan, diagnostic tests, and medications and document in education  Patient and  were asking MD and RN many questions regarding change in plan of care. Patient educated regarding new pain medication plan, NG tube clamp, and mobility requirements.       Problem: Pain Management  Goal: Pain level will decrease to patient's comfort goal  Outcome: PROGRESSING SLOWER THAN EXPECTED    Intervention: Follow pain managment plan developed in collaboration with patient and Interdisciplinary Team  Patient PCA discontinued due to patient stating it is not helping. IV Dilaudid, and PO (NGT) Methadone ordered. Will continue to monitor

## 2018-09-25 NOTE — PROGRESS NOTES
Trauma / Surgical Daily Progress Note    Date of Service  9/25/2018    Chief Complaint  62 y.o. female admitted 9/19/2018 with Small bowel obstruction (HCC)    POD # 3 Ex lap, lysis of adhesions, small bowel resection    Interval Events  Pt seen with Dr. Amanda  Pain remains uncontrolled and unchanged, no N/V, no BM/flatus  Abdomen softer, bowel sounds present, NGT with minimal output  Antibiotic day 6    - Clamp NGT, return to suction for any N/V  - Start oral pain meds via NGT, stop PCA  - Relistor  - Continue mobilization efforts    Review of Systems  Review of Systems   Constitutional: Positive for malaise/fatigue. Negative for fever.   Respiratory: Negative for shortness of breath.    Cardiovascular: Negative for chest pain.   Gastrointestinal: Positive for abdominal pain (uncontrolled and unchanged). Negative for nausea and vomiting.        BM prior to arrival, no flatus   Genitourinary: Negative for dysuria (voiding).   Skin: Negative for rash.   Neurological: Negative for speech change.        Vital Signs  Temp:  [36.4 °C (97.5 °F)-37.1 °C (98.7 °F)] 37.1 °C (98.7 °F)  Pulse:  [63-73] 63  Resp:  [18] 18  BP: (102-114)/(56-72) 108/66    Physical Exam  Physical Exam   Constitutional: She is oriented to person, place, and time. She appears well-developed. Nasal cannula in place.   HENT:   NGT in place   Neck: Neck supple.   Cardiovascular: Normal rate.    Pulmonary/Chest: Effort normal. No respiratory distress.   Abdominal: Soft. She exhibits no distension. There is tenderness. There is no guarding.   Bowel sounds present   Musculoskeletal:   Ambulatory   Neurological: She is alert and oriented to person, place, and time.   Skin: Skin is warm and dry.   Psychiatric: Her affect is angry. She is agitated.   Nursing note and vitals reviewed.      Laboratory  Recent Results (from the past 24 hour(s))   BASIC METABOLIC PANEL    Collection Time: 09/25/18  6:58 AM   Result Value Ref Range    Sodium 143 135 - 145  mmol/L    Potassium 3.9 3.6 - 5.5 mmol/L    Chloride 113 (H) 96 - 112 mmol/L    Co2 20 20 - 33 mmol/L    Glucose 66 65 - 99 mg/dL    Bun 5 (L) 8 - 22 mg/dL    Creatinine 0.58 0.50 - 1.40 mg/dL    Calcium 7.9 (L) 8.5 - 10.5 mg/dL    Anion Gap 10.0 0.0 - 11.9   CBC WITH DIFFERENTIAL    Collection Time: 09/25/18  6:58 AM   Result Value Ref Range    WBC 5.0 4.8 - 10.8 K/uL    RBC 2.94 (L) 4.20 - 5.40 M/uL    Hemoglobin 10.4 (L) 12.0 - 16.0 g/dL    Hematocrit 31.9 (L) 37.0 - 47.0 %    .5 (H) 81.4 - 97.8 fL    MCH 35.4 (H) 27.0 - 33.0 pg    MCHC 32.6 (L) 33.6 - 35.0 g/dL    RDW 54.2 (H) 35.9 - 50.0 fL    Platelet Count 141 (L) 164 - 446 K/uL    MPV 11.3 9.0 - 12.9 fL    Neutrophils-Polys 69.30 44.00 - 72.00 %    Lymphocytes 20.70 (L) 22.00 - 41.00 %    Monocytes 5.60 0.00 - 13.40 %    Eosinophils 3.00 0.00 - 6.90 %    Basophils 0.60 0.00 - 1.80 %    Immature Granulocytes 0.80 0.00 - 0.90 %    Nucleated RBC 0.00 /100 WBC    Neutrophils (Absolute) 3.44 2.00 - 7.15 K/uL    Lymphs (Absolute) 1.03 1.00 - 4.80 K/uL    Monos (Absolute) 0.28 0.00 - 0.85 K/uL    Eos (Absolute) 0.15 0.00 - 0.51 K/uL    Baso (Absolute) 0.03 0.00 - 0.12 K/uL    Immature Granulocytes (abs) 0.04 0.00 - 0.11 K/uL    NRBC (Absolute) 0.00 K/uL   ESTIMATED GFR    Collection Time: 09/25/18  6:58 AM   Result Value Ref Range    GFR If African American >60 >60 mL/min/1.73 m 2    GFR If Non African American >60 >60 mL/min/1.73 m 2       Fluids    Intake/Output Summary (Last 24 hours) at 09/25/18 0917  Last data filed at 09/25/18 0810   Gross per 24 hour   Intake           2633.2 ml   Output              900 ml   Net           1733.2 ml       Core Measures & Quality Metrics  Labs reviewed and Medications reviewed  Parker catheter: No Parker      DVT Prophylaxis: Enoxaparin (Lovenox)  DVT prophylaxis - mechanical: SCDs  Ulcer prophylaxis: Yes  Antibiotics: Treating active infection/contamination beyond 24 hours perioperative coverage      LILIAN Score  ETOH  Screening    Assessment/Plan  Small bowel obstruction (HCC)- (present on admission)   Assessment & Plan    9/22 Ex lap, lysis of adhesions, small bowel resection.  9/25 Clamp NGT.  Doxy/Flagyl day 6.  Houston Amanda DO, Surgery.            Discussed patient condition with RN, , Patient and general surgery. Dr. Amanda

## 2018-09-25 NOTE — CARE PLAN
Problem: Nutritional:  Goal: Achieve adequate nutritional intake  Diet will advance past NPO and patient will consume >50% of meals  Outcome: NOT MET  Pt has been NPO x 6 days

## 2018-09-25 NOTE — PROGRESS NOTES
"Pt A&O x4. Anxious, requires a lot of assistance, calls frequently. PRN Ativan administered per pt request.     Vitals: /65   Pulse 68   Temp 36.6 °C (97.9 °F)   Resp 18   Ht 1.549 m (5' 1\")   Wt 84.1 kg (185 lb 6.5 oz)   LMP 10/02/1980   SpO2 98%   Breastfeeding? No   BMI 35.03 kg/m²     Pt continues to rate ravinder 10 out of 10, despite extensive efforts to control pain. Dilaudid PCA running with basal rate and bolus doses. Basal rate increased today, pt continues to frequently ask for more pain medicine. States, \"I haven't gotten high from anything I've received yet\". Q6 Toradol scheduled. Pt declines heat or ice pack to incision site.     Neuro: SCHWAB. Denies new onset of numbness/ tingling.    Cardiac: Denies new onset of chest pain.    Vascular: Pulses 2+ BUE, BLE. No edema noted.    Respiratory: Lungs sound diminished in bases. Refusing to use IS, states, \"I don't want to do that tonight\". On 2L O2 NC, humidification added.  on, satting in high 90's. Denies SOB.    GI: Abdomen soft, rounded, distended, tender, obese. Absent bowel sounds, - flatus, - BM. NPO, ice chips, pt tolerating ice chips well. - nausea/ vomiting. NG tube in place to low, continuous wall suction, small output noted, thick/ green. Administered air bolus through blue tube, line patent.     : Pt voiding in BSC. Urine remains brown.     MSK: Pt refusing to ambulate halls this evening, pt c/o \"having to get up out of bed to pee\". Refusing SCD's, pt able to readjust self in bed.     Integumentary: Midline abdominal surgical incision island dressing dry, intact, small old/ dried serosanguinous drainage noted, observed, initial dressing. Abdominal binder in place.     Labs noted. Left subclavian triple lumen central line secured in place, patent, dressing CDI, no s/sx of infection noted at this time.     Fall precautions in place: Bed locked in lowest position, Upper bed rails up, treaded socks in place, personal belongings within " reach, call light within reach, appropriate mobility signs in place, - bed alarm, pt not attempting to get OOB.     Pt updated on POC.

## 2018-09-25 NOTE — PROGRESS NOTES
Bedside report received.  Assessment complete.  A&O x 4. Patient calls appropriately.  Patient up with one assist, FWW. Bed alarm on.   Patient has 10/10 pain. Dilaudid PCA in place.  Denies N&V. Tolerating NPO diet.  Surgical midline with dressing is CDI. NG tube to suction in place, output of 250 overnight.   + void 400 overnight, - flatus, hypoactive BS  Patient denies SOB.  SCD's refused.  Patient is complaining of pain consistently, even though PCA basal dose was doubled yesterday. States that the Dilaudid is not working for her. Education provided.  Review plan with of care with patient. Call light and personal belongings with in reach. Hourly rounding in place. All needs met at this time.

## 2018-09-25 NOTE — DIETARY
"Nutrition Services   Pt is on day day 6 of admit.  Marium Renteria is a 62 y.o. female with admitting DX of SBO.   Pt noted to be NPO x 6 days     Assessment:  Height: 154.9 cm (5' 1\")  Weight: 84.1 kg (185 lb 6.5 oz)  Body mass index is 35.03 kg/m².  Diet/Intake: NPO x 6 days     Evaluation:   1. Noted with ex lap, lysis of adhesions and small bowel resection on 9/22  2. NGT in place to intermittent suction   3. Per chart review, pt noted with recent hospitalization (9/12-9/16) for chest pain and hand cellulitis - also noted with a wt of 72.2 kg   4. Noted with recent wt gain - per I/Os pt is noted to be +4.9L   5. Last BM PTA per chart review     Pt at risk for malnutrition related to inadequate nutrition of NPO status x 6 days     Recommendations/Plan:  1. Advance diet past NPO as pt is able per MD.  If pt is unable to have her diet advanced within the next 24 hours, nutrition support is strongly recommended as pt will be NPO x 7 days    2. Once PO intake is in place, please document all PO intake as % taken in ADL's to provide interdisciplinary communication across all shifts.   3. Monitor weight.    RD following              "

## 2018-09-25 NOTE — CARE PLAN
Problem: Safety  Goal: Will remain free from falls  Outcome: PROGRESSING AS EXPECTED    Intervention: Assess risk factors for falls   09/25/18 0537   OTHER   Fall Risk Risk to Fall - 0 - 1 point   Risk for Injury-Any positive answers results in the pt being at high risk for fall related injury Surgery: Thoracic or Abdominal Surgery or Lower Limb Amputation   Mobility Status Assessment 1-1 Healthcare Provider Required for Assistance with Ambulation & Transfer   History of fall 0   Pt Calls for Assistance Yes     Intervention: Implement fall precautions   09/24/18 2000 09/25/18 0537   OTHER   Environmental Precautions Treaded Slipper Socks on Patient;Personal Belongings, Wastebasket, Call Bell etc. in Easy Reach;Report Given to Other Health Care Providers Regarding Fall Risk;Bed in Low Position;Communication Sign for Patients & Families;Mobility Assessed & Appropriate Sign Placed --    IV Pole on Same Side of Bed as Bathroom --  Yes   Bedrails --  Bedrails Closest to Bathroom Down         Problem: Venous Thromboembolism (VTW)/Deep Vein Thrombosis (DVT) Prevention:  Goal: Patient will participate in Venous Thrombosis (VTE)/Deep Vein Thrombosis (DVT)Prevention Measures  Outcome: PROGRESSING AS EXPECTED   09/24/18 2000   OTHER   Risk Assessment Score 3   VTE RISK High   Pharmacologic Prophylaxis Used LMWH: Enoxaparin(Lovenox)   Mechanical/VTE Prophylaxis   Mechanical Prophylaxis  SCDs, Sequential Compression Device   SCDs, Sequential Compression Device Refused

## 2018-09-26 LAB
ANION GAP SERPL CALC-SCNC: 15 MMOL/L (ref 0–11.9)
BASOPHILS # BLD AUTO: 0.9 % (ref 0–1.8)
BASOPHILS # BLD: 0.05 K/UL (ref 0–0.12)
BUN SERPL-MCNC: 3 MG/DL (ref 8–22)
CALCIUM SERPL-MCNC: 7.6 MG/DL (ref 8.5–10.5)
CHLORIDE SERPL-SCNC: 107 MMOL/L (ref 96–112)
CO2 SERPL-SCNC: 19 MMOL/L (ref 20–33)
CREAT SERPL-MCNC: 0.56 MG/DL (ref 0.5–1.4)
EOSINOPHIL # BLD AUTO: 0.18 K/UL (ref 0–0.51)
EOSINOPHIL NFR BLD: 3.1 % (ref 0–6.9)
ERYTHROCYTE [DISTWIDTH] IN BLOOD BY AUTOMATED COUNT: 53.3 FL (ref 35.9–50)
GLUCOSE SERPL-MCNC: 61 MG/DL (ref 65–99)
HCT VFR BLD AUTO: 32.5 % (ref 37–47)
HGB BLD-MCNC: 10.6 G/DL (ref 12–16)
IMM GRANULOCYTES # BLD AUTO: 0.06 K/UL (ref 0–0.11)
IMM GRANULOCYTES NFR BLD AUTO: 1 % (ref 0–0.9)
LYMPHOCYTES # BLD AUTO: 1.02 K/UL (ref 1–4.8)
LYMPHOCYTES NFR BLD: 17.6 % (ref 22–41)
MCH RBC QN AUTO: 34.9 PG (ref 27–33)
MCHC RBC AUTO-ENTMCNC: 32.6 G/DL (ref 33.6–35)
MCV RBC AUTO: 106.9 FL (ref 81.4–97.8)
MONOCYTES # BLD AUTO: 0.29 K/UL (ref 0–0.85)
MONOCYTES NFR BLD AUTO: 5 % (ref 0–13.4)
NEUTROPHILS # BLD AUTO: 4.19 K/UL (ref 2–7.15)
NEUTROPHILS NFR BLD: 72.4 % (ref 44–72)
NRBC # BLD AUTO: 0 K/UL
NRBC BLD-RTO: 0 /100 WBC
PLATELET # BLD AUTO: 153 K/UL (ref 164–446)
PMV BLD AUTO: 11.4 FL (ref 9–12.9)
POTASSIUM SERPL-SCNC: 3.4 MMOL/L (ref 3.6–5.5)
RBC # BLD AUTO: 3.04 M/UL (ref 4.2–5.4)
SODIUM SERPL-SCNC: 141 MMOL/L (ref 135–145)
WBC # BLD AUTO: 5.8 K/UL (ref 4.8–10.8)

## 2018-09-26 PROCEDURE — 97530 THERAPEUTIC ACTIVITIES: CPT

## 2018-09-26 PROCEDURE — 700111 HCHG RX REV CODE 636 W/ 250 OVERRIDE (IP): Performed by: SURGERY

## 2018-09-26 PROCEDURE — 85025 COMPLETE CBC W/AUTO DIFF WBC: CPT

## 2018-09-26 PROCEDURE — A9270 NON-COVERED ITEM OR SERVICE: HCPCS | Performed by: HOSPITALIST

## 2018-09-26 PROCEDURE — 700101 HCHG RX REV CODE 250: Performed by: INTERNAL MEDICINE

## 2018-09-26 PROCEDURE — 97535 SELF CARE MNGMENT TRAINING: CPT

## 2018-09-26 PROCEDURE — C9113 INJ PANTOPRAZOLE SODIUM, VIA: HCPCS | Performed by: HOSPITALIST

## 2018-09-26 PROCEDURE — 700101 HCHG RX REV CODE 250: Performed by: NURSE PRACTITIONER

## 2018-09-26 PROCEDURE — 770001 HCHG ROOM/CARE - MED/SURG/GYN PRIV*

## 2018-09-26 PROCEDURE — 700111 HCHG RX REV CODE 636 W/ 250 OVERRIDE (IP): Performed by: HOSPITALIST

## 2018-09-26 PROCEDURE — 80048 BASIC METABOLIC PNL TOTAL CA: CPT

## 2018-09-26 PROCEDURE — 700102 HCHG RX REV CODE 250 W/ 637 OVERRIDE(OP): Performed by: SURGERY

## 2018-09-26 PROCEDURE — 700111 HCHG RX REV CODE 636 W/ 250 OVERRIDE (IP): Performed by: NURSE PRACTITIONER

## 2018-09-26 PROCEDURE — 700102 HCHG RX REV CODE 250 W/ 637 OVERRIDE(OP): Performed by: HOSPITALIST

## 2018-09-26 PROCEDURE — A9270 NON-COVERED ITEM OR SERVICE: HCPCS | Performed by: SURGERY

## 2018-09-26 RX ORDER — PANTOPRAZOLE SODIUM 40 MG/10ML
40 INJECTION, POWDER, LYOPHILIZED, FOR SOLUTION INTRAVENOUS DAILY
Status: DISCONTINUED | OUTPATIENT
Start: 2018-09-27 | End: 2018-09-28

## 2018-09-26 RX ORDER — POTASSIUM CHLORIDE 7.45 MG/ML
10 INJECTION INTRAVENOUS
Status: COMPLETED | OUTPATIENT
Start: 2018-09-26 | End: 2018-09-26

## 2018-09-26 RX ORDER — SODIUM CHLORIDE 9 MG/ML
INJECTION, SOLUTION INTRAVENOUS
Status: COMPLETED
Start: 2018-09-26 | End: 2018-09-26

## 2018-09-26 RX ADMIN — METHADONE HYDROCHLORIDE 10 MG: 5 SOLUTION ORAL at 13:30

## 2018-09-26 RX ADMIN — HYDROMORPHONE HYDROCHLORIDE 2 MG: 2 INJECTION INTRAMUSCULAR; INTRAVENOUS; SUBCUTANEOUS at 15:37

## 2018-09-26 RX ADMIN — POTASSIUM CHLORIDE 10 MEQ: 10 INJECTION, SOLUTION INTRAVENOUS at 12:33

## 2018-09-26 RX ADMIN — LEVOTHYROXINE SODIUM ANHYDROUS 50 MCG: 100 INJECTION, POWDER, LYOPHILIZED, FOR SOLUTION INTRAVENOUS at 06:16

## 2018-09-26 RX ADMIN — ENOXAPARIN SODIUM 40 MG: 100 INJECTION SUBCUTANEOUS at 06:17

## 2018-09-26 RX ADMIN — PANTOPRAZOLE SODIUM 40 MG: 40 INJECTION, POWDER, LYOPHILIZED, FOR SOLUTION INTRAVENOUS at 06:17

## 2018-09-26 RX ADMIN — HYDROMORPHONE HYDROCHLORIDE 2 MG: 2 INJECTION INTRAMUSCULAR; INTRAVENOUS; SUBCUTANEOUS at 03:29

## 2018-09-26 RX ADMIN — METHADONE HYDROCHLORIDE 10 MG: 5 SOLUTION ORAL at 06:17

## 2018-09-26 RX ADMIN — HYDROMORPHONE HYDROCHLORIDE 2 MG: 2 INJECTION INTRAMUSCULAR; INTRAVENOUS; SUBCUTANEOUS at 06:32

## 2018-09-26 RX ADMIN — HYDROMORPHONE HYDROCHLORIDE 2 MG: 2 INJECTION INTRAMUSCULAR; INTRAVENOUS; SUBCUTANEOUS at 18:28

## 2018-09-26 RX ADMIN — POTASSIUM CHLORIDE 10 MEQ: 10 INJECTION, SOLUTION INTRAVENOUS at 16:21

## 2018-09-26 RX ADMIN — POTASSIUM CHLORIDE 10 MEQ: 10 INJECTION, SOLUTION INTRAVENOUS at 14:49

## 2018-09-26 RX ADMIN — HYDROMORPHONE HYDROCHLORIDE 1 MG: 2 INJECTION INTRAMUSCULAR; INTRAVENOUS; SUBCUTANEOUS at 21:41

## 2018-09-26 RX ADMIN — HYDROMORPHONE HYDROCHLORIDE 2 MG: 2 INJECTION INTRAMUSCULAR; INTRAVENOUS; SUBCUTANEOUS at 12:32

## 2018-09-26 RX ADMIN — METHADONE HYDROCHLORIDE 10 MG: 5 SOLUTION ORAL at 21:35

## 2018-09-26 RX ADMIN — NICOTINE 21 MG: 21 PATCH, EXTENDED RELEASE TRANSDERMAL at 06:17

## 2018-09-26 RX ADMIN — LORAZEPAM 1 MG: 2 INJECTION INTRAMUSCULAR; INTRAVENOUS at 23:35

## 2018-09-26 RX ADMIN — HYDROMORPHONE HYDROCHLORIDE 2 MG: 2 INJECTION INTRAMUSCULAR; INTRAVENOUS; SUBCUTANEOUS at 09:34

## 2018-09-26 RX ADMIN — POTASSIUM CHLORIDE 10 MEQ: 10 INJECTION, SOLUTION INTRAVENOUS at 13:31

## 2018-09-26 RX ADMIN — POTASSIUM CHLORIDE AND SODIUM CHLORIDE: 900; 300 INJECTION, SOLUTION INTRAVENOUS at 06:16

## 2018-09-26 ASSESSMENT — GAIT ASSESSMENTS
DEVIATION: ANTALGIC;DECREASED BASE OF SUPPORT;DECREASED HEEL STRIKE;DECREASED TOE OFF;OTHER (COMMENT)
ASSISTIVE DEVICE: FRONT WHEEL WALKER
GAIT LEVEL OF ASSIST: CONTACT GUARD ASSIST
DISTANCE (FEET): 20

## 2018-09-26 ASSESSMENT — COGNITIVE AND FUNCTIONAL STATUS - GENERAL
CLIMB 3 TO 5 STEPS WITH RAILING: A LITTLE
WALKING IN HOSPITAL ROOM: A LITTLE
TOILETING: A LITTLE
HELP NEEDED FOR BATHING: A LOT
MOVING TO AND FROM BED TO CHAIR: A LOT
MOVING FROM LYING ON BACK TO SITTING ON SIDE OF FLAT BED: A LOT
DRESSING REGULAR LOWER BODY CLOTHING: A LITTLE
DAILY ACTIVITIY SCORE: 19
TURNING FROM BACK TO SIDE WHILE IN FLAT BAD: A LOT
MOBILITY SCORE: 15
STANDING UP FROM CHAIR USING ARMS: A LITTLE
SUGGESTED CMS G CODE MODIFIER DAILY ACTIVITY: CK
DRESSING REGULAR UPPER BODY CLOTHING: A LITTLE
SUGGESTED CMS G CODE MODIFIER MOBILITY: CK

## 2018-09-26 ASSESSMENT — PAIN SCALES - GENERAL
PAINLEVEL_OUTOF10: 9
PAINLEVEL_OUTOF10: 10
PAINLEVEL_OUTOF10: 8
PAINLEVEL_OUTOF10: 9

## 2018-09-26 NOTE — DISCHARGE PLANNING
Agency/Facility Name: [facility]   Spoke To: Albin   Outcome: declined : non contracted insurance     Agency/Facility Name: [facility]   Spoke To: D[facility]   Outcome: Dacia needs an update on patients IV stop date.

## 2018-09-26 NOTE — THERAPY
"Occupational Therapy Treatment completed with focus on ADLs, patient education and caregiver training.  Functional Status:  Pt was seen for Occupational Therapy treatment today, see Therapy Kardex for details. Treatment included education in breath control with activity and at rest, self pacing techs and energy conservation for pain management. Educated pt in safety awareness techs as well. Psychosocial intervention addressed. Pt's  in room answering all questions for pt. Pt became agitated  with . Pt's  was asked kindly to allow his wife to answer questions about her own routine.Pt refused to get OOB or EOB for any self care tasks this OT session stating, \"I'm too exhausted today;' 'It's been a long day already and I didn't sleep at all last night\".  Pt agreed to attempt self caret asks long sitting in bed. Pt c/o increased abdominal pain prior to activity. RN informed. Pt demonstrated SBA for UB dressing, Min/Mod A for LB dressing long sitting in bed with use of AE. Pt stated she will not wear socks or pants at home wearing \"Moo \"Moo's and slippers\". Did educate pt in how to don clothing for when she goes out to MD appointments. Requested pt demo a BSC transfer.Pt declined adamantly. \"I'm in too much pain\". Pt agreed.discussed seated shower tech and DME needs. Pt stated she \"has a chair that does not fully fit in shower/tub combo but \"we make it work\". Safety?? Pt was left up in bed, call light in reach and nursing is aware. Family training is on going. Pt stated she \"will need help with odd jobs around the home which  does not do\".  Pt needed to be re-directed back to task at hand occasionally, easily distracted. Pt seen mostly for education this OT session. RN updated on OT treatment findings and recommendations.Continue Occupational Therapy services as per plan.    Plan of Care: Will benefit from Occupational Therapy 3 times per week  Discharge Recommendations:  Equipment Will " "Continue to Assess for Equipment Needs. Post-acute therapy Discharge to a transitional care facility for continued skilled therapy services.    See \"Rehab Therapy-Acute\" Patient Summary Report for complete documentation.   "

## 2018-09-26 NOTE — CARE PLAN
Problem: Nutritional:  Goal: Achieve adequate nutritional intake  Diet will advance past NPO and patient will consume >50% of meals   Outcome: NOT MET  Pt has not received adequate nutrition x 7 days     Intervention: Initiate nutrition support  Nutrition support is strongly recommended as pt has not received adequate nutrition x 7 days   Intervention: Collaborate with transitional care team and interdisciplinary team to meet patient's needs  RD following

## 2018-09-26 NOTE — PROGRESS NOTES
"/84   Pulse 68   Temp 36 °C (96.8 °F)   Resp 18   Ht 1.549 m (5' 1\")   Wt 84.1 kg (185 lb 6.5 oz)   LMP 10/02/1980   SpO2 93%   Breastfeeding? No   BMI 35.03 kg/m²     Patient is A&Ox4   Reports pain to abdomen, medicated per MAR   SCHWAB, CMS intact, reports baseline numbness and tingling on BLE.   Mobilizes with x1 assist, educated to call for assistance, bed alarm on.   On RA, denies SOB, chest pain.   Hypoactive BS x 4. Remains NPO, tolerating ice chips. Negative flatus, negative BM.   Midline abdominal incision intact, abdominal binder in place.   Central line in place over left chest, dressing current and intact.   PIV over RFA SL, patent.   Updated on POC. Belongings and call light within reach. All needs met at this time.   "

## 2018-09-26 NOTE — THERAPY
"Physical Therapy Treatment completed.   Bed Mobility:  Supine to Sit: Minimal Assist  Transfers: Sit to Stand: Stand by Assist  Gait: Level Of Assist: Contact Guard Assist with Front-Wheel Walker       Plan of Care: Will benefit from Physical Therapy 3 times per week  Discharge Recommendations: Equipment: Will Continue to Assess for Equipment Needs and BSC and 4WW to allow seated rest breaks (pt reports her 4WW in broken); defer to OT recs as well; see below    Pt was able to demonstrate short distance ambulation with FWW with CGA with no desi LOB. She was primarily limited 2' to current pain though does generally demonstrate improving functional strength, endurance and mobility. She would optimallly benefit from continued skilled PT after dc from acute setting. If pt has appropriate social supports for IADl and light ADls she would be functionally capable of dc to home with home PT services for recovery. If social supports unavailable or unwilling to provide assist, she would be best served with continued skilled PT/placement prior to medical dc to home to reduce caregiver burden and promote functional progression. Would also defer to OT recs  with regards to dc planning. This PT spoke with SW regarding dc planning and concerns. Will continue to visit.     See \"Rehab Therapy-Acute\" Patient Summary Report for complete documentation.       "

## 2018-09-26 NOTE — CARE PLAN
Problem: Safety  Goal: Will remain free from injury  Outcome: PROGRESSING AS EXPECTED  Safety precautions in place. Bed in locked/low position. 2 side rails up. Treaded socks. BA on. Call light in reach, calls appropriately. Hourly rounding practiced.    Problem: Pain Management  Goal: Pain level will decrease to patient's comfort goal  Outcome: PROGRESSING AS EXPECTED  Pain managed with PRN Dilaudid and scheduled methadone. Pt reporting adequate pain control at this time.

## 2018-09-26 NOTE — PROGRESS NOTES
Bedside report received.  Assessment complete.  A&O x 4. Patient calls appropriately.  Patient up with one assist. Bed alarm on.   Patient has 9-10/10 pain. Dilaudid IV, Methadone scheduled given  Denies N&V. Tolerating NPO diet.  Surgical midline with dressing is CDI, abd binder in place. NG tube clamped.  + void, - flatus, BS normoactive to hyperactive  Patient denies SOB.  SCD's refused.  Patient still complaining about pain, but states it is better than it has been. Will continue to monitor  Review plan with of care with patient. Call light and personal belongings with in reach. Hourly rounding in place. All needs met at this time.

## 2018-09-26 NOTE — PROGRESS NOTES
"Surgery    Less pain  No nausea  NGT only 125/24hrs  C/o HA     /83   Pulse 68   Temp 36.1 °C (97 °F)   Resp 16   Ht 1.549 m (5' 1\")   Wt 84.1 kg (185 lb 6.5 oz)   LMP 10/02/1980   SpO2 91%   Breastfeeding? No   BMI 35.03 kg/m²     NAD  ABd soft, min tender   Inc CDI  +BS    A/P  Improving post op ileus  DC NGT  NPO except ice chips  AMbulate  IS  Replace K  "

## 2018-09-26 NOTE — DISCHARGE PLANNING
Agency/Facility Name: [facility]  Outcome: Patient declined due to non contracted insurance provider

## 2018-09-27 PROBLEM — R53.81 DEBILITY: Status: ACTIVE | Noted: 2018-09-27

## 2018-09-27 PROBLEM — K56.7 ILEUS (HCC): Status: ACTIVE | Noted: 2018-09-27

## 2018-09-27 LAB
ANION GAP SERPL CALC-SCNC: 17 MMOL/L (ref 0–11.9)
BASOPHILS # BLD AUTO: 0.7 % (ref 0–1.8)
BASOPHILS # BLD: 0.05 K/UL (ref 0–0.12)
BUN SERPL-MCNC: <3 MG/DL (ref 8–22)
CALCIUM SERPL-MCNC: 8 MG/DL (ref 8.5–10.5)
CHLORIDE SERPL-SCNC: 100 MMOL/L (ref 96–112)
CO2 SERPL-SCNC: 23 MMOL/L (ref 20–33)
CREAT SERPL-MCNC: 0.55 MG/DL (ref 0.5–1.4)
EOSINOPHIL # BLD AUTO: 0.17 K/UL (ref 0–0.51)
EOSINOPHIL NFR BLD: 2.5 % (ref 0–6.9)
ERYTHROCYTE [DISTWIDTH] IN BLOOD BY AUTOMATED COUNT: 49.9 FL (ref 35.9–50)
GLUCOSE SERPL-MCNC: 67 MG/DL (ref 65–99)
HCT VFR BLD AUTO: 34.7 % (ref 37–47)
HGB BLD-MCNC: 12 G/DL (ref 12–16)
IMM GRANULOCYTES # BLD AUTO: 0.1 K/UL (ref 0–0.11)
IMM GRANULOCYTES NFR BLD AUTO: 1.5 % (ref 0–0.9)
LYMPHOCYTES # BLD AUTO: 1.18 K/UL (ref 1–4.8)
LYMPHOCYTES NFR BLD: 17.3 % (ref 22–41)
MAGNESIUM SERPL-MCNC: 0.9 MG/DL (ref 1.5–2.5)
MCH RBC QN AUTO: 35.6 PG (ref 27–33)
MCHC RBC AUTO-ENTMCNC: 34.6 G/DL (ref 33.6–35)
MCV RBC AUTO: 103 FL (ref 81.4–97.8)
MONOCYTES # BLD AUTO: 0.46 K/UL (ref 0–0.85)
MONOCYTES NFR BLD AUTO: 6.7 % (ref 0–13.4)
NEUTROPHILS # BLD AUTO: 4.88 K/UL (ref 2–7.15)
NEUTROPHILS NFR BLD: 71.3 % (ref 44–72)
NRBC # BLD AUTO: 0 K/UL
NRBC BLD-RTO: 0 /100 WBC
PLATELET # BLD AUTO: 190 K/UL (ref 164–446)
PMV BLD AUTO: 11.3 FL (ref 9–12.9)
POTASSIUM SERPL-SCNC: 3.3 MMOL/L (ref 3.6–5.5)
RBC # BLD AUTO: 3.37 M/UL (ref 4.2–5.4)
SODIUM SERPL-SCNC: 140 MMOL/L (ref 135–145)
WBC # BLD AUTO: 6.8 K/UL (ref 4.8–10.8)

## 2018-09-27 PROCEDURE — 700111 HCHG RX REV CODE 636 W/ 250 OVERRIDE (IP): Performed by: NURSE PRACTITIONER

## 2018-09-27 PROCEDURE — 83735 ASSAY OF MAGNESIUM: CPT

## 2018-09-27 PROCEDURE — C9113 INJ PANTOPRAZOLE SODIUM, VIA: HCPCS | Performed by: SURGERY

## 2018-09-27 PROCEDURE — A9270 NON-COVERED ITEM OR SERVICE: HCPCS | Performed by: HOSPITALIST

## 2018-09-27 PROCEDURE — A9270 NON-COVERED ITEM OR SERVICE: HCPCS | Performed by: SURGERY

## 2018-09-27 PROCEDURE — 700111 HCHG RX REV CODE 636 W/ 250 OVERRIDE (IP): Performed by: SURGERY

## 2018-09-27 PROCEDURE — 700101 HCHG RX REV CODE 250: Performed by: INTERNAL MEDICINE

## 2018-09-27 PROCEDURE — 700102 HCHG RX REV CODE 250 W/ 637 OVERRIDE(OP): Performed by: HOSPITALIST

## 2018-09-27 PROCEDURE — 770001 HCHG ROOM/CARE - MED/SURG/GYN PRIV*

## 2018-09-27 PROCEDURE — 700102 HCHG RX REV CODE 250 W/ 637 OVERRIDE(OP): Performed by: SURGERY

## 2018-09-27 PROCEDURE — 80048 BASIC METABOLIC PNL TOTAL CA: CPT

## 2018-09-27 PROCEDURE — 85025 COMPLETE CBC W/AUTO DIFF WBC: CPT

## 2018-09-27 RX ORDER — POTASSIUM CHLORIDE 7.45 MG/ML
10 INJECTION INTRAVENOUS
Status: COMPLETED | OUTPATIENT
Start: 2018-09-27 | End: 2018-09-27

## 2018-09-27 RX ORDER — MAGNESIUM SULFATE HEPTAHYDRATE 40 MG/ML
4 INJECTION, SOLUTION INTRAVENOUS ONCE
Status: COMPLETED | OUTPATIENT
Start: 2018-09-27 | End: 2018-09-27

## 2018-09-27 RX ADMIN — METHADONE HYDROCHLORIDE 10 MG: 5 SOLUTION ORAL at 06:41

## 2018-09-27 RX ADMIN — HYDROMORPHONE HYDROCHLORIDE 2 MG: 2 INJECTION INTRAMUSCULAR; INTRAVENOUS; SUBCUTANEOUS at 08:46

## 2018-09-27 RX ADMIN — NICOTINE 21 MG: 21 PATCH, EXTENDED RELEASE TRANSDERMAL at 05:11

## 2018-09-27 RX ADMIN — POTASSIUM CHLORIDE 10 MEQ: 7.46 INJECTION, SOLUTION INTRAVENOUS at 10:29

## 2018-09-27 RX ADMIN — PANTOPRAZOLE SODIUM 40 MG: 40 INJECTION, POWDER, LYOPHILIZED, FOR SOLUTION INTRAVENOUS at 05:10

## 2018-09-27 RX ADMIN — HYDROMORPHONE HYDROCHLORIDE 2 MG: 2 INJECTION INTRAMUSCULAR; INTRAVENOUS; SUBCUTANEOUS at 13:04

## 2018-09-27 RX ADMIN — MAGNESIUM SULFATE IN WATER 4 G: 40 INJECTION, SOLUTION INTRAVENOUS at 13:04

## 2018-09-27 RX ADMIN — LORAZEPAM 1 MG: 2 INJECTION INTRAMUSCULAR; INTRAVENOUS at 23:14

## 2018-09-27 RX ADMIN — HYDROMORPHONE HYDROCHLORIDE 2 MG: 2 INJECTION INTRAMUSCULAR; INTRAVENOUS; SUBCUTANEOUS at 05:07

## 2018-09-27 RX ADMIN — METHADONE HYDROCHLORIDE 10 MG: 5 SOLUTION ORAL at 21:32

## 2018-09-27 RX ADMIN — HYDROMORPHONE HYDROCHLORIDE 2 MG: 2 INJECTION INTRAMUSCULAR; INTRAVENOUS; SUBCUTANEOUS at 19:30

## 2018-09-27 RX ADMIN — ENOXAPARIN SODIUM 40 MG: 100 INJECTION SUBCUTANEOUS at 05:12

## 2018-09-27 RX ADMIN — LEVOTHYROXINE SODIUM ANHYDROUS 50 MCG: 100 INJECTION, POWDER, LYOPHILIZED, FOR SOLUTION INTRAVENOUS at 06:39

## 2018-09-27 RX ADMIN — HYDROMORPHONE HYDROCHLORIDE 2 MG: 2 INJECTION INTRAMUSCULAR; INTRAVENOUS; SUBCUTANEOUS at 02:56

## 2018-09-27 RX ADMIN — POTASSIUM CHLORIDE 10 MEQ: 7.46 INJECTION, SOLUTION INTRAVENOUS at 08:27

## 2018-09-27 RX ADMIN — HYDROMORPHONE HYDROCHLORIDE 2 MG: 2 INJECTION INTRAMUSCULAR; INTRAVENOUS; SUBCUTANEOUS at 22:48

## 2018-09-27 RX ADMIN — HYDROMORPHONE HYDROCHLORIDE 2 MG: 2 INJECTION INTRAMUSCULAR; INTRAVENOUS; SUBCUTANEOUS at 16:00

## 2018-09-27 RX ADMIN — METHADONE HYDROCHLORIDE 10 MG: 5 SOLUTION ORAL at 14:16

## 2018-09-27 ASSESSMENT — ENCOUNTER SYMPTOMS
VOMITING: 0
ABDOMINAL PAIN: 1
MYALGIAS: 1
NAUSEA: 0
FEVER: 0
CHILLS: 0
SHORTNESS OF BREATH: 0

## 2018-09-27 ASSESSMENT — PAIN SCALES - GENERAL
PAINLEVEL_OUTOF10: 10
PAINLEVEL_OUTOF10: 9
PAINLEVEL_OUTOF10: 10
PAINLEVEL_OUTOF10: 9
PAINLEVEL_OUTOF10: 10
PAINLEVEL_OUTOF10: 9
PAINLEVEL_OUTOF10: 10

## 2018-09-27 NOTE — CARE PLAN
Problem: Bowel/Gastric:  Goal: Normal bowel function is maintained or improved  Outcome: PROGRESSING AS EXPECTED  Patient advanced to clear liquid diet.  Patient tolerating without nausea or vomiting.      Problem: Mobility  Goal: Risk for activity intolerance will decrease  Outcome: PROGRESSING AS EXPECTED  Patient up to chair with 1 assist.  Ambulate with FWW, tolerating well.

## 2018-09-27 NOTE — CARE PLAN
Problem: Safety  Goal: Will remain free from falls  Outcome: PROGRESSING AS EXPECTED  Bed alarm in place and on. Call light within reach.    Problem: Pain Management  Goal: Pain level will decrease to patient's comfort goal  Patient medicated for pain as ordered.

## 2018-09-27 NOTE — ASSESSMENT & PLAN NOTE
9/27 Supplement 4 grams IV magnesium    9/28 Supplement 4 grams IV magnesium  9/30 Supplement 4 grams IV magnesium. Start oral magnesium   10/1 Supplement 2 grams IV magnesium   10/2 Supplement 4 grams IV magnesium  10/3 Supplement 2 grams IV magnesium  10/5 Trend down, supplement 2 grams and continue oral replacement  10/7 Supplement 2 grams IV magnesium  Continue supplementation

## 2018-09-27 NOTE — ASSESSMENT & PLAN NOTE
9/27 Supplement 20 mEq IV potassium   9/28 Supplement 40 mEq IV potassium  9/29 Supplement 40 mEq IV potassium    - Follow up potassium 3.1   - Potassium 40 mEq BID    10/1 Trend up. Continue oral supplementation. Decrease potassium supplementation to 20 mEq BID  10/2 Trend down, increase oral potassium supplementation to 20 mEq TID  10/7 Stable, continue oral supplementation

## 2018-09-27 NOTE — PROGRESS NOTES
Keyla from Lab called with critical result of Magnesium of 0.9 at 1045. Critical lab result read back to Keyla.   Reggie Esparza notified of critical lab result at 1050.  Critical lab result read back by Reggie Esparza.

## 2018-09-27 NOTE — PROGRESS NOTES
Trauma / Surgical Daily Progress Note    Date of Service  9/27/2018    Chief Complaint  62 y.o. female admitted 9/19/2018 with Small bowel obstruction (HCC)    Interval Events    No critical events overnight   Post operative ileus   BM prior to arrival. (-) Flatus (+) bowel sounds.   No nausea/vimoting   Hypokalemia     - Await return if bowel function  - Will discuss bowel promotors with attending   - Check magnesium. IV potassium supplementation  - Anticipate need for post acute services. Skilled Referrals  - Counseled     Review of Systems  Review of Systems   Constitutional: Negative for chills and fever.   Respiratory: Negative for shortness of breath.    Cardiovascular: Negative for chest pain.   Gastrointestinal: Positive for abdominal pain. Negative for nausea and vomiting.        BM prior to arrival  No flatus   Musculoskeletal: Positive for myalgias.        Vital Signs  Temp:  [36.1 °C (97 °F)-36.9 °C (98.5 °F)] 36.9 °C (98.5 °F)  Pulse:  [65-71] 65  Resp:  [16-17] 17  BP: (131-142)/(65-85) 142/85    Physical Exam  Physical Exam   Constitutional: No distress. Nasal cannula in place.   HENT:   Head: Normocephalic.   Eyes: Conjunctivae are normal.   Cardiovascular: Normal rate and regular rhythm.    Pulmonary/Chest: No respiratory distress. She exhibits no tenderness.   Abdominal: There is tenderness. There is no rebound and no guarding.   Midline incision approximated with staples. No drainage.   (+) bowel sounds   Musculoskeletal:   Moves all extremities    Neurological: She is alert.   Skin: Skin is warm and dry.   Nursing note and vitals reviewed.      Laboratory  Recent Results (from the past 24 hour(s))   BASIC METABOLIC PANEL    Collection Time: 09/27/18  4:21 AM   Result Value Ref Range    Sodium 140 135 - 145 mmol/L    Potassium 3.3 (L) 3.6 - 5.5 mmol/L    Chloride 100 96 - 112 mmol/L    Co2 23 20 - 33 mmol/L    Glucose 67 65 - 99 mg/dL    Bun <3 (L) 8 - 22 mg/dL    Creatinine 0.55 0.50 - 1.40 mg/dL     Calcium 8.0 (L) 8.5 - 10.5 mg/dL    Anion Gap 17.0 (H) 0.0 - 11.9   CBC WITH DIFFERENTIAL    Collection Time: 09/27/18  4:21 AM   Result Value Ref Range    WBC 6.8 4.8 - 10.8 K/uL    RBC 3.37 (L) 4.20 - 5.40 M/uL    Hemoglobin 12.0 12.0 - 16.0 g/dL    Hematocrit 34.7 (L) 37.0 - 47.0 %    .0 (H) 81.4 - 97.8 fL    MCH 35.6 (H) 27.0 - 33.0 pg    MCHC 34.6 33.6 - 35.0 g/dL    RDW 49.9 35.9 - 50.0 fL    Platelet Count 190 164 - 446 K/uL    MPV 11.3 9.0 - 12.9 fL    Neutrophils-Polys 71.30 44.00 - 72.00 %    Lymphocytes 17.30 (L) 22.00 - 41.00 %    Monocytes 6.70 0.00 - 13.40 %    Eosinophils 2.50 0.00 - 6.90 %    Basophils 0.70 0.00 - 1.80 %    Immature Granulocytes 1.50 (H) 0.00 - 0.90 %    Nucleated RBC 0.00 /100 WBC    Neutrophils (Absolute) 4.88 2.00 - 7.15 K/uL    Lymphs (Absolute) 1.18 1.00 - 4.80 K/uL    Monos (Absolute) 0.46 0.00 - 0.85 K/uL    Eos (Absolute) 0.17 0.00 - 0.51 K/uL    Baso (Absolute) 0.05 0.00 - 0.12 K/uL    Immature Granulocytes (abs) 0.10 0.00 - 0.11 K/uL    NRBC (Absolute) 0.00 K/uL   ESTIMATED GFR    Collection Time: 09/27/18  4:21 AM   Result Value Ref Range    GFR If African American >60 >60 mL/min/1.73 m 2    GFR If Non African American >60 >60 mL/min/1.73 m 2       Fluids    Intake/Output Summary (Last 24 hours) at 09/27/18 0730  Last data filed at 09/27/18 0400   Gross per 24 hour   Intake             1300 ml   Output             1750 ml   Net             -450 ml       Core Measures & Quality Metrics  Labs reviewed and Medications reviewed  Parker catheter: No Parker  Central line in place: Need for access    DVT Prophylaxis: Enoxaparin (Lovenox)  DVT prophylaxis - mechanical: SCDs  Ulcer prophylaxis: Yes    Assessed for rehab: Patient was assess for and/or received rehabilitation services during this hospitalization    Total Score: 0    ETOH Screening    Assessment/Plan  Hypokalemia- (present on admission)   Assessment & Plan    9/27 Check Magnesium. Potassium supplemention         Small bowel obstruction (HCC)- (present on admission)   Assessment & Plan    9/22 Ex lap, lysis of adhesions, small bowel resection.  9/25 7 day course of antibiotic therapy completed. NG tube clamped  9/26 Interval removal of NG tube.  Houston Amanda DO, Surgery.            Discussed patient condition with Patient and trauma surgery, Dr. Houston Amanda.

## 2018-09-27 NOTE — PROGRESS NOTES
Received report from evening RN.  Assumed care of patient.  Patient A&Ox4.  C/O pain in abdomen 10/10, medicated per MAR.  No complaints of nausea or vomiting.  Midline abdominal incision with dressing in place.  Abdominal binder secured.  Up with 1 assist and FWW.  Patient +void.  No flatus, no BM.  Baseline numbness/tingling on left side of body.  Bed alarm in place and on.  Call light within reach.  Bed in lowest position.

## 2018-09-27 NOTE — DISCHARGE PLANNING
Agency/Facility Name: [facility]  Spoke To: MESHA on VM  Outcome: Awaiting call back    Agency/Facility Name: [facility]  Spoke To: Dacia  Outcome: Wondering IV stop date.    Agency/Facility Name: [facility]  Spoke To: MESHA on VM   Outcome: Awaiting call back    Agency/Facility Name: [facility]  Spoke To: Iqra  Outcome: Need to know stop date of IV antibiotics, patient may not smoke there and need an JUANITA since not contracted with patients insurance.    KRUNAL salomon

## 2018-09-28 LAB
ANION GAP SERPL CALC-SCNC: 15 MMOL/L (ref 0–11.9)
BASOPHILS # BLD AUTO: 1 % (ref 0–1.8)
BASOPHILS # BLD: 0.06 K/UL (ref 0–0.12)
BUN SERPL-MCNC: <3 MG/DL (ref 8–22)
CALCIUM SERPL-MCNC: 7.3 MG/DL (ref 8.5–10.5)
CHLORIDE SERPL-SCNC: 100 MMOL/L (ref 96–112)
CO2 SERPL-SCNC: 26 MMOL/L (ref 20–33)
CREAT SERPL-MCNC: 0.48 MG/DL (ref 0.5–1.4)
EOSINOPHIL # BLD AUTO: 0.17 K/UL (ref 0–0.51)
EOSINOPHIL NFR BLD: 2.8 % (ref 0–6.9)
ERYTHROCYTE [DISTWIDTH] IN BLOOD BY AUTOMATED COUNT: 48.4 FL (ref 35.9–50)
GLUCOSE SERPL-MCNC: 77 MG/DL (ref 65–99)
HCT VFR BLD AUTO: 31.5 % (ref 37–47)
HGB BLD-MCNC: 11.2 G/DL (ref 12–16)
IMM GRANULOCYTES # BLD AUTO: 0.18 K/UL (ref 0–0.11)
IMM GRANULOCYTES NFR BLD AUTO: 3 % (ref 0–0.9)
LYMPHOCYTES # BLD AUTO: 1.15 K/UL (ref 1–4.8)
LYMPHOCYTES NFR BLD: 18.9 % (ref 22–41)
MAGNESIUM SERPL-MCNC: 1.5 MG/DL (ref 1.5–2.5)
MCH RBC QN AUTO: 36 PG (ref 27–33)
MCHC RBC AUTO-ENTMCNC: 35.6 G/DL (ref 33.6–35)
MCV RBC AUTO: 101.3 FL (ref 81.4–97.8)
MONOCYTES # BLD AUTO: 0.55 K/UL (ref 0–0.85)
MONOCYTES NFR BLD AUTO: 9 % (ref 0–13.4)
NEUTROPHILS # BLD AUTO: 3.98 K/UL (ref 2–7.15)
NEUTROPHILS NFR BLD: 65.3 % (ref 44–72)
NRBC # BLD AUTO: 0 K/UL
NRBC BLD-RTO: 0 /100 WBC
PLATELET # BLD AUTO: 172 K/UL (ref 164–446)
PMV BLD AUTO: 11.3 FL (ref 9–12.9)
POTASSIUM SERPL-SCNC: 2.9 MMOL/L (ref 3.6–5.5)
RBC # BLD AUTO: 3.11 M/UL (ref 4.2–5.4)
SODIUM SERPL-SCNC: 141 MMOL/L (ref 135–145)
WBC # BLD AUTO: 6.1 K/UL (ref 4.8–10.8)

## 2018-09-28 PROCEDURE — 80048 BASIC METABOLIC PNL TOTAL CA: CPT

## 2018-09-28 PROCEDURE — A9270 NON-COVERED ITEM OR SERVICE: HCPCS | Performed by: SURGERY

## 2018-09-28 PROCEDURE — 770001 HCHG ROOM/CARE - MED/SURG/GYN PRIV*

## 2018-09-28 PROCEDURE — 700101 HCHG RX REV CODE 250: Performed by: INTERNAL MEDICINE

## 2018-09-28 PROCEDURE — 700102 HCHG RX REV CODE 250 W/ 637 OVERRIDE(OP): Performed by: HOSPITALIST

## 2018-09-28 PROCEDURE — 700111 HCHG RX REV CODE 636 W/ 250 OVERRIDE (IP): Performed by: NURSE PRACTITIONER

## 2018-09-28 PROCEDURE — 700102 HCHG RX REV CODE 250 W/ 637 OVERRIDE(OP): Performed by: SURGERY

## 2018-09-28 PROCEDURE — 97535 SELF CARE MNGMENT TRAINING: CPT

## 2018-09-28 PROCEDURE — 83735 ASSAY OF MAGNESIUM: CPT

## 2018-09-28 PROCEDURE — 97530 THERAPEUTIC ACTIVITIES: CPT

## 2018-09-28 PROCEDURE — 700105 HCHG RX REV CODE 258

## 2018-09-28 PROCEDURE — C9113 INJ PANTOPRAZOLE SODIUM, VIA: HCPCS | Performed by: SURGERY

## 2018-09-28 PROCEDURE — A9270 NON-COVERED ITEM OR SERVICE: HCPCS | Performed by: NURSE PRACTITIONER

## 2018-09-28 PROCEDURE — 85025 COMPLETE CBC W/AUTO DIFF WBC: CPT

## 2018-09-28 PROCEDURE — 700111 HCHG RX REV CODE 636 W/ 250 OVERRIDE (IP): Performed by: SURGERY

## 2018-09-28 PROCEDURE — A9270 NON-COVERED ITEM OR SERVICE: HCPCS | Performed by: HOSPITALIST

## 2018-09-28 PROCEDURE — 700102 HCHG RX REV CODE 250 W/ 637 OVERRIDE(OP): Performed by: NURSE PRACTITIONER

## 2018-09-28 RX ORDER — LEVOTHYROXINE SODIUM 88 UG/1
88 TABLET ORAL
Status: DISCONTINUED | OUTPATIENT
Start: 2018-09-29 | End: 2018-10-12 | Stop reason: HOSPADM

## 2018-09-28 RX ORDER — MAGNESIUM SULFATE HEPTAHYDRATE 40 MG/ML
4 INJECTION, SOLUTION INTRAVENOUS ONCE
Status: COMPLETED | OUTPATIENT
Start: 2018-09-28 | End: 2018-09-28

## 2018-09-28 RX ORDER — HYDROMORPHONE HYDROCHLORIDE 2 MG/ML
1 INJECTION, SOLUTION INTRAMUSCULAR; INTRAVENOUS; SUBCUTANEOUS EVERY 4 HOURS PRN
Status: DISCONTINUED | OUTPATIENT
Start: 2018-09-28 | End: 2018-09-29

## 2018-09-28 RX ORDER — SODIUM CHLORIDE 9 MG/ML
INJECTION, SOLUTION INTRAVENOUS
Status: ACTIVE
Start: 2018-09-28 | End: 2018-09-28

## 2018-09-28 RX ORDER — CALCIUM CARBONATE 500 MG/1
500 TABLET, CHEWABLE ORAL DAILY
Status: DISCONTINUED | OUTPATIENT
Start: 2018-09-28 | End: 2018-09-29

## 2018-09-28 RX ORDER — OMEPRAZOLE 20 MG/1
20 CAPSULE, DELAYED RELEASE ORAL DAILY
Status: DISCONTINUED | OUTPATIENT
Start: 2018-09-29 | End: 2018-10-12 | Stop reason: HOSPADM

## 2018-09-28 RX ORDER — SODIUM CHLORIDE 9 MG/ML
INJECTION, SOLUTION INTRAVENOUS
Status: COMPLETED
Start: 2018-09-28 | End: 2018-09-28

## 2018-09-28 RX ORDER — POTASSIUM CHLORIDE 7.45 MG/ML
10 INJECTION INTRAVENOUS
Status: COMPLETED | OUTPATIENT
Start: 2018-09-28 | End: 2018-09-28

## 2018-09-28 RX ORDER — LORAZEPAM 1 MG/1
1 TABLET ORAL 2 TIMES DAILY PRN
Status: DISCONTINUED | OUTPATIENT
Start: 2018-09-28 | End: 2018-10-12 | Stop reason: HOSPADM

## 2018-09-28 RX ORDER — DOXYCYCLINE 100 MG/1
100 TABLET ORAL EVERY 12 HOURS
Status: COMPLETED | OUTPATIENT
Start: 2018-09-28 | End: 2018-10-04

## 2018-09-28 RX ORDER — OXYCODONE HYDROCHLORIDE 5 MG/1
5 TABLET ORAL
Status: DISCONTINUED | OUTPATIENT
Start: 2018-09-28 | End: 2018-09-30

## 2018-09-28 RX ADMIN — POTASSIUM CHLORIDE 10 MEQ: 10 INJECTION, SOLUTION INTRAVENOUS at 10:55

## 2018-09-28 RX ADMIN — DOXYCYCLINE 100 MG: 100 TABLET ORAL at 10:02

## 2018-09-28 RX ADMIN — SODIUM CHLORIDE 500 ML: 9 INJECTION, SOLUTION INTRAVENOUS at 16:57

## 2018-09-28 RX ADMIN — POLYVINYL ALCOHOL 2 DROP: 14 SOLUTION/ DROPS OPHTHALMIC at 18:17

## 2018-09-28 RX ADMIN — HYDROMORPHONE HYDROCHLORIDE 2 MG: 2 INJECTION INTRAMUSCULAR; INTRAVENOUS; SUBCUTANEOUS at 06:17

## 2018-09-28 RX ADMIN — METHADONE HYDROCHLORIDE 10 MG: 5 SOLUTION ORAL at 08:18

## 2018-09-28 RX ADMIN — LORAZEPAM 1 MG: 2 INJECTION INTRAMUSCULAR; INTRAVENOUS at 10:10

## 2018-09-28 RX ADMIN — NICOTINE 21 MG: 21 PATCH, EXTENDED RELEASE TRANSDERMAL at 06:16

## 2018-09-28 RX ADMIN — ENOXAPARIN SODIUM 40 MG: 100 INJECTION SUBCUTANEOUS at 06:17

## 2018-09-28 RX ADMIN — LEVOTHYROXINE SODIUM ANHYDROUS 50 MCG: 100 INJECTION, POWDER, LYOPHILIZED, FOR SOLUTION INTRAVENOUS at 06:00

## 2018-09-28 RX ADMIN — POTASSIUM CHLORIDE 10 MEQ: 10 INJECTION, SOLUTION INTRAVENOUS at 08:11

## 2018-09-28 RX ADMIN — HYDROMORPHONE HYDROCHLORIDE 1 MG: 2 INJECTION INTRAMUSCULAR; INTRAVENOUS; SUBCUTANEOUS at 18:23

## 2018-09-28 RX ADMIN — HYDROMORPHONE HYDROCHLORIDE 1 MG: 2 INJECTION INTRAMUSCULAR; INTRAVENOUS; SUBCUTANEOUS at 13:51

## 2018-09-28 RX ADMIN — HYDROMORPHONE HYDROCHLORIDE 1 MG: 2 INJECTION INTRAMUSCULAR; INTRAVENOUS; SUBCUTANEOUS at 09:22

## 2018-09-28 RX ADMIN — ANTACID TABLETS 500 MG: 500 TABLET, CHEWABLE ORAL at 18:21

## 2018-09-28 RX ADMIN — OXYCODONE HYDROCHLORIDE 5 MG: 5 TABLET ORAL at 16:57

## 2018-09-28 RX ADMIN — POTASSIUM CHLORIDE 10 MEQ: 10 INJECTION, SOLUTION INTRAVENOUS at 12:08

## 2018-09-28 RX ADMIN — PANTOPRAZOLE SODIUM 40 MG: 40 INJECTION, POWDER, LYOPHILIZED, FOR SOLUTION INTRAVENOUS at 06:17

## 2018-09-28 RX ADMIN — METHADONE HYDROCHLORIDE 10 MG: 5 SOLUTION ORAL at 21:51

## 2018-09-28 RX ADMIN — HYDROMORPHONE HYDROCHLORIDE 1 MG: 2 INJECTION INTRAMUSCULAR; INTRAVENOUS; SUBCUTANEOUS at 22:48

## 2018-09-28 RX ADMIN — OXYCODONE HYDROCHLORIDE 5 MG: 5 TABLET ORAL at 12:24

## 2018-09-28 RX ADMIN — METHADONE HYDROCHLORIDE 10 MG: 5 SOLUTION ORAL at 13:49

## 2018-09-28 RX ADMIN — MAGNESIUM SULFATE IN WATER 4 G: 40 INJECTION, SOLUTION INTRAVENOUS at 09:55

## 2018-09-28 RX ADMIN — LORAZEPAM 1 MG: 1 TABLET ORAL at 21:50

## 2018-09-28 RX ADMIN — POTASSIUM CHLORIDE 10 MEQ: 10 INJECTION, SOLUTION INTRAVENOUS at 09:21

## 2018-09-28 RX ADMIN — DOXYCYCLINE 100 MG: 100 TABLET ORAL at 18:22

## 2018-09-28 RX ADMIN — HYDROMORPHONE HYDROCHLORIDE 2 MG: 2 INJECTION INTRAMUSCULAR; INTRAVENOUS; SUBCUTANEOUS at 03:11

## 2018-09-28 ASSESSMENT — PAIN SCALES - GENERAL
PAINLEVEL_OUTOF10: 9
PAINLEVEL_OUTOF10: 9
PAINLEVEL_OUTOF10: 10
PAINLEVEL_OUTOF10: 9
PAINLEVEL_OUTOF10: 10
PAINLEVEL_OUTOF10: 9
PAINLEVEL_OUTOF10: 9

## 2018-09-28 ASSESSMENT — COGNITIVE AND FUNCTIONAL STATUS - GENERAL
DAILY ACTIVITIY SCORE: 23
HELP NEEDED FOR BATHING: A LITTLE
SUGGESTED CMS G CODE MODIFIER DAILY ACTIVITY: CI

## 2018-09-28 ASSESSMENT — ENCOUNTER SYMPTOMS
NAUSEA: 0
FEVER: 0
SHORTNESS OF BREATH: 0
ABDOMINAL PAIN: 1
MYALGIAS: 1
VOMITING: 0
CHILLS: 0

## 2018-09-28 NOTE — PROGRESS NOTES
Awake and alert, states she doesn't wear the SCDs, educated for this, abd is soft states no flatus, taking CL diet well. IV K+ started.

## 2018-09-28 NOTE — CARE PLAN
Problem: Nutritional:  Goal: Achieve adequate nutritional intake  Diet will advance past NPO and patient will consume >50% of meals   Outcome: PROGRESSING SLOWER THAN EXPECTED

## 2018-09-28 NOTE — CARE PLAN
Problem: Bowel/Gastric:  Goal: Normal bowel function is maintained or improved  Outcome: PROGRESSING SLOWER THAN EXPECTED  No BM or flatus, encourage out of bed and walking.     Problem: Discharge Barriers/Planning  Goal: Patient's continuum of care needs will be met  Outcome: PROGRESSING SLOWER THAN EXPECTED  Low K+ and Mg these are replaced again today. Continue to monitor.

## 2018-09-28 NOTE — PROGRESS NOTES
Trauma / Surgical Daily Progress Note    Date of Service  9/28/2018    Chief Complaint  62 y.o. female admitted 9/19/2018 with Small bowel obstruction (HCC)    Interval Events    Tolerating clear liquid diet. No N/V.   No flatus/BM.  (+) bowel sounds.  Erythema to middle portion of abdominal incision.  No drainage.    No peritoneal signs   No leukocytosis, afebrile  Hypomagnesemia   Hypokalemia     - Await return of GI function  - Will discuss bowel promotors with attending   - Erythema outlined. Watch for growth. Trend WBC count. Start antibiotics  - Supplement 4 grams of IV magnesium   - Supplement 40 meq of IV KCL  - Central line needed for access  - PT/OT evals   - Skilled nursing referrals   - Inpatient care for post surgical care and ileus  - Counseled     Review of Systems  Review of Systems   Constitutional: Negative for chills and fever.   Respiratory: Negative for shortness of breath.    Cardiovascular: Negative for chest pain.   Gastrointestinal: Positive for abdominal pain. Negative for nausea and vomiting.        BM prior to arrival  No flatus   Musculoskeletal: Positive for myalgias.        Vital Signs  Temp:  [36.3 °C (97.3 °F)-37.2 °C (98.9 °F)] 36.9 °C (98.5 °F)  Pulse:  [66-68] 66  Resp:  [17-18] 17  BP: (117-143)/(79-89) 129/83    Physical Exam  Physical Exam   Constitutional: No distress. Nasal cannula in place.   HENT:   Head: Normocephalic.   Eyes: Conjunctivae are normal.   Cardiovascular: Normal rate and regular rhythm.    Pulmonary/Chest: No respiratory distress. She exhibits no tenderness.   Abdominal: There is tenderness. There is no rebound and no guarding.   Erythema to middle portion of incision. No drainage. Well approximated with staples  (+) bowel sounds   Musculoskeletal:   Moves all extremities    Neurological: She is alert.   Skin: Skin is warm and dry.   Nursing note and vitals reviewed.      Laboratory  Recent Results (from the past 24 hour(s))   BASIC METABOLIC PANEL     Collection Time: 09/28/18  3:13 AM   Result Value Ref Range    Sodium 141 135 - 145 mmol/L    Potassium 2.9 (L) 3.6 - 5.5 mmol/L    Chloride 100 96 - 112 mmol/L    Co2 26 20 - 33 mmol/L    Glucose 77 65 - 99 mg/dL    Bun <3 (L) 8 - 22 mg/dL    Creatinine 0.48 (L) 0.50 - 1.40 mg/dL    Calcium 7.3 (L) 8.5 - 10.5 mg/dL    Anion Gap 15.0 (H) 0.0 - 11.9   CBC WITH DIFFERENTIAL    Collection Time: 09/28/18  3:13 AM   Result Value Ref Range    WBC 6.1 4.8 - 10.8 K/uL    RBC 3.11 (L) 4.20 - 5.40 M/uL    Hemoglobin 11.2 (L) 12.0 - 16.0 g/dL    Hematocrit 31.5 (L) 37.0 - 47.0 %    .3 (H) 81.4 - 97.8 fL    MCH 36.0 (H) 27.0 - 33.0 pg    MCHC 35.6 (H) 33.6 - 35.0 g/dL    RDW 48.4 35.9 - 50.0 fL    Platelet Count 172 164 - 446 K/uL    MPV 11.3 9.0 - 12.9 fL    Neutrophils-Polys 65.30 44.00 - 72.00 %    Lymphocytes 18.90 (L) 22.00 - 41.00 %    Monocytes 9.00 0.00 - 13.40 %    Eosinophils 2.80 0.00 - 6.90 %    Basophils 1.00 0.00 - 1.80 %    Immature Granulocytes 3.00 (H) 0.00 - 0.90 %    Nucleated RBC 0.00 /100 WBC    Neutrophils (Absolute) 3.98 2.00 - 7.15 K/uL    Lymphs (Absolute) 1.15 1.00 - 4.80 K/uL    Monos (Absolute) 0.55 0.00 - 0.85 K/uL    Eos (Absolute) 0.17 0.00 - 0.51 K/uL    Baso (Absolute) 0.06 0.00 - 0.12 K/uL    Immature Granulocytes (abs) 0.18 (H) 0.00 - 0.11 K/uL    NRBC (Absolute) 0.00 K/uL   MAGNESIUM    Collection Time: 09/28/18  3:13 AM   Result Value Ref Range    Magnesium 1.5 1.5 - 2.5 mg/dL   ESTIMATED GFR    Collection Time: 09/28/18  3:13 AM   Result Value Ref Range    GFR If African American >60 >60 mL/min/1.73 m 2    GFR If Non African American >60 >60 mL/min/1.73 m 2       Fluids    Intake/Output Summary (Last 24 hours) at 09/28/18 9249  Last data filed at 09/28/18 0310   Gross per 24 hour   Intake              920 ml   Output                0 ml   Net              920 ml       Core Measures & Quality Metrics  Labs reviewed and Medications reviewed  Parker catheter: No Parker  Central line in  place: Need for access    DVT Prophylaxis: Enoxaparin (Lovenox)  DVT prophylaxis - mechanical: SCDs  Ulcer prophylaxis: Yes    Assessed for rehab: Patient was assess for and/or received rehabilitation services during this hospitalization    Total Score: 0    ETOH Screening    Assessment/Plan  Hypomagnesemia   Assessment & Plan    9/27 Supplement 4 grams IV magnesium         Hypokalemia- (present on admission)   Assessment & Plan    9/27 Supplement 20 meq IV potassium   9/28 Supplement 40 meq IV potassium  Continue potassium supplementation in maintenance IV fluids  Trend labs        Small bowel obstruction (HCC)- (present on admission)   Assessment & Plan    9/22 Ex lap, lysis of adhesions, small bowel resection.  9/25 7 day course of antibiotic therapy completed. NG tube clamped  9/26 Interval removal of NG tube.  9/27 Clear liquid diet  Houston Amanda DO, Surgery.            Discussed patient condition with Patient and trauma surgery, Dr. Houston Amanda.

## 2018-09-28 NOTE — THERAPY
"Occupational Therapy Treatment completed with focus on ADLs and ADL transfers.  Functional Status:    Sup>sit: Min A (HHA for pt to pull self up with HOB elevated), stood up with SBA, xfered to BSC with SBA, supv for pericare. Donned socks with supv, ambulated to bathroom with FWW, followed with w/c per pt request. Completed 10 minutes of standing grooming with supv. Returned to bed with Min A to get into bed, needing assist with LLE. Would benefit from leg  at home.   Plan of Care: Will benefit from Occupational Therapy 3 times per week  Discharge Recommendations:  Equipment Will Continue to Assess for Equipment Needs. Post-acute therapy Discharge to home with outpatient or home health for additional skilled therapy services.    See \"Rehab Therapy-Acute\" Patient Summary Report for complete documentation.     Pt seen for OT treatment, requesting to use bathroom on entry. Pt completed ADL xfers with SBA, used BSC 2/2 urgency. Later demonstrated LB dressing and standing grooming. Pt required HHA to get out of bed, but is moving well beyond that. Will continue working with pt in this setting to address independence with functional tasks. Pt reports a good set-up at home that includes a hospital bed and BSC, she also has chairs strategically placed to help conserve energy. Would recommend D/C home with HH OT.  "

## 2018-09-29 DIAGNOSIS — R06.00 DYSPNEA, UNSPECIFIED TYPE: ICD-10-CM

## 2018-09-29 LAB
ANION GAP SERPL CALC-SCNC: 10 MMOL/L (ref 0–11.9)
ANION GAP SERPL CALC-SCNC: 13 MMOL/L (ref 0–11.9)
BUN SERPL-MCNC: <3 MG/DL (ref 8–22)
BUN SERPL-MCNC: <3 MG/DL (ref 8–22)
CALCIUM SERPL-MCNC: 6.6 MG/DL (ref 8.5–10.5)
CALCIUM SERPL-MCNC: 6.8 MG/DL (ref 8.5–10.5)
CHLORIDE SERPL-SCNC: 100 MMOL/L (ref 96–112)
CHLORIDE SERPL-SCNC: 99 MMOL/L (ref 96–112)
CO2 SERPL-SCNC: 29 MMOL/L (ref 20–33)
CO2 SERPL-SCNC: 30 MMOL/L (ref 20–33)
CREAT SERPL-MCNC: 0.45 MG/DL (ref 0.5–1.4)
CREAT SERPL-MCNC: 0.51 MG/DL (ref 0.5–1.4)
GLUCOSE SERPL-MCNC: 143 MG/DL (ref 65–99)
GLUCOSE SERPL-MCNC: 88 MG/DL (ref 65–99)
POTASSIUM SERPL-SCNC: 2.8 MMOL/L (ref 3.6–5.5)
POTASSIUM SERPL-SCNC: 3.1 MMOL/L (ref 3.6–5.5)
SODIUM SERPL-SCNC: 140 MMOL/L (ref 135–145)
SODIUM SERPL-SCNC: 141 MMOL/L (ref 135–145)

## 2018-09-29 PROCEDURE — A9270 NON-COVERED ITEM OR SERVICE: HCPCS | Performed by: NURSE PRACTITIONER

## 2018-09-29 PROCEDURE — 770001 HCHG ROOM/CARE - MED/SURG/GYN PRIV*

## 2018-09-29 PROCEDURE — A9270 NON-COVERED ITEM OR SERVICE: HCPCS | Performed by: SURGERY

## 2018-09-29 PROCEDURE — 700102 HCHG RX REV CODE 250 W/ 637 OVERRIDE(OP): Performed by: NURSE PRACTITIONER

## 2018-09-29 PROCEDURE — 700111 HCHG RX REV CODE 636 W/ 250 OVERRIDE (IP): Performed by: NURSE PRACTITIONER

## 2018-09-29 PROCEDURE — 700111 HCHG RX REV CODE 636 W/ 250 OVERRIDE (IP): Performed by: SURGERY

## 2018-09-29 PROCEDURE — 700102 HCHG RX REV CODE 250 W/ 637 OVERRIDE(OP): Performed by: SURGERY

## 2018-09-29 PROCEDURE — 700101 HCHG RX REV CODE 250: Performed by: NURSE PRACTITIONER

## 2018-09-29 PROCEDURE — 80048 BASIC METABOLIC PNL TOTAL CA: CPT | Mod: 91

## 2018-09-29 PROCEDURE — 700102 HCHG RX REV CODE 250 W/ 637 OVERRIDE(OP): Performed by: HOSPITALIST

## 2018-09-29 PROCEDURE — A9270 NON-COVERED ITEM OR SERVICE: HCPCS | Performed by: HOSPITALIST

## 2018-09-29 RX ORDER — METHADONE HYDROCHLORIDE 10 MG/1
10 TABLET ORAL EVERY 8 HOURS
Status: DISCONTINUED | OUTPATIENT
Start: 2018-09-29 | End: 2018-10-01

## 2018-09-29 RX ORDER — POTASSIUM CHLORIDE 7.45 MG/ML
10 INJECTION INTRAVENOUS
Status: COMPLETED | OUTPATIENT
Start: 2018-09-29 | End: 2018-09-29

## 2018-09-29 RX ORDER — POLYETHYLENE GLYCOL 3350 17 G/17G
1 POWDER, FOR SOLUTION ORAL DAILY
Status: DISCONTINUED | OUTPATIENT
Start: 2018-09-29 | End: 2018-10-12 | Stop reason: HOSPADM

## 2018-09-29 RX ORDER — CALCIUM CARBONATE 500 MG/1
1000 TABLET, CHEWABLE ORAL DAILY
Status: DISCONTINUED | OUTPATIENT
Start: 2018-09-30 | End: 2018-09-30

## 2018-09-29 RX ORDER — POTASSIUM CHLORIDE 20 MEQ/1
40 TABLET, EXTENDED RELEASE ORAL 2 TIMES DAILY
Status: DISCONTINUED | OUTPATIENT
Start: 2018-09-29 | End: 2018-10-01

## 2018-09-29 RX ORDER — HYDROMORPHONE HYDROCHLORIDE 2 MG/ML
1 INJECTION, SOLUTION INTRAMUSCULAR; INTRAVENOUS; SUBCUTANEOUS
Status: DISCONTINUED | OUTPATIENT
Start: 2018-09-29 | End: 2018-10-01

## 2018-09-29 RX ADMIN — POLYETHYLENE GLYCOL 3350 1 PACKET: 17 POWDER, FOR SOLUTION ORAL at 13:57

## 2018-09-29 RX ADMIN — DOXYCYCLINE 100 MG: 100 TABLET ORAL at 06:08

## 2018-09-29 RX ADMIN — METHADONE HYDROCHLORIDE 10 MG: 10 TABLET ORAL at 13:57

## 2018-09-29 RX ADMIN — POTASSIUM CHLORIDE 10 MEQ: 10 INJECTION, SOLUTION INTRAVENOUS at 10:15

## 2018-09-29 RX ADMIN — POTASSIUM CHLORIDE 10 MEQ: 10 INJECTION, SOLUTION INTRAVENOUS at 11:18

## 2018-09-29 RX ADMIN — POTASSIUM CHLORIDE AND SODIUM CHLORIDE: 900; 300 INJECTION, SOLUTION INTRAVENOUS at 03:31

## 2018-09-29 RX ADMIN — POTASSIUM CHLORIDE 40 MEQ: 1500 TABLET, EXTENDED RELEASE ORAL at 17:36

## 2018-09-29 RX ADMIN — HYDROMORPHONE HYDROCHLORIDE 1 MG: 2 INJECTION INTRAMUSCULAR; INTRAVENOUS; SUBCUTANEOUS at 03:27

## 2018-09-29 RX ADMIN — LORAZEPAM 1 MG: 1 TABLET ORAL at 23:45

## 2018-09-29 RX ADMIN — OXYCODONE HYDROCHLORIDE 5 MG: 5 TABLET ORAL at 06:08

## 2018-09-29 RX ADMIN — POTASSIUM CHLORIDE 10 MEQ: 10 INJECTION, SOLUTION INTRAVENOUS at 08:00

## 2018-09-29 RX ADMIN — HYDROMORPHONE HYDROCHLORIDE 1 MG: 2 INJECTION INTRAMUSCULAR; INTRAVENOUS; SUBCUTANEOUS at 20:35

## 2018-09-29 RX ADMIN — DOXYCYCLINE 100 MG: 100 TABLET ORAL at 17:36

## 2018-09-29 RX ADMIN — POTASSIUM CHLORIDE AND SODIUM CHLORIDE: 900; 300 INJECTION, SOLUTION INTRAVENOUS at 23:51

## 2018-09-29 RX ADMIN — HYDROMORPHONE HYDROCHLORIDE 1 MG: 2 INJECTION INTRAMUSCULAR; INTRAVENOUS; SUBCUTANEOUS at 17:31

## 2018-09-29 RX ADMIN — METHADONE HYDROCHLORIDE 10 MG: 5 SOLUTION ORAL at 06:08

## 2018-09-29 RX ADMIN — HYDROMORPHONE HYDROCHLORIDE 1 MG: 2 INJECTION INTRAMUSCULAR; INTRAVENOUS; SUBCUTANEOUS at 11:16

## 2018-09-29 RX ADMIN — NICOTINE 21 MG: 21 PATCH, EXTENDED RELEASE TRANSDERMAL at 06:08

## 2018-09-29 RX ADMIN — ANTACID TABLETS 500 MG: 500 TABLET, CHEWABLE ORAL at 06:08

## 2018-09-29 RX ADMIN — METHADONE HYDROCHLORIDE 10 MG: 10 TABLET ORAL at 20:35

## 2018-09-29 RX ADMIN — ENOXAPARIN SODIUM 40 MG: 100 INJECTION SUBCUTANEOUS at 06:08

## 2018-09-29 RX ADMIN — LEVOTHYROXINE SODIUM 88 MCG: 88 TABLET ORAL at 06:08

## 2018-09-29 RX ADMIN — HYDROMORPHONE HYDROCHLORIDE 1 MG: 2 INJECTION INTRAMUSCULAR; INTRAVENOUS; SUBCUTANEOUS at 14:15

## 2018-09-29 RX ADMIN — POTASSIUM CHLORIDE 10 MEQ: 10 INJECTION, SOLUTION INTRAVENOUS at 09:13

## 2018-09-29 RX ADMIN — HYDROMORPHONE HYDROCHLORIDE 1 MG: 2 INJECTION INTRAMUSCULAR; INTRAVENOUS; SUBCUTANEOUS at 08:00

## 2018-09-29 RX ADMIN — HYDROMORPHONE HYDROCHLORIDE 1 MG: 2 INJECTION INTRAMUSCULAR; INTRAVENOUS; SUBCUTANEOUS at 23:45

## 2018-09-29 RX ADMIN — OMEPRAZOLE 20 MG: 20 CAPSULE, DELAYED RELEASE ORAL at 06:08

## 2018-09-29 ASSESSMENT — ENCOUNTER SYMPTOMS
ABDOMINAL PAIN: 1
MYALGIAS: 1
VOMITING: 0
NAUSEA: 0
SHORTNESS OF BREATH: 0
FEVER: 0
CHILLS: 0

## 2018-09-29 ASSESSMENT — PAIN SCALES - GENERAL
PAINLEVEL_OUTOF10: 9
PAINLEVEL_OUTOF10: 9
PAINLEVEL_OUTOF10: 8
PAINLEVEL_OUTOF10: 10
PAINLEVEL_OUTOF10: 10
PAINLEVEL_OUTOF10: 9
PAINLEVEL_OUTOF10: 10
PAINLEVEL_OUTOF10: 10
PAINLEVEL_OUTOF10: 9
PAINLEVEL_OUTOF10: 7
PAINLEVEL_OUTOF10: 10

## 2018-09-29 NOTE — CARE PLAN
Problem: Communication  Goal: The ability to communicate needs accurately and effectively will improve  Outcome: PROGRESSING AS EXPECTED  Patient uses call light appropriately. POC discussed    Problem: Urinary Elimination:  Goal: Ability to reestablish a normal urinary elimination pattern will improve  Outcome: PROGRESSING AS EXPECTED  Patient voiding appropriately

## 2018-09-29 NOTE — PROGRESS NOTES
Dee from Lab called with critical result of Calcium of 6.8 at 0519. Critical lab result read back to Dee.   Randa HENDERSON notified of critical lab result at 0530.  Critical lab result read back by Randa.  No new orders received.

## 2018-09-29 NOTE — PROGRESS NOTES
Trauma / Surgical Daily Progress Note    Date of Service  9/29/2018    Chief Complaint  62 y.o. female admitted 9/19/2018 with Small bowel obstruction (HCC)    Interval Events    Incisional erythema improved. No drainage  No bowel movement. Negative flatus.  Tolerating clear liquid diet   Hypokalemia   Corrected calcium 7.9    - Continue antibiotic therapy  - Await return of GI function   - Will discussed bowel promotors and diet with attending  - PT/OT  - Counseled     Review of Systems  Review of Systems   Constitutional: Negative for chills and fever.   Respiratory: Negative for shortness of breath.    Cardiovascular: Negative for chest pain.   Gastrointestinal: Positive for abdominal pain. Negative for nausea and vomiting.        BM prior to arrival  No flatus   Musculoskeletal: Positive for myalgias.        Vital Signs  Temp:  [36.4 °C (97.5 °F)-36.9 °C (98.5 °F)] 36.9 °C (98.5 °F)  Pulse:  [66-71] 66  Resp:  [16-18] 16  BP: (130-138)/(81-88) 132/83    Physical Exam  Physical Exam   Constitutional: No distress. Nasal cannula in place.   HENT:   Head: Normocephalic.   Eyes: Conjunctivae are normal.   Cardiovascular: Normal rate and regular rhythm.    Pulmonary/Chest: No respiratory distress. She exhibits no tenderness.   Abdominal: There is tenderness. There is no rebound and no guarding.   Erythema to middle portion of incision decreased. No drainage. Well approximated with staples  (+) bowel sounds   Musculoskeletal:   Moves all extremities    Neurological: She is alert.   Skin: Skin is warm and dry.   Nursing note and vitals reviewed.      Laboratory  Recent Results (from the past 24 hour(s))   BASIC METABOLIC PANEL    Collection Time: 09/29/18  3:30 AM   Result Value Ref Range    Sodium 141 135 - 145 mmol/L    Potassium 2.8 (L) 3.6 - 5.5 mmol/L    Chloride 99 96 - 112 mmol/L    Co2 29 20 - 33 mmol/L    Glucose 88 65 - 99 mg/dL    Bun <3 (L) 8 - 22 mg/dL    Creatinine 0.45 (L) 0.50 - 1.40 mg/dL    Calcium 6.8  (LL) 8.5 - 10.5 mg/dL    Anion Gap 13.0 (H) 0.0 - 11.9   ESTIMATED GFR    Collection Time: 09/29/18  3:30 AM   Result Value Ref Range    GFR If African American >60 >60 mL/min/1.73 m 2    GFR If Non African American >60 >60 mL/min/1.73 m 2       Fluids    Intake/Output Summary (Last 24 hours) at 09/29/18 0925  Last data filed at 09/29/18 0913   Gross per 24 hour   Intake             2240 ml   Output             3300 ml   Net            -1060 ml       Core Measures & Quality Metrics  Labs reviewed and Medications reviewed  Parker catheter: No Parker  Central line in place: Need for access    DVT Prophylaxis: Enoxaparin (Lovenox)  DVT prophylaxis - mechanical: SCDs  Ulcer prophylaxis: Yes    Assessed for rehab: Patient was assess for and/or received rehabilitation services during this hospitalization    Total Score: 0    ETOH Screening    Assessment/Plan  Hypomagnesemia   Assessment & Plan    9/27 Supplement 4 grams IV magnesium          Hypokalemia- (present on admission)   Assessment & Plan    9/27 Supplement 20 meq IV potassium   9/28 Supplement 40 meq IV potassium  9/29 Supplement 40 meq IV potassium   Continue potassium supplementation in maintenance IV fluids  Trend labs        Small bowel obstruction (HCC)- (present on admission)   Assessment & Plan    9/22 Ex lap, lysis of adhesions, small bowel resection.  9/25 7 day course of antibiotic therapy completed. NG tube clamped  9/26 Interval removal of NG tube.  9/27 Clear liquid diet  9/28 Abdominal wound erythema. Start IV doxycycline.    9/29 Erythema improved. Continue antibiotic therapy   Houston Amanda DO, Surgery.            Discussed patient condition with Patient and trauma surgery, Dr. Pedro Brown .    Stable  Advance diet    Pedro Brown MD

## 2018-09-29 NOTE — PROGRESS NOTES
States pain always at 9 or 10, she is able to get to commode with minimal assistance, and she is able to laugh and talk and work on paper work . abd is rounded and semisoft, no flatus, abd binder intact. Removed for dressing change.

## 2018-09-29 NOTE — PROGRESS NOTES
Received bedside report and assumed care at 1900    Pt is A&O x4  Pain 9/10, declines intervention at this time  Denies nausea  Tolerating clear liquid diet  + Urine Output  - Flatus  - BM   Triple lumen subclavian  Midline abdominal incision, abdominal binder in place  Up with x1 assistance  Bed in lowest position and locked.  Bed alarm on  Reviewed plan of care with patient, bed in lowest position and locked, pt resting comfortably now, call light within reach, all needs met at this time

## 2018-09-29 NOTE — CARE PLAN
Problem: Respiratory:  Goal: Respiratory status will improve  Outcome: PROGRESSING AS EXPECTED  No O2 needed, usually refusing IS, encourage use.     Problem: Urinary Elimination:  Goal: Ability to reestablish a normal urinary elimination pattern will improve  Outcome: PROGRESSING AS EXPECTED  Voiding well.

## 2018-09-29 NOTE — PROGRESS NOTES
Up to void on bedside commode, medicated for pain, states she didn't sleep all night and cannot walk now, positive flatus on commode.

## 2018-09-30 LAB
ANION GAP SERPL CALC-SCNC: 10 MMOL/L (ref 0–11.9)
ANISOCYTOSIS BLD QL SMEAR: ABNORMAL
BASOPHILS # BLD AUTO: 1 % (ref 0–1.8)
BASOPHILS # BLD: 0.06 K/UL (ref 0–0.12)
BUN SERPL-MCNC: <3 MG/DL (ref 8–22)
CALCIUM SERPL-MCNC: 6.9 MG/DL (ref 8.5–10.5)
CHLORIDE SERPL-SCNC: 100 MMOL/L (ref 96–112)
CO2 SERPL-SCNC: 29 MMOL/L (ref 20–33)
CREAT SERPL-MCNC: 0.46 MG/DL (ref 0.5–1.4)
EOSINOPHIL # BLD AUTO: 0.06 K/UL (ref 0–0.51)
EOSINOPHIL NFR BLD: 1 % (ref 0–6.9)
ERYTHROCYTE [DISTWIDTH] IN BLOOD BY AUTOMATED COUNT: 50.3 FL (ref 35.9–50)
GLUCOSE SERPL-MCNC: 96 MG/DL (ref 65–99)
HCT VFR BLD AUTO: 31.3 % (ref 37–47)
HGB BLD-MCNC: 10.6 G/DL (ref 12–16)
LYMPHOCYTES # BLD AUTO: 1.42 K/UL (ref 1–4.8)
LYMPHOCYTES NFR BLD: 25.3 % (ref 22–41)
MACROCYTES BLD QL SMEAR: ABNORMAL
MAGNESIUM SERPL-MCNC: 1.2 MG/DL (ref 1.5–2.5)
MANUAL DIFF BLD: NORMAL
MCH RBC QN AUTO: 34.5 PG (ref 27–33)
MCHC RBC AUTO-ENTMCNC: 33.9 G/DL (ref 33.6–35)
MCV RBC AUTO: 102 FL (ref 81.4–97.8)
METAMYELOCYTES NFR BLD MANUAL: 1 %
MONOCYTES # BLD AUTO: 0.68 K/UL (ref 0–0.85)
MONOCYTES NFR BLD AUTO: 12.1 % (ref 0–13.4)
MORPHOLOGY BLD-IMP: NORMAL
MYELOCYTES NFR BLD MANUAL: 1 %
NEUTROPHILS # BLD AUTO: 3.28 K/UL (ref 2–7.15)
NEUTROPHILS NFR BLD: 56.6 % (ref 44–72)
NEUTS BAND NFR BLD MANUAL: 2 % (ref 0–10)
NRBC # BLD AUTO: 0 K/UL
NRBC BLD-RTO: 0 /100 WBC
PHOSPHATE SERPL-MCNC: 3.5 MG/DL (ref 2.5–4.5)
PLATELET # BLD AUTO: 178 K/UL (ref 164–446)
PLATELET BLD QL SMEAR: NORMAL
PMV BLD AUTO: 11.2 FL (ref 9–12.9)
POLYCHROMASIA BLD QL SMEAR: NORMAL
POTASSIUM SERPL-SCNC: 3.4 MMOL/L (ref 3.6–5.5)
RBC # BLD AUTO: 3.07 M/UL (ref 4.2–5.4)
RBC BLD AUTO: PRESENT
SODIUM SERPL-SCNC: 139 MMOL/L (ref 135–145)
WBC # BLD AUTO: 5.6 K/UL (ref 4.8–10.8)

## 2018-09-30 PROCEDURE — 700102 HCHG RX REV CODE 250 W/ 637 OVERRIDE(OP): Performed by: NURSE PRACTITIONER

## 2018-09-30 PROCEDURE — 700111 HCHG RX REV CODE 636 W/ 250 OVERRIDE (IP): Performed by: NURSE PRACTITIONER

## 2018-09-30 PROCEDURE — A9270 NON-COVERED ITEM OR SERVICE: HCPCS | Performed by: NURSE PRACTITIONER

## 2018-09-30 PROCEDURE — 700111 HCHG RX REV CODE 636 W/ 250 OVERRIDE (IP): Performed by: SURGERY

## 2018-09-30 PROCEDURE — A9270 NON-COVERED ITEM OR SERVICE: HCPCS | Performed by: SURGERY

## 2018-09-30 PROCEDURE — 700102 HCHG RX REV CODE 250 W/ 637 OVERRIDE(OP): Performed by: HOSPITALIST

## 2018-09-30 PROCEDURE — 84100 ASSAY OF PHOSPHORUS: CPT

## 2018-09-30 PROCEDURE — 770001 HCHG ROOM/CARE - MED/SURG/GYN PRIV*

## 2018-09-30 PROCEDURE — 80048 BASIC METABOLIC PNL TOTAL CA: CPT

## 2018-09-30 PROCEDURE — 700102 HCHG RX REV CODE 250 W/ 637 OVERRIDE(OP): Performed by: SURGERY

## 2018-09-30 PROCEDURE — 85027 COMPLETE CBC AUTOMATED: CPT

## 2018-09-30 PROCEDURE — 83735 ASSAY OF MAGNESIUM: CPT

## 2018-09-30 PROCEDURE — A9270 NON-COVERED ITEM OR SERVICE: HCPCS | Performed by: HOSPITALIST

## 2018-09-30 PROCEDURE — 85007 BL SMEAR W/DIFF WBC COUNT: CPT

## 2018-09-30 PROCEDURE — 700105 HCHG RX REV CODE 258

## 2018-09-30 RX ORDER — CALCIUM CARBONATE 500 MG/1
2000 TABLET, CHEWABLE ORAL DAILY
Status: DISCONTINUED | OUTPATIENT
Start: 2018-09-30 | End: 2018-10-06

## 2018-09-30 RX ORDER — SODIUM CHLORIDE 9 MG/ML
INJECTION, SOLUTION INTRAVENOUS
Status: COMPLETED
Start: 2018-09-30 | End: 2018-09-30

## 2018-09-30 RX ORDER — POLYETHYLENE GLYCOL 3350 17 G/17G
1 POWDER, FOR SOLUTION ORAL ONCE
Status: COMPLETED | OUTPATIENT
Start: 2018-09-30 | End: 2018-09-30

## 2018-09-30 RX ORDER — HYDROMORPHONE HYDROCHLORIDE 2 MG/1
2 TABLET ORAL
Status: DISCONTINUED | OUTPATIENT
Start: 2018-09-30 | End: 2018-10-09

## 2018-09-30 RX ORDER — MAGNESIUM SULFATE HEPTAHYDRATE 40 MG/ML
4 INJECTION, SOLUTION INTRAVENOUS ONCE
Status: COMPLETED | OUTPATIENT
Start: 2018-09-30 | End: 2018-09-30

## 2018-09-30 RX ORDER — CALCIUM CARBONATE 500 MG/1
2000 TABLET, CHEWABLE ORAL DAILY
Status: DISCONTINUED | OUTPATIENT
Start: 2018-10-01 | End: 2018-09-30

## 2018-09-30 RX ADMIN — ANTACID TABLETS 2000 MG: 500 TABLET, CHEWABLE ORAL at 08:17

## 2018-09-30 RX ADMIN — SODIUM CHLORIDE 500 ML: 9 INJECTION, SOLUTION INTRAVENOUS at 04:58

## 2018-09-30 RX ADMIN — METHADONE HYDROCHLORIDE 10 MG: 10 TABLET ORAL at 05:48

## 2018-09-30 RX ADMIN — OMEPRAZOLE 20 MG: 20 CAPSULE, DELAYED RELEASE ORAL at 05:48

## 2018-09-30 RX ADMIN — LORAZEPAM 1 MG: 1 TABLET ORAL at 21:54

## 2018-09-30 RX ADMIN — HYDROMORPHONE HYDROCHLORIDE 2 MG: 2 TABLET ORAL at 15:34

## 2018-09-30 RX ADMIN — NICOTINE 21 MG: 21 PATCH, EXTENDED RELEASE TRANSDERMAL at 05:48

## 2018-09-30 RX ADMIN — HYDROMORPHONE HYDROCHLORIDE 2 MG: 2 TABLET ORAL at 21:54

## 2018-09-30 RX ADMIN — HYDROMORPHONE HYDROCHLORIDE 1 MG: 2 INJECTION INTRAMUSCULAR; INTRAVENOUS; SUBCUTANEOUS at 08:21

## 2018-09-30 RX ADMIN — HYDROMORPHONE HYDROCHLORIDE 1 MG: 2 INJECTION INTRAMUSCULAR; INTRAVENOUS; SUBCUTANEOUS at 11:32

## 2018-09-30 RX ADMIN — HYDROMORPHONE HYDROCHLORIDE 2 MG: 2 TABLET ORAL at 18:26

## 2018-09-30 RX ADMIN — HYDROMORPHONE HYDROCHLORIDE 1 MG: 2 INJECTION INTRAMUSCULAR; INTRAVENOUS; SUBCUTANEOUS at 03:07

## 2018-09-30 RX ADMIN — MAGNESIUM GLUCONATE 500 MG ORAL TABLET 400 MG: 500 TABLET ORAL at 16:57

## 2018-09-30 RX ADMIN — DOXYCYCLINE 100 MG: 100 TABLET ORAL at 05:48

## 2018-09-30 RX ADMIN — POTASSIUM CHLORIDE 40 MEQ: 1500 TABLET, EXTENDED RELEASE ORAL at 08:18

## 2018-09-30 RX ADMIN — DOXYCYCLINE 100 MG: 100 TABLET ORAL at 16:57

## 2018-09-30 RX ADMIN — METHADONE HYDROCHLORIDE 10 MG: 10 TABLET ORAL at 13:10

## 2018-09-30 RX ADMIN — LEVOTHYROXINE SODIUM 88 MCG: 88 TABLET ORAL at 05:48

## 2018-09-30 RX ADMIN — POLYETHYLENE GLYCOL 3350 1 PACKET: 17 POWDER, FOR SOLUTION ORAL at 13:11

## 2018-09-30 RX ADMIN — POTASSIUM CHLORIDE 40 MEQ: 1500 TABLET, EXTENDED RELEASE ORAL at 16:57

## 2018-09-30 RX ADMIN — MAGNESIUM GLUCONATE 500 MG ORAL TABLET 400 MG: 500 TABLET ORAL at 09:25

## 2018-09-30 RX ADMIN — MAGNESIUM SULFATE HEPTAHYDRATE 4 G: 40 INJECTION, SOLUTION INTRAVENOUS at 09:25

## 2018-09-30 RX ADMIN — ENOXAPARIN SODIUM 40 MG: 100 INJECTION SUBCUTANEOUS at 05:48

## 2018-09-30 RX ADMIN — METHADONE HYDROCHLORIDE 10 MG: 10 TABLET ORAL at 21:54

## 2018-09-30 ASSESSMENT — PAIN SCALES - GENERAL
PAINLEVEL_OUTOF10: 9
PAINLEVEL_OUTOF10: 6
PAINLEVEL_OUTOF10: 6
PAINLEVEL_OUTOF10: 10
PAINLEVEL_OUTOF10: 8
PAINLEVEL_OUTOF10: 9
PAINLEVEL_OUTOF10: 10
PAINLEVEL_OUTOF10: 9

## 2018-09-30 ASSESSMENT — ENCOUNTER SYMPTOMS
MYALGIAS: 1
FEVER: 0
ROS GI COMMENTS: BM PRIOR TO ARRIVAL  (+) FLATUS
ABDOMINAL PAIN: 1
VOMITING: 0
CHILLS: 0
NAUSEA: 0
SHORTNESS OF BREATH: 0

## 2018-09-30 NOTE — PROGRESS NOTES
Trauma / Surgical Daily Progress Note    Date of Service  9/30/2018    Chief Complaint  62 y.o. female admitted 9/19/2018 with Small bowel obstruction (HCC)    Interval Events    Tolerating full liquid diet   (+) flatus. No BM.  No increase incisional erythema   Frequent use of IV dilaudid   Oxycodone not working per patient   Hypokalemia improved   Hypomagnesemia     - Continue oral diet   - Continue antibiotic therapy  - DC Oxycodone. Trial oral dilaudid   - Continue oral potassium supplementation   - 4 grams IV magnesium. Initiate oral supplementation  - AM labs  - Counseled     Review of Systems  Review of Systems   Constitutional: Negative for chills and fever.   Respiratory: Negative for shortness of breath.    Cardiovascular: Negative for chest pain.   Gastrointestinal: Positive for abdominal pain. Negative for nausea and vomiting.        BM prior to arrival  (+) flatus   Musculoskeletal: Positive for myalgias.        Vital Signs  Temp:  [36.1 °C (97 °F)-36.7 °C (98 °F)] 36.1 °C (97 °F)  Pulse:  [66-67] 66  Resp:  [16-17] 17  BP: (123-138)/(86-94) 123/87    Physical Exam  Physical Exam   Constitutional: No distress. Nasal cannula in place.   HENT:   Head: Normocephalic.   Eyes: Conjunctivae are normal.   Cardiovascular: Normal rate and regular rhythm.    Pulmonary/Chest: No respiratory distress. She exhibits no tenderness.   Abdominal: Bowel sounds are normal. She exhibits no distension. There is tenderness. There is no rebound and no guarding.   No increase in incisional erythema. No drainage. Well approximated with staples  (+) bowel sounds   Musculoskeletal:   Moves all extremities    Neurological: She is alert.   Skin: Skin is warm and dry.   Nursing note and vitals reviewed.      Laboratory  Recent Results (from the past 24 hour(s))   BASIC METABOLIC PANEL    Collection Time: 09/29/18  3:15 PM   Result Value Ref Range    Sodium 140 135 - 145 mmol/L    Potassium 3.1 (L) 3.6 - 5.5 mmol/L    Chloride 100 96  - 112 mmol/L    Co2 30 20 - 33 mmol/L    Glucose 143 (H) 65 - 99 mg/dL    Bun <3 (L) 8 - 22 mg/dL    Creatinine 0.51 0.50 - 1.40 mg/dL    Calcium 6.6 (LL) 8.5 - 10.5 mg/dL    Anion Gap 10.0 0.0 - 11.9   ESTIMATED GFR    Collection Time: 09/29/18  3:15 PM   Result Value Ref Range    GFR If African American >60 >60 mL/min/1.73 m 2    GFR If Non African American >60 >60 mL/min/1.73 m 2   BASIC METABOLIC PANEL    Collection Time: 09/30/18  3:20 AM   Result Value Ref Range    Sodium 139 135 - 145 mmol/L    Potassium 3.4 (L) 3.6 - 5.5 mmol/L    Chloride 100 96 - 112 mmol/L    Co2 29 20 - 33 mmol/L    Glucose 96 65 - 99 mg/dL    Bun <3 (L) 8 - 22 mg/dL    Creatinine 0.46 (L) 0.50 - 1.40 mg/dL    Calcium 6.9 (LL) 8.5 - 10.5 mg/dL    Anion Gap 10.0 0.0 - 11.9   MAGNESIUM    Collection Time: 09/30/18  3:20 AM   Result Value Ref Range    Magnesium 1.2 (L) 1.5 - 2.5 mg/dL   PHOSPHORUS    Collection Time: 09/30/18  3:20 AM   Result Value Ref Range    Phosphorus 3.5 2.5 - 4.5 mg/dL   ESTIMATED GFR    Collection Time: 09/30/18  3:20 AM   Result Value Ref Range    GFR If African American >60 >60 mL/min/1.73 m 2    GFR If Non African American >60 >60 mL/min/1.73 m 2   CBC WITH DIFFERENTIAL    Collection Time: 09/30/18  5:00 AM   Result Value Ref Range    WBC 5.6 4.8 - 10.8 K/uL    RBC 3.07 (L) 4.20 - 5.40 M/uL    Hemoglobin 10.6 (L) 12.0 - 16.0 g/dL    Hematocrit 31.3 (L) 37.0 - 47.0 %    .0 (H) 81.4 - 97.8 fL    MCH 34.5 (H) 27.0 - 33.0 pg    MCHC 33.9 33.6 - 35.0 g/dL    RDW 50.3 (H) 35.9 - 50.0 fL    Platelet Count 178 164 - 446 K/uL    MPV 11.2 9.0 - 12.9 fL    Nucleated RBC 0.00 /100 WBC    NRBC (Absolute) 0.00 K/uL    Neutrophils-Polys 56.60 44.00 - 72.00 %    Lymphocytes 25.30 22.00 - 41.00 %    Monocytes 12.10 0.00 - 13.40 %    Eosinophils 1.00 0.00 - 6.90 %    Basophils 1.00 0.00 - 1.80 %    Neutrophils (Absolute) 3.28 2.00 - 7.15 K/uL    Lymphs (Absolute) 1.42 1.00 - 4.80 K/uL    Monos (Absolute) 0.68 0.00 - 0.85  K/uL    Eos (Absolute) 0.06 0.00 - 0.51 K/uL    Baso (Absolute) 0.06 0.00 - 0.12 K/uL    Anisocytosis 1+     Macrocytosis 1+    DIFFERENTIAL MANUAL    Collection Time: 09/30/18  5:00 AM   Result Value Ref Range    Bands-Stabs 2.00 0.00 - 10.00 %    Metamyelocytes 1.00 %    Myelocytes 1.00 %    Manual Diff Status PERFORMED    PERIPHERAL SMEAR REVIEW    Collection Time: 09/30/18  5:00 AM   Result Value Ref Range    Peripheral Smear Review see below    PLATELET ESTIMATE    Collection Time: 09/30/18  5:00 AM   Result Value Ref Range    Plt Estimation Normal    MORPHOLOGY    Collection Time: 09/30/18  5:00 AM   Result Value Ref Range    RBC Morphology Present     Polychromia 1+        Fluids    Intake/Output Summary (Last 24 hours) at 09/30/18 0853  Last data filed at 09/30/18 0400   Gross per 24 hour   Intake             3400 ml   Output             2600 ml   Net              800 ml       Core Measures & Quality Metrics  Labs reviewed and Medications reviewed  Parker catheter: No Parker  Central line in place: Need for access    DVT Prophylaxis: Enoxaparin (Lovenox)  DVT prophylaxis - mechanical: SCDs  Ulcer prophylaxis: Yes    Assessed for rehab: Patient was assess for and/or received rehabilitation services during this hospitalization    Total Score: 0    ETOH Screening    Assessment/Plan  Hypomagnesemia   Assessment & Plan    9/27 Supplement 4 grams IV magnesium    9/28 Supplement 4 grams IV magnesium  9/30 Supplement 4 grams IV magnesium. Start oral magnesium   Supplement and trend        Hypokalemia- (present on admission)   Assessment & Plan    9/27 Supplement 20 meq IV potassium   9/28 Supplement 40 meq IV potassium  9/29 Supplement 40 meq IV potassium    - Follow up potassium 3.1   - Potassium 40 mEq BID    9/30 Trend up. Continue oral supplementation  Trend labs        Small bowel obstruction (HCC)- (present on admission)   Assessment & Plan    9/22 Ex lap, lysis of adhesions, small bowel resection.  9/25 7 day  course of antibiotic therapy completed. NG tube clamped  9/26 Interval removal of NG tube.  9/27 Clear liquid diet  9/28 Abdominal wound erythema. Start IV doxycycline.    9/29 Incisional erythema improved. Continue antibiotic therapy. Full liquid diet    9/30 Antibiotic day 2  Houston Amanda DO, Surgery.            Discussed patient condition with Patient and trauma surgery, Dr. Pedro Brown.

## 2018-09-30 NOTE — PROGRESS NOTES
Received bedside report and assumed care at 1900    Pt is A&O x4  Pain 9/10, medicated per MAR  Denies nausea  Tolerating full liquid diet  + Urine Output  + Flatus  - BM   Triple lumen subclavian  Midline abdominal incision, abdominal binder in place  Up with x1 assistance  Bed in lowest position and locked.  Bed alarm on  Reviewed plan of care with patient, bed in lowest position and locked, pt resting comfortably now, call light within reach, all needs met at this time

## 2018-09-30 NOTE — CARE PLAN
Problem: Knowledge Deficit  Goal: Knowledge of the prescribed therapeutic regimen will improve  Outcome: PROGRESSING AS EXPECTED  Medications explained prior to administration    Problem: Fluid Volume:  Goal: Will maintain balanced intake and output  Outcome: PROGRESSING AS EXPECTED  Receiving IV fluids and drinking PO fluids

## 2018-10-01 PROBLEM — E87.1 HYPONATREMIA: Status: RESOLVED | Noted: 2018-09-20 | Resolved: 2018-10-01

## 2018-10-01 LAB
ANION GAP SERPL CALC-SCNC: 10 MMOL/L (ref 0–11.9)
ANISOCYTOSIS BLD QL SMEAR: ABNORMAL
BASOPHILS # BLD AUTO: 0 % (ref 0–1.8)
BASOPHILS # BLD: 0 K/UL (ref 0–0.12)
BUN SERPL-MCNC: <3 MG/DL (ref 8–22)
CALCIUM SERPL-MCNC: 7.1 MG/DL (ref 8.5–10.5)
CHLORIDE SERPL-SCNC: 102 MMOL/L (ref 96–112)
CO2 SERPL-SCNC: 27 MMOL/L (ref 20–33)
CREAT SERPL-MCNC: 0.48 MG/DL (ref 0.5–1.4)
EOSINOPHIL # BLD AUTO: 0.05 K/UL (ref 0–0.51)
EOSINOPHIL NFR BLD: 1.1 % (ref 0–6.9)
ERYTHROCYTE [DISTWIDTH] IN BLOOD BY AUTOMATED COUNT: 51.8 FL (ref 35.9–50)
GLUCOSE SERPL-MCNC: 82 MG/DL (ref 65–99)
HCT VFR BLD AUTO: 31.7 % (ref 37–47)
HGB BLD-MCNC: 10.6 G/DL (ref 12–16)
LYMPHOCYTES # BLD AUTO: 1.04 K/UL (ref 1–4.8)
LYMPHOCYTES NFR BLD: 21.7 % (ref 22–41)
MACROCYTES BLD QL SMEAR: ABNORMAL
MAGNESIUM SERPL-MCNC: 1.8 MG/DL (ref 1.5–2.5)
MANUAL DIFF BLD: NORMAL
MCH RBC QN AUTO: 34.5 PG (ref 27–33)
MCHC RBC AUTO-ENTMCNC: 33.4 G/DL (ref 33.6–35)
MCV RBC AUTO: 103.3 FL (ref 81.4–97.8)
MONOCYTES # BLD AUTO: 0.36 K/UL (ref 0–0.85)
MONOCYTES NFR BLD AUTO: 7.6 % (ref 0–13.4)
MORPHOLOGY BLD-IMP: NORMAL
NEUTROPHILS # BLD AUTO: 3.34 K/UL (ref 2–7.15)
NEUTROPHILS NFR BLD: 66.3 % (ref 44–72)
NEUTS BAND NFR BLD MANUAL: 3.3 % (ref 0–10)
NRBC # BLD AUTO: 0 K/UL
NRBC BLD-RTO: 0 /100 WBC
PLATELET # BLD AUTO: 172 K/UL (ref 164–446)
PLATELET BLD QL SMEAR: NORMAL
PMV BLD AUTO: 11.7 FL (ref 9–12.9)
POTASSIUM SERPL-SCNC: 3.9 MMOL/L (ref 3.6–5.5)
RBC # BLD AUTO: 3.07 M/UL (ref 4.2–5.4)
RBC BLD AUTO: PRESENT
SODIUM SERPL-SCNC: 139 MMOL/L (ref 135–145)
WBC # BLD AUTO: 4.8 K/UL (ref 4.8–10.8)

## 2018-10-01 PROCEDURE — 80048 BASIC METABOLIC PNL TOTAL CA: CPT

## 2018-10-01 PROCEDURE — A9270 NON-COVERED ITEM OR SERVICE: HCPCS | Performed by: NURSE PRACTITIONER

## 2018-10-01 PROCEDURE — 700102 HCHG RX REV CODE 250 W/ 637 OVERRIDE(OP): Performed by: HOSPITALIST

## 2018-10-01 PROCEDURE — A9270 NON-COVERED ITEM OR SERVICE: HCPCS | Performed by: HOSPITALIST

## 2018-10-01 PROCEDURE — 700111 HCHG RX REV CODE 636 W/ 250 OVERRIDE (IP): Performed by: SURGERY

## 2018-10-01 PROCEDURE — 700102 HCHG RX REV CODE 250 W/ 637 OVERRIDE(OP): Performed by: NURSE PRACTITIONER

## 2018-10-01 PROCEDURE — 85007 BL SMEAR W/DIFF WBC COUNT: CPT

## 2018-10-01 PROCEDURE — 83735 ASSAY OF MAGNESIUM: CPT

## 2018-10-01 PROCEDURE — 85027 COMPLETE CBC AUTOMATED: CPT

## 2018-10-01 PROCEDURE — 700111 HCHG RX REV CODE 636 W/ 250 OVERRIDE (IP): Performed by: NURSE PRACTITIONER

## 2018-10-01 PROCEDURE — 770001 HCHG ROOM/CARE - MED/SURG/GYN PRIV*

## 2018-10-01 PROCEDURE — 700102 HCHG RX REV CODE 250 W/ 637 OVERRIDE(OP): Performed by: SURGERY

## 2018-10-01 PROCEDURE — A9270 NON-COVERED ITEM OR SERVICE: HCPCS | Performed by: SURGERY

## 2018-10-01 RX ORDER — POTASSIUM CHLORIDE 20 MEQ/1
20 TABLET, EXTENDED RELEASE ORAL 2 TIMES DAILY
Status: DISCONTINUED | OUTPATIENT
Start: 2018-10-01 | End: 2018-10-03

## 2018-10-01 RX ORDER — MAGNESIUM SULFATE HEPTAHYDRATE 40 MG/ML
2 INJECTION, SOLUTION INTRAVENOUS ONCE
Status: COMPLETED | OUTPATIENT
Start: 2018-10-01 | End: 2018-10-01

## 2018-10-01 RX ORDER — ALBUTEROL SULFATE 90 MCG
2 HFA AEROSOL WITH ADAPTER (GRAM) INHALATION EVERY 6 HOURS PRN
Qty: 6.7 G | Refills: 0 | Status: SHIPPED | OUTPATIENT
Start: 2018-10-01 | End: 2019-04-30 | Stop reason: SDUPTHER

## 2018-10-01 RX ADMIN — LEVOTHYROXINE SODIUM 88 MCG: 88 TABLET ORAL at 06:14

## 2018-10-01 RX ADMIN — MAGNESIUM GLUCONATE 500 MG ORAL TABLET 400 MG: 500 TABLET ORAL at 16:53

## 2018-10-01 RX ADMIN — HYDROMORPHONE HYDROCHLORIDE 2 MG: 2 TABLET ORAL at 16:53

## 2018-10-01 RX ADMIN — HYDROMORPHONE HYDROCHLORIDE 2 MG: 2 TABLET ORAL at 01:57

## 2018-10-01 RX ADMIN — MAGNESIUM GLUCONATE 500 MG ORAL TABLET 400 MG: 500 TABLET ORAL at 08:29

## 2018-10-01 RX ADMIN — ENOXAPARIN SODIUM 40 MG: 100 INJECTION SUBCUTANEOUS at 06:15

## 2018-10-01 RX ADMIN — DOXYCYCLINE 100 MG: 100 TABLET ORAL at 16:53

## 2018-10-01 RX ADMIN — OMEPRAZOLE 20 MG: 20 CAPSULE, DELAYED RELEASE ORAL at 06:14

## 2018-10-01 RX ADMIN — LORAZEPAM 1 MG: 1 TABLET ORAL at 23:03

## 2018-10-01 RX ADMIN — HYDROMORPHONE HYDROCHLORIDE 2 MG: 2 TABLET ORAL at 20:00

## 2018-10-01 RX ADMIN — POTASSIUM CHLORIDE 20 MEQ: 1500 TABLET, EXTENDED RELEASE ORAL at 16:53

## 2018-10-01 RX ADMIN — DOXYCYCLINE 100 MG: 100 TABLET ORAL at 06:14

## 2018-10-01 RX ADMIN — MAGNESIUM SULFATE IN WATER 2 G: 40 INJECTION, SOLUTION INTRAVENOUS at 08:22

## 2018-10-01 RX ADMIN — HYDROMORPHONE HYDROCHLORIDE 2 MG: 2 TABLET ORAL at 09:40

## 2018-10-01 RX ADMIN — HYDROMORPHONE HYDROCHLORIDE 2 MG: 2 TABLET ORAL at 13:03

## 2018-10-01 RX ADMIN — NICOTINE 21 MG: 21 PATCH, EXTENDED RELEASE TRANSDERMAL at 06:15

## 2018-10-01 RX ADMIN — POLYETHYLENE GLYCOL 3350 1 PACKET: 17 POWDER, FOR SOLUTION ORAL at 08:28

## 2018-10-01 RX ADMIN — METHADONE HYDROCHLORIDE 10 MG: 10 TABLET ORAL at 06:14

## 2018-10-01 RX ADMIN — HYDROMORPHONE HYDROCHLORIDE 2 MG: 2 TABLET ORAL at 06:14

## 2018-10-01 RX ADMIN — ANTACID TABLETS 2000 MG: 500 TABLET, CHEWABLE ORAL at 08:28

## 2018-10-01 RX ADMIN — HYDROMORPHONE HYDROCHLORIDE 2 MG: 2 TABLET ORAL at 23:03

## 2018-10-01 ASSESSMENT — PAIN SCALES - GENERAL
PAINLEVEL_OUTOF10: 9
PAINLEVEL_OUTOF10: 10
PAINLEVEL_OUTOF10: 7
PAINLEVEL_OUTOF10: 6
PAINLEVEL_OUTOF10: 9
PAINLEVEL_OUTOF10: 10
PAINLEVEL_OUTOF10: 10

## 2018-10-01 ASSESSMENT — ENCOUNTER SYMPTOMS
NAUSEA: 0
CHILLS: 0
SHORTNESS OF BREATH: 0
ROS GI COMMENTS: BM PRIOR TO ARRIVAL  (+) FLATUS
FEVER: 0
ABDOMINAL PAIN: 1
VOMITING: 0
MYALGIAS: 1

## 2018-10-01 NOTE — ASSESSMENT & PLAN NOTE
9/30 DC oxycodone.  Start oral dilaudid  10/1 DC Methadone. DC IV narcotics   10/4 IV narcotics for immediate post operative period only  10/8 DC IV narcotics

## 2018-10-01 NOTE — TELEPHONE ENCOUNTER
Was the patient seen in the last year in this department? Yes    Does patient have an active prescription for medications requested? No     Received Request Via: Pharmacy      Pt met protocol?: Yes pt last ov 5/2018 is currently an in patient at the hospital

## 2018-10-01 NOTE — CARE PLAN
Problem: Nutritional:  Goal: Achieve adequate nutritional intake  Diet will advance past NPO and patient will consume >50% of meals   Outcome: PROGRESSING SLOWER THAN EXPECTED  PO diet placed x 1 day; per ADLs adequate PO intake thus far    Intervention: Monitor PO intake, weights, and laboratory values  Record percentage of all PO intake conusmed in ADLs to help monitor po adequacy    Intervention: Collaborate with transitional care team and interdisciplinary team to meet patient's needs  RD following

## 2018-10-01 NOTE — PROGRESS NOTES
Trauma / Surgical Daily Progress Note    Date of Service  10/1/2018    Chief Complaint  62 y.o. female admitted 9/19/2018 with Small bowel obstruction (HCC)    Interval Events    Tolerating GI soft diet. No nausea/vomiting.  (+) flatus. (-) BM   Incisional erythema improved. No drainage    Improved pain control with oral dilaudid.    No IV narcotics over night.  Patient requesting methadone be discontinues     Hypokalemia resolved  Hyomagnesemia improved    - Continue GI soft diet. Await BM   - Continue Doxycycline   - DC methadone and IV Dilaudid   - Decrease oral potassium   - 2 grams Iv magnesium   - Pt needs to mobilize.  PT/OT ordered.   - PT/OT for discharge recommendations.   - Pt refusing skilled nursing facilities at this time  - Counseled    Review of Systems  Review of Systems   Constitutional: Negative for chills and fever.   Respiratory: Negative for shortness of breath.    Cardiovascular: Negative for chest pain.   Gastrointestinal: Positive for abdominal pain. Negative for nausea and vomiting.        BM prior to arrival  (+) flatus   Musculoskeletal: Positive for myalgias.        Vital Signs  Temp:  [36.4 °C (97.5 °F)-36.7 °C (98.1 °F)] 36.7 °C (98.1 °F)  Pulse:  [60-67] 62  Resp:  [15-18] 15  BP: (117-130)/(75-81) 121/77    Physical Exam  Physical Exam   Constitutional: No distress. Nasal cannula in place.   HENT:   Head: Normocephalic.   Eyes: Conjunctivae are normal.   Cardiovascular: Normal rate and regular rhythm.    Pulmonary/Chest: No respiratory distress. She exhibits no tenderness.   Abdominal: Bowel sounds are normal. She exhibits no distension. There is tenderness. There is no rebound and no guarding.   No increase in incisional erythema. No drainage. Well approximated with staples  Improving abdominal erythema   (+) bowel sounds   Musculoskeletal:   Moves all extremities    Neurological: She is alert.   Skin: Skin is warm and dry.   Nursing note and vitals reviewed.      Laboratory  Recent  Results (from the past 24 hour(s))   BASIC METABOLIC PANEL    Collection Time: 10/01/18  4:10 AM   Result Value Ref Range    Sodium 139 135 - 145 mmol/L    Potassium 3.9 3.6 - 5.5 mmol/L    Chloride 102 96 - 112 mmol/L    Co2 27 20 - 33 mmol/L    Glucose 82 65 - 99 mg/dL    Bun <3 (L) 8 - 22 mg/dL    Creatinine 0.48 (L) 0.50 - 1.40 mg/dL    Calcium 7.1 (L) 8.5 - 10.5 mg/dL    Anion Gap 10.0 0.0 - 11.9   CBC WITH DIFFERENTIAL    Collection Time: 10/01/18  4:10 AM   Result Value Ref Range    WBC 4.8 4.8 - 10.8 K/uL    RBC 3.07 (L) 4.20 - 5.40 M/uL    Hemoglobin 10.6 (L) 12.0 - 16.0 g/dL    Hematocrit 31.7 (L) 37.0 - 47.0 %    .3 (H) 81.4 - 97.8 fL    MCH 34.5 (H) 27.0 - 33.0 pg    MCHC 33.4 (L) 33.6 - 35.0 g/dL    RDW 51.8 (H) 35.9 - 50.0 fL    Platelet Count 172 164 - 446 K/uL    MPV 11.7 9.0 - 12.9 fL    Nucleated RBC 0.00 /100 WBC    NRBC (Absolute) 0.00 K/uL    Neutrophils-Polys 66.30 44.00 - 72.00 %    Lymphocytes 21.70 (L) 22.00 - 41.00 %    Monocytes 7.60 0.00 - 13.40 %    Eosinophils 1.10 0.00 - 6.90 %    Basophils 0.00 0.00 - 1.80 %    Neutrophils (Absolute) 3.34 2.00 - 7.15 K/uL    Lymphs (Absolute) 1.04 1.00 - 4.80 K/uL    Monos (Absolute) 0.36 0.00 - 0.85 K/uL    Eos (Absolute) 0.05 0.00 - 0.51 K/uL    Baso (Absolute) 0.00 0.00 - 0.12 K/uL    Anisocytosis 1+     Macrocytosis 1+    MAGNESIUM    Collection Time: 10/01/18  4:10 AM   Result Value Ref Range    Magnesium 1.8 1.5 - 2.5 mg/dL   DIFFERENTIAL MANUAL    Collection Time: 10/01/18  4:10 AM   Result Value Ref Range    Bands-Stabs 3.30 0.00 - 10.00 %    Manual Diff Status PERFORMED    PERIPHERAL SMEAR REVIEW    Collection Time: 10/01/18  4:10 AM   Result Value Ref Range    Peripheral Smear Review see below    PLATELET ESTIMATE    Collection Time: 10/01/18  4:10 AM   Result Value Ref Range    Plt Estimation Normal    MORPHOLOGY    Collection Time: 10/01/18  4:10 AM   Result Value Ref Range    RBC Morphology Present    ESTIMATED GFR    Collection  Time: 10/01/18  4:10 AM   Result Value Ref Range    GFR If African American >60 >60 mL/min/1.73 m 2    GFR If Non African American >60 >60 mL/min/1.73 m 2       Fluids    Intake/Output Summary (Last 24 hours) at 10/01/18 0810  Last data filed at 10/01/18 0400   Gross per 24 hour   Intake             1405 ml   Output             2400 ml   Net             -995 ml       Core Measures & Quality Metrics  Labs reviewed and Medications reviewed  Parker catheter: No Parker  Central line in place: Need for access    DVT Prophylaxis: Enoxaparin (Lovenox)  DVT prophylaxis - mechanical: SCDs  Ulcer prophylaxis: Yes    Assessed for rehab: Patient was assess for and/or received rehabilitation services during this hospitalization    Total Score: 0    ETOH Screening    Assessment/Plan  Hypomagnesemia   Assessment & Plan    9/27 Supplement 4 grams IV magnesium    9/28 Supplement 4 grams IV magnesium  9/30 Supplement 4 grams IV magnesium. Start oral magnesium   10/1 Supplement 2 grams IV magnesium   Supplement and trend        Hypokalemia- (present on admission)   Assessment & Plan    9/27 Supplement 20 mEq IV potassium   9/28 Supplement 40 mEq IV potassium  9/29 Supplement 40 mEq IV potassium    - Follow up potassium 3.1   - Potassium 40 mEq BID    10/1 Trend up. Continue oral supplementation. Decrease Potassium to 20 mEq BID  Trend labs        Small bowel obstruction (HCC)- (present on admission)   Assessment & Plan    9/22 Ex lap, lysis of adhesions, small bowel resection.  9/25 7 day course of antibiotic therapy completed. NG tube clamped  9/26 Interval removal of NG tube.  9/27 Clear liquid diet  9/28 Abdominal wound erythema. Start IV doxycycline.    9/29 Incisional erythema improved. Continue antibiotic therapy. Full liquid diet    10/1 Antibiotic day 3  Houston Amanda DO, Surgery.            Discussed patient condition with Patient and trauma surgery, Dr. Houston Amanda.

## 2018-10-01 NOTE — DISCHARGE PLANNING
Anticipated Discharge Disposition: [facility]    Action: This RN CM spoke with pt at bedside.  The pt wants to transfer to [facility], pt has been accepted.  This RN CM updated [facility].    Barriers to Discharge: none.    Plan: Pt needs to have BM and pending medical clearance pt can transfer to [facility].

## 2018-10-01 NOTE — CARE PLAN
Problem: Pain Management  Goal: Pain level will decrease to patient's comfort goal  Outcome: PROGRESSING AS EXPECTED  Patient taking PO pain medication instead of IV meds    Problem: Mobility  Goal: Risk for activity intolerance will decrease  Outcome: PROGRESSING AS EXPECTED  Patient ambulated in the hallway during day

## 2018-10-01 NOTE — PROGRESS NOTES
Received bedside report and assumed care at 1900    Pt is A&O x4  Pain 6/10, declines intervention at this time  Denies nausea  Tolerating GI soft diet  + Urine Output  + Flatus  - BM   Triple lumen subclavian, SL  Midline abdominal incision, abdominal binder in place  Up with x1 assistance  Bed in lowest position and locked.  Bed alarm on  Reviewed plan of care with patient, bed in lowest position and locked, pt resting comfortably now, call light within reach, all needs met at this time

## 2018-10-02 LAB
ALBUMIN SERPL BCP-MCNC: 3.1 G/DL (ref 3.2–4.9)
ALBUMIN/GLOB SERPL: 1 G/DL
ALP SERPL-CCNC: 56 U/L (ref 30–99)
ALT SERPL-CCNC: 10 U/L (ref 2–50)
ANION GAP SERPL CALC-SCNC: 10 MMOL/L (ref 0–11.9)
AST SERPL-CCNC: 16 U/L (ref 12–45)
BASOPHILS # BLD AUTO: 0.9 % (ref 0–1.8)
BASOPHILS # BLD: 0.05 K/UL (ref 0–0.12)
BILIRUB SERPL-MCNC: 0.5 MG/DL (ref 0.1–1.5)
BUN SERPL-MCNC: 3 MG/DL (ref 8–22)
CALCIUM SERPL-MCNC: 7.5 MG/DL (ref 8.5–10.5)
CHLORIDE SERPL-SCNC: 101 MMOL/L (ref 96–112)
CO2 SERPL-SCNC: 28 MMOL/L (ref 20–33)
CREAT SERPL-MCNC: 0.61 MG/DL (ref 0.5–1.4)
EOSINOPHIL # BLD AUTO: 0.14 K/UL (ref 0–0.51)
EOSINOPHIL NFR BLD: 2.5 % (ref 0–6.9)
ERYTHROCYTE [DISTWIDTH] IN BLOOD BY AUTOMATED COUNT: 51.7 FL (ref 35.9–50)
GLOBULIN SER CALC-MCNC: 3 G/DL (ref 1.9–3.5)
GLUCOSE SERPL-MCNC: 88 MG/DL (ref 65–99)
HCT VFR BLD AUTO: 34.6 % (ref 37–47)
HGB BLD-MCNC: 11.8 G/DL (ref 12–16)
IMM GRANULOCYTES # BLD AUTO: 0.27 K/UL (ref 0–0.11)
IMM GRANULOCYTES NFR BLD AUTO: 4.8 % (ref 0–0.9)
LYMPHOCYTES # BLD AUTO: 1.51 K/UL (ref 1–4.8)
LYMPHOCYTES NFR BLD: 26.9 % (ref 22–41)
MAGNESIUM SERPL-MCNC: 1.7 MG/DL (ref 1.5–2.5)
MCH RBC QN AUTO: 35.3 PG (ref 27–33)
MCHC RBC AUTO-ENTMCNC: 34.1 G/DL (ref 33.6–35)
MCV RBC AUTO: 103.6 FL (ref 81.4–97.8)
MONOCYTES # BLD AUTO: 0.51 K/UL (ref 0–0.85)
MONOCYTES NFR BLD AUTO: 9.1 % (ref 0–13.4)
NEUTROPHILS # BLD AUTO: 3.13 K/UL (ref 2–7.15)
NEUTROPHILS NFR BLD: 55.8 % (ref 44–72)
NRBC # BLD AUTO: 0 K/UL
NRBC BLD-RTO: 0 /100 WBC
PLATELET # BLD AUTO: 194 K/UL (ref 164–446)
PMV BLD AUTO: 11.8 FL (ref 9–12.9)
POTASSIUM SERPL-SCNC: 4.1 MMOL/L (ref 3.6–5.5)
PROT SERPL-MCNC: 6.1 G/DL (ref 6–8.2)
RBC # BLD AUTO: 3.34 M/UL (ref 4.2–5.4)
SODIUM SERPL-SCNC: 139 MMOL/L (ref 135–145)
WBC # BLD AUTO: 5.6 K/UL (ref 4.8–10.8)

## 2018-10-02 PROCEDURE — A9270 NON-COVERED ITEM OR SERVICE: HCPCS | Performed by: HOSPITALIST

## 2018-10-02 PROCEDURE — 700111 HCHG RX REV CODE 636 W/ 250 OVERRIDE (IP): Performed by: NURSE PRACTITIONER

## 2018-10-02 PROCEDURE — 700111 HCHG RX REV CODE 636 W/ 250 OVERRIDE (IP): Performed by: SURGERY

## 2018-10-02 PROCEDURE — 97535 SELF CARE MNGMENT TRAINING: CPT

## 2018-10-02 PROCEDURE — 97530 THERAPEUTIC ACTIVITIES: CPT

## 2018-10-02 PROCEDURE — A9270 NON-COVERED ITEM OR SERVICE: HCPCS | Performed by: NURSE PRACTITIONER

## 2018-10-02 PROCEDURE — 83735 ASSAY OF MAGNESIUM: CPT

## 2018-10-02 PROCEDURE — 700102 HCHG RX REV CODE 250 W/ 637 OVERRIDE(OP): Performed by: NURSE PRACTITIONER

## 2018-10-02 PROCEDURE — 770001 HCHG ROOM/CARE - MED/SURG/GYN PRIV*

## 2018-10-02 PROCEDURE — 85025 COMPLETE CBC W/AUTO DIFF WBC: CPT

## 2018-10-02 PROCEDURE — A9270 NON-COVERED ITEM OR SERVICE: HCPCS | Performed by: SURGERY

## 2018-10-02 PROCEDURE — 700102 HCHG RX REV CODE 250 W/ 637 OVERRIDE(OP): Performed by: SURGERY

## 2018-10-02 PROCEDURE — 80053 COMPREHEN METABOLIC PANEL: CPT

## 2018-10-02 PROCEDURE — 700102 HCHG RX REV CODE 250 W/ 637 OVERRIDE(OP): Performed by: HOSPITALIST

## 2018-10-02 RX ORDER — POLYVINYL ALCOHOL 14 MG/ML
2 SOLUTION/ DROPS OPHTHALMIC
Qty: 1 BOTTLE | Refills: 3 | Status: SHIPPED | OUTPATIENT
Start: 2018-10-02 | End: 2018-10-11

## 2018-10-02 RX ORDER — NICOTINE 21 MG/24HR
1 PATCH, TRANSDERMAL 24 HOURS TRANSDERMAL EVERY 24 HOURS
Qty: 30 PATCH
Start: 2018-10-02 | End: 2018-10-11

## 2018-10-02 RX ORDER — MAGNESIUM SULFATE HEPTAHYDRATE 40 MG/ML
4 INJECTION, SOLUTION INTRAVENOUS ONCE
Status: COMPLETED | OUTPATIENT
Start: 2018-10-02 | End: 2018-10-02

## 2018-10-02 RX ORDER — POTASSIUM CHLORIDE 20 MEQ/1
20 TABLET, EXTENDED RELEASE ORAL 2 TIMES DAILY
Qty: 60 TAB | Refills: 11 | Status: SHIPPED | OUTPATIENT
Start: 2018-10-02 | End: 2018-10-11

## 2018-10-02 RX ORDER — HYDROMORPHONE HYDROCHLORIDE 2 MG/1
2 TABLET ORAL
Qty: 24 TAB | Refills: 0 | Status: SHIPPED | OUTPATIENT
Start: 2018-10-02 | End: 2018-10-11

## 2018-10-02 RX ORDER — LORAZEPAM 1 MG/1
1 TABLET ORAL 2 TIMES DAILY PRN
Refills: 0
Start: 2018-10-02 | End: 2018-10-11

## 2018-10-02 RX ORDER — DOXYCYCLINE 100 MG/1
100 TABLET ORAL EVERY 12 HOURS
Refills: 0
Start: 2018-10-02 | End: 2018-10-11

## 2018-10-02 RX ADMIN — MAGNESIUM SULFATE IN WATER 4 G: 40 INJECTION, SOLUTION INTRAVENOUS at 08:25

## 2018-10-02 RX ADMIN — HYDROMORPHONE HYDROCHLORIDE 2 MG: 2 TABLET ORAL at 13:43

## 2018-10-02 RX ADMIN — HYDROMORPHONE HYDROCHLORIDE 2 MG: 2 TABLET ORAL at 20:02

## 2018-10-02 RX ADMIN — ENOXAPARIN SODIUM 40 MG: 100 INJECTION SUBCUTANEOUS at 04:45

## 2018-10-02 RX ADMIN — DOXYCYCLINE 100 MG: 100 TABLET ORAL at 04:45

## 2018-10-02 RX ADMIN — POTASSIUM CHLORIDE 20 MEQ: 1500 TABLET, EXTENDED RELEASE ORAL at 17:01

## 2018-10-02 RX ADMIN — HYDROMORPHONE HYDROCHLORIDE 2 MG: 2 TABLET ORAL at 10:28

## 2018-10-02 RX ADMIN — MAGNESIUM GLUCONATE 500 MG ORAL TABLET 400 MG: 500 TABLET ORAL at 04:45

## 2018-10-02 RX ADMIN — LEVOTHYROXINE SODIUM 88 MCG: 88 TABLET ORAL at 04:45

## 2018-10-02 RX ADMIN — MAGNESIUM GLUCONATE 500 MG ORAL TABLET 400 MG: 500 TABLET ORAL at 17:01

## 2018-10-02 RX ADMIN — HYDROMORPHONE HYDROCHLORIDE 2 MG: 2 TABLET ORAL at 06:30

## 2018-10-02 RX ADMIN — HYDROMORPHONE HYDROCHLORIDE 2 MG: 2 TABLET ORAL at 03:25

## 2018-10-02 RX ADMIN — DOXYCYCLINE 100 MG: 100 TABLET ORAL at 17:01

## 2018-10-02 RX ADMIN — LORAZEPAM 1 MG: 1 TABLET ORAL at 23:01

## 2018-10-02 RX ADMIN — OMEPRAZOLE 20 MG: 20 CAPSULE, DELAYED RELEASE ORAL at 04:45

## 2018-10-02 RX ADMIN — NICOTINE 21 MG: 21 PATCH, EXTENDED RELEASE TRANSDERMAL at 04:44

## 2018-10-02 RX ADMIN — ANTACID TABLETS 2000 MG: 500 TABLET, CHEWABLE ORAL at 04:45

## 2018-10-02 RX ADMIN — HYDROMORPHONE HYDROCHLORIDE 2 MG: 2 TABLET ORAL at 17:01

## 2018-10-02 RX ADMIN — POTASSIUM CHLORIDE 20 MEQ: 1500 TABLET, EXTENDED RELEASE ORAL at 04:45

## 2018-10-02 RX ADMIN — HYDROMORPHONE HYDROCHLORIDE 2 MG: 2 TABLET ORAL at 23:01

## 2018-10-02 ASSESSMENT — COGNITIVE AND FUNCTIONAL STATUS - GENERAL
SUGGESTED CMS G CODE MODIFIER DAILY ACTIVITY: CK
MOVING FROM LYING ON BACK TO SITTING ON SIDE OF FLAT BED: UNABLE
TURNING FROM BACK TO SIDE WHILE IN FLAT BAD: UNABLE
PERSONAL GROOMING: A LITTLE
STANDING UP FROM CHAIR USING ARMS: A LITTLE
DRESSING REGULAR UPPER BODY CLOTHING: A LITTLE
MOBILITY SCORE: 12
WALKING IN HOSPITAL ROOM: A LITTLE
DRESSING REGULAR LOWER BODY CLOTHING: A LITTLE
CLIMB 3 TO 5 STEPS WITH RAILING: A LITTLE
SUGGESTED CMS G CODE MODIFIER MOBILITY: CL
DAILY ACTIVITIY SCORE: 19
TOILETING: A LITTLE
HELP NEEDED FOR BATHING: A LITTLE
MOVING TO AND FROM BED TO CHAIR: UNABLE

## 2018-10-02 ASSESSMENT — GAIT ASSESSMENTS
GAIT LEVEL OF ASSIST: CONTACT GUARD ASSIST
DISTANCE (FEET): 15
ASSISTIVE DEVICE: FRONT WHEEL WALKER
DEVIATION: ANTALGIC;DECREASED BASE OF SUPPORT;DECREASED HEEL STRIKE;DECREASED TOE OFF;OTHER (COMMENT)

## 2018-10-02 ASSESSMENT — PAIN SCALES - GENERAL
PAINLEVEL_OUTOF10: 9
PAINLEVEL_OUTOF10: 9
PAINLEVEL_OUTOF10: 8
PAINLEVEL_OUTOF10: 9
PAINLEVEL_OUTOF10: 10
PAINLEVEL_OUTOF10: 8
PAINLEVEL_OUTOF10: 10

## 2018-10-02 ASSESSMENT — ENCOUNTER SYMPTOMS
SHORTNESS OF BREATH: 0
MYALGIAS: 1
CHILLS: 0
VOMITING: 0
FEVER: 0
NAUSEA: 0
ABDOMINAL PAIN: 1

## 2018-10-02 NOTE — THERAPY
"Physical Therapy Treatment completed.   Bed Mobility:  Supine to Sit: Minimal Assist (from raised HOB)  Transfers: Sit to Stand: Contact Guard Assist (with FWW)  Gait: Level Of Assist: Contact Guard Assist with Front-Wheel Walker       Plan of Care: Will benefit from Physical Therapy 3 times per week  Discharge Recommendations: Equipment: Will Continue to Assess for Equipment Needs.     Pt with continued mobility difficulties during ambulation; pt will be alone at home during the day and is unable to ambulate a household distance without assist and wishes to participate in SNF placement to improve aerobic endurance/balance. From a PT perspective, pt has many goals to work toward and SNF is the recommendation; will follow.       See \"Rehab Therapy-Acute\" Patient Summary Report for complete documentation.       "

## 2018-10-02 NOTE — DISCHARGE SUMMARY
DATE OF ADMISSION:  09/19/2018    DATE OF TRANSFER:  10/02/2018    ADMITTING PHYSICIAN:  Tay Sorto MD    CONSULTING PHYSICIANS:  Houston Amanda DO, surgery    DISCHARGE DIAGNOSES:  1.  Small-bowel obstruction.  2.  Ileus.  3.  Hypokalemia.  4.  Hypomagnesemia.  5.  Debility.  6.  Chronic post-procedural pain.  7.  History of general anxiety disorder.  8.  History of hypothyroidism.  9.  History of ulcerative esophagitis.  10.  History of tobacco use.  11.  History of osteoporosis.  12.  History of gout.  13.  History of parotid cyst.  14.  History of restless leg syndrome.  15.  History of anxiety.  16.  History of gynecological disorder.  17.  History of arthritis.  18.  History of unspecified essential hypertension.  19.  History of psychiatric problems.  20.  History of urinary incontinence.  21.  History of hemorrhagic disorder.    PROCEDURES:  Date of procedure, 09/22/2018.  Procedure performed by Dr. Houston Amanda; exploratory laparotomy, lysis of adhesion, and bowel resection.    HISTORY OF PRESENT ILLNESS:  The patient is a 63-year-old female with an   extensive past medical history who presented on 09/19/2018 with a chief   complaint of abdominal pain.  Review of the prior records indicate that the   pain started 2 days prior to admission and there was associated nausea and   voluminous bilious vomiting.    HOSPITAL COURSE:  She was admitted to Dr. Tay Sorto, medicine.  Workup and   imaging were consistent with a small-bowel obstruction and Dr. Houston Amanda,   general surgery was consulted on 09/20/2018.    She proceeded to the operating theater on 09/22/2018 where she underwent an   exploratory laparotomy, lysis of adhesion, and small bowel resection.  She   developed a postoperative ileus and her nasogastric tube was removed  on 09/26.  A clear liquid diet was initiated on 09/27 and she was advanced to a  GI soft diet.  At this time she is tolerating her oral diet and is having bowel movements  (10/01/2018 and 10/02/2018).  The midline incision is well approximated with staples in place, no drainage and improving erythema to the central portion. The redness was outlined and a 7 days course of doxycycline will completed on 10/07/2018.    The patient did take narcotics in the outpatient setting.  There was some   post-procedural pain.  She currently has adequate pain control on oral Dilaudid.    Laboratory studies demonstrated hypokalemia and hypomagnesemia.  She did   require IV potassium and magnesium supplementation on multiple occasions.  At   this time, her potassium is 4.1 and her magnesium is 1.7 and she tolerating oral supplementation.  Her electrolytes will need to be closely followed in the post-acute   setting.    Given the patient's past medical history and current surgery, she will need   post-acute services.  She was tentatively accepted to [facility]   on 10/02/2018.  At this time, she was a Eric coma score of 15.  She is   afebrile.  She is nontoxic in appearance.  She is tolerating a GI soft diet   and she is having bowel movements.  Her white blood cell count on the day of   discharge was 5.6.  She was nontoxic in appearance and she was afebrile.    DISCHARGE PHYSICAL EXAMINATION:  Please see exam dated 10/02/2018.    DISCHARGE MEDICATIONS:  Please see med reconciliation record dated 10/02/2018.    DISPOSITION:  The patient will be discharged to [facility] on 10/02/2018.    She will follow with Dr. Houston Amanda within 1 week's time as needed if   symptoms worsen for wound check and staple removal.  The patient has been   extensively counseled and all of her questions have been answered.  Special   attention was paid to the signs and symptoms of infection, new or worsening   abdominal pain and to seek immediate medical attention if these do develop.    She demonstrates understanding of her discharge instructions and gives verbal   compliance.    The opioid risk tool and  narcotics check was completed by [facility] prior to transfer.    TIME SPENT ON DISCHARGE:  60 minutes.       ____________________________________     AKIL SMITH / JATIN    DD:  10/02/2018 09:25:48  DT:  10/02/2018 10:37:28    D#:  [id number]  Job#:  [id number]    cc: DYLAN GRANDA MD, PAIGE BOWEN DO

## 2018-10-02 NOTE — PROGRESS NOTES
Script for PO dilaudid destroyed per Reggie NP request.  Script was given for pt to go to SNF, but D/C plans cancelled at this time.

## 2018-10-02 NOTE — PROGRESS NOTES
Trauma / Surgical Daily Progress Note    Date of Service  10/2/2018    Chief Complaint  63 y.o. female admitted 9/19/2018 with Small bowel obstruction (HCC)    Interval Events    No critical events overnight   Tolerating oral diet   (+) BM's 10/1 and 10/2    No increase in incisional redness. No drainage  Afebrile, nontoxic in appearance, no leukocytosis   Adequate pain control with oral dilaudid  Serum magnesium 1.7    - Continue 7 day course of antibiotic therapy   - Supplement 4 grams of IV magnesium   - Disposition: [facility] today   - Counseled     Review of Systems  Review of Systems   Constitutional: Negative for chills and fever.   Respiratory: Negative for shortness of breath.    Cardiovascular: Negative for chest pain.   Gastrointestinal: Positive for abdominal pain. Negative for nausea and vomiting.        (+) BM's 10/1 and 10/2  (+) flatus   Musculoskeletal: Positive for myalgias.        Vital Signs  Temp:  [36.6 °C (97.8 °F)-37.2 °C (99 °F)] 37.2 °C (99 °F)  Pulse:  [61-64] 64  Resp:  [12-18] 12  BP: (109-129)/(72-78) 129/78    Physical Exam  Physical Exam   Constitutional: No distress. Nasal cannula in place.   HENT:   Head: Normocephalic.   Eyes: Conjunctivae are normal.   Cardiovascular: Normal rate and regular rhythm.    Pulmonary/Chest: No respiratory distress. She exhibits no tenderness.   Abdominal: Bowel sounds are normal. She exhibits no distension. There is tenderness. There is no rebound and no guarding.   No increase in incisional erythema. No drainage. Well approximated with staples  (+) bowel sounds   Musculoskeletal:   Moves all extremities    Neurological: She is alert.   Skin: Skin is warm and dry.   Nursing note and vitals reviewed.      Laboratory  Recent Results (from the past 24 hour(s))   CBC WITH DIFFERENTIAL    Collection Time: 10/02/18  3:32 AM   Result Value Ref Range    WBC 5.6 4.8 - 10.8 K/uL    RBC 3.34 (L) 4.20 - 5.40 M/uL    Hemoglobin 11.8 (L) 12.0 - 16.0  g/dL    Hematocrit 34.6 (L) 37.0 - 47.0 %    .6 (H) 81.4 - 97.8 fL    MCH 35.3 (H) 27.0 - 33.0 pg    MCHC 34.1 33.6 - 35.0 g/dL    RDW 51.7 (H) 35.9 - 50.0 fL    Platelet Count 194 164 - 446 K/uL    MPV 11.8 9.0 - 12.9 fL    Neutrophils-Polys 55.80 44.00 - 72.00 %    Lymphocytes 26.90 22.00 - 41.00 %    Monocytes 9.10 0.00 - 13.40 %    Eosinophils 2.50 0.00 - 6.90 %    Basophils 0.90 0.00 - 1.80 %    Immature Granulocytes 4.80 (H) 0.00 - 0.90 %    Nucleated RBC 0.00 /100 WBC    Neutrophils (Absolute) 3.13 2.00 - 7.15 K/uL    Lymphs (Absolute) 1.51 1.00 - 4.80 K/uL    Monos (Absolute) 0.51 0.00 - 0.85 K/uL    Eos (Absolute) 0.14 0.00 - 0.51 K/uL    Baso (Absolute) 0.05 0.00 - 0.12 K/uL    Immature Granulocytes (abs) 0.27 (H) 0.00 - 0.11 K/uL    NRBC (Absolute) 0.00 K/uL   MAGNESIUM    Collection Time: 10/02/18  3:32 AM   Result Value Ref Range    Magnesium 1.7 1.5 - 2.5 mg/dL   COMP METABOLIC PANEL    Collection Time: 10/02/18  3:32 AM   Result Value Ref Range    Sodium 139 135 - 145 mmol/L    Potassium 4.1 3.6 - 5.5 mmol/L    Chloride 101 96 - 112 mmol/L    Co2 28 20 - 33 mmol/L    Anion Gap 10.0 0.0 - 11.9    Glucose 88 65 - 99 mg/dL    Bun 3 (L) 8 - 22 mg/dL    Creatinine 0.61 0.50 - 1.40 mg/dL    Calcium 7.5 (L) 8.5 - 10.5 mg/dL    AST(SGOT) 16 12 - 45 U/L    ALT(SGPT) 10 2 - 50 U/L    Alkaline Phosphatase 56 30 - 99 U/L    Total Bilirubin 0.5 0.1 - 1.5 mg/dL    Albumin 3.1 (L) 3.2 - 4.9 g/dL    Total Protein 6.1 6.0 - 8.2 g/dL    Globulin 3.0 1.9 - 3.5 g/dL    A-G Ratio 1.0 g/dL   ESTIMATED GFR    Collection Time: 10/02/18  3:32 AM   Result Value Ref Range    GFR If African American >60 >60 mL/min/1.73 m 2    GFR If Non African American >60 >60 mL/min/1.73 m 2       Fluids    Intake/Output Summary (Last 24 hours) at 10/02/18 0824  Last data filed at 10/02/18 0400   Gross per 24 hour   Intake             1200 ml   Output                0 ml   Net             1200 ml       Core Measures & Quality  Metrics  Labs reviewed and Medications reviewed  Parker catheter: No Parker  Central line in place: Need for access    DVT Prophylaxis: Enoxaparin (Lovenox)  DVT prophylaxis - mechanical: SCDs  Ulcer prophylaxis: Yes    Assessed for rehab: Patient was assess for and/or received rehabilitation services during this hospitalization    Total Score: 0    ETOH Screening    Assessment/Plan  Hypomagnesemia   Assessment & Plan    9/27 Supplement 4 grams IV magnesium    9/28 Supplement 4 grams IV magnesium  9/30 Supplement 4 grams IV magnesium. Start oral magnesium   10/1 Supplement 2 grams IV magnesium   10/2 Supplement 4 grams IV magnessium  Supplement and trend        Hypokalemia- (present on admission)   Assessment & Plan    9/27 Supplement 20 mEq IV potassium   9/28 Supplement 40 mEq IV potassium  9/29 Supplement 40 mEq IV potassium    - Follow up potassium 3.1   - Potassium 40 mEq BID    10/1 Trend up. Continue oral supplementation. Decrease potassium supplementation to 20 mEq BID  10/2 Continue oral potassium supplementation 20 mEq BID  Trend labs        Small bowel obstruction (HCC)- (present on admission)   Assessment & Plan    9/22 Ex lap, lysis of adhesions, small bowel resection.  9/25 7 day course of antibiotic therapy completed. NG tube clamped  9/26 Interval removal of NG tube.  9/27 Clear liquid diet  9/28 Abdominal wound erythema. Start Doxycycline.    9/29 Incisional erythema improved. Continue antibiotic therapy. Full liquid diet    9/30 GI soft diet  10/2 Antibiotic day 5 (stops date 10/4 at 1800)  Houston Amanda DO, Surgery.        Other chronic postprocedural pain   Assessment & Plan    9/30 - DC oxycodone.  Start oral dilaudid  10/1 - DC Methadone. DC IV narcotics        Hypothyroidism- (present on admission)   Assessment & Plan    Chronic condition treated with Synthroid.  Resumed maintenance medication.            Discussed patient condition with RN, Patient and trauma surgery, Dr. Houston Amanda.

## 2018-10-02 NOTE — CARE PLAN
Problem: Safety  Goal: Will remain free from injury  Outcome: PROGRESSING AS EXPECTED  Safety precautions in place. Bed in locked/low position. 2 side rails up. Treaded socks. BA on. Call light in reach, calls appropriately. Hourly rounding practiced.    Problem: Pain Management  Goal: Pain level will decrease to patient's comfort goal  Outcome: PROGRESSING AS EXPECTED  Pain managed with Dilaudid PO.

## 2018-10-02 NOTE — FACE TO FACE
Face to Face Supporting Documentation - Home Health    The encounter with this patient was in whole or in part the primary reason for home health admission.    Date of encounter:   Patient:                    MRN:                       YOB: 2017  Marium Renteria  [id number]  1955     Home health to see patient for:  Skilled Nursing care for assessment, interventions & education, Wound Care, Home health aide, Physical Therapy evaluation and treatment and Occupational therapy evaluation and treatment    Skilled need for:  New Onset Medical Diagnosis post operative exploratory laparotomy     Skilled nursing interventions to include:  Comment: wound care, mobilization     Homebound status evidenced by:  Needs the assistance of another person in order to leave the home. Leaving home requires a considerable and taxing effort. There is a normal inability to leave the home.    Community Physician to provide follow up care: Huma Moon M.D.     Optional Interventions? No      I certify the face to face encounter for this home health care referral meets the CMS requirements and the encounter/clinical assessment with the patient was, in whole, or in part, for the medical condition(s) listed above, which is the primary reason for home health care. Based on my clinical findings: the service(s) are medically necessary, support the need for home health care, and the homebound criteria are met.  I certify that this patient has had a face to face encounter by myself.  SUHAS Dunbar. - NPI: [id number]

## 2018-10-02 NOTE — DISCHARGE PLANNING
Agency/Facility Name: [facility]   Spoke To: Corrine   Outcome: Patient was accepted and was to transport today @ 1pm to facility.     CCA received call @ 1014 from [facility] stating that she is no longer accepting the patient because they are a home health candidate instead.     CORI Miranda has been notified.

## 2018-10-02 NOTE — THERAPY
"Occupational Therapy Treatment completed with focus on ADLs, ADL transfers and patient education.  Functional Status:Pt was seen for Occupational Therapy treatment today, see Therapy Kardex for details. Treatment included education in breath control with activity and at rest, self pacing techs and energy conservation for pain management. Educated pt in safety awareness techs as well. Psychosocial intervention addressed. CGA/Min A for sit to stand and for ambulation ADL's to/from BR. Pt demo CGA for toilet transfers. Set up only for toilet hygiene seated base. CGA for clothing management up over hips in a standing position.  Pt demonstrated SBA for UB dressing, Min A for LB dressing using sock aid for sock donning. Pt stood at sink for oral hygiene and grooming, sat to wash hair with shampoo cap requiring Min A. Able to comb out hair without c/o pain in Right shoulder.  Pt also demonstrated  Min A for Ambulating ADL's with FWW with fatigue. Pt will be home alone most of day while  is at work. When asked about who would make her meals and warm up foods for breakfast and lunch, pt stated, she \"did not know\". \"I can wait to eat until my  can come home\".  \"We cant afford for him to buy me fast foods everyday\".  Pt stated she could not microwave foods. Pt stated she has a neighbor who can help but also stated her \"friend has bipolar disorder and cannot always be counted on\". Pt will need to be stronger, safer and more indep to return home to [facility] living. Spoke with OTR re: change in d/c plan recommendations for SNF rehab prior to d/c home. RN/CM informed. Pt was left up in a chair, call light in reach, bedside table in front of her  and nursing is aware.  Continue Occupational Therapy services as per plan.    Plan of Care: Will benefit from Occupational Therapy 3 times per week  Discharge Recommendations:  Equipment Will Continue to Assess for Equipment Needs. Post-acute therapy Discharge to a " "transitional care facility for continued skilled therapy services.    See \"Rehab Therapy-Acute\" Patient Summary Report for complete documentation.   "

## 2018-10-02 NOTE — DISCHARGE PLANNING
"Anticipated Discharge Disposition: TBD    Action: This RN CM discussed pt's dc disposition with Dr. Amanda since the pt has been denied by SNF due to \"no SNF needs.\"  PT/OT to re-evaluate pt per MD request.  Physical Therapy is at bedside now to evaluate the pt.  Dr. Amanda at bedside and updated pt on POC.     Barriers to Discharge: PT/OT recommendations for dc disposition    Plan: Pending recommendations referrals will be sent to appropriate entities     "

## 2018-10-02 NOTE — PROGRESS NOTES
"/76   Pulse 61   Temp 37.1 °C (98.8 °F)   Resp 18   Ht 1.549 m (5' 1\")   Wt 71.2 kg (156 lb 15.5 oz)   LMP 10/02/1980   SpO2 92%   Breastfeeding? No   BMI 29.66 kg/m²     Patient is A&Ox4.  Reports pain to abdomen, medicated per CHELSEY SCHWAB, CMS intact, reports numbness and tingling to LLE   Mobilizes with x1 assist, calls appropriately, bed alarm on.   Normoactive BS x 4. Tolerating diet. Denies nausea/vomiting.   + flatus, + BM. Pt is voiding in the BSC.   Midline abdominal incision clean, dry, intact, and approximated. Abdominal binder in place.   LUE triple lumen in place, all lumens patent.   Updated on POC. Belongings and call light within reach. All needs met at this time.     "

## 2018-10-03 ENCOUNTER — APPOINTMENT (OUTPATIENT)
Dept: RADIOLOGY | Facility: MEDICAL CENTER | Age: 63
DRG: 330 | End: 2018-10-03
Attending: NURSE PRACTITIONER
Payer: COMMERCIAL

## 2018-10-03 PROBLEM — K56.7 ILEUS (HCC): Status: RESOLVED | Noted: 2018-09-27 | Resolved: 2018-10-03

## 2018-10-03 LAB
ANION GAP SERPL CALC-SCNC: 11 MMOL/L (ref 0–11.9)
BASOPHILS # BLD AUTO: 0.7 % (ref 0–1.8)
BASOPHILS # BLD: 0.04 K/UL (ref 0–0.12)
BUN SERPL-MCNC: 6 MG/DL (ref 8–22)
CALCIUM SERPL-MCNC: 7.5 MG/DL (ref 8.5–10.5)
CHLORIDE SERPL-SCNC: 102 MMOL/L (ref 96–112)
CO2 SERPL-SCNC: 28 MMOL/L (ref 20–33)
CREAT SERPL-MCNC: 0.51 MG/DL (ref 0.5–1.4)
EOSINOPHIL # BLD AUTO: 0.16 K/UL (ref 0–0.51)
EOSINOPHIL NFR BLD: 2.7 % (ref 0–6.9)
ERYTHROCYTE [DISTWIDTH] IN BLOOD BY AUTOMATED COUNT: 52.7 FL (ref 35.9–50)
GLUCOSE SERPL-MCNC: 83 MG/DL (ref 65–99)
HCT VFR BLD AUTO: 33.1 % (ref 37–47)
HGB BLD-MCNC: 10.9 G/DL (ref 12–16)
IMM GRANULOCYTES # BLD AUTO: 0.12 K/UL (ref 0–0.11)
IMM GRANULOCYTES NFR BLD AUTO: 2 % (ref 0–0.9)
LYMPHOCYTES # BLD AUTO: 1.52 K/UL (ref 1–4.8)
LYMPHOCYTES NFR BLD: 25.2 % (ref 22–41)
MAGNESIUM SERPL-MCNC: 1.7 MG/DL (ref 1.5–2.5)
MCH RBC QN AUTO: 34.4 PG (ref 27–33)
MCHC RBC AUTO-ENTMCNC: 32.9 G/DL (ref 33.6–35)
MCV RBC AUTO: 104.4 FL (ref 81.4–97.8)
MONOCYTES # BLD AUTO: 0.44 K/UL (ref 0–0.85)
MONOCYTES NFR BLD AUTO: 7.3 % (ref 0–13.4)
NEUTROPHILS # BLD AUTO: 3.74 K/UL (ref 2–7.15)
NEUTROPHILS NFR BLD: 62.1 % (ref 44–72)
NRBC # BLD AUTO: 0 K/UL
NRBC BLD-RTO: 0 /100 WBC
PLATELET # BLD AUTO: 193 K/UL (ref 164–446)
PMV BLD AUTO: 11.7 FL (ref 9–12.9)
POTASSIUM SERPL-SCNC: 3.8 MMOL/L (ref 3.6–5.5)
RBC # BLD AUTO: 3.17 M/UL (ref 4.2–5.4)
SODIUM SERPL-SCNC: 141 MMOL/L (ref 135–145)
WBC # BLD AUTO: 6 K/UL (ref 4.8–10.8)

## 2018-10-03 PROCEDURE — A9270 NON-COVERED ITEM OR SERVICE: HCPCS | Performed by: SURGERY

## 2018-10-03 PROCEDURE — 700102 HCHG RX REV CODE 250 W/ 637 OVERRIDE(OP): Performed by: HOSPITALIST

## 2018-10-03 PROCEDURE — 770001 HCHG ROOM/CARE - MED/SURG/GYN PRIV*

## 2018-10-03 PROCEDURE — 700101 HCHG RX REV CODE 250: Performed by: SURGERY

## 2018-10-03 PROCEDURE — 80048 BASIC METABOLIC PNL TOTAL CA: CPT

## 2018-10-03 PROCEDURE — 700111 HCHG RX REV CODE 636 W/ 250 OVERRIDE (IP): Performed by: NURSE PRACTITIONER

## 2018-10-03 PROCEDURE — 700102 HCHG RX REV CODE 250 W/ 637 OVERRIDE(OP): Performed by: SURGERY

## 2018-10-03 PROCEDURE — A9270 NON-COVERED ITEM OR SERVICE: HCPCS | Performed by: HOSPITALIST

## 2018-10-03 PROCEDURE — 700111 HCHG RX REV CODE 636 W/ 250 OVERRIDE (IP): Performed by: SURGERY

## 2018-10-03 PROCEDURE — 83735 ASSAY OF MAGNESIUM: CPT

## 2018-10-03 PROCEDURE — A9270 NON-COVERED ITEM OR SERVICE: HCPCS | Performed by: NURSE PRACTITIONER

## 2018-10-03 PROCEDURE — 85025 COMPLETE CBC W/AUTO DIFF WBC: CPT

## 2018-10-03 PROCEDURE — 700102 HCHG RX REV CODE 250 W/ 637 OVERRIDE(OP): Performed by: NURSE PRACTITIONER

## 2018-10-03 PROCEDURE — 74176 CT ABD & PELVIS W/O CONTRAST: CPT

## 2018-10-03 RX ORDER — MAGNESIUM SULFATE HEPTAHYDRATE 40 MG/ML
2 INJECTION, SOLUTION INTRAVENOUS ONCE
Status: COMPLETED | OUTPATIENT
Start: 2018-10-03 | End: 2018-10-03

## 2018-10-03 RX ORDER — DEXTROSE MONOHYDRATE, SODIUM CHLORIDE, AND POTASSIUM CHLORIDE 50; 1.49; 4.5 G/1000ML; G/1000ML; G/1000ML
INJECTION, SOLUTION INTRAVENOUS CONTINUOUS
Status: DISCONTINUED | OUTPATIENT
Start: 2018-10-04 | End: 2018-10-04

## 2018-10-03 RX ORDER — POTASSIUM CHLORIDE 20 MEQ/1
20 TABLET, EXTENDED RELEASE ORAL 3 TIMES DAILY
Status: DISCONTINUED | OUTPATIENT
Start: 2018-10-03 | End: 2018-10-12 | Stop reason: HOSPADM

## 2018-10-03 RX ORDER — HYDROMORPHONE HYDROCHLORIDE 2 MG/ML
1 INJECTION, SOLUTION INTRAMUSCULAR; INTRAVENOUS; SUBCUTANEOUS
Status: DISCONTINUED | OUTPATIENT
Start: 2018-10-03 | End: 2018-10-04

## 2018-10-03 RX ADMIN — POTASSIUM CHLORIDE 20 MEQ: 1500 TABLET, EXTENDED RELEASE ORAL at 06:31

## 2018-10-03 RX ADMIN — DOXYCYCLINE 100 MG: 100 TABLET ORAL at 18:10

## 2018-10-03 RX ADMIN — DOXYCYCLINE 100 MG: 100 TABLET ORAL at 06:31

## 2018-10-03 RX ADMIN — HYDROMORPHONE HYDROCHLORIDE 2 MG: 2 TABLET ORAL at 09:38

## 2018-10-03 RX ADMIN — POTASSIUM CHLORIDE 20 MEQ: 1500 TABLET, EXTENDED RELEASE ORAL at 12:41

## 2018-10-03 RX ADMIN — HYDROMORPHONE HYDROCHLORIDE 1 MG: 2 INJECTION INTRAMUSCULAR; INTRAVENOUS; SUBCUTANEOUS at 19:29

## 2018-10-03 RX ADMIN — MAGNESIUM GLUCONATE 500 MG ORAL TABLET 400 MG: 500 TABLET ORAL at 18:11

## 2018-10-03 RX ADMIN — ANTACID TABLETS 2000 MG: 500 TABLET, CHEWABLE ORAL at 06:31

## 2018-10-03 RX ADMIN — HYDROMORPHONE HYDROCHLORIDE 2 MG: 2 TABLET ORAL at 06:31

## 2018-10-03 RX ADMIN — HYDROMORPHONE HYDROCHLORIDE 2 MG: 2 TABLET ORAL at 15:57

## 2018-10-03 RX ADMIN — ENOXAPARIN SODIUM 40 MG: 100 INJECTION SUBCUTANEOUS at 06:31

## 2018-10-03 RX ADMIN — MAGNESIUM SULFATE IN WATER 2 G: 40 INJECTION, SOLUTION INTRAVENOUS at 11:10

## 2018-10-03 RX ADMIN — OMEPRAZOLE 20 MG: 20 CAPSULE, DELAYED RELEASE ORAL at 06:31

## 2018-10-03 RX ADMIN — POTASSIUM CHLORIDE 20 MEQ: 1500 TABLET, EXTENDED RELEASE ORAL at 18:11

## 2018-10-03 RX ADMIN — NICOTINE 21 MG: 21 PATCH, EXTENDED RELEASE TRANSDERMAL at 06:31

## 2018-10-03 RX ADMIN — POTASSIUM CHLORIDE, DEXTROSE MONOHYDRATE AND SODIUM CHLORIDE: 150; 5; 450 INJECTION, SOLUTION INTRAVENOUS at 23:07

## 2018-10-03 RX ADMIN — LEVOTHYROXINE SODIUM 88 MCG: 88 TABLET ORAL at 04:54

## 2018-10-03 RX ADMIN — MAGNESIUM GLUCONATE 500 MG ORAL TABLET 400 MG: 500 TABLET ORAL at 06:31

## 2018-10-03 RX ADMIN — HYDROMORPHONE HYDROCHLORIDE 2 MG: 2 TABLET ORAL at 03:21

## 2018-10-03 RX ADMIN — HYDROMORPHONE HYDROCHLORIDE 2 MG: 2 TABLET ORAL at 12:41

## 2018-10-03 RX ADMIN — LORAZEPAM 1 MG: 1 TABLET ORAL at 22:53

## 2018-10-03 ASSESSMENT — ENCOUNTER SYMPTOMS
ABDOMINAL PAIN: 1
NAUSEA: 0
MYALGIAS: 1
FEVER: 0
CHILLS: 0
VOMITING: 0
SHORTNESS OF BREATH: 0

## 2018-10-03 ASSESSMENT — PAIN SCALES - GENERAL
PAINLEVEL_OUTOF10: 9
PAINLEVEL_OUTOF10: 10

## 2018-10-03 NOTE — DISCHARGE PLANNING
Received Transport Form @ 1100 from Ruth(CORI CM)  Spoke to Kelley @ [facility]    Transport is scheduled for 10/02 @ 1600 going to [facility]. [facility] unable to provide transportation as they are booked for the day. Received quote of $40 from [facility]. Ruth notified of transport cost. Approved Services Form pending.    Update @ 1326:  Discharge meds must be added to DC summary by 1400 per Corrine([facility]).

## 2018-10-03 NOTE — DISCHARGE PLANNING
Anticipated Discharge Disposition: [facility]    Action: This RN CM spoke with Brady Fuchs.  Per AKIL Fuchs the pt is no longer medically cleared to transfer to [facility] and needs a CT scan.  Transfer to SNF cancelled.    This RN CM called [facility] to cancel transfer.    Barriers to Discharge: none    Plan: Pending medical clearance pt to transfer to SNF.

## 2018-10-03 NOTE — DISCHARGE PLANNING
Spoke To: Ruth(CORI ZUÑIGA)  Agency/Facility Name: [facility]  Plan or Request: Transport to [facility] canceled per Ruth's(CORI ZUÑIGA) request. Corrine(BEV) notified.

## 2018-10-03 NOTE — PROGRESS NOTES
"/71   Pulse 63   Temp 36.7 °C (98.1 °F)   Resp 17   Ht 1.549 m (5' 1\")   Wt 71.2 kg (156 lb 15.5 oz)   LMP 10/02/1980   SpO2 95%   Breastfeeding? No   BMI 29.66 kg/m²     Patient examined with Dr. Amanda  Area of induration to midline incision, no erythema  CT abdomen / pelvis now  Cancel transfer at this time  "

## 2018-10-03 NOTE — DISCHARGE PLANNING
Anticipated Discharge Disposition: [facility]    Action: this RN CM spoke with Margarita BECKMAN, pt has been approved by [facility].  This RN CM updated Shila Trauma APN.  Pt is medically cleared for transfer to [facility]. Transport was set up for 1400.    This RN CM called Dacia bedside RN, per RN the pt is receiving IV Mg which will finish around 1500.  Transport changed to 1600.  This RN CM called and left VM for Margarita BECKMAN.    Barriers to Discharge: none    Plan: Pt to transfer to [facility] at 1600.

## 2018-10-03 NOTE — PROGRESS NOTES
"/74   Pulse 72   Temp 36.3 °C (97.4 °F)   Resp 16   Ht 1.549 m (5' 1\")   Wt 71.2 kg (156 lb 15.5 oz)   LMP 10/02/1980   SpO2 92%   Breastfeeding? No   BMI 29.66 kg/m²     Patient is A&Ox4.   Reports pain to abdomen, medicated per MAR  SCHWAB, CMS intact, reports baseline numbness and tingling to LLE.   Pt mobilizes with SBA, calls appropriately, bed alarm is on.   On RA, denies SOB or chest pain.   Normoactive BS x 4. Tolerating diet. Denies nausea/vomiting.   + flatus, + BM. Pt is voiding adequately.   Midline abdominal incision clean, dry, and intact. Abdominal binder in place.   PICC line flushed, lines remain patent.   Updated on POC. Belongings and call light within reach. All needs met at this time.   "

## 2018-10-03 NOTE — PROGRESS NOTES
Trauma / Surgical Daily Progress Note    Date of Service  10/3/2018    Chief Complaint  63 y.o. female admitted 9/19/2018 with Small bowel obstruction (HCC)  POD # 11 Exploratory laparotomy with small bowel resection    Interval Events  No critical events overnight  Therapy notes updated, recommend post acute services    - Continue current 7 day antibiotic course  - Magnesium / potassium replacement ongoing  - Medically clear for post acute services    Review of Systems  Review of Systems   Constitutional: Negative for chills and fever.   Respiratory: Negative for shortness of breath.    Cardiovascular: Negative for chest pain.   Gastrointestinal: Positive for abdominal pain. Negative for nausea and vomiting.        10/3 BM   Genitourinary:        Voiding    Musculoskeletal: Positive for myalgias.        Vital Signs  Temp:  [36.3 °C (97.4 °F)-36.7 °C (98.1 °F)] 36.7 °C (98.1 °F)  Pulse:  [63-72] 63  Resp:  [16-17] 17  BP: (110-119)/(71-74) 110/71    Physical Exam  Physical Exam   Constitutional: She is oriented to person, place, and time. No distress.   HENT:   Head: Normocephalic.   Eyes: Conjunctivae are normal.   Neck: No JVD present.   Cardiovascular: Normal rate.    Pulmonary/Chest: Effort normal. No respiratory distress. She exhibits no tenderness.   Abdominal: Bowel sounds are normal. She exhibits no distension. There is tenderness. There is no guarding.   Midline incision with staples, no drainage   Musculoskeletal:   Moves all extremities   Neurological: She is alert and oriented to person, place, and time.   Skin: Skin is warm and dry.   Nursing note and vitals reviewed.      Laboratory  Recent Results (from the past 24 hour(s))   CBC WITH DIFFERENTIAL    Collection Time: 10/03/18  3:29 AM   Result Value Ref Range    WBC 6.0 4.8 - 10.8 K/uL    RBC 3.17 (L) 4.20 - 5.40 M/uL    Hemoglobin 10.9 (L) 12.0 - 16.0 g/dL    Hematocrit 33.1 (L) 37.0 - 47.0 %    .4 (H) 81.4 - 97.8 fL    MCH 34.4 (H) 27.0 -  33.0 pg    MCHC 32.9 (L) 33.6 - 35.0 g/dL    RDW 52.7 (H) 35.9 - 50.0 fL    Platelet Count 193 164 - 446 K/uL    MPV 11.7 9.0 - 12.9 fL    Neutrophils-Polys 62.10 44.00 - 72.00 %    Lymphocytes 25.20 22.00 - 41.00 %    Monocytes 7.30 0.00 - 13.40 %    Eosinophils 2.70 0.00 - 6.90 %    Basophils 0.70 0.00 - 1.80 %    Immature Granulocytes 2.00 (H) 0.00 - 0.90 %    Nucleated RBC 0.00 /100 WBC    Neutrophils (Absolute) 3.74 2.00 - 7.15 K/uL    Lymphs (Absolute) 1.52 1.00 - 4.80 K/uL    Monos (Absolute) 0.44 0.00 - 0.85 K/uL    Eos (Absolute) 0.16 0.00 - 0.51 K/uL    Baso (Absolute) 0.04 0.00 - 0.12 K/uL    Immature Granulocytes (abs) 0.12 (H) 0.00 - 0.11 K/uL    NRBC (Absolute) 0.00 K/uL   BASIC METABOLIC PANEL    Collection Time: 10/03/18  3:29 AM   Result Value Ref Range    Sodium 141 135 - 145 mmol/L    Potassium 3.8 3.6 - 5.5 mmol/L    Chloride 102 96 - 112 mmol/L    Co2 28 20 - 33 mmol/L    Glucose 83 65 - 99 mg/dL    Bun 6 (L) 8 - 22 mg/dL    Creatinine 0.51 0.50 - 1.40 mg/dL    Calcium 7.5 (L) 8.5 - 10.5 mg/dL    Anion Gap 11.0 0.0 - 11.9   MAGNESIUM    Collection Time: 10/03/18  3:29 AM   Result Value Ref Range    Magnesium 1.7 1.5 - 2.5 mg/dL   ESTIMATED GFR    Collection Time: 10/03/18  3:29 AM   Result Value Ref Range    GFR If African American >60 >60 mL/min/1.73 m 2    GFR If Non African American >60 >60 mL/min/1.73 m 2       Fluids    Intake/Output Summary (Last 24 hours) at 10/03/18 1019  Last data filed at 10/03/18 0905   Gross per 24 hour   Intake             1080 ml   Output                0 ml   Net             1080 ml       Core Measures & Quality Metrics  Labs reviewed and Medications reviewed  Parker catheter: No Parker  Central line in place: Need for access    DVT Prophylaxis: Enoxaparin (Lovenox)  DVT prophylaxis - mechanical: SCDs  Ulcer prophylaxis: Yes  Antibiotics: Treating active infection/contamination beyond 24 hours perioperative coverage  Assessed for rehab: Patient was assess for and/or  received rehabilitation services during this hospitalization    Total Score: 0    ETOH Screening    Assessment/Plan  Hypomagnesemia   Assessment & Plan    9/27 Supplement 4 grams IV magnesium    9/28 Supplement 4 grams IV magnesium  9/30 Supplement 4 grams IV magnesium. Start oral magnesium   10/1 Supplement 2 grams IV magnesium   10/2 Supplement 4 grams IV magnesium  10/3 Supplement 2 grams IV magnesium  Supplement and trend        Hypokalemia- (present on admission)   Assessment & Plan    9/27 Supplement 20 mEq IV potassium   9/28 Supplement 40 mEq IV potassium  9/29 Supplement 40 mEq IV potassium    - Follow up potassium 3.1   - Potassium 40 mEq BID    10/1 Trend up. Continue oral supplementation. Decrease potassium supplementation to 20 mEq BID  10/2 Trend down, increase oral potassium supplementation to 20 mEq TID  Trend labs        Small bowel obstruction (HCC)- (present on admission)   Assessment & Plan    9/22 Ex lap, lysis of adhesions, small bowel resection.  9/25 7 day course of antibiotic therapy completed. NG tube clamped  9/26 Interval removal of NG tube.  9/27 Clear liquid diet  9/28 Abdominal wound erythema. Start Doxycycline.    9/29 Incisional erythema improved. Continue antibiotic therapy. Full liquid diet    9/30 GI soft diet  10/3 Antibiotic day 6 (stops date 10/4 at 1800)   Houston Amanda DO, Surgery.        Other chronic postprocedural pain   Assessment & Plan    9/30 - DC oxycodone.  Start oral dilaudid  10/1 - DC Methadone. DC IV narcotics         Hypothyroidism- (present on admission)   Assessment & Plan    Chronic condition treated with Synthroid.  Resumed maintenance medication.            Discussed patient condition with RN, Patient and general surgery, Dr. Amanda.

## 2018-10-03 NOTE — DISCHARGE PLANNING
Agency/Facility Name: [facility]  Outcome: Attempted to follow up, however, no answer. Voicemail left regarding updated therapy notes.

## 2018-10-04 LAB
ANION GAP SERPL CALC-SCNC: 9 MMOL/L (ref 0–11.9)
BASOPHILS # BLD AUTO: 0.8 % (ref 0–1.8)
BASOPHILS # BLD: 0.04 K/UL (ref 0–0.12)
BUN SERPL-MCNC: 5 MG/DL (ref 8–22)
CALCIUM SERPL-MCNC: 7.9 MG/DL (ref 8.5–10.5)
CHLORIDE SERPL-SCNC: 104 MMOL/L (ref 96–112)
CO2 SERPL-SCNC: 26 MMOL/L (ref 20–33)
CREAT SERPL-MCNC: 0.67 MG/DL (ref 0.5–1.4)
EOSINOPHIL # BLD AUTO: 0.12 K/UL (ref 0–0.51)
EOSINOPHIL NFR BLD: 2.3 % (ref 0–6.9)
ERYTHROCYTE [DISTWIDTH] IN BLOOD BY AUTOMATED COUNT: 52.9 FL (ref 35.9–50)
GLUCOSE SERPL-MCNC: 89 MG/DL (ref 65–99)
GRAM STN SPEC: NORMAL
HCT VFR BLD AUTO: 31.5 % (ref 37–47)
HGB BLD-MCNC: 10.5 G/DL (ref 12–16)
IMM GRANULOCYTES # BLD AUTO: 0.09 K/UL (ref 0–0.11)
IMM GRANULOCYTES NFR BLD AUTO: 1.7 % (ref 0–0.9)
LYMPHOCYTES # BLD AUTO: 1.48 K/UL (ref 1–4.8)
LYMPHOCYTES NFR BLD: 28.7 % (ref 22–41)
MAGNESIUM SERPL-MCNC: 1.7 MG/DL (ref 1.5–2.5)
MCH RBC QN AUTO: 35 PG (ref 27–33)
MCHC RBC AUTO-ENTMCNC: 33.3 G/DL (ref 33.6–35)
MCV RBC AUTO: 105 FL (ref 81.4–97.8)
MONOCYTES # BLD AUTO: 0.4 K/UL (ref 0–0.85)
MONOCYTES NFR BLD AUTO: 7.8 % (ref 0–13.4)
NEUTROPHILS # BLD AUTO: 3.02 K/UL (ref 2–7.15)
NEUTROPHILS NFR BLD: 58.7 % (ref 44–72)
NRBC # BLD AUTO: 0 K/UL
NRBC BLD-RTO: 0 /100 WBC
PLATELET # BLD AUTO: 178 K/UL (ref 164–446)
PMV BLD AUTO: 11.6 FL (ref 9–12.9)
POTASSIUM SERPL-SCNC: 4.1 MMOL/L (ref 3.6–5.5)
RBC # BLD AUTO: 3 M/UL (ref 4.2–5.4)
RHODAMINE-AURAMINE STN SPEC: NORMAL
SIGNIFICANT IND 70042: NORMAL
SIGNIFICANT IND 70042: NORMAL
SITE SITE: NORMAL
SITE SITE: NORMAL
SODIUM SERPL-SCNC: 139 MMOL/L (ref 135–145)
SOURCE SOURCE: NORMAL
SOURCE SOURCE: NORMAL
WBC # BLD AUTO: 5.2 K/UL (ref 4.8–10.8)

## 2018-10-04 PROCEDURE — 85025 COMPLETE CBC W/AUTO DIFF WBC: CPT

## 2018-10-04 PROCEDURE — A9270 NON-COVERED ITEM OR SERVICE: HCPCS | Performed by: SURGERY

## 2018-10-04 PROCEDURE — 501445 HCHG STAPLER, SKIN DISP: Performed by: SURGERY

## 2018-10-04 PROCEDURE — 500424 HCHG DRESSING, AIRSTRIP: Performed by: SURGERY

## 2018-10-04 PROCEDURE — 700102 HCHG RX REV CODE 250 W/ 637 OVERRIDE(OP): Performed by: NURSE PRACTITIONER

## 2018-10-04 PROCEDURE — 160038 HCHG SURGERY MINUTES - EA ADDL 1 MIN LEVEL 2: Performed by: SURGERY

## 2018-10-04 PROCEDURE — 700101 HCHG RX REV CODE 250

## 2018-10-04 PROCEDURE — 700101 HCHG RX REV CODE 250: Performed by: SURGERY

## 2018-10-04 PROCEDURE — 87186 SC STD MICRODIL/AGAR DIL: CPT

## 2018-10-04 PROCEDURE — 700111 HCHG RX REV CODE 636 W/ 250 OVERRIDE (IP): Performed by: SURGERY

## 2018-10-04 PROCEDURE — 87102 FUNGUS ISOLATION CULTURE: CPT

## 2018-10-04 PROCEDURE — 80048 BASIC METABOLIC PNL TOTAL CA: CPT

## 2018-10-04 PROCEDURE — 160048 HCHG OR STATISTICAL LEVEL 1-5: Performed by: SURGERY

## 2018-10-04 PROCEDURE — 160009 HCHG ANES TIME/MIN: Performed by: SURGERY

## 2018-10-04 PROCEDURE — A9270 NON-COVERED ITEM OR SERVICE: HCPCS | Performed by: NURSE PRACTITIONER

## 2018-10-04 PROCEDURE — 700102 HCHG RX REV CODE 250 W/ 637 OVERRIDE(OP): Performed by: SURGERY

## 2018-10-04 PROCEDURE — 87116 MYCOBACTERIA CULTURE: CPT

## 2018-10-04 PROCEDURE — 700102 HCHG RX REV CODE 250 W/ 637 OVERRIDE(OP): Performed by: HOSPITALIST

## 2018-10-04 PROCEDURE — 501838 HCHG SUTURE GENERAL: Performed by: SURGERY

## 2018-10-04 PROCEDURE — A6402 STERILE GAUZE <= 16 SQ IN: HCPCS | Performed by: SURGERY

## 2018-10-04 PROCEDURE — 160027 HCHG SURGERY MINUTES - 1ST 30 MINS LEVEL 2: Performed by: SURGERY

## 2018-10-04 PROCEDURE — A9270 NON-COVERED ITEM OR SERVICE: HCPCS | Performed by: HOSPITALIST

## 2018-10-04 PROCEDURE — 700111 HCHG RX REV CODE 636 W/ 250 OVERRIDE (IP)

## 2018-10-04 PROCEDURE — 87205 SMEAR GRAM STAIN: CPT

## 2018-10-04 PROCEDURE — 700111 HCHG RX REV CODE 636 W/ 250 OVERRIDE (IP): Performed by: NURSE PRACTITIONER

## 2018-10-04 PROCEDURE — 770001 HCHG ROOM/CARE - MED/SURG/GYN PRIV*

## 2018-10-04 PROCEDURE — 160035 HCHG PACU - 1ST 60 MINS PHASE I: Performed by: SURGERY

## 2018-10-04 PROCEDURE — 87070 CULTURE OTHR SPECIMN AEROBIC: CPT

## 2018-10-04 PROCEDURE — 160002 HCHG RECOVERY MINUTES (STAT): Performed by: SURGERY

## 2018-10-04 PROCEDURE — 87077 CULTURE AEROBIC IDENTIFY: CPT

## 2018-10-04 PROCEDURE — 0W9F0ZZ DRAINAGE OF ABDOMINAL WALL, OPEN APPROACH: ICD-10-PCS | Performed by: SURGERY

## 2018-10-04 PROCEDURE — 87075 CULTR BACTERIA EXCEPT BLOOD: CPT

## 2018-10-04 PROCEDURE — 87206 SMEAR FLUORESCENT/ACID STAI: CPT

## 2018-10-04 PROCEDURE — 87015 SPECIMEN INFECT AGNT CONCNTJ: CPT

## 2018-10-04 PROCEDURE — 83735 ASSAY OF MAGNESIUM: CPT

## 2018-10-04 PROCEDURE — 160036 HCHG PACU - EA ADDL 30 MINS PHASE I: Performed by: SURGERY

## 2018-10-04 PROCEDURE — 700111 HCHG RX REV CODE 636 W/ 250 OVERRIDE (IP): Performed by: ANESTHESIOLOGY

## 2018-10-04 RX ORDER — MIDAZOLAM HYDROCHLORIDE 1 MG/ML
1 INJECTION INTRAMUSCULAR; INTRAVENOUS
Status: DISCONTINUED | OUTPATIENT
Start: 2018-10-04 | End: 2018-10-04 | Stop reason: HOSPADM

## 2018-10-04 RX ORDER — HYDROMORPHONE HYDROCHLORIDE 2 MG/ML
0.4 INJECTION, SOLUTION INTRAMUSCULAR; INTRAVENOUS; SUBCUTANEOUS
Status: DISCONTINUED | OUTPATIENT
Start: 2018-10-04 | End: 2018-10-04 | Stop reason: HOSPADM

## 2018-10-04 RX ORDER — BACITRACIN 50000 [IU]/1
INJECTION, POWDER, FOR SOLUTION INTRAMUSCULAR
Status: DISCONTINUED | OUTPATIENT
Start: 2018-10-04 | End: 2018-10-04 | Stop reason: HOSPADM

## 2018-10-04 RX ORDER — HYDROMORPHONE HYDROCHLORIDE 2 MG/ML
0.1 INJECTION, SOLUTION INTRAMUSCULAR; INTRAVENOUS; SUBCUTANEOUS
Status: DISCONTINUED | OUTPATIENT
Start: 2018-10-04 | End: 2018-10-04 | Stop reason: HOSPADM

## 2018-10-04 RX ORDER — ONDANSETRON 2 MG/ML
4 INJECTION INTRAMUSCULAR; INTRAVENOUS
Status: DISCONTINUED | OUTPATIENT
Start: 2018-10-04 | End: 2018-10-04 | Stop reason: HOSPADM

## 2018-10-04 RX ORDER — DIPHENHYDRAMINE HYDROCHLORIDE 50 MG/ML
12.5 INJECTION INTRAMUSCULAR; INTRAVENOUS
Status: DISCONTINUED | OUTPATIENT
Start: 2018-10-04 | End: 2018-10-04 | Stop reason: HOSPADM

## 2018-10-04 RX ORDER — HYDROMORPHONE HYDROCHLORIDE 2 MG/ML
0.5 INJECTION, SOLUTION INTRAMUSCULAR; INTRAVENOUS; SUBCUTANEOUS EVERY 6 HOURS PRN
Status: DISCONTINUED | OUTPATIENT
Start: 2018-10-04 | End: 2018-10-07

## 2018-10-04 RX ORDER — MEPERIDINE HYDROCHLORIDE 25 MG/ML
6.25 INJECTION INTRAMUSCULAR; INTRAVENOUS; SUBCUTANEOUS
Status: DISCONTINUED | OUTPATIENT
Start: 2018-10-04 | End: 2018-10-04 | Stop reason: HOSPADM

## 2018-10-04 RX ORDER — MAGNESIUM HYDROXIDE 1200 MG/15ML
LIQUID ORAL
Status: COMPLETED | OUTPATIENT
Start: 2018-10-04 | End: 2018-10-04

## 2018-10-04 RX ORDER — HYDROMORPHONE HYDROCHLORIDE 2 MG/ML
0.2 INJECTION, SOLUTION INTRAMUSCULAR; INTRAVENOUS; SUBCUTANEOUS
Status: DISCONTINUED | OUTPATIENT
Start: 2018-10-04 | End: 2018-10-04 | Stop reason: HOSPADM

## 2018-10-04 RX ORDER — SODIUM CHLORIDE, SODIUM LACTATE, POTASSIUM CHLORIDE, CALCIUM CHLORIDE 600; 310; 30; 20 MG/100ML; MG/100ML; MG/100ML; MG/100ML
INJECTION, SOLUTION INTRAVENOUS CONTINUOUS
Status: DISCONTINUED | OUTPATIENT
Start: 2018-10-04 | End: 2018-10-04 | Stop reason: HOSPADM

## 2018-10-04 RX ORDER — HALOPERIDOL 5 MG/ML
1 INJECTION INTRAMUSCULAR
Status: DISCONTINUED | OUTPATIENT
Start: 2018-10-04 | End: 2018-10-04 | Stop reason: HOSPADM

## 2018-10-04 RX ADMIN — MAGNESIUM GLUCONATE 500 MG ORAL TABLET 400 MG: 500 TABLET ORAL at 06:34

## 2018-10-04 RX ADMIN — HYDROMORPHONE HYDROCHLORIDE 0.4 MG: 2 INJECTION INTRAMUSCULAR; INTRAVENOUS; SUBCUTANEOUS at 11:34

## 2018-10-04 RX ADMIN — DOXYCYCLINE 100 MG: 100 TABLET ORAL at 06:34

## 2018-10-04 RX ADMIN — HYDROMORPHONE HYDROCHLORIDE 1 MG: 2 INJECTION INTRAMUSCULAR; INTRAVENOUS; SUBCUTANEOUS at 06:33

## 2018-10-04 RX ADMIN — HYDROMORPHONE HYDROCHLORIDE 0.4 MG: 2 INJECTION INTRAMUSCULAR; INTRAVENOUS; SUBCUTANEOUS at 11:39

## 2018-10-04 RX ADMIN — HYDROMORPHONE HYDROCHLORIDE 0.2 MG: 2 INJECTION, SOLUTION INTRAMUSCULAR; INTRAVENOUS; SUBCUTANEOUS at 11:18

## 2018-10-04 RX ADMIN — OMEPRAZOLE 20 MG: 20 CAPSULE, DELAYED RELEASE ORAL at 06:34

## 2018-10-04 RX ADMIN — HYDROMORPHONE HYDROCHLORIDE 4 MG: 2 INJECTION INTRAMUSCULAR; INTRAVENOUS; SUBCUTANEOUS at 10:58

## 2018-10-04 RX ADMIN — POTASSIUM CHLORIDE, DEXTROSE MONOHYDRATE AND SODIUM CHLORIDE: 150; 5; 450 INJECTION, SOLUTION INTRAVENOUS at 11:36

## 2018-10-04 RX ADMIN — HYDROMORPHONE HYDROCHLORIDE 0.4 MG: 2 INJECTION INTRAMUSCULAR; INTRAVENOUS; SUBCUTANEOUS at 11:28

## 2018-10-04 RX ADMIN — HYDROMORPHONE HYDROCHLORIDE 0.4 MG: 2 INJECTION INTRAMUSCULAR; INTRAVENOUS; SUBCUTANEOUS at 11:48

## 2018-10-04 RX ADMIN — HYDROMORPHONE HYDROCHLORIDE 0.4 MG: 2 INJECTION INTRAMUSCULAR; INTRAVENOUS; SUBCUTANEOUS at 10:47

## 2018-10-04 RX ADMIN — HYDROMORPHONE HYDROCHLORIDE 0.5 MG: 2 INJECTION INTRAMUSCULAR; INTRAVENOUS; SUBCUTANEOUS at 23:48

## 2018-10-04 RX ADMIN — HYDROMORPHONE HYDROCHLORIDE 2 MG: 2 TABLET ORAL at 17:55

## 2018-10-04 RX ADMIN — MIDAZOLAM 1 MG: 1 INJECTION INTRAMUSCULAR; INTRAVENOUS at 11:50

## 2018-10-04 RX ADMIN — ANTACID TABLETS 2000 MG: 500 TABLET, CHEWABLE ORAL at 06:33

## 2018-10-04 RX ADMIN — HALOPERIDOL LACTATE 1 MG: 5 INJECTION, SOLUTION INTRAMUSCULAR at 11:04

## 2018-10-04 RX ADMIN — HYDROMORPHONE HYDROCHLORIDE 0.4 MG: 2 INJECTION INTRAMUSCULAR; INTRAVENOUS; SUBCUTANEOUS at 11:10

## 2018-10-04 RX ADMIN — HYDROMORPHONE HYDROCHLORIDE 2 MG: 2 TABLET ORAL at 21:28

## 2018-10-04 RX ADMIN — HYDROMORPHONE HYDROCHLORIDE 0.4 MG: 2 INJECTION INTRAMUSCULAR; INTRAVENOUS; SUBCUTANEOUS at 11:05

## 2018-10-04 RX ADMIN — FENTANYL CITRATE 50 MCG: 50 INJECTION, SOLUTION INTRAMUSCULAR; INTRAVENOUS at 10:38

## 2018-10-04 RX ADMIN — FENTANYL CITRATE 50 MCG: 50 INJECTION, SOLUTION INTRAMUSCULAR; INTRAVENOUS at 10:42

## 2018-10-04 RX ADMIN — HYDROMORPHONE HYDROCHLORIDE 0.4 MG: 2 INJECTION INTRAMUSCULAR; INTRAVENOUS; SUBCUTANEOUS at 11:43

## 2018-10-04 RX ADMIN — HYDROMORPHONE HYDROCHLORIDE 1 MG: 2 INJECTION INTRAMUSCULAR; INTRAVENOUS; SUBCUTANEOUS at 00:17

## 2018-10-04 RX ADMIN — POTASSIUM CHLORIDE 20 MEQ: 1500 TABLET, EXTENDED RELEASE ORAL at 06:34

## 2018-10-04 RX ADMIN — NICOTINE 21 MG: 21 PATCH, EXTENDED RELEASE TRANSDERMAL at 06:34

## 2018-10-04 RX ADMIN — DOXYCYCLINE 100 MG: 100 TABLET ORAL at 17:55

## 2018-10-04 RX ADMIN — LEVOTHYROXINE SODIUM 88 MCG: 88 TABLET ORAL at 05:37

## 2018-10-04 RX ADMIN — HYDROMORPHONE HYDROCHLORIDE 0.4 MG: 2 INJECTION INTRAMUSCULAR; INTRAVENOUS; SUBCUTANEOUS at 10:52

## 2018-10-04 RX ADMIN — MAGNESIUM GLUCONATE 500 MG ORAL TABLET 400 MG: 500 TABLET ORAL at 17:55

## 2018-10-04 RX ADMIN — POTASSIUM CHLORIDE 20 MEQ: 1500 TABLET, EXTENDED RELEASE ORAL at 17:55

## 2018-10-04 RX ADMIN — HYDROMORPHONE HYDROCHLORIDE 2 MG: 2 TABLET ORAL at 14:40

## 2018-10-04 RX ADMIN — MIDAZOLAM 1 MG: 1 INJECTION INTRAMUSCULAR; INTRAVENOUS at 11:55

## 2018-10-04 RX ADMIN — HYDROMORPHONE HYDROCHLORIDE 1 MG: 2 INJECTION INTRAMUSCULAR; INTRAVENOUS; SUBCUTANEOUS at 03:23

## 2018-10-04 ASSESSMENT — PAIN SCALES - GENERAL
PAINLEVEL_OUTOF10: 7
PAINLEVEL_OUTOF10: ASSUMED PAIN PRESENT
PAINLEVEL_OUTOF10: 10
PAINLEVEL_OUTOF10: 9
PAINLEVEL_OUTOF10: 9
PAINLEVEL_OUTOF10: 10
PAINLEVEL_OUTOF10: 8
PAINLEVEL_OUTOF10: 10
PAINLEVEL_OUTOF10: 0
PAINLEVEL_OUTOF10: 8
PAINLEVEL_OUTOF10: 8
PAINLEVEL_OUTOF10: 10
PAINLEVEL_OUTOF10: 0
PAINLEVEL_OUTOF10: 10

## 2018-10-04 ASSESSMENT — ENCOUNTER SYMPTOMS
SHORTNESS OF BREATH: 1
MYALGIAS: 1
FEVER: 0
VOMITING: 0
ABDOMINAL PAIN: 1
NAUSEA: 0
CHILLS: 0

## 2018-10-04 NOTE — THERAPY
Attempted to see pt this AM for OT treatment. Pt currently in procedure. Will re-attempt as able.

## 2018-10-04 NOTE — CARE PLAN
Problem: Safety  Goal: Will remain free from injury  Outcome: PROGRESSING AS EXPECTED  Safety precautions in place. Bed in locked/low position. 2 side rails up. Treaded socks. Call light in reach, calls appropriately. Hourly rounding practiced.    Problem: Pain Management  Goal: Pain level will decrease to patient's comfort goal  Outcome: PROGRESSING AS EXPECTED  Pain managed with Dilaudid PO and IV

## 2018-10-04 NOTE — PROGRESS NOTES
"/80   Pulse 69   Temp 36 °C (96.8 °F)   Resp 18   Ht 1.549 m (5' 1\")   Wt 71.2 kg (156 lb 15.5 oz)   LMP 10/02/1980   SpO2 98%   Breastfeeding? No   BMI 29.66 kg/m²     Patient is A&Ox4.   Reports pain to abdomen, medicated per MAR  SCHWAB, CMS intact, reports baseline numbness and tingling to LLE.   Pt mobilizes with SBA, calls appropriately, gait steady.   On RA, denies SOB and chest pain.   Normoactive BS x 4. Tolerating diet. Denies nausea/vomiting.   + flatus, + BM 10/3. Pt is voiding adequately.   Midline abdominal incision clean, dry, and intact; abdominal binder in place.   Triple lumen CVC attached to IVF. All lines flushed with positive blood return.   Updated on POC. Belongings and call light within reach. All needs met at this time.   "

## 2018-10-04 NOTE — OR NURSING
REPORT TO RANDY RN  PT REC'D FENTANYL 50 MCGS X 2 IV. DILAUDID TOTAL 2 MGS IV, VERSED 1 MG IV X 2.    PT REPORTS  PAIN 8-10/10   WHEN ASKED. PRESENTLY NON VERBAL-- CALM, QUIET BREATHING HR 69 /70  2L OXYMASK  93% RR 15    ABDOMEN  WITH [place] DRESSING ABDOMINAL BINDER. SITE C/D/I .     PT VOIDED LARGE AMOUNT.

## 2018-10-04 NOTE — PROGRESS NOTES
Bedside report received.  Assessment complete.  A&O x 4. Patient calls appropriately.  Patient up with stand by assist.   Patient has 7/10 pain. Repositioned.  Denies N&V. NPO since midnight.  Surgical incision to midline, dressing and abdominal binder in place. .  + void, + flatus  Patient denies SOB.  SCD's in place.  Review plan with of care with patient. Call light and personal belongings with in reach. Hourly rounding in place. All needs met at this time.

## 2018-10-04 NOTE — OR NURSING
VOIDED LARGE AMOUNT IN BEDPAN NOT MEASURED.     REPORTS SURGICAL/ ABDOMINAL  PAIN 8/10.  HAS REC'D FENTANYL 50 MCGS X 2 IV, DILAUDID 0.4 MGS IV X 5.

## 2018-10-04 NOTE — CARE PLAN
Problem: Safety  Goal: Will remain free from injury  Outcome: PROGRESSING AS EXPECTED  Patient free from accidental injury at this time. Safety precautions in place. Hourly rounding in place.     Problem: Pain Management  Goal: Pain level will decrease to patient's comfort goal  Outcome: PROGRESSING SLOWER THAN EXPECTED  Patient calls frequently for pain medication. Patient medicated per MAR. Hourly rounding in place.

## 2018-10-04 NOTE — PROGRESS NOTES
Trauma / Surgical Daily Progress Note    Date of Service  10/4/2018    Chief Complaint  63 y.o. female admitted 9/19/2018 with Small bowel obstruction (HCC)  POD # 12 Exploratory laparotomy with small bowel resection  POD #0 Wound exploration and seroma drainage     Interval Events  Seen and examined post operative  Lethargic  Pain control adequate     - BMP pending  - Antibiotic course to complete today  - Disposition: SNF when medically clear    Review of Systems  Review of Systems   Constitutional: Negative for chills and fever.   Respiratory: Positive for shortness of breath.    Cardiovascular: Negative for chest pain.   Gastrointestinal: Positive for abdominal pain (incisional). Negative for nausea and vomiting.        10/3 BM   Genitourinary:        Voiding    Musculoskeletal: Positive for myalgias.        Vital Signs  Temp:  [36 °C (96.8 °F)-36.7 °C (98.1 °F)] 36.4 °C (97.5 °F)  Pulse:  [57-71] 59  Resp:  [10-25] 17  BP: (104-122)/(64-82) 122/82    Physical Exam  Physical Exam   Constitutional: No distress.   HENT:   Head: Normocephalic.   Eyes: Conjunctivae are normal.   Neck: No JVD present.   Cardiovascular: Normal rate.    Pulmonary/Chest: Effort normal. No respiratory distress. She exhibits no tenderness.   Abdominal: Bowel sounds are normal. She exhibits no distension. There is tenderness. There is no guarding.   Midline incision surgical dressing in place   Musculoskeletal:   Moves all extremities    Neurological:   Lethargic post operative   Skin: Skin is warm and dry.   Nursing note and vitals reviewed.      Laboratory  Recent Results (from the past 24 hour(s))   CBC WITH DIFFERENTIAL    Collection Time: 10/04/18  3:36 AM   Result Value Ref Range    WBC 5.2 4.8 - 10.8 K/uL    RBC 3.00 (L) 4.20 - 5.40 M/uL    Hemoglobin 10.5 (L) 12.0 - 16.0 g/dL    Hematocrit 31.5 (L) 37.0 - 47.0 %    .0 (H) 81.4 - 97.8 fL    MCH 35.0 (H) 27.0 - 33.0 pg    MCHC 33.3 (L) 33.6 - 35.0 g/dL    RDW 52.9 (H) 35.9 -  50.0 fL    Platelet Count 178 164 - 446 K/uL    MPV 11.6 9.0 - 12.9 fL    Neutrophils-Polys 58.70 44.00 - 72.00 %    Lymphocytes 28.70 22.00 - 41.00 %    Monocytes 7.80 0.00 - 13.40 %    Eosinophils 2.30 0.00 - 6.90 %    Basophils 0.80 0.00 - 1.80 %    Immature Granulocytes 1.70 (H) 0.00 - 0.90 %    Nucleated RBC 0.00 /100 WBC    Neutrophils (Absolute) 3.02 2.00 - 7.15 K/uL    Lymphs (Absolute) 1.48 1.00 - 4.80 K/uL    Monos (Absolute) 0.40 0.00 - 0.85 K/uL    Eos (Absolute) 0.12 0.00 - 0.51 K/uL    Baso (Absolute) 0.04 0.00 - 0.12 K/uL    Immature Granulocytes (abs) 0.09 0.00 - 0.11 K/uL    NRBC (Absolute) 0.00 K/uL       Fluids    Intake/Output Summary (Last 24 hours) at 10/04/18 1526  Last data filed at 10/04/18 1022   Gross per 24 hour   Intake              940 ml   Output             1600 ml   Net             -660 ml       Core Measures & Quality Metrics  Labs reviewed, Medications reviewed and Radiology images reviewed  Parker catheter: No Parker  Central line in place: Need for access    DVT Prophylaxis: Enoxaparin (Lovenox)  DVT prophylaxis - mechanical: SCDs  Ulcer prophylaxis: Yes  Antibiotics: Treating active infection/contamination beyond 24 hours perioperative coverage  Assessed for rehab: Patient was assess for and/or received rehabilitation services during this hospitalization    LILIAN Score  ETOH Screening    Assessment/Plan  Debility- (present on admission)   Assessment & Plan    9/27 Skilled Nursing Referrals      10/3 Plan transfer to skilled nursing facility        Hypomagnesemia   Assessment & Plan    9/27 Supplement 4 grams IV magnesium    9/28 Supplement 4 grams IV magnesium  9/30 Supplement 4 grams IV magnesium. Start oral magnesium   10/1 Supplement 2 grams IV magnesium   10/2 Supplement 4 grams IV magnesium  10/3 Supplement 2 grams IV magnesium  Supplement and trend        Hypokalemia- (present on admission)   Assessment & Plan    9/27 Supplement 20 mEq IV potassium   9/28 Supplement 40 mEq IV  potassium  9/29 Supplement 40 mEq IV potassium    - Follow up potassium 3.1   - Potassium 40 mEq BID    10/1 Trend up. Continue oral supplementation. Decrease potassium supplementation to 20 mEq BID  10/2 Trend down, increase oral potassium supplementation to 20 mEq TID  10/4 Labs pending  Trend labs        Small bowel obstruction (HCC)- (present on admission)   Assessment & Plan    9/22 Ex lap, lysis of adhesions, small bowel resection.  9/25 7 day course of antibiotic therapy completed. NG tube clamped  9/26 Interval removal of NG tube.  9/27 Clear liquid diet  9/28 Abdominal wound erythema. Start Doxycycline.    9/29 Incisional erythema improved. Continue antibiotic therapy. Full liquid diet    9/30 GI soft diet  10/4 Wound exploration and seroma drainage in OR  Antibiotic day 7 - stop date 10/4 at 1800  Houston Amanda DO, Surgery.        Other chronic postprocedural pain   Assessment & Plan    9/30 - DC oxycodone.  Start oral dilaudid  10/1 - DC Methadone. DC IV narcotics         Ulcerative esophagitis   Assessment & Plan    Patient completed course of triple therapy        Hypothyroidism- (present on admission)   Assessment & Plan    Chronic condition treated with Synthroid.  Resumed maintenance medication.            Discussed patient condition with RN, Patient and general surgery, Dr. Amanda.

## 2018-10-04 NOTE — PROGRESS NOTES
Patient arrived via hospital bed and transport from PACU. Resting comfortably now. Call light within reach.

## 2018-10-04 NOTE — OR SURGEON
Operative report    PreOp Diagnosis: Abdominal wall seroma ×2    PostOp Diagnosis: Drainage of  abdominal wall seroma    Procedure(s):  ABDOMINAL WOUND EXPLORATION AND SEROMA DRAINAGE - Wound Class: Dirty or Infected    Surgeon(s):  Houston Amanda D.O.    Anesthesiologist/Type of Anesthesia:  Anesthesiologist: Brandon Whittaker M.D./General    Surgical Staff:  Circulator: Charly Good R.N.; Nicky Hodges R.N.  Scrub Person: ARLIN BrownNLOBO    Specimens removed if any:  ID Type Source Tests Collected by Time Destination   1 : Abdominal wall wound Other Other AFB CULTURE, ANAEROBIC CULTURE, FUNGAL CULTURE Houston Amanda D.O. 10/4/2018  9:36 AM        Estimated Blood Loss: Minimal    Findings: Seroma anterior superior abdominal wall within the wound drained of 150 mL serosanguineous fluid. Seroma anterior-inferior abdominal wall within wound drained 85 mL serosanguineous fluid. Minimal fascial disruption upper midline    Complications: None noted    Indications 63-year-old female with multiple medical comorbidities who underwent laparotomy for small bowel obstruction 12 days ago who has developed increasing palpable swelling within the midline abdominal wound with significant tenderness.    Operation: Patient was taken to the operating room placed supine on the operating table. She is placed under general anesthesia and LMA was placed to protect the airway. The staples removed and the abdomen was prepped with chlorhexidine and then draped in standard fashion. The area over the superior abdominal wall mass was incised and we carefully dissected down to it. We immediately evacuated 150 mL of serosanguineous material. We then proceeded to continue opening the midline wound down to the level of the inferior abdominal wall mass. We dissected this with a clamp and evacuated as another 85 mL of fluid. There was some odor from the inferior cavity so we have swabbed and sent for culture. We then pulse lavaged  the wound with bacitracin saline 3 L. There is no sign of necrosis or gross infection or abscess. The wound in the upper midline seem to have a gap in the fascia between sutures and this was buttressed using 2 2-0 Vicryl sutures in a figure-of-eight fashion. Once this was completed we used Bovie cautery for hemostasis within the wound beds. The wound was then closed in multiple layers using 2-0 Vicryl. Once we had good closure of the abdominal wall with hopeful obliteration of the seroma cavities, we approximated the dermal layer using interrupted 3-0 Vicryl stitch. Once this was completed, the skin was closed with staples. There was then cleaned and dried and dry sterile dressing was applied. We then placed a bulky towel over the midline wound and reapplied the abdominal binder. The patient was then awakened from anesthesia and taken to postanesthesia care unit in stable condition. The sponge, needle and instrument counts were correct at the end of the case.        10/4/2018 10:42 AM Houston Amanda D.O.

## 2018-10-04 NOTE — PROGRESS NOTES
Surgery    CT reviewed.  Two large wound seromas, no abscess, no hernia. No concerning intraabdominal findings.    Discussed surgery in AM with pt. Plan for seroma drainage and wound closure.  NPO after MN  Consent ordered.

## 2018-10-04 NOTE — ASSESSMENT & PLAN NOTE
9/27 Skilled Nursing Referrals      10/3 Transfer held due to seroma development  10/5 SNF referrals resent  10/9 Rehab to reevaluate

## 2018-10-05 LAB
ANION GAP SERPL CALC-SCNC: 9 MMOL/L (ref 0–11.9)
BASOPHILS # BLD AUTO: 0.7 % (ref 0–1.8)
BASOPHILS # BLD: 0.05 K/UL (ref 0–0.12)
BUN SERPL-MCNC: 5 MG/DL (ref 8–22)
CALCIUM SERPL-MCNC: 7.6 MG/DL (ref 8.5–10.5)
CHLORIDE SERPL-SCNC: 102 MMOL/L (ref 96–112)
CO2 SERPL-SCNC: 26 MMOL/L (ref 20–33)
CREAT SERPL-MCNC: 0.47 MG/DL (ref 0.5–1.4)
EOSINOPHIL # BLD AUTO: 0.16 K/UL (ref 0–0.51)
EOSINOPHIL NFR BLD: 2.2 % (ref 0–6.9)
ERYTHROCYTE [DISTWIDTH] IN BLOOD BY AUTOMATED COUNT: 53.7 FL (ref 35.9–50)
GLUCOSE SERPL-MCNC: 89 MG/DL (ref 65–99)
HCT VFR BLD AUTO: 33.5 % (ref 37–47)
HGB BLD-MCNC: 11 G/DL (ref 12–16)
IMM GRANULOCYTES # BLD AUTO: 0.1 K/UL (ref 0–0.11)
IMM GRANULOCYTES NFR BLD AUTO: 1.3 % (ref 0–0.9)
LYMPHOCYTES # BLD AUTO: 1.63 K/UL (ref 1–4.8)
LYMPHOCYTES NFR BLD: 21.9 % (ref 22–41)
MAGNESIUM SERPL-MCNC: 1.5 MG/DL (ref 1.5–2.5)
MCH RBC QN AUTO: 34.7 PG (ref 27–33)
MCHC RBC AUTO-ENTMCNC: 32.8 G/DL (ref 33.6–35)
MCV RBC AUTO: 105.7 FL (ref 81.4–97.8)
MONOCYTES # BLD AUTO: 0.43 K/UL (ref 0–0.85)
MONOCYTES NFR BLD AUTO: 5.8 % (ref 0–13.4)
NEUTROPHILS # BLD AUTO: 5.06 K/UL (ref 2–7.15)
NEUTROPHILS NFR BLD: 68.1 % (ref 44–72)
NRBC # BLD AUTO: 0 K/UL
NRBC BLD-RTO: 0 /100 WBC
PLATELET # BLD AUTO: 196 K/UL (ref 164–446)
PMV BLD AUTO: 11.7 FL (ref 9–12.9)
POTASSIUM SERPL-SCNC: 3.9 MMOL/L (ref 3.6–5.5)
RBC # BLD AUTO: 3.17 M/UL (ref 4.2–5.4)
SODIUM SERPL-SCNC: 137 MMOL/L (ref 135–145)
WBC # BLD AUTO: 7.4 K/UL (ref 4.8–10.8)

## 2018-10-05 PROCEDURE — A9270 NON-COVERED ITEM OR SERVICE: HCPCS | Performed by: SURGERY

## 2018-10-05 PROCEDURE — 80048 BASIC METABOLIC PNL TOTAL CA: CPT

## 2018-10-05 PROCEDURE — A9270 NON-COVERED ITEM OR SERVICE: HCPCS | Performed by: NURSE PRACTITIONER

## 2018-10-05 PROCEDURE — 700102 HCHG RX REV CODE 250 W/ 637 OVERRIDE(OP): Performed by: SURGERY

## 2018-10-05 PROCEDURE — 700102 HCHG RX REV CODE 250 W/ 637 OVERRIDE(OP): Performed by: HOSPITALIST

## 2018-10-05 PROCEDURE — 700102 HCHG RX REV CODE 250 W/ 637 OVERRIDE(OP): Performed by: NURSE PRACTITIONER

## 2018-10-05 PROCEDURE — 700111 HCHG RX REV CODE 636 W/ 250 OVERRIDE (IP): Performed by: NURSE PRACTITIONER

## 2018-10-05 PROCEDURE — 83735 ASSAY OF MAGNESIUM: CPT

## 2018-10-05 PROCEDURE — A9270 NON-COVERED ITEM OR SERVICE: HCPCS | Performed by: HOSPITALIST

## 2018-10-05 PROCEDURE — 770001 HCHG ROOM/CARE - MED/SURG/GYN PRIV*

## 2018-10-05 PROCEDURE — 700111 HCHG RX REV CODE 636 W/ 250 OVERRIDE (IP): Performed by: SURGERY

## 2018-10-05 PROCEDURE — 97530 THERAPEUTIC ACTIVITIES: CPT

## 2018-10-05 PROCEDURE — 85025 COMPLETE CBC W/AUTO DIFF WBC: CPT

## 2018-10-05 RX ORDER — POTASSIUM CHLORIDE 20 MEQ/1
20 TABLET, EXTENDED RELEASE ORAL 3 TIMES DAILY
Qty: 60 TAB | Refills: 11 | Status: SHIPPED | OUTPATIENT
Start: 2018-10-05 | End: 2018-10-11

## 2018-10-05 RX ORDER — NICOTINE 21 MG/24HR
1 PATCH, TRANSDERMAL 24 HOURS TRANSDERMAL EVERY 24 HOURS
Qty: 30 PATCH
Start: 2018-10-05 | End: 2018-10-11

## 2018-10-05 RX ORDER — DOXYCYCLINE 100 MG/1
100 TABLET ORAL EVERY 12 HOURS
Refills: 0
Start: 2018-10-05 | End: 2018-10-11

## 2018-10-05 RX ORDER — DOXYCYCLINE 100 MG/1
100 TABLET ORAL EVERY 12 HOURS
Status: DISCONTINUED | OUTPATIENT
Start: 2018-10-05 | End: 2018-10-06

## 2018-10-05 RX ORDER — NICOTINE 21 MG/24HR
14 PATCH, TRANSDERMAL 24 HOURS TRANSDERMAL
Status: DISCONTINUED | OUTPATIENT
Start: 2018-10-06 | End: 2018-10-12 | Stop reason: HOSPADM

## 2018-10-05 RX ORDER — MAGNESIUM SULFATE HEPTAHYDRATE 40 MG/ML
2 INJECTION, SOLUTION INTRAVENOUS ONCE
Status: COMPLETED | OUTPATIENT
Start: 2018-10-05 | End: 2018-10-05

## 2018-10-05 RX ADMIN — HYDROMORPHONE HYDROCHLORIDE 2 MG: 2 TABLET ORAL at 17:41

## 2018-10-05 RX ADMIN — MAGNESIUM GLUCONATE 500 MG ORAL TABLET 400 MG: 500 TABLET ORAL at 07:22

## 2018-10-05 RX ADMIN — POTASSIUM CHLORIDE 20 MEQ: 1500 TABLET, EXTENDED RELEASE ORAL at 17:41

## 2018-10-05 RX ADMIN — POTASSIUM CHLORIDE 20 MEQ: 1500 TABLET, EXTENDED RELEASE ORAL at 14:44

## 2018-10-05 RX ADMIN — LORAZEPAM 1 MG: 1 TABLET ORAL at 23:34

## 2018-10-05 RX ADMIN — HYDROMORPHONE HYDROCHLORIDE 0.5 MG: 2 INJECTION INTRAMUSCULAR; INTRAVENOUS; SUBCUTANEOUS at 23:34

## 2018-10-05 RX ADMIN — ANTACID TABLETS 2000 MG: 500 TABLET, CHEWABLE ORAL at 07:22

## 2018-10-05 RX ADMIN — LORAZEPAM 1 MG: 1 TABLET ORAL at 04:02

## 2018-10-05 RX ADMIN — LEVOTHYROXINE SODIUM 88 MCG: 88 TABLET ORAL at 06:13

## 2018-10-05 RX ADMIN — HYDROMORPHONE HYDROCHLORIDE 2 MG: 2 TABLET ORAL at 07:22

## 2018-10-05 RX ADMIN — POLYETHYLENE GLYCOL 3350 1 PACKET: 17 POWDER, FOR SOLUTION ORAL at 07:22

## 2018-10-05 RX ADMIN — POTASSIUM CHLORIDE 20 MEQ: 1500 TABLET, EXTENDED RELEASE ORAL at 07:22

## 2018-10-05 RX ADMIN — HYDROMORPHONE HYDROCHLORIDE 2 MG: 2 TABLET ORAL at 21:10

## 2018-10-05 RX ADMIN — MAGNESIUM GLUCONATE 500 MG ORAL TABLET 400 MG: 500 TABLET ORAL at 17:41

## 2018-10-05 RX ADMIN — MAGNESIUM SULFATE IN WATER 2 G: 40 INJECTION, SOLUTION INTRAVENOUS at 10:24

## 2018-10-05 RX ADMIN — HYDROMORPHONE HYDROCHLORIDE 2 MG: 2 TABLET ORAL at 03:52

## 2018-10-05 RX ADMIN — HYDROMORPHONE HYDROCHLORIDE 2 MG: 2 TABLET ORAL at 10:24

## 2018-10-05 RX ADMIN — DOXYCYCLINE 100 MG: 100 TABLET ORAL at 14:43

## 2018-10-05 RX ADMIN — OMEPRAZOLE 20 MG: 20 CAPSULE, DELAYED RELEASE ORAL at 06:13

## 2018-10-05 RX ADMIN — HYDROMORPHONE HYDROCHLORIDE 0.5 MG: 2 INJECTION INTRAMUSCULAR; INTRAVENOUS; SUBCUTANEOUS at 12:50

## 2018-10-05 RX ADMIN — ENOXAPARIN SODIUM 40 MG: 100 INJECTION SUBCUTANEOUS at 06:13

## 2018-10-05 RX ADMIN — HYDROMORPHONE HYDROCHLORIDE 2 MG: 2 TABLET ORAL at 14:44

## 2018-10-05 RX ADMIN — NICOTINE 21 MG: 21 PATCH, EXTENDED RELEASE TRANSDERMAL at 06:13

## 2018-10-05 ASSESSMENT — COGNITIVE AND FUNCTIONAL STATUS - GENERAL
MOVING TO AND FROM BED TO CHAIR: UNABLE
STANDING UP FROM CHAIR USING ARMS: A LITTLE
TURNING FROM BACK TO SIDE WHILE IN FLAT BAD: UNABLE
MOVING FROM LYING ON BACK TO SITTING ON SIDE OF FLAT BED: UNABLE
WALKING IN HOSPITAL ROOM: A LITTLE
MOBILITY SCORE: 12
CLIMB 3 TO 5 STEPS WITH RAILING: A LITTLE
SUGGESTED CMS G CODE MODIFIER MOBILITY: CL

## 2018-10-05 ASSESSMENT — ENCOUNTER SYMPTOMS
VOMITING: 0
ABDOMINAL PAIN: 1
MYALGIAS: 1
NAUSEA: 0
SHORTNESS OF BREATH: 1
FEVER: 0
CHILLS: 0

## 2018-10-05 ASSESSMENT — PAIN SCALES - GENERAL
PAINLEVEL_OUTOF10: 9
PAINLEVEL_OUTOF10: 8
PAINLEVEL_OUTOF10: 10
PAINLEVEL_OUTOF10: 8
PAINLEVEL_OUTOF10: 9
PAINLEVEL_OUTOF10: 10
PAINLEVEL_OUTOF10: 8

## 2018-10-05 ASSESSMENT — GAIT ASSESSMENTS
GAIT LEVEL OF ASSIST: STAND BY ASSIST
ASSISTIVE DEVICE: FRONT WHEEL WALKER
DEVIATION: ANTALGIC;DECREASED HEEL STRIKE;DECREASED TOE OFF
DISTANCE (FEET): 40

## 2018-10-05 NOTE — DISCHARGE PLANNING
Per RN ZARA Redomnd, [facility] has sent referral to [facility] @ [phone].

## 2018-10-05 NOTE — CARE PLAN
Problem: Communication  Goal: The ability to communicate needs accurately and effectively will improve  Outcome: PROGRESSING AS EXPECTED  Patient demonstrates effective use of call light. POC discussed    Problem: Mobility  Goal: Risk for activity intolerance will decrease  Outcome: PROGRESSING SLOWER THAN EXPECTED  Patient refusing to ambulate to toilet, insisting on using commode at bedside.

## 2018-10-05 NOTE — THERAPY
"Physical Therapy Treatment completed.   Bed Mobility:  Supine to Sit: Minimal Assist  Transfers: Sit to Stand: Contact Guard Assist  Gait: Level Of Assist: Stand by Assist with Front-Wheel Walker       Plan of Care: Will benefit from Physical Therapy 3 times per week  Discharge Recommendations: Equipment: Will Continue to Assess for Equipment Needs. Post-acute therapy Discharge to a transitional care facility or to home with outpatient or home health depending on patient progression.    See \"Rehab Therapy-Acute\" Patient Summary Report for complete documentation.     Patient report of pain inconsistent with presentation during mobility. Patient tolerated in room mobility including toilet transfer well but declined further ambulation as she preferred to eat breakfast. Patient will continue to benefit from continued PT during acute stay to progress functional mobility.  "

## 2018-10-05 NOTE — DISCHARGE PLANNING
Agency/Facility Name: [facility]   Spoke To: Mague   Outcome: Mague is trying to schedule transfer today and requested an updated discharge summary before 1400 due to facilities pharmacy needing to order pts meds.

## 2018-10-05 NOTE — DISCHARGE PLANNING
Agency/Facility Name: [facility]   Spoke To: Dacia   Outcome: Per Dacia, patients insurance ([employer] ) will not allow an JUANITA until patient has been declined by the contracted facilities.       Agency/Facility Name: [facility]   Spoke To: Marisabel   Outcome: Facility has accepted.Per Marisabel patient will be needing an JUANITA with their insurance.

## 2018-10-05 NOTE — PROGRESS NOTES
Trauma / Surgical Daily Progress Note    Date of Service  10/5/2018    Chief Complaint  63 y.o. female admitted 9/19/2018 with Small bowel obstruction (HCC)  Hospital day # 16  POD # 13 Exploratory laparotomy with small bowel resection  POD #1 Wound exploration and seroma drainage     Interval Events  Tolerating oral pain medication  Labs reassuring    - Mobilize  - Disposition: [facility]    Review of Systems  Review of Systems   Constitutional: Positive for malaise/fatigue. Negative for chills and fever.   Respiratory: Positive for shortness of breath.    Cardiovascular: Negative for chest pain.   Gastrointestinal: Positive for abdominal pain (incisional). Negative for nausea and vomiting.        10/4 BM   Genitourinary:        Voiding   Musculoskeletal: Positive for myalgias.        Vital Signs  Temp:  [36.4 °C (97.5 °F)-37.3 °C (99.1 °F)] 36.9 °C (98.4 °F)  Pulse:  [57-73] 71  Resp:  [10-25] 18  BP: ()/(53-82) 113/66    Physical Exam  Physical Exam   Constitutional: No distress.   HENT:   Head: Normocephalic.   Eyes: Conjunctivae are normal.   Neck: No JVD present.   Cardiovascular: Normal rate.    Pulmonary/Chest: Effort normal. No respiratory distress. She exhibits no tenderness.   Abdominal: Soft. She exhibits no distension. There is tenderness (incisional). There is no guarding.   Midline incision surgical dressing in place    Musculoskeletal:   Moves all extremities   Neurological: She is alert.   Skin: Skin is warm and dry.   Nursing note and vitals reviewed.      Laboratory  Recent Results (from the past 24 hour(s))   AFB CULTURE    Collection Time: 10/04/18  9:36 AM   Result Value Ref Range    Significant Indicator NEG     Source WND     Site Abdominal Wall Wound     AFB Culture       NOTE:  Swabs are not recommended for the isolation of  Mycobacteria, since they provide limited material.  They  are acceptable only if a specimen cannot be collected by  any other means.  Negative results obtained from  these  specimens submitted on swabs are not reliable.  Culture in progress.      AFB Smear Results No acid fast bacilli seen.    FUNGAL CULTURE    Collection Time: 10/04/18  9:36 AM   Result Value Ref Range    Significant Indicator NEG     Source WND     Site Abdominal Wall Wound     Fungal Culture Culture in progress.    GRAM STAIN    Collection Time: 10/04/18  9:36 AM   Result Value Ref Range    Significant Indicator .     Source WND     Site Abdominal Wall Wound     Gram Stain Result Moderate WBCs.  No organisms seen.      ACID FAST STAIN    Collection Time: 10/04/18  9:36 AM   Result Value Ref Range    Significant Indicator NEG     Source WND     Site Abdominal Wall Wound     AFB Smear Results No acid fast bacilli seen.    BASIC METABOLIC PANEL    Collection Time: 10/04/18  3:00 PM   Result Value Ref Range    Sodium 139 135 - 145 mmol/L    Potassium 4.1 3.6 - 5.5 mmol/L    Chloride 104 96 - 112 mmol/L    Co2 26 20 - 33 mmol/L    Glucose 89 65 - 99 mg/dL    Bun 5 (L) 8 - 22 mg/dL    Creatinine 0.67 0.50 - 1.40 mg/dL    Calcium 7.9 (L) 8.5 - 10.5 mg/dL    Anion Gap 9.0 0.0 - 11.9   ESTIMATED GFR    Collection Time: 10/04/18  3:00 PM   Result Value Ref Range    GFR If African American >60 >60 mL/min/1.73 m 2    GFR If Non African American >60 >60 mL/min/1.73 m 2   MAGNESIUM    Collection Time: 10/04/18  3:00 PM   Result Value Ref Range    Magnesium 1.7 1.5 - 2.5 mg/dL   CBC WITH DIFFERENTIAL    Collection Time: 10/05/18  4:11 AM   Result Value Ref Range    WBC 7.4 4.8 - 10.8 K/uL    RBC 3.17 (L) 4.20 - 5.40 M/uL    Hemoglobin 11.0 (L) 12.0 - 16.0 g/dL    Hematocrit 33.5 (L) 37.0 - 47.0 %    .7 (H) 81.4 - 97.8 fL    MCH 34.7 (H) 27.0 - 33.0 pg    MCHC 32.8 (L) 33.6 - 35.0 g/dL    RDW 53.7 (H) 35.9 - 50.0 fL    Platelet Count 196 164 - 446 K/uL    MPV 11.7 9.0 - 12.9 fL    Neutrophils-Polys 68.10 44.00 - 72.00 %    Lymphocytes 21.90 (L) 22.00 - 41.00 %    Monocytes 5.80 0.00 - 13.40 %    Eosinophils 2.20 0.00  - 6.90 %    Basophils 0.70 0.00 - 1.80 %    Immature Granulocytes 1.30 (H) 0.00 - 0.90 %    Nucleated RBC 0.00 /100 WBC    Neutrophils (Absolute) 5.06 2.00 - 7.15 K/uL    Lymphs (Absolute) 1.63 1.00 - 4.80 K/uL    Monos (Absolute) 0.43 0.00 - 0.85 K/uL    Eos (Absolute) 0.16 0.00 - 0.51 K/uL    Baso (Absolute) 0.05 0.00 - 0.12 K/uL    Immature Granulocytes (abs) 0.10 0.00 - 0.11 K/uL    NRBC (Absolute) 0.00 K/uL   MAGNESIUM    Collection Time: 10/05/18  4:11 AM   Result Value Ref Range    Magnesium 1.5 1.5 - 2.5 mg/dL   BASIC METABOLIC PANEL    Collection Time: 10/05/18  4:11 AM   Result Value Ref Range    Sodium 137 135 - 145 mmol/L    Potassium 3.9 3.6 - 5.5 mmol/L    Chloride 102 96 - 112 mmol/L    Co2 26 20 - 33 mmol/L    Glucose 89 65 - 99 mg/dL    Bun 5 (L) 8 - 22 mg/dL    Creatinine 0.47 (L) 0.50 - 1.40 mg/dL    Calcium 7.6 (L) 8.5 - 10.5 mg/dL    Anion Gap 9.0 0.0 - 11.9   ESTIMATED GFR    Collection Time: 10/05/18  4:11 AM   Result Value Ref Range    GFR If African American >60 >60 mL/min/1.73 m 2    GFR If Non African American >60 >60 mL/min/1.73 m 2       Fluids    Intake/Output Summary (Last 24 hours) at 10/05/18 0851  Last data filed at 10/05/18 0720   Gross per 24 hour   Intake              940 ml   Output             1000 ml   Net              -60 ml       Core Measures & Quality Metrics  Labs reviewed, Medications reviewed and Radiology images reviewed  Parker catheter: No Parker  Central line in place: Need for access    DVT Prophylaxis: Enoxaparin (Lovenox)  DVT prophylaxis - mechanical: SCDs  Ulcer prophylaxis: Yes    Assessed for rehab: Patient was assess for and/or received rehabilitation services during this hospitalization    LILIAN Score  ETOH Screening    Assessment/Plan  Debility- (present on admission)   Assessment & Plan    9/27 Skilled Nursing Referrals      10/3 Transfer held due to seroma development        Hypomagnesemia   Assessment & Plan    9/27 Supplement 4 grams IV magnesium    9/28  Supplement 4 grams IV magnesium  9/30 Supplement 4 grams IV magnesium. Start oral magnesium   10/1 Supplement 2 grams IV magnesium   10/2 Supplement 4 grams IV magnesium  10/3 Supplement 2 grams IV magnesium  10/5 Trend down, supplement 2 grams and continue oral replacement  Supplement and trend        Hypokalemia- (present on admission)   Assessment & Plan    9/27 Supplement 20 mEq IV potassium   9/28 Supplement 40 mEq IV potassium  9/29 Supplement 40 mEq IV potassium    - Follow up potassium 3.1   - Potassium 40 mEq BID    10/1 Trend up. Continue oral supplementation. Decrease potassium supplementation to 20 mEq BID  10/2 Trend down, increase oral potassium supplementation to 20 mEq TID  10/5 Stable, continue oral supplementation  Trend labs        Small bowel obstruction (HCC)- (present on admission)   Assessment & Plan    9/22 Ex lap, lysis of adhesions, small bowel resection.  9/25 7 day course of antibiotic therapy completed. NG tube clamped  9/26 Interval removal of NG tube.  9/27 Clear liquid diet  9/28 Abdominal wound erythema. Start Doxycycline.    9/29 Incisional erythema improved. Continue antibiotic therapy. Full liquid diet    9/30 GI soft diet  10/4 Wound exploration and seroma drainage in OR  Antibiotic day 7 - stop date 10/4 at 1800   Houston Amanda DO, Surgery.        Other chronic postprocedural pain   Assessment & Plan    9/30 DC oxycodone.  Start oral dilaudid  10/1 DC Methadone. DC IV narcotics   10/4 IV narcotics for immediate post operative period only        Ulcerative esophagitis   Assessment & Plan    Patient completed course of triple therapy        Hypothyroidism- (present on admission)   Assessment & Plan    Chronic condition treated with Synthroid.  Resumed maintenance medication.            Discussed patient condition with RN, Patient and general surgery, Dr. Amanda.

## 2018-10-05 NOTE — DISCHARGE PLANNING
Agency/Facility Name: [facility]   Spoke To: Gladys   Outcome: CCA has faxed d.c summary to facility @ [phone]

## 2018-10-05 NOTE — DISCHARGE SUMMARY
General Surgery Transfer Summary Addendum    DATE OF ADMISSION: 9/19/2018    DATE OF TRANSFER: 10/5/2018    LENGTH OF STAY: 16 days    ADMITTING PHYSICIAN:  Tay Sorto MD     CONSULTING PHYSICIANS:  Houston Amanda DO, surgery    PROCEDURES:  1. Procedure completed by Dr. Amanda on October 4, 2018, abdominal wound exploration and seroma drainage.    HOSPITAL COURSE: Patient remained in the acute care setting while awaiting placement at skilled nursing facility.  In the interim she did develop a seroma in the area of her midline wound.  On October 4 she presented to the operating theater for drainage of said seroma.  She tolerated the procedure well and returned to the general surgical unit postoperatively.  She is to remain on the doxycycline until 10-.    Her potassium and magnesium continued to be supplemented during the acute phase.  She should continue to receive oral supplementation and her electrolytes should be followed.    On day of transfer she is tolerating a regular diet, her pain is controlled with oral pain medication, her white blood cell count is within normal range and she displays no overt signs of infection.    DISCHARGE PHYSICAL EXAM: See [facility] physical exam dated 10/5/2018    DISCHARGE MEDICATIONS: See epic medication reconciliation dated 10/5/2018    DISPOSITION:  The patient will be discharged to [facility] on 10/05/2018.    She will follow with Dr. Houston Amanda within 1 week's time as needed if   symptoms worsen for wound check and staple removal.    She will follow-up with her primary care provider upon discharge from [facility] nursing [facility].    The patient has been extensively counseled and all of her questions have been answered.  Special attention was paid to the signs and symptoms of infection, new or worsening abdominal pain and to seek immediate medical attention if these do develop.  She demonstrates understanding of her discharge instructions and gives verbal  compliance.     The opioid risk tool and narcotics check was completed by [facility] prior to transfer.      ____________________________________________  OLIMPIA Correa    DD: 10/5/2018 1:47 PM

## 2018-10-05 NOTE — DISCHARGE PLANNING
Anticipated Discharge Disposition: To [facility] today    Action: Requested TARSHA Fuchs to update SNF orders.    Barriers to Discharge: RK Maru reports that duncan Padgett,  Manager, just notified her that [facility] is now not able to accept any patients today.    Plan: Requested RK Mahoney to find another accepting SN, she will follow up.

## 2018-10-05 NOTE — DISCHARGE PLANNING
Agency/Facility Name: [facility]   Spoke To: CCA left VM for patric   Outcome: CCA waiting for call back regarding referral status.       CCA awaiting call back and will update RN ZARA Redmond as patients insurance is contracted with [facility] and [facility] only.

## 2018-10-05 NOTE — PROGRESS NOTES
Received bedside report and assumed care at 1900    Pt is A&O x4  Pain 10/10, medicated per MAR  Denies nausea  Tolerating regular diet  + Urine Output  + Flatus  + BM   Abdominal midline abdominal incision. Dressing in place  Up with x1 assistance and FWW  Bed in lowest position and locked.   Reviewed plan of care with patient, bed in lowest position and locked, pt resting comfortably now, call light within reach, all needs met at this time

## 2018-10-05 NOTE — PROGRESS NOTES
Bedside report received.  Assessment complete.  A&O x 4. Patient calls frequently.  Patient up with stand by assist.   Patient has 9/10 pain. Repositioned, heat pack, and medicated per MAR.  Denies N&V. Patient tolerating regular diet.  Surgical incision to midline, dressing and abdominal binder in place. .  + void, + flatus. Last BM 10/4  Patient denies SOB.  SCD's in place.  Review plan with of care with patient. Call light and personal belongings with in reach. Hourly rounding in place. All needs met at this time.

## 2018-10-05 NOTE — DISCHARGE PLANNING
Spoke To: Lorin (Supervisor)   Outcome: Per [facility] is not transferring any patients to facility today.     CCA will follow up with pending SNF referrals.

## 2018-10-05 NOTE — DISCHARGE PLANNING
Agency/Facility Name: [facility]   Spoke To: Gladys  Outcome: Gladys stated that she was training and the intake rep Corrine will be out of the office until Monday. CCA placed call in order to set up transport because patient is m/c. Due to Gladys not being able to schedule patients arrival today, this CCA asked for director to call once she returns to her desk.     CCA left Gladys the extension number and is awaiting follow up call from [facility] management.    Patients Insurance rep  has called care transition team inquiring about patients acceptance to SNF.     CCA placed call and left message for dept supervisor Lorin so that patient can be scheduled for transport today. CCA awaiting call back from Lorin.

## 2018-10-05 NOTE — DISCHARGE PLANNING
Agency/Facility Name:  [facility]   Spoke To: Tiffani   Outcome: Tiffani has received and is reviewing SNF referral and will give this CCA a call back with response.

## 2018-10-06 LAB
BACTERIA WND AEROBE CULT: ABNORMAL
BACTERIA WND AEROBE CULT: ABNORMAL
BASOPHILS # BLD AUTO: 1 % (ref 0–1.8)
BASOPHILS # BLD: 0.05 K/UL (ref 0–0.12)
EOSINOPHIL # BLD AUTO: 0.17 K/UL (ref 0–0.51)
EOSINOPHIL NFR BLD: 3.4 % (ref 0–6.9)
ERYTHROCYTE [DISTWIDTH] IN BLOOD BY AUTOMATED COUNT: 51.9 FL (ref 35.9–50)
GRAM STN SPEC: ABNORMAL
HCT VFR BLD AUTO: 33 % (ref 37–47)
HGB BLD-MCNC: 10.9 G/DL (ref 12–16)
IMM GRANULOCYTES # BLD AUTO: 0.06 K/UL (ref 0–0.11)
IMM GRANULOCYTES NFR BLD AUTO: 1.2 % (ref 0–0.9)
LYMPHOCYTES # BLD AUTO: 1.48 K/UL (ref 1–4.8)
LYMPHOCYTES NFR BLD: 30 % (ref 22–41)
MCH RBC QN AUTO: 34.9 PG (ref 27–33)
MCHC RBC AUTO-ENTMCNC: 33 G/DL (ref 33.6–35)
MCV RBC AUTO: 105.8 FL (ref 81.4–97.8)
MONOCYTES # BLD AUTO: 0.39 K/UL (ref 0–0.85)
MONOCYTES NFR BLD AUTO: 7.9 % (ref 0–13.4)
NEUTROPHILS # BLD AUTO: 2.78 K/UL (ref 2–7.15)
NEUTROPHILS NFR BLD: 56.5 % (ref 44–72)
NRBC # BLD AUTO: 0 K/UL
NRBC BLD-RTO: 0 /100 WBC
PLATELET # BLD AUTO: 192 K/UL (ref 164–446)
PMV BLD AUTO: 11.9 FL (ref 9–12.9)
RBC # BLD AUTO: 3.12 M/UL (ref 4.2–5.4)
SIGNIFICANT IND 70042: ABNORMAL
SITE SITE: ABNORMAL
SOURCE SOURCE: ABNORMAL
WBC # BLD AUTO: 4.9 K/UL (ref 4.8–10.8)

## 2018-10-06 PROCEDURE — 700102 HCHG RX REV CODE 250 W/ 637 OVERRIDE(OP): Performed by: NURSE PRACTITIONER

## 2018-10-06 PROCEDURE — 700102 HCHG RX REV CODE 250 W/ 637 OVERRIDE(OP): Performed by: SURGERY

## 2018-10-06 PROCEDURE — A9270 NON-COVERED ITEM OR SERVICE: HCPCS | Performed by: NURSE PRACTITIONER

## 2018-10-06 PROCEDURE — 700105 HCHG RX REV CODE 258: Performed by: NURSE PRACTITIONER

## 2018-10-06 PROCEDURE — 700111 HCHG RX REV CODE 636 W/ 250 OVERRIDE (IP): Performed by: SURGERY

## 2018-10-06 PROCEDURE — 700111 HCHG RX REV CODE 636 W/ 250 OVERRIDE (IP): Performed by: NURSE PRACTITIONER

## 2018-10-06 PROCEDURE — 85025 COMPLETE CBC W/AUTO DIFF WBC: CPT

## 2018-10-06 PROCEDURE — A9270 NON-COVERED ITEM OR SERVICE: HCPCS | Performed by: SURGERY

## 2018-10-06 PROCEDURE — 770001 HCHG ROOM/CARE - MED/SURG/GYN PRIV*

## 2018-10-06 RX ORDER — SODIUM CHLORIDE 9 MG/ML
INJECTION, SOLUTION INTRAVENOUS
Status: ACTIVE
Start: 2018-10-06 | End: 2018-10-06

## 2018-10-06 RX ORDER — CALCIUM CARBONATE 500 MG/1
1000 TABLET, CHEWABLE ORAL 3 TIMES DAILY
Status: DISCONTINUED | OUTPATIENT
Start: 2018-10-06 | End: 2018-10-12 | Stop reason: HOSPADM

## 2018-10-06 RX ADMIN — MAGNESIUM GLUCONATE 500 MG ORAL TABLET 400 MG: 500 TABLET ORAL at 06:25

## 2018-10-06 RX ADMIN — NICOTINE 14 MG: 14 PATCH, EXTENDED RELEASE TRANSDERMAL at 05:02

## 2018-10-06 RX ADMIN — LEVOTHYROXINE SODIUM 88 MCG: 88 TABLET ORAL at 05:00

## 2018-10-06 RX ADMIN — ANTACID TABLETS 2000 MG: 500 TABLET, CHEWABLE ORAL at 06:25

## 2018-10-06 RX ADMIN — HYDROMORPHONE HYDROCHLORIDE 2 MG: 2 TABLET ORAL at 17:18

## 2018-10-06 RX ADMIN — CEFEPIME 2 G: 2 INJECTION, POWDER, FOR SOLUTION INTRAVENOUS at 10:55

## 2018-10-06 RX ADMIN — HYDROMORPHONE HYDROCHLORIDE 0.5 MG: 2 INJECTION INTRAMUSCULAR; INTRAVENOUS; SUBCUTANEOUS at 22:46

## 2018-10-06 RX ADMIN — ANTACID TABLETS 1000 MG: 500 TABLET, CHEWABLE ORAL at 10:55

## 2018-10-06 RX ADMIN — OMEPRAZOLE 20 MG: 20 CAPSULE, DELAYED RELEASE ORAL at 05:01

## 2018-10-06 RX ADMIN — ENOXAPARIN SODIUM 40 MG: 100 INJECTION SUBCUTANEOUS at 05:02

## 2018-10-06 RX ADMIN — HYDROMORPHONE HYDROCHLORIDE 2 MG: 2 TABLET ORAL at 06:25

## 2018-10-06 RX ADMIN — HYDROMORPHONE HYDROCHLORIDE 2 MG: 2 TABLET ORAL at 20:21

## 2018-10-06 RX ADMIN — POLYETHYLENE GLYCOL 3350 1 PACKET: 17 POWDER, FOR SOLUTION ORAL at 05:01

## 2018-10-06 RX ADMIN — LORAZEPAM 1 MG: 1 TABLET ORAL at 22:47

## 2018-10-06 RX ADMIN — VITAMIN D, TAB 1000IU (100/BT) 4000 UNITS: 25 TAB at 10:55

## 2018-10-06 RX ADMIN — DOXYCYCLINE 100 MG: 100 TABLET ORAL at 05:01

## 2018-10-06 RX ADMIN — CEFEPIME 2 G: 2 INJECTION, POWDER, FOR SOLUTION INTRAVENOUS at 22:47

## 2018-10-06 RX ADMIN — HYDROMORPHONE HYDROCHLORIDE 0.5 MG: 2 INJECTION INTRAMUSCULAR; INTRAVENOUS; SUBCUTANEOUS at 09:25

## 2018-10-06 RX ADMIN — HYDROMORPHONE HYDROCHLORIDE 2 MG: 2 TABLET ORAL at 14:04

## 2018-10-06 RX ADMIN — HYDROMORPHONE HYDROCHLORIDE 2 MG: 2 TABLET ORAL at 10:55

## 2018-10-06 RX ADMIN — CEFEPIME 2 G: 2 INJECTION, POWDER, FOR SOLUTION INTRAVENOUS at 14:04

## 2018-10-06 RX ADMIN — HYDROMORPHONE HYDROCHLORIDE 2 MG: 2 TABLET ORAL at 03:35

## 2018-10-06 RX ADMIN — POTASSIUM CHLORIDE 20 MEQ: 1500 TABLET, EXTENDED RELEASE ORAL at 17:17

## 2018-10-06 RX ADMIN — POTASSIUM CHLORIDE 20 MEQ: 1500 TABLET, EXTENDED RELEASE ORAL at 12:40

## 2018-10-06 RX ADMIN — MAGNESIUM GLUCONATE 500 MG ORAL TABLET 400 MG: 500 TABLET ORAL at 17:18

## 2018-10-06 RX ADMIN — POTASSIUM CHLORIDE 20 MEQ: 1500 TABLET, EXTENDED RELEASE ORAL at 06:25

## 2018-10-06 RX ADMIN — HYDROMORPHONE HYDROCHLORIDE 0.5 MG: 2 INJECTION INTRAMUSCULAR; INTRAVENOUS; SUBCUTANEOUS at 15:06

## 2018-10-06 ASSESSMENT — ENCOUNTER SYMPTOMS
VOMITING: 0
FEVER: 0
NAUSEA: 0
MYALGIAS: 1
ABDOMINAL PAIN: 1
SHORTNESS OF BREATH: 0
CHILLS: 0

## 2018-10-06 ASSESSMENT — PAIN SCALES - GENERAL
PAINLEVEL_OUTOF10: 10
PAINLEVEL_OUTOF10: 10
PAINLEVEL_OUTOF10: 5
PAINLEVEL_OUTOF10: 9
PAINLEVEL_OUTOF10: 10
PAINLEVEL_OUTOF10: 5
PAINLEVEL_OUTOF10: 9
PAINLEVEL_OUTOF10: 9
PAINLEVEL_OUTOF10: 10

## 2018-10-06 NOTE — PROGRESS NOTES
Bedside report received.  Assessment complete.  A&O x 4. Patient calls frequently.  Patient up with stand by assist.   Patient has 9/10 pain. Repositioned, heat pack, and medicated per MAR.  Denies N&V. Patient tolerating regular diet.  Surgical incision to midline, dressing and abdominal binder in place. .  + void, + flatus. Last BM 10/5  Patient denies SOB.  SCD's in place.  Review plan with of care with patient. Call light and personal belongings with in reach. Hourly rounding in place. All needs met at this time.

## 2018-10-06 NOTE — CARE PLAN
Problem: Knowledge Deficit  Goal: Knowledge of the prescribed therapeutic regimen will improve  Outcome: PROGRESSING AS EXPECTED  Patient stated pain management ineffective. Once educated, pt initially states she understands and then asks questions showing questionable understanding. Patient able to educated nurse upon non-narcotic medication regimen.      Problem: Pain Management  Goal: Pain level will decrease to patient's comfort goal  Outcome: PROGRESSING AS EXPECTED  Educated patient about impending discharge to SNF with attempt to keep pain managed by PO medications. Patient verbalized understanding, however requests for IV Dilaudid first.

## 2018-10-06 NOTE — PROGRESS NOTES
Trauma / Surgical Daily Progress Note    Date of Service  10/6/2018    Chief Complaint  63 y.o. female admitted 9/19/2018 with Small bowel obstruction (HCC)  Hospital day # 17  POD # 14 Exploratory laparotomy with small bowel resection  POD #2 Wound exploration and seroma drainage     Interval Events  Wound cultures pending positive for Pseudomonas aeruginosa     - Stop doxycycline, start Zosyn  - Discussed pain control expectations at length with patient  - Disposition: SNF when medically clear    Review of Systems  Review of Systems   Constitutional: Positive for malaise/fatigue. Negative for chills and fever.   Respiratory: Negative for shortness of breath.    Cardiovascular: Negative for chest pain.   Gastrointestinal: Positive for abdominal pain (incisional). Negative for nausea and vomiting.        10/4 BM   Genitourinary:        Voiding    Musculoskeletal: Positive for myalgias.        Vital Signs  Temp:  [36.2 °C (97.1 °F)-36.8 °C (98.2 °F)] 36.7 °C (98.1 °F)  Pulse:  [69-87] 87  Resp:  [17-18] 17  BP: (103-124)/(66-81) 117/72    Physical Exam  Physical Exam   Constitutional: No distress.   HENT:   Head: Normocephalic.   Eyes: Conjunctivae are normal.   Neck: No JVD present.   Cardiovascular: Normal rate.    Pulmonary/Chest: Effort normal. No respiratory distress. She exhibits no tenderness.   Abdominal: Soft. She exhibits no distension. There is tenderness (incisional). There is no guarding.   Midline incision surgical dressing in place   Musculoskeletal:   Moves all extremities    Neurological: She is alert.   Skin: Skin is warm and dry.   Nursing note and vitals reviewed.      Laboratory  Recent Results (from the past 24 hour(s))   CBC WITH DIFFERENTIAL    Collection Time: 10/06/18  3:30 AM   Result Value Ref Range    WBC 4.9 4.8 - 10.8 K/uL    RBC 3.12 (L) 4.20 - 5.40 M/uL    Hemoglobin 10.9 (L) 12.0 - 16.0 g/dL    Hematocrit 33.0 (L) 37.0 - 47.0 %    .8 (H) 81.4 - 97.8 fL    MCH 34.9 (H) 27.0 -  33.0 pg    MCHC 33.0 (L) 33.6 - 35.0 g/dL    RDW 51.9 (H) 35.9 - 50.0 fL    Platelet Count 192 164 - 446 K/uL    MPV 11.9 9.0 - 12.9 fL    Neutrophils-Polys 56.50 44.00 - 72.00 %    Lymphocytes 30.00 22.00 - 41.00 %    Monocytes 7.90 0.00 - 13.40 %    Eosinophils 3.40 0.00 - 6.90 %    Basophils 1.00 0.00 - 1.80 %    Immature Granulocytes 1.20 (H) 0.00 - 0.90 %    Nucleated RBC 0.00 /100 WBC    Neutrophils (Absolute) 2.78 2.00 - 7.15 K/uL    Lymphs (Absolute) 1.48 1.00 - 4.80 K/uL    Monos (Absolute) 0.39 0.00 - 0.85 K/uL    Eos (Absolute) 0.17 0.00 - 0.51 K/uL    Baso (Absolute) 0.05 0.00 - 0.12 K/uL    Immature Granulocytes (abs) 0.06 0.00 - 0.11 K/uL    NRBC (Absolute) 0.00 K/uL       Fluids    Intake/Output Summary (Last 24 hours) at 10/06/18 0936  Last data filed at 10/06/18 0815   Gross per 24 hour   Intake             1120 ml   Output              400 ml   Net              720 ml       Core Measures & Quality Metrics  Core Measures & Quality Metrics  LILIAN Score  ETOH Screening    Assessment/Plan  Debility- (present on admission)   Assessment & Plan    9/27 Skilled Nursing Referrals      10/3 Transfer held due to seroma development        Hypomagnesemia   Assessment & Plan    9/27 Supplement 4 grams IV magnesium    9/28 Supplement 4 grams IV magnesium  9/30 Supplement 4 grams IV magnesium. Start oral magnesium   10/1 Supplement 2 grams IV magnesium   10/2 Supplement 4 grams IV magnesium  10/3 Supplement 2 grams IV magnesium  10/5 Trend down, supplement 2 grams and continue oral replacement  Continue supplementation         Hypokalemia- (present on admission)   Assessment & Plan    9/27 Supplement 20 mEq IV potassium   9/28 Supplement 40 mEq IV potassium  9/29 Supplement 40 mEq IV potassium    - Follow up potassium 3.1   - Potassium 40 mEq BID    10/1 Trend up. Continue oral supplementation. Decrease potassium supplementation to 20 mEq BID  10/2 Trend down, increase oral potassium supplementation to 20 mEq  TID  10/5 Stable, continue oral supplementation        Small bowel obstruction (HCC)- (present on admission)   Assessment & Plan    9/22 Ex lap, lysis of adhesions, small bowel resection.  9/25 7 day course of antibiotic therapy completed. NG tube clamped  9/26 Interval removal of NG tube.  9/27 Clear liquid diet  9/28 Abdominal wound erythema. Start Doxycycline.    9/29 Incisional erythema improved. Continue antibiotic therapy. Full liquid diet    9/30 GI soft diet  10/4 Wound exploration and seroma drainage in OR, cultures sent  10/5 Continue doxycycline for an additional 7 days   10/6 Wound culture pending positive for pseudomonas, start Cefepime, stop doxycycline  Houston Amanda DO, Surgery.        Other chronic postprocedural pain   Assessment & Plan    9/30 DC oxycodone.  Start oral dilaudid  10/1 DC Methadone. DC IV narcotics   10/4 IV narcotics for immediate post operative period only        Ulcerative esophagitis   Assessment & Plan    Patient completed course of triple therapy        Hypothyroidism- (present on admission)   Assessment & Plan    Chronic condition treated with Synthroid.  Resumed maintenance medication.            Discussed patient condition with RN, Patient and general surgery, Dr. Amanda.

## 2018-10-06 NOTE — PROGRESS NOTES
Report received from AM RN; assumed care.   A&O x 4. VSS. Saturations 95% Patient calls appropriately. Up stand by assist with FWW. Patient states repeatedly 9/10 for pain. Medicated; see MAR. Abdomen described as tender. Island dressing in place over midline incision with small amount of old serosang drainage. Denies nausea/vomiting, Tolerating regular diet. Abdominal binder in place, patient loosening as needed. 10/05/18 last reported BM by pt. Bed locked/lowest position. Belongings within reach.  All needs met at this time.

## 2018-10-06 NOTE — PROGRESS NOTES
Patient tolerated central line dressing change per active order. White, gray, and red lumens all patent with blood return. TKO IVF running.

## 2018-10-06 NOTE — DISCHARGE PLANNING
Anticipated Discharge Disposition: To [facility] if accepted.  Per Nathalie, liaison for [facility], [facility] and [facility] SNF's, [facility] is the only SNF that is contracted with [employer], the patient's insurance.    Action: This RN CM notes that [facility] is currently reviewing the patient but has not accepted her yet.    Barriers to Discharge: Acceptance by [facility].    Plan: Await decision from [facility] as to whether or not they can take the patient.

## 2018-10-07 LAB
ANION GAP SERPL CALC-SCNC: 7 MMOL/L (ref 0–11.9)
BACTERIA SPEC ANAEROBE CULT: NORMAL
BASOPHILS # BLD AUTO: 1 % (ref 0–1.8)
BASOPHILS # BLD: 0.05 K/UL (ref 0–0.12)
BUN SERPL-MCNC: 7 MG/DL (ref 8–22)
CALCIUM SERPL-MCNC: 8.3 MG/DL (ref 8.5–10.5)
CHLORIDE SERPL-SCNC: 105 MMOL/L (ref 96–112)
CO2 SERPL-SCNC: 27 MMOL/L (ref 20–33)
CREAT SERPL-MCNC: 0.64 MG/DL (ref 0.5–1.4)
EOSINOPHIL # BLD AUTO: 0.17 K/UL (ref 0–0.51)
EOSINOPHIL NFR BLD: 3.5 % (ref 0–6.9)
ERYTHROCYTE [DISTWIDTH] IN BLOOD BY AUTOMATED COUNT: 50.7 FL (ref 35.9–50)
GLUCOSE SERPL-MCNC: 83 MG/DL (ref 65–99)
HCT VFR BLD AUTO: 32.8 % (ref 37–47)
HGB BLD-MCNC: 10.8 G/DL (ref 12–16)
IMM GRANULOCYTES # BLD AUTO: 0.07 K/UL (ref 0–0.11)
IMM GRANULOCYTES NFR BLD AUTO: 1.4 % (ref 0–0.9)
LYMPHOCYTES # BLD AUTO: 1.53 K/UL (ref 1–4.8)
LYMPHOCYTES NFR BLD: 31.2 % (ref 22–41)
MAGNESIUM SERPL-MCNC: 1.6 MG/DL (ref 1.5–2.5)
MCH RBC QN AUTO: 34.4 PG (ref 27–33)
MCHC RBC AUTO-ENTMCNC: 32.9 G/DL (ref 33.6–35)
MCV RBC AUTO: 104.5 FL (ref 81.4–97.8)
MONOCYTES # BLD AUTO: 0.42 K/UL (ref 0–0.85)
MONOCYTES NFR BLD AUTO: 8.6 % (ref 0–13.4)
NEUTROPHILS # BLD AUTO: 2.66 K/UL (ref 2–7.15)
NEUTROPHILS NFR BLD: 54.3 % (ref 44–72)
NRBC # BLD AUTO: 0 K/UL
NRBC BLD-RTO: 0 /100 WBC
PLATELET # BLD AUTO: 193 K/UL (ref 164–446)
PMV BLD AUTO: 11.5 FL (ref 9–12.9)
POTASSIUM SERPL-SCNC: 4 MMOL/L (ref 3.6–5.5)
RBC # BLD AUTO: 3.14 M/UL (ref 4.2–5.4)
SIGNIFICANT IND 70042: NORMAL
SITE SITE: NORMAL
SODIUM SERPL-SCNC: 139 MMOL/L (ref 135–145)
SOURCE SOURCE: NORMAL
WBC # BLD AUTO: 4.9 K/UL (ref 4.8–10.8)

## 2018-10-07 PROCEDURE — 85025 COMPLETE CBC W/AUTO DIFF WBC: CPT

## 2018-10-07 PROCEDURE — A9270 NON-COVERED ITEM OR SERVICE: HCPCS | Performed by: SURGERY

## 2018-10-07 PROCEDURE — A9270 NON-COVERED ITEM OR SERVICE: HCPCS | Performed by: NURSE PRACTITIONER

## 2018-10-07 PROCEDURE — 700105 HCHG RX REV CODE 258

## 2018-10-07 PROCEDURE — 700102 HCHG RX REV CODE 250 W/ 637 OVERRIDE(OP): Performed by: NURSE PRACTITIONER

## 2018-10-07 PROCEDURE — 80048 BASIC METABOLIC PNL TOTAL CA: CPT

## 2018-10-07 PROCEDURE — 700111 HCHG RX REV CODE 636 W/ 250 OVERRIDE (IP): Performed by: SURGERY

## 2018-10-07 PROCEDURE — 83735 ASSAY OF MAGNESIUM: CPT

## 2018-10-07 PROCEDURE — 700111 HCHG RX REV CODE 636 W/ 250 OVERRIDE (IP): Performed by: HOSPITALIST

## 2018-10-07 PROCEDURE — 700111 HCHG RX REV CODE 636 W/ 250 OVERRIDE (IP): Performed by: NURSE PRACTITIONER

## 2018-10-07 PROCEDURE — 700105 HCHG RX REV CODE 258: Performed by: NURSE PRACTITIONER

## 2018-10-07 PROCEDURE — 700102 HCHG RX REV CODE 250 W/ 637 OVERRIDE(OP): Performed by: SURGERY

## 2018-10-07 PROCEDURE — 770001 HCHG ROOM/CARE - MED/SURG/GYN PRIV*

## 2018-10-07 RX ORDER — HYDROMORPHONE HYDROCHLORIDE 2 MG/ML
1 INJECTION, SOLUTION INTRAMUSCULAR; INTRAVENOUS; SUBCUTANEOUS EVERY 4 HOURS PRN
Status: DISCONTINUED | OUTPATIENT
Start: 2018-10-07 | End: 2018-10-08

## 2018-10-07 RX ORDER — MAGNESIUM SULFATE HEPTAHYDRATE 40 MG/ML
4 INJECTION, SOLUTION INTRAVENOUS ONCE
Status: COMPLETED | OUTPATIENT
Start: 2018-10-07 | End: 2018-10-07

## 2018-10-07 RX ORDER — SODIUM CHLORIDE 9 MG/ML
INJECTION, SOLUTION INTRAVENOUS
Status: COMPLETED
Start: 2018-10-07 | End: 2018-10-07

## 2018-10-07 RX ORDER — SODIUM CHLORIDE 9 MG/ML
INJECTION, SOLUTION INTRAVENOUS
Status: COMPLETED
Start: 2018-10-07 | End: 2018-10-08

## 2018-10-07 RX ORDER — CIPROFLOXACIN 500 MG/1
500 TABLET, FILM COATED ORAL EVERY 12 HOURS
Status: DISCONTINUED | OUTPATIENT
Start: 2018-10-07 | End: 2018-10-08

## 2018-10-07 RX ADMIN — HYDROMORPHONE HYDROCHLORIDE 1 MG: 2 INJECTION INTRAMUSCULAR; INTRAVENOUS; SUBCUTANEOUS at 11:33

## 2018-10-07 RX ADMIN — CIPROFLOXACIN HYDROCHLORIDE 500 MG: 500 TABLET, FILM COATED ORAL at 17:54

## 2018-10-07 RX ADMIN — CIPROFLOXACIN HYDROCHLORIDE 500 MG: 500 TABLET, FILM COATED ORAL at 13:10

## 2018-10-07 RX ADMIN — HYDROMORPHONE HYDROCHLORIDE 1 MG: 2 INJECTION INTRAMUSCULAR; INTRAVENOUS; SUBCUTANEOUS at 20:24

## 2018-10-07 RX ADMIN — SODIUM CHLORIDE 500 ML: 9 INJECTION, SOLUTION INTRAVENOUS at 05:41

## 2018-10-07 RX ADMIN — ANTACID TABLETS 1000 MG: 500 TABLET, CHEWABLE ORAL at 11:31

## 2018-10-07 RX ADMIN — LEVOTHYROXINE SODIUM 88 MCG: 88 TABLET ORAL at 05:40

## 2018-10-07 RX ADMIN — SODIUM CHLORIDE: 9 INJECTION, SOLUTION INTRAVENOUS at 20:45

## 2018-10-07 RX ADMIN — POLYETHYLENE GLYCOL 3350 1 PACKET: 17 POWDER, FOR SOLUTION ORAL at 06:15

## 2018-10-07 RX ADMIN — HYDROMORPHONE HYDROCHLORIDE 0.5 MG: 2 INJECTION INTRAMUSCULAR; INTRAVENOUS; SUBCUTANEOUS at 07:42

## 2018-10-07 RX ADMIN — VITAMIN D, TAB 1000IU (100/BT) 4000 UNITS: 25 TAB at 06:14

## 2018-10-07 RX ADMIN — ANTACID TABLETS 1000 MG: 500 TABLET, CHEWABLE ORAL at 17:54

## 2018-10-07 RX ADMIN — HYDROMORPHONE HYDROCHLORIDE 2 MG: 2 TABLET ORAL at 06:13

## 2018-10-07 RX ADMIN — POTASSIUM CHLORIDE 20 MEQ: 1500 TABLET, EXTENDED RELEASE ORAL at 11:31

## 2018-10-07 RX ADMIN — POTASSIUM CHLORIDE 20 MEQ: 1500 TABLET, EXTENDED RELEASE ORAL at 17:54

## 2018-10-07 RX ADMIN — ENOXAPARIN SODIUM 40 MG: 100 INJECTION SUBCUTANEOUS at 05:43

## 2018-10-07 RX ADMIN — MAGNESIUM GLUCONATE 500 MG ORAL TABLET 400 MG: 500 TABLET ORAL at 06:14

## 2018-10-07 RX ADMIN — ANTACID TABLETS 1000 MG: 500 TABLET, CHEWABLE ORAL at 06:14

## 2018-10-07 RX ADMIN — HYDROMORPHONE HYDROCHLORIDE 2 MG: 2 TABLET ORAL at 09:34

## 2018-10-07 RX ADMIN — HYDROMORPHONE HYDROCHLORIDE 1 MG: 2 INJECTION INTRAMUSCULAR; INTRAVENOUS; SUBCUTANEOUS at 15:47

## 2018-10-07 RX ADMIN — MAGNESIUM GLUCONATE 500 MG ORAL TABLET 400 MG: 500 TABLET ORAL at 17:54

## 2018-10-07 RX ADMIN — ALTEPLASE 2 MG: 2.2 INJECTION, POWDER, LYOPHILIZED, FOR SOLUTION INTRAVENOUS at 23:22

## 2018-10-07 RX ADMIN — HYDROMORPHONE HYDROCHLORIDE 2 MG: 2 TABLET ORAL at 17:54

## 2018-10-07 RX ADMIN — MAGNESIUM SULFATE IN WATER 4 G: 40 INJECTION, SOLUTION INTRAVENOUS at 13:10

## 2018-10-07 RX ADMIN — NICOTINE 14 MG: 14 PATCH, EXTENDED RELEASE TRANSDERMAL at 05:40

## 2018-10-07 RX ADMIN — HYDROMORPHONE HYDROCHLORIDE 2 MG: 2 TABLET ORAL at 03:02

## 2018-10-07 RX ADMIN — CEFEPIME 2 G: 2 INJECTION, POWDER, FOR SOLUTION INTRAVENOUS at 05:40

## 2018-10-07 RX ADMIN — POTASSIUM CHLORIDE 20 MEQ: 1500 TABLET, EXTENDED RELEASE ORAL at 06:14

## 2018-10-07 RX ADMIN — LORAZEPAM 1 MG: 1 TABLET ORAL at 23:22

## 2018-10-07 RX ADMIN — HYDROMORPHONE HYDROCHLORIDE 2 MG: 2 TABLET ORAL at 13:11

## 2018-10-07 RX ADMIN — OMEPRAZOLE 20 MG: 20 CAPSULE, DELAYED RELEASE ORAL at 05:40

## 2018-10-07 ASSESSMENT — PAIN SCALES - GENERAL
PAINLEVEL_OUTOF10: 9
PAINLEVEL_OUTOF10: 10
PAINLEVEL_OUTOF10: 9
PAINLEVEL_OUTOF10: 10
PAINLEVEL_OUTOF10: ASSUMED PAIN PRESENT
PAINLEVEL_OUTOF10: 9
PAINLEVEL_OUTOF10: ASSUMED PAIN PRESENT
PAINLEVEL_OUTOF10: 10
PAINLEVEL_OUTOF10: ASSUMED PAIN PRESENT
PAINLEVEL_OUTOF10: 10
PAINLEVEL_OUTOF10: 9

## 2018-10-07 NOTE — CARE PLAN
Problem: Safety  Goal: Will remain free from falls    Intervention: Assess risk factors for falls  Pt up with standby assist and FWW. Call light within reach. Calls for assistance as needed.       Problem: Pain Management  Goal: Pain level will decrease to patient's comfort goal  Pain controlled with oral and IV pain meds

## 2018-10-07 NOTE — PROGRESS NOTES
Surgery  Doing better, c/o pain (chronic pain patient).  Inquiring about specific doses  Wound looks great  On appropriate abx  Mobilize  Adjusted prn iv medication but instructed patient to use sparingly.  Counseled patient on the dangers of chronic narcotic use/abuse  Dr. GAO back tomorrow.

## 2018-10-07 NOTE — PROGRESS NOTES
Received report from AM RN; assumed care. A&O x 4. VSS. 93% on RA. Discussed pain control with patient at beginning of shift; agreed on medication regimen. Patient reporting 9-10 pain; see MAR for administration. Patient resting/sleeping after pain medication administration. Patient released abdominal binder at beginning of shift. Reapplied for trips to the bathroom. Reporting tenderness/cramping. Marked island dressing with permanent marker for patient comfort in monitoring to midline incision. Dry, intact, old serosangin drainage. No change from previous NOC shift, as explained to patient. Waffle cushion in place. Patient utilizing FWW with standby assist to bathroom, voiding directly in toilet, not awaiting hat placement to measure; clear, yellow urine noted. Patient tolerating IV antibiotics. Normoactive BS. Patient stated had loose stool x 2 during day shift; will hold bowel protocol. All needs met at present time. Bed locked/lowest position. Personal belongings within reach.

## 2018-10-07 NOTE — CARE PLAN
Problem: Bowel/Gastric:  Goal: Normal bowel function is maintained or improved  Outcome: PROGRESSING SLOWER THAN EXPECTED  Reports of two loose stools, will hold bowel protocol this AM.     Problem: Pain Management  Goal: Pain level will decrease to patient's comfort goal  Outcome: PROGRESSING SLOWER THAN EXPECTED  Multiple non-pharmacological methods utilized to assist with pain control; heat/cold packs, distraction; ineffective per patient.

## 2018-10-07 NOTE — PROGRESS NOTES
"Pt AA&Ox4. Pain rating at 10. Medicated per MAR. Pt up with standby assist and FWW. Steady gait. Midline incision with island dressing and old drainage noted. abd binder in place. Pt encouraged to ambulate in hallway three times a day. Right central line with TKO. Dressing intact. Plan of care discussed. +void, +flatus, +BM.  Blood pressure 139/75, pulse 62, temperature 36.8 °C (98.2 °F), resp. rate 17, height 1.549 m (5' 1\"), weight 67.2 kg (148 lb 2.4 oz), last menstrual period 10/02/1980, SpO2 91 %, not currently breastfeeding.      "

## 2018-10-08 LAB
ANION GAP SERPL CALC-SCNC: 11 MMOL/L (ref 0–11.9)
BASOPHILS # BLD AUTO: 1.2 % (ref 0–1.8)
BASOPHILS # BLD: 0.06 K/UL (ref 0–0.12)
BUN SERPL-MCNC: 7 MG/DL (ref 8–22)
CALCIUM SERPL-MCNC: 8.3 MG/DL (ref 8.5–10.5)
CHLORIDE SERPL-SCNC: 103 MMOL/L (ref 96–112)
CO2 SERPL-SCNC: 25 MMOL/L (ref 20–33)
CREAT SERPL-MCNC: 0.59 MG/DL (ref 0.5–1.4)
EOSINOPHIL # BLD AUTO: 0.2 K/UL (ref 0–0.51)
EOSINOPHIL NFR BLD: 4.1 % (ref 0–6.9)
ERYTHROCYTE [DISTWIDTH] IN BLOOD BY AUTOMATED COUNT: 49.7 FL (ref 35.9–50)
GLUCOSE SERPL-MCNC: 92 MG/DL (ref 65–99)
HCT VFR BLD AUTO: 32.8 % (ref 37–47)
HGB BLD-MCNC: 11.1 G/DL (ref 12–16)
IMM GRANULOCYTES # BLD AUTO: 0.08 K/UL (ref 0–0.11)
IMM GRANULOCYTES NFR BLD AUTO: 1.7 % (ref 0–0.9)
LYMPHOCYTES # BLD AUTO: 1.36 K/UL (ref 1–4.8)
LYMPHOCYTES NFR BLD: 28.1 % (ref 22–41)
MCH RBC QN AUTO: 35.1 PG (ref 27–33)
MCHC RBC AUTO-ENTMCNC: 33.8 G/DL (ref 33.6–35)
MCV RBC AUTO: 103.8 FL (ref 81.4–97.8)
MONOCYTES # BLD AUTO: 0.53 K/UL (ref 0–0.85)
MONOCYTES NFR BLD AUTO: 11 % (ref 0–13.4)
NEUTROPHILS # BLD AUTO: 2.61 K/UL (ref 2–7.15)
NEUTROPHILS NFR BLD: 53.9 % (ref 44–72)
NRBC # BLD AUTO: 0 K/UL
NRBC BLD-RTO: 0 /100 WBC
PLATELET # BLD AUTO: 194 K/UL (ref 164–446)
PMV BLD AUTO: 12 FL (ref 9–12.9)
POTASSIUM SERPL-SCNC: 4 MMOL/L (ref 3.6–5.5)
RBC # BLD AUTO: 3.16 M/UL (ref 4.2–5.4)
SODIUM SERPL-SCNC: 139 MMOL/L (ref 135–145)
WBC # BLD AUTO: 4.8 K/UL (ref 4.8–10.8)

## 2018-10-08 PROCEDURE — 700105 HCHG RX REV CODE 258

## 2018-10-08 PROCEDURE — 700111 HCHG RX REV CODE 636 W/ 250 OVERRIDE (IP): Performed by: SURGERY

## 2018-10-08 PROCEDURE — 80048 BASIC METABOLIC PNL TOTAL CA: CPT

## 2018-10-08 PROCEDURE — A9270 NON-COVERED ITEM OR SERVICE: HCPCS | Performed by: NURSE PRACTITIONER

## 2018-10-08 PROCEDURE — 770001 HCHG ROOM/CARE - MED/SURG/GYN PRIV*

## 2018-10-08 PROCEDURE — 85025 COMPLETE CBC W/AUTO DIFF WBC: CPT

## 2018-10-08 PROCEDURE — 700102 HCHG RX REV CODE 250 W/ 637 OVERRIDE(OP): Performed by: NURSE PRACTITIONER

## 2018-10-08 PROCEDURE — 97530 THERAPEUTIC ACTIVITIES: CPT

## 2018-10-08 PROCEDURE — 97535 SELF CARE MNGMENT TRAINING: CPT

## 2018-10-08 PROCEDURE — 36415 COLL VENOUS BLD VENIPUNCTURE: CPT

## 2018-10-08 PROCEDURE — A9270 NON-COVERED ITEM OR SERVICE: HCPCS | Performed by: SURGERY

## 2018-10-08 PROCEDURE — 700102 HCHG RX REV CODE 250 W/ 637 OVERRIDE(OP): Performed by: SURGERY

## 2018-10-08 RX ORDER — SODIUM CHLORIDE 9 MG/ML
INJECTION, SOLUTION INTRAVENOUS
Status: COMPLETED
Start: 2018-10-08 | End: 2018-10-08

## 2018-10-08 RX ORDER — CIPROFLOXACIN 750 MG/1
750 TABLET, FILM COATED ORAL EVERY 12 HOURS
Status: COMPLETED | OUTPATIENT
Start: 2018-10-08 | End: 2018-10-11

## 2018-10-08 RX ADMIN — HYDROMORPHONE HYDROCHLORIDE 1 MG: 2 INJECTION INTRAMUSCULAR; INTRAVENOUS; SUBCUTANEOUS at 05:14

## 2018-10-08 RX ADMIN — HYDROMORPHONE HYDROCHLORIDE 2 MG: 2 TABLET ORAL at 21:43

## 2018-10-08 RX ADMIN — POTASSIUM CHLORIDE 20 MEQ: 1500 TABLET, EXTENDED RELEASE ORAL at 18:08

## 2018-10-08 RX ADMIN — CIPROFLOXACIN HYDROCHLORIDE 500 MG: 500 TABLET, FILM COATED ORAL at 05:17

## 2018-10-08 RX ADMIN — HYDROMORPHONE HYDROCHLORIDE 2 MG: 2 TABLET ORAL at 18:08

## 2018-10-08 RX ADMIN — ANTACID TABLETS 1000 MG: 500 TABLET, CHEWABLE ORAL at 06:12

## 2018-10-08 RX ADMIN — HYDROMORPHONE HYDROCHLORIDE 2 MG: 2 TABLET ORAL at 08:35

## 2018-10-08 RX ADMIN — SODIUM CHLORIDE 500 ML: 9 INJECTION, SOLUTION INTRAVENOUS at 15:06

## 2018-10-08 RX ADMIN — ANTACID TABLETS 1000 MG: 500 TABLET, CHEWABLE ORAL at 18:07

## 2018-10-08 RX ADMIN — CIPROFLOXACIN HYDROCHLORIDE 750 MG: 750 TABLET, FILM COATED ORAL at 18:07

## 2018-10-08 RX ADMIN — NICOTINE 14 MG: 14 PATCH, EXTENDED RELEASE TRANSDERMAL at 05:17

## 2018-10-08 RX ADMIN — POTASSIUM CHLORIDE 20 MEQ: 1500 TABLET, EXTENDED RELEASE ORAL at 06:12

## 2018-10-08 RX ADMIN — VITAMIN D, TAB 1000IU (100/BT) 4000 UNITS: 25 TAB at 06:12

## 2018-10-08 RX ADMIN — SODIUM CHLORIDE: 9 INJECTION, SOLUTION INTRAVENOUS at 00:27

## 2018-10-08 RX ADMIN — ANTACID TABLETS 1000 MG: 500 TABLET, CHEWABLE ORAL at 12:00

## 2018-10-08 RX ADMIN — ENOXAPARIN SODIUM 40 MG: 100 INJECTION SUBCUTANEOUS at 05:17

## 2018-10-08 RX ADMIN — LEVOTHYROXINE SODIUM 88 MCG: 88 TABLET ORAL at 05:17

## 2018-10-08 RX ADMIN — SODIUM CHLORIDE 500 ML: 9 INJECTION, SOLUTION INTRAVENOUS at 18:16

## 2018-10-08 RX ADMIN — POTASSIUM CHLORIDE 20 MEQ: 1500 TABLET, EXTENDED RELEASE ORAL at 12:00

## 2018-10-08 RX ADMIN — MAGNESIUM GLUCONATE 500 MG ORAL TABLET 400 MG: 500 TABLET ORAL at 06:12

## 2018-10-08 RX ADMIN — HYDROMORPHONE HYDROCHLORIDE 1 MG: 2 INJECTION INTRAMUSCULAR; INTRAVENOUS; SUBCUTANEOUS at 09:47

## 2018-10-08 RX ADMIN — MAGNESIUM GLUCONATE 500 MG ORAL TABLET 400 MG: 500 TABLET ORAL at 18:08

## 2018-10-08 RX ADMIN — SODIUM CHLORIDE 500 ML: 9 INJECTION, SOLUTION INTRAVENOUS at 15:05

## 2018-10-08 RX ADMIN — SODIUM CHLORIDE: 9 INJECTION, SOLUTION INTRAVENOUS at 02:00

## 2018-10-08 RX ADMIN — POLYETHYLENE GLYCOL 3350 1 PACKET: 17 POWDER, FOR SOLUTION ORAL at 05:17

## 2018-10-08 RX ADMIN — OMEPRAZOLE 20 MG: 20 CAPSULE, DELAYED RELEASE ORAL at 05:17

## 2018-10-08 RX ADMIN — HYDROMORPHONE HYDROCHLORIDE 2 MG: 2 TABLET ORAL at 15:06

## 2018-10-08 RX ADMIN — HYDROMORPHONE HYDROCHLORIDE 2 MG: 2 TABLET ORAL at 01:57

## 2018-10-08 RX ADMIN — HYDROMORPHONE HYDROCHLORIDE 2 MG: 2 TABLET ORAL at 12:00

## 2018-10-08 RX ADMIN — HYDROMORPHONE HYDROCHLORIDE 1 MG: 2 INJECTION INTRAMUSCULAR; INTRAVENOUS; SUBCUTANEOUS at 00:20

## 2018-10-08 ASSESSMENT — COGNITIVE AND FUNCTIONAL STATUS - GENERAL
TOILETING: A LITTLE
DAILY ACTIVITIY SCORE: 21
PERSONAL GROOMING: A LITTLE
HELP NEEDED FOR BATHING: A LITTLE
SUGGESTED CMS G CODE MODIFIER DAILY ACTIVITY: CJ

## 2018-10-08 ASSESSMENT — PAIN SCALES - GENERAL
PAINLEVEL_OUTOF10: 9
PAINLEVEL_OUTOF10: 9
PAINLEVEL_OUTOF10: 8
PAINLEVEL_OUTOF10: ASSUMED PAIN PRESENT
PAINLEVEL_OUTOF10: 9
PAINLEVEL_OUTOF10: 10
PAINLEVEL_OUTOF10: 9
PAINLEVEL_OUTOF10: 9
PAINLEVEL_OUTOF10: ASSUMED PAIN PRESENT
PAINLEVEL_OUTOF10: 8

## 2018-10-08 ASSESSMENT — ENCOUNTER SYMPTOMS
NAUSEA: 0
ROS GI COMMENTS: 10/8 BM
CHILLS: 0
VOMITING: 0
ABDOMINAL PAIN: 1
SHORTNESS OF BREATH: 0
FEVER: 0
MYALGIAS: 1

## 2018-10-08 NOTE — DISCHARGE SUMMARY
General Surgery Discharge Summary Addendum     Dates of addendum: 10/5/2018 - 10/8/2018    LENGTH OF STAY: 19 days    ADMITTING PHYSICIAN:  Tay Sorto MD     CONSULTING PHYSICIANS:  Houston Amanda DO, surgery    DISCHARGE DIAGNOSIS:  No change to discharge diagnosis    PROCEDURES:  No additional procedures were completed    HOSPITAL COURSE: The patient remained in the acute care setting wallowing placement at [facility].  The wound cultures that were obtained during her procedure on October 4 were noted to be significant for a light growth of Pseudomonas.  She was started on a course of oral ciprofloxacin.  Her antibiotic course should complete on October 11.    She continued to receive potassium and magnesium replacement during her acute hospitalization.  She should continue to receive oral supplementation her electro lites should be followed.    DISCHARGE PHYSICAL EXAM: See [facility] physical exam dated 10/8/2018    DISCHARGE MEDICATIONS: Please see epic medication reconciliation dated October 8, 2018.    DISPOSITION: The patient will be discharged to [facility] in stable condition on 10/8/2018.  She will follow up with Dr. Amanda in 10 days for staple removal and wound check.  She should follow-up with her primary care provider upon discharge from [facility] nursing [facility].    The patient has been extensively counseled and all questions have been answered. Special attention was paid to respiratory decompensation, increased pain and signs and symptoms of infection and to seek immediate medical attention if these develop. The patient demonstrates understanding and give verbal compliance with discharge instructions.    TIME SPENT ON DISCHARGE: 45 minutes        ____________________________________________  OLIMPIA Correa    DD: 10/8/2018 11:57 AM

## 2018-10-08 NOTE — DISCHARGE PLANNING
Agency/Facility Name: [facility]   Spoke To: John (Intake Nurse)   Outcome: John requested that CCA send the new d/c summary with discharge meds included. John requested that doctor sends RX via [facility] [address] [place] [address]

## 2018-10-08 NOTE — DISCHARGE PLANNING
Agency/Facility Name: [facility]   Spoke To: Nathalie [phone]  Outcome: Per Nathalie, patient has been accepted to facility and there is a room available if patient chooses.   CCA will need to call back to schedule once updated by CORI ZUÑIGA.     CORI Miranda has been updated.

## 2018-10-08 NOTE — DISCHARGE PLANNING
Agency/Facility Name: [facility]   Spoke To: Gladys   Outcome: Per Gladys, intake rep Corrine will be available at 1300 today. CCA inquired about scheduling transport, Gladys stated that Corrine has been notified and will be calling this CCA back.     CCA will follow up at 1300 in order to arrange patients transport to facility.

## 2018-10-08 NOTE — CARE PLAN
Problem: Pain Management  Goal: Pain level will decrease to patient's comfort goal  Outcome: PROGRESSING SLOWER THAN EXPECTED  Discussed with patient new pain management. Encouraged patient to take PO pain medication instead of IV. Patient opting for IV over PO administration. Educated patient on risk and delay of discharge. Patient verbalized understanding.     Problem: Psychosocial Needs:  Goal: Level of anxiety will decrease  Outcome: PROGRESSING AS EXPECTED  Therapeutic listening provided to patient over shift as patient vocalized concerns. All questions answered. Patient calm and resting at present.

## 2018-10-08 NOTE — PROGRESS NOTES
"Pt A&Ox4, sitting up in bed.  Pt verbally aggressive with this RN stating \"I don't know why you won't just give me my pain shot right now, nobody wants to make sure my pain is controlled I need my pain shot not the pill!!\" Provided extensive education about availability of medications and attempt to try orals first.   Tolerating regular diet, denies n/v. + bowel sounds, + flatus, LBM 10/7.  Saturating >90% on RA.  Pt ambulates SBA with FWW, tires easily.  Updated on plan of care. Safety education provided. Bed locked in low. Call light within reach. Rounding in place.   "

## 2018-10-08 NOTE — DISCHARGE PLANNING
Anticipated Discharge Disposition: SNF    Action: LSW requested that CCA contacts [facility] as per RN, this is patient's 1st choice and per Brady Fuchs is Dr. Amanda's preference.    [facility] reports, she spoke with Mague, Manager at [facility] whom confirmed accepting patient today.  Per [facility], Mague requested the addendum to the discharge summary be faxed to them today prior to 2:00pm, Brady Fuchs aware.    LSW spoke with CORI Ludwig and confirmed, this patient is able to tolerate transfer via Wheelchair Van, Transportation Form provided to [facility].    Per [facility], transfer to [facility] is scheduled for today at 2:00pm.    12:10pm - LSW received a [place] Text from [facility]  Supervisor advising to cancel transfer to [facility] as they are not accepting patients, LSW notified Ruth BECKMAN, and Brady Fuchs notified by this LSW.    Barriers to Discharge: None.    Plan: SNF.  CORI Miranda CM updated on patient's discharge plan.  Ruth to complete transfer arrangements.

## 2018-10-08 NOTE — PROGRESS NOTES
Trauma / Surgical Daily Progress Note    Date of Service  10/8/2018    Chief Complaint  63 y.o. female admitted 9/19/2018 with Small bowel obstruction (HCC)    Interval Events  Tolerating oral antibiotics, case discussed with ID  Pain controlled  Ambulating    - Cipro dose adjusted per ID recommendations  - Medically clear for post acute services    Review of Systems  Review of Systems   Constitutional: Positive for malaise/fatigue. Negative for chills and fever.   Respiratory: Negative for shortness of breath.    Cardiovascular: Negative for chest pain.   Gastrointestinal: Positive for abdominal pain (incisional). Negative for nausea and vomiting.        10/8 BM   Genitourinary:        Voiding   Musculoskeletal: Positive for myalgias.        Vital Signs  Temp:  [36.4 °C (97.6 °F)-36.9 °C (98.4 °F)] 36.4 °C (97.6 °F)  Pulse:  [63-77] 73  Resp:  [17-19] 18  BP: (109-130)/(64-82) 122/82    Physical Exam  Physical Exam   Constitutional: No distress.   HENT:   Head: Normocephalic.   Eyes: Conjunctivae are normal.   Neck: No JVD present.   Cardiovascular: Normal rate.    Pulmonary/Chest: Effort normal. No respiratory distress. She exhibits no tenderness.   Abdominal: Soft. She exhibits no distension. There is tenderness (incisional). There is no guarding.   Midline incision with staples intact, no erythema   Musculoskeletal:   Moves all extremities   Neurological: She is alert.   Skin: Skin is warm and dry.   Nursing note and vitals reviewed.      Laboratory  Recent Results (from the past 24 hour(s))   CBC WITH DIFFERENTIAL    Collection Time: 10/08/18  2:10 AM   Result Value Ref Range    WBC 4.8 4.8 - 10.8 K/uL    RBC 3.16 (L) 4.20 - 5.40 M/uL    Hemoglobin 11.1 (L) 12.0 - 16.0 g/dL    Hematocrit 32.8 (L) 37.0 - 47.0 %    .8 (H) 81.4 - 97.8 fL    MCH 35.1 (H) 27.0 - 33.0 pg    MCHC 33.8 33.6 - 35.0 g/dL    RDW 49.7 35.9 - 50.0 fL    Platelet Count 194 164 - 446 K/uL    MPV 12.0 9.0 - 12.9 fL    Neutrophils-Polys  53.90 44.00 - 72.00 %    Lymphocytes 28.10 22.00 - 41.00 %    Monocytes 11.00 0.00 - 13.40 %    Eosinophils 4.10 0.00 - 6.90 %    Basophils 1.20 0.00 - 1.80 %    Immature Granulocytes 1.70 (H) 0.00 - 0.90 %    Nucleated RBC 0.00 /100 WBC    Neutrophils (Absolute) 2.61 2.00 - 7.15 K/uL    Lymphs (Absolute) 1.36 1.00 - 4.80 K/uL    Monos (Absolute) 0.53 0.00 - 0.85 K/uL    Eos (Absolute) 0.20 0.00 - 0.51 K/uL    Baso (Absolute) 0.06 0.00 - 0.12 K/uL    Immature Granulocytes (abs) 0.08 0.00 - 0.11 K/uL    NRBC (Absolute) 0.00 K/uL   BASIC METABOLIC PANEL    Collection Time: 10/08/18  2:10 AM   Result Value Ref Range    Sodium 139 135 - 145 mmol/L    Potassium 4.0 3.6 - 5.5 mmol/L    Chloride 103 96 - 112 mmol/L    Co2 25 20 - 33 mmol/L    Glucose 92 65 - 99 mg/dL    Bun 7 (L) 8 - 22 mg/dL    Creatinine 0.59 0.50 - 1.40 mg/dL    Calcium 8.3 (L) 8.5 - 10.5 mg/dL    Anion Gap 11.0 0.0 - 11.9   ESTIMATED GFR    Collection Time: 10/08/18  2:10 AM   Result Value Ref Range    GFR If African American >60 >60 mL/min/1.73 m 2    GFR If Non African American >60 >60 mL/min/1.73 m 2       Fluids    Intake/Output Summary (Last 24 hours) at 10/08/18 1134  Last data filed at 10/08/18 0400   Gross per 24 hour   Intake              720 ml   Output                0 ml   Net              720 ml       Core Measures & Quality Metrics  Labs reviewed, Medications reviewed and Radiology images reviewed  Parker catheter: No Parker  Central line in place: Need for access    DVT Prophylaxis: Enoxaparin (Lovenox)  DVT prophylaxis - mechanical: SCDs  Ulcer prophylaxis: Yes  Antibiotics: Treating active infection/contamination beyond 24 hours perioperative coverage  Assessed for rehab: Patient was assess for and/or received rehabilitation services during this hospitalization    LILIAN Score  ETOH Screening    Assessment/Plan  Debility- (present on admission)   Assessment & Plan    9/27 Skilled Nursing Referrals      10/3 Transfer held due to seroma  development  10/5 SNF referrals resent        Hypomagnesemia   Assessment & Plan    9/27 Supplement 4 grams IV magnesium    9/28 Supplement 4 grams IV magnesium  9/30 Supplement 4 grams IV magnesium. Start oral magnesium   10/1 Supplement 2 grams IV magnesium   10/2 Supplement 4 grams IV magnesium  10/3 Supplement 2 grams IV magnesium  10/5 Trend down, supplement 2 grams and continue oral replacement  Continue supplementation         Hypokalemia- (present on admission)   Assessment & Plan    9/27 Supplement 20 mEq IV potassium   9/28 Supplement 40 mEq IV potassium  9/29 Supplement 40 mEq IV potassium    - Follow up potassium 3.1   - Potassium 40 mEq BID    10/1 Trend up. Continue oral supplementation. Decrease potassium supplementation to 20 mEq BID  10/2 Trend down, increase oral potassium supplementation to 20 mEq TID  10/5 Stable, continue oral supplementation         Small bowel obstruction (HCC)- (present on admission)   Assessment & Plan    9/22 Ex lap, lysis of adhesions, small bowel resection.  9/25 7 day course of antibiotic therapy completed. NG tube clamped  9/26 Interval removal of NG tube.  9/27 Clear liquid diet  9/28 Abdominal wound erythema. Start Doxycycline.    9/29 Incisional erythema improved. Continue antibiotic therapy. Full liquid diet    9/30 GI soft diet  10/4 Wound exploration and seroma drainage in OR, cultures sent  10/5 Continue doxycycline for an additional 7 days   10/6 Wound culture pending positive for pseudomonas, start Cefepime, stop doxycycline  10/7 Pseudomonas resistant to Cefepime, start Cipro  Houston Amanda DO, Surgery.        Other chronic postprocedural pain   Assessment & Plan    9/30 DC oxycodone.  Start oral dilaudid  10/1 DC Methadone. DC IV narcotics   10/4 IV narcotics for immediate post operative period only        Ulcerative esophagitis   Assessment & Plan    Patient completed course of triple therapy        Hypothyroidism- (present on admission)   Assessment & Plan     Chronic condition treated with Synthroid.  Resumed maintenance medication.            Discussed patient condition with RN, Patient and general surgery, Dr. Amanda.

## 2018-10-08 NOTE — DISCHARGE PLANNING
Agency/Facility Name: [facility]   Spoke To: left  for Navi  Outcome: CCA left voicemail regarding patients acceptance status. CCA awaiting call back and will update RN CM.

## 2018-10-08 NOTE — DISCHARGE PLANNING
"Anticipated Discharge Disposition: TBD    Action: This RN CM spoke with pt about SNF placement.  Per the pt she only wants to go to [facility].  Pt stated, \"I'm not a dog, and I wouldn't even take my dog there.\"      Transport for [facility] appeared at bedside, this RN CM called [facility] Supervisor to clarify transfer.  Per [facility] Supervisor and Upper Management [facility] is not accepting patients and to not transfer the pt.    Barriers to discharge: Pt's choice to transfer to [facility] only    Plan: Awaiting Leadership advice    "

## 2018-10-08 NOTE — DISCHARGE PLANNING
Agency/Facility Name: [facility]   Spoke To: Left VM   Outcome: CCA awaiting call back regarding patients transfer.

## 2018-10-08 NOTE — THERAPY
"Occupational Therapy Treatment completed with focus on ADLs and ADL transfers.  Functional Status:    Pt in bed when OT entered. Demonstrated supv for bed mobility with HOB raised. Sit><stand with SBA. Ambulated to bathroom using FWW, toilet xfer with SBA, fast descent noted 2/2 urgency. Returned to seated EOB to don socks (supv) and receive pain meds from RN. Pt requested to take a walk, uses w/c push to ambulate longer distances. Manages brakes on chair and sits with SBA. Returned B2B  Plan of Care: Will benefit from Occupational Therapy 3 times per week  Discharge Recommendations:  Equipment Will Continue to Assess for Equipment Needs. Post-acute therapy Discharge to a transitional care facility for continued skilled therapy services.    See \"Rehab Therapy-Acute\" Patient Summary Report for complete documentation.     Pt seen for OT treatment today. Her function is improving, however she still requires SBA for functional transfers for safety. Pt still has decreased activity tolerance and uses w/c push for longer distances so that she can sit when needed. Pt has good awareness and insight into needs and is able to determine when she needs to take a break. Pt stood/walked for 12 minutes total and required 3 4-minute long rest breaks during that time. Recommend post-acute OT services prior to D/C home to improve activity tolerance, strength, and endurance in order to maximize safety and independence in the home.   "

## 2018-10-08 NOTE — DISCHARGE PLANNING
CCA received call from [facility] Dispatch stating that patients pickup has not been scheduled via [facility] and that the transport has been arranged by the facility ([facility]) instead.     CCA is corresponding with [facility] at this time. CCA was advised to call [phone] if something changes with patient .

## 2018-10-08 NOTE — PROGRESS NOTES
Received report from AM RN; assumed care. A&O x 4. VSS. 92% on RA. Patient asking for IV Dilaudid at beginning of shift; pt instructed nurse about new pain management regiment. Discussed plan with patient; pt agreeable. 9/10 reports continued; reporting tenderness and intermittent cramping. Central line dressing CDI, blue port occluded. Received order for Alteplase; administered initial dose; see MAR. Abdominal dressing of gauze/tape to midline incision CDI, skin underneath approximated with sutures. Normoactive BS noted throughout. Waffle cushion in place. 2 vertical linear scratches reported/noted on right posterior thigh. Patient unable to report when/how happened and made several suggestions as to source. Patient utilizing FWW with standby assist to bathroom, voiding directly in toilet; clear, yellow urine noted. Patient tolerating IV antibiotics. Normoactive BS.Bed locked/lowest position. Personal belongings within reach.  All needs met at present time.

## 2018-10-09 ENCOUNTER — HOSPITAL ENCOUNTER (INPATIENT)
Facility: REHABILITATION | Age: 63
End: 2018-10-09
Attending: PHYSICAL MEDICINE & REHABILITATION | Admitting: PHYSICAL MEDICINE & REHABILITATION
Payer: COMMERCIAL

## 2018-10-09 LAB
BASOPHILS # BLD AUTO: 0.9 % (ref 0–1.8)
BASOPHILS # BLD: 0.04 K/UL (ref 0–0.12)
EOSINOPHIL # BLD AUTO: 0.18 K/UL (ref 0–0.51)
EOSINOPHIL NFR BLD: 4.3 % (ref 0–6.9)
ERYTHROCYTE [DISTWIDTH] IN BLOOD BY AUTOMATED COUNT: 49.2 FL (ref 35.9–50)
HCT VFR BLD AUTO: 31.4 % (ref 37–47)
HGB BLD-MCNC: 10.6 G/DL (ref 12–16)
IMM GRANULOCYTES # BLD AUTO: 0.03 K/UL (ref 0–0.11)
IMM GRANULOCYTES NFR BLD AUTO: 0.7 % (ref 0–0.9)
LYMPHOCYTES # BLD AUTO: 1.46 K/UL (ref 1–4.8)
LYMPHOCYTES NFR BLD: 34.6 % (ref 22–41)
MCH RBC QN AUTO: 34.9 PG (ref 27–33)
MCHC RBC AUTO-ENTMCNC: 33.8 G/DL (ref 33.6–35)
MCV RBC AUTO: 103.3 FL (ref 81.4–97.8)
MONOCYTES # BLD AUTO: 0.51 K/UL (ref 0–0.85)
MONOCYTES NFR BLD AUTO: 12.1 % (ref 0–13.4)
NEUTROPHILS # BLD AUTO: 2 K/UL (ref 2–7.15)
NEUTROPHILS NFR BLD: 47.4 % (ref 44–72)
NRBC # BLD AUTO: 0 K/UL
NRBC BLD-RTO: 0 /100 WBC
PLATELET # BLD AUTO: 176 K/UL (ref 164–446)
PMV BLD AUTO: 11.9 FL (ref 9–12.9)
RBC # BLD AUTO: 3.04 M/UL (ref 4.2–5.4)
WBC # BLD AUTO: 4.2 K/UL (ref 4.8–10.8)

## 2018-10-09 PROCEDURE — 85025 COMPLETE CBC W/AUTO DIFF WBC: CPT

## 2018-10-09 PROCEDURE — 700102 HCHG RX REV CODE 250 W/ 637 OVERRIDE(OP): Performed by: NURSE PRACTITIONER

## 2018-10-09 PROCEDURE — 770001 HCHG ROOM/CARE - MED/SURG/GYN PRIV*

## 2018-10-09 PROCEDURE — A9270 NON-COVERED ITEM OR SERVICE: HCPCS | Performed by: NURSE PRACTITIONER

## 2018-10-09 PROCEDURE — 700102 HCHG RX REV CODE 250 W/ 637 OVERRIDE(OP): Performed by: SURGERY

## 2018-10-09 PROCEDURE — A9270 NON-COVERED ITEM OR SERVICE: HCPCS | Performed by: SURGERY

## 2018-10-09 PROCEDURE — 97530 THERAPEUTIC ACTIVITIES: CPT

## 2018-10-09 PROCEDURE — 700105 HCHG RX REV CODE 258

## 2018-10-09 PROCEDURE — 97116 GAIT TRAINING THERAPY: CPT

## 2018-10-09 PROCEDURE — 700111 HCHG RX REV CODE 636 W/ 250 OVERRIDE (IP): Performed by: SURGERY

## 2018-10-09 RX ORDER — SODIUM CHLORIDE 9 MG/ML
INJECTION, SOLUTION INTRAVENOUS
Status: COMPLETED
Start: 2018-10-09 | End: 2018-10-09

## 2018-10-09 RX ORDER — SODIUM CHLORIDE 9 MG/ML
INJECTION, SOLUTION INTRAVENOUS
Status: COMPLETED
Start: 2018-10-09 | End: 2018-10-10

## 2018-10-09 RX ORDER — MORPHINE SULFATE 15 MG/1
15 TABLET ORAL EVERY 6 HOURS PRN
Status: DISCONTINUED | OUTPATIENT
Start: 2018-10-09 | End: 2018-10-12 | Stop reason: HOSPADM

## 2018-10-09 RX ADMIN — MORPHINE SULFATE 15 MG: 15 TABLET ORAL at 16:06

## 2018-10-09 RX ADMIN — ANTACID TABLETS 1000 MG: 500 TABLET, CHEWABLE ORAL at 17:12

## 2018-10-09 RX ADMIN — LEVOTHYROXINE SODIUM 88 MCG: 88 TABLET ORAL at 05:37

## 2018-10-09 RX ADMIN — MORPHINE SULFATE 15 MG: 15 TABLET ORAL at 22:10

## 2018-10-09 RX ADMIN — MAGNESIUM GLUCONATE 500 MG ORAL TABLET 400 MG: 500 TABLET ORAL at 06:28

## 2018-10-09 RX ADMIN — SODIUM CHLORIDE: 9 INJECTION, SOLUTION INTRAVENOUS at 11:30

## 2018-10-09 RX ADMIN — POTASSIUM CHLORIDE 20 MEQ: 1500 TABLET, EXTENDED RELEASE ORAL at 12:19

## 2018-10-09 RX ADMIN — CIPROFLOXACIN HYDROCHLORIDE 750 MG: 750 TABLET, FILM COATED ORAL at 05:37

## 2018-10-09 RX ADMIN — ANTACID TABLETS 1000 MG: 500 TABLET, CHEWABLE ORAL at 06:28

## 2018-10-09 RX ADMIN — POLYETHYLENE GLYCOL 3350 1 PACKET: 17 POWDER, FOR SOLUTION ORAL at 05:37

## 2018-10-09 RX ADMIN — HYDROMORPHONE HYDROCHLORIDE 2 MG: 2 TABLET ORAL at 03:25

## 2018-10-09 RX ADMIN — SODIUM CHLORIDE 500 ML: 9 INJECTION, SOLUTION INTRAVENOUS at 08:08

## 2018-10-09 RX ADMIN — MORPHINE SULFATE 15 MG: 15 TABLET ORAL at 10:12

## 2018-10-09 RX ADMIN — POTASSIUM CHLORIDE 20 MEQ: 1500 TABLET, EXTENDED RELEASE ORAL at 06:28

## 2018-10-09 RX ADMIN — VITAMIN D, TAB 1000IU (100/BT) 4000 UNITS: 25 TAB at 06:28

## 2018-10-09 RX ADMIN — ENOXAPARIN SODIUM 40 MG: 100 INJECTION SUBCUTANEOUS at 05:36

## 2018-10-09 RX ADMIN — HYDROMORPHONE HYDROCHLORIDE 2 MG: 2 TABLET ORAL at 00:31

## 2018-10-09 RX ADMIN — NICOTINE 14 MG: 14 PATCH, EXTENDED RELEASE TRANSDERMAL at 05:36

## 2018-10-09 RX ADMIN — HYDROMORPHONE HYDROCHLORIDE 2 MG: 2 TABLET ORAL at 06:27

## 2018-10-09 RX ADMIN — CIPROFLOXACIN HYDROCHLORIDE 750 MG: 750 TABLET, FILM COATED ORAL at 17:12

## 2018-10-09 RX ADMIN — ANTACID TABLETS 1000 MG: 500 TABLET, CHEWABLE ORAL at 12:19

## 2018-10-09 RX ADMIN — POTASSIUM CHLORIDE 20 MEQ: 1500 TABLET, EXTENDED RELEASE ORAL at 17:12

## 2018-10-09 RX ADMIN — LORAZEPAM 1 MG: 1 TABLET ORAL at 00:31

## 2018-10-09 RX ADMIN — OMEPRAZOLE 20 MG: 20 CAPSULE, DELAYED RELEASE ORAL at 05:36

## 2018-10-09 RX ADMIN — LORAZEPAM 1 MG: 1 TABLET ORAL at 23:08

## 2018-10-09 RX ADMIN — MAGNESIUM GLUCONATE 500 MG ORAL TABLET 400 MG: 500 TABLET ORAL at 17:12

## 2018-10-09 ASSESSMENT — PAIN SCALES - GENERAL
PAINLEVEL_OUTOF10: 9
PAINLEVEL_OUTOF10: 10
PAINLEVEL_OUTOF10: 9
PAINLEVEL_OUTOF10: 10

## 2018-10-09 ASSESSMENT — COGNITIVE AND FUNCTIONAL STATUS - GENERAL
TURNING FROM BACK TO SIDE WHILE IN FLAT BAD: UNABLE
MOVING FROM LYING ON BACK TO SITTING ON SIDE OF FLAT BED: A LOT
MOVING TO AND FROM BED TO CHAIR: UNABLE
WALKING IN HOSPITAL ROOM: A LITTLE
CLIMB 3 TO 5 STEPS WITH RAILING: A LOT
STANDING UP FROM CHAIR USING ARMS: A LITTLE
MOBILITY SCORE: 12
SUGGESTED CMS G CODE MODIFIER MOBILITY: CL

## 2018-10-09 ASSESSMENT — ENCOUNTER SYMPTOMS
SHORTNESS OF BREATH: 0
ABDOMINAL PAIN: 1
VOMITING: 0
ROS GI COMMENTS: 10/8 BM
NAUSEA: 0
MYALGIAS: 1
CHILLS: 0
FEVER: 0

## 2018-10-09 ASSESSMENT — GAIT ASSESSMENTS
GAIT LEVEL OF ASSIST: CONTACT GUARD ASSIST
DISTANCE (FEET): 35
DEVIATION: ANTALGIC;SHUFFLED GAIT
ASSISTIVE DEVICE: FRONT WHEEL WALKER

## 2018-10-09 NOTE — CARE PLAN
Problem: Discharge Barriers/Planning  Goal: Patient's continuum of care needs will be met    Intervention: Assess potential discharge barriers on admission and throughout hospital stay  Acceptance to skilled nursing facility

## 2018-10-09 NOTE — DISCHARGE PLANNING
Physiatry Dr. Schulz recommending skilled nursing for post acute care as pt does not currently meet 2/3 therapy needmeeting CMS criteria for inpatient rehab level care.

## 2018-10-09 NOTE — DISCHARGE PLANNING
Agency/Facility Name: [facility]   Spoke To: CCA left VM   Outcome: CCA awaiting call back from Barry Calvo.

## 2018-10-09 NOTE — CARE PLAN
Problem: Discharge Barriers/Planning  Goal: Patient's continuum of care needs will be met  Outcome: PROGRESSING AS EXPECTED  The transfer of medication regimen discussed at length. Educated patient about normal medication reconciliation and administration at SNF. Patient verbalized understanding.     Problem: Psychosocial Needs:  Goal: Level of anxiety will decrease  Outcome: PROGRESSING AS EXPECTED  Nervousness expressed at beginning of NOC shift. Questions answered regarding [facility] and discharge. Patient calm and verbalized understanding after discussion.

## 2018-10-09 NOTE — CONSULTS
Chart review done,   63 y/o female with significant history who presented with SBO.     At this time she has no OT needs , doesn't meet 2/3 discipline needs for acute rehab, as documented on 10/8, still requiring PT CGA, I would recommend SNF to increase endurance and strength    Please let me know if any questions or if any changes    Kaleb Schulz D.O.

## 2018-10-09 NOTE — PROGRESS NOTES
"Patient continues to c/o pain, reports history of chronic back pain best gets at home 8/10. Pain this am 9/10 on PO dilaudid. Patient verbalized that she knows narcotics are contributing to issues with constipation and bowel obstructions. Patient requesting to resume home soma,  \"I will discuss pain medication with the doctor when he comes by\". Patient encouraged to ambulate TID and sit up to chair for meals, verbalized understanding. Patient has triple lumen central line that she is refusing to have removed until discharging as \"I am a hard stick and I do not want to be stabbed again\". Patient has been educated on infection risk and verbalized understanding stating \"I had a PICC line at home for a year before\". Therapy to see patient this am. Pending SNF acceptance. MD orders reviewed, all questions answered. /97   Pulse 79   Temp 37 °C (98.6 °F)   Resp 16   Ht 1.549 m (5' 1\")   Wt 67.2 kg (148 lb 2.4 oz)   LMP 10/02/1980   SpO2 95%   Breastfeeding? No   BMI 27.99 kg/m²     "

## 2018-10-09 NOTE — DISCHARGE PLANNING
PMR referral from Armaan BARNARD    Debility ongoing medical management as well as therapy need. Anticipate post acute services to facilitate a successful transition to community home with outpatient/spouse support. Dr. Schulz to consult per protocol.

## 2018-10-09 NOTE — PROGRESS NOTES
"Received report from AM RN; assumed care. A&O x 4. VSS. 93% on RA. Patient present pain regimen times desired for pain management at beginning of shift. Patient stated she wants pain medication Q3 hours and writing down times of administration. 9-10/10 reports continue; aching/tenderness. Calm, unlabored breathing noted. Central line dressing CDI, all lines running 30 mL/hour 0.9% NS TKO. Patient states that central line is \"not to be removed until transferred\". Questions answered regarding [facility]. Midline covered with abd pad and silk tape, placed this AM by this RN. Patient refused assessment underneath since it was holding. Normoactive BS noted throughout. Waffle cushion in place. 2 vertical linear scratches reported/noted on right posterior thigh less red this shift. Patient stable utilizing FWW to bathroom without assistance; steady on feet. Pt tolerating PO antibiotics without adverse side effects noted/reported. Bed locked/lowest position. Personal belongings within reach.  All needs met at present time.   "

## 2018-10-09 NOTE — PROGRESS NOTES
Trauma / Surgical Daily Progress Note    Date of Service  10/9/2018    Chief Complaint  63 y.o. female admitted 9/19/2018 with Small bowel obstruction (HCC)  Hospital day # 20  POD # 17 Exploratory laparotomy with small bowel resection  POD #5 Wound exploration and seroma drainage     Interval Events  No critical events overnight  Pain control improved with PO morphine  Updated therapy evaluations completed    - Rehab to evaluate for potential acceptance    Review of Systems  Review of Systems   Constitutional: Negative for chills, fever and malaise/fatigue.   Respiratory: Negative for shortness of breath.    Cardiovascular: Negative for chest pain.   Gastrointestinal: Positive for abdominal pain (incisional). Negative for nausea and vomiting.        10/8 BM   Genitourinary:        Voiding    Musculoskeletal: Positive for myalgias.        Vital Signs  Temp:  [36.3 °C (97.3 °F)-37.2 °C (99 °F)] 37 °C (98.6 °F)  Pulse:  [66-79] 79  Resp:  [16-18] 16  BP: (123-155)/(78-97) 146/97    Physical Exam  Physical Exam   Constitutional: She is oriented to person, place, and time. No distress.   HENT:   Head: Normocephalic.   Eyes: Conjunctivae are normal.   Neck: No JVD present.   Cardiovascular: Normal rate.    Pulmonary/Chest: Effort normal. No respiratory distress. She exhibits no tenderness.   Abdominal: Soft. She exhibits no distension. There is tenderness (incisional). There is no guarding.   Midline incision with staples intact, no erythema, soft   Musculoskeletal:   Moves all extremities    Neurological: She is alert and oriented to person, place, and time.   Skin: Skin is warm and dry.   Nursing note and vitals reviewed.      Laboratory  Recent Results (from the past 24 hour(s))   CBC WITH DIFFERENTIAL    Collection Time: 10/09/18 12:27 AM   Result Value Ref Range    WBC 4.2 (L) 4.8 - 10.8 K/uL    RBC 3.04 (L) 4.20 - 5.40 M/uL    Hemoglobin 10.6 (L) 12.0 - 16.0 g/dL    Hematocrit 31.4 (L) 37.0 - 47.0 %    .3  (H) 81.4 - 97.8 fL    MCH 34.9 (H) 27.0 - 33.0 pg    MCHC 33.8 33.6 - 35.0 g/dL    RDW 49.2 35.9 - 50.0 fL    Platelet Count 176 164 - 446 K/uL    MPV 11.9 9.0 - 12.9 fL    Neutrophils-Polys 47.40 44.00 - 72.00 %    Lymphocytes 34.60 22.00 - 41.00 %    Monocytes 12.10 0.00 - 13.40 %    Eosinophils 4.30 0.00 - 6.90 %    Basophils 0.90 0.00 - 1.80 %    Immature Granulocytes 0.70 0.00 - 0.90 %    Nucleated RBC 0.00 /100 WBC    Neutrophils (Absolute) 2.00 2.00 - 7.15 K/uL    Lymphs (Absolute) 1.46 1.00 - 4.80 K/uL    Monos (Absolute) 0.51 0.00 - 0.85 K/uL    Eos (Absolute) 0.18 0.00 - 0.51 K/uL    Baso (Absolute) 0.04 0.00 - 0.12 K/uL    Immature Granulocytes (abs) 0.03 0.00 - 0.11 K/uL    NRBC (Absolute) 0.00 K/uL       Fluids    Intake/Output Summary (Last 24 hours) at 10/09/18 1359  Last data filed at 10/09/18 0903   Gross per 24 hour   Intake             2150 ml   Output                0 ml   Net             2150 ml       Core Measures & Quality Metrics  Labs reviewed, Medications reviewed and Radiology images reviewed  Parker catheter: No Parker  Central line in place: Need for access    DVT Prophylaxis: Enoxaparin (Lovenox)  DVT prophylaxis - mechanical: SCDs  Ulcer prophylaxis: Yes  Antibiotics: Treating active infection/contamination beyond 24 hours perioperative coverage  Assessed for rehab: Patient was assess for and/or received rehabilitation services during this hospitalization    Total Score: 0    ETOH Screening    Assessment/Plan  Debility- (present on admission)   Assessment & Plan    9/27 Skilled Nursing Referrals      10/3 Transfer held due to seroma development  10/5 SNF referrals resent  10/9 Rehab to reevaluate         Hypomagnesemia   Assessment & Plan    9/27 Supplement 4 grams IV magnesium    9/28 Supplement 4 grams IV magnesium  9/30 Supplement 4 grams IV magnesium. Start oral magnesium   10/1 Supplement 2 grams IV magnesium   10/2 Supplement 4 grams IV magnesium  10/3 Supplement 2 grams IV  magnesium  10/5 Trend down, supplement 2 grams and continue oral replacement  10/7 Supplement 2 grams IV magnesium  Continue supplementation         Hypokalemia- (present on admission)   Assessment & Plan    9/27 Supplement 20 mEq IV potassium   9/28 Supplement 40 mEq IV potassium  9/29 Supplement 40 mEq IV potassium    - Follow up potassium 3.1   - Potassium 40 mEq BID    10/1 Trend up. Continue oral supplementation. Decrease potassium supplementation to 20 mEq BID  10/2 Trend down, increase oral potassium supplementation to 20 mEq TID  10/7 Stable, continue oral supplementation         Small bowel obstruction (HCC)- (present on admission)   Assessment & Plan    9/22 Ex lap, lysis of adhesions, small bowel resection.  9/25 7 day course of antibiotic therapy completed. NG tube clamped  9/26 Interval removal of NG tube.  9/27 Clear liquid diet  9/28 Abdominal wound erythema. Start Doxycycline.    9/29 Incisional erythema improved. Continue antibiotic therapy. Full liquid diet    9/30 GI soft diet  10/4 Wound exploration and seroma drainage in OR, cultures sent  10/5 Continue doxycycline for an additional 7 days   10/6 Wound culture pending positive for pseudomonas, start Cefepime, stop doxycycline  10/7 Pseudomonas resistant to Cefepime, start Ricky Amanda DO, Surgery.        Other chronic postprocedural pain   Assessment & Plan    9/30 DC oxycodone.  Start oral dilaudid  10/1 DC Methadone. DC IV narcotics   10/4 IV narcotics for immediate post operative period only  10/8 DC IV narcotics        Ulcerative esophagitis   Assessment & Plan    Patient completed course of triple therapy        Hypothyroidism- (present on admission)   Assessment & Plan    Chronic condition treated with Synthroid.  Resumed maintenance medication.            Discussed patient condition with RN, Patient and general surgery, Dr. Amanda.

## 2018-10-09 NOTE — THERAPY
"Physical Therapy Treatment completed.   Bed Mobility:  Supine to Sit: Contact Guard Assist  Transfers: Sit to Stand: Contact Guard Assist  Gait: Level Of Assist: Contact Guard Assist with Front-Wheel Walker       Plan of Care: Will benefit from Physical Therapy 3 times per week  Discharge Recommendations: Equipment: Will Continue to Assess for Equipment Needs.     See \"Rehab Therapy-Acute\" Patient Summary Report for complete documentation.     Pt progressing slowly w/ therapy. Pt is limited by pain, poor endurance and poor safety awareness. Pt requires a lot of time  to perform mobility. She also requires a lot of rest breaks throughout mobility. Pts rest breaks increase in duration throughout tx. Pt had multiple instances of L knee buckling during ambulation in which she was able to self correct. Pt also w/ poor safety awareness as when going to the BR, pt attempting to use speed to go over the lip of the door and lost her balance backwards, again w/ pt able to self correct. Pt currently not at an endurance level for DC home. She also is a high fall risk and would benefit from increased balance training.  "

## 2018-10-09 NOTE — CARE PLAN
"Problem: Discharge Barriers/Planning  Goal: Patient's continuum of care needs will be met  Outcome: PROGRESSING AS EXPECTED  Coordinating with social work regarding d/c to [facility]. Pt adamantly refusing to go to [facility] stating \"i wouldn't even take my dog there, they treat me like a dog, the nurses take all my and everyone's pain meds and get high in the [place]\".    Problem: Pain Management  Goal: Pain level will decrease to patient's comfort goal  Outcome: PROGRESSING AS EXPECTED  Pt's IV pain medications. Pt reports pain 9/10 constantly but only called for meds every 3 hours and tolerated only PO well today, pt appeared calm and slept comfortably.       "

## 2018-10-09 NOTE — DISCHARGE PLANNING
Agency/Facility Name: [facility]   Spoke To: Barry   Outcome: Per Barry, patient is declined due to a past history of behavior at facility.     RN ZARA Miranda has been updated.

## 2018-10-09 NOTE — DISCHARGE PLANNING
Anticipated Discharge Disposition: TBD    Action: Referral has been sent to [facility] for possible dc disposition and Physiatry Consult has been placed.  This RN CM called Edmundo with Rehab.  Per Edmundo, Dr. Schulz will come assess the pt today.  In addition, this RN CM updated April, Manager of Care Coordination on the above information and pt's complex dc disposition.    Barriers to Discharge: Rehab acceptance    Plan: Pending placement

## 2018-10-10 DIAGNOSIS — G62.9 NEUROPATHY: ICD-10-CM

## 2018-10-10 PROBLEM — G25.81 RESTLESS LEG SYNDROME: Status: ACTIVE | Noted: 2018-10-10

## 2018-10-10 LAB
ANION GAP SERPL CALC-SCNC: 12 MMOL/L (ref 0–11.9)
BASOPHILS # BLD AUTO: 1.1 % (ref 0–1.8)
BASOPHILS # BLD: 0.05 K/UL (ref 0–0.12)
BUN SERPL-MCNC: 5 MG/DL (ref 8–22)
CALCIUM SERPL-MCNC: 8.2 MG/DL (ref 8.5–10.5)
CHLORIDE SERPL-SCNC: 106 MMOL/L (ref 96–112)
CO2 SERPL-SCNC: 23 MMOL/L (ref 20–33)
CREAT SERPL-MCNC: 0.67 MG/DL (ref 0.5–1.4)
EOSINOPHIL # BLD AUTO: 0.22 K/UL (ref 0–0.51)
EOSINOPHIL NFR BLD: 4.8 % (ref 0–6.9)
ERYTHROCYTE [DISTWIDTH] IN BLOOD BY AUTOMATED COUNT: 50 FL (ref 35.9–50)
GLUCOSE SERPL-MCNC: 113 MG/DL (ref 65–99)
HCT VFR BLD AUTO: 32.4 % (ref 37–47)
HGB BLD-MCNC: 10.9 G/DL (ref 12–16)
IMM GRANULOCYTES # BLD AUTO: 0.04 K/UL (ref 0–0.11)
IMM GRANULOCYTES NFR BLD AUTO: 0.9 % (ref 0–0.9)
LYMPHOCYTES # BLD AUTO: 1.82 K/UL (ref 1–4.8)
LYMPHOCYTES NFR BLD: 39.4 % (ref 22–41)
MAGNESIUM SERPL-MCNC: 1.3 MG/DL (ref 1.5–2.5)
MCH RBC QN AUTO: 34.9 PG (ref 27–33)
MCHC RBC AUTO-ENTMCNC: 33.6 G/DL (ref 33.6–35)
MCV RBC AUTO: 103.8 FL (ref 81.4–97.8)
MONOCYTES # BLD AUTO: 0.39 K/UL (ref 0–0.85)
MONOCYTES NFR BLD AUTO: 8.4 % (ref 0–13.4)
NEUTROPHILS # BLD AUTO: 2.1 K/UL (ref 2–7.15)
NEUTROPHILS NFR BLD: 45.4 % (ref 44–72)
NRBC # BLD AUTO: 0 K/UL
NRBC BLD-RTO: 0 /100 WBC
PLATELET # BLD AUTO: 176 K/UL (ref 164–446)
PMV BLD AUTO: 12.2 FL (ref 9–12.9)
POTASSIUM SERPL-SCNC: 3.5 MMOL/L (ref 3.6–5.5)
RBC # BLD AUTO: 3.12 M/UL (ref 4.2–5.4)
SODIUM SERPL-SCNC: 141 MMOL/L (ref 135–145)
WBC # BLD AUTO: 4.6 K/UL (ref 4.8–10.8)

## 2018-10-10 PROCEDURE — A9270 NON-COVERED ITEM OR SERVICE: HCPCS

## 2018-10-10 PROCEDURE — 85025 COMPLETE CBC W/AUTO DIFF WBC: CPT

## 2018-10-10 PROCEDURE — 80048 BASIC METABOLIC PNL TOTAL CA: CPT

## 2018-10-10 PROCEDURE — 700102 HCHG RX REV CODE 250 W/ 637 OVERRIDE(OP)

## 2018-10-10 PROCEDURE — 700105 HCHG RX REV CODE 258

## 2018-10-10 PROCEDURE — A9270 NON-COVERED ITEM OR SERVICE: HCPCS | Performed by: NURSE PRACTITIONER

## 2018-10-10 PROCEDURE — 700102 HCHG RX REV CODE 250 W/ 637 OVERRIDE(OP): Performed by: NURSE PRACTITIONER

## 2018-10-10 PROCEDURE — 770001 HCHG ROOM/CARE - MED/SURG/GYN PRIV*

## 2018-10-10 PROCEDURE — 700102 HCHG RX REV CODE 250 W/ 637 OVERRIDE(OP): Performed by: SURGERY

## 2018-10-10 PROCEDURE — 700111 HCHG RX REV CODE 636 W/ 250 OVERRIDE (IP): Performed by: SURGERY

## 2018-10-10 PROCEDURE — A9270 NON-COVERED ITEM OR SERVICE: HCPCS | Performed by: SURGERY

## 2018-10-10 PROCEDURE — 83735 ASSAY OF MAGNESIUM: CPT

## 2018-10-10 RX ORDER — SODIUM CHLORIDE 9 MG/ML
INJECTION, SOLUTION INTRAVENOUS
Status: COMPLETED
Start: 2018-10-10 | End: 2018-10-10

## 2018-10-10 RX ORDER — GABAPENTIN 300 MG/1
CAPSULE ORAL
Qty: 180 CAP | Refills: 0 | OUTPATIENT
Start: 2018-10-10

## 2018-10-10 RX ADMIN — ANTACID TABLETS 1000 MG: 500 TABLET, CHEWABLE ORAL at 08:41

## 2018-10-10 RX ADMIN — ANTACID TABLETS 1000 MG: 500 TABLET, CHEWABLE ORAL at 17:56

## 2018-10-10 RX ADMIN — POTASSIUM CHLORIDE 20 MEQ: 1500 TABLET, EXTENDED RELEASE ORAL at 12:00

## 2018-10-10 RX ADMIN — LEVOTHYROXINE SODIUM 88 MCG: 88 TABLET ORAL at 06:31

## 2018-10-10 RX ADMIN — MAGNESIUM GLUCONATE 500 MG ORAL TABLET 400 MG: 500 TABLET ORAL at 06:35

## 2018-10-10 RX ADMIN — POLYETHYLENE GLYCOL 3350 1 PACKET: 17 POWDER, FOR SOLUTION ORAL at 06:32

## 2018-10-10 RX ADMIN — CIPROFLOXACIN HYDROCHLORIDE 750 MG: 750 TABLET, FILM COATED ORAL at 17:56

## 2018-10-10 RX ADMIN — POTASSIUM CHLORIDE 20 MEQ: 1500 TABLET, EXTENDED RELEASE ORAL at 17:56

## 2018-10-10 RX ADMIN — CARBIDOPA AND LEVODOPA 1 TABLET: 25; 100 TABLET ORAL at 20:13

## 2018-10-10 RX ADMIN — NICOTINE 14 MG: 14 PATCH, EXTENDED RELEASE TRANSDERMAL at 06:32

## 2018-10-10 RX ADMIN — LORAZEPAM 1 MG: 1 TABLET ORAL at 23:53

## 2018-10-10 RX ADMIN — VITAMIN D, TAB 1000IU (100/BT) 4000 UNITS: 25 TAB at 11:29

## 2018-10-10 RX ADMIN — MORPHINE SULFATE 15 MG: 15 TABLET ORAL at 04:09

## 2018-10-10 RX ADMIN — CIPROFLOXACIN HYDROCHLORIDE 750 MG: 750 TABLET, FILM COATED ORAL at 06:32

## 2018-10-10 RX ADMIN — MAGNESIUM GLUCONATE 500 MG ORAL TABLET 400 MG: 500 TABLET ORAL at 17:56

## 2018-10-10 RX ADMIN — OMEPRAZOLE 20 MG: 20 CAPSULE, DELAYED RELEASE ORAL at 06:32

## 2018-10-10 RX ADMIN — ENOXAPARIN SODIUM 40 MG: 100 INJECTION SUBCUTANEOUS at 06:35

## 2018-10-10 RX ADMIN — MORPHINE SULFATE 15 MG: 15 TABLET ORAL at 15:59

## 2018-10-10 RX ADMIN — ANTACID TABLETS 1000 MG: 500 TABLET, CHEWABLE ORAL at 11:29

## 2018-10-10 RX ADMIN — SODIUM CHLORIDE 500 ML: 9 INJECTION, SOLUTION INTRAVENOUS at 00:36

## 2018-10-10 RX ADMIN — MORPHINE SULFATE 15 MG: 15 TABLET ORAL at 10:07

## 2018-10-10 RX ADMIN — MORPHINE SULFATE 15 MG: 15 TABLET ORAL at 22:17

## 2018-10-10 RX ADMIN — SODIUM CHLORIDE 500 ML: 9 INJECTION, SOLUTION INTRAVENOUS at 14:35

## 2018-10-10 ASSESSMENT — PAIN SCALES - GENERAL
PAINLEVEL_OUTOF10: 10
PAINLEVEL_OUTOF10: 8
PAINLEVEL_OUTOF10: 9

## 2018-10-10 NOTE — CARE PLAN
Problem: Discharge Barriers/Planning  Goal: Patient's continuum of care needs will be met    Intervention: Assess potential discharge barriers on admission and throughout hospital stay  Patient declined from rehab

## 2018-10-10 NOTE — CARE PLAN
Problem: Communication  Goal: The ability to communicate needs accurately and effectively will improve  Outcome: PROGRESSING AS EXPECTED  Reinforced not getting OOB without staff present.  Pt states and demonstrates understanding, pt's call light within reach, and pt demonstrates understanding of use.    Problem: Bowel/Gastric:  Goal: Normal bowel function is maintained or improved  Outcome: PROGRESSING AS EXPECTED  Pt denies n/v, tolerating regular diet at this time    Problem: Pain Management  Goal: Pain level will decrease to patient's comfort goal  Outcome: PROGRESSING SLOWER THAN EXPECTED  Pt consistently states pain 9/10 when assessed.

## 2018-10-10 NOTE — DISCHARGE PLANNING
Received Choice form at [phone]  Agency/Facility Name: [facility]   Referral sent per Choice form @ [phone]

## 2018-10-10 NOTE — DISCHARGE PLANNING
Anticipated Discharge Disposition: Home with HH    Action: This RN CM called the PCA providers on the list [employer] supplied.  Every number was no longer in service.    Barriers to Discharge: HH acceptance    Plan: Following up with leadership and dependant on pt; if HH is sufficient for pt or PCA is also needed.

## 2018-10-10 NOTE — PROGRESS NOTES
Trauma / Surgical Daily Progress Note    Date of Service  10/10/2018    Chief Complaint  63 y.o. female admitted 9/19/2018 with Small bowel obstruction (HCC)  Hospital day # 21  POD # 18 Exploratory laparotomy with small bowel resection  POD #6 Wound exploration and seroma drainage     Interval Events  No critical events overnight  Sinemet resumed for patient reported for RLS  Continues to complain of pain - discussed regimen and expectations  Remains medically clear for post acute services     Review of Systems  ROS     Vital Signs  Temp:  [36.1 °C (97 °F)-37.1 °C (98.8 °F)] 36.3 °C (97.3 °F)  Pulse:  [75-90] 90  Resp:  [16-19] 19  BP: (125-188)/() 188/105    Physical Exam  Physical Exam   Constitutional: She is oriented to person, place, and time. She appears well-developed and well-nourished. No distress.   Pulmonary/Chest: Effort normal and breath sounds normal. No respiratory distress.   Abdominal:   Soft   Non distended  General tenderness with palpation   Bina present  Abdominal binder in place  Distal portion of wound slightly pink - blanchable - no drainage     Musculoskeletal: Normal range of motion.   Neurological: She is alert and oriented to person, place, and time.   Skin: Skin is warm and dry.   Psychiatric: She has a normal mood and affect.   Nursing note and vitals reviewed.      Laboratory  Recent Results (from the past 24 hour(s))   CBC WITH DIFFERENTIAL    Collection Time: 10/10/18  3:20 AM   Result Value Ref Range    WBC 4.6 (L) 4.8 - 10.8 K/uL    RBC 3.12 (L) 4.20 - 5.40 M/uL    Hemoglobin 10.9 (L) 12.0 - 16.0 g/dL    Hematocrit 32.4 (L) 37.0 - 47.0 %    .8 (H) 81.4 - 97.8 fL    MCH 34.9 (H) 27.0 - 33.0 pg    MCHC 33.6 33.6 - 35.0 g/dL    RDW 50.0 35.9 - 50.0 fL    Platelet Count 176 164 - 446 K/uL    MPV 12.2 9.0 - 12.9 fL    Neutrophils-Polys 45.40 44.00 - 72.00 %    Lymphocytes 39.40 22.00 - 41.00 %    Monocytes 8.40 0.00 - 13.40 %    Eosinophils 4.80 0.00 - 6.90 %     Basophils 1.10 0.00 - 1.80 %    Immature Granulocytes 0.90 0.00 - 0.90 %    Nucleated RBC 0.00 /100 WBC    Neutrophils (Absolute) 2.10 2.00 - 7.15 K/uL    Lymphs (Absolute) 1.82 1.00 - 4.80 K/uL    Monos (Absolute) 0.39 0.00 - 0.85 K/uL    Eos (Absolute) 0.22 0.00 - 0.51 K/uL    Baso (Absolute) 0.05 0.00 - 0.12 K/uL    Immature Granulocytes (abs) 0.04 0.00 - 0.11 K/uL    NRBC (Absolute) 0.00 K/uL   MAGNESIUM    Collection Time: 10/10/18  3:20 AM   Result Value Ref Range    Magnesium 1.3 (L) 1.5 - 2.5 mg/dL   BASIC METABOLIC PANEL    Collection Time: 10/10/18  3:20 AM   Result Value Ref Range    Sodium 141 135 - 145 mmol/L    Potassium 3.5 (L) 3.6 - 5.5 mmol/L    Chloride 106 96 - 112 mmol/L    Co2 23 20 - 33 mmol/L    Glucose 113 (H) 65 - 99 mg/dL    Bun 5 (L) 8 - 22 mg/dL    Creatinine 0.67 0.50 - 1.40 mg/dL    Calcium 8.2 (L) 8.5 - 10.5 mg/dL    Anion Gap 12.0 (H) 0.0 - 11.9   ESTIMATED GFR    Collection Time: 10/10/18  3:20 AM   Result Value Ref Range    GFR If African American >60 >60 mL/min/1.73 m 2    GFR If Non African American >60 >60 mL/min/1.73 m 2       Fluids    Intake/Output Summary (Last 24 hours) at 10/10/18 1022  Last data filed at 10/10/18 0830   Gross per 24 hour   Intake             2640 ml   Output                0 ml   Net             2640 ml       Core Measures & Quality Metrics  Core Measures & Quality Metrics  LILIAN Score  ETOH Screening    Assessment/Plan  Debility- (present on admission)   Assessment & Plan    9/27 Skilled Nursing Referrals      10/3 Transfer held due to seroma development  10/5 SNF referrals resent  10/9 Rehab to reevaluate         Hypomagnesemia   Assessment & Plan    9/27 Supplement 4 grams IV magnesium    9/28 Supplement 4 grams IV magnesium  9/30 Supplement 4 grams IV magnesium. Start oral magnesium   10/1 Supplement 2 grams IV magnesium   10/2 Supplement 4 grams IV magnesium  10/3 Supplement 2 grams IV magnesium  10/5 Trend down, supplement 2 grams and continue oral  replacement  10/7 Supplement 2 grams IV magnesium  Continue supplementation         Hypokalemia- (present on admission)   Assessment & Plan    9/27 Supplement 20 mEq IV potassium   9/28 Supplement 40 mEq IV potassium  9/29 Supplement 40 mEq IV potassium    - Follow up potassium 3.1   - Potassium 40 mEq BID    10/1 Trend up. Continue oral supplementation. Decrease potassium supplementation to 20 mEq BID  10/2 Trend down, increase oral potassium supplementation to 20 mEq TID  10/7 Stable, continue oral supplementation         Small bowel obstruction (HCC)- (present on admission)   Assessment & Plan    9/22 Ex lap, lysis of adhesions, small bowel resection.  9/25 7 day course of antibiotic therapy completed. NG tube clamped  9/26 Interval removal of NG tube.  9/27 Clear liquid diet  9/28 Abdominal wound erythema. Start Doxycycline.    9/29 Incisional erythema improved. Continue antibiotic therapy. Full liquid diet    9/30 GI soft diet  10/4 Wound exploration and seroma drainage in OR, cultures sent  10/5 Continue doxycycline for an additional 7 days   10/6 Wound culture pending positive for pseudomonas, start Cefepime, stop doxycycline  10/7 Pseudomonas resistant to Cefepime, start Malloryro  [place] Amanda DO, Surgery.        Other chronic postprocedural pain   Assessment & Plan    9/30 DC oxycodone.  Start oral dilaudid  10/1 DC Methadone. DC IV narcotics   10/4 IV narcotics for immediate post operative period only  10/8 DC IV narcotics        Ulcerative esophagitis   Assessment & Plan    Patient completed course of triple therapy        Hypothyroidism- (present on admission)   Assessment & Plan    Chronic condition treated with Synthroid.  Resumed maintenance medication.        Restless leg syndrome- (present on admission)   Assessment & Plan    Per patient report  Sinemet resumed         Discussed patient condition with RN, Patient and trauma surgery. Dr. Amanda

## 2018-10-10 NOTE — PROGRESS NOTES
"Patient again declined from rehab recommendation SNF. Patient continues to decline to have staff remove central line despite education. Patient is ambulating with staff and FWW, sitting up to recliner chair. +BM 10/9/2018. Midline with staples dressing changed , abdominal binder. MD orders reviewed, all questions answered. BP (!) 188/105 Comment: RN notified  Pulse 90   Temp 36.3 °C (97.3 °F)   Resp 19   Ht 1.549 m (5' 1\")   Wt 67.2 kg (148 lb 2.4 oz)   LMP 10/02/1980   SpO2 94%   Breastfeeding? No   BMI 27.99 kg/m²     "

## 2018-10-10 NOTE — PROGRESS NOTES
Pt is A&O 4  Pain consistently at 9/10 pain, medicating with PRN medication per MAR.  Pt c/o bilateral calf muscle cramping throughout the night.  Educated pt on use of heat packs, repositioning, light stretching, ambulating.  Pt currently in recliner chair.  denies nausea  Tolerating Diet Regular  Surgical incision midline incision, has dressing in place, no drainage noted  + Urine Void   + Flatus  + BM Void  Up standby with FWW   SCD's refused  Reviewed plan of care with patient, bed in lowest position and locked, pt resting comfortably now, call light within reach, all needs met at this time

## 2018-10-10 NOTE — DISCHARGE PLANNING
Anticipated Discharge Disposition: Home with HH and Possible PCA    Action: This RN CM spoke with pt at bedside regarding dc disposition.  The pt stated she has multiple DME at home, including wheelchair, hospital bed, w/c ramp, shower chair, and transfer board.  The pt stated she is calling [facility] to get her bedside commode like the rest of her DME.  The pt also chose [facility].  This RN ZARA also spoke with pt about a possible PCA (Personal Care Assistant).      This RN ZARA called KRUNAL Kirby with [employer] ([phone]) and left  to help arrange a PCA.      Barriers to Discharge: HH acceptance     Plan: This RN CM calling PCA companies to see if they will accept [employer]

## 2018-10-10 NOTE — DISCHARGE PLANNING
Good evening,  Please attach a Home Health referral so that it may be processed.  The last referral on record was from 10/02/2018 and we need one to cover this recent request. Thank you!

## 2018-10-11 ENCOUNTER — HOME HEALTH ADMISSION (OUTPATIENT)
Dept: HOME HEALTH SERVICES | Facility: HOME HEALTHCARE | Age: 63
End: 2018-10-11
Payer: COMMERCIAL

## 2018-10-11 LAB
BASOPHILS # BLD AUTO: 1.8 % (ref 0–1.8)
BASOPHILS # BLD: 0.08 K/UL (ref 0–0.12)
EOSINOPHIL # BLD AUTO: 0.23 K/UL (ref 0–0.51)
EOSINOPHIL NFR BLD: 5.2 % (ref 0–6.9)
ERYTHROCYTE [DISTWIDTH] IN BLOOD BY AUTOMATED COUNT: 49.1 FL (ref 35.9–50)
HCT VFR BLD AUTO: 31.9 % (ref 37–47)
HGB BLD-MCNC: 10.6 G/DL (ref 12–16)
IMM GRANULOCYTES # BLD AUTO: 0.03 K/UL (ref 0–0.11)
IMM GRANULOCYTES NFR BLD AUTO: 0.7 % (ref 0–0.9)
LYMPHOCYTES # BLD AUTO: 1.85 K/UL (ref 1–4.8)
LYMPHOCYTES NFR BLD: 41.7 % (ref 22–41)
MCH RBC QN AUTO: 34.5 PG (ref 27–33)
MCHC RBC AUTO-ENTMCNC: 33.2 G/DL (ref 33.6–35)
MCV RBC AUTO: 103.9 FL (ref 81.4–97.8)
MONOCYTES # BLD AUTO: 0.4 K/UL (ref 0–0.85)
MONOCYTES NFR BLD AUTO: 9 % (ref 0–13.4)
NEUTROPHILS # BLD AUTO: 1.85 K/UL (ref 2–7.15)
NEUTROPHILS NFR BLD: 41.6 % (ref 44–72)
NRBC # BLD AUTO: 0 K/UL
NRBC BLD-RTO: 0 /100 WBC
PLATELET # BLD AUTO: 170 K/UL (ref 164–446)
PMV BLD AUTO: 12.1 FL (ref 9–12.9)
RBC # BLD AUTO: 3.07 M/UL (ref 4.2–5.4)
WBC # BLD AUTO: 4.4 K/UL (ref 4.8–10.8)

## 2018-10-11 PROCEDURE — 700102 HCHG RX REV CODE 250 W/ 637 OVERRIDE(OP): Performed by: SURGERY

## 2018-10-11 PROCEDURE — 700105 HCHG RX REV CODE 258

## 2018-10-11 PROCEDURE — 97116 GAIT TRAINING THERAPY: CPT

## 2018-10-11 PROCEDURE — 700102 HCHG RX REV CODE 250 W/ 637 OVERRIDE(OP): Performed by: NURSE PRACTITIONER

## 2018-10-11 PROCEDURE — 97535 SELF CARE MNGMENT TRAINING: CPT

## 2018-10-11 PROCEDURE — A9270 NON-COVERED ITEM OR SERVICE: HCPCS | Performed by: NURSE PRACTITIONER

## 2018-10-11 PROCEDURE — 700111 HCHG RX REV CODE 636 W/ 250 OVERRIDE (IP): Performed by: SURGERY

## 2018-10-11 PROCEDURE — 85025 COMPLETE CBC W/AUTO DIFF WBC: CPT

## 2018-10-11 PROCEDURE — A9270 NON-COVERED ITEM OR SERVICE: HCPCS | Performed by: SURGERY

## 2018-10-11 PROCEDURE — 97530 THERAPEUTIC ACTIVITIES: CPT

## 2018-10-11 PROCEDURE — 770001 HCHG ROOM/CARE - MED/SURG/GYN PRIV*

## 2018-10-11 RX ORDER — MORPHINE SULFATE 15 MG/1
15 TABLET ORAL EVERY 6 HOURS PRN
Qty: 28 TAB | Refills: 0 | Status: SHIPPED | OUTPATIENT
Start: 2018-10-11 | End: 2018-10-18

## 2018-10-11 RX ORDER — NICOTINE 21 MG/24HR
1 PATCH, TRANSDERMAL 24 HOURS TRANSDERMAL EVERY 24 HOURS
Qty: 14 PATCH | Refills: 0 | Status: SHIPPED | OUTPATIENT
Start: 2018-10-11 | End: 2018-10-25

## 2018-10-11 RX ORDER — CIPROFLOXACIN 750 MG/1
750 TABLET, FILM COATED ORAL EVERY 12 HOURS
Qty: 14 TAB | Refills: 0 | Status: SHIPPED | OUTPATIENT
Start: 2018-10-11 | End: 2018-10-18

## 2018-10-11 RX ORDER — LORAZEPAM 1 MG/1
1 TABLET ORAL 2 TIMES DAILY PRN
Qty: 30 TAB | Refills: 0 | Status: SHIPPED | OUTPATIENT
Start: 2018-10-11 | End: 2018-10-18

## 2018-10-11 RX ORDER — SODIUM CHLORIDE 9 MG/ML
INJECTION, SOLUTION INTRAVENOUS
Status: COMPLETED
Start: 2018-10-11 | End: 2018-10-11

## 2018-10-11 RX ADMIN — NICOTINE 14 MG: 14 PATCH, EXTENDED RELEASE TRANSDERMAL at 04:09

## 2018-10-11 RX ADMIN — MAGNESIUM GLUCONATE 500 MG ORAL TABLET 400 MG: 500 TABLET ORAL at 18:04

## 2018-10-11 RX ADMIN — POTASSIUM CHLORIDE 20 MEQ: 1500 TABLET, EXTENDED RELEASE ORAL at 18:03

## 2018-10-11 RX ADMIN — MAGNESIUM GLUCONATE 500 MG ORAL TABLET 400 MG: 500 TABLET ORAL at 05:37

## 2018-10-11 RX ADMIN — LORAZEPAM 1 MG: 1 TABLET ORAL at 22:09

## 2018-10-11 RX ADMIN — MORPHINE SULFATE 15 MG: 15 TABLET ORAL at 22:09

## 2018-10-11 RX ADMIN — POLYETHYLENE GLYCOL 3350 1 PACKET: 17 POWDER, FOR SOLUTION ORAL at 05:45

## 2018-10-11 RX ADMIN — POTASSIUM CHLORIDE 20 MEQ: 1500 TABLET, EXTENDED RELEASE ORAL at 05:38

## 2018-10-11 RX ADMIN — ENOXAPARIN SODIUM 40 MG: 100 INJECTION SUBCUTANEOUS at 04:10

## 2018-10-11 RX ADMIN — CIPROFLOXACIN HYDROCHLORIDE 750 MG: 750 TABLET, FILM COATED ORAL at 05:38

## 2018-10-11 RX ADMIN — ANTACID TABLETS 1000 MG: 500 TABLET, CHEWABLE ORAL at 12:59

## 2018-10-11 RX ADMIN — MORPHINE SULFATE 15 MG: 15 TABLET ORAL at 04:10

## 2018-10-11 RX ADMIN — MORPHINE SULFATE 15 MG: 15 TABLET ORAL at 16:06

## 2018-10-11 RX ADMIN — OMEPRAZOLE 20 MG: 20 CAPSULE, DELAYED RELEASE ORAL at 04:10

## 2018-10-11 RX ADMIN — LEVOTHYROXINE SODIUM 88 MCG: 88 TABLET ORAL at 04:09

## 2018-10-11 RX ADMIN — CARBIDOPA AND LEVODOPA 1 TABLET: 25; 100 TABLET ORAL at 22:09

## 2018-10-11 RX ADMIN — MORPHINE SULFATE 15 MG: 15 TABLET ORAL at 10:06

## 2018-10-11 RX ADMIN — VITAMIN D, TAB 1000IU (100/BT) 4000 UNITS: 25 TAB at 05:37

## 2018-10-11 RX ADMIN — SODIUM CHLORIDE 500 ML: 9 INJECTION, SOLUTION INTRAVENOUS at 04:10

## 2018-10-11 RX ADMIN — LORAZEPAM 1 MG: 1 TABLET ORAL at 13:52

## 2018-10-11 RX ADMIN — POTASSIUM CHLORIDE 20 MEQ: 1500 TABLET, EXTENDED RELEASE ORAL at 12:59

## 2018-10-11 RX ADMIN — CIPROFLOXACIN HYDROCHLORIDE 750 MG: 750 TABLET, FILM COATED ORAL at 18:03

## 2018-10-11 RX ADMIN — ANTACID TABLETS 1000 MG: 500 TABLET, CHEWABLE ORAL at 18:03

## 2018-10-11 RX ADMIN — ANTACID TABLETS 1000 MG: 500 TABLET, CHEWABLE ORAL at 05:38

## 2018-10-11 ASSESSMENT — COGNITIVE AND FUNCTIONAL STATUS - GENERAL
SUGGESTED CMS G CODE MODIFIER MOBILITY: CK
MOBILITY SCORE: 16
STANDING UP FROM CHAIR USING ARMS: A LITTLE
TURNING FROM BACK TO SIDE WHILE IN FLAT BAD: A LITTLE
WALKING IN HOSPITAL ROOM: A LITTLE
CLIMB 3 TO 5 STEPS WITH RAILING: A LOT
MOVING TO AND FROM BED TO CHAIR: A LOT
MOVING FROM LYING ON BACK TO SITTING ON SIDE OF FLAT BED: A LITTLE

## 2018-10-11 ASSESSMENT — PAIN SCALES - GENERAL
PAINLEVEL_OUTOF10: 9

## 2018-10-11 ASSESSMENT — ENCOUNTER SYMPTOMS
ABDOMINAL PAIN: 1
MYALGIAS: 1
ROS GI COMMENTS: 10/8 BM
CONSTITUTIONAL NEGATIVE: 1
RESPIRATORY NEGATIVE: 1

## 2018-10-11 ASSESSMENT — GAIT ASSESSMENTS
GAIT LEVEL OF ASSIST: STAND BY ASSIST
DEVIATION: ANTALGIC
ASSISTIVE DEVICE: FRONT WHEEL WALKER
DISTANCE (FEET): 40

## 2018-10-11 NOTE — DISCHARGE PLANNING
ATTN: Case Management  RE: Referral for Home Health                We would like to take this opportunity to thank you for submitting a referral for your patient to continue care with [facility]. Our skilled team is dedicated to helping all patients recover and gain independence in the home setting.            As of 10/11/2018, we have accepted the above patient into our service. A [facility] clinician will be out to see the patient within 48 hours to conduct our initial visit. If you have any questions or concerns regarding the patient’s transition to Home Health, please do not hesitate to contact us. We are open for referrals 7 days a week from 8AM to 5PM at [phone].      We look forward to collaborating with you,  [facility] Team

## 2018-10-11 NOTE — PROGRESS NOTES
Bedside report received.  Assessment complete.  A&O x 4. Patient calls appropriately.  Patient up with one assist.   Patient has 9/10 pain. MS Contin given q6 hour.  Denies N&V. Tolerating regular diet.  Surgical midline is well approximated with staples, abd pad in place, abdominal binder over. CDI.  + void, + flatus  Patient denies SOB.  SCD's refused.  Patient awaiting Home Health to go home, will continue to update patient.  Review plan with of care with patient. Call light and personal belongings with in reach. Hourly rounding in place. All needs met at this time.

## 2018-10-11 NOTE — DISCHARGE PLANNING
Anticipated Discharge Disposition: Home with HH    Action: RN Cm called all [facility] PCA providers supplied by management (pt is approved by her insurance provider for 15-18 hours per week):    [facility] [phone]  [facility]  [phone]  Per Michelle, Intake representative Corinna will come to interview pt on Monday 10/15 at 11 am.  [employer] [facility]  [phone]  Per Rachelle, she will research PCA's to see if anyone is available and call RN CM back with any results.  [facility]  [phone]  Left voice mail requesting return call  [facility]  [phone]  Per [place], they are not accepting medicaid reimbursement.    Barriers to Discharge: HH and PCA agency acceptance    Plan: Continued follow up with leadership, await responses from PCA agencies.

## 2018-10-11 NOTE — PROGRESS NOTES
Pt is A&O 4  Pain controlled at this time on current regimen.  Not complaining of muscle cramps in lower legs bilaterally this PM  Denies nausea  Tolerating Diet Regular  Surgical incision to midline well approximated, minor redness to base of incision, staples intact, covered with ABD pad, and tape.  Abdominal binder in place  + Urine Void   + Flatus  - BM Void  Up standby with FWW   SCD's refused  Reviewed plan of care with patient, bed in lowest position and locked, pt resting comfortably now, call light within reach, all needs met at this time

## 2018-10-11 NOTE — PROGRESS NOTES
Trauma / Surgical Daily Progress Note    Date of Service  10/11/2018    Chief Complaint  63 y.o. female admitted 9/19/2018 with Small bowel obstruction (HCC)  Hospital day # 22  POD # 19 Exploratory laparotomy with small bowel resection  POD # 7 Wound exploration and seroma drainage     Interval Events  Accepted by home health  Adequate pain control  Cleared for discharge with HH aide established  Discontinue every other staple  Discontinue central line     Review of Systems  Review of Systems   Constitutional: Negative.    Respiratory: Negative.    Gastrointestinal: Positive for abdominal pain (incisional).        10/8 BM   Genitourinary:        Voiding    Musculoskeletal: Positive for myalgias.   All other systems reviewed and are negative.       Vital Signs  Temp:  [36.2 °C (97.2 °F)-37.1 °C (98.8 °F)] 36.2 °C (97.2 °F)  Pulse:  [70-79] 71  Resp:  [16-18] 17  BP: (120-136)/(82-87) 131/85    Physical Exam  Physical Exam   Constitutional: She is oriented to person, place, and time. She appears well-developed and well-nourished. No distress.   Pulmonary/Chest: Effort normal and breath sounds normal. No respiratory distress.   Abdominal:   Soft   Non distended  General tenderness with palpation   [place] present  Abdominal binder in place  Distal portion of wound slightly pink - blanchable - no drainage     Musculoskeletal: Normal range of motion.   Neurological: She is alert and oriented to person, place, and time.   Skin: Skin is warm and dry.   Psychiatric: She has a normal mood and affect.   Nursing note and vitals reviewed.      Laboratory  Recent Results (from the past 24 hour(s))   CBC WITH DIFFERENTIAL    Collection Time: 10/11/18 12:48 AM   Result Value Ref Range    WBC 4.4 (L) 4.8 - 10.8 K/uL    RBC 3.07 (L) 4.20 - 5.40 M/uL    Hemoglobin 10.6 (L) 12.0 - 16.0 g/dL    Hematocrit 31.9 (L) 37.0 - 47.0 %    .9 (H) 81.4 - 97.8 fL    MCH 34.5 (H) 27.0 - 33.0 pg    MCHC 33.2 (L) 33.6 - 35.0 g/dL    RDW 49.1  35.9 - 50.0 fL    Platelet Count 170 164 - 446 K/uL    MPV 12.1 9.0 - 12.9 fL    Neutrophils-Polys 41.60 (L) 44.00 - 72.00 %    Lymphocytes 41.70 (H) 22.00 - 41.00 %    Monocytes 9.00 0.00 - 13.40 %    Eosinophils 5.20 0.00 - 6.90 %    Basophils 1.80 0.00 - 1.80 %    Immature Granulocytes 0.70 0.00 - 0.90 %    Nucleated RBC 0.00 /100 WBC    Neutrophils (Absolute) 1.85 (L) 2.00 - 7.15 K/uL    Lymphs (Absolute) 1.85 1.00 - 4.80 K/uL    Monos (Absolute) 0.40 0.00 - 0.85 K/uL    Eos (Absolute) 0.23 0.00 - 0.51 K/uL    Baso (Absolute) 0.08 0.00 - 0.12 K/uL    Immature Granulocytes (abs) 0.03 0.00 - 0.11 K/uL    NRBC (Absolute) 0.00 K/uL       Fluids    Intake/Output Summary (Last 24 hours) at 10/11/18 1035  Last data filed at 10/11/18 0920   Gross per 24 hour   Intake             1800 ml   Output                0 ml   Net             1800 ml       Core Measures & Quality Metrics  Labs reviewed and Medications reviewed  Parker catheter: No Parker      DVT Prophylaxis: Enoxaparin (Lovenox)  DVT prophylaxis - mechanical: SCDs    Antibiotics: Treating active infection/contamination beyond 24 hours perioperative coverage  Assessed for rehab: Patient was assess for and/or received rehabilitation services during this hospitalization    Total Score: 0    ETOH Screening    Assessment/Plan  Debility- (present on admission)   Assessment & Plan    9/27 Skilled Nursing Referrals      10/3 Transfer held due to seroma development  10/5 SNF referrals resent  10/9 Rehab to reevaluate         Hypomagnesemia   Assessment & Plan    9/27 Supplement 4 grams IV magnesium    9/28 Supplement 4 grams IV magnesium  9/30 Supplement 4 grams IV magnesium. Start oral magnesium   10/1 Supplement 2 grams IV magnesium   10/2 Supplement 4 grams IV magnesium  10/3 Supplement 2 grams IV magnesium  10/5 Trend down, supplement 2 grams and continue oral replacement  10/7 Supplement 2 grams IV magnesium  Continue supplementation         Hypokalemia- (present on  admission)   Assessment & Plan    9/27 Supplement 20 mEq IV potassium   9/28 Supplement 40 mEq IV potassium  9/29 Supplement 40 mEq IV potassium    - Follow up potassium 3.1   - Potassium 40 mEq BID    10/1 Trend up. Continue oral supplementation. Decrease potassium supplementation to 20 mEq BID  10/2 Trend down, increase oral potassium supplementation to 20 mEq TID  10/7 Stable, continue oral supplementation         Small bowel obstruction (HCC)- (present on admission)   Assessment & Plan    9/22 Ex lap, lysis of adhesions, small bowel resection.  9/25 7 day course of antibiotic therapy completed. NG tube clamped  9/26 Interval removal of NG tube.  9/27 Clear liquid diet  9/28 Abdominal wound erythema. Start Doxycycline.    9/29 Incisional erythema improved. Continue antibiotic therapy. Full liquid diet    9/30 GI soft diet  10/4 Wound exploration and seroma drainage in OR, cultures sent  10/5 Continue doxycycline for an additional 7 days   10/6 Wound culture pending positive for pseudomonas, start Cefepime, stop doxycycline  10/7 Pseudomonas resistant to Cefepime, start Ricky Amanda DO, Surgery.        Other chronic postprocedural pain   Assessment & Plan    9/30 DC oxycodone.  Start oral dilaudid  10/1 DC Methadone. DC IV narcotics   10/4 IV narcotics for immediate post operative period only  10/8 DC IV narcotics        Ulcerative esophagitis   Assessment & Plan    Patient completed course of triple therapy        Hypothyroidism- (present on admission)   Assessment & Plan    Chronic condition treated with Synthroid.  Resumed maintenance medication.        Restless leg syndrome- (present on admission)   Assessment & Plan    Per patient report  Sinemet resumed         Discussed patient condition with RN, Patient and trauma surgery. Dr. Amanda

## 2018-10-11 NOTE — THERAPY
"Physical Therapy Treatment completed.   Bed Mobility:  Supine to Sit: Stand by Assist  Transfers: Sit to Stand: Stand by Assist  Gait: Level Of Assist: Stand by Assist with Front-Wheel Walker       Plan of Care: Will benefit from Physical Therapy 3 times per week  Discharge Recommendations: Equipment: No Equipment Needed. Pt has 4WW and w/c.    See \"Rehab Therapy-Acute\" Patient Summary Report for complete documentation.     Pt continues to progress w/ therapy. Pt is very talkative and focuses on pain and what she won't be able to do at home, so she required a lot of time to perform mobility and a lot of cues to attend to task. Pt still demonstrates impulsive tendencies as she was rushing to get to the BR. Even w/ rushing, pt didn't demonstrate any LOB. Pt still have poor activity tolerance however does appear to be able to do more than she does. Pt continues to progress and was encouraged to increase her OOB activity.  "

## 2018-10-11 NOTE — CARE PLAN
Problem: Knowledge Deficit  Goal: Knowledge of disease process/condition, treatment plan, diagnostic tests, and medications will improve  Outcome: PROGRESSING AS EXPECTED    Intervention: Explain information regarding disease process/condition, treatment plan, diagnostic tests, and medications and document in education  Patient updated on plan of care and discharge plan. Questions answered, will keep updated      Problem: Pain Management  Goal: Pain level will decrease to patient's comfort goal  Outcome: PROGRESSING AS EXPECTED    Intervention: Follow pain managment plan developed in collaboration with patient and Interdisciplinary Team  Patient receiving PO MS Contin q6h for pain management. Patient states it lasts her about 3 hours and the last 3 hours her pain is increased. MD aware. No changes added. Will continue to monitor and intervene when necessary

## 2018-10-11 NOTE — CARE PLAN
Problem: Communication  Goal: The ability to communicate needs accurately and effectively will improve  Outcome: PROGRESSING SLOWER THAN EXPECTED  Pt repeatedly asks the same or similar questions, reinforcement of information provided as necessary.    Problem: Venous Thromboembolism (VTW)/Deep Vein Thrombosis (DVT) Prevention:  Goal: Patient will participate in Venous Thrombosis (VTE)/Deep Vein Thrombosis (DVT)Prevention Measures  Outcome: PROGRESSING AS EXPECTED  Refusing SCD's, accepting lovenox per MAR for pharmacologic prophylaxis.    Problem: Fluid Volume:  Goal: Will maintain balanced intake and output  Outcome: PROGRESSING AS EXPECTED  Pt voiding regularly, triple lumen central line running TKO through all lines.

## 2018-10-11 NOTE — PROGRESS NOTES
Updated pt on POC, plan to d/c with home health.  Pt asked if Dr. Amanda would come to bedside later tonight, updated pt that Brittney HENDERSON had already seen her earlier this morning.  Pt stated concerns about getting to her house, getting into her house, and being able to move throughout her house.  Pt requested to know exact time of discharge, updated that she had not yet been accepted to home health at this time, and the time of discharge would depend upon acceptance time, however she would be updated as more information was made available.    Pt appears anxious at this time.  Call light within reach, all questions asked and answered as stated above, no further needs at this time.

## 2018-10-11 NOTE — DISCHARGE PLANNING
RN CM received a call from Sally, [facility].  She declines to take the pt into service, citing the location.

## 2018-10-12 ENCOUNTER — APPOINTMENT (OUTPATIENT)
Dept: RADIOLOGY | Facility: MEDICAL CENTER | Age: 63
DRG: 330 | End: 2018-10-12
Attending: HOSPITALIST
Payer: COMMERCIAL

## 2018-10-12 VITALS
HEIGHT: 61 IN | TEMPERATURE: 97.6 F | RESPIRATION RATE: 18 BRPM | OXYGEN SATURATION: 95 % | WEIGHT: 148.15 LBS | DIASTOLIC BLOOD PRESSURE: 92 MMHG | HEART RATE: 83 BPM | BODY MASS INDEX: 27.97 KG/M2 | SYSTOLIC BLOOD PRESSURE: 147 MMHG

## 2018-10-12 LAB
EKG IMPRESSION: NORMAL
TROPONIN I SERPL-MCNC: <0.01 NG/ML (ref 0–0.04)

## 2018-10-12 PROCEDURE — 71045 X-RAY EXAM CHEST 1 VIEW: CPT

## 2018-10-12 PROCEDURE — 93005 ELECTROCARDIOGRAM TRACING: CPT | Performed by: HOSPITALIST

## 2018-10-12 PROCEDURE — 700102 HCHG RX REV CODE 250 W/ 637 OVERRIDE(OP): Performed by: NURSE PRACTITIONER

## 2018-10-12 PROCEDURE — 36415 COLL VENOUS BLD VENIPUNCTURE: CPT

## 2018-10-12 PROCEDURE — 93010 ELECTROCARDIOGRAM REPORT: CPT | Performed by: INTERNAL MEDICINE

## 2018-10-12 PROCEDURE — A9270 NON-COVERED ITEM OR SERVICE: HCPCS | Performed by: SURGERY

## 2018-10-12 PROCEDURE — A9270 NON-COVERED ITEM OR SERVICE: HCPCS | Performed by: NURSE PRACTITIONER

## 2018-10-12 PROCEDURE — 700102 HCHG RX REV CODE 250 W/ 637 OVERRIDE(OP): Performed by: SURGERY

## 2018-10-12 PROCEDURE — 84484 ASSAY OF TROPONIN QUANT: CPT

## 2018-10-12 RX ADMIN — NICOTINE 14 MG: 14 PATCH, EXTENDED RELEASE TRANSDERMAL at 04:50

## 2018-10-12 RX ADMIN — POLYETHYLENE GLYCOL 3350 1 PACKET: 17 POWDER, FOR SOLUTION ORAL at 06:14

## 2018-10-12 RX ADMIN — ANTACID TABLETS 1000 MG: 500 TABLET, CHEWABLE ORAL at 12:15

## 2018-10-12 RX ADMIN — MORPHINE SULFATE 15 MG: 15 TABLET ORAL at 12:16

## 2018-10-12 RX ADMIN — POTASSIUM CHLORIDE 20 MEQ: 1500 TABLET, EXTENDED RELEASE ORAL at 06:13

## 2018-10-12 RX ADMIN — LORAZEPAM 1 MG: 1 TABLET ORAL at 12:16

## 2018-10-12 RX ADMIN — MORPHINE SULFATE 15 MG: 15 TABLET ORAL at 04:50

## 2018-10-12 RX ADMIN — ANTACID TABLETS 1000 MG: 500 TABLET, CHEWABLE ORAL at 06:13

## 2018-10-12 RX ADMIN — VITAMIN D, TAB 1000IU (100/BT) 4000 UNITS: 25 TAB at 06:14

## 2018-10-12 RX ADMIN — OMEPRAZOLE 20 MG: 20 CAPSULE, DELAYED RELEASE ORAL at 04:50

## 2018-10-12 RX ADMIN — LEVOTHYROXINE SODIUM 88 MCG: 88 TABLET ORAL at 04:50

## 2018-10-12 RX ADMIN — MAGNESIUM GLUCONATE 500 MG ORAL TABLET 400 MG: 500 TABLET ORAL at 06:14

## 2018-10-12 RX ADMIN — POTASSIUM CHLORIDE 20 MEQ: 1500 TABLET, EXTENDED RELEASE ORAL at 12:15

## 2018-10-12 ASSESSMENT — PAIN SCALES - GENERAL: PAINLEVEL_OUTOF10: 8

## 2018-10-12 NOTE — PROGRESS NOTES
Report received from NOC shift RN.   A/O X 4. Room air.   VSS. Hx of HTN.   Last labs 10/11/2018.   Trop level <0.01.   Sx incision present at midline abdomen dressed with abdominal pad and abdominal binder placed over incision.   Old central line dressing present on left upper chest from previous day's removal.   Staples in in place with every other removed.   BS normoactive X 4.   +BM on 10/11/2018.   +void.   Up ambulating with FWW.   Patient is active smoker.   Lung sounds clear at apices and diminished at bases.   Patient coughing up phlegm.   Call light at bedside.

## 2018-10-12 NOTE — DISCHARGE SUMMARY
General surgery discharge summary addendum     Dates of addendum: 10/8-10/12/18    LENGTH OF STAY: 23 days     ADMITTING PHYSICIAN:  Tay Sorto MD     CONSULTING PHYSICIANS:  [place] Amanda DO, surgery     DISCHARGE DIAGNOSIS:  No change to discharge diagnosis     PROCEDURES:  No additional procedures were completed    HOSPITAL COURSE: Ms. Renteria remained in the hospital awaiting placement in SNF. She has been denied and at this time, enough time has passed that she is safe to discharge home with home health.    DISCHARGE PHYSICAL EXAM: See [facility] physical exam dated 10/11/2018     DISCHARGE MEDICATIONS: Please see epic medication reconciliation dated October 11, 2018.    DISPOSITION: She is scheduled for follow up Thursday 10/18 at 1100 with Dr. Amanda. She will need the remainder of her staples removed. She has a follow up with a PCP on 11/1. She was provided 7 days of narcotics upon discharge today. She will need to complete an additional 7 days of Cipro which was also provided.     The patient has been extensively counseled and all questions have been answered. Special attention was paid to respiratory decompensation, increased pain and signs and symptoms of infection and to seek immediate medical attention if these develop. The patient demonstrates understanding and give verbal compliance with discharge instructions.

## 2018-10-12 NOTE — PROGRESS NOTES
Pt is A&O 4  Pain controlled on current regimen  Denies nausea  Tolerating Diet Regular  Surgical incision at midline, closed with staples, abd pad and tape, well approximated, abdominal binder in place  + Urine Void   + Flatus  + BM Void  Up standby with walker   SCD's refused  Reviewed plan of care with patient, bed in lowest position and locked, pt resting comfortably now, call light within reach, all needs met at this time

## 2018-10-12 NOTE — CARE PLAN
Problem: Infection  Goal: Will remain free from infection  Outcome: PROGRESSING AS EXPECTED  Pt had central line d/c'd during day shift, PO antibiotics given per MAR    Problem: Urinary Elimination:  Goal: Ability to reestablish a normal urinary elimination pattern will improve  Outcome: PROGRESSING AS EXPECTED  Pt voids frequently in the toilet, tolerating diet, no n/v

## 2018-10-12 NOTE — DISCHARGE PLANNING
Anticipated Discharge Disposition: Home with HH    Action: This RN CM spoke with pt's  to confirm at home DME.  Per the pt's , they have a hospital bed, wheelchair, wheelchair ramp, transfer board, shower chair and bedside commode all provided by [facility].      The pt has also been accepted by [facility] as of 10/11/18.  This RN CM called [facility] and spoke to Radha.  Per Radha pt is accepted and will be seen by [facility].     In addition, this RN CM called [facility] Scheduling ([phone] to schedule a PCP appointment with Tiffani Nielsen.    Also, This RN CM gave the pt the phone number to [facility] ([phone]) if she feels she needs more help at home.    Barriers to Discharge: none    Plan: RN CM available as needed for dc disposition.

## 2018-10-12 NOTE — PROGRESS NOTES
Discharge and follow up instructions discussed with patient.   No PIV access on patient. Wheelchair acquired for transportation out to personal vehicle.

## 2018-10-12 NOTE — PROGRESS NOTES
Pt c/o new onset CP at 0545, VSS at that time, notified Aiden GRUBBS at 0610, received orders for troponin, CXR and EKG.  Vital signs continue to be stable at this time.

## 2018-10-12 NOTE — DISCHARGE INSTRUCTIONS
Discharge Instructions    Discharged to home by car with relative. Discharged via wheelchair, hospital escort: Yes.  Special equipment needed: Not Applicable    Be sure to schedule a follow-up appointment with your primary care doctor or any specialists as instructed.     Discharge Plan:   Diet Plan: Discussed  Activity Level: Discussed  Smoking Cessation Offered: Patient Counseled  Confirmed Follow up Appointment: Patient to Call and Schedule Appointment  Confirmed Symptoms Management: Discussed  Medication Reconciliation Updated: Yes  Pneumococcal Vaccine Administered/Refused: Not given - Patient refused pneumococcal vaccine  Influenza Vaccine Indication: Patient Refuses    I understand that a diet low in cholesterol, fat, and sodium is recommended for good health. Unless I have been given specific instructions below for another diet, I accept this instruction as my diet prescription.   Other diet: Regular    Special Instructions: None    · Is patient discharged on Warfarin / Coumadin?   No     Exploratory Laparotomy, Adult, Care After  Refer to this sheet in the next few weeks. These instructions provide you with information about caring for yourself after your procedure. Your health care provider may also give you more specific instructions. Your treatment has been planned according to current medical practices, but problems sometimes occur. Call your health care provider if you have any problems or questions after your procedure.  WHAT TO EXPECT AFTER THE PROCEDURE  After your procedure, it is typical to have:  · Abdominal soreness.  · Fatigue.  · A sore throat from tubes in your throat.  · A lack of appetite.  HOME CARE INSTRUCTIONS  Medicines  · Take medicines only as directed by your health care provider.  · Do not drive or operate heavy machinery while taking pain medicine.  Incision Care  · There are many different ways to close and cover an incision, including stitches (sutures), skin glue, and adhesive  strips. Follow your health care provider's instructions about:  ¨ Incision care.  ¨ Bandage (dressing) changes and removal.  ¨ Incision closure removal.  · Do not take showers or baths until your health care provider says that you can.  · Check your incision area daily for signs of infection. Watch for:  ¨ Redness.  ¨ Tenderness.  ¨ Swelling.  ¨ Drainage.  Activities  · Do not lift anything that is heavier than 10 pounds (4.5 kg) until your health care provider says that it is safe.  · Try to walk a little bit each day if your health care provider says that it is okay.  · Ask your health care provider when you can start to do your usual activities again, such as driving, going back to work, and having sex.  Eating and Drinking  · You may eat what you usually eat. Include lots of whole grains, fruits, and vegetables in your diet. This will help to prevent constipation.  · Drink enough fluid to keep your urine clear or pale yellow.  General Instructions  · Keep all follow-up visits as directed by your health care provider. This is important.  SEEK MEDICAL CARE IF:   · You have a fever.  · You have chills.  · Your pain medicine is not helping.  · You have constipation or diarrhea.  · You have nausea or vomiting.  · You have drainage, redness, swelling, or pain at your incision site.  SEEK IMMEDIATE MEDICAL CARE IF:  · Your pain is getting worse.  · It has been more than 3 days since you been able to have a bowel movement.  · You have ongoing (persistent) vomiting.  · The edges of your incision open up.  · You have warmth, tenderness, and swelling in your calf.  · You have trouble breathing.  · You have chest pain.     This information is not intended to replace advice given to you by your health care provider. Make sure you discuss any questions you have with your health care provider.     Document Released: 08/01/2005 Document Revised: 01/08/2016 Document Reviewed: 08/05/2015  [employer] Patient Education  ©2016 [employer]    Laparoscopic Lysis of Abdominal Adhesions, Care After  Refer to this sheet in the next few weeks. These instructions provide you with information about caring for yourself after your procedure. Your health care provider may also give you more specific instructions. Your treatment has been planned according to current medical practices, but problems sometimes occur. Call your health care provider if you have any problems or questions after your procedure.  WHAT TO EXPECT AFTER THE PROCEDURE  After your procedure, it is common to have some pain around the incision.  HOME CARE INSTRUCTIONS  · Take medicines only as directed by your health care provider.  · You may need to start by eating only a little at a time. Start with liquids. As your appetite improves, gradually return to eating solid foods.  · Do not lift anything that is heavier than 10 lb (4.5 kg) for four weeks.  · Return to your normal activities as directed by your health care provider. Ask your health care provider what activities are safe for you.  · There are many different ways to close and cover an incision, including stitches (sutures), skin glue, and adhesive strips. Follow instructions from your health care provider about:  ¨ Incision care.  ¨ Bandage (dressing) changes and removal.  ¨ Incision closure removal.  · Check your incisions every day for signs of infection. Watch for:  ¨ Redness, swelling, or pain.  ¨ Fluid , blood, or pus.  SEEK MEDICAL CARE IF:  · You develop a fever or chills.  · You have redness, swelling, or pain at the site of your incisions.  · You have fluid, blood, or pus coming from your incisions.  · There is a bad smell coming from your incisions or the dressing.  · Your pain gets worse.  · You have a cough.  · You have nausea and vomiting that does not go away after 3 hours.  SEEK IMMEDIATE MEDICAL CARE IF:  · You develop severe pain in your abdomen or your chest.  · You develop shortness of  breath.  · You develop nausea or vomiting that is severe or keeps coming back.     This information is not intended to replace advice given to you by your health care provider. Make sure you discuss any questions you have with your health care provider.     Document Released: 05/03/2016 Document Reviewed: 12/14/2015  [employer] Interactive Patient Education ©2016 [employer].      Laparoscopic Colectomy  Laparoscopic colectomy is surgery to remove part or all of the large intestine (colon). This procedure may be used to treat several conditions, including:  · Inflammation and infection of the colon (diverticulitis).  · Tumors or masses in the colon.  · Inflammatory bowel disease, such as Crohn disease or ulcerative colitis. Colectomy is an option when symptoms cannot be controlled with medicines.  · Bleeding from the colon that cannot be controlled by another method.  · Blockage or obstruction of the colon.  Tell a health care provider about:  · Any allergies you have.  · All medicines you are taking, including vitamins, herbs, eye drops, creams, and over-the-counter medicines.  · Any problems you or family members have had with anesthetic medicines.  · Any blood disorders you have.  · Any surgeries you have had.  · Any medical conditions you have.  What are the risks?  Generally, this is a safe procedure. However, problems may occur, including:  · Infection.  · Bleeding.  · Allergic reactions to medicines or dyes.  · Damage to other structures or organs.  · Leaking from where the colon was sewn together.  · Future blockage of the small intestines from scar tissue. Another surgery may be needed to repair this.  · Needing to convert to an open procedure. Complications such as damage to other organs or excessive bleeding may require the surgeon to convert from a laparoscopic procedure to an open procedure. This involves making a larger incision in the abdomen.  What happens before the procedure?  Staying hydrated    Follow instructions from your health care provider about hydration, which may include:  · Up to 2 hours before the procedure - you may continue to drink clear liquids, such as water, clear fruit juice, black coffee, and plain tea.  Eating and drinking restrictions   Follow instructions from your health care provider about eating and drinking, which may include:  · 8 hours before the procedure - stop eating heavy meals, meals with high fiber, or foods such as meat, fried foods, or fatty foods.  · 6 hours before the procedure - stop eating light meals or foods, such as toast or cereal.  · 6 hours before the procedure - stop drinking milk or drinks that contain milk.  · 2 hours before the procedure - stop drinking clear liquids.  Medicines  · Ask your health care provider about:  ¨ Changing or stopping your regular medicines. This is especially important if you are taking diabetes medicines or blood thinners.  ¨ Taking medicines such as aspirin and ibuprofen. These medicines can thin your blood. Do not take these medicines before your procedure if your health care provider instructs you not to.  · You may be given antibiotic medicine to clean out bacteria from your colon. Follow the directions carefully and take the medicine at the correct time.  General instructions  · You may be prescribed an oral bowel prep to clean out your colon in preparation for the surgery:  ¨ Follow instructions from your health care provider about how to do this.  ¨ Do not eat or drink anything else after you have started the bowel prep, unless your health care provider tells you it is safe to do so.  · Do not use any products that contain nicotine or tobacco, such as cigarettes and e-cigarettes. If you need help quitting, ask your health care provider.  What happens during the procedure?  · To reduce your risk of infection:  ¨ Your health care team will wash or sanitize their hands.  ¨ Your skin will be washed with soap.  · An IV tube will  be inserted into one of your veins to deliver fluid and medication.  · You will be given one of the following:  ¨ A medicine to help you relax (sedative).  ¨ A medicine to make you fall asleep (general anesthetic).  · Small monitors will be connected to your body. They will be used to check your heart, blood pressure, and oxygen level.  · A breathing tube may be placed into your lungs during the procedure.  · A thin, flexible tube (catheter) will be placed into your bladder to drain urine.  · A tube may be placed through your nose and into your stomach to drain stomach fluids (nasogastric tube, or NG tube).  · Your abdomen will be filled with air so it expands. This gives the surgeon more room to operate and makes your organs easier to see.  · Several small cuts (incisions) will be made in your abdomen.  · A thin, lighted tube with a tiny camera on the end (laparoscope) will be put through one of the small incisions. The camera on the laparoscope will send a picture to a computer screen in the operating room. This will give the surgeon a good view inside your abdomen.  · Hollow tubes will be put through the other small incisions in your abdomen. The tools that are needed for the procedure will be put through these tubes.  · Clamps or staples will be put on both ends of the diseased part of the colon.  · The part of the intestine between the clamps or staples will be removed.  · If possible, the ends of the healthy colon that remain will be stitched (sutured) or stapled together to allow your body to pass waste (stool).  · Sometimes, the remaining colon cannot be stitched back together. If this is the case, a colostomy will be needed. If you need a colostomy:  ¨ An opening to the outside of your body (stoma) will be made through your abdomen.  ¨ The end of your colon will be brought to the opening. It will be stitched to the skin.  ¨ A bag will be attached to the opening. Stool will drain into this removable  bag.  ¨ The colostomy may be temporary or permanent.  · The incisions from the colectomy will be closed with sutures or staples.  The procedure may vary among health care providers and hospitals.  What happens after the procedure?  · Your blood pressure, heart rate, breathing rate, and blood oxygen level will be monitored until the medicines you were given have worn off.  · You will receive fluids through an IV tube until your bowels start to work properly.  · Once your bowels are working again, you will be given clear liquids first and then solid food as tolerated.  · You will be given medicines to control your pain and nausea, if needed.  · Do not drive for 24 hours if you were given a sedative.  This information is not intended to replace advice given to you by your health care provider. Make sure you discuss any questions you have with your health care provider.  Document Released: 03/09/2004 Document Revised: 09/18/2017 Document Reviewed: 09/18/2017  [employer] Interactive Patient Education © 2017 [employer]    Depression / Suicide Risk    As you are discharged from this [facility] facility, it is important to learn how to keep safe from harming yourself.    Recognize the warning signs:  · Abrupt changes in personality, positive or negative- including increase in energy   · Giving away possessions  · Change in eating patterns- significant weight changes-  positive or negative  · Change in sleeping patterns- unable to sleep or sleeping all the time   · Unwillingness or inability to communicate  · Depression  · Unusual sadness, discouragement and loneliness  · Talk of wanting to die  · Neglect of personal appearance   · Rebelliousness- reckless behavior  · Withdrawal from people/activities they love  · Confusion- inability to concentrate     If you or a loved one observes any of these behaviors or has concerns about self-harm, here's what you can do:  · Talk about it- your feelings and reasons for harming  yourself  · Remove any means that you might use to hurt yourself (examples: pills, rope, extension cords, firearm)  · Get professional help from the community (Mental Health, Substance Abuse, psychological counseling)  · Do not be alone:Call your Safe Contact- someone whom you trust who will be there for you.  · Call your local CRISIS HOTLINE 303-0426 or 299-174-7256  · Call your local Children's Mobile Crisis Response Team [place] (349) 423-1604 or www.[employer]  · Call the toll free National Suicide Prevention Hotlines   · National Suicide Prevention Lifeline 544-366-FZIP (8580)  · National Hope Line Network 800-SUICIDE (454-4095)

## 2018-10-14 ENCOUNTER — HOME CARE VISIT (OUTPATIENT)
Dept: HOME HEALTH SERVICES | Facility: HOME HEALTHCARE | Age: 63
End: 2018-10-14
Payer: COMMERCIAL

## 2018-10-14 VITALS
SYSTOLIC BLOOD PRESSURE: 110 MMHG | BODY MASS INDEX: 27.09 KG/M2 | OXYGEN SATURATION: 97 % | RESPIRATION RATE: 18 BRPM | DIASTOLIC BLOOD PRESSURE: 70 MMHG | HEIGHT: 60 IN | HEART RATE: 83 BPM | WEIGHT: 138 LBS | TEMPERATURE: 97.5 F

## 2018-10-14 PROCEDURE — G0493 RN CARE EA 15 MIN HH/HOSPICE: HCPCS

## 2018-10-14 PROCEDURE — 665001 SOC-HOME HEALTH

## 2018-10-14 SDOH — ECONOMIC STABILITY: HOUSING INSECURITY: UNSAFE COOKING RANGE AREA: 0

## 2018-10-14 SDOH — ECONOMIC STABILITY: HOUSING INSECURITY: UNSAFE APPLIANCES: 0

## 2018-10-14 ASSESSMENT — PATIENT HEALTH QUESTIONNAIRE - PHQ9
1. LITTLE INTEREST OR PLEASURE IN DOING THINGS: 01
2. FEELING DOWN, DEPRESSED, IRRITABLE, OR HOPELESS: 01

## 2018-10-14 ASSESSMENT — ACTIVITIES OF DAILY LIVING (ADL)
HOME_HEALTH_OASIS: 01
OASIS_M1830: 03

## 2018-10-14 ASSESSMENT — ENCOUNTER SYMPTOMS
SHORTNESS OF BREATH: T
NAUSEA: OCCATIONAL

## 2018-10-15 ENCOUNTER — TELEPHONE (OUTPATIENT)
Dept: HEALTH INFORMATION MANAGEMENT | Facility: OTHER | Age: 63
End: 2018-10-15

## 2018-10-15 NOTE — TELEPHONE ENCOUNTER
"Referral from [facility]. Med rec completed 10/11/18 per Dr. Amanda's Epic notes. Patient was admitted for small bowel obstruction and underwent surgery. Discharged with morphine. Outbound call to discuss stool softener to prevent constipation. Patient states she was having a bowel movement 2 to 3 times per day while admitted and that has reduced to once daily since going home. Patient does not have docusate, however she has Miralax. Recommended starting Miralax to prevent constipation from morphine. Patient agrees. She states she feels she needs a dietitian to help with choosing foods without fiber to prevent further bowel obstruction. She would like a  to help with social determinants. Patient also states she needs a PCP. Provided phone numbers to [facility] scheduling and  office to schedule. Patient reports she is out of levothyroxine and claim is rejected at pharmacy for \"prior authorization required.\" Outbound call to pharmacy to verify correct MARIEL 1 code is being processed.  Outbound call to Dr. Springer office to request follow up on prior auth. LVM with my and retail pharmacy contact info. No further issues noted with patient's current med list.   "

## 2018-10-16 ENCOUNTER — HOME CARE VISIT (OUTPATIENT)
Dept: HOME HEALTH SERVICES | Facility: HOME HEALTHCARE | Age: 63
End: 2018-10-16
Payer: COMMERCIAL

## 2018-10-16 VITALS
HEART RATE: 80 BPM | OXYGEN SATURATION: 93 % | RESPIRATION RATE: 16 BRPM | DIASTOLIC BLOOD PRESSURE: 64 MMHG | TEMPERATURE: 98.6 F | SYSTOLIC BLOOD PRESSURE: 94 MMHG

## 2018-10-16 PROCEDURE — G0151 HHCP-SERV OF PT,EA 15 MIN: HCPCS

## 2018-10-16 SDOH — ECONOMIC STABILITY: HOUSING INSECURITY

## 2018-10-17 ENCOUNTER — HOME CARE VISIT (OUTPATIENT)
Dept: HOME HEALTH SERVICES | Facility: HOME HEALTHCARE | Age: 63
End: 2018-10-17
Payer: COMMERCIAL

## 2018-10-17 VITALS
DIASTOLIC BLOOD PRESSURE: 84 MMHG | OXYGEN SATURATION: 94 % | HEART RATE: 76 BPM | TEMPERATURE: 98.3 F | SYSTOLIC BLOOD PRESSURE: 139 MMHG | RESPIRATION RATE: 18 BRPM

## 2018-10-17 PROCEDURE — A6257 TRANSPARENT FILM <= 16 SQ IN: HCPCS

## 2018-10-17 PROCEDURE — G0156 HHCP-SVS OF AIDE,EA 15 MIN: HCPCS

## 2018-10-17 PROCEDURE — A6252 ABSORPT DRG >16 <=48 W/O BDR: HCPCS

## 2018-10-17 PROCEDURE — G0152 HHCP-SERV OF OT,EA 15 MIN: HCPCS

## 2018-10-17 PROCEDURE — G0299 HHS/HOSPICE OF RN EA 15 MIN: HCPCS

## 2018-10-17 PROCEDURE — A4216 STERILE WATER/SALINE, 10 ML: HCPCS

## 2018-10-17 PROCEDURE — A5120 SKIN BARRIER, WIPE OR SWAB: HCPCS

## 2018-10-17 ASSESSMENT — ENCOUNTER SYMPTOMS
NAUSEA: NO ISSUES AT THE TIME OF THIS ASSESSMENT.
VOMITING: NO ISSUES AT THE TIME OF THIS ASSESSMENT.

## 2018-10-17 ASSESSMENT — ACTIVITIES OF DAILY LIVING (ADL): TRANSPORTATION COMMENTS: REQUIRES ASSIST OF ONE OTHER PERSON TO LEAVE THE HOME

## 2018-10-17 NOTE — DOCUMENTATION QUERY
"DOCUMENTATION QUERY    PROVIDERS: Please select “Cosign w/ note”to reply to query.    To better represent the severity of illness of your patient, please review the following information and exercise your independent professional judgment in responding to this query.     Patient was admitted for complete small bowel obstruction with adhesions for which surgery was performed on 9/22/18.    After surgery, patient developed seroma for which she had abdominal wound exploration and seroma drainage performed on 10/4.   Per 10/8 Discharge Summary, \"wound cultures obtained during her procedure on October 4 were noted to be significant for a light growth of Pseudomonas.\"     Based upon the clinical findings, risk factors, and treatment, can the relationship between Pseudomonas and patient's surgery on 9/22 be further specified?    • Pseudomonas infection of superficial incisional surgical site (stitch or subcutaneous abscess)  • Pseudomonas infection of deep incisional surgical site (intra-muscular abscess)  • Pseudomonas not related to surgical site infection      • Other explanation of clinical findings (please document)  • Unable to determine        The medical record reflects the following:   Clinical Findings   OP Report 10/4/18, [place] Amanda MD    Findings: Seroma anterior superior abdominal wall within the wound drained of 150 mL serosanguineous fluid. Seroma anterior-inferior abdominal wall within wound drained 85 mL serosanguineous fluid. Minimal fascial disruption upper midline    Discharge Summary 10/5/18     HOSPITAL COURSE: Patient remained in the acute care setting while awaiting placement at skilled nursing facility.  In the interim she did develop a seroma in the area of her midline wound.  On October 4 she presented to the operating theater for drainage of said seroma.     Discharge Summary 10/8/18    HOSPITAL COURSE: The patient remained in the acute care setting wallowing placement at skilled nursing " facility.  The wound cultures that were obtained during her procedure on October 4 were noted to be significant for a light growth of Pseudomonas.  She was started on a course of oral ciprofloxacin.  Her antibiotic course should complete on October 11.     Treatment Drainage of abdominal seroma and cultures, ciprofloxacin   Risk Factors  Complete SBO with adhesions s/p ,multiple lysis of adhesions and small bowel resection, post-operative seroma of abdominal wall s/p drainage     Location within medical record  OP Report, Discharge Summary     Thank you,   [email or web address]

## 2018-10-18 VITALS
RESPIRATION RATE: 18 BRPM | HEART RATE: 97 BPM | TEMPERATURE: 98.9 F | DIASTOLIC BLOOD PRESSURE: 82 MMHG | OXYGEN SATURATION: 98 % | SYSTOLIC BLOOD PRESSURE: 122 MMHG

## 2018-10-19 ENCOUNTER — HOME CARE VISIT (OUTPATIENT)
Dept: HOME HEALTH SERVICES | Facility: HOME HEALTHCARE | Age: 63
End: 2018-10-19
Payer: COMMERCIAL

## 2018-10-19 ENCOUNTER — TELEPHONE (OUTPATIENT)
Dept: MEDICAL GROUP | Facility: PHYSICIAN GROUP | Age: 63
End: 2018-10-19

## 2018-10-19 PROCEDURE — G0299 HHS/HOSPICE OF RN EA 15 MIN: HCPCS

## 2018-10-19 NOTE — TELEPHONE ENCOUNTER
Called [facility] and let them know that I have never seen this patient.  It appears she was seen by Tiffani Nielsen PA-C in May and as her supervising physician, I am able to sign these orders.  I agreed to sign until patient establishes with Dr. Almonte on November 1st.    -Elizabeth Maher M.D.

## 2018-10-21 VITALS
HEART RATE: 87 BPM | RESPIRATION RATE: 18 BRPM | BODY MASS INDEX: 26.73 KG/M2 | WEIGHT: 138 LBS | TEMPERATURE: 98.7 F | DIASTOLIC BLOOD PRESSURE: 70 MMHG | SYSTOLIC BLOOD PRESSURE: 110 MMHG | OXYGEN SATURATION: 96 %

## 2018-10-21 VITALS
WEIGHT: 138 LBS | DIASTOLIC BLOOD PRESSURE: 84 MMHG | SYSTOLIC BLOOD PRESSURE: 139 MMHG | BODY MASS INDEX: 26.73 KG/M2 | RESPIRATION RATE: 18 BRPM | TEMPERATURE: 98.3 F | HEART RATE: 76 BPM | OXYGEN SATURATION: 96 %

## 2018-10-21 ASSESSMENT — ACTIVITIES OF DAILY LIVING (ADL)
MEAL_PREP_ASSISTANCE: 6
HOUSEKEEPING_ASSISTANCE: 6
TOILETING_ASSISTANCE: 0
ORAL_CARE_ASSISTANCE: 0
GROOMING_ASSISTANCE: 0
EATING_ASSISTANCE: 0
TELEPHONE_ASSISTANCE: 0
LAUNDRY_ASSISTANCE: 6
DRESSING_UB_ASSISTANCE: 0
TRANSPORTATION_ASSISTANCE: 6
DRESSING_LB_ASSISTANCE: 4
SHOPPING_ASSISTANCE: 6
BATHING_ASSISTANCE: 4

## 2018-10-21 ASSESSMENT — ENCOUNTER SYMPTOMS
POOR JUDGMENT: 1
DEBILITATING PAIN: 1

## 2018-10-22 ENCOUNTER — HOME CARE VISIT (OUTPATIENT)
Dept: HOME HEALTH SERVICES | Facility: HOME HEALTHCARE | Age: 63
End: 2018-10-22
Payer: COMMERCIAL

## 2018-10-22 VITALS
DIASTOLIC BLOOD PRESSURE: 80 MMHG | HEART RATE: 83 BPM | RESPIRATION RATE: 16 BRPM | OXYGEN SATURATION: 97 % | SYSTOLIC BLOOD PRESSURE: 138 MMHG | TEMPERATURE: 99.8 F

## 2018-10-22 PROCEDURE — G0299 HHS/HOSPICE OF RN EA 15 MIN: HCPCS

## 2018-10-22 SDOH — ECONOMIC STABILITY: HOUSING INSECURITY
HOME SAFETY: PT'S ENVIRONMENT VERY CLUTTERED WITHOUT A CLEAR PATH FOR EVACUATION AND FLAMMABLE MATERIALS ALL OVER. PT A SMOKER

## 2018-10-22 SDOH — ECONOMIC STABILITY: HOUSING INSECURITY: UNSAFE APPLIANCES: 0

## 2018-10-22 SDOH — ECONOMIC STABILITY: HOUSING INSECURITY: UNSAFE COOKING RANGE AREA: 0

## 2018-10-22 ASSESSMENT — ENCOUNTER SYMPTOMS
DEBILITATING PAIN: 1
VOMITING: DENIES ANY VOMITING
NAUSEA: DENIES C/O NAUSEA

## 2018-10-23 ENCOUNTER — HOME CARE VISIT (OUTPATIENT)
Dept: HOME HEALTH SERVICES | Facility: HOME HEALTHCARE | Age: 63
End: 2018-10-23
Payer: COMMERCIAL

## 2018-10-23 PROCEDURE — G0152 HHCP-SERV OF OT,EA 15 MIN: HCPCS

## 2018-10-24 ENCOUNTER — HOME CARE VISIT (OUTPATIENT)
Dept: HOME HEALTH SERVICES | Facility: HOME HEALTHCARE | Age: 63
End: 2018-10-24
Payer: COMMERCIAL

## 2018-10-24 VITALS
TEMPERATURE: 98.6 F | DIASTOLIC BLOOD PRESSURE: 82 MMHG | HEART RATE: 80 BPM | SYSTOLIC BLOOD PRESSURE: 128 MMHG | OXYGEN SATURATION: 96 %

## 2018-10-24 VITALS
RESPIRATION RATE: 16 BRPM | TEMPERATURE: 98.1 F | DIASTOLIC BLOOD PRESSURE: 81 MMHG | OXYGEN SATURATION: 91 % | SYSTOLIC BLOOD PRESSURE: 103 MMHG | HEART RATE: 80 BPM

## 2018-10-24 VITALS
HEART RATE: 88 BPM | TEMPERATURE: 98.4 F | DIASTOLIC BLOOD PRESSURE: 74 MMHG | SYSTOLIC BLOOD PRESSURE: 118 MMHG | RESPIRATION RATE: 16 BRPM | OXYGEN SATURATION: 98 %

## 2018-10-24 PROCEDURE — G0156 HHCP-SVS OF AIDE,EA 15 MIN: HCPCS

## 2018-10-24 PROCEDURE — G0495 RN CARE TRAIN/EDU IN HH: HCPCS

## 2018-10-24 SDOH — ECONOMIC STABILITY: HOUSING INSECURITY: UNSAFE APPLIANCES: 0

## 2018-10-24 SDOH — ECONOMIC STABILITY: HOUSING INSECURITY: UNSAFE COOKING RANGE AREA: 0

## 2018-10-24 ASSESSMENT — ENCOUNTER SYMPTOMS
DEBILITATING PAIN: 1
POOR JUDGMENT: 1
VOMITING: DENIES ANY VOMITING
NAUSEA: DENIES C/O NAUSEA

## 2018-10-25 ENCOUNTER — HOME CARE VISIT (OUTPATIENT)
Dept: HOME HEALTH SERVICES | Facility: HOME HEALTHCARE | Age: 63
End: 2018-10-25
Payer: COMMERCIAL

## 2018-10-26 ENCOUNTER — HOME CARE VISIT (OUTPATIENT)
Dept: HOME HEALTH SERVICES | Facility: HOME HEALTHCARE | Age: 63
End: 2018-10-26
Payer: COMMERCIAL

## 2018-10-26 VITALS
HEART RATE: 92 BPM | TEMPERATURE: 98.6 F | DIASTOLIC BLOOD PRESSURE: 70 MMHG | SYSTOLIC BLOOD PRESSURE: 124 MMHG | RESPIRATION RATE: 18 BRPM | OXYGEN SATURATION: 97 %

## 2018-10-26 PROCEDURE — G0299 HHS/HOSPICE OF RN EA 15 MIN: HCPCS

## 2018-10-26 SDOH — ECONOMIC STABILITY: HOUSING INSECURITY: UNSAFE COOKING RANGE AREA: 0

## 2018-10-26 SDOH — ECONOMIC STABILITY: HOUSING INSECURITY: UNSAFE APPLIANCES: 0

## 2018-10-26 ASSESSMENT — ENCOUNTER SYMPTOMS
DEBILITATING PAIN: 1
VOMITING: DENIES ANY VOMITING
NAUSEA: DENIES C/O NAUSEA

## 2018-10-29 ENCOUNTER — HOME CARE VISIT (OUTPATIENT)
Dept: HOME HEALTH SERVICES | Facility: HOME HEALTHCARE | Age: 63
End: 2018-10-29
Payer: COMMERCIAL

## 2018-10-29 VITALS
TEMPERATURE: 98.9 F | OXYGEN SATURATION: 98 % | RESPIRATION RATE: 18 BRPM | HEART RATE: 76 BPM | DIASTOLIC BLOOD PRESSURE: 82 MMHG | SYSTOLIC BLOOD PRESSURE: 132 MMHG

## 2018-10-29 PROCEDURE — G0180 MD CERTIFICATION HHA PATIENT: HCPCS | Performed by: FAMILY MEDICINE

## 2018-10-29 PROCEDURE — G0299 HHS/HOSPICE OF RN EA 15 MIN: HCPCS

## 2018-10-29 SDOH — ECONOMIC STABILITY: HOUSING INSECURITY: UNSAFE APPLIANCES: 0

## 2018-10-29 SDOH — ECONOMIC STABILITY: HOUSING INSECURITY: UNSAFE COOKING RANGE AREA: 0

## 2018-10-29 ASSESSMENT — ENCOUNTER SYMPTOMS
DEBILITATING PAIN: 1
VOMITING: DENIES ANY VOMITING
NAUSEA: DENIES C/O NAUSEA
DEBILITATING PAIN: 1

## 2018-10-30 ENCOUNTER — HOME CARE VISIT (OUTPATIENT)
Dept: HOME HEALTH SERVICES | Facility: HOME HEALTHCARE | Age: 63
End: 2018-10-30
Payer: COMMERCIAL

## 2018-10-30 PROCEDURE — G0152 HHCP-SERV OF OT,EA 15 MIN: HCPCS

## 2018-10-31 ENCOUNTER — HOME CARE VISIT (OUTPATIENT)
Dept: HOME HEALTH SERVICES | Facility: HOME HEALTHCARE | Age: 63
End: 2018-10-31
Payer: COMMERCIAL

## 2018-10-31 VITALS
OXYGEN SATURATION: 94 % | TEMPERATURE: 98.6 F | SYSTOLIC BLOOD PRESSURE: 108 MMHG | DIASTOLIC BLOOD PRESSURE: 71 MMHG | RESPIRATION RATE: 18 BRPM | HEART RATE: 79 BPM

## 2018-10-31 VITALS
DIASTOLIC BLOOD PRESSURE: 80 MMHG | TEMPERATURE: 98.9 F | RESPIRATION RATE: 18 BRPM | HEART RATE: 75 BPM | OXYGEN SATURATION: 92 % | SYSTOLIC BLOOD PRESSURE: 124 MMHG

## 2018-10-31 LAB
FUNGUS SPEC CULT: NORMAL
SIGNIFICANT IND 70042: NORMAL
SITE SITE: NORMAL
SOURCE SOURCE: NORMAL

## 2018-10-31 PROCEDURE — G0156 HHCP-SVS OF AIDE,EA 15 MIN: HCPCS

## 2018-10-31 PROCEDURE — G0300 HHS/HOSPICE OF LPN EA 15 MIN: HCPCS

## 2018-10-31 ASSESSMENT — ENCOUNTER SYMPTOMS: POOR JUDGMENT: 1

## 2018-11-01 ENCOUNTER — OFFICE VISIT (OUTPATIENT)
Dept: MEDICAL GROUP | Facility: MEDICAL CENTER | Age: 63
End: 2018-11-01
Attending: FAMILY MEDICINE
Payer: COMMERCIAL

## 2018-11-01 VITALS
SYSTOLIC BLOOD PRESSURE: 104 MMHG | RESPIRATION RATE: 18 BRPM | HEART RATE: 91 BPM | OXYGEN SATURATION: 98 % | DIASTOLIC BLOOD PRESSURE: 68 MMHG | TEMPERATURE: 99.3 F

## 2018-11-01 VITALS
RESPIRATION RATE: 14 BRPM | WEIGHT: 150 LBS | TEMPERATURE: 98.7 F | DIASTOLIC BLOOD PRESSURE: 70 MMHG | HEIGHT: 62 IN | SYSTOLIC BLOOD PRESSURE: 100 MMHG | BODY MASS INDEX: 27.6 KG/M2 | OXYGEN SATURATION: 95 % | HEART RATE: 94 BPM

## 2018-11-01 DIAGNOSIS — T84.84XA PAIN DUE TO HIP JOINT PROSTHESIS, INITIAL ENCOUNTER (HCC): ICD-10-CM

## 2018-11-01 DIAGNOSIS — I10 HYPERTENSION, UNSPECIFIED TYPE: ICD-10-CM

## 2018-11-01 DIAGNOSIS — G89.4 CHRONIC PAIN SYNDROME: ICD-10-CM

## 2018-11-01 DIAGNOSIS — K63.1 PERFORATED BOWEL (HCC): ICD-10-CM

## 2018-11-01 DIAGNOSIS — E20.9 HYPOPARATHYROIDISM, UNSPECIFIED HYPOPARATHYROIDISM TYPE (HCC): ICD-10-CM

## 2018-11-01 DIAGNOSIS — E03.9 HYPOTHYROIDISM, UNSPECIFIED TYPE: ICD-10-CM

## 2018-11-01 DIAGNOSIS — G25.9 EXTRAPYRAMIDAL AND MOVEMENT DISORDER: ICD-10-CM

## 2018-11-01 DIAGNOSIS — F39 MOOD DISORDER (HCC): ICD-10-CM

## 2018-11-01 DIAGNOSIS — K21.9 GASTROESOPHAGEAL REFLUX DISEASE, ESOPHAGITIS PRESENCE NOT SPECIFIED: ICD-10-CM

## 2018-11-01 DIAGNOSIS — Z98.890: ICD-10-CM

## 2018-11-01 DIAGNOSIS — Z96.649 PAIN DUE TO HIP JOINT PROSTHESIS, INITIAL ENCOUNTER (HCC): ICD-10-CM

## 2018-11-01 PROCEDURE — 99214 OFFICE O/P EST MOD 30 MIN: CPT | Performed by: FAMILY MEDICINE

## 2018-11-01 PROCEDURE — 99202 OFFICE O/P NEW SF 15 MIN: CPT | Performed by: FAMILY MEDICINE

## 2018-11-01 ASSESSMENT — ENCOUNTER SYMPTOMS
ABDOMINAL PAIN: 1
FOCAL WEAKNESS: 1
VOMITING: DENIES
DEPRESSION: 1
TREMORS: 0
FEVER: 0
CHILLS: 0
PALPITATIONS: 0
HALLUCINATIONS: 0
BLOOD IN STOOL: 0
HEADACHES: 0
SENSORY CHANGE: 0
MENTAL STATUS CHANGE: 0
COUGH: 0
SPEECH CHANGE: 0
NERVOUS/ANXIOUS: 1
BACK PAIN: 1
SPUTUM PRODUCTION: 0
SHORTNESS OF BREATH: 0
TINGLING: 1
NAUSEA: DENIES
INSOMNIA: 1
HEARTBURN: 1
VOMITING: 0
NAUSEA: 0
EYES NEGATIVE: 1

## 2018-11-01 ASSESSMENT — LIFESTYLE VARIABLES: SUBSTANCE_ABUSE: 0

## 2018-11-01 NOTE — PROGRESS NOTES
Subjective:      Marium Renteria is a 63 y.o. female who presents with [facility]            Patient 63-year-old female here to establish with the clinic today.  She had previously been seen at [facility] but states she needed to stop going there due to insurance reasons.  She currently has Medicaid for her insurance.  She does have multiple medical issues including chronic pain, hypertension, recent perforated bowel status post repair, gastroesophageal reflux disease, hypothyroidism, hypoparathyroidism and a mood disorder.    She recently had been in the hospital for her perforated bowel (secondary to an ileus and bowel blockage) that was repaired by Dr Amanda.  She has been doing well with that and will be following up with her surgeon for evaluations and continued management of the repair.  A referral to gastroenterology will be made for further assistance and monitoring her GI symptoms as well as follow-up endoscopies and colonoscopies if needed.  We will have her continue to use her medications as directed.  We will continue to follow.    She also has a history of chronic pain with pain in her right shoulder, lower back, neck, neuropathy and arthritis.  She has been seeing a pain specialist but states that her pain specialist is no longer able to see her due to her insurance.  We will have her continue to use her Neurontin and muscle relaxer as directed.  Another referral to pain management has been made today.  She will also continue to see her surgeon for her pain medication secondary to her recent surgery.  We will continue to follow.    She has also been seen in the past by psychiatry will be she is also not seen due to her insurance.  Will refer back to psychiatry for further management of her mood disorder.  Due to her recent ileus, recommended against continuing to use her benzodiazepines with the same frequency that she had been using it.  She is not having any urges to harm himself or  others.  If she has any worsening of her current mood or urges to harm herself or others she has been advised to go to the emergency room for additional assistance in management.    She is also been seeing endocrine, Dr. Juarez for her hypothyroidism as well as hypoparathyroidism.  She had recent blood work which showed that her calcium levels were normal but her phosphorus levels were slightly elevated.  We will have her continue to follow-up with her endocrinologist.  She will continue to take her levothyroxine and Tums as directed.  And will continue to monitor.  Will reorder a complete metabolic panel for further assessment.  We will continue to follow.    We will have her keep track of her blood pressures at home.  Her blood pressure today was slightly low but she has not taking any blood pressure medications.  She is not having any chest pain, shortness of breath or syncopal events.  Patient has been advised if she has any episodes of chest pain, shortness of breath or syncope or lightheadedness she is to go to the emergency room for further assistance in management.    She also has an extrapyramidal movement disorder for which she is currently on carbidopa and levodopa for.  Discussed future referral for neurology.  Patient states she has been well managed with her current medications for this problem.  Patient declined at this time will continue to follow.    Her past surgical history is includes thyroidectomy, gastrointestinal surgeries, atherectomy, appendectomy, and orthopedic surgeries.    See her past history for her family history.    She is currently  and unemployed, her  is the breadwinner.    She smokes tobacco, denies current alcohol use and denies current substance use.        Review of Systems   Constitutional: Negative for chills and fever.   HENT: Negative for ear pain, hearing loss and tinnitus.    Eyes: Negative.    Respiratory: Negative for cough, sputum production and  "shortness of breath.    Cardiovascular: Negative for chest pain and palpitations.   Gastrointestinal: Positive for abdominal pain and heartburn. Negative for blood in stool, nausea and vomiting.   Genitourinary: Negative.    Musculoskeletal: Positive for back pain and joint pain.   Skin: Negative for rash.   Neurological: Positive for tingling and focal weakness. Negative for tremors, sensory change, speech change and headaches.   Psychiatric/Behavioral: Positive for depression. Negative for hallucinations, substance abuse and suicidal ideas. The patient is nervous/anxious and has insomnia.           Objective:     /70 (BP Location: Right arm, Patient Position: Sitting)   Pulse 94   Temp 37.1 °C (98.7 °F)   Resp 14   Ht 1.575 m (5' 2\")   Wt 68 kg (150 lb)   LMP 10/02/1980   SpO2 95%   BMI 27.44 kg/m²      Physical Exam   Constitutional: She is oriented to person, place, and time. She appears well-developed and well-nourished.   Currently in wheelchair   HENT:   Head: Normocephalic and atraumatic.   Nose: Nose normal.   Mouth/Throat: Oropharynx is clear and moist.   Eyes: Pupils are equal, round, and reactive to light. Conjunctivae and EOM are normal.   Neck: Normal range of motion. Neck supple. No thyromegaly present.   Cardiovascular: Normal rate, regular rhythm and normal heart sounds.  Exam reveals no friction rub.    No murmur heard.  Pulmonary/Chest: Effort normal and breath sounds normal. No respiratory distress. She has no wheezes. She has no rales.   Abdominal: Soft. Bowel sounds are normal. She exhibits no distension. There is tenderness.   Musculoskeletal: She exhibits tenderness. She exhibits no edema.   In wheelchair   Lymphadenopathy:     She has no cervical adenopathy.   Neurological: She is alert and oriented to person, place, and time.   Skin: Skin is warm and dry.   Psychiatric: She has a normal mood and affect. Her behavior is normal.   Nursing note and vitals reviewed.            "   Assessment/Plan:     1. Perforated bowel (HCC)  Status post repair following up with surgeon, a referral has been made to gastroenterology for continued assistance in management of her history of ileus as well as adhesions and bowel obstruction causing her perforation.  We will continue to follow.  ER precautions have also been given to patient.  - REFERRAL TO GASTROENTEROLOGY    2. Chronic pain syndrome  We will have her continue to take her Neurontin and muscle relaxers as directed.  An x-ray of her shoulder has also been ordered.  She is also been referred to pain management to resume her chronic pain management care.  We will continue to follow.  - REFERRAL TO PAIN CLINIC  - DX-SHOULDER 2+ RIGHT; Future    3. Hypertension, unspecified type  She has been advised to monitor blood pressure at home and keep notes. If blood pressure elevated or having symptoms of CP, SOB or neurologic changes to go to the er.      4. S/P digestive system surgery  See above plan.  - REFERRAL TO GASTROENTEROLOGY    5. Mood disorder (HCC)  We will have her referred back to psychiatry for further assistance in management of her history of mood disorders.  She had been on a benzodiazepine, which she has not been regularly taking since her bowel obstruction.  We will continue to follow.  ER precautions have once again been given to patient.  - REFERRAL TO PSYCHIATRY    6. Pain due to hip joint prosthesis, initial encounter ([facility])  A referral to pain management has been made for patient.  We will continue to follow.    7. Gastroesophageal reflux disease, esophagitis presence not specified  We will have her continue to use her medication as directed.  A referral to gastroenterology has also been ordered today.  We will continue to follow.  - REFERRAL TO GASTROENTEROLOGY    8. Hypothyroidism, unspecified type  She will continue to take her medication as directed by her current endocrinologist.  Since her endocrinologist does not accept  Medicaid another referral to endocrinology has also been made.  We will continue to follow.  - REFERRAL TO ENDOCRINOLOGY    9. Hypoparathyroidism, unspecified hypoparathyroidism type (HCC)  Reviewed the results of her recent blood work.  A follow-up blood work has been ordered due to her elevated phosphorus levels.  She has been following up with endocrine for her hyperparathyroidism as well as hypothyroidism.  Referral has also been remade to endocrine if she will not be able to follow-up with her current endocrinologist due to insurance reasons.  We will continue to follow.  - REFERRAL TO ENDOCRINOLOGY    10. Extrapyramidal and movement disorder  She is currently on carbidopa and levodopa for her extraparametal movement disorder.  Discussed with patient possibly referring to neurology for further assistance in management of the condition.  Patient declined the referral today.  We will have her continue her medication as directed since it appears to be working for her.  We will continue to follow.

## 2018-11-02 ENCOUNTER — HOME CARE VISIT (OUTPATIENT)
Dept: HOME HEALTH SERVICES | Facility: HOME HEALTHCARE | Age: 63
End: 2018-11-02
Payer: COMMERCIAL

## 2018-11-04 ENCOUNTER — HOSPITAL ENCOUNTER (EMERGENCY)
Facility: MEDICAL CENTER | Age: 63
End: 2018-11-04
Payer: COMMERCIAL

## 2018-11-04 ENCOUNTER — HOSPITAL ENCOUNTER (OUTPATIENT)
Dept: RADIOLOGY | Facility: MEDICAL CENTER | Age: 63
End: 2018-11-04
Attending: FAMILY MEDICINE
Payer: COMMERCIAL

## 2018-11-04 DIAGNOSIS — G89.4 CHRONIC PAIN SYNDROME: ICD-10-CM

## 2018-11-04 PROCEDURE — 73030 X-RAY EXAM OF SHOULDER: CPT | Mod: RT

## 2018-11-05 ENCOUNTER — HOME CARE VISIT (OUTPATIENT)
Dept: HOME HEALTH SERVICES | Facility: HOME HEALTHCARE | Age: 63
End: 2018-11-05
Payer: COMMERCIAL

## 2018-11-05 VITALS
SYSTOLIC BLOOD PRESSURE: 118 MMHG | HEART RATE: 83 BPM | TEMPERATURE: 98.7 F | DIASTOLIC BLOOD PRESSURE: 78 MMHG | OXYGEN SATURATION: 95 % | RESPIRATION RATE: 18 BRPM

## 2018-11-05 PROCEDURE — G0299 HHS/HOSPICE OF RN EA 15 MIN: HCPCS

## 2018-11-05 PROCEDURE — A6214 FOAM DRG > 48 SQ IN W/BORDER: HCPCS

## 2018-11-05 RX ORDER — CARISOPRODOL 350 MG/1
350 TABLET ORAL EVERY 8 HOURS PRN
Qty: 30 TAB | OUTPATIENT
Start: 2018-11-05

## 2018-11-05 SDOH — ECONOMIC STABILITY: HOUSING INSECURITY: UNSAFE COOKING RANGE AREA: 0

## 2018-11-05 SDOH — ECONOMIC STABILITY: HOUSING INSECURITY: UNSAFE APPLIANCES: 0

## 2018-11-05 ASSESSMENT — ENCOUNTER SYMPTOMS
NAUSEA: DENIES
VOMITING: DENIES

## 2018-11-07 ENCOUNTER — HOME CARE VISIT (OUTPATIENT)
Dept: HOME HEALTH SERVICES | Facility: HOME HEALTHCARE | Age: 63
End: 2018-11-07

## 2018-11-08 ENCOUNTER — HOME CARE VISIT (OUTPATIENT)
Dept: HOME HEALTH SERVICES | Facility: HOME HEALTHCARE | Age: 63
End: 2018-11-08
Payer: COMMERCIAL

## 2018-11-08 VITALS
TEMPERATURE: 98.2 F | SYSTOLIC BLOOD PRESSURE: 110 MMHG | DIASTOLIC BLOOD PRESSURE: 60 MMHG | OXYGEN SATURATION: 95 % | RESPIRATION RATE: 18 BRPM | HEART RATE: 78 BPM

## 2018-11-08 PROCEDURE — G0156 HHCP-SVS OF AIDE,EA 15 MIN: HCPCS

## 2018-11-08 PROCEDURE — G0299 HHS/HOSPICE OF RN EA 15 MIN: HCPCS

## 2018-11-08 SDOH — ECONOMIC STABILITY: HOUSING INSECURITY: UNSAFE COOKING RANGE AREA: 0

## 2018-11-08 SDOH — ECONOMIC STABILITY: HOUSING INSECURITY: UNSAFE APPLIANCES: 0

## 2018-11-08 ASSESSMENT — ENCOUNTER SYMPTOMS
VOMITING: DENIED
NAUSEA: DENIED

## 2018-11-08 ASSESSMENT — ACTIVITIES OF DAILY LIVING (ADL): TRANSPORTATION COMMENTS: NEEDS ONE ASSIST TO LEAVE HOME SAFELY

## 2018-11-09 VITALS
DIASTOLIC BLOOD PRESSURE: 82 MMHG | RESPIRATION RATE: 18 BRPM | SYSTOLIC BLOOD PRESSURE: 120 MMHG | HEART RATE: 78 BPM | TEMPERATURE: 98.8 F | OXYGEN SATURATION: 98 %

## 2018-11-11 DIAGNOSIS — M10.9 GOUT, ARTHRITIS: ICD-10-CM

## 2018-11-11 DIAGNOSIS — G25.9 EXTRAPYRAMIDAL AND MOVEMENT DISORDER: ICD-10-CM

## 2018-11-12 RX ORDER — ALLOPURINOL 300 MG/1
TABLET ORAL
Qty: 90 TAB | Refills: 1 | Status: SHIPPED | OUTPATIENT
Start: 2018-11-12 | End: 2019-04-30 | Stop reason: SDUPTHER

## 2018-11-13 ENCOUNTER — HOME CARE VISIT (OUTPATIENT)
Dept: HOME HEALTH SERVICES | Facility: HOME HEALTHCARE | Age: 63
End: 2018-11-13
Payer: COMMERCIAL

## 2018-11-13 VITALS
HEART RATE: 72 BPM | SYSTOLIC BLOOD PRESSURE: 114 MMHG | RESPIRATION RATE: 18 BRPM | DIASTOLIC BLOOD PRESSURE: 68 MMHG | OXYGEN SATURATION: 99 % | TEMPERATURE: 97.8 F

## 2018-11-13 PROCEDURE — G0495 RN CARE TRAIN/EDU IN HH: HCPCS

## 2018-11-13 RX ORDER — TRAZODONE HYDROCHLORIDE 50 MG/1
TABLET ORAL
Qty: 60 TAB | Refills: 0 | Status: SHIPPED | OUTPATIENT
Start: 2018-11-13 | End: 2018-12-13 | Stop reason: SDUPTHER

## 2018-11-13 SDOH — ECONOMIC STABILITY: HOUSING INSECURITY: UNSAFE COOKING RANGE AREA: 0

## 2018-11-13 SDOH — ECONOMIC STABILITY: HOUSING INSECURITY: UNSAFE APPLIANCES: 0

## 2018-11-13 ASSESSMENT — ACTIVITIES OF DAILY LIVING (ADL): TRANSPORTATION COMMENTS: NEEDS ONE ASSIST TO LEAVE HOME SAFELY

## 2018-11-13 ASSESSMENT — ENCOUNTER SYMPTOMS
VOMITING: DENIED
NAUSEA: DENIED

## 2018-11-14 ENCOUNTER — HOME CARE VISIT (OUTPATIENT)
Dept: HOME HEALTH SERVICES | Facility: HOME HEALTHCARE | Age: 63
End: 2018-11-14
Payer: COMMERCIAL

## 2018-11-14 VITALS
TEMPERATURE: 98.6 F | RESPIRATION RATE: 18 BRPM | HEART RATE: 84 BPM | DIASTOLIC BLOOD PRESSURE: 80 MMHG | SYSTOLIC BLOOD PRESSURE: 120 MMHG | OXYGEN SATURATION: 94 %

## 2018-11-14 PROCEDURE — G0156 HHCP-SVS OF AIDE,EA 15 MIN: HCPCS

## 2018-11-15 ENCOUNTER — HOME CARE VISIT (OUTPATIENT)
Dept: HOME HEALTH SERVICES | Facility: HOME HEALTHCARE | Age: 63
End: 2018-11-15
Payer: COMMERCIAL

## 2018-11-15 VITALS
HEART RATE: 84 BPM | SYSTOLIC BLOOD PRESSURE: 118 MMHG | TEMPERATURE: 97.5 F | DIASTOLIC BLOOD PRESSURE: 62 MMHG | RESPIRATION RATE: 18 BRPM | OXYGEN SATURATION: 98 %

## 2018-11-15 PROCEDURE — G0299 HHS/HOSPICE OF RN EA 15 MIN: HCPCS

## 2018-11-15 SDOH — ECONOMIC STABILITY: HOUSING INSECURITY: UNSAFE APPLIANCES: 0

## 2018-11-15 SDOH — ECONOMIC STABILITY: HOUSING INSECURITY: UNSAFE COOKING RANGE AREA: 0

## 2018-11-15 ASSESSMENT — ENCOUNTER SYMPTOMS
NAUSEA: DENIED
VOMITING: DENIED

## 2018-11-20 ENCOUNTER — HOME CARE VISIT (OUTPATIENT)
Dept: HOME HEALTH SERVICES | Facility: HOME HEALTHCARE | Age: 63
End: 2018-11-20
Payer: COMMERCIAL

## 2018-11-20 ENCOUNTER — OFFICE VISIT (OUTPATIENT)
Dept: MEDICAL GROUP | Facility: MEDICAL CENTER | Age: 63
End: 2018-11-20
Attending: FAMILY MEDICINE
Payer: COMMERCIAL

## 2018-11-20 VITALS
WEIGHT: 140 LBS | SYSTOLIC BLOOD PRESSURE: 140 MMHG | RESPIRATION RATE: 14 BRPM | HEART RATE: 84 BPM | HEIGHT: 62 IN | DIASTOLIC BLOOD PRESSURE: 90 MMHG | TEMPERATURE: 99 F | OXYGEN SATURATION: 97 % | BODY MASS INDEX: 25.76 KG/M2

## 2018-11-20 DIAGNOSIS — E03.9 HYPOTHYROIDISM, UNSPECIFIED TYPE: ICD-10-CM

## 2018-11-20 DIAGNOSIS — R10.9 ABDOMINAL PAIN, UNSPECIFIED ABDOMINAL LOCATION: ICD-10-CM

## 2018-11-20 DIAGNOSIS — E83.39 HYPERPHOSPHATEMIA: ICD-10-CM

## 2018-11-20 DIAGNOSIS — M54.16 LEFT LUMBAR RADICULOPATHY: ICD-10-CM

## 2018-11-20 DIAGNOSIS — E83.51 HYPOCALCEMIA: ICD-10-CM

## 2018-11-20 DIAGNOSIS — K52.9 CHRONIC DIARRHEA: ICD-10-CM

## 2018-11-20 DIAGNOSIS — E20.9 HYPOPARATHYROIDISM, UNSPECIFIED HYPOPARATHYROIDISM TYPE (HCC): ICD-10-CM

## 2018-11-20 PROCEDURE — 99212 OFFICE O/P EST SF 10 MIN: CPT | Performed by: FAMILY MEDICINE

## 2018-11-20 PROCEDURE — 99214 OFFICE O/P EST MOD 30 MIN: CPT | Performed by: FAMILY MEDICINE

## 2018-11-20 RX ORDER — CARISOPRODOL 350 MG/1
350 TABLET ORAL EVERY 8 HOURS PRN
Qty: 30 TAB | Refills: 1 | Status: SHIPPED | OUTPATIENT
Start: 2018-11-20 | End: 2018-12-19 | Stop reason: SDUPTHER

## 2018-11-20 ASSESSMENT — ENCOUNTER SYMPTOMS
FEVER: 0
NERVOUS/ANXIOUS: 1
TINGLING: 1
NAUSEA: 1
COUGH: 0
HEARTBURN: 1
DEPRESSION: 1
BACK PAIN: 1
INSOMNIA: 0
SHORTNESS OF BREATH: 0
PALPITATIONS: 0
SENSORY CHANGE: 0
SPUTUM PRODUCTION: 0
CONSTIPATION: 1
ABDOMINAL PAIN: 1
TREMORS: 0
FOCAL WEAKNESS: 1
VOMITING: 0
SPEECH CHANGE: 0
HALLUCINATIONS: 0
CHILLS: 0
DIARRHEA: 1
HEADACHES: 0
BLOOD IN STOOL: 0

## 2018-11-20 ASSESSMENT — LIFESTYLE VARIABLES: SUBSTANCE_ABUSE: 0

## 2018-11-20 NOTE — PROGRESS NOTES
Subjective:      Marium Renteria is a 63 y.o. female who presents with Medication Refill (soma)            Patient 63-year-old female here for follow-up of her chronic back pain, history of hypothyroidism, history of hyperparathyroidism, hypocalcemia, hypophosphatemia, status post repair secondary to adhesions and perforated bowel, and chronic pain.    She states she recently had been hospitalized for her bowel repair secondary to her bowel perforation.  She had a CT scan in October with the results as follows    1.  There has been interval lysis of adhesions with the small bowel obstruction no longer apparent. There are postoperative changes involving the distal ileum.  2.  There is postoperative change in the anterior abdominal wall with 2 simple appearing postoperative fluid collections in the subcutaneous fat just deep to the incision. These could be postoperative seromas. There is no evidence of a thick wall to suggest that these are abscesses.    She has been having occasional pains in her abdomen following her recent hospitalization.  The pains are similar to the pain she has been having during his CT.  She has been instructed to follow-up with her surgeon and has been referred to gastroenterology for a possible colonoscopy if needed.  She states that the pain does come and go.  We will have her go back to the emergency room if her pain level should be persistent or begin to worsen.  Discussed the possibility of recurrence of the bowel obstruction, infections or perforation.  She has not been having any fevers, nausea vomiting, but has been having alternating bouts of constipation and diarrhea.    She is also having spasms in her lower back which she had been on Soma for in the past.  She states she is no longer able to follow-up with her pain specialist since he does not accept Medicaid.  A referral to pain management has been made for patient but the pain management specialist is in [place].  She  will be contacting the specialist in [place] to schedule an appointment.  In the meantime will have her continue to use her Soma as as needed.  She has been cautioned that Soma potentially could slow her bowel movements down and worsen any bowel problems.  She also recently had an x-ray of her shoulder with the following results:    There is no evidence of acute fracture.  Significant osteoarthritis appears to be present at the glenohumeral joint.    Discussed referring to orthopedics as well.  We will have her establish with pain management first to see if injections will help to alleviate some of the discomfort in her shoulder as well.  We will continue to follow.    She has also been following up with Dr. Juarez in regards to her hypo-parathyroidism and her hypothyroidism.  On a recent blood work she had an elevated phosphate level as well as a low calcium level.  The results of these labs will be faxed over to Dr. Juarez's office.  She states that Dr. Juarez no longer accepts Medicaid but she has been able to consult her uncertain problems.  Another referral to endocrine has also been made for her to follow-up on her problems related to her hypothyroidism as well as hypoparathyroidism.  We will continue to follow.     Current medications, allergies, and problem list reviewed with patient and updated in EPIC.          Review of Systems   Constitutional: Negative for chills and fever.   HENT: Negative for hearing loss and tinnitus.    Respiratory: Negative for cough, sputum production and shortness of breath.    Cardiovascular: Negative for chest pain and palpitations.   Gastrointestinal: Positive for abdominal pain, constipation, diarrhea, heartburn and nausea. Negative for blood in stool and vomiting.   Genitourinary: Negative.    Musculoskeletal: Positive for back pain and joint pain.   Neurological: Positive for tingling and focal weakness. Negative for tremors, sensory change, speech change and headaches.  "  Psychiatric/Behavioral: Positive for depression. Negative for hallucinations, substance abuse and suicidal ideas. The patient is nervous/anxious. The patient does not have insomnia.           Objective:     /90 (BP Location: Right arm, Patient Position: Sitting)   Pulse 84   Temp 37.2 °C (99 °F)   Resp 14   Ht 1.575 m (5' 2\")   Wt 63.5 kg (140 lb)   LMP 10/02/1980   SpO2 97%   BMI 25.61 kg/m²      Physical Exam   Constitutional: She is oriented to person, place, and time. She appears well-developed and well-nourished.   In wheelchair   HENT:   Head: Normocephalic and atraumatic.   Neck: Normal range of motion. Neck supple.   Cardiovascular: Normal rate, regular rhythm and normal heart sounds.  Exam reveals no friction rub.    No murmur heard.  Pulmonary/Chest: Effort normal and breath sounds normal. No respiratory distress. She has no wheezes. She has no rales.   Abdominal: Soft. Bowel sounds are normal. She exhibits no distension and no mass. There is tenderness. There is no guarding.   Surgical scar on abd healed well   Musculoskeletal: She exhibits tenderness. She exhibits no edema.   Wearing abdominal support in wheelchair   Neurological: She is alert and oriented to person, place, and time.   Skin: Skin is warm and dry.   Psychiatric: She has a normal mood and affect. Her behavior is normal.   Nursing note and vitals reviewed.              Assessment/Plan:     1. Hypoparathyroidism, unspecified hypoparathyroidism type (HCC)  We will have her follow-up with endocrine for continued management of her hypoparathyroidism, hypocalcemia and hyper phosphatemia as well as hypothyroidism.  We will continue to follow.  - PTH WITH CALCIUM; Standing    2. Hyperphosphatemia  See above plan  - PHOSPHORUS; Standing    3. Hypocalcemia  See above plan    4. Left lumbar radiculopathy  She will take her medication as directed.  Patient warned about the potential risks of being on her muscle relaxer.  She has been " referred to pain management for further assistance in management of her chronic pain problems.  ER precautions have been given to patient.  - carisoprodol (SOMA) 350 MG Tab; Take 1 Tab by mouth every 8 hours as needed for Muscle Spasms for up to 14 days.  Dispense: 30 Tab; Refill: 1    5. Hypothyroidism, unspecified type  See above plan.  - TSH WITH REFLEX TO FT4; Standing    6. Abdominal pain, unspecified abdominal location  Reviewed the results of her recent abdominal CT scan.  Patient has been advised to go to the emergency room if she has any worsening of her current symptoms.  She has been referred to GI but her GI referral is in [place].  We will continue to follow.    7. Chronic diarrhea  See above plan.

## 2018-11-23 ENCOUNTER — HOME CARE VISIT (OUTPATIENT)
Dept: HOME HEALTH SERVICES | Facility: HOME HEALTHCARE | Age: 63
End: 2018-11-23
Payer: COMMERCIAL

## 2018-11-23 PROCEDURE — G0156 HHCP-SVS OF AIDE,EA 15 MIN: HCPCS

## 2018-11-26 ENCOUNTER — TELEPHONE (OUTPATIENT)
Dept: MEDICAL GROUP | Facility: MEDICAL CENTER | Age: 63
End: 2018-11-26

## 2018-11-26 ENCOUNTER — HOME CARE VISIT (OUTPATIENT)
Dept: HOME HEALTH SERVICES | Facility: HOME HEALTHCARE | Age: 63
End: 2018-11-26
Payer: COMMERCIAL

## 2018-11-26 VITALS
OXYGEN SATURATION: 95 % | DIASTOLIC BLOOD PRESSURE: 80 MMHG | RESPIRATION RATE: 18 BRPM | TEMPERATURE: 98.3 F | HEART RATE: 75 BPM | SYSTOLIC BLOOD PRESSURE: 130 MMHG

## 2018-11-26 NOTE — TELEPHONE ENCOUNTER
MEDICATION PRIOR AUTHORIZATION NEEDED:    1. Name of Medication: Carisoprodol 350 MG Tabs    2. Requested By (Name of Pharmacy): [facility]     3. Is insurance on file current? yes    4. What is the name & phone number of the 3rd party payor? [employer]    CONTINUE WITH PA OR CHANGE RX?      Preferred alternatives: Cyclobenxaprine tab,    Tizandine tab, or Chloroxozazon tab. (See  scanned media)

## 2018-11-27 ENCOUNTER — HOSPITAL ENCOUNTER (OUTPATIENT)
Facility: MEDICAL CENTER | Age: 63
End: 2018-11-27
Attending: FAMILY MEDICINE
Payer: COMMERCIAL

## 2018-11-27 ENCOUNTER — HOME CARE VISIT (OUTPATIENT)
Dept: HOME HEALTH SERVICES | Facility: HOME HEALTHCARE | Age: 63
End: 2018-11-27
Payer: COMMERCIAL

## 2018-11-27 ENCOUNTER — HOME CARE VISIT (OUTPATIENT)
Dept: HOME HEALTH SERVICES | Facility: HOME HEALTHCARE | Age: 63
End: 2018-11-27

## 2018-11-27 LAB
PTH-INTACT SERPL-MCNC: 7 PG/ML (ref 14–72)
T4 FREE SERPL-MCNC: 1.16 NG/DL (ref 0.53–1.43)
TSH SERPL DL<=0.005 MIU/L-ACNC: 0.04 UIU/ML (ref 0.38–5.33)

## 2018-11-27 PROCEDURE — 83970 ASSAY OF PARATHORMONE: CPT

## 2018-11-27 PROCEDURE — 84439 ASSAY OF FREE THYROXINE: CPT

## 2018-11-27 PROCEDURE — 84100 ASSAY OF PHOSPHORUS: CPT

## 2018-11-27 PROCEDURE — G0270 MNT SUBS TX FOR CHANGE DX: HCPCS

## 2018-11-27 PROCEDURE — G0493 RN CARE EA 15 MIN HH/HOSPICE: HCPCS

## 2018-11-27 PROCEDURE — 84443 ASSAY THYROID STIM HORMONE: CPT

## 2018-11-27 PROCEDURE — 80053 COMPREHEN METABOLIC PANEL: CPT

## 2018-11-27 RX ORDER — EPINEPHRINE 0.3 MG/.3ML
INJECTION SUBCUTANEOUS
Qty: 2 EACH | Refills: 0 | Status: SHIPPED | OUTPATIENT
Start: 2018-11-27 | End: 2019-04-30

## 2018-11-28 VITALS
OXYGEN SATURATION: 98 % | TEMPERATURE: 97.9 F | HEART RATE: 77 BPM | SYSTOLIC BLOOD PRESSURE: 138 MMHG | DIASTOLIC BLOOD PRESSURE: 88 MMHG

## 2018-11-28 VITALS
DIASTOLIC BLOOD PRESSURE: 88 MMHG | TEMPERATURE: 97.9 F | SYSTOLIC BLOOD PRESSURE: 138 MMHG | HEART RATE: 77 BPM | OXYGEN SATURATION: 98 % | RESPIRATION RATE: 18 BRPM

## 2018-11-28 DIAGNOSIS — E03.9 HYPOTHYROIDISM, UNSPECIFIED TYPE: ICD-10-CM

## 2018-11-28 PROBLEM — K56.609 SMALL BOWEL OBSTRUCTION (HCC): Status: RESOLVED | Noted: 2018-09-20 | Resolved: 2018-11-28

## 2018-11-28 PROBLEM — F11.20 OPIOID DEPENDENCE, CONTINUOUS (HCC): Status: RESOLVED | Noted: 2017-04-26 | Resolved: 2018-11-28

## 2018-11-28 PROBLEM — G89.28 OTHER CHRONIC POSTPROCEDURAL PAIN: Status: RESOLVED | Noted: 2018-09-20 | Resolved: 2018-11-28

## 2018-11-28 PROBLEM — E87.6 HYPOKALEMIA: Status: RESOLVED | Noted: 2018-09-20 | Resolved: 2018-11-28

## 2018-11-28 PROBLEM — E66.9 CLASS 1 OBESITY IN ADULT: Status: RESOLVED | Noted: 2018-09-13 | Resolved: 2018-11-28

## 2018-11-28 PROBLEM — E66.811 CLASS 1 OBESITY IN ADULT: Status: RESOLVED | Noted: 2018-09-13 | Resolved: 2018-11-28

## 2018-11-28 LAB
ALBUMIN SERPL BCP-MCNC: 4 G/DL (ref 3.2–4.9)
ALBUMIN/GLOB SERPL: 1.3 G/DL
ALP SERPL-CCNC: 94 U/L (ref 30–99)
ALT SERPL-CCNC: 6 U/L (ref 2–50)
ANION GAP SERPL CALC-SCNC: 11 MMOL/L (ref 0–11.9)
AST SERPL-CCNC: 17 U/L (ref 12–45)
BILIRUB SERPL-MCNC: 0.4 MG/DL (ref 0.1–1.5)
BUN SERPL-MCNC: 7 MG/DL (ref 8–22)
CALCIUM SERPL-MCNC: 8.6 MG/DL (ref 8.5–10.5)
CHLORIDE SERPL-SCNC: 103 MMOL/L (ref 96–112)
CO2 SERPL-SCNC: 26 MMOL/L (ref 20–33)
CREAT SERPL-MCNC: 0.6 MG/DL (ref 0.5–1.4)
GLOBULIN SER CALC-MCNC: 3.1 G/DL (ref 1.9–3.5)
GLUCOSE SERPL-MCNC: 86 MG/DL (ref 65–99)
PHOSPHATE SERPL-MCNC: 4.4 MG/DL (ref 2.5–4.5)
POTASSIUM SERPL-SCNC: 3.7 MMOL/L (ref 3.6–5.5)
PROT SERPL-MCNC: 7.1 G/DL (ref 6–8.2)
SODIUM SERPL-SCNC: 140 MMOL/L (ref 135–145)

## 2018-11-28 RX ORDER — LEVOTHYROXINE SODIUM 75 UG/1
1 CAPSULE ORAL DAILY
Qty: 30 CAP | Refills: 3 | Status: SHIPPED | OUTPATIENT
Start: 2018-11-28 | End: 2019-02-07

## 2018-11-28 SDOH — ECONOMIC STABILITY: HOUSING INSECURITY: UNSAFE COOKING RANGE AREA: 0

## 2018-11-28 SDOH — ECONOMIC STABILITY: HOUSING INSECURITY: UNSAFE APPLIANCES: 0

## 2018-11-28 ASSESSMENT — ACTIVITIES OF DAILY LIVING (ADL)
OASIS_M1830: 01
HOME_HEALTH_OASIS: 00

## 2018-11-29 VITALS
HEART RATE: 85 BPM | TEMPERATURE: 99.3 F | RESPIRATION RATE: 18 BRPM | DIASTOLIC BLOOD PRESSURE: 90 MMHG | WEIGHT: 140 LBS | SYSTOLIC BLOOD PRESSURE: 130 MMHG | BODY MASS INDEX: 27.48 KG/M2 | OXYGEN SATURATION: 99 % | HEIGHT: 60 IN

## 2018-11-29 LAB
MYCOBACTERIUM SPEC CULT: NORMAL
RHODAMINE-AURAMINE STN SPEC: NORMAL
SIGNIFICANT IND 70042: NORMAL
SITE SITE: NORMAL
SOURCE SOURCE: NORMAL

## 2018-11-30 ENCOUNTER — HOME CARE VISIT (OUTPATIENT)
Dept: HOME HEALTH SERVICES | Facility: HOME HEALTHCARE | Age: 63
End: 2018-11-30
Payer: COMMERCIAL

## 2018-12-06 NOTE — TELEPHONE ENCOUNTER
DOCUMENTATION OF PAR STATUS:    1. Name of Medication & Dose: Caridoprodol 360 MG tabs     2. Name of Prescription Coverage Company & phone #: [email or web address]    3. Date Prior Auth Submitted: 12/6/2018    4. What information was given to obtain insurance decision? Office notes, Med Hx, Dx.    5. Prior Auth Status? Pending    6. Patient Notified: yes

## 2018-12-06 NOTE — TELEPHONE ENCOUNTER
FINAL PRIOR AUTHORIZATION STATUS:    1.  Name of Medication & Dose: Caridoprodol 350 MG    2. Prior Auth Status: Approved through 12/06/2019     3. Action Taken: Pharmacy Notified: yes Patient Notified: yes

## 2018-12-07 ENCOUNTER — HOSPITAL ENCOUNTER (OUTPATIENT)
Dept: RADIOLOGY | Facility: MEDICAL CENTER | Age: 63
End: 2018-12-07
Attending: SURGERY
Payer: COMMERCIAL

## 2018-12-07 DIAGNOSIS — R10.9 ABDOMINAL PAIN, UNSPECIFIED ABDOMINAL LOCATION: ICD-10-CM

## 2018-12-07 PROCEDURE — 76700 US EXAM ABDOM COMPLETE: CPT

## 2018-12-13 RX ORDER — TRAZODONE HYDROCHLORIDE 50 MG/1
TABLET ORAL
Qty: 60 TAB | Refills: 0 | Status: SHIPPED | OUTPATIENT
Start: 2018-12-13 | End: 2019-01-10 | Stop reason: SDUPTHER

## 2018-12-17 DIAGNOSIS — R42 VERTIGO: ICD-10-CM

## 2018-12-19 ENCOUNTER — OFFICE VISIT (OUTPATIENT)
Dept: MEDICAL GROUP | Facility: MEDICAL CENTER | Age: 63
End: 2018-12-19
Attending: FAMILY MEDICINE
Payer: COMMERCIAL

## 2018-12-19 VITALS
OXYGEN SATURATION: 97 % | RESPIRATION RATE: 14 BRPM | TEMPERATURE: 98.3 F | SYSTOLIC BLOOD PRESSURE: 140 MMHG | HEART RATE: 76 BPM | HEIGHT: 62 IN | WEIGHT: 150 LBS | DIASTOLIC BLOOD PRESSURE: 90 MMHG | BODY MASS INDEX: 27.6 KG/M2

## 2018-12-19 DIAGNOSIS — F41.9 ANXIETY: ICD-10-CM

## 2018-12-19 DIAGNOSIS — M54.16 LEFT LUMBAR RADICULOPATHY: ICD-10-CM

## 2018-12-19 DIAGNOSIS — I10 ESSENTIAL HYPERTENSION: ICD-10-CM

## 2018-12-19 PROCEDURE — 99212 OFFICE O/P EST SF 10 MIN: CPT | Performed by: FAMILY MEDICINE

## 2018-12-19 PROCEDURE — 99214 OFFICE O/P EST MOD 30 MIN: CPT | Performed by: FAMILY MEDICINE

## 2018-12-19 RX ORDER — LORAZEPAM 1 MG/1
1 TABLET ORAL EVERY 8 HOURS PRN
Qty: 30 TAB | Refills: 0 | Status: SHIPPED | OUTPATIENT
Start: 2018-12-19 | End: 2019-01-23 | Stop reason: SDUPTHER

## 2018-12-19 RX ORDER — LISINOPRIL 10 MG/1
10 TABLET ORAL DAILY
Qty: 30 TAB | Refills: 3 | Status: SHIPPED | OUTPATIENT
Start: 2018-12-19 | End: 2019-04-17 | Stop reason: SDUPTHER

## 2018-12-19 RX ORDER — MECLIZINE HCL 12.5 MG/1
TABLET ORAL
Qty: 30 TAB | Refills: 0 | Status: SHIPPED | OUTPATIENT
Start: 2018-12-19 | End: 2019-01-25 | Stop reason: SDUPTHER

## 2018-12-19 RX ORDER — CARISOPRODOL 350 MG/1
350 TABLET ORAL EVERY 8 HOURS PRN
Qty: 30 TAB | Refills: 1 | Status: SHIPPED | OUTPATIENT
Start: 2018-12-19 | End: 2019-01-23 | Stop reason: SDUPTHER

## 2018-12-19 ASSESSMENT — ENCOUNTER SYMPTOMS
ABDOMINAL PAIN: 0
NERVOUS/ANXIOUS: 1
COUGH: 0
NECK PAIN: 1
SPUTUM PRODUCTION: 0
VOMITING: 0
TREMORS: 0
FOCAL WEAKNESS: 0
SENSORY CHANGE: 0
INSOMNIA: 0
PALPITATIONS: 0
BACK PAIN: 1
TINGLING: 1
CHILLS: 0
HALLUCINATIONS: 0
NAUSEA: 0
HEADACHES: 0
DEPRESSION: 0
SPEECH CHANGE: 0
FEVER: 0
SHORTNESS OF BREATH: 0

## 2018-12-19 ASSESSMENT — LIFESTYLE VARIABLES: SUBSTANCE_ABUSE: 0

## 2018-12-20 NOTE — PROGRESS NOTES
Subjective:      Marium Renteria is a 63 y.o. female who presents with Medication Refill            Patient 63-year-old female here for follow-up of her lumbar radiculopathy, recent bowel obstruction, anxiety with panic attacks, and hypertension.  She had recently been to  follow-up with her general surgeon and an ultrasound of her abdomen was ordered with the following results:    1.  3.2 cm simple cyst identified in the right kidney. Bosniak category 1.    2.  No abnormalities noted in the abdominal wall.    3.  Liver and spleen are borderline enlarged.      Since she has a 3.2 cm simple cyst identified in her kidney we will continue to follow with a repeat ultrasound in the next several months.  Patient reassured that simple cysts are usually benign.  But she will also be following up with her surgeon, so if her symptoms worsen or she develops an increase in the size of the cyst.  Patient states that her surgeon will manage her cyst.  We will continue to follow.    She is in the process of looking for a pain specialist in the area since the pain specialist that the referral has been sent to his located in [place].  She feels that the distance that she needs to travel will severely aggravate her back pain and is very reluctant to do that.  So today she will need her Soma refilled so a refill will be printed and have her take to the pharmacy.  She has been warned that the prescriptions will not be continued for long-term.  Will only fill for short-term until she is able to establish with pain management.  Also a back brace has been written for her since she initially had a back brace ordered by her previous pain specialist who no longer accepts her insurance.  She states that the back brace she currently has is no longer supporting her back as the elastic straps have stretched out too much.  We will continue to follow.    She is also been followed by psychiatry, and her anxiety was being managed by them.   "Since having this new insurance she will also not be able to follow-up with her psychiatrist or her medications for anxiety we will not be refilled.  We will refill her medications on a temporary basis until she is able to reestablish with psychiatry.  She is not having any urges to harm herself or others.  But if she has any sudden worsening of her current symptoms she has been advised to go to the emergency room for further assistance in management.    Her blood pressure today was slightly elevated at 140/90.  She states that when she was seen by her surgeon her blood pressure was above 200 systolic and over 100 diastolic.  We will have her continue to keep track of her blood pressures at home but will start her on lisinopril today.  Since her blood pressure is 140/90 today will start at 10 mg and increase as needed.  She is not having any chest pain or shortness of breath or other cardiac or neurologic symptoms.  We will continue to follow.     Current medications, allergies, and problem list reviewed with patient and updated in EPIC.          Review of Systems   Constitutional: Negative for chills and fever.   HENT: Negative for hearing loss and tinnitus.    Respiratory: Negative for cough, sputum production and shortness of breath.    Cardiovascular: Negative for chest pain and palpitations.   Gastrointestinal: Negative for abdominal pain, nausea and vomiting.   Musculoskeletal: Positive for back pain and neck pain. Negative for joint pain.   Skin: Negative for rash.   Neurological: Positive for tingling. Negative for tremors, sensory change, speech change, focal weakness and headaches.   Psychiatric/Behavioral: Negative for depression, hallucinations, substance abuse and suicidal ideas. The patient is nervous/anxious. The patient does not have insomnia.           Objective:     /90 (BP Location: Right arm, Patient Position: Sitting)   Pulse 76   Temp 36.8 °C (98.3 °F)   Resp 14   Ht 1.575 m (5' 2\")  "  Wt 68 kg (150 lb)   LMP 10/02/1980   SpO2 97%   BMI 27.44 kg/m²      Physical Exam   Constitutional: She is oriented to person, place, and time. She appears well-developed and well-nourished.   HENT:   Head: Normocephalic and atraumatic.   Cardiovascular: Normal rate, regular rhythm and normal heart sounds.  Exam reveals no friction rub.    No murmur heard.  Pulmonary/Chest: Effort normal and breath sounds normal. No respiratory distress. She has no wheezes. She has no rales.   Abdominal: Soft. Bowel sounds are normal. She exhibits no distension. There is no tenderness.   Musculoskeletal: She exhibits tenderness. She exhibits no edema.   She is currently using a wheel chair for mobility   Neurological: She is alert and oriented to person, place, and time.   Skin: Skin is warm and dry.   Psychiatric: She has a normal mood and affect. Her behavior is normal.   Nursing note and vitals reviewed.              Assessment/Plan:     1. Left lumbar radiculopathy  Will have her continue to use her medications as directed. NARx check done. A referral to pain management also redone today. Will also write for a back brace to replace the one she is currently using.  - carisoprodol (SOMA) 350 MG Tab; Take 1 Tab by mouth every 8 hours as needed for Muscle Spasms for up to 14 days.  Dispense: 30 Tab; Refill: 1  - REFERRAL TO PAIN CLINIC    2. Essential hypertension  Will have her start lisinopril 10 mg. She has been advised to monitor blood pressure at home and keep notes. If blood pressure elevated or having symptoms of CP, SOB or neurologic changes to go to the er.      3. Anxiety  Will write for ativan to use as directed. Referred to psychiatry for a further evaluation and assistance in management of her anxiety and panic attacks. Will continue to follow.  - REFERRAL TO PSYCHIATRY  - LORazepam (ATIVAN) 1 MG Tab; Take 1 Tab by mouth every 8 hours as needed for Anxiety for up to 30 days.  Dispense: 30 Tab; Refill: 0

## 2019-01-11 RX ORDER — TRAZODONE HYDROCHLORIDE 50 MG/1
TABLET ORAL
Qty: 60 TAB | Refills: 0 | Status: SHIPPED | OUTPATIENT
Start: 2019-01-11 | End: 2019-02-09 | Stop reason: SDUPTHER

## 2019-01-21 RX ORDER — LORAZEPAM 1 MG/1
TABLET ORAL
Qty: 30 TAB | Refills: 0 | OUTPATIENT
Start: 2019-01-21 | End: 2019-02-20

## 2019-01-23 DIAGNOSIS — F41.9 ANXIETY: ICD-10-CM

## 2019-01-23 DIAGNOSIS — M54.16 LEFT LUMBAR RADICULOPATHY: ICD-10-CM

## 2019-01-23 RX ORDER — CARISOPRODOL 350 MG/1
350 TABLET ORAL EVERY 8 HOURS PRN
Qty: 30 TAB | Refills: 0 | Status: SHIPPED | OUTPATIENT
Start: 2019-01-23 | End: 2019-01-25 | Stop reason: SDUPTHER

## 2019-01-23 RX ORDER — LORAZEPAM 1 MG/1
TABLET ORAL
Qty: 30 TAB | Refills: 0 | OUTPATIENT
Start: 2019-01-23 | End: 2019-02-22

## 2019-01-23 RX ORDER — LORAZEPAM 1 MG/1
1 TABLET ORAL EVERY 8 HOURS PRN
Qty: 30 TAB | Refills: 0 | Status: SHIPPED | OUTPATIENT
Start: 2019-01-23 | End: 2019-02-21 | Stop reason: SDUPTHER

## 2019-01-25 DIAGNOSIS — M54.16 LEFT LUMBAR RADICULOPATHY: ICD-10-CM

## 2019-01-28 RX ORDER — CARISOPRODOL 350 MG/1
TABLET ORAL
Qty: 30 TAB | Refills: 0 | Status: SHIPPED | OUTPATIENT
Start: 2019-01-28 | End: 2019-02-27

## 2019-01-29 DIAGNOSIS — G62.9 NEUROPATHY: ICD-10-CM

## 2019-01-29 RX ORDER — GABAPENTIN 300 MG/1
CAPSULE ORAL
Qty: 180 CAP | Refills: 0 | Status: SHIPPED | OUTPATIENT
Start: 2019-01-29 | End: 2019-02-27 | Stop reason: SDUPTHER

## 2019-02-07 ENCOUNTER — OFFICE VISIT (OUTPATIENT)
Dept: MEDICAL GROUP | Facility: MEDICAL CENTER | Age: 64
End: 2019-02-07
Attending: FAMILY MEDICINE
Payer: COMMERCIAL

## 2019-02-07 VITALS
OXYGEN SATURATION: 96 % | SYSTOLIC BLOOD PRESSURE: 130 MMHG | TEMPERATURE: 98.5 F | DIASTOLIC BLOOD PRESSURE: 80 MMHG | HEIGHT: 62 IN | WEIGHT: 156 LBS | RESPIRATION RATE: 16 BRPM | HEART RATE: 88 BPM | BODY MASS INDEX: 28.71 KG/M2

## 2019-02-07 DIAGNOSIS — E03.9 HYPOTHYROIDISM, UNSPECIFIED TYPE: ICD-10-CM

## 2019-02-07 DIAGNOSIS — M47.12 OSTEOARTHRITIS OF CERVICAL SPINE WITH MYELOPATHY: ICD-10-CM

## 2019-02-07 DIAGNOSIS — I10 ESSENTIAL HYPERTENSION: ICD-10-CM

## 2019-02-07 DIAGNOSIS — Z96.649 PAIN DUE TO HIP JOINT PROSTHESIS, SUBSEQUENT ENCOUNTER: ICD-10-CM

## 2019-02-07 DIAGNOSIS — E20.9 HYPOPARATHYROIDISM, UNSPECIFIED HYPOPARATHYROIDISM TYPE (HCC): ICD-10-CM

## 2019-02-07 DIAGNOSIS — T84.84XD PAIN DUE TO HIP JOINT PROSTHESIS, SUBSEQUENT ENCOUNTER: ICD-10-CM

## 2019-02-07 PROCEDURE — 99213 OFFICE O/P EST LOW 20 MIN: CPT | Performed by: FAMILY MEDICINE

## 2019-02-07 PROCEDURE — 99214 OFFICE O/P EST MOD 30 MIN: CPT | Performed by: FAMILY MEDICINE

## 2019-02-07 RX ORDER — LEVOTHYROXINE SODIUM 88 MCG
TABLET ORAL
Refills: 0 | COMMUNITY
Start: 2019-01-21 | End: 2019-04-30 | Stop reason: SDUPTHER

## 2019-02-07 RX ORDER — CHOLESTYRAMINE 4 G/9G
1 POWDER, FOR SUSPENSION ORAL DAILY
Qty: 30 EACH | Refills: 0 | Status: SHIPPED | OUTPATIENT
Start: 2019-02-07 | End: 2019-04-30 | Stop reason: SDUPTHER

## 2019-02-07 ASSESSMENT — ENCOUNTER SYMPTOMS
BACK PAIN: 1
NAUSEA: 0
PALPITATIONS: 0
TREMORS: 0
TINGLING: 1
FEVER: 0
DEPRESSION: 0
NECK PAIN: 1
CHILLS: 0
VOMITING: 0
NERVOUS/ANXIOUS: 1
ABDOMINAL PAIN: 0
SPEECH CHANGE: 0
SHORTNESS OF BREATH: 0
COUGH: 0
EYES NEGATIVE: 1
HALLUCINATIONS: 0
SPUTUM PRODUCTION: 0
HEADACHES: 0
SENSORY CHANGE: 0
FOCAL WEAKNESS: 1

## 2019-02-07 ASSESSMENT — LIFESTYLE VARIABLES: SUBSTANCE_ABUSE: 0

## 2019-02-08 NOTE — PROGRESS NOTES
Subjective:      Marium Renteria is a 63 y.o. female who presents with Follow-Up and Medication Refill            Patient 63-year-old female here for follow-up of her blood pressure, chronic pain, hyperthyroidism, and chronic diarrhea.    She had been recently seen by her endocrinologist for the management of her endocrine disorders.  And her blood pressure was noted to be elevated at that time.  Patient states that she has been sent back to this clinic for her blood pressure issues to be addressed.  Today her blood pressure is within normal limits and has been mostly within normal limits on her other visits.  We will have her continue to use her blood pressure medications as directed and to record her blood pressure readings at home.  And will discuss her blood pressure readings at her next visit.  Also discussed with patient possibly referring to cardiology for an ambulatory blood pressure management.  We will continue to follow.    She is also requesting a referral to aqua therapy for her chronic pain issues.  She has been having chronic pain in her shoulders bilaterally, bilateral hips, and back pain.  She states that she had been in aqua therapy in the past and it did help significantly alleviate some of her pains as well as help her strengthen her extremities.  She has been referred to pain management as well as orthopedics for further assessment and assistance in management of her chronic pain problems.  We will have her continue to use her medications as directed until she is able to be further assessed by pain management.  We will continue to follow.    She has been taking potassium for low potassium levels, but she has no medications that appear to be related to potassium levels.  We will have her hold her potassium and we will check her electrolyte levels.  If her potassium levels continue to run low will restart her potassium supplements.  We may also need to refer her to nephrology for further  "assistance in assessing her for hypokalemia.    She has been she has been having issues with a chronic diarrhea.  She recently had problems with a bowel obstruction and had seen a general surgeon as well as gastroenterologist.  She states she had received a clean bill to health from both specialist.  But her diarrhea has started to worsen again.  She has used cholestyramine in the past to manage her diarrhea problems.  We will have her continue to use her cholestyramine as directed, but she has been advised to contact GI for a follow-up of her recurrent worsening diarrhea.  We will continue to follow.     Current medications, allergies, and problem list reviewed with patient and updated in EPIC.          Review of Systems   Constitutional: Negative for chills and fever.   HENT: Negative for hearing loss and tinnitus.    Eyes: Negative.    Respiratory: Negative for cough, sputum production and shortness of breath.    Cardiovascular: Negative for chest pain and palpitations.   Gastrointestinal: Negative for abdominal pain, nausea and vomiting.   Musculoskeletal: Positive for back pain, joint pain and neck pain.   Skin: Negative for rash.   Neurological: Positive for tingling and focal weakness. Negative for tremors, sensory change, speech change and headaches.   Psychiatric/Behavioral: Negative for depression, hallucinations, substance abuse and suicidal ideas. The patient is nervous/anxious.           Objective:     /80 (BP Location: Left arm, Patient Position: Sitting)   Pulse 88   Temp 36.9 °C (98.5 °F)   Resp 16   Ht 1.575 m (5' 2\")   Wt 70.8 kg (156 lb)   LMP 10/02/1980   SpO2 96%   BMI 28.53 kg/m²      Physical Exam   Constitutional: She is oriented to person, place, and time.   BMI 28   HENT:   Head: Normocephalic and atraumatic.   Cardiovascular: Normal rate, regular rhythm and normal heart sounds.  Exam reveals no friction rub.    No murmur heard.  Pulmonary/Chest: Effort normal and breath " sounds normal. No respiratory distress. She has no wheezes. She has no rales.   Abdominal: Soft. Bowel sounds are normal. She exhibits no distension. There is no tenderness.   Musculoskeletal:   In wheelchair for mobility   Neurological: She is alert and oriented to person, place, and time.   Skin: Skin is warm and dry.   Psychiatric: She has a normal mood and affect. Her behavior is normal.   Nursing note and vitals reviewed.              Assessment/Plan:     1. Essential hypertension  We will have her continue to use her blood pressure medications as previously directed. She has been advised to monitor blood pressure at home and keep notes. If blood pressure elevated or having symptoms of CP, SOB or neurologic changes to go to the er.    - Comp Metabolic Panel; Future  - CBC WITH DIFFERENTIAL; Future    2. Hypoparathyroidism, unspecified hypoparathyroidism type, hypothyroidism (HCC)  We will recheck her TSH as well has her parathyroid levels.  She will also continue to follow-up with her endocrinologist for continued assistance in management of her problems.  We will continue to follow.  - TSH WITH REFLEX TO FT4; Future  - PTH WITH CALCIUM; Future    3. Osteoarthritis of cervical spine with myelopathy  We will have her continue to use her medications as directed.  A referral to physical therapy for aqua therapy has been made for patient today.  She will also contact her pain specialist as well as orthopedics for schedule appointments.  We will continue to follow.  - REFERRAL TO PHYSICAL THERAPY Reason for Therapy: Eval/Treat/Report    4. Pain due to hip joint prosthesis, subsequent encounter  See above plan.  - REFERRAL TO PHYSICAL THERAPY Reason for Therapy: Eval/Treat/Report

## 2019-02-12 ENCOUNTER — TELEPHONE (OUTPATIENT)
Dept: MEDICAL GROUP | Facility: MEDICAL CENTER | Age: 64
End: 2019-02-12

## 2019-02-12 DIAGNOSIS — M25.552 BILATERAL HIP PAIN: ICD-10-CM

## 2019-02-12 DIAGNOSIS — M25.551 BILATERAL HIP PAIN: ICD-10-CM

## 2019-02-12 RX ORDER — TRAZODONE HYDROCHLORIDE 50 MG/1
TABLET ORAL
Qty: 60 TAB | Refills: 1 | Status: SHIPPED | OUTPATIENT
Start: 2019-02-12 | End: 2019-03-28 | Stop reason: SDUPTHER

## 2019-02-12 NOTE — TELEPHONE ENCOUNTER
1. Caller Name: Marium Renteria                                             Call Back Number: [phone]      Patient approves a detailed voicemail message: yes    Patient called and stated that she needs a referral send to [facility]. They will not see for pain management but they will see her for her shoulder issues. Please advise.

## 2019-02-21 DIAGNOSIS — F41.9 ANXIETY: ICD-10-CM

## 2019-02-21 RX ORDER — LORAZEPAM 1 MG/1
1 TABLET ORAL EVERY 8 HOURS PRN
Qty: 30 TAB | Refills: 0 | Status: SHIPPED | OUTPATIENT
Start: 2019-02-21 | End: 2019-03-19 | Stop reason: SDUPTHER

## 2019-02-25 DIAGNOSIS — I10 ESSENTIAL HYPERTENSION: ICD-10-CM

## 2019-02-25 DIAGNOSIS — E20.9 HYPOPARATHYROIDISM, UNSPECIFIED HYPOPARATHYROIDISM TYPE (HCC): ICD-10-CM

## 2019-02-27 DIAGNOSIS — G62.9 NEUROPATHY: ICD-10-CM

## 2019-02-27 RX ORDER — GABAPENTIN 300 MG/1
CAPSULE ORAL
Qty: 180 CAP | Refills: 0 | Status: SHIPPED | OUTPATIENT
Start: 2019-02-27 | End: 2019-03-19 | Stop reason: SDUPTHER

## 2019-03-04 DIAGNOSIS — R42 VERTIGO: ICD-10-CM

## 2019-03-05 RX ORDER — MECLIZINE HCL 12.5 MG/1
12.5 TABLET ORAL
Qty: 30 TAB | Refills: 0 | Status: SHIPPED | OUTPATIENT
Start: 2019-03-05 | End: 2019-04-09 | Stop reason: SDUPTHER

## 2019-03-21 ENCOUNTER — OFFICE VISIT (OUTPATIENT)
Dept: MEDICAL GROUP | Facility: MEDICAL CENTER | Age: 64
End: 2019-03-21
Attending: INTERNAL MEDICINE
Payer: COMMERCIAL

## 2019-03-21 VITALS
OXYGEN SATURATION: 96 % | HEART RATE: 74 BPM | RESPIRATION RATE: 16 BRPM | BODY MASS INDEX: 30.55 KG/M2 | WEIGHT: 166 LBS | SYSTOLIC BLOOD PRESSURE: 118 MMHG | DIASTOLIC BLOOD PRESSURE: 70 MMHG | TEMPERATURE: 98.9 F | HEIGHT: 62 IN

## 2019-03-21 DIAGNOSIS — G89.4 CHRONIC PAIN SYNDROME: ICD-10-CM

## 2019-03-21 PROCEDURE — 99213 OFFICE O/P EST LOW 20 MIN: CPT | Performed by: INTERNAL MEDICINE

## 2019-03-21 PROCEDURE — 99212 OFFICE O/P EST SF 10 MIN: CPT | Performed by: INTERNAL MEDICINE

## 2019-03-21 RX ORDER — POTASSIUM CHLORIDE 750 MG/1
TABLET, EXTENDED RELEASE ORAL
COMMUNITY
Start: 2019-03-09 | End: 2019-04-09 | Stop reason: SDUPTHER

## 2019-03-21 RX ORDER — CARISOPRODOL 350 MG/1
TABLET ORAL
COMMUNITY
Start: 2019-03-19 | End: 2019-04-01 | Stop reason: SDUPTHER

## 2019-03-21 NOTE — PROGRESS NOTES
Chief Complaint   Patient presents with   • Letter for School/Work     [employer] paperwork   • Medication Refill     trazodone       Subjective:     HPI:   Marium presents today with the following.    Chronic pain syndrome  This is a pleasant 63-year-old lady, a patient of Dr. Almonte who is here today requesting a form for [employer] to be filled.  She shared with me that she has multiple musculoskeletal problems including shoulder, hips and lower spinal problems.  She is a status post a complicated spinal surgery back in 2014 after which she had residual left lower extremity weakness and affected her ability to walk.  She currently uses a powered wheelchair and a power hospital bed at her home.  She still lives independently.  She told me that her unemployment is barely covering her food and assistance reason she is not able to pay her utility bills.  She would like to have a form from [employer] for to state that she is currently using a powered wheelchair and power hospital bed.    She will be starting physical therapy next week.  She is also followed by a pain management specialist and currently on benzodiazepines and Soma.      Patient Active Problem List    Diagnosis Date Noted   • Debility 09/27/2018     Priority: High   • Hypomagnesemia 09/22/2018     Priority: High   • Restless leg syndrome 10/10/2018     Priority: Low   • Tobacco use 09/13/2018     Priority: Low   • Hypoparathyroidism (HCC) 12/11/2009     Priority: Low   • Chronic pain syndrome 03/21/2019   • Hypothyroidism 09/20/2018   • Ulcerative esophagitis 09/20/2018   • Major depressive disorder 09/11/2017   • Pain due to hip joint prosthesis (HCC) 08/07/2017   • Benzodiazepine dependence, continuous ([facility]) 04/26/2017   • Cervical lymphadenopathy 10/07/2016   • Osteoarthritis of cervical spine with myelopathy 09/14/2016   • Failed back syndrome 09/14/2016   • Depression 06/27/2016   • Constipation 06/27/2016   • Left lumbar radiculopathy  06/23/2016   • Neuropathy (HCC) 06/23/2016   • Lumbar scoliosis 12/17/2015   • History of alcohol abuse 12/07/2015   • Hyperlipidemia 06/05/2015   • Insomnia 05/23/2014   • Chronic diarrhea 09/30/2013   • ELISHA (generalized anxiety disorder) 08/28/2013   • Gout, arthritis 05/24/2013   • Mixed hyperlipidemia 12/11/2009   • HTN (hypertension) 12/11/2009   • Postsurgical hypothyroidism 12/11/2009   • Osteoporosis, senile 12/11/2009   • 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   • Symptomatic menopausal or female climacteric states 12/11/2009   • Cigarette nicotine dependence, uncomplicated 12/11/2009       Current Outpatient Prescriptions   Medication Sig Dispense Refill   • carisoprodol (SOMA) 350 MG Tab      • LORazepam (ATIVAN) 1 MG Tab TAKE ONE TABLET BY MOUTH EVERY 8 HOURS AS NEEDED FOR ANXIETY FOR UP TO 30 DAYS 30 Tab 0   • gabapentin (NEURONTIN) 300 MG Cap TAKE 2 CAPSULES BY MOUTH THREE TIMES DAILY 180 Cap 0   • meclizine (ANTIVERT) 12.5 MG Tab Take 1 Tab by mouth 1 time daily as needed. 30 Tab 0   • traZODone (DESYREL) 50 MG Tab TAKE 1 TO 2 TABLETS BY MOUTH EVERY DAY AT BEDTIME AS NEEDED FOR SLEEP 60 Tab 1   • cholestyramine (QUESTRAN) 4 g packet Take 4 g by mouth every day. 30 Each 0   • SYNTHROID 88 MCG Tab   0   • Misc. Devices Misc Back brace to use as directed 1 Device 0   • lisinopril (PRINIVIL) 10 MG Tab Take 1 Tab by mouth every day. 30 Tab 3   • EPINEPHrine (EPIPEN) 0.3 MG/0.3ML Solution Auto-injector solution for injection INJECT 1 PEN INTRAMUSCULARLY INTO THE MIDDLE OF THE OUTER THIGH AND HOLD FOR 3 SECONDS AS NEEDED FOR ANAPHLAXSIS 2 Each 0   • carbidopa-levodopa (SINEMET)  MG Tab TAKE 1 TABLET BY MOUTH DAILY 90 Tab 1   • allopurinol (ZYLOPRIM) 300 MG Tab TAKE 1 TABLET BY MOUTH ONCE DAILY 90 Tab 1   • potassium chloride ER (KLOR-CON) 10 MEQ tablet Take 10 mEq by mouth every day.     • Multiple Vitamin (DAILY-BRANDON) Tab Take 1 Tab by mouth every day.     • traZODone (DESYREL) 50 MG Tab Take  "50 mg by mouth every bedtime.     • PROVENTIL  (90 Base) MCG/ACT Aero Soln inhalation aerosol INHALE 2 PUFFS BY MOUTH EVERY 6 HOURS AS NEEDED FOR SHORTNESS OF BREATH 6.7 g 0   • polyethylene glycol 3350 (MIRALAX) Powder Take 17 g by mouth 3 times a day as needed (bowel care).     • varenicline (CHANTIX STARTING MONTH MIRELA) 0.5 MG X 11 & 1 MG X 42 tablet Per packet 56 Tab 3   • Cholecalciferol (VITAMIN D3) 1000 UNITS Cap Take 4,000 Units by mouth every day.     • Calcium Carbonate Antacid (TUMS PO) Take 3,200 mg by mouth every day. Needs to take BERRY flavor     • potassium chloride SA (K-DUR) 10 MEQ Tab CR      • pantoprazole (PROTONIX) 40 MG Tablet Delayed Response Take 40 mg by mouth every day.       No current facility-administered medications for this visit.        Allergies as of 03/21/2019 - Reviewed 03/21/2019   Allergen Reaction Noted   • Bee venom Anaphylaxis 12/11/2009   • Cillins [penicillins] Anaphylaxis and Hives 12/11/2009   • Latex Rash 09/02/2017   • Asa [aspirin] Unspecified 12/19/2013   • Coconut oil Itching and Swelling 04/26/2017   • Codeine Itching 07/12/2012   • Contrast media with iodine [iodine] Vomiting and Nausea 06/23/2016   • Hydrocodone-ibuprofen  12/07/2015   • Hydroxyzine Unspecified 09/19/2018   • Ibuprofen Unspecified 12/15/2015   • Norco [apap-fd&c yellow #10 [place]-hydrocodone] Itching 11/21/2014   • Other food Unspecified 12/15/2015   • Sulfa drugs Hives 12/11/2009   • Talwin Unspecified 07/12/2012   • Tape Swelling 07/20/2017   • Vicodin [hydrocodone-acetaminophen] Itching 01/27/2013        Past Medical History:   Diagnosis Date   • Alcohol abuse, continuous drinking behavior 12/11/2009   • Anesthesia 4-26-17    \"Hard to put to sleep\"   • Anxiety    • Arthritis 4-26-17    \"Hands\"   • Chronic diarrhea 9/30/2013   • Dental disorder 4-26-17    \"Upper permanent right side bridge\"   • Gout    • Gynecological disorder     \"HX Gonorrhea, was treated\"   • Helicobacter pylori (H. " "pylori) infection 9/11/2012   • Hemorrhagic disorder (HCC)     \"needed a lot of blood with back surgery\"   • Hyperlipidemia 6/5/2015   • Hypoparathyroidism (HCC) 12/11/2009   • Lumbago 2017    back and neck   • Mixed hyperlipidemia 12/11/2009   • Osteoporosis, unspecified 12/11/2009   • Other B-complex deficiencies 12/11/2009   • Parotid cyst    • Postsurgical hypothyroidism 12/11/2009   • Psychiatric problem 4-26-17    depression, anxiety   • Restless leg syndrome    • S/P tooth extraction 4-25-17    Pt. states Dr. Sexton is aware.   • Serum calcium elevated 10/8/2012   • Sialolithiasis, ductal 7/21/2012   • Snoring    • Symptomatic menopausal or female climacteric states 12/11/2009   • Tobacco abuse 12/11/2009   • Unspecified essential hypertension 12/11/2009   • Urinary incontinence     urgency       Past Surgical History:   Procedure Laterality Date   • VENTRAL HERNIA REPAIR  10/4/2018    Procedure: ABDOMINAL WOUND EXPLORATION AND SEROMA DRAINAGE;  Surgeon: Houston Amanda D.O.;  Location: SURGERY [facility];  Service: General   • EXPLORATORY LAPAROTOMY  9/22/2018    Procedure: EXPLORATORY LAPAROTOMY;  Surgeon: Houston Amanda D.O.;  Location: SURGERY [facility];  Service: General   • LYSIS ADHESIONS GENERAL  9/22/2018    Procedure: LYSIS ADHESIONS GENERAL;  Surgeon: Houston Amanda D.O.;  Location: [facility];  Service: General   • BOWEL RESECTION  9/22/2018    Procedure: BOWEL RESECTION- POSSIBLE;  Surgeon: Houston Amanda D.O.;  Location: SURGERY [facility];  Service: General   • LUMBAR FUSION POSTERIOR  8/7/2017    Procedure: LUMBAR FUSION POSTERIOR EXPLORATION;  Surgeon: Garrett Sexton M.D.;  Location: [facility];  Service:    • HARDWARE REMOVAL NEURO  8/7/2017    Procedure: HARDWARE REMOVAL NEURO DEEP L1-L3, L4-S1, SEROMA REMOVAL;  Surgeon: Garrett Sexton M.D.;  Location: [facility];  Service:    • LUMBAR FUSION POSTERIOR  12/17/2015    Procedure: LUMBAR " FUSION POSTERIOR L1-S1;  Surgeon: Garrett Sexton M.D.;  Location: SURGERY [facility];  Service:    • LUMBAR LAMINECTOMY DISKECTOMY  12/17/2015    Procedure: LUMBAR LAMINECTOMY DISKECTOMY;  Surgeon: Garrett Sexton M.D.;  Location: SURGERY [facility];  Service:    • LUMBAR DECOMPRESSION  12/17/2015    Procedure: LUMBAR DECOMPRESSION L1-L3;  Surgeon: Garrett Sexton M.D.;  Location: SURGERY [facility];  Service:    • COLONOSCOPY WITH POLYP  8/1/2012    Performed by ARIA EMERSON at ENDOSCOPY [facility]   • GASTROSCOPY WITH BIOPSY  8/1/2012    Performed by ARIA EMERSON at ENDOSCOPY [facility]   • THYROIDECTOMY  2001   • PARATHYROIDECTOMY  2001   • COLON RESECTION  1985    7 feet of intestine   • APPENDECTOMY     • DENTAL SURGERY     • HYSTERECTOMY, VAGINAL      partial, benign   • PRIMARY C SECTION     • SALPINGO-OOPHORECTOMY, UNILATERAL         Social History   Substance Use Topics   • Smoking status: Current Every Day Smoker     Packs/day: 0.50     Years: 47.00     Types: Cigarettes     Start date: 4/25/1970   • Smokeless tobacco: Never Used      Comment: quit during pregnancy, now 2-3 cig daily   • Alcohol use No      Comment: quit drinking daily 3 years ago, now drinks 3-4 beers on weekends       Family History   Problem Relation Age of Onset   • Genetic Mother         Alzheimer's   • Alcohol/Drug Father        ROS:     - Constitutional: Negative for fever, chills, unexpected weight change, and fatigue/generalized weakness.     - HEENT: Negative for headaches, vision changes, hearing changes, ear pain, ear discharge, sinus congestion, or sore throat.     - Respiratory: Negative for cough, sputum production, dyspnea and wheezing.    - Cardiovascular: Negative for chest pain or palpitations.      - Gastrointestinal: Negative for heartburn, nausea, vomiting, abdominal pain, diarrhea or constipation.     - Genitourinary: Negative for dysuria, polyuria or urinary urgency.    -  "Musculoskeletal: Per HPI.    - Skin: Negative for rash, itching, cyanotic skin color change.     - Psychiatric/Behavioral: Negative for depression or suicidal/homicidal ideation.       Physical Exam:     Blood pressure 118/70, pulse 74, temperature 37.2 °C (98.9 °F), temperature source Temporal, resp. rate 16, height 1.575 m (5' 2.01\"), weight 75.3 kg (166 lb), last menstrual period 10/02/1980, SpO2 96 %, not currently breastfeeding. Body mass index is 30.35 kg/m².   Gen:         Alert and oriented, No apparent distress. Wheel chair bound.  Neck:        No Lymphadenopathy or Bruits.  Lungs:     Clear to auscultation bilaterally  CV:          Regular rate and rhythm. No murmurs, rubs or gallops.               Ext:          No clubbing, cyanosis, edema.  Neuro:     Left lower extremity strength 4/5, compared to right 5/5.  Subjective loss of chronic touch sensation over the outer aspect of her left thigh and left leg.        Assessment and Plan:     63 y.o. female with the following issues.    1. Chronic pain syndrome  New to me, chronic controlled problem.  As discussed in HPI, has multiple musculoskeletal issues and residual left lower extremity weakness from previous spinal surgery.  Currently wheelchair-bound.  I have filled her requested form from [facility] that she is currently using a powered wheelchair and she also uses her hospital bed at home to help her with mobility per her request.  She has a pending referral red, in the process of scheduling a follow-up with pain management specialist for her chronic pain.  Also, started physical therapy last week.      Follow Up:      Return in about 2 months (around 5/21/2019) for PRN with PCP.      Please note that this dictation was created using voice recognition software. I have made every reasonable attempt to correct obvious errors, but I expect that there are errors of grammar and possibly content that I did not discover before finalizing the note.    Signed " by: Aysha Xiao

## 2019-03-21 NOTE — ASSESSMENT & PLAN NOTE
This is a pleasant 63-year-old lady, a patient of Dr. Almonte who is here today requesting a form for [employer] to be filled.  She shared with me that she has multiple musculoskeletal problems including shoulder, hips and lower spinal problems.  She is a status post a complicated spinal surgery back in 2014 after which she had residual left lower extremity weakness and affected her ability to walk.  She currently uses a powered wheelchair and a power hospital bed at her home.  She still lives independently.  She told me that her unemployment is barely covering her food and assistance reason she is not able to pay her utility bills.  She would like to have a form from [employer] for to state that she is currently using a powered wheelchair and power hospital bed.    She will be starting physical therapy next week.  She is also followed by a pain management specialist and currently on benzodiazepines and Soma.

## 2019-03-28 ENCOUNTER — OFFICE VISIT (OUTPATIENT)
Dept: MEDICAL GROUP | Facility: MEDICAL CENTER | Age: 64
End: 2019-03-28
Attending: FAMILY MEDICINE
Payer: COMMERCIAL

## 2019-03-28 VITALS
OXYGEN SATURATION: 96 % | RESPIRATION RATE: 14 BRPM | BODY MASS INDEX: 30.18 KG/M2 | WEIGHT: 164 LBS | SYSTOLIC BLOOD PRESSURE: 130 MMHG | TEMPERATURE: 98.8 F | HEIGHT: 62 IN | HEART RATE: 76 BPM | DIASTOLIC BLOOD PRESSURE: 90 MMHG

## 2019-03-28 DIAGNOSIS — G47.00 INSOMNIA, UNSPECIFIED TYPE: ICD-10-CM

## 2019-03-28 DIAGNOSIS — G89.4 CHRONIC PAIN SYNDROME: ICD-10-CM

## 2019-03-28 DIAGNOSIS — I10 ESSENTIAL HYPERTENSION: ICD-10-CM

## 2019-03-28 DIAGNOSIS — R32 INCONTINENCE IN FEMALE: ICD-10-CM

## 2019-03-28 PROCEDURE — 99214 OFFICE O/P EST MOD 30 MIN: CPT | Performed by: FAMILY MEDICINE

## 2019-03-28 PROCEDURE — 99212 OFFICE O/P EST SF 10 MIN: CPT | Performed by: FAMILY MEDICINE

## 2019-03-28 RX ORDER — TRAZODONE HYDROCHLORIDE 50 MG/1
TABLET ORAL
Qty: 60 TAB | Refills: 1 | Status: SHIPPED | OUTPATIENT
Start: 2019-03-28 | End: 2019-04-30

## 2019-03-28 ASSESSMENT — ENCOUNTER SYMPTOMS
HEADACHES: 0
COUGH: 0
ABDOMINAL PAIN: 0
FEVER: 0
TINGLING: 1
SENSORY CHANGE: 0
NAUSEA: 0
SHORTNESS OF BREATH: 0
BACK PAIN: 1
NECK PAIN: 1
NERVOUS/ANXIOUS: 1
TREMORS: 0
SPEECH CHANGE: 0
CHILLS: 0
INSOMNIA: 1
FOCAL WEAKNESS: 0
VOMITING: 0
DEPRESSION: 0
PALPITATIONS: 0
HALLUCINATIONS: 0
SPUTUM PRODUCTION: 0

## 2019-03-28 ASSESSMENT — LIFESTYLE VARIABLES: SUBSTANCE_ABUSE: 0

## 2019-03-28 NOTE — PROGRESS NOTES
Subjective:      Marium Renteria is a 63 y.o. female who presents with Medication Refill (trazodone) and Hypertension            Patient 63-year-old female with a history of chronic pain, insomnia and incontinence.    She will need orders written for new pads for her TENS unit.  She states that her TENS unit still works but the pads that came with the unit is not no longer sticky and is not sticking to her back long enough for the TENS unit to be effective.  A prescription for the TENS unit pads will be written and she will take him either to the pharmacy or to the care chest for them to be filled.  She has been approved for 8 visits to physical therapy for her chronic pain issues.  We will continue to follow.    She will need her trazodone refilled today.  She has been using her trazodone for problems with insomnia.  She states that the trazodone does work for her she is only been using 1-2 at a time as needed for her insomnia.  We will continue to follow.    She will also need adult incontinence supplies written for her to use either once to twice daily.  She states she will take the prescription to the care chest where they said they would fill it for her when she has been written orders.  Orders will be written today for patient to take back to the care chest.  We will continue to follow.       Current medications, allergies, and problem list reviewed with patient and updated in EPIC.          Review of Systems   Constitutional: Negative for chills and fever.   HENT: Negative for hearing loss and tinnitus.    Respiratory: Negative for cough, sputum production and shortness of breath.    Cardiovascular: Negative for chest pain and palpitations.   Gastrointestinal: Negative for abdominal pain, nausea and vomiting.   Musculoskeletal: Positive for back pain, joint pain and neck pain.   Skin: Negative for rash.   Neurological: Positive for tingling. Negative for tremors, sensory change, speech change, focal  "weakness and headaches.   Psychiatric/Behavioral: Negative for depression, hallucinations, substance abuse and suicidal ideas. The patient is nervous/anxious and has insomnia.           Objective:     /90 (BP Location: Left arm, Patient Position: Sitting)   Pulse 76   Temp 37.1 °C (98.8 °F)   Resp 14   Ht 1.575 m (5' 2\")   Wt 74.4 kg (164 lb)   LMP 10/02/1980   SpO2 96%   BMI 30.00 kg/m²      Physical Exam   Constitutional: She is oriented to person, place, and time.   BMI 30.00   HENT:   Head: Normocephalic and atraumatic.   Cardiovascular: Normal rate, regular rhythm and normal heart sounds.  Exam reveals no friction rub.    No murmur heard.  Pulmonary/Chest: Effort normal and breath sounds normal. No respiratory distress. She has no wheezes. She has no rales.   Abdominal: Soft. Bowel sounds are normal. She exhibits no distension. There is no tenderness.   Musculoskeletal: She exhibits tenderness. She exhibits no edema.   Neurological: She is alert and oriented to person, place, and time.   Psychiatric: She has a normal mood and affect. Her behavior is normal.   Nursing note and vitals reviewed.              Assessment/Plan:     1. Insomnia, unspecified type  The prescription for trazodone will be sent to her pharmacy for her to continue using as directed.  We will continue to follow.  - Misc. Devices Misc; TENS unit pads to be used as directed  Dispense: 10 Device; Refill: 1  - Misc. Devices Misc; Incontinence supplies for incontinence to be changed once to twice daily  Dispense: 60 Device; Refill: 11  - traZODone (DESYREL) 50 MG Tab; TAKE 1 TO 2 TABLETS BY MOUTH EVERY DAY AT BEDTIME AS NEEDED FOR SLEEP  Dispense: 60 Tab; Refill: 1    2. Chronic pain syndrome  Prescription for TENS unit pads have been written.  She will continue to follow-up with physical therapy and had been recently approved for 8 visits.  We will have her continue using her medications as directed.  We will continue to " follow.    3. Essential hypertension  We will have her continue to take her blood pressure medications as directed.  She will be checking her blood pressures at home and keeping notes.    4. Incontinence in female  The prescription for adult incontinence supplies have been printed and patient will take them to the care chest to be filled.  We will continue to follow.

## 2019-04-01 DIAGNOSIS — G89.4 CHRONIC PAIN SYNDROME: ICD-10-CM

## 2019-04-02 RX ORDER — CARISOPRODOL 350 MG/1
TABLET ORAL
Qty: 30 TAB | Refills: 1 | Status: SHIPPED | OUTPATIENT
Start: 2019-04-02 | End: 2019-04-30 | Stop reason: SDUPTHER

## 2019-04-09 DIAGNOSIS — R42 VERTIGO: ICD-10-CM

## 2019-04-09 RX ORDER — POTASSIUM CHLORIDE 750 MG/1
TABLET, EXTENDED RELEASE ORAL
Qty: 60 TAB | Refills: 0 | Status: SHIPPED | OUTPATIENT
Start: 2019-04-09 | End: 2019-04-30 | Stop reason: SDUPTHER

## 2019-04-09 NOTE — TELEPHONE ENCOUNTER
Was the patient seen in the last year in this department? Yes    Does patient have an active prescription for medications requested? No     Received Request Via: Pharmacy      Pt met protocol?: Yes, last ov 3/19  Last labs 2/19

## 2019-04-10 RX ORDER — MECLIZINE HCL 12.5 MG/1
TABLET ORAL
Qty: 30 TAB | Refills: 0 | Status: SHIPPED | OUTPATIENT
Start: 2019-04-10 | End: 2019-04-30 | Stop reason: SDUPTHER

## 2019-04-15 ENCOUNTER — TELEPHONE (OUTPATIENT)
Dept: MEDICAL GROUP | Facility: MEDICAL CENTER | Age: 64
End: 2019-04-15

## 2019-04-15 DIAGNOSIS — M54.5 MIDLINE LOW BACK PAIN, UNSPECIFIED CHRONICITY, WITH SCIATICA PRESENCE UNSPECIFIED: ICD-10-CM

## 2019-04-15 NOTE — TELEPHONE ENCOUNTER
"1. Caller Name: Marium Renteria                                           Call Back Number: [phone] (home)         Patient approves a detailed voicemail message: N\A    Patient is requesting an updated physical therapy referral with \"lumbar region\" added. Please advise.    "

## 2019-04-17 RX ORDER — LISINOPRIL 10 MG/1
TABLET ORAL
Qty: 30 TAB | Refills: 3 | Status: SHIPPED | OUTPATIENT
Start: 2019-04-17 | End: 2019-04-30

## 2019-04-30 ENCOUNTER — OFFICE VISIT (OUTPATIENT)
Dept: MEDICAL GROUP | Facility: MEDICAL CENTER | Age: 64
End: 2019-04-30
Attending: FAMILY MEDICINE
Payer: COMMERCIAL

## 2019-04-30 ENCOUNTER — TELEPHONE (OUTPATIENT)
Dept: MEDICAL GROUP | Facility: MEDICAL CENTER | Age: 64
End: 2019-04-30

## 2019-04-30 VITALS
HEART RATE: 88 BPM | BODY MASS INDEX: 30.36 KG/M2 | SYSTOLIC BLOOD PRESSURE: 140 MMHG | OXYGEN SATURATION: 95 % | DIASTOLIC BLOOD PRESSURE: 90 MMHG | WEIGHT: 165 LBS | HEIGHT: 62 IN | TEMPERATURE: 99 F | RESPIRATION RATE: 16 BRPM

## 2019-04-30 DIAGNOSIS — E20.9 HYPOPARATHYROIDISM, UNSPECIFIED HYPOPARATHYROIDISM TYPE (HCC): ICD-10-CM

## 2019-04-30 DIAGNOSIS — E03.9 HYPOTHYROIDISM, UNSPECIFIED TYPE: ICD-10-CM

## 2019-04-30 DIAGNOSIS — M54.16 LEFT LUMBAR RADICULOPATHY: ICD-10-CM

## 2019-04-30 DIAGNOSIS — G89.4 CHRONIC PAIN SYNDROME: ICD-10-CM

## 2019-04-30 DIAGNOSIS — I10 ESSENTIAL HYPERTENSION: ICD-10-CM

## 2019-04-30 DIAGNOSIS — K52.9 CHRONIC DIARRHEA: ICD-10-CM

## 2019-04-30 DIAGNOSIS — G62.9 NEUROPATHY: ICD-10-CM

## 2019-04-30 DIAGNOSIS — R42 VERTIGO: ICD-10-CM

## 2019-04-30 DIAGNOSIS — G25.9 EXTRAPYRAMIDAL AND MOVEMENT DISORDER: ICD-10-CM

## 2019-04-30 DIAGNOSIS — E78.2 MIXED HYPERLIPIDEMIA: ICD-10-CM

## 2019-04-30 DIAGNOSIS — Z91.030 BEE STING ALLERGY: ICD-10-CM

## 2019-04-30 DIAGNOSIS — M10.9 GOUT, ARTHRITIS: ICD-10-CM

## 2019-04-30 PROCEDURE — 99213 OFFICE O/P EST LOW 20 MIN: CPT | Performed by: FAMILY MEDICINE

## 2019-04-30 PROCEDURE — 99214 OFFICE O/P EST MOD 30 MIN: CPT | Performed by: FAMILY MEDICINE

## 2019-04-30 RX ORDER — POTASSIUM CHLORIDE 750 MG/1
TABLET, EXTENDED RELEASE ORAL
Qty: 90 TAB | Refills: 0 | Status: SHIPPED | OUTPATIENT
Start: 2019-04-30 | End: 2019-07-15

## 2019-04-30 RX ORDER — MECLIZINE HCL 12.5 MG/1
TABLET ORAL
Qty: 90 TAB | Refills: 0 | Status: SHIPPED | OUTPATIENT
Start: 2019-04-30 | End: 2019-07-24 | Stop reason: SDUPTHER

## 2019-04-30 RX ORDER — LISINOPRIL 20 MG/1
20 TABLET ORAL DAILY
Qty: 90 TAB | Refills: 0 | Status: SHIPPED | OUTPATIENT
Start: 2019-04-30 | End: 2019-07-24 | Stop reason: SDUPTHER

## 2019-04-30 RX ORDER — TRAZODONE HYDROCHLORIDE 50 MG/1
50 TABLET ORAL
Qty: 90 TAB | Refills: 0 | Status: SHIPPED | OUTPATIENT
Start: 2019-04-30 | End: 2019-08-22 | Stop reason: SDUPTHER

## 2019-04-30 RX ORDER — ALBUTEROL SULFATE 90 UG/1
2 AEROSOL, METERED RESPIRATORY (INHALATION) EVERY 6 HOURS PRN
Qty: 3 INHALER | Refills: 0 | Status: SHIPPED | OUTPATIENT
Start: 2019-04-30 | End: 2023-04-18

## 2019-04-30 RX ORDER — LEVOTHYROXINE SODIUM 88 MCG
88 TABLET ORAL
Qty: 90 TAB | Refills: 0 | Status: SHIPPED | OUTPATIENT
Start: 2019-04-30 | End: 2019-09-27 | Stop reason: SDUPTHER

## 2019-04-30 RX ORDER — ALLOPURINOL 300 MG/1
TABLET ORAL
Qty: 90 TAB | Refills: 0 | Status: SHIPPED | OUTPATIENT
Start: 2019-04-30 | End: 2019-09-17 | Stop reason: SDUPTHER

## 2019-04-30 RX ORDER — EPINEPHRINE 0.3 MG/.3ML
0.3 INJECTION SUBCUTANEOUS ONCE
Qty: 0.3 ML | Refills: 0 | Status: SHIPPED | OUTPATIENT
Start: 2019-04-30 | End: 2019-04-30

## 2019-04-30 RX ORDER — CHOLESTYRAMINE 4 G/9G
1 POWDER, FOR SUSPENSION ORAL DAILY
Qty: 90 EACH | Refills: 0 | Status: SHIPPED | OUTPATIENT
Start: 2019-04-30 | End: 2019-07-25 | Stop reason: SDUPTHER

## 2019-04-30 RX ORDER — GABAPENTIN 300 MG/1
CAPSULE ORAL
Qty: 180 CAP | Refills: 0 | Status: SHIPPED | OUTPATIENT
Start: 2019-04-30 | End: 2019-05-29 | Stop reason: SDUPTHER

## 2019-04-30 RX ORDER — CARISOPRODOL 350 MG/1
TABLET ORAL
Qty: 30 TAB | Refills: 1 | Status: SHIPPED | OUTPATIENT
Start: 2019-04-30 | End: 2019-05-15 | Stop reason: SDUPTHER

## 2019-04-30 ASSESSMENT — ENCOUNTER SYMPTOMS
HEARTBURN: 1
VOMITING: 0
ABDOMINAL PAIN: 0
EYES NEGATIVE: 1
INSOMNIA: 0
CHILLS: 0
DEPRESSION: 0
HEADACHES: 0
NERVOUS/ANXIOUS: 1
NAUSEA: 0
FOCAL WEAKNESS: 1
SENSORY CHANGE: 0
TREMORS: 0
BACK PAIN: 1
HALLUCINATIONS: 0
SHORTNESS OF BREATH: 0
SPUTUM PRODUCTION: 0
FEVER: 0
DIARRHEA: 1
COUGH: 0
PALPITATIONS: 0
SPEECH CHANGE: 0
TINGLING: 1

## 2019-04-30 ASSESSMENT — LIFESTYLE VARIABLES: SUBSTANCE_ABUSE: 0

## 2019-04-30 NOTE — PROGRESS NOTES
Subjective:      Marium Renteria is a 63 y.o. female who presents with Leg Pain (cramping)            Patient 63-year-old female here for follow-up of her hypocalcemia and hypoparathyroidism, essential hypertension, left lumbar radiculopathy, hyperlipidemia, bee sting allergy and hypothyroidism.    We will have her repeat a PTH with calcium level to further assess her history of hypoparathyroidism and hypocalcemia.  Now that she is no longer followed by an endocrinologist since her endocrinologist no longer accepts her insurance.  We will have her referred to another endocrinologist but in the meantime we will recheck her levels and have her continue to use her vitamin D and calcium supplements as directed.  Her last calcium levels were adequate, but she has been having cramping in her hands and her calves which is concerning for possibly low her calcium level.  Information on hypoparathyroidism has been given to patient today.  She has been advised if she ends up having worsening cramping or other symptoms including dizziness which is not feeling right she is to go to the emergency room for possible assistance in management.  We will continue to follow.    Today she is requesting a referral to physical therapy preferably aqua therapy.  She has a history of a lumbar radiculopathy for which she had been seeing a pain specialist in the past for epidural injections on a regular basis.  She is currently on Soma for her muscle spasms which seems to help her chronic pain issues.  She has been referred to a neurosurgeon for a further evaluation.  Patient states that she had been told she needs to get an MRI done, and is waiting for her neurosurgeon to put the order in for the MRI for her to schedule and get done prior to her next appointment.  We will continue to follow.    She will also need her other medications refilled today.  Her medications will be sent to her pharmacy for her to continue using as directed.   "She has not been having any issues with her medications.  We will continue to follow.    She is also requesting an EpiPen, she has severe bee sting allergies and is concerned about now that the weather is warming up and flowers are improved that there are more beads present.  A EpiPen will be sent to her pharmacy for her to use as directed.  We will continue to follow.    Her blood pressure today is slightly elevated so her lisinopril will be increased to 20 mg daily.  We will have her continue to check her blood pressures at home and keep notes.  If her blood pressure continues to be elevated she has been advised to return to the clinic for further adjustment.  If she has any sudden chest pain, shortness of breath or other cardiac or neurologic symptoms she has been advised to go to the emergency room for further assistance and management.     Current medications, allergies, and problem list reviewed with patient and updated in EPIC.          Review of Systems   Constitutional: Negative for chills and fever.   HENT: Negative for hearing loss and tinnitus.    Eyes: Negative.    Respiratory: Negative for cough, sputum production and shortness of breath.    Cardiovascular: Negative for chest pain and palpitations.   Gastrointestinal: Positive for diarrhea and heartburn. Negative for abdominal pain, nausea and vomiting.   Musculoskeletal: Positive for back pain and joint pain.   Skin: Negative for rash.   Neurological: Positive for tingling and focal weakness. Negative for tremors, sensory change, speech change and headaches.   Psychiatric/Behavioral: Negative for depression, hallucinations, substance abuse and suicidal ideas. The patient is nervous/anxious. The patient does not have insomnia.           Objective:     /90 (BP Location: Left arm, Patient Position: Sitting)   Pulse 88   Temp 37.2 °C (99 °F)   Resp 16   Ht 1.575 m (5' 2\")   Wt 74.8 kg (165 lb)   LMP 10/02/1980   SpO2 95%   BMI 30.18 kg/m²  "     Physical Exam   Constitutional: She is oriented to person, place, and time.   BMI 30.18   HENT:   Head: Normocephalic and atraumatic.   Nose: Nose normal.   Mouth/Throat: Oropharynx is clear and moist.   Eyes: Pupils are equal, round, and reactive to light. Conjunctivae and EOM are normal.   Cardiovascular: Normal rate, regular rhythm and normal heart sounds.  Exam reveals no friction rub.    No murmur heard.  Pulmonary/Chest: Effort normal and breath sounds normal. No respiratory distress. She has no wheezes. She has no rales.   Abdominal: Soft. Bowel sounds are normal. She exhibits no distension. There is no tenderness.   Musculoskeletal: She exhibits tenderness. She exhibits no edema.   In wheelchair with back brace   Neurological: She is alert and oriented to person, place, and time.   Skin: Skin is warm and dry.   Psychiatric: She has a normal mood and affect. Her behavior is normal.   Nursing note and vitals reviewed.              Assessment/Plan:     1. BMI 30.0-30.9,adult  We will have her continue to watch her diet as well as exercise as tolerated.  - Patient identified as having weight management issue.  Appropriate orders and counseling given.    2. Hypoparathyroidism, unspecified hypoparathyroidism type (HCC)  We will have her recheck her PTH and calcium levels.  She will continue to take her vitamin D and calcium supplements as previously directed.  A referral to endocrine has also been made since she is no longer able to follow-up with her previous endocrinologist due to insurance reasons.  We will continue to follow.  - REFERRAL TO ENDOCRINOLOGY  - PTH WITH CALCIUM; Future  - Comp Metabolic Panel; Future    3. Essential hypertension  We will increase her lisinopril to 20 mg daily.  We will have her check her blood pressures at home and keep notes.    4. Left lumbar radiculopathy  We will have her continue to use her medications as directed.  Referral to physical therapy has been made for patient  today.  She will also continue to follow-up with her neurosurgeon as directed.  We will continue to follow.  - REFERRAL TO PHYSICAL THERAPY Reason for Therapy: Eval/Treat/Report    5. Hypothyroidism, unspecified type  We will have her continue to take her thyroid medication as directed.  Her last TSH level was within normal limits.  We will continue to follow.  - REFERRAL TO ENDOCRINOLOGY    6. Mixed hyperlipidemia  We will have her continue discussed diet and exercise.  We will continue to follow.    7. Bee sting allergy  An EpiPen kit has been sent to her pharmacy for her to use as directed.  We will continue to follow.  - EPINEPHrine (EPIPEN 2-MIRELA) 0.3 MG/0.3ML Solution Auto-injector solution for injection; 0.3 mL by Intramuscular route Once for 1 dose.  Dispense: 0.3 mL; Refill: 0

## 2019-05-01 NOTE — TELEPHONE ENCOUNTER
1. Caller Name: [facility]                                         Call Back Number: [phone]      Patient approves a detailed voicemail message: N\A    [facility]u called needing a day supply on pt's Soma Rx.     Called Maru back. Pt's Rx should be a 14 day supply based on previous scripts.

## 2019-05-02 ENCOUNTER — PHYSICAL THERAPY (OUTPATIENT)
Dept: PHYSICAL THERAPY | Facility: REHABILITATION | Age: 64
End: 2019-05-02
Attending: FAMILY MEDICINE
Payer: COMMERCIAL

## 2019-05-02 DIAGNOSIS — G89.29 CHRONIC BILATERAL LOW BACK PAIN WITH BILATERAL SCIATICA: ICD-10-CM

## 2019-05-02 DIAGNOSIS — M54.42 CHRONIC BILATERAL LOW BACK PAIN WITH BILATERAL SCIATICA: ICD-10-CM

## 2019-05-02 DIAGNOSIS — M54.41 CHRONIC BILATERAL LOW BACK PAIN WITH BILATERAL SCIATICA: ICD-10-CM

## 2019-05-02 PROCEDURE — 97162 PT EVAL MOD COMPLEX 30 MIN: CPT

## 2019-05-02 ASSESSMENT — ENCOUNTER SYMPTOMS
PAIN SCALE AT HIGHEST: 9
PAIN SCALE: 8
ALLEVIATING FACTORS: RELAXATION
PAIN SCALE AT LOWEST: 8
ALLEVIATING FACTORS: STRETCHING
ALLEVIATING FACTORS: PAIN MEDICATION
EXACERBATED BY: STANDING
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
## 2019-05-06 DIAGNOSIS — E20.9 HYPOPARATHYROIDISM, UNSPECIFIED HYPOPARATHYROIDISM TYPE (HCC): ICD-10-CM

## 2019-05-06 DIAGNOSIS — E03.9 HYPOTHYROIDISM, UNSPECIFIED TYPE: ICD-10-CM

## 2019-05-07 ENCOUNTER — TELEPHONE (OUTPATIENT)
Dept: MEDICAL GROUP | Facility: MEDICAL CENTER | Age: 64
End: 2019-05-07

## 2019-05-07 NOTE — TELEPHONE ENCOUNTER
MEDICATION PRIOR AUTHORIZATION NEEDED:    1. Name of Medication: epipen    2. Requested By (Name of Pharmacy): [facility]     3. Is insurance on file current? yes    4. What is the name & phone number of the 3rd party payor? [email or web address]

## 2019-05-13 ENCOUNTER — HOSPITAL ENCOUNTER (OUTPATIENT)
Dept: CARDIOLOGY | Facility: MEDICAL CENTER | Age: 64
End: 2019-05-13
Attending: NEUROLOGICAL SURGERY
Payer: COMMERCIAL

## 2019-05-13 PROCEDURE — 93005 ELECTROCARDIOGRAM TRACING: CPT | Performed by: NURSE PRACTITIONER

## 2019-05-14 LAB — EKG IMPRESSION: NORMAL

## 2019-05-14 PROCEDURE — 93010 ELECTROCARDIOGRAM REPORT: CPT | Performed by: INTERNAL MEDICINE

## 2019-05-15 ENCOUNTER — OFFICE VISIT (OUTPATIENT)
Dept: MEDICAL GROUP | Facility: MEDICAL CENTER | Age: 64
End: 2019-05-15
Attending: FAMILY MEDICINE
Payer: COMMERCIAL

## 2019-05-15 VITALS
WEIGHT: 164 LBS | HEART RATE: 100 BPM | RESPIRATION RATE: 16 BRPM | TEMPERATURE: 96.7 F | DIASTOLIC BLOOD PRESSURE: 80 MMHG | BODY MASS INDEX: 30.18 KG/M2 | HEIGHT: 62 IN | SYSTOLIC BLOOD PRESSURE: 120 MMHG | OXYGEN SATURATION: 95 %

## 2019-05-15 DIAGNOSIS — E20.9 HYPOPARATHYROIDISM, UNSPECIFIED HYPOPARATHYROIDISM TYPE (HCC): ICD-10-CM

## 2019-05-15 DIAGNOSIS — G89.4 CHRONIC PAIN SYNDROME: ICD-10-CM

## 2019-05-15 DIAGNOSIS — R58 BLEEDING: ICD-10-CM

## 2019-05-15 PROCEDURE — 99213 OFFICE O/P EST LOW 20 MIN: CPT | Performed by: FAMILY MEDICINE

## 2019-05-15 PROCEDURE — 99214 OFFICE O/P EST MOD 30 MIN: CPT | Performed by: FAMILY MEDICINE

## 2019-05-15 RX ORDER — CARISOPRODOL 350 MG/1
TABLET ORAL
Qty: 30 TAB | Refills: 1 | Status: SHIPPED | OUTPATIENT
Start: 2019-05-15 | End: 2019-05-22

## 2019-05-15 ASSESSMENT — ENCOUNTER SYMPTOMS
NECK PAIN: 1
NAUSEA: 0
SPUTUM PRODUCTION: 0
NERVOUS/ANXIOUS: 1
SPEECH CHANGE: 0
TREMORS: 0
BACK PAIN: 1
BRUISES/BLEEDS EASILY: 1
FOCAL WEAKNESS: 0
COUGH: 0
PALPITATIONS: 0
DEPRESSION: 1
VOMITING: 0
ABDOMINAL PAIN: 0
TINGLING: 1
SENSORY CHANGE: 0
CHILLS: 0
INSOMNIA: 0
SHORTNESS OF BREATH: 0
HALLUCINATIONS: 0
FEVER: 0
HEADACHES: 0

## 2019-05-15 ASSESSMENT — LIFESTYLE VARIABLES: SUBSTANCE_ABUSE: 0

## 2019-05-15 NOTE — PROGRESS NOTES
Subjective:      Marium Renteria is a 63 y.o. female who presents with Results (labs)            Patient 63-year-old female here to follow-up on her recent blood work.    She has a history of hypoparathyroidism, hypocalcemia, chronic pain, hypokalemia, and a recent complaint about bleeding after a blood draw.    She recently had her complete metabolic panel, parathyroid level and calcium level drawn.  Her parathyroid level was down to 11 with a calcium of 7.6.  Patient states that she started increasing her calcium intake along with her vitamin D intake.  She has also been in touch with her old endocrinologist and had discussed this with her as well.  Since her previous endocrinologist no longer accepts her insurance she will be establishing with a new endocrinologist which she has not done yet.  We will have her continue to take her medications as directed and will continue to follow.    Following the blood draw she states that approximately 2 hours after her blood draw she removed the bandaging and the site of the blood draw started to bleed again.  Patient is concerned about a bleeding problem so will check a complete blood count to recheck her complete blood count we will also check a PT/INR and PTT.  We will continue to follow.    She will also need her Soma refilled today.  She is still having issues with her hips bilaterally as well as shoulders bilaterally.  She is following up with Dr. Macdonald in neurosurgery, and also an orthopedic doctor for her hips.  She will be undergoing an MRI soon, but has not yet got that done.  We will have her to continue taking her medications as previously directed.  We will continue to follow.     Current medications, allergies, and problem list reviewed with patient and updated in EPIC.          Review of Systems   Constitutional: Negative for chills and fever.   HENT: Negative for hearing loss and tinnitus.    Respiratory: Negative for cough, sputum production and  "shortness of breath.    Cardiovascular: Negative for chest pain and palpitations.   Gastrointestinal: Negative for abdominal pain, nausea and vomiting.   Musculoskeletal: Positive for back pain, joint pain and neck pain.   Skin: Negative for rash.   Neurological: Positive for tingling. Negative for tremors, sensory change, speech change, focal weakness and headaches.   Endo/Heme/Allergies: Positive for environmental allergies. Bruises/bleeds easily.   Psychiatric/Behavioral: Positive for depression. Negative for hallucinations, substance abuse and suicidal ideas. The patient is nervous/anxious. The patient does not have insomnia.           Objective:     /80 (BP Location: Left arm, Patient Position: Sitting)   Pulse 100   Temp 35.9 °C (96.7 °F)   Resp 16   Ht 1.575 m (5' 2\")   Wt 74.4 kg (164 lb)   LMP 10/02/1980   SpO2 95%   BMI 30.00 kg/m²      Physical Exam   Constitutional: She is oriented to person, place, and time.   BMI 30.00   HENT:   Head: Normocephalic and atraumatic.   Cardiovascular: Normal rate, regular rhythm and normal heart sounds.  Exam reveals no friction rub.    No murmur heard.  Pulmonary/Chest: Effort normal and breath sounds normal. No respiratory distress. She has no wheezes. She has no rales.   Abdominal: Soft. Bowel sounds are normal. She exhibits no distension. There is no tenderness.   Musculoskeletal:   In wheelchair   Neurological: She is alert and oriented to person, place, and time.   Skin: Skin is warm and dry.   Psychiatric: She has a normal mood and affect. Her behavior is normal.   Nursing note and vitals reviewed.              Assessment/Plan:     1. Chronic pain syndrome  We will have her continue to use her medications as directed and also follow-up with her orthopedic as well as neurosurgeon for continued management of her chronic pain issues.  We will continue to follow.  - carisoprodol (SOMA) 350 MG Tab; TAKE ONE TABLET BY MOUTH EVERY 8 HOURS AS NEEDED MUSCLE " SPASMS  Dispense: 30 Tab; Refill: 1    2. Hypoparathyroidism, unspecified hypoparathyroidism type (HCC)  Reviewed the results of her recent blood work.  She will continue to follow-up with endocrine in regards to management of her hypocalcemia as well as hypoparathyroidism and hypothyroidism.  We will continue to follow.    3. Bleeding  Since she had an episode of bleeding after a blood draw a CBC, PT and PTT will be ordered for her today.  We will continue to follow.  - CBC WITH DIFFERENTIAL; Future

## 2019-05-20 ENCOUNTER — APPOINTMENT (OUTPATIENT)
Dept: RADIOLOGY | Facility: MEDICAL CENTER | Age: 64
End: 2019-05-20
Attending: NURSE PRACTITIONER
Payer: COMMERCIAL

## 2019-05-21 ENCOUNTER — HOSPITAL ENCOUNTER (OUTPATIENT)
Dept: RADIOLOGY | Facility: MEDICAL CENTER | Age: 64
End: 2019-05-21
Attending: NURSE PRACTITIONER
Payer: COMMERCIAL

## 2019-05-21 DIAGNOSIS — M54.5 LOW BACK PAIN, UNSPECIFIED BACK PAIN LATERALITY, UNSPECIFIED CHRONICITY, WITH SCIATICA PRESENCE UNSPECIFIED: ICD-10-CM

## 2019-05-21 DIAGNOSIS — F41.9 ANXIETY: ICD-10-CM

## 2019-05-21 PROCEDURE — 72110 X-RAY EXAM L-2 SPINE 4/>VWS: CPT

## 2019-05-21 RX ORDER — LORAZEPAM 1 MG/1
TABLET ORAL
Qty: 30 TAB | Refills: 0 | Status: SHIPPED | OUTPATIENT
Start: 2019-05-21 | End: 2019-06-20

## 2019-05-21 NOTE — TELEPHONE ENCOUNTER
Please let patient know I will refill her lorazepam for 30-day supply.  It looks like she is due for a urine drug screen, so she will need to provide this at the time of medication pickup.  Please obtain the day of last medication dose at the time of medication pickup.    Alix Mejia M.D.

## 2019-05-29 ENCOUNTER — OFFICE VISIT (OUTPATIENT)
Dept: MEDICAL GROUP | Facility: MEDICAL CENTER | Age: 64
End: 2019-05-29
Attending: FAMILY MEDICINE
Payer: COMMERCIAL

## 2019-05-29 ENCOUNTER — HOSPITAL ENCOUNTER (OUTPATIENT)
Facility: MEDICAL CENTER | Age: 64
End: 2019-05-29
Attending: FAMILY MEDICINE
Payer: COMMERCIAL

## 2019-05-29 VITALS
SYSTOLIC BLOOD PRESSURE: 120 MMHG | TEMPERATURE: 97.8 F | WEIGHT: 163 LBS | OXYGEN SATURATION: 96 % | RESPIRATION RATE: 16 BRPM | DIASTOLIC BLOOD PRESSURE: 80 MMHG | HEIGHT: 62 IN | BODY MASS INDEX: 30 KG/M2 | HEART RATE: 88 BPM

## 2019-05-29 DIAGNOSIS — F39 MOOD DISORDER (HCC): ICD-10-CM

## 2019-05-29 DIAGNOSIS — M47.12 OSTEOARTHRITIS OF CERVICAL SPINE WITH MYELOPATHY: ICD-10-CM

## 2019-05-29 DIAGNOSIS — G62.9 NEUROPATHY: ICD-10-CM

## 2019-05-29 DIAGNOSIS — G47.00 INSOMNIA, UNSPECIFIED TYPE: ICD-10-CM

## 2019-05-29 PROCEDURE — G0480 DRUG TEST DEF 1-7 CLASSES: HCPCS

## 2019-05-29 PROCEDURE — 80307 DRUG TEST PRSMV CHEM ANLYZR: CPT

## 2019-05-29 PROCEDURE — 99212 OFFICE O/P EST SF 10 MIN: CPT | Performed by: FAMILY MEDICINE

## 2019-05-29 PROCEDURE — 99214 OFFICE O/P EST MOD 30 MIN: CPT | Performed by: FAMILY MEDICINE

## 2019-05-29 ASSESSMENT — ENCOUNTER SYMPTOMS
SPEECH CHANGE: 0
CHILLS: 0
SHORTNESS OF BREATH: 0
HALLUCINATIONS: 0
BACK PAIN: 1
NAUSEA: 0
SENSORY CHANGE: 0
INSOMNIA: 1
TINGLING: 1
FOCAL WEAKNESS: 1
VOMITING: 0
HEADACHES: 1
PALPITATIONS: 0
NERVOUS/ANXIOUS: 1
NECK PAIN: 1
COUGH: 0
EYES NEGATIVE: 1
FEVER: 0
SPUTUM PRODUCTION: 0
ABDOMINAL PAIN: 0
TREMORS: 0

## 2019-05-29 ASSESSMENT — LIFESTYLE VARIABLES: SUBSTANCE_ABUSE: 0

## 2019-05-29 NOTE — PROGRESS NOTES
Subjective:      Marium Renteria is a 63 y.o. female who presents with Follow-Up (chronic pain) and Results (labs)            Patient 63-year-old female here to follow-up on her anxiety, insomnia, and chronic pain issues in her cervical spine.    We will have her get a urine drug screen done, since she is requesting her Ativan prescription refilled today.  She had requested her Ativan prescription last week and her prescription was written by a covering physician.  She was not able to get her prescription filled until a urine drug screen has been submitted.  Patient has been advised that she should be seeing a psychiatrist for the management of her anxiety since she continues to need her Ativan to be refilled.  She states she is using her Ativan to help her sleep at night due to her insomnia.  Will refer patient to psychiatry as well as a sleep study for a further evaluation.  Discussed the need to try a antidepressant medication to help manage her anxiety symptoms, but patient declined today.  Stating that she does not want to be on more medications.  We will also refer for a sleep study in addition to the psychiatry referral for a further evaluation of possible sleep apnea causing her anxiety.  Patient has been advised if she has any worsening of her current symptoms to go to the emergency room for a further assessment and assistance in management.    Patient also has issues with chronic pain and has been using Soma for her muscle spasms.  She has been referred to pain management, but since pain management is located in [place] patient has been very reluctant to make an appointment to be seen.  Once again patient has been advised that the medicine she is on is not meant to be used on a long-term basis, and eventually she will need to be weaned off if her symptoms do not improve.  We will have her continue to use her medication as directed until she is able to schedule an appointment with pain management  "or be weaned off.  We will continue to follow.     Current medications, allergies, and problem list reviewed with patient and updated in EPIC.          Review of Systems   Constitutional: Negative for chills and fever.   HENT: Negative for hearing loss and tinnitus.    Eyes: Negative.    Respiratory: Negative for cough, sputum production and shortness of breath.    Cardiovascular: Negative for chest pain and palpitations.   Gastrointestinal: Negative for abdominal pain, nausea and vomiting.   Musculoskeletal: Positive for back pain, joint pain and neck pain.   Skin: Negative for rash.   Neurological: Positive for tingling, focal weakness and headaches. Negative for tremors, sensory change and speech change.   Psychiatric/Behavioral: Negative for hallucinations, substance abuse and suicidal ideas. The patient is nervous/anxious and has insomnia.           Objective:     /80 (BP Location: Left arm, Patient Position: Sitting)   Pulse 88   Temp 36.6 °C (97.8 °F)   Resp 16   Ht 1.575 m (5' 2\")   Wt 73.9 kg (163 lb)   LMP 10/02/1980   SpO2 96%   BMI 29.81 kg/m²      Physical Exam   Constitutional: She is oriented to person, place, and time.   BMI 29.81   HENT:   Head: Normocephalic and atraumatic.   Cardiovascular: Normal rate, regular rhythm and normal heart sounds.  Exam reveals no friction rub.    No murmur heard.  Pulmonary/Chest: Effort normal and breath sounds normal. No respiratory distress. She has no wheezes. She has no rales.   Abdominal: Soft. Bowel sounds are normal. She exhibits no distension. There is no tenderness.   Musculoskeletal:   In wheelchair with back brace   Neurological: She is alert and oriented to person, place, and time.   Skin: Skin is warm and dry.   Psychiatric: She has a normal mood and affect. Her behavior is normal.   Nursing note and vitals reviewed.              Assessment/Plan:     1. Osteoarthritis of cervical spine with myelopathy  We will have her continue using her " medications as directed and a drug screen will be ordered today.  Also discussed referring to pain management again for a further evaluation as well as assistance in management of her chronic pain.  We will continue to follow.  - PAIN MANAGEMENT PANEL, TAYA W/ RFLX TO QNT; Future    2. Mood disorder (HCC)  We will have her continue to use her medication as directed until she is further assessed by psychiatry.  Also discussed psychology as another management strategy.  We will continue to follow.  - PAIN MANAGEMENT PANEL, TAYA W/ RFLX TO QNT; Future  - REFERRAL TO PSYCHIATRY    3. Insomnia, unspecified type  Since she has difficulty falling and staying asleep will have her referred to sleep medicine for a sleep study to further assess for sleep apnea as a possible cause.  We will continue to follow.  - REFERRAL TO PSYCHIATRY

## 2019-05-29 NOTE — LETTER
[facility]  Papi Almonte M.D.  [address]  [place] [address]  Fax: [phone]   Authorization for Release/Disclosure of   Protected Health Information   Name: MARIUM RENTERIA : 1955 SSN: [id number]   Address: [address] [place], [place] [address] [address] Phone:    [phone] (home) [phone] (work)   I authorize the entity listed below to release/disclose the PHI below to:   [facility]/Papi Almonte M.D. and Papi Almonte M.D.   Provider or Entity Name: Dr. Gutierrez [facility]     Address   City, [facility]   Phone:      Fax:     Reason for request: continuity of care   Information to be released:    [  ] LAST COLONOSCOPY,  including any PATH REPORT and follow-up  [  ] LAST FIT/COLOGUARD RESULT [  ] LAST DEXA  [  ] LAST MAMMOGRAM  [  ] LAST PAP  [  ] LAST LABS [  ] RETINA EXAM REPORT  [  ] IMMUNIZATION RECORDS  [ x ] Release all info      [  ] Check here and initial the line next to each item to release ALL health information INCLUDING  _____ Care and treatment for drug and / or alcohol abuse  _____ HIV testing, infection status, or AIDS  _____ Genetic Testing    DATES OF SERVICE OR TIME PERIOD TO BE DISCLOSED: _____________  I understand and acknowledge that:  * This Authorization may be revoked at any time by you in writing, except if your health information has already been used or disclosed.  * Your health information that will be used or disclosed as a result of you signing this authorization could be re-disclosed by the recipient. If this occurs, your re-disclosed health information may no longer be protected by State or Federal laws.  * You may refuse to sign this Authorization. Your refusal will not affect your ability to obtain treatment.  * This Authorization becomes effective upon signing and will  on (date) __________.      If no date is indicated, this Authorization will  one (1) year from the signature date.    Name: Marium Renteria    Signature:   Date:     2019          PLEASE FAX REQUESTED RECORDS BACK TO: [phone]

## 2019-05-30 RX ORDER — GABAPENTIN 300 MG/1
CAPSULE ORAL
Qty: 180 CAP | Refills: 0 | Status: SHIPPED | OUTPATIENT
Start: 2019-05-30 | End: 2019-07-22 | Stop reason: SDUPTHER

## 2019-05-31 LAB
AMPHET CTO UR CFM-MCNC: NEGATIVE NG/ML
BARBITURATES CTO UR CFM-MCNC: NEGATIVE NG/ML
BENZODIAZ CTO UR CFM-MCNC: NEGATIVE NG/ML
BUPRENORPHINE UR-MCNC: NEGATIVE NG/ML
CANNABINOIDS CTO UR CFM-MCNC: NEGATIVE NG/ML
CARISOPRODOL UR-MCNC: POSITIVE NG/ML
COCAINE CTO UR CFM-MCNC: NEGATIVE NG/ML
DRUG SCREEN COMMENT UR-IMP: NORMAL
ETHYL GLUCURONIDE UR QL SCN: POSITIVE NG/ML
FENTANYL UR-MCNC: NEGATIVE NG/ML
MEPERIDINE CTO UR SCN-MCNC: NEGATIVE NG/ML
METHADONE CTO UR CFM-MCNC: NEGATIVE NG/ML
OPIATES UR QL SCN: NEGATIVE NG/ML
OXYCDOXYM URSCRN 2005102: NEGATIVE NG/ML
PCP CTO UR CFM-MCNC: NEGATIVE NG/ML
PROPOXYPH CTO UR CFM-MCNC: NEGATIVE NG/ML
TAPENTADOL UR-MCNC: NEGATIVE NG/ML
TRAMADOL CTO UR SCN-MCNC: NEGATIVE NG/ML
ZOLPIDEM UR-MCNC: NEGATIVE NG/ML

## 2019-06-02 LAB
ETHYL GLUCURONIDE UR CFM-MCNC: NORMAL NG/ML
ETHYL SULFATE UR CFM-MCNC: 2610 NG/ML

## 2019-06-03 LAB
CARISOPRODOL UR-MCNC: <100 NG/ML
MEPROBAMATE UR-MCNC: 9020 NG/ML

## 2019-06-04 ENCOUNTER — APPOINTMENT (OUTPATIENT)
Dept: PHYSICAL THERAPY | Facility: REHABILITATION | Age: 64
End: 2019-06-04
Attending: FAMILY MEDICINE
Payer: COMMERCIAL

## 2019-06-06 ENCOUNTER — APPOINTMENT (OUTPATIENT)
Dept: PHYSICAL THERAPY | Facility: REHABILITATION | Age: 64
End: 2019-06-06
Attending: FAMILY MEDICINE
Payer: COMMERCIAL

## 2019-06-11 ENCOUNTER — APPOINTMENT (OUTPATIENT)
Dept: PHYSICAL THERAPY | Facility: REHABILITATION | Age: 64
End: 2019-06-11
Attending: FAMILY MEDICINE
Payer: COMMERCIAL

## 2019-06-13 ENCOUNTER — APPOINTMENT (OUTPATIENT)
Dept: PHYSICAL THERAPY | Facility: REHABILITATION | Age: 64
End: 2019-06-13
Attending: FAMILY MEDICINE
Payer: COMMERCIAL

## 2019-06-18 ENCOUNTER — APPOINTMENT (OUTPATIENT)
Dept: PHYSICAL THERAPY | Facility: REHABILITATION | Age: 64
End: 2019-06-18
Attending: FAMILY MEDICINE
Payer: COMMERCIAL

## 2019-06-20 ENCOUNTER — APPOINTMENT (OUTPATIENT)
Dept: PHYSICAL THERAPY | Facility: REHABILITATION | Age: 64
End: 2019-06-20
Attending: FAMILY MEDICINE
Payer: COMMERCIAL

## 2019-06-25 ENCOUNTER — TELEPHONE (OUTPATIENT)
Dept: MEDICAL GROUP | Facility: MEDICAL CENTER | Age: 64
End: 2019-06-25

## 2019-06-25 ENCOUNTER — APPOINTMENT (OUTPATIENT)
Dept: PHYSICAL THERAPY | Facility: REHABILITATION | Age: 64
End: 2019-06-25
Attending: FAMILY MEDICINE
Payer: COMMERCIAL

## 2019-06-25 DIAGNOSIS — F41.9 ANXIETY: ICD-10-CM

## 2019-06-25 RX ORDER — LORAZEPAM 1 MG/1
TABLET ORAL
Qty: 30 TAB | Refills: 0 | Status: CANCELLED | OUTPATIENT
Start: 2019-06-25

## 2019-06-25 NOTE — TELEPHONE ENCOUNTER
1. Caller Name: Marium Renteria                                           Call Back Number: [phone] (home) [phone] (work)        Patient approves a detailed voicemail message: N\A    Patient is requesting a new referral for aqua physical therapy for lower back. Please advise.

## 2019-06-25 NOTE — TELEPHONE ENCOUNTER
Was the patient seen in the last year in this department? Yes    Does patient have an active prescription for medications requested? Yes    Received Request Via: Patient       Pt has yet to establish with mental health. Number was provided to Pt.

## 2019-06-26 ENCOUNTER — OFFICE VISIT (OUTPATIENT)
Dept: MEDICAL GROUP | Facility: MEDICAL CENTER | Age: 64
End: 2019-06-26
Attending: FAMILY MEDICINE
Payer: COMMERCIAL

## 2019-06-26 ENCOUNTER — HOSPITAL ENCOUNTER (OUTPATIENT)
Facility: MEDICAL CENTER | Age: 64
End: 2019-06-26
Attending: FAMILY MEDICINE
Payer: COMMERCIAL

## 2019-06-26 VITALS
SYSTOLIC BLOOD PRESSURE: 130 MMHG | HEIGHT: 62 IN | TEMPERATURE: 98.9 F | WEIGHT: 163 LBS | DIASTOLIC BLOOD PRESSURE: 90 MMHG | OXYGEN SATURATION: 100 % | HEART RATE: 86 BPM | BODY MASS INDEX: 30 KG/M2 | RESPIRATION RATE: 14 BRPM

## 2019-06-26 DIAGNOSIS — M25.511 BILATERAL SHOULDER PAIN, UNSPECIFIED CHRONICITY: ICD-10-CM

## 2019-06-26 DIAGNOSIS — M47.12 OSTEOARTHRITIS OF CERVICAL SPINE WITH MYELOPATHY: ICD-10-CM

## 2019-06-26 DIAGNOSIS — T84.84XD PAIN DUE TO HIP JOINT PROSTHESIS, SUBSEQUENT ENCOUNTER: ICD-10-CM

## 2019-06-26 DIAGNOSIS — G89.4 CHRONIC PAIN SYNDROME: ICD-10-CM

## 2019-06-26 DIAGNOSIS — Z96.649 PAIN DUE TO HIP JOINT PROSTHESIS, SUBSEQUENT ENCOUNTER: ICD-10-CM

## 2019-06-26 DIAGNOSIS — F39 MOOD DISORDER (HCC): ICD-10-CM

## 2019-06-26 DIAGNOSIS — M25.512 BILATERAL SHOULDER PAIN, UNSPECIFIED CHRONICITY: ICD-10-CM

## 2019-06-26 PROCEDURE — 80307 DRUG TEST PRSMV CHEM ANLYZR: CPT

## 2019-06-26 PROCEDURE — 99214 OFFICE O/P EST MOD 30 MIN: CPT | Performed by: FAMILY MEDICINE

## 2019-06-26 PROCEDURE — G0480 DRUG TEST DEF 1-7 CLASSES: HCPCS

## 2019-06-26 PROCEDURE — 99213 OFFICE O/P EST LOW 20 MIN: CPT | Performed by: FAMILY MEDICINE

## 2019-06-26 ASSESSMENT — ENCOUNTER SYMPTOMS
HEADACHES: 0
NERVOUS/ANXIOUS: 1
SPUTUM PRODUCTION: 0
INSOMNIA: 1
PALPITATIONS: 0
BACK PAIN: 1
ABDOMINAL PAIN: 0
FEVER: 0
VOMITING: 0
TREMORS: 0
HALLUCINATIONS: 0
SENSORY CHANGE: 0
SPEECH CHANGE: 0
COUGH: 0
TINGLING: 1
DEPRESSION: 1
NAUSEA: 0
CHILLS: 0
FOCAL WEAKNESS: 0
SHORTNESS OF BREATH: 0
NECK PAIN: 1

## 2019-06-26 ASSESSMENT — PATIENT HEALTH QUESTIONNAIRE - PHQ9
1. LITTLE INTEREST OR PLEASURE IN DOING THINGS: NOT AT ALL
4. FEELING TIRED OR HAVING LITTLE ENERGY: NOT AT ALL
8. MOVING OR SPEAKING SO SLOWLY THAT OTHER PEOPLE COULD HAVE NOTICED. OR THE OPPOSITE, BEING SO FIGETY OR RESTLESS THAT YOU HAVE BEEN MOVING AROUND A LOT MORE THAN USUAL: NOT AT ALL
2. FEELING DOWN, DEPRESSED, IRRITABLE, OR HOPELESS: NOT AT ALL
6. FEELING BAD ABOUT YOURSELF - OR THAT YOU ARE A FAILURE OR HAVE LET YOURSELF OR YOUR FAMILY DOWN: NOT AL ALL
SUM OF ALL RESPONSES TO PHQ QUESTIONS 1-9: 0
SUM OF ALL RESPONSES TO PHQ9 QUESTIONS 1 AND 2: 0
5. POOR APPETITE OR OVEREATING: NOT AT ALL
7. TROUBLE CONCENTRATING ON THINGS, SUCH AS READING THE NEWSPAPER OR WATCHING TELEVISION: NOT AT ALL
3. TROUBLE FALLING OR STAYING ASLEEP OR SLEEPING TOO MUCH: NOT AT ALL
9. THOUGHTS THAT YOU WOULD BE BETTER OFF DEAD, OR OF HURTING YOURSELF: NOT AT ALL

## 2019-06-26 ASSESSMENT — LIFESTYLE VARIABLES: SUBSTANCE_ABUSE: 0

## 2019-06-26 NOTE — LETTER
On license of [facility]  Papi Almonte M.D.  [address]  [place] [address]  Fax: [phone]   Authorization for Release/Disclosure of   Protected Health Information   Name: MARIUM RENTERIA : 1955 SSN: [id number]   Address: [address] [place], [place] [address] [address] Phone:    [phone] (home) [phone] (work)   I authorize the entity listed below to release/disclose the PHI below to:   On license of [facility]/Papi Almonte M.D. and Papi Almonte M.D.   Provider or Entity Name: Dr. Gutierrez     Address   City, State, [facility]   Phone:      Fax:     Reason for request: continuity of care   Information to be released:    [  ] LAST COLONOSCOPY,  including any PATH REPORT and follow-up  [  ] LAST FIT/COLOGUARD RESULT [  ] LAST DEXA  [  ] LAST MAMMOGRAM  [  ] LAST PAP  [  ] LAST LABS [  ] RETINA EXAM REPORT  [  ] IMMUNIZATION RECORDS  [ x ] Release all info      [  ] Check here and initial the line next to each item to release ALL health information INCLUDING  _____ Care and treatment for drug and / or alcohol abuse  _____ HIV testing, infection status, or AIDS  _____ Genetic Testing    DATES OF SERVICE OR TIME PERIOD TO BE DISCLOSED: _____________  I understand and acknowledge that:  * This Authorization may be revoked at any time by you in writing, except if your health information has already been used or disclosed.  * Your health information that will be used or disclosed as a result of you signing this authorization could be re-disclosed by the recipient. If this occurs, your re-disclosed health information may no longer be protected by State or Federal laws.  * You may refuse to sign this Authorization. Your refusal will not affect your ability to obtain treatment.  * This Authorization becomes effective upon signing and will  on (date) __________.      If no date is indicated, this Authorization will  one (1) year from the signature date.    Name: Marium Renteria    Signature:   Date:     2019            PLEASE FAX REQUESTED RECORDS BACK TO: [phone]

## 2019-06-26 NOTE — PROGRESS NOTES
Subjective:      Marium Renteria is a 63 y.o. female who presents with Medication Refill            Patient 63-year-old female here for follow-up of her mood disorder, chronic back pain, osteoarthritis, bilateral shoulder pain and bilateral hip pain.    She states she would like to have a referral to physical therapy but for aqua therapy.  She states that the aqua therapy is much better because of her upper and lower body problems as well as her chronic back pain.  She has been using Soma to manage her muscle spasms.  On her last urine drug screen she was noted to have a significant amount of alcohol in her system along with her Soma.  Patient has been warned today that she cannot drink alcohol while using her Soma or else we will need to discontinue filling her Soma.  She had also been on a benzodiazepine for sleeping along with her Soma which once again she has been warned that she cannot use both medications at a time.  Her benzodiazepine will not be filled by this clinic.  We will have her follow-up with mental health for further management of her anxiety issues.  Patient states that she needs to use her benzodiazepine to sleep at night.  She has been sent for a sleep study as well to see if part of that problem may be due to sleep apnea.  We will continue to follow.     Current medications, allergies, and problem list reviewed with patient and updated in EPIC.          Review of Systems   Constitutional: Negative for chills and fever.   HENT: Negative for hearing loss and tinnitus.    Respiratory: Negative for cough, sputum production and shortness of breath.    Cardiovascular: Negative for chest pain and palpitations.   Gastrointestinal: Negative for abdominal pain, nausea and vomiting.   Musculoskeletal: Positive for back pain, joint pain and neck pain.   Skin: Negative for rash.   Neurological: Positive for tingling. Negative for tremors, sensory change, speech change, focal weakness and headaches.  "  Psychiatric/Behavioral: Positive for depression. Negative for hallucinations, substance abuse and suicidal ideas. The patient is nervous/anxious and has insomnia.           Objective:     /90 (BP Location: Left arm, Patient Position: Sitting)   Pulse 86   Temp 37.2 °C (98.9 °F)   Resp 14   Ht 1.575 m (5' 2\")   Wt 73.9 kg (163 lb)   LMP 10/02/1980   SpO2 100%   BMI 29.81 kg/m²      Physical Exam   Constitutional: She is oriented to person, place, and time.   BMI 29.81   HENT:   Head: Normocephalic and atraumatic.   Cardiovascular: Normal rate, regular rhythm and normal heart sounds.  Exam reveals no friction rub.    No murmur heard.  Pulmonary/Chest: Effort normal and breath sounds normal. No respiratory distress. She has no wheezes. She has no rales.   Abdominal: Soft. Bowel sounds are normal. She exhibits no distension. There is no tenderness.   Musculoskeletal: She exhibits tenderness. She exhibits no edema.   Patient in wheelchair with back brace   Neurological: She is alert and oriented to person, place, and time. No cranial nerve deficit. Coordination normal.   Skin: Skin is warm and dry.   Psychiatric: She has a normal mood and affect. Her behavior is normal.   Nursing note and vitals reviewed.              Assessment/Plan:     1. Chronic pain syndrome  We will have her continue to use her medication as directed we will repeat her urine drug screen today to see if the alcohol in her system at her last drug screen is still present.  Patient warned about using Soma while drinking alcohol heavily.  She is also been warned about using Soma and a benzodiazepine concurrently.  Referral to mental health has been made and also a referral to sleep study has been made for a further evaluation and possible assistance in management of her insomnia and her anxiety symptoms.  We will continue to follow.  - REFERRAL TO PHYSICAL THERAPY Reason for Therapy: Eval/Treat/Report  - PAIN MANAGEMENT ERVINN W/ RFLX TO " QNT; Future    2. Osteoarthritis of cervical spine with myelopathy  A referral to physical therapy has been made for possible aqua therapy.  We will have her continue taking her other medications as directed as long as she follows the rules applied to her medications.  We will continue to follow.  - REFERRAL TO PHYSICAL THERAPY Reason for Therapy: Eval/Treat/Report  - PAIN MANAGEMENT SCRN, W/ RFLX TO QNT; Future    3. Pain due to hip joint prosthesis, subsequent encounter  See above plan.  - REFERRAL TO PHYSICAL THERAPY Reason for Therapy: Eval/Treat/Report  - PAIN MANAGEMENT SCRN, W/ RFLX TO QNT; Future    4. Bilateral shoulder pain, unspecified chronicity  See above plan.  - REFERRAL TO PHYSICAL THERAPY Reason for Therapy: Eval/Treat/Report  - PAIN MANAGEMENT SCRN, W/ RFLX TO QNT; Future    5. Mood disorder (HCC)  We will have her referred to psychiatry for further evaluation of her mood disorder.  We will continue to follow.

## 2019-06-26 NOTE — LETTER
Counts include 234 beds at the [facility]  Papi Almonte M.D.  [address]  [place] [address]  Fax: [phone]   Authorization for Release/Disclosure of   Protected Health Information   Name: MARIUM RENTERIA : 1955 SSN: [id number]   Address: [address] [place], [place] [address] [address] Phone:    [phone] (home) [phone] (work)   I authorize the entity listed below to release/disclose the PHI below to:   Counts include 234 beds at the [facility]/Papi Almonte M.D. and Papi Almonte M.D.   Provider or Entity Name: Dr. Gutierrez     Address   City, State, [facility]   Phone:      Fax:     Reason for request: continuity of care   Information to be released:    [  ] LAST COLONOSCOPY,  including any PATH REPORT and follow-up  [  ] LAST FIT/COLOGUARD RESULT [  ] LAST DEXA  [  ] LAST MAMMOGRAM  [  ] LAST PAP  [  ] LAST LABS [  ] RETINA EXAM REPORT  [  ] IMMUNIZATION RECORDS  [ x ] Release all info      [  ] Check here and initial the line next to each item to release ALL health information INCLUDING  _____ Care and treatment for drug and / or alcohol abuse  _____ HIV testing, infection status, or AIDS  _____ Genetic Testing    DATES OF SERVICE OR TIME PERIOD TO BE DISCLOSED: _____________  I understand and acknowledge that:  * This Authorization may be revoked at any time by you in writing, except if your health information has already been used or disclosed.  * Your health information that will be used or disclosed as a result of you signing this authorization could be re-disclosed by the recipient. If this occurs, your re-disclosed health information may no longer be protected by State or Federal laws.  * You may refuse to sign this Authorization. Your refusal will not affect your ability to obtain treatment.  * This Authorization becomes effective upon signing and will  on (date) __________.      If no date is indicated, this Authorization will  one (1) year from the signature date.    Name: Marium Renteria    Signature:   Date:     2019            PLEASE FAX REQUESTED RECORDS BACK TO: [phone]

## 2019-06-30 LAB
ETHYL GLUCURONIDE UR CFM-MCNC: NORMAL NG/ML
ETHYL SULFATE UR CFM-MCNC: 3210 NG/ML

## 2019-07-01 LAB
CARISOPRODOL UR-MCNC: <100 NG/ML
MEPROBAMATE UR-MCNC: 6870 NG/ML

## 2019-07-02 ENCOUNTER — TELEPHONE (OUTPATIENT)
Dept: MEDICAL GROUP | Facility: MEDICAL CENTER | Age: 64
End: 2019-07-02

## 2019-07-02 NOTE — TELEPHONE ENCOUNTER
1. Caller Name: Marium Renteria                                           Call Back Number: .phone        Patient approves a detailed voicemail message: Yes    Pt called and left a message following up on her referral to [facility], her lab results from last week, and to advise Dr. Almonte of her Endocrinology apt on 7/12/19.    Ref[facility] sent:   [facility]  [address] [place] [address]  [phone] FAX      Please advise on pt's labs, or should pt schedule an appointment to go over results.  
I will SWITCH the dose or number of times a day I take the medications listed below when I get home from the hospital:  None

## 2019-07-15 RX ORDER — POTASSIUM CHLORIDE 750 MG/1
TABLET, EXTENDED RELEASE ORAL
Qty: 20 TAB | Refills: 0 | Status: SHIPPED | OUTPATIENT
Start: 2019-07-15 | End: 2022-05-02 | Stop reason: SDUPTHER

## 2019-07-22 ENCOUNTER — TELEPHONE (OUTPATIENT)
Dept: MEDICAL GROUP | Facility: MEDICAL CENTER | Age: 64
End: 2019-07-22

## 2019-07-22 DIAGNOSIS — G62.9 NEUROPATHY: ICD-10-CM

## 2019-07-22 RX ORDER — GABAPENTIN 300 MG/1
CAPSULE ORAL
Qty: 180 CAP | Refills: 0 | Status: SHIPPED | OUTPATIENT
Start: 2019-07-22 | End: 2019-08-22 | Stop reason: SDUPTHER

## 2019-07-24 DIAGNOSIS — R42 VERTIGO: ICD-10-CM

## 2019-07-24 NOTE — TELEPHONE ENCOUNTER
Pt notified of providers result notes.     Pt is currently taking Soma. It was last written on 05/2019 with 1 additional refill. Pt takes Rx as needed.

## 2019-07-25 RX ORDER — LISINOPRIL 20 MG/1
TABLET ORAL
Qty: 90 TAB | Refills: 0 | Status: SHIPPED | OUTPATIENT
Start: 2019-07-25 | End: 2019-09-27 | Stop reason: SDUPTHER

## 2019-07-25 RX ORDER — CHOLESTYRAMINE 4 G/9G
POWDER, FOR SUSPENSION ORAL
Qty: 30 EACH | Refills: 0 | Status: SHIPPED | OUTPATIENT
Start: 2019-07-25 | End: 2019-08-20 | Stop reason: SDUPTHER

## 2019-07-25 RX ORDER — MECLIZINE HCL 12.5 MG/1
TABLET ORAL
Qty: 90 TAB | Refills: 0 | Status: SHIPPED | OUTPATIENT
Start: 2019-07-25 | End: 2019-12-19 | Stop reason: SDUPTHER

## 2019-08-08 ENCOUNTER — NON-PROVIDER VISIT (OUTPATIENT)
Dept: NEUROLOGY | Facility: MEDICAL CENTER | Age: 64
End: 2019-08-08
Payer: COMMERCIAL

## 2019-08-08 DIAGNOSIS — M54.16 LUMBAR RADICULOPATHY: ICD-10-CM

## 2019-08-08 PROCEDURE — 95886 MUSC TEST DONE W/N TEST COMP: CPT | Performed by: PSYCHIATRY & NEUROLOGY

## 2019-08-08 PROCEDURE — 95910 NRV CNDJ TEST 7-8 STUDIES: CPT | Performed by: PSYCHIATRY & NEUROLOGY

## 2019-08-08 NOTE — PROCEDURES
"NERVE CONDUCTION STUDIES AND ELECTROMYOGRAPHY REPORT  [facility]  08/08/19           IMPRESSION:  This is a minimally abnormal electrodiagnostic study due to prolonged H reflex on the right which can be seen in right S1 radiculopathy but in itself is not diagnostic.  There is no evidence of lumbosacral radiculopathy on EMG or peripheral neuropathy.  Compared to 2016 nerve conduction study there is improvement in the left common peroneal motor response.  Recommend clinical correlation.      Stefania Soliman MD  Neurology - Neurophysiology  [facility]      REASON FOR REFERRAL:  Ms. Marium Renteria 63 y.o. referred by Stefania Ardon (APRN) for evaluation of chronic bilateral lumbar radiculopathies, left greater than right. Pain has been present since at least 2015. She has had multiple spine interventions including removal of hardware. This is associated with left leg weakness and numbness left lateral thigh/buttocks. Comparison study is from 2016 (Dr. Hope).    Height: 5'2\"  Weight: 168 lbs    ELECTRODIAGNOSTIC EXAMINATION:  Nerve conduction studies (NCS) and electromyography (EMG) are utilized to evaluate direct or indirect damage to the peripheral nervous system. NCS are performed to measure the nerve(s) response(s) to electrostimulation across a given nerve segment. EMG evaluates the passive and active electrical activity of the muscle(s) in question.  Muscles are innervated by specific peripheral nerves and roots. Often times, several nerves the muscle to be examined in order to determine the presence or absence of the disease process. Furthermore, nerves and muscles may need to be tested in a qpts-ta-wlyo comparison, as well as in additional extremities, as this may be crucial in characterizing the extent of the disease process, which may be diffuse or isolated and of varying degree of severity. The extent of the neurodiagnostic exam is justified as it may help arrive to a " proper diagnosis, which ultimately may contribute to better management of the patient. Therefore, the nerves to muscles examined during the study were medically necessary.    Unless otherwise noted, temperature of the extremity(s) study was monitored before and during the examination and remained between 32 and 36 degrees C for the upper extremities, and between 30 and 36 degrees C for the lower extremities. The patient tolerated testing well, without any complications.       NERVE CONDUCTION STUDY SUMMARY:  Selected nerves of the bilateral lower extremity are studied.    Normal bilateral sural sensory responses.  Normal bilateral common peroneal motor responses at the extensor digitorum brevis.  The left common peroneal motor response at the EDB is improved compared to 2016 study.  F waves are within normal limits.  Normal left common peroneal motor response at the tibialis anterior.  Normal bilateral tibial motor responses at the abductor pollicis brevis.  This is stable compared to 2016 study.  F waves are within normal limits.  H-reflexes stimulating the bilateral tibial nerves and recording at the bilateral soleus is normal on the left and prolonged on the right (normal for age and leg length is 32.5 ms).      NEEDLE EMG SUMMARY:  Concentric needle study of selected bilateral lower extremity muscles is performed.     Insertion is normal in all muscles sampled. With activation, there are normal morphology (amplitude/duration) motor unit action potentials firing with normal recruitment.       PATIENT DATA TABLES  Nerve Conduction Studies     Stim Site NR Onset (ms) Norm Onset (ms) O-P Amp (µV) Norm O-P Amp Site1 Site2 Delta-P (ms) Dist (cm) Corby (m/s) Norm Corby (m/s)   Left Sural Anti Sensory (Lat Mall)  36°C   Calf    3.4 <4.6 8.3 >3 Calf Lat Mall 4.3 14.0 *33 >40   Right Sural Anti Sensory (Lat Mall)  36°C   Calf    3.3 <4.6 13.9 >3 Calf Lat Mall 4.1 14.0 *34 >40        Stim Site NR Onset (ms) Norm Onset (ms) O-P  Amp (mV) Norm O-P Amp Site1 Site2 Delta-0 (ms) Dist (cm) Corby (m/s) Norm Corby (m/s)   Left Peroneal EDB Motor (Ext Dig Brev)  36°C   Ankle    5.6 <6 4.3 >2.5 B Fib Ankle 7.5 32.0 43 >40   B Fib    13.1  3.8  Poplt B Fib 1.7 10.0 59    Poplt    14.8  3.2          Right Peroneal EDB Motor (Ext Dig Brev)  36°C   Ankle    3.6 <6 4.8 >2.5 B Fib Ankle 7.6 33.0 43 >40   B Fib    11.2  4.5  Poplt B Fib 1.9 10.0 53    Poplt    13.1  4.4          Left Peroneal TA Motor (AntTibialis)  36°C   Fib Head    3.7 <4.5 3.8 .3 Poplit Fib Head 2.0 10.0 50 >40   Poplit    5.7  3.3          Left Tibial Motor (Abd Lopez Brev)  36°C   Ankle    3.8 <6 6.2 >4 Knee Ankle 8.8 35.5 40 >40   Knee    12.6  5.2          Right Tibial Motor (Abd Lopez Brev)  36°C   Ankle    6.0 <6 11.1 >4 Knee Ankle 8.2 37.5 46 >40   Knee    14.2  9.0            F Wave Studies     NR F-Lat (ms) Lat Norm (ms)   Left Peroneal EDB (Ext Dig Brev)  36°C      51.34 <57   Right Peroneal EDB (Ext Dig Brev)  36°C      49.14 <57   Left Tibial (Abd Hallucis)  36°C      50.79 <57   Right Tibial (Abd Hallucis)  36°C      50.47 <57     H Reflex Studies     NR H-Lat (ms) L-R H-Lat (ms) L-R Lat Norm   Left Tibial (Gastroc)  36°C      30.47 *4.84 <2   Right Tibial (Gastroc)  36°C      35.31 *4.84 <2                                          Electromyography     Side Muscle Nerve Root Ins Act Fibs Psw Amp Dur Poly Recrt Int Pat Comment   Left AntTibialis Dp Br Fibular L4-5 Nml Nml Nml Nml Nml 0 Nml *25% effort   Left Gastroc Tibial S1-2 Nml Nml Nml Nml Nml 0 Nml Nml    Left VastusLat Femoral L2-4 Nml Nml Nml Nml Nml 0 Nml Nml    Left GluteusMed SupGluteal L5-S1 Nml Nml Nml Nml Nml 0 Nml Nml    Left VastusMed Femoral L2-4 Nml Nml Nml Nml Nml 0 Nml Nml    Right AntTibialis Dp Br Fibular L4-5 Nml Nml Nml Nml Nml 0 Nml Nml    Right Gastroc Tibial S1-2 Nml Nml Nml Nml Nml 0 Nml Nml    Right VastusLat Femoral L2-4 Nml Nml Nml Nml Nml 0 Nml Nml    Right GluteusMed SupGluteal L5-S1 Nml Nml Nml  Nml Nml 0 Nml Nml    Right VastusMed Femoral L2-4 Nml Nml Nml Nml Nml 0 Nml Nml

## 2019-08-20 RX ORDER — CHOLESTYRAMINE 4 G/9G
POWDER, FOR SUSPENSION ORAL
Qty: 30 EACH | Refills: 0 | Status: SHIPPED | OUTPATIENT
Start: 2019-08-20 | End: 2019-09-15 | Stop reason: SDUPTHER

## 2019-08-22 DIAGNOSIS — G62.9 NEUROPATHY: ICD-10-CM

## 2019-08-25 DIAGNOSIS — G25.9 EXTRAPYRAMIDAL AND MOVEMENT DISORDER: ICD-10-CM

## 2019-08-26 RX ORDER — GABAPENTIN 300 MG/1
CAPSULE ORAL
Qty: 180 CAP | Refills: 0 | Status: SHIPPED | OUTPATIENT
Start: 2019-08-26 | End: 2019-09-17 | Stop reason: SDUPTHER

## 2019-08-26 RX ORDER — TRAZODONE HYDROCHLORIDE 50 MG/1
TABLET ORAL
Qty: 30 TAB | Refills: 0 | Status: SHIPPED | OUTPATIENT
Start: 2019-08-26 | End: 2019-09-17 | Stop reason: SDUPTHER

## 2019-09-17 DIAGNOSIS — M10.9 GOUT, ARTHRITIS: ICD-10-CM

## 2019-09-19 RX ORDER — CHOLESTYRAMINE 4 G/9G
POWDER, FOR SUSPENSION ORAL
Qty: 90 EACH | Refills: 0 | Status: SHIPPED | OUTPATIENT
Start: 2019-09-19 | End: 2019-12-17

## 2019-09-19 RX ORDER — ALLOPURINOL 300 MG/1
TABLET ORAL
Qty: 90 TAB | Refills: 1 | Status: SHIPPED | OUTPATIENT
Start: 2019-09-19 | End: 2020-03-03 | Stop reason: SDUPTHER

## 2019-10-01 RX ORDER — LISINOPRIL 20 MG/1
TABLET ORAL
Qty: 90 TAB | Refills: 0 | Status: SHIPPED | OUTPATIENT
Start: 2019-10-01 | End: 2020-01-07

## 2019-10-01 RX ORDER — LEVOTHYROXINE SODIUM 88 MCG
TABLET ORAL
Qty: 90 TAB | Refills: 0 | Status: SHIPPED | OUTPATIENT
Start: 2019-10-01 | End: 2020-01-24

## 2019-10-03 ENCOUNTER — TELEPHONE (OUTPATIENT)
Dept: PHYSICAL THERAPY | Facility: REHABILITATION | Age: 64
End: 2019-10-03

## 2019-10-03 NOTE — OP THERAPY DISCHARGE SUMMARY
Outpatient Physical Therapy  DISCHARGE SUMMARY NOTE      [address]  [address]  [place] [address]  Phone:  [phone]  Fax:  [phone]    Date of Visit: 10/03/2019    Patient: Marium Renteria  YOB: 1955  MRN: [id number]     Referring Provider: No referring provider defined for this encounter.   Referring Diagnosis No admission diagnoses are documented for this encounter.         Functional Assessment Used        Your patient is being discharged from Physical Therapy with the following comments:   · Patient has failed to schedule or reschedule follow-up visits      Recommendations:  D/C from PT. Patient only seen for initial evaluation.     Ebony Layton, PT, DPT    Date: 10/3/2019

## 2019-10-14 ENCOUNTER — HOSPITAL ENCOUNTER (OUTPATIENT)
Dept: RADIOLOGY | Facility: MEDICAL CENTER | Age: 64
End: 2019-10-14
Attending: NURSE PRACTITIONER
Payer: COMMERCIAL

## 2019-10-14 ENCOUNTER — ANESTHESIA (OUTPATIENT)
Dept: RADIOLOGY | Facility: MEDICAL CENTER | Age: 64
End: 2019-10-14
Payer: COMMERCIAL

## 2019-10-14 ENCOUNTER — HOSPITAL ENCOUNTER (OUTPATIENT)
Dept: RADIOLOGY | Facility: MEDICAL CENTER | Age: 64
End: 2019-10-14
Attending: ORTHOPAEDIC SURGERY
Payer: COMMERCIAL

## 2019-10-14 ENCOUNTER — ANESTHESIA EVENT (OUTPATIENT)
Dept: RADIOLOGY | Facility: MEDICAL CENTER | Age: 64
End: 2019-10-14
Payer: COMMERCIAL

## 2019-10-14 VITALS
WEIGHT: 164 LBS | HEART RATE: 77 BPM | DIASTOLIC BLOOD PRESSURE: 82 MMHG | OXYGEN SATURATION: 94 % | HEIGHT: 62 IN | BODY MASS INDEX: 30.18 KG/M2 | RESPIRATION RATE: 16 BRPM | SYSTOLIC BLOOD PRESSURE: 150 MMHG | TEMPERATURE: 97.8 F

## 2019-10-14 DIAGNOSIS — M19.011 ARTHRITIS OF RIGHT SHOULDER REGION: ICD-10-CM

## 2019-10-14 DIAGNOSIS — M40.204 KYPHOSIS OF THORACIC REGION, UNSPECIFIED KYPHOSIS TYPE: ICD-10-CM

## 2019-10-14 DIAGNOSIS — M54.16 LUMBAR RADICULOPATHY: ICD-10-CM

## 2019-10-14 PROCEDURE — 73221 MRI JOINT UPR EXTREM W/O DYE: CPT | Mod: RT

## 2019-10-14 PROCEDURE — 72148 MRI LUMBAR SPINE W/O DYE: CPT

## 2019-10-14 PROCEDURE — 700111 HCHG RX REV CODE 636 W/ 250 OVERRIDE (IP): Performed by: ANESTHESIOLOGY

## 2019-10-14 PROCEDURE — 700105 HCHG RX REV CODE 258: Performed by: ANESTHESIOLOGY

## 2019-10-14 PROCEDURE — 72146 MRI CHEST SPINE W/O DYE: CPT

## 2019-10-14 PROCEDURE — 700101 HCHG RX REV CODE 250

## 2019-10-14 RX ORDER — ONDANSETRON 2 MG/ML
INJECTION INTRAMUSCULAR; INTRAVENOUS PRN
Status: DISCONTINUED | OUTPATIENT
Start: 2019-10-14 | End: 2019-10-14 | Stop reason: SURG

## 2019-10-14 RX ORDER — DIPHENHYDRAMINE HYDROCHLORIDE 50 MG/ML
12.5 INJECTION INTRAMUSCULAR; INTRAVENOUS
Status: DISCONTINUED | OUTPATIENT
Start: 2019-10-14 | End: 2019-10-15 | Stop reason: HOSPADM

## 2019-10-14 RX ORDER — ONDANSETRON 2 MG/ML
4 INJECTION INTRAMUSCULAR; INTRAVENOUS
Status: DISCONTINUED | OUTPATIENT
Start: 2019-10-14 | End: 2019-10-15 | Stop reason: HOSPADM

## 2019-10-14 RX ORDER — SODIUM CHLORIDE, SODIUM LACTATE, POTASSIUM CHLORIDE, CALCIUM CHLORIDE 600; 310; 30; 20 MG/100ML; MG/100ML; MG/100ML; MG/100ML
INJECTION, SOLUTION INTRAVENOUS
Status: DISCONTINUED | OUTPATIENT
Start: 2019-10-14 | End: 2019-10-14 | Stop reason: SURG

## 2019-10-14 RX ORDER — MIDAZOLAM HYDROCHLORIDE 1 MG/ML
INJECTION INTRAMUSCULAR; INTRAVENOUS
Status: COMPLETED
Start: 2019-10-14 | End: 2019-10-14

## 2019-10-14 RX ORDER — DEXAMETHASONE SODIUM PHOSPHATE 4 MG/ML
INJECTION, SOLUTION INTRA-ARTICULAR; INTRALESIONAL; INTRAMUSCULAR; INTRAVENOUS; SOFT TISSUE PRN
Status: DISCONTINUED | OUTPATIENT
Start: 2019-10-14 | End: 2019-10-14 | Stop reason: SURG

## 2019-10-14 RX ORDER — HALOPERIDOL 5 MG/ML
1 INJECTION INTRAMUSCULAR
Status: DISCONTINUED | OUTPATIENT
Start: 2019-10-14 | End: 2019-10-15 | Stop reason: HOSPADM

## 2019-10-14 RX ADMIN — DEXAMETHASONE SODIUM PHOSPHATE 8 MG: 4 INJECTION, SOLUTION INTRA-ARTICULAR; INTRALESIONAL; INTRAMUSCULAR; INTRAVENOUS; SOFT TISSUE at 15:25

## 2019-10-14 RX ADMIN — FENTANYL CITRATE 25 MCG: 50 INJECTION, SOLUTION INTRAMUSCULAR; INTRAVENOUS at 14:03

## 2019-10-14 RX ADMIN — SODIUM CHLORIDE, POTASSIUM CHLORIDE, SODIUM LACTATE AND CALCIUM CHLORIDE: 600; 310; 30; 20 INJECTION, SOLUTION INTRAVENOUS at 13:23

## 2019-10-14 RX ADMIN — MIDAZOLAM HYDROCHLORIDE 1 MG: 1 INJECTION, SOLUTION INTRAMUSCULAR; INTRAVENOUS at 13:27

## 2019-10-14 RX ADMIN — FENTANYL CITRATE 50 MCG: 50 INJECTION, SOLUTION INTRAMUSCULAR; INTRAVENOUS at 13:27

## 2019-10-14 RX ADMIN — ONDANSETRON 4 MG: 2 INJECTION INTRAMUSCULAR; INTRAVENOUS at 15:25

## 2019-10-14 RX ADMIN — PROPOFOL 150 MG: 10 INJECTION, EMULSION INTRAVENOUS at 13:28

## 2019-10-14 RX ADMIN — LIDOCAINE HYDROCHLORIDE 40 MG: 20 INJECTION, SOLUTION INFILTRATION; PERINEURAL at 13:28

## 2019-10-14 RX ADMIN — FENTANYL CITRATE 25 MCG: 50 INJECTION, SOLUTION INTRAMUSCULAR; INTRAVENOUS at 14:11

## 2019-10-14 RX ADMIN — PROPOFOL 20 MG: 10 INJECTION, EMULSION INTRAVENOUS at 14:11

## 2019-10-14 RX ADMIN — ALBUTEROL SULFATE: 2.5 SOLUTION RESPIRATORY (INHALATION) at 15:15

## 2019-10-14 RX ADMIN — PROPOFOL 30 MG: 10 INJECTION, EMULSION INTRAVENOUS at 14:04

## 2019-10-14 RX ADMIN — MIDAZOLAM HYDROCHLORIDE 1 MG: 1 INJECTION, SOLUTION INTRAMUSCULAR; INTRAVENOUS at 14:11

## 2019-10-14 ASSESSMENT — PAIN SCALES - GENERAL: PAIN_LEVEL: 0

## 2019-10-14 NOTE — ANESTHESIA PROCEDURE NOTES
Airway  Date/Time: 10/14/2019 1:32 PM  Performed by: Jacki Birmingham M.D.  Authorized by: Jacki Birmingham M.D.     Location:  OR  Urgency:  Elective  Difficult Airway: No    Indications for Airway Management:  Anesthesia  Spontaneous Ventilation: present    Sedation Level:  Deep  Preoxygenated: Yes    Patient Position:  Sniffing  MILS Maintained Throughout: No    Mask Difficulty Assessment:  0 - not attempted  Final Airway Type:  Supraglottic airway  Final Supraglottic Airway:  Standard LMA  SGA Size:  3  Number of Attempts at Approach:  1  Number of Other Approaches Attempted:  0

## 2019-10-14 NOTE — ANESTHESIA POSTPROCEDURE EVALUATION
Patient: Marium Renteria    Procedure Summary     Date:  10/14/19 Room / Location:  [facility] MRI  [address]    Anesthesia Start:  1323 Anesthesia Stop:  1452    Procedure:  MR-THORACIC SPINE-W/O Diagnosis:  Kyphosis of thoracic region, unspecified kyphosis type    Scheduled Providers:   Responsible Provider:  Jacki Birmingham M.D.    Anesthesia Type:  general ASA Status:  3          Final Anesthesia Type: general  Last vitals  /85       Temp 97.4       Pulse    84   Resp     16   SpO2     90     Anesthesia Post Evaluation    Patient location during evaluation: PACU  Patient participation: complete - patient participated  Level of consciousness: awake and alert  Pain score: 0    Airway patency: patent  Anesthetic complications: no  Cardiovascular status: hemodynamically stable  Respiratory status: acceptable  Hydration status: acceptable    PONV: none

## 2019-10-14 NOTE — DISCHARGE INSTRUCTIONS
MRI ADULT DISCHARGE INSTRUCTIONS    You have been medicated today for your scan. Please follow the instructions below to ensure your safe recovery. If you have any questions or problems, feel free to call us at [phone] or [phone].     1.   Have someone stay with you to assist you as needed.    2.   Do not drive or operate any mechanical devices.    3.   Do not perform any activity that requires concentration. Make no major decisions over the next 24 hours.     4.   Be careful changing positions from laying down to sitting or standing, as you may become dizzy.     5.   Do not drink alcohol for 48 hours.    6.   There are no restrictions for eating your normal meals. Drink fluids.    7.   You may continue your usual medications for pain, tranquilizers, muscle relaxants or sedatives when awake.     8.   Tomorrow, you may continue your normal daily activities.     9.   Pressure dressing on 10 - 15 minutes. If swelling or bleeding occurs when removed, continue placing direct pressure on injection site for another 5 minutes, or until bleeding stops.     Midazolam (VERSED)  What is this medicine?  You were given MIDAZOLAM (ASUNCION peterson) for your procedure today. This medication is a benzodiazepine. It is used to cause relaxation or sleep before surgery and to block the memory of the procedure.  This medicine may be used for other purposes; ask your health care provider or pharmacist if you have questions.  What side effects may I notice from receiving this medicine?  Side effects that you should report to your doctor or health care professional as soon as possible:  • allergic reactions like skin rash, itching or hives, swelling of the face, lips, or tongue  • breathing problems  • confusion  • dizziness or lightheadedness  • fast, irregular heartbeat  • halluninations during recovery  • numbness or tingling in the hands or feet  • pain, redness, or swelling at site where injected  • seizures  Side effects that usually  do not require medical attention (report to your doctor or health care professional if they continue or are bothersome):  • coughing  • headache  • hiccups  • involuntary eye and muscle movements  • loss of memory of events just before, during, and after use  • nausea, vomiting  • speech problems  • tiredness  • trouble sleeping or nightmares  This list may not describe all possible side effects. Call your doctor for medical advice about side effects. You may report side effects to FDA at 4-681-AFI-8995.    Fentanyl  What is this medicine?  You were given FENTANYL (FEN ta nil) for your procedure today, it is a pain reliever. It is used to treat breakthrough pain that your long acting pain medicine does not control. Do not use this medicine for a pain that will go away in a few days like pain from surgery, doctor or dentist visits.   This medicine may be used for other purposes; ask your health care provider or pharmacist if you have questions.  What side effects may I notice from receiving this medicine?  Side effects that you should report to your doctor or health care professional as soon as possible:  • allergic reactions like skin rash, itching or hives, swelling of the face, lips, or tongue  • breathing problems  • changes in vision  • confusion  • dry mouth  • feeling faint, lightheaded  • hallucination  • irregular heartbeat  • mouth pain, sores  • problems with balance, talking, walking  • trouble passing urine or change in the amount of urine  • unusual bleeding or bruising  • unusually weak or tired  Side effects that usually do not require medical attention (report to your doctor or health care professional if they continue or are bothersome):  • dizzy  • headache  • loss of appetite  • nausea, vomiting  • sweating  • tingling in mouth  This list may not describe all possible side effects. Call your doctor for medical advice about side effects. You may report side effects to FDA at 5-782-FDA-8624.      I  have been informed of and understand the above discharge instructions.

## 2019-10-14 NOTE — ANESTHESIA PREPROCEDURE EVALUATION
Relevant Problems   NEURO   (+) History of alcohol abuse      CARDIAC   (+) HTN (hypertension)      GI   (+) Ulcerative esophagitis      ENDO   (+) Hypothyroidism   (+) Postsurgical hypothyroidism      Other   (+) Cervical lymphadenopathy   (+) Gout, arthritis       Physical Exam    Airway   Mallampati: II  TM distance: >3 FB  Neck ROM: full       Cardiovascular   Rhythm: regular  Rate: normal     Dental - normal exam      Very poor dentition   Pulmonary    Abdominal   (+) obese     Neurological              Anesthesia Plan    ASA 3   ASA physical status 3 criteria: COPD    Plan - general       Airway plan will be LMA      Plan Factors:   Patient was previously instructed to abstain from smoking on day of procedure.  Patient smoked on day of procedure.    Induction: intravenous      Pertinent diagnostic labs and testing reviewed    Informed Consent:    Anesthetic plan and risks discussed with patient.

## 2019-10-14 NOTE — PROGRESS NOTES
Pt to MRI Nurses station in wheelchair from home for scheduled MRI with Anesthesia.  PIV established.  Pt spoke with Dr. Birmingham.  Consent signed.  MRI completed.  To recovery.  Tolerated water.  Pt noted to be audibly wheezy.  NPPB given by Dr. Birmingham.  Audible wheezing resolved and pt stated she felt better.  Discharge instructions given to pt and  with verbalized understanding.  Pt out of the Imaging Department in her wheelchair from home  Ana, pt's friend, to drive her home.

## 2019-10-14 NOTE — ANESTHESIA TIME REPORT
Anesthesia Start and Stop Event Times     Date Time Event    10/14/2019 1322 Ready for Procedure     1323 Anesthesia Start        Responsible Staff  10/14/19    Name Role Begin End    Jacki Birmingham M.D. Anesth 1323         Preop Diagnosis (Free Text):  Pre-op Diagnosis             Preop Diagnosis (Codes):    Post op Diagnosis  Right shoulder pain      Premium Reason  Non-Premium    Comments:

## 2019-10-15 DIAGNOSIS — G25.9 EXTRAPYRAMIDAL AND MOVEMENT DISORDER: ICD-10-CM

## 2019-10-15 DIAGNOSIS — G62.9 NEUROPATHY: ICD-10-CM

## 2019-10-16 ENCOUNTER — TELEPHONE (OUTPATIENT)
Dept: MEDICAL GROUP | Facility: MEDICAL CENTER | Age: 64
End: 2019-10-16

## 2019-10-16 RX ORDER — GABAPENTIN 300 MG/1
CAPSULE ORAL
Qty: 180 CAP | Refills: 1 | Status: SHIPPED | OUTPATIENT
Start: 2019-10-16 | End: 2019-12-17

## 2019-10-16 RX ORDER — TRAZODONE HYDROCHLORIDE 50 MG/1
TABLET ORAL
Qty: 30 TAB | Refills: 1 | Status: SHIPPED | OUTPATIENT
Start: 2019-10-16 | End: 2019-12-17

## 2019-10-16 NOTE — TELEPHONE ENCOUNTER
VOICEMAIL  1. Caller Name: Marium Renteria      Call Back Number: [phone] (home) [phone] (work)      2. Message: Pharmacy needs day supply on Soma 350mg.   Please advise.     3. Patient approves office to leave a detailed voicemail/MyChart message: N\A

## 2019-11-01 RX ORDER — POTASSIUM CHLORIDE 750 MG/1
TABLET, EXTENDED RELEASE ORAL
Qty: 20 TAB | Refills: 0 | Status: SHIPPED | OUTPATIENT
Start: 2019-11-01 | End: 2019-12-18 | Stop reason: SDUPTHER

## 2019-11-12 NOTE — ASSESSMENT & PLAN NOTE
Patient's BMI of 30.08, outpatient weight loss encouraged   <<-----Click here for Discharge Medication Review

## 2019-11-18 DIAGNOSIS — R42 VERTIGO: ICD-10-CM

## 2019-11-18 RX ORDER — MECLIZINE HCL 12.5 MG/1
TABLET ORAL
Qty: 30 TAB | Refills: 0 | Status: SHIPPED | OUTPATIENT
Start: 2019-11-18 | End: 2021-12-06 | Stop reason: SDUPTHER

## 2019-11-21 ENCOUNTER — OFFICE VISIT (OUTPATIENT)
Dept: MEDICAL GROUP | Facility: MEDICAL CENTER | Age: 64
End: 2019-11-21
Attending: FAMILY MEDICINE
Payer: COMMERCIAL

## 2019-11-21 VITALS
OXYGEN SATURATION: 95 % | WEIGHT: 158 LBS | DIASTOLIC BLOOD PRESSURE: 54 MMHG | HEART RATE: 88 BPM | SYSTOLIC BLOOD PRESSURE: 128 MMHG | BODY MASS INDEX: 29.08 KG/M2 | RESPIRATION RATE: 16 BRPM | TEMPERATURE: 97 F | HEIGHT: 62 IN

## 2019-11-21 DIAGNOSIS — M54.9 CHRONIC BACK PAIN, UNSPECIFIED BACK LOCATION, UNSPECIFIED BACK PAIN LATERALITY: ICD-10-CM

## 2019-11-21 DIAGNOSIS — I10 ESSENTIAL HYPERTENSION: ICD-10-CM

## 2019-11-21 DIAGNOSIS — E20.89 OTHER HYPOPARATHYROIDISM: ICD-10-CM

## 2019-11-21 DIAGNOSIS — E03.9 HYPOTHYROIDISM, UNSPECIFIED TYPE: ICD-10-CM

## 2019-11-21 DIAGNOSIS — G89.29 CHRONIC BACK PAIN, UNSPECIFIED BACK LOCATION, UNSPECIFIED BACK PAIN LATERALITY: ICD-10-CM

## 2019-11-21 DIAGNOSIS — G89.29 OTHER CHRONIC PAIN: ICD-10-CM

## 2019-11-21 DIAGNOSIS — F17.200 SMOKER: ICD-10-CM

## 2019-11-21 DIAGNOSIS — R32 URINARY INCONTINENCE, UNSPECIFIED TYPE: ICD-10-CM

## 2019-11-21 PROCEDURE — 99213 OFFICE O/P EST LOW 20 MIN: CPT | Performed by: FAMILY MEDICINE

## 2019-11-21 PROCEDURE — 99215 OFFICE O/P EST HI 40 MIN: CPT | Performed by: FAMILY MEDICINE

## 2019-11-22 NOTE — PROGRESS NOTES
CC: Patient is here to establish care after her PCP left, has multiple medical concerns    HPI: Established patient new to me, here today accompanied with her   Marium presents today here to establish care, 64 years old female with past medical history significant for hypertension, hypothyroidism status post total thyroidectomy, hypoparathyroidism after thyroidectomy, chronic pain related to chronic pain in the back neck and shoulder areas, history of urinary incontinence, smoker, reviewed records, past medical history, family/social history, past surgical history, medications and discussed the following concerns as follow today:      Smoker  Patient said she is ready to quit at this time requesting a prescription of Chantix for her to try it again as she tried in the past and stopped it before knowing the effect because she thought she might develop side effects at that time but she wants to try it again to confirm because she is ready to quit and would like to try the Chantix    Chronic back pain, unspecified back location, unspecified back pain laterality  Patient with long history of chronic back pain, history of surgical interventions in the past.  She has a neurosurgeon follows up with her on regular basis yet she is not very reluctant on invasive procedures, patient uses Soma and used to be on pain medications in the past, does not follow-up with pain management at this time, she used to get the Soma her previous PCP.  Patient is usually on wheelchair because of difficulty in walking and physical activity related to lower extremity weakness, has been attending physical therapy at this time.  Requesting power chair prescription to be renewed since her power chair is not working properly and that helps her daily life activity a lot when she is using the power chair right now she is using a regular wheelchair    Other hypoparathyroidism  History of hyperparathyroidism and calcium abnormality patient  started to have the symptoms of hyper parathyroid ~thyroidectomy, she lost her endocrinologist care at this time because he moved and she is unable to establish with another endocrinologist denies any muscle cramps at this time    Hypothyroidism, unspecified type  History of hypothyroidism related to total thyroidectomy, on 88 mcg of Synthroid daily. no recent check of her thyroid function.    Other chronic pain  Patient i has chronic pain disorder related to bilateral shoulder pain, chronic back pain, taking Soma to control her pain, used to be on narcotics and opioids in the past but not taking any of them now, on gabapentin.  Discussed with the patient referral to pain management for further chronic pain management.    Essential hypertension  Blood pressure at the office today within normal limits at 128/54 patient continues to take the lisinopril without concerns about cough or other side effects denies headache or chest pain at this time     Urinary incontinence, unspecified type    Reports that she has chronic stress incontinence and sometimes urge incontinence without symptoms of UTI leg burning sensation this is a chronic problem that has been going on for more than 1 year patient is requesting pads to use them as needed since it helps her a lot during the daytime when she has accidents.    Patient Active Problem List    Diagnosis Date Noted   • Debility 09/27/2018     Priority: High   • Hypomagnesemia 09/22/2018     Priority: High   • Restless leg syndrome 10/10/2018     Priority: Low   • Tobacco use 09/13/2018     Priority: Low   • Hypoparathyroidism ([facility]) 12/11/2009     Priority: Low   • Incontinence in female 03/28/2019   • Chronic pain syndrome 03/21/2019   • Hypothyroidism 09/20/2018   • Ulcerative esophagitis 09/20/2018   • Major depressive disorder 09/11/2017   • Pain due to hip joint prosthesis ([facility]) 08/07/2017   • Benzodiazepine dependence, continuous ([facility]) 04/26/2017   • Cervical lymphadenopathy  10/07/2016   • Osteoarthritis of cervical spine with myelopathy 09/14/2016   • Failed back syndrome 09/14/2016   • Depression 06/27/2016   • Left lumbar radiculopathy 06/23/2016   • Neuropathy (HCC) 06/23/2016   • Lumbar scoliosis 12/17/2015   • History of alcohol abuse 12/07/2015   • Hyperlipidemia 06/05/2015   • Insomnia 05/23/2014   • Chronic diarrhea 09/30/2013   • ELISHA (generalized anxiety disorder) 08/28/2013   • Gout, arthritis 05/24/2013   • Mixed hyperlipidemia 12/11/2009   • HTN (hypertension) 12/11/2009   • Postsurgical hypothyroidism 12/11/2009   • Osteoporosis, senile 12/11/2009   • 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   • Symptomatic menopausal or female climacteric states 12/11/2009   • Cigarette nicotine dependence, uncomplicated 12/11/2009       Current Outpatient Medications   Medication Sig Dispense Refill   • varenicline (CHANTIX STARTING MONTH PAK) 0.5 MG X 11 & 1 MG X 42 tablet Per packet 60 Tab 3   • Misc. Devices Misc As directed 90 Application 03   • Misc. Devices Misc As directed 1 Application 0   • meclizine (ANTIVERT) 12.5 MG Tab TAKE ONE TABLET BY MOUTH DAILY AS NEEDED 30 Tab 0   • potassium chloride SA (K-DUR) 10 MEQ Tab CR TAKE ONE TABLET BY MOUTH DAILY ON SUNDAY, TUESDAY, WEDNESDAY, THURSDAY, AND SATURDAY (Patient taking differently: Take 10 mEq by mouth every day. TAKE ONE TABLET BY MOUTH DAILY ON SUNDAY, TUESDAY, WEDNESDAY, THURSDAY, AND SATURDAY) 20 Tab 0   • gabapentin (NEURONTIN) 300 MG Cap TAKE TWO CAPSULES BY MOUTH THREE TIMES A  Cap 1   • carbidopa-levodopa (SINEMET)  MG Tab TAKE ONE TABLET BY MOUTH DAILY 30 Tab 1   • traZODone (DESYREL) 50 MG Tab TAKE ONE TABLET BY MOUTH EVERY NIGHT AT BEDTIME AS NEEDED FOR SLEEP 30 Tab 1   • SYNTHROID 88 MCG Tab TAKE ONE TABLET BY MOUTH EVERY MORNING ON AN EMPTY STOMACH 90 Tab 0   • lisinopril (PRINIVIL) 20 MG Tab TAKE ONE TABLET BY MOUTH DAILY 90 Tab 0   • allopurinol (ZYLOPRIM) 300 MG Tab TAKE ONE TABLET BY MOUTH  DAILY 90 Tab 1   • Cholecalciferol (VITAMIN D3) 1000 units Cap Take 4,000 Units by mouth every day. (Patient taking differently: Take 5,000 Units by mouth every day. Indications: supplement) 90 Cap 0   • albuterol (PROVENTIL HFA) 108 (90 Base) MCG/ACT Aero Soln inhalation aerosol Inhale 2 Puffs by mouth every 6 hours as needed for Shortness of Breath. 3 Inhaler 0   • Misc. Devices Misc TENS unit pads to be used as directed 10 Device 1   • Misc. Devices Misc Incontinence supplies for incontinence to be changed once to twice daily 60 Device 11   • Misc. Devices Misc Back brace to use as directed 1 Device 0   • polyethylene glycol 3350 (MIRALAX) Powder Take 17 g by mouth 3 times a day as needed (bowel care).     • Calcium Carbonate Antacid (TUMS PO) Take 3,200 mg by mouth every day. Needs to take BERRY flavor     • cholestyramine (QUESTRAN) 4 g packet MIX 1 PACKET WITH LIQUID AND DRINK BY MOUTH ONCE DAILY 90 Each 0   • meclizine (ANTIVERT) 12.5 MG Tab TAKE ONE TABLET BY MOUTH DAILY AS NEEDED 90 Tab 0   • potassium chloride SA (K-DUR) 10 MEQ Tab CR TAKE 1 TABLET BY MOUTH ON SUNDAY, TUESDAY, WEDNESDAY, THURSDAY AND SATURDAY 20 Tab 0   • Multiple Vitamin (DAILY-BRANDON) Tab Take 1 Tab by mouth every day.       No current facility-administered medications for this visit.          Allergies as of 11/21/2019 - Reviewed 11/21/2019   Allergen Reaction Noted   • Bee venom Anaphylaxis 12/11/2009   • Cillins [penicillins] Anaphylaxis and Hives 12/11/2009   • Latex Rash 09/02/2017   • Asa [aspirin] Unspecified 12/19/2013   • Coconut oil Itching and Swelling 04/26/2017   • Codeine Itching 07/12/2012   • Contrast media with iodine [iodine] Vomiting and Nausea 06/23/2016   • Hydrocodone-ibuprofen  12/07/2015   • Hydroxyzine Unspecified 09/19/2018   • Ibuprofen Unspecified 12/15/2015   • Norco [apap-fd&c yellow #10 [place]-hydrocodone] Itching 11/21/2014   • Other food Unspecified 12/15/2015   • Sulfa drugs Hives 12/11/2009   • Talwin  Unspecified 07/12/2012   • Tape Swelling 07/20/2017   • Vicodin [hydrocodone-acetaminophen] Itching 01/27/2013        ROS: Denies any chest pain, Shortness of breath, Changes bowel or bladder, Lower extremity edema.    Physical Exam:  Gen.: Well-developed, well-nourished, no apparent distress,pleasant and cooperative with the examination, patient is sitting in a wheelchair and had to move withheld to the exam table for me to be able to examine her.  Skin:  Warm and dry with good turgor. No rashes or suspicious lesions in visible areas  Eye: PERRLA, conjunctiva and sclera clear, lids normal  HEENT: Normocephalic/atraumatic, sinuses nontender with palpation, TMs clear, nares patent with pink mucosa and clear rhinorrhea, lips without lesions, oropharynx clear.  Neck: Trachea midline,no masses or adenopathy  Thyroid: normal consistency and size. No masses or nodules. Not tender with palpation.  Cor: Regular rate and rhythm without murmur, gallop or rub.  Lungs: Respirations unlabored.Clear to auscultation with equal breath sounds bilaterally. No wheezes, rhonchi.  Abdomen: Soft nontender without hepatosplenomegaly or masses appreciated, normoactive bowel sounds. No hernias.  Extremities: No cyanosis, clubbing or edema, Symmetrical without deformities or malformations. Pulses 2+ and symmetrical both upper and lower extremities  Lymphatic: No abnormal adenopathy of the neck groin or axillae.  Psych: Alert and oriented x 3.Normal affect, judgement,insight and memory.  Back exam: No deformity noted but there is scar of previous operation/surgery      Assessment and Plan.   64 y.o. female here today to establish care    1. Smoker  Counseled for smoking cessation patient is ready and willing to quit refilled Chantix prescription for her to try discussed side effects, to stop the medication if she is having any side effects  - varenicline (CHANTIX STARTING MONTH MIRELA) 0.5 MG X 11 & 1 MG X 42 tablet; Per packet  Dispense: 60  Tab; Refill: 3    2. Chronic back pain, unspecified back location, unspecified back pain laterality  I had long discussion with the patient today that I would not be able to prescribe Soma narcotics Norco for long-term as chronic pain management and she will need to follow-up with pain management for the purpose of chronic pain medication/chronic pain management.  Discussed with the patient that until she is able to establish with pain management, I can give 1 courtesy refill of Soma.  After that if there is any refill of muscle relaxants it will be regular muscle relaxant, discussed with the patient concerns side effects of Soma and increase.  See and drowsiness, also I have reviewed records from her previous which she documented concerns about urine drug test containing benzodiazepines, and alcohol.  Which makes me more reluctant on using nonnarcotic, with less side effects than using medication that can cause her change in mental status or complications like respiratory distress.  - VITAMIN D,25 HYDROXY; Future  - REFERRAL TO PAIN CLINIC  - [facility]. Devices Misc; As directed  Dispense: 90 Application; Refill: 03    3. Other hypoparathyroidism (HCC)  Patient establish with endocrinology, will check her calcium levels and vitamin D, advised to continue calcium and vitamin D supplementation  - Comp Metabolic Panel; Future  - IONIZED CALCIUM; Future  - TSH; Future  - TRIIDOTHYRONINE; Future  - REFERRAL TO ENDOCRINOLOGY    4. Hypothyroidism, unspecified type  Recheck thyroid function test, continue 88 mcg of Synthroid for now  - Comp Metabolic Panel; Future  - IONIZED CALCIUM; Future  - TSH; Future  - FREE THYROXINE; Future  - TRIIDOTHYRONINE; Future    5. Other chronic pain  As mentioned above patient will be referred to pain management for further evaluation and management    6. Essential hypertension  Well-controlled on lisinopril continue same regimen check kidney function test  - Comp Metabolic Panel; Future    7.  Urinary incontinence, unspecified type  Refilled prescription for dependence for the patient to use it as needed and directed.  Will further evaluate in the future causes of her incontinence, discussed Kegel's exercise today to strengthen pelvic muscles.  - Misc. Devices Misc; As directed  Dispense: 1 Application; Refill: 0    I spent >60min with patient face to face ,> 50% of time spent counseling , coordinating care, reviewing records , and educating patient .    Please note that this dictation was created using voice recognition software. I have made every reasonable attempt to correct obvious errors but there may be errors of grammar and content that I may have overlooked prior to finalization of this note.

## 2019-12-04 ENCOUNTER — HOSPITAL ENCOUNTER (OUTPATIENT)
Dept: RADIOLOGY | Facility: MEDICAL CENTER | Age: 64
End: 2019-12-04
Attending: NEUROLOGICAL SURGERY
Payer: COMMERCIAL

## 2019-12-04 DIAGNOSIS — M48.061 SPINAL STENOSIS, LUMBAR REGION, WITHOUT NEUROGENIC CLAUDICATION: ICD-10-CM

## 2019-12-04 PROCEDURE — 72131 CT LUMBAR SPINE W/O DYE: CPT

## 2019-12-12 ENCOUNTER — HOSPITAL ENCOUNTER (OUTPATIENT)
Dept: LAB | Facility: MEDICAL CENTER | Age: 64
End: 2019-12-12
Attending: FAMILY MEDICINE
Payer: COMMERCIAL

## 2019-12-12 DIAGNOSIS — E20.89 OTHER HYPOPARATHYROIDISM: ICD-10-CM

## 2019-12-12 DIAGNOSIS — E20.9 HYPOPARATHYROIDISM, UNSPECIFIED HYPOPARATHYROIDISM TYPE (HCC): ICD-10-CM

## 2019-12-12 DIAGNOSIS — E03.9 HYPOTHYROIDISM, UNSPECIFIED TYPE: ICD-10-CM

## 2019-12-12 DIAGNOSIS — G89.29 CHRONIC BACK PAIN, UNSPECIFIED BACK LOCATION, UNSPECIFIED BACK PAIN LATERALITY: ICD-10-CM

## 2019-12-12 DIAGNOSIS — I10 ESSENTIAL HYPERTENSION: ICD-10-CM

## 2019-12-12 DIAGNOSIS — M54.9 CHRONIC BACK PAIN, UNSPECIFIED BACK LOCATION, UNSPECIFIED BACK PAIN LATERALITY: ICD-10-CM

## 2019-12-12 PROCEDURE — 80053 COMPREHEN METABOLIC PANEL: CPT

## 2019-12-12 PROCEDURE — 82330 ASSAY OF CALCIUM: CPT

## 2019-12-12 PROCEDURE — 36415 COLL VENOUS BLD VENIPUNCTURE: CPT

## 2019-12-12 PROCEDURE — 83970 ASSAY OF PARATHORMONE: CPT

## 2019-12-12 PROCEDURE — 84439 ASSAY OF FREE THYROXINE: CPT

## 2019-12-12 PROCEDURE — 84480 ASSAY TRIIODOTHYRONINE (T3): CPT

## 2019-12-12 PROCEDURE — 82306 VITAMIN D 25 HYDROXY: CPT

## 2019-12-12 PROCEDURE — 84443 ASSAY THYROID STIM HORMONE: CPT

## 2019-12-13 LAB
25(OH)D3 SERPL-MCNC: 42 NG/ML (ref 30–100)
ALBUMIN SERPL BCP-MCNC: 4 G/DL (ref 3.2–4.9)
ALBUMIN/GLOB SERPL: 1.4 G/DL
ALP SERPL-CCNC: 95 U/L (ref 30–99)
ALT SERPL-CCNC: 13 U/L (ref 2–50)
ANION GAP SERPL CALC-SCNC: 15 MMOL/L (ref 0–11.9)
AST SERPL-CCNC: 22 U/L (ref 12–45)
BILIRUB SERPL-MCNC: 0.3 MG/DL (ref 0.1–1.5)
BUN SERPL-MCNC: 4 MG/DL (ref 8–22)
CA-I SERPL-SCNC: 1 MMOL/L (ref 1.1–1.3)
CALCIUM SERPL-MCNC: 8.5 MG/DL (ref 8.5–10.5)
CALCIUM SERPL-MCNC: 8.7 MG/DL (ref 8.5–10.5)
CHLORIDE SERPL-SCNC: 107 MMOL/L (ref 96–112)
CO2 SERPL-SCNC: 20 MMOL/L (ref 20–33)
CREAT SERPL-MCNC: 0.6 MG/DL (ref 0.5–1.4)
GLOBULIN SER CALC-MCNC: 2.8 G/DL (ref 1.9–3.5)
GLUCOSE SERPL-MCNC: 92 MG/DL (ref 65–99)
POTASSIUM SERPL-SCNC: 3.4 MMOL/L (ref 3.6–5.5)
PROT SERPL-MCNC: 6.8 G/DL (ref 6–8.2)
PTH-INTACT SERPL-MCNC: 5.6 PG/ML (ref 14–72)
SODIUM SERPL-SCNC: 142 MMOL/L (ref 135–145)
T3 SERPL-MCNC: 94 NG/DL (ref 60–181)
T4 FREE SERPL-MCNC: 1.12 NG/DL (ref 0.53–1.43)
TSH SERPL DL<=0.005 MIU/L-ACNC: 0.48 UIU/ML (ref 0.38–5.33)

## 2019-12-17 ENCOUNTER — TELEPHONE (OUTPATIENT)
Dept: MEDICAL GROUP | Facility: MEDICAL CENTER | Age: 64
End: 2019-12-17

## 2019-12-17 NOTE — TELEPHONE ENCOUNTER
Can you call patient please verify from her if she is taking, Sinemet carbidopa-levodopa which is a medication used for Parkinson's disease, since I got it from pharmacy as a refill and I did not see it from her previous medications, did not see any diagnosis of Parkinson.  Please verify from patient if she receives this medication by mistake then she does not have to take it and will cancel it.  Thank you

## 2019-12-18 ENCOUNTER — OFFICE VISIT (OUTPATIENT)
Dept: MEDICAL GROUP | Facility: MEDICAL CENTER | Age: 64
End: 2019-12-18
Attending: FAMILY MEDICINE
Payer: COMMERCIAL

## 2019-12-18 VITALS
HEART RATE: 76 BPM | WEIGHT: 161.5 LBS | TEMPERATURE: 97.6 F | BODY MASS INDEX: 29.72 KG/M2 | SYSTOLIC BLOOD PRESSURE: 140 MMHG | OXYGEN SATURATION: 96 % | DIASTOLIC BLOOD PRESSURE: 80 MMHG | RESPIRATION RATE: 16 BRPM | HEIGHT: 62 IN

## 2019-12-18 DIAGNOSIS — E20.89 OTHER HYPOPARATHYROIDISM: ICD-10-CM

## 2019-12-18 DIAGNOSIS — R42 VERTIGO: ICD-10-CM

## 2019-12-18 DIAGNOSIS — E87.6 HYPOKALEMIA: ICD-10-CM

## 2019-12-18 DIAGNOSIS — G25.9 EXTRAPYRAMIDAL AND MOVEMENT DISORDER: ICD-10-CM

## 2019-12-18 DIAGNOSIS — R29.898 WEAKNESS OF LEFT LOWER EXTREMITY: ICD-10-CM

## 2019-12-18 DIAGNOSIS — E03.9 HYPOTHYROIDISM, UNSPECIFIED TYPE: ICD-10-CM

## 2019-12-18 DIAGNOSIS — G89.4 CHRONIC PAIN SYNDROME: ICD-10-CM

## 2019-12-18 PROCEDURE — 99213 OFFICE O/P EST LOW 20 MIN: CPT | Performed by: FAMILY MEDICINE

## 2019-12-18 PROCEDURE — 99215 OFFICE O/P EST HI 40 MIN: CPT | Performed by: FAMILY MEDICINE

## 2019-12-18 RX ORDER — CALCITRIOL 0.25 UG/1
0.25 CAPSULE, LIQUID FILLED ORAL DAILY
Qty: 30 CAP | Refills: 1 | Status: SHIPPED | OUTPATIENT
Start: 2019-12-18 | End: 2020-03-16

## 2019-12-18 RX ORDER — POTASSIUM CHLORIDE 750 MG/1
10 TABLET, EXTENDED RELEASE ORAL DAILY
Qty: 30 TAB | Refills: 1 | Status: SHIPPED | OUTPATIENT
Start: 2019-12-18 | End: 2020-02-04 | Stop reason: SDUPTHER

## 2019-12-19 DIAGNOSIS — R42 VERTIGO: ICD-10-CM

## 2019-12-19 RX ORDER — MECLIZINE HCL 12.5 MG/1
25 TABLET ORAL
Qty: 30 TAB | Refills: 3 | Status: SHIPPED | OUTPATIENT
Start: 2019-12-19 | End: 2019-12-24 | Stop reason: SDUPTHER

## 2019-12-19 NOTE — PROGRESS NOTES
Chief Complaint:   Chief Complaint   Patient presents with   • Results   • Medication Refill   • Leg Problem       HPI: Established patient  Marium Renteria is a 64 y.o. female who presents for follow-up discuss the following concerns as follow today:     Other hypoparathyroidism  History of hypoparathyroidism status post surgical total thyroidectomy, results reviewed with the patient today evidence of low parathormone hormone and subnormal level of ionized calcium with normal level of total calcium patient reports no abnormality with muscle cramping but sometimes she feels that the hands cramp especially with blood pressure checking  Patient was given information about her referral to reestablish with endocrinology Dr. Gomes at [facility]   Vertigo  This is a chronic problem that has been going on for more than 5 years as per patient history patient told me that she has been chronically using meclizine for help with the vertigo twice a day and she was requesting refills today, discussed with the patient in details the problem that she reports that she has been having on and off vertigo for at least for 5 years now, associated with ear ringing, no history of evaluation by a neurologist or ENT specialist to rule out inner ear problem or vertigo related to neurological issues, patient denies red flags like change in mental status or severe headache.  Or any new changes    Hypothyroidism, unspecified type  Discussed with the patient normal thyroid function test, she is on 88 mcg of Synthroid daily denies side effects.    Chronic pain syndrome  History of chronic low back pain and shoulder pain and chronic pain disorder, patient was referred as per last visit to see pain management for further evaluation of her chronic pain, patient was refilled with a courtesy refill of Soma and discussed with the patient side effects of the medication and advised to start tapering down slowly to prevent sudden  withdrawal symptoms, needs to follow-up with pain management for further evaluation of her chronic pain at this time she needs to continue the gabapentin as directed    Hypokalemia  Lab work shows subnormal level of potassium at the level of 3.4, denies abnormal heartbeats or muscle cramping, patient on low-dose potassium 10 mEq every other day     Extrapyramidal and movement disorder/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  Patient reports that she is unable to walk because of the left lower extremity weakness and she continues to do physical therapy, requesting to renew prescription for an electric mobile device because the electric chair that she has been using is not working anymore and this is affecting her daily functional activities she is unable to walk or do her groceries.  Requesting another prescription today.    Past medical history, family history, social history and medications reviewed and updated in the record. Today   Current medications, problem list and allergies reviewed in Epic today   Health maintenance topics are reviewed and updated.    Patient Active Problem List    Diagnosis Date Noted   • Debility 09/27/2018     Priority: High   • Hypomagnesemia 09/22/2018     Priority: High   • Restless leg syndrome 10/10/2018     Priority: Low   • Tobacco use 09/13/2018     Priority: Low   • Hypoparathyroidism (HCC) 12/11/2009     Priority: Low   • Incontinence in female 03/28/2019   • Chronic pain syndrome 03/21/2019   • Hypothyroidism 09/20/2018   • Ulcerative esophagitis 09/20/2018   • Major depressive disorder  09/11/2017   • Pain due to hip joint prosthesis ([facility]) 08/07/2017   • Benzodiazepine dependence, continuous ([facility]) 04/26/2017   • Cervical lymphadenopathy 10/07/2016   • Osteoarthritis of cervical spine with myelopathy 09/14/2016   • Failed back syndrome 09/14/2016   • Depression 06/27/2016   • Left lumbar radiculopathy 06/23/2016   • Neuropathy ([facility]) 06/23/2016   • Lumbar scoliosis 12/17/2015   • History of alcohol abuse 12/07/2015   • Hyperlipidemia 06/05/2015   • Insomnia 05/23/2014   • Chronic diarrhea 09/30/2013   • ELISHA (generalized anxiety disorder) 08/28/2013   • Gout, arthritis 05/24/2013   • Mixed hyperlipidemia 12/11/2009   • HTN (hypertension) 12/11/2009   • Postsurgical hypothyroidism 12/11/2009   • Osteoporosis, senile 12/11/2009   • 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   • Symptomatic menopausal or female climacteric states 12/11/2009   • Cigarette nicotine dependence, uncomplicated 12/11/2009     Family History   Problem Relation Age of Onset   • Genetic Disorder Mother         Alzheimer's   • Alcohol/Drug Father      Social History     Socioeconomic History   • Marital status:      Spouse name: Not on file   • Number of children: 1   • Years of education: Not on file   • Highest education level: Not on file   Occupational History   • Occupation: none     Employer: OTHER   Social Needs   • Financial resource strain: Not on file   • Food insecurity:     Worry: Not on file     Inability: Not on file   • Transportation needs:     Medical: Not on file     Non-medical: Not on file   Tobacco Use   • Smoking status: Current Every Day Smoker     Packs/day: 0.50     Years: 47.00     Pack years: 23.50     Types: Cigarettes     Start date: 4/25/1970   • Smokeless tobacco: Never Used   • Tobacco comment: quit during pregnancy, now 2-3 cig daily   Substance and Sexual Activity   • Alcohol use: No     Alcohol/week: 1.2 oz     Types: 2 Cans of beer per week     Comment: quit drinking daily 3 years  ago, now drinks 3-4 beers on weekends   • Drug use: No   • Sexual activity: Not Currently   Lifestyle   • Physical activity:     Days per week: Not on file     Minutes per session: Not on file   • Stress: Not on file   Relationships   • Social connections:     Talks on phone: Not on file     Gets together: Not on file     Attends Hoahaoism service: Not on file     Active member of club or organization: Not on file     Attends meetings of clubs or organizations: Not on file     Relationship status: Not on file   • Intimate partner violence:     Fear of current or ex partner: Not on file     Emotionally abused: Not on file     Physically abused: Not on file     Forced sexual activity: Not on file   Other Topics Concern   • Not on file   Social History Narrative   • Not on file     Current Outpatient Medications   Medication Sig Dispense Refill   • potassium chloride SA (K-DUR) 10 MEQ Tab CR Take 1 Tab by mouth every day. 30 Tab 1   • calcitRIOL (ROCALTROL) 0.25 MCG Cap Take 1 Cap by mouth every day. 30 Cap 1   • Misc. Devices Misc As needed 1 Application 0   • Misc. Devices Misc As needed 1 Application 0   • carbidopa-levodopa (SINEMET)  MG Tab TAKE ONE TABLET BY MOUTH DAILY 90 Tab 1   • gabapentin (NEURONTIN) 300 MG Cap TAKE TWO CAPSULES BY MOUTH THREE TIMES A  Cap 1   • traZODone (DESYREL) 50 MG Tab TAKE ONE TABLET BY MOUTH EVERY NIGHT AT BEDTIME AS NEEDED FOR SLEEP 30 Tab 3   • cholestyramine (QUESTRAN) 4 g packet MIX 1 PACKET WITH LIQUID AND DRINK BY MOUTH ONCE DAILY 30 Each 1   • carisoprodol (SOMA) 350 MG Tab Take 1 Tab by mouth every 12 hours as needed for Muscle Spasms for up to 21 days. 30 Tab 0   • potassium chloride SA (K-DUR) 10 MEQ Tab CR TAKE ONE TABLET BY MOUTH DAILY ON SUNDAY, TUESDAY, WEDNESDAY, THURSDAY, AND SATURDAY 20 Each 1   • varenicline (CHANTIX STARTING MONTH PAK) 0.5 MG X 11 & 1 MG X 42 tablet Per packet 60 Tab 3   • Misc. Devices Misc As directed 90 Application 03   • Misc.  "Devices Misc As directed 1 Application 0   • meclizine (ANTIVERT) 12.5 MG Tab TAKE ONE TABLET BY MOUTH DAILY AS NEEDED 30 Tab 0   • SYNTHROID 88 MCG Tab TAKE ONE TABLET BY MOUTH EVERY MORNING ON AN EMPTY STOMACH 90 Tab 0   • lisinopril (PRINIVIL) 20 MG Tab TAKE ONE TABLET BY MOUTH DAILY 90 Tab 0   • allopurinol (ZYLOPRIM) 300 MG Tab TAKE ONE TABLET BY MOUTH DAILY 90 Tab 1   • meclizine (ANTIVERT) 12.5 MG Tab TAKE ONE TABLET BY MOUTH DAILY AS NEEDED 90 Tab 0   • potassium chloride SA (K-DUR) 10 MEQ Tab CR TAKE 1 TABLET BY MOUTH ON SUNDAY, TUESDAY, WEDNESDAY, THURSDAY AND SATURDAY 20 Tab 0   • Cholecalciferol (VITAMIN D3) 1000 units Cap Take 4,000 Units by mouth every day. (Patient taking differently: Take 5,000 Units by mouth every day. Indications: supplement) 90 Cap 0   • albuterol (PROVENTIL HFA) 108 (90 Base) MCG/ACT Aero Soln inhalation aerosol Inhale 2 Puffs by mouth every 6 hours as needed for Shortness of Breath. 3 Inhaler 0   • Misc. Devices Misc TENS unit pads to be used as directed 10 Device 1   • Misc. Devices Misc Incontinence supplies for incontinence to be changed once to twice daily 60 Device 11   • Misc. Devices Misc Back brace to use as directed 1 Device 0   • Multiple Vitamin (DAILY-BRANDON) Tab Take 1 Tab by mouth every day.     • polyethylene glycol 3350 (MIRALAX) Powder Take 17 g by mouth 3 times a day as needed (bowel care).     • Calcium Carbonate Antacid (TUMS PO) Take 3,200 mg by mouth every day. Needs to take BERRY flavor       No current facility-administered medications for this visit.          Review Of Systems  As documented in HPI above  PHYSICAL EXAMINATION:    /80 (BP Location: Right arm, Patient Position: Sitting, BP Cuff Size: Adult)   Pulse 76   Temp 36.4 °C (97.6 °F) (Temporal)   Resp 16   Ht 1.575 m (5' 2\")   Wt 73.3 kg (161 lb 8 oz)   LMP 10/02/1980   SpO2 96%   BMI 29.54 kg/m²   Gen.: Well-developed, well-nourished, no apparent distress, pleasant and cooperative " with the examination, patient is sitting in a wheelchair, and unable to ambulate by herself.  HEENT: Normocephalic/atraumatic, sinuses nontender with palpation, TMs clear, nares patent with pink mucosa and clear rhinorrhea, oropharynx clear  Neck: No JVD or bruits, no adenopathy  Cor: Regular rate and rhythm without murmur gallop or rub      Extremities: No cyanosis, clubbing or edema          ASSESSMENT/Plan:  1. Other hypoparathyroidism (HCC)   status post total thyroidectomy, subnormal level of ionized calcium and the parathormone hormone, advised to continue calcium vitamin D supplementation and I will add low-dose of calcitriol until she is able to establish with her endocrinologist, information for her referral was handed today no red flags patient is asymptomatic    calcitRIOL (ROCALTROL) 0.25 MCG Cap   2. Vertigo   this is a chronic problem that has been going on for long time, I had long discussion with the patient that symptomatic treatment with meclizine only is not appropriate and we need to explore more rule out causes related to inner ear pathology and neurological issues, placed referral for further evaluation by ENT specialist, hearing test and neurological evaluation.  We will refill limited amount of meclizine to use it as needed until she is able to get further evaluation and work-up for her vertigo, CAT scan done several years ago for evaluation of persistent headache reviewed  REFERRAL TO AUDIOLOGY    REFERRAL TO ENT    REFERRAL TO NEUROLOGY   3. Hypothyroidism, unspecified type   well-controlled on same dose continue Synthroid as directed at 88 mcg daily   4. Chronic pain syndrome     5. Hypokalemia   low-dose potassium for replacement recheck potassium level and calcium level and electrolytes     potassium chloride SA (K-DUR) 10 MEQ Tab CR   6. Extrapyramidal and movement disorder   this is chronic problem that might be related to previous back surgery, patient to establish with neurology for  further evaluation and for also evaluation of left lower extremity weakness continue physical therapy for now  Misc. Devices Misc    Misc. Devices Misc   7. Weakness of left lower extremity   this is a chronic problem, status post back surgery in the past and chronic back pain, patient has been followed up by neurosurgery still, handed a prescription for power mobility device to help with mobility and daily functional activities since she said she has problem with her old 1 and is not working anymore and now she is using only regular wheelchair.    Misc. Devices Misc   Addendum note: After spending more than 40 minutes with the patient in the room today after she was educated about her medications and while we were scheduling her next appointment at the , patient again expressed concerns about possible teeth infection and requested clindamycin antibiotics prescription to treat possible infection.  Patient was taken back to the room, examined her oral cavity upper jaw with all teeth were did as per patient history by oral surgeon 2 months ago, I do not see any evidence of infection or inflammation at this time, no adenopathy cervical or submandibular.  Several teeth on the lower jaw area with caries.  Explained to the patient that I do not see any evidence of infection at this time and I defer the prescription for clindamycin advised the patient to follow-up with her oral surgeon and dentist as directed for further evaluation.  Advised if she develops any pain or swelling that might indicate abscess or dental infection to report to her dentist, or to me , urgent care or emergency room after hours.    Please note that this dictation was created using voice recognition software. I have made every reasonable attempt to correct obvious errors but there may be errors of grammar and content that I may have overlooked prior to finalization of this note.     I spent 60 min with patient face to face , explaining the  risks and benefits ,and answering questions

## 2019-12-23 ENCOUNTER — TELEPHONE (OUTPATIENT)
Dept: MEDICAL GROUP | Facility: MEDICAL CENTER | Age: 64
End: 2019-12-23

## 2019-12-23 DIAGNOSIS — G89.4 CHRONIC PAIN SYNDROME: ICD-10-CM

## 2019-12-23 DIAGNOSIS — M47.12 OSTEOARTHRITIS OF CERVICAL SPINE WITH MYELOPATHY: ICD-10-CM

## 2019-12-23 DIAGNOSIS — Z96.649 PAIN DUE TO HIP JOINT PROSTHESIS, SUBSEQUENT ENCOUNTER: ICD-10-CM

## 2019-12-23 DIAGNOSIS — T84.84XD PAIN DUE TO HIP JOINT PROSTHESIS, SUBSEQUENT ENCOUNTER: ICD-10-CM

## 2019-12-24 DIAGNOSIS — R42 VERTIGO: ICD-10-CM

## 2019-12-24 RX ORDER — MECLIZINE HCL 12.5 MG/1
25 TABLET ORAL
Qty: 30 TAB | Refills: 3 | Status: SHIPPED | OUTPATIENT
Start: 2019-12-24 | End: 2020-04-09 | Stop reason: SDUPTHER

## 2019-12-24 NOTE — TELEPHONE ENCOUNTER
Pharmacy needs clarification on Sig for Meclizine HCL     Sig states: take 2 BID and take 1 po daily.     Please advise. And resend medication.     Thank you,     Addis Paulino  Medical Assistant

## 2019-12-28 NOTE — TELEPHONE ENCOUNTER
Referral has been signed, she can come in and  a copy of the referral at her convenience. Thank you

## 2019-12-30 ENCOUNTER — TELEPHONE (OUTPATIENT)
Dept: MEDICAL GROUP | Facility: MEDICAL CENTER | Age: 64
End: 2019-12-30

## 2019-12-30 NOTE — TELEPHONE ENCOUNTER
1. Name: CHESTER PRAJAPATI   Call Back Number: [phone]  Patient approves a detailed voicemail message: yes    Chester stopped by today asking about her RX to [employer] for her electric wheelchair. She stated that she would like to have that requested for this year as she hasn't had any other equipment needed.     Please let her know when she can call [employer] or when they will call her.

## 2020-01-07 DIAGNOSIS — R29.898 WEAKNESS OF LEFT LOWER EXTREMITY: ICD-10-CM

## 2020-01-07 DIAGNOSIS — G25.9 EXTRAPYRAMIDAL AND MOVEMENT DISORDER: ICD-10-CM

## 2020-01-09 ENCOUNTER — TELEPHONE (OUTPATIENT)
Dept: MEDICAL GROUP | Facility: MEDICAL CENTER | Age: 65
End: 2020-01-09

## 2020-01-09 DIAGNOSIS — G89.4 CHRONIC PAIN SYNDROME: ICD-10-CM

## 2020-01-09 DIAGNOSIS — F39 MOOD DISORDER (HCC): ICD-10-CM

## 2020-01-09 RX ORDER — CARISOPRODOL 350 MG/1
TABLET ORAL
Qty: 30 TAB | Refills: 0 | OUTPATIENT
Start: 2020-01-09

## 2020-01-09 RX ORDER — CARISOPRODOL 350 MG/1
350 TABLET ORAL
Qty: 30 TAB | Refills: 0 | Status: SHIPPED | OUTPATIENT
Start: 2020-01-09 | End: 2020-02-08

## 2020-01-09 NOTE — TELEPHONE ENCOUNTER
Pt called and stated she would like a refill on her Soma, until she can get into pain management the end of Feb. She also stated that her potassium rx dosage was supposed to be changed. Please advise, thank you

## 2020-01-10 NOTE — TELEPHONE ENCOUNTER
I have already placed referral to psychiatry today, refilled Soma for 1 month supply please notify patient that I am not going to be able to refill Soma in the future and she needs to follow-up with pain management.  Prescription is ready for pickup.  I would like her to take potassium 10 mEq daily to prevent high potassium level.

## 2020-01-13 ENCOUNTER — TELEPHONE (OUTPATIENT)
Dept: MEDICAL GROUP | Facility: MEDICAL CENTER | Age: 65
End: 2020-01-13

## 2020-01-13 NOTE — TELEPHONE ENCOUNTER
DOCUMENTATION OF PAR STATUS:    1. Name of Medication & Dose: Soma 350 mg tabs     2. Name of Prescription Coverage Company & phone #: [employer]       3. Date Prior Auth Submitted: 01/13/2020    4. What information was given to obtain insurance decision? PA form from [employer]    5. Prior Auth Status? Pending    6. Patient Notified: yes

## 2020-01-17 NOTE — H&P
ADMISSION DATE:  2017    HISTORY OF PRESENT ILLNESS:  The patient is an approximately 61-year-old   female with chief complaint of back pain.  She also has complaints of numbness   in her low back, numbness in her legs and severe pain in her back.    She has had multiple lumbar surgeries performed on her, none of which I think   were successful.  She continues to complain of pain regardless of what   treatment that has been administered including lumbar spinal surgery multiple   times, injections, medications, activity modification, bracing, etc.  She has   had a postoperative MRI which showed a postoperative seroma.  She also has   hardware that is in excellent position.    I saw her in the clinic and she reported to me she would like to have the   hardware out and the seroma evacuated.  I explained to her at length that   virtually all spinal surgeries of the magnitude that she has experienced has   some degree of fluid collection.  The seroma was an extremely normal component   of the postoperative anatomy after a large decompression and fusion.    She does have pain overlying the hardware, so I do think that hardware removal   may be of some benefit and she has requested seroma removal.    My impression has been that regardless of the treatment it has been extremely   difficult if not impossible to improve her pain symptoms.    PAST MEDICAL HISTORY:  Significant for hyperlipidemia, hypertension,   hypothyroidism, osteoporosis, hypoparathyroidism, tobacco abuse, alcohol   abuse, parotid cyst, sialolithiasis, arthritis, Helicobacter pylori infection,   gout, calcium elevation, chronic diarrhea, hyperlipidemia, restless leg   syndrome, psychiatric depression and anxiety.    PAST SURGICAL HISTORY:  Thyroidectomy, parathyroidectomy   salpingo-oophorectomy, hysterectomy,  section, lumbar fusion surgery   and decompression x3.    SOCIAL HISTORY:  She is a 1-1/2 pack per day smoker and she also drinks  SNF - Speech Language Pathology   Swallow Eval/Discharge  Rebeca Cochran     Patient Name: Camille Pantoja  : 1952  MRN: [id number]  Today's Date: 2020  Onset of Illness/Injury or Date of Surgery: 01/10/20     Referring Physician: Richard      Admit Date: 2020    Visit Dx:    ICD-10-CM ICD-9-CM   1. Oropharyngeal dysphagia R13.12 787.22     Patient Active Problem List   Diagnosis   • Deep vein thrombosis of lower extremity (CMS/HCC)   • Hyperlipidemia   • Hypertension   • Vitamin D deficiency   • Osteoarthritis   • Paroxysmal atrial fibrillation with rapid ventricular response (CMS/HCC)   • Chronic atrial fibrillation with rapid ventricular response   • Atrial fibrillation (CMS/HCC)   • Metabolic encephalopathy   • Cerebral ventriculomegaly   • Acute ischemic left MCA stroke (CMS/HCC)   • Aftercare   • Right hemiparesis (CMS/HCC)     Past Medical History:   Diagnosis Date   • CHF (congestive heart failure) (CMS/HCC)    • Hyperlipidemia    • Hypertension    • Irregular heart beat    • Skin cancer    • Stroke (CMS/HCC)      Past Surgical History:   Procedure Laterality Date   • CARDIAC CATHETERIZATION N/A 1/10/2020    Procedure: Left Heart Cath;  Surgeon: Cain Mcneil MD;  Location: [facility] INVASIVE LOCATION;  Service: Cardiovascular   • CARDIAC CATHETERIZATION N/A 1/10/2020    Procedure: Coronary angiography;  Surgeon: Cain Mcneil MD;  Location: [facility] INVASIVE LOCATION;  Service: Cardiovascular   • FINGER SURGERY     • HYSTERECTOMY     • JOINT REPLACEMENT     • ORIF SHOULDER DISLOCATION W/ HUMERAL FRACTURE     • TIBIA FRACTURE SURGERY            SWALLOW EVALUATION (last 72 hours)      SLP Adult Swallow Evaluation     Row Name 20 1238       Rehab Evaluation    Document Type  evaluation  -AD    Total Evaluation Minutes, SLP  28  -AD    Subjective Information  no complaints  -AD    Patient Observations  alert;cooperative;agree to therapy  -AD    Patient/Family Observations  Pt    alcohol on a regular basis and has a history of alcoholism.    MEDICATIONS:  Include trazodone, Soma, polyethylene glycol, Ativan, Neurontin,   Zyloprim, oxycodone, Synthroid, K-Dur, vitamin D, calcium carbonate,   carbidopa levodopa, meclizine and MS Contin.    REVIEW OF SYSTEMS:  Positive for all the above past medical history and denies   other positive review of systems.    PHYSICAL EXAMINATION:  GENERAL:  She was alert, oriented.  She is cooperative and afebrile.  MUSCULOSKELETAL:  Examination of her lumbar spine reveals she has tenderness   to palpation around the hardware.  She also has numbness in various areas in   her low back.  She also has varying degrees of numbness in her lower   extremities including bilateral feet including the medial malleolus dorsum of   the foot, lateral aspect of the foot, intermittent anterior knee numbness and   lateral thigh numbness bilaterally.  Motor strength is essentially 4-/5 in all   muscle groups in bilateral lower extremities including iliopsoas, quadriceps,   hamstrings, anterior tibialis, gastroc soleus, extensor hallucis longus, and   peroneals.  Reflex is 2/4 ankle and patella.    DIAGNOSTIC DATA:  MRI show hardware in place.  There is a very small seroma   that was above and below the fascia.    DIAGNOSES:  1.  Painful hardware.  2.  Postoperative seroma.    PLAN:  To do a deep segmental hardware removal, fusion exploration and seroma   removal.  We have explained to her the risks of bleeding, infection, scar,   nerve damage, reoperation, failure of operation.  It is very possible that I   could do a technically perfect operation and she not get better.  Given her   history of recalcitrant to any treatment, I think this is a very likely   possibility.  All of this was explained to her in detail and at this point,   she understands and would like to proceed with surgery.       ____________________________________     MD DIMPLE MON / JATIN    DD:   seen upright in a chair during lunch.   -AD    Patient Effort  good  -AD    Symptoms Noted During/After Treatment  none  -AD       General Information    Patient Profile Reviewed  yes  -AD    Pertinent History Of Current Problem  Pt is a 68 y/o female hospitalized for new onset Afib and stroke. MRI shows infarcts in the areas of the left caudate, left lentiform nucleus, external limb of the left internal capsule, left insular cortex, and left posterior frontal lobe.Pt also later found to have new acute infarcts in the areas of the right upper cerebellum and tiny right upper most portion of the kojo near the midbrain junction. MBS completed on 1/15/20 with mild oropharyngeal dysphagia due to mild decreased hyolaryngeal excursion. Mild vallecular residue noted that cleare with subsequent swallow and no penetration or aspiration viewed on any consistency. Swallow eval ordered to follow up for meal assessment and tolerance of diet.   -AD    Current Method of Nutrition  soft textures;chopped;thin liquids  -AD    Precautions/Limitations, Vision  WFL with corrective lenses;for purposes of eval  -AD    Precautions/Limitations, Hearing  WFL;for purposes of eval  -AD    Prior Level of Function-Communication  WFL  -AD    Prior Level of Function-Swallowing  no diet consistency restrictions  -AD    Plans/Goals Discussed with  patient;agreed upon  -AD    Barriers to Rehab  none identified  -AD    Patient's Goals for Discharge  return to regular diet  -AD       Pain Assessment    Additional Documentation  -- no pain reported  -AD       Oral Motor and Function    Oral Lesions or Structural Abnormalities and/or variants  sore on lower lip on left side  -AD    Dentition Assessment  natural, present and adequate  -AD    Secretion Management  WNL/WFL  -AD    Mucosal Quality  moist, healthy  -AD    Volitional Swallow  WFL  -AD    Volitional Cough  reduced respiratory support improved with verbal prompt for strong cough  -AD       Oral  08/18/2017 08:23:03  DT:  08/18/2017 08:43:04    D#:  [id number]  Job#:  [id number]   Musculature and Cranial Nerve Assessment    Oral Motor General Assessment  oral labial or buccal impairment;lingual impairment  -AD    Oral Labial or Buccal Impairment, Detail, Cranial Nerve VII (Facial):  left labial droop slight left facial droop; functional ROM of lips  -AD    Lingual Impairment, Detail. Cranial Nerves IX, XII (Glossopharyngeal and Hypoglossal)  reduced strength;bilaterally;other (see comments) mild; slight tongue deviation to left upon protrusion  -AD       General Eating/Swallowing Observations    Respiratory Support Currently in Use  room air  -AD    Eating/Swallowing Skills  self-fed;appropriate self-feeding skills observed  -AD    Positioning During Eating  upright in chair near 90 degrees  -AD    Utensils Used  spoon;straw  -AD    Consistencies Trialed  regular textures;soft textures;whole;thin liquids  -AD       Respiratory    Respiratory Status  WFL;room air;during swallowing/eating  -AD       Clinical Swallow Eval    Oral Prep Phase  WFL  -AD    Oral Transit  WFL  -AD    Oral Residue  WFL  -AD    Pharyngeal Phase  WFL  -AD    Esophageal Phase  unremarkable  -AD    Clinical Swallow Evaluation Summary  Pt presents with a functional oropharyngeal swallow. No oral prep, transit deficits noted. No oral reisude after trials of regular and soft textures. No clinical s/s of aspiration noted on thins from straw. Minimal to mild left labial weakness noted but did not appear to have any ill effect on swallowing.   -AD       Clinical Impression    SLP Swallowing Diagnosis  functional oral phase;functional pharyngeal phase  -AD    Functional Impact  no impact on function  -AD    Swallow Criteria for Skilled Therapeutic Interventions Met  no problems identified which require skilled intervention  -AD       Recommendations    Therapy Frequency (Swallow)  evaluation only  -AD    SLP Diet Recommendation  soft textures;whole;thin liquids  -AD    Recommended Precautions and Strategies  upright posture  during/after eating;small bites of food and sips of liquid  -AD    SLP Rec. for Method of Medication Administration  meds whole;with thin liquids;as tolerated  -AD    Monitor for Signs of Aspiration  no;notify SLP if any concerns  -AD    Anticipated Dischage Disposition  home with assist  -AD      User Key  (r) = Recorded By, (t) = Taken By, (c) = Cosigned By    Initials Name Effective Dates    Neema Verduzco, MS CCC-SLP 02/28/19 -           EDUCATION  The patient has been educated in the following areas:   Dysphagia (Swallowing Impairment) Modified Diet Instruction. Pt verbalizes understanding of results and recommendation for soft whole food diet.     SLP Recommendation and Plan  SLP Swallowing Diagnosis: functional oral phase, functional pharyngeal phase  SLP Diet Recommendation: soft textures, whole, thin liquids  Monitor for Signs of Aspiration: no, notify SLP if any concerns  Swallow Criteria for Skilled Therapeutic Interventions Met: no problems identified which require skilled intervention  Anticipated Dischage Disposition: home with assist  Therapy Frequency (Swallow): evaluation only          Plan of Care Reviewed With: patient  Outcome Summary: SLP/Clinical Swallow Eval: Pt presents with a functional oropharyngeal swallow. No oral prep, transit deficits noted. No oral reisude after trials of regular and soft textures. No clinical s/s of aspiration noted on thins from straw. Minimal to mild left labial weakness noted but did not appear to have any ill effect on swallowing. Recommend to advance diet to soft whole textures with thins. No need for dysphagia therapy indicated at this time.       Time Calculation:   Time Calculation- SLP     Row Name 01/17/20 1335             Time Calculation- SLP    SLP Start Time  1210  -AD      SLP Stop Time  1238  -AD      SLP Time Calculation (min)  28 min  -AD      Total Timed Code Minutes- SLP  0 minute(s)  -AD      SLP Non-Billable Time (min)  0 min  -AD      TCU  Minutes- SLP  0 min  -AD      SLP Received On  01/17/20  -AD        User Key  (r) = Recorded By, (t) = Taken By, (c) = Cosigned By    Initials Name Provider Type    Neema Verduzco MS CCC-SLP Speech and Language Pathologist          Therapy Charges for Today     Code Description Service Date Service Provider Modifiers Qty    [id number] HC ST EVAL ORAL PHARYNG SWALLOW 2 1/17/2020 Neema Raya MS CCC-SLP GN 1        SLP Discharge Summary  Anticipated Dischage Disposition: home with assist    Neema Raya MS CCC-SLP  1/17/2020

## 2020-01-27 ENCOUNTER — TELEPHONE (OUTPATIENT)
Dept: MEDICAL GROUP | Facility: MEDICAL CENTER | Age: 65
End: 2020-01-27

## 2020-01-27 NOTE — TELEPHONE ENCOUNTER
MEDICATION PRIOR AUTHORIZATION NEEDED:     1. Name of Medication: Synthroid 88 mcg Tab     2. Requested By (Name of Pharmacy): [facility]     3. Is insurance on file current? [employer]     Patients insurance covers: levothyroxine 88mcg, levoxyl tab 88mcg     Please advise if patient can switch to generic?

## 2020-01-27 NOTE — TELEPHONE ENCOUNTER
MEDICATION PRIOR AUTHORIZATION NEEDED:    1. Name of Medication: Synthroid 88 mcg Tab    2. Requested By (Name of Pharmacy): [facility]     3. Is insurance on file current? [place]    Patients insurance covers: levothyroxine 88mcg, levoxyl tab 88mcg    Please advise if patient can switch to generic?

## 2020-01-28 ENCOUNTER — TELEPHONE (OUTPATIENT)
Dept: MEDICAL GROUP | Facility: MEDICAL CENTER | Age: 65
End: 2020-01-28

## 2020-01-28 NOTE — TELEPHONE ENCOUNTER
DOCUMENTATION OF PAR STATUS:    1. Name of Medication & Dose: Synthroid 88 mcg     2. Name of Prescription Coverage Company & phone #: [employer] med[employer]     3. Date Prior Auth Submitted: 01/28/2020    4. What information was given to obtain insurance decision? [employer] [place] form thru [employer] phone number [phone]    5. Prior Auth Status? Pending    6. Patient Notified: yes

## 2020-01-28 NOTE — TELEPHONE ENCOUNTER
FINAL PRIOR AUTHORIZATION STATUS:    1.  Name of Medication & Dose: Synthroid 88 MCG     2. Prior Auth Status: Approved through 01/28/2021     3. Action Taken: Pharmacy Notified: yes Patient Notified: yes

## 2020-01-29 NOTE — TELEPHONE ENCOUNTER
FINAL PRIOR AUTHORIZATION STATUS:    1.  Name of Medication & Dose: Synthroid 88 mcg     2. Prior Auth Status: Approved through 01/29/2021     3. Action Taken: Pharmacy Notified: yes Patient Notified: yes

## 2020-02-03 DIAGNOSIS — E87.6 HYPOKALEMIA: ICD-10-CM

## 2020-02-04 RX ORDER — POTASSIUM CHLORIDE 750 MG/1
10 TABLET, EXTENDED RELEASE ORAL DAILY
Qty: 30 TAB | Refills: 1 | Status: SHIPPED | OUTPATIENT
Start: 2020-02-04 | End: 2020-03-09

## 2020-02-04 NOTE — TELEPHONE ENCOUNTER
Received request via: Patient    Was the patient seen in the last year in this department? No     Does the patient have an active prescription (recently filled or refills available) for medication(s) requested? No

## 2020-02-10 ENCOUNTER — APPOINTMENT (OUTPATIENT)
Dept: PHYSICAL MEDICINE AND REHAB | Facility: MEDICAL CENTER | Age: 65
End: 2020-02-10
Payer: COMMERCIAL

## 2020-02-20 DIAGNOSIS — G89.4 CHRONIC PAIN SYNDROME: ICD-10-CM

## 2020-02-20 RX ORDER — CARISOPRODOL 350 MG/1
350 TABLET ORAL
Qty: 30 TAB | Refills: 0 | OUTPATIENT
Start: 2020-02-20 | End: 2020-03-21

## 2020-02-25 ENCOUNTER — TELEPHONE (OUTPATIENT)
Dept: MEDICAL GROUP | Facility: MEDICAL CENTER | Age: 65
End: 2020-02-25

## 2020-02-25 NOTE — TELEPHONE ENCOUNTER
Patient keeps requesting prescription refill for Soma.  Please advise patient as our agreement and conversation in previous visit that I gave her a courtesy refill of the medication and advised her to establish with pain management issues she elects to continue this medicine.  I will not be able to refill the medicine.  Thank you

## 2020-03-03 DIAGNOSIS — M10.9 GOUT, ARTHRITIS: ICD-10-CM

## 2020-03-03 RX ORDER — ALLOPURINOL 300 MG/1
TABLET ORAL
Qty: 90 TAB | Refills: 1 | Status: SHIPPED | OUTPATIENT
Start: 2020-03-03 | End: 2020-03-27 | Stop reason: SDUPTHER

## 2020-03-03 NOTE — TELEPHONE ENCOUNTER
Received request via: Pharmacy    Was the patient seen in the last year in this department? Yes  Future Appointments       Provider Department Center    3/10/2020 2:00 PM Nito Yousif M.D. [facility]          Does the patient have an active prescription (recently filled or refills available) for medication(s) requested? No

## 2020-03-10 ENCOUNTER — OFFICE VISIT (OUTPATIENT)
Dept: MEDICAL GROUP | Facility: MEDICAL CENTER | Age: 65
End: 2020-03-10
Attending: FAMILY MEDICINE
Payer: COMMERCIAL

## 2020-03-10 VITALS
RESPIRATION RATE: 16 BRPM | WEIGHT: 163.2 LBS | BODY MASS INDEX: 30.03 KG/M2 | HEIGHT: 62 IN | SYSTOLIC BLOOD PRESSURE: 130 MMHG | DIASTOLIC BLOOD PRESSURE: 90 MMHG | TEMPERATURE: 98 F | OXYGEN SATURATION: 98 % | HEART RATE: 69 BPM

## 2020-03-10 DIAGNOSIS — G89.29 CHRONIC BACK PAIN, UNSPECIFIED BACK LOCATION, UNSPECIFIED BACK PAIN LATERALITY: ICD-10-CM

## 2020-03-10 DIAGNOSIS — M54.9 CHRONIC BACK PAIN, UNSPECIFIED BACK LOCATION, UNSPECIFIED BACK PAIN LATERALITY: ICD-10-CM

## 2020-03-10 DIAGNOSIS — E20.9 HYPOPARATHYROIDISM, UNSPECIFIED HYPOPARATHYROIDISM TYPE (HCC): ICD-10-CM

## 2020-03-10 DIAGNOSIS — M62.838 MUSCLE SPASM: ICD-10-CM

## 2020-03-10 PROCEDURE — 99213 OFFICE O/P EST LOW 20 MIN: CPT | Performed by: FAMILY MEDICINE

## 2020-03-10 PROCEDURE — 99214 OFFICE O/P EST MOD 30 MIN: CPT | Performed by: FAMILY MEDICINE

## 2020-03-10 RX ORDER — TIZANIDINE 4 MG/1
4 TABLET ORAL 2 TIMES DAILY PRN
Qty: 30 TAB | Refills: 1 | Status: SHIPPED
Start: 2020-03-10 | End: 2020-05-03

## 2020-03-10 RX ORDER — PREGABALIN 75 MG/1
75 CAPSULE ORAL 3 TIMES DAILY
Qty: 90 CAP | Refills: 1 | Status: SHIPPED | OUTPATIENT
Start: 2020-03-10 | End: 2020-03-27 | Stop reason: SDUPTHER

## 2020-03-10 ASSESSMENT — FIBROSIS 4 INDEX: FIB4 SCORE: 2.26

## 2020-03-10 NOTE — PROGRESS NOTES
Chief Complaint:   Chief Complaint   Patient presents with   • Lab Results   • Spasms       HPI:Established patient   Marium Renteria is a 64 y.o. female who presents for follow-up, discussed the following concerns as follow-up today:     Muscle spasm  Patient reports that she has been having diffuse muscle spasms in all the muscles of her back, especially at nighttime.  Patient is requesting to refill her Soma, she said this is the only medication that helped her muscle spasms in the past, and a previous discussion in previous visit with the patient she was referred to see pain management for further evaluation of her chronic back pain, and she was given only a courtesy refill of Soma prescription twice for her to be able to taper off the medication because of its discussed side effects.  In high risk of dependency.  Patient today said that her pain management appointment is in 1 week, and she is unable to tolerate her back pain is, reviewed with the patient her previous lab work results which shows evidence of subnormal level of potassium.  She is also with a history of hypoparathyroidism and abnormal calcium metabolism.  She told me today that she has been taking her calcium pills but she thinks she is not taking the proper dose because they are large and she cannot swallow them and she has to crush them and she thinks that she might be taking less amount of her dose.    Chronic back pain, unspecified back location, unspecified back pain laterality    As mentioned above chronic back pain related to previous vertebral injury, with previous history of surgical intervention, at this time patient is on gabapentin 600 mg 3 times a day. Patient said medication is high dose and still not controlling her pain, and she would like something stronger.  For her chronic back pain control.  Patient is wheelchair-bound, today she is with her new electric scooter that she uses for ambulation.        Past medical history,  family history, social history and medications reviewed and updated in the record. Today   Current medications, problem list and allergies reviewed in Epic today   Health maintenance topics are reviewed and updated.    Patient Active Problem List    Diagnosis Date Noted   • Debility 09/27/2018     Priority: High   • Hypomagnesemia 09/22/2018     Priority: High   • Restless leg syndrome 10/10/2018     Priority: Low   • Tobacco use 09/13/2018     Priority: Low   • Hypoparathyroidism (HCC) 12/11/2009     Priority: Low   • Incontinence in female 03/28/2019   • Chronic pain syndrome 03/21/2019   • Hypothyroidism 09/20/2018   • Ulcerative esophagitis 09/20/2018   • Major depressive disorder 09/11/2017   • Pain due to hip joint prosthesis ([facility]) 08/07/2017   • Benzodiazepine dependence, continuous ([facility]) 04/26/2017   • Cervical lymphadenopathy 10/07/2016   • Osteoarthritis of cervical spine with myelopathy 09/14/2016   • Failed back syndrome 09/14/2016   • Depression 06/27/2016   • Left lumbar radiculopathy 06/23/2016   • Neuropathy ([facility]) 06/23/2016   • Lumbar scoliosis 12/17/2015   • History of alcohol abuse 12/07/2015   • Hyperlipidemia 06/05/2015   • Insomnia 05/23/2014   • Chronic diarrhea 09/30/2013   • ELISHA (generalized anxiety disorder) 08/28/2013   • Gout, arthritis 05/24/2013   • Mixed hyperlipidemia 12/11/2009   • HTN (hypertension) 12/11/2009   • Postsurgical hypothyroidism 12/11/2009   • Osteoporosis, senile 12/11/2009   • 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   • Symptomatic menopausal or female climacteric states 12/11/2009   • Cigarette nicotine dependence, uncomplicated 12/11/2009     Family History   Problem Relation Age of Onset   • Genetic Disorder Mother         Alzheimer's   • Alcohol/Drug Father      Social History     Socioeconomic History   • Marital status:      Spouse name: Not on file   • Number of children: 1   • Years of education: Not on file   • Highest education level: Not on  file   Occupational History   • Occupation: none     Employer: OTHER   Social Needs   • Financial resource strain: Not on file   • Food insecurity     Worry: Not on file     Inability: Not on file   • Transportation needs     Medical: Not on file     Non-medical: Not on file   Tobacco Use   • Smoking status: Current Every Day Smoker     Packs/day: 0.50     Years: 47.00     Pack years: 23.50     Types: Cigarettes     Start date: 4/25/1970   • Smokeless tobacco: Never Used   • Tobacco comment: quit during pregnancy, now 2-3 cig daily   Substance and Sexual Activity   • Alcohol use: No     Alcohol/week: 1.2 oz     Types: 2 Cans of beer per week     Comment: quit drinking daily 3 years ago, now drinks 3-4 beers on weekends   • Drug use: No   • Sexual activity: Not Currently   Lifestyle   • Physical activity     Days per week: Not on file     Minutes per session: Not on file   • Stress: Not on file   Relationships   • Social connections     Talks on phone: Not on file     Gets together: Not on file     Attends Buddhist service: Not on file     Active member of club or organization: Not on file     Attends meetings of clubs or organizations: Not on file     Relationship status: Not on file   • Intimate partner violence     Fear of current or ex partner: Not on file     Emotionally abused: Not on file     Physically abused: Not on file     Forced sexual activity: Not on file   Other Topics Concern   • Not on file   Social History Narrative   • Not on file       Current Outpatient Medications   Medication Sig Dispense Refill   • potassium chloride SA (K-DUR) 10 MEQ Tab CR TAKE ONE TABLET BY MOUTH DAILY 15 Tab 0   • pregabalin (LYRICA) 75 MG Cap Take 1 Cap by mouth 3 times a day for 31 days. 90 Cap 1   • tizanidine (ZANAFLEX) 4 MG Tab Take 1 Tab by mouth 2 times a day as needed. 30 Tab 1   • allopurinol (ZYLOPRIM) 300 MG Tab TAKE ONE TABLET BY MOUTH DAILY 90 Tab 1   • cholestyramine (QUESTRAN) 4 g packet MIX 1 PACKET WITH  LIQUID AND DRINK BY MOUTH ONCE DAILY 30 Each 0   • SYNTHROID 88 MCG Tab TAKE ONE TABLET BY MOUTH EVERY MORNING ON AN EMPTY STOMACH 90 Tab 1   • lisinopril (PRINIVIL) 20 MG Tab TAKE ONE TABLET BY MOUTH DAILY 90 Tab 1   • Misc. Devices Misc As needed 1 Application 0   • meclizine (ANTIVERT) 12.5 MG Tab Take 2 Tabs by mouth 1 time daily as needed. TAKE ONE TABLET BY MOUTH DAILY AS NEEDED 30 Tab 3   • calcitRIOL (ROCALTROL) 0.25 MCG Cap Take 1 Cap by mouth every day. 30 Cap 1   • Misc. Devices Misc As needed 1 Application 0   • carbidopa-levodopa (SINEMET)  MG Tab TAKE ONE TABLET BY MOUTH DAILY 90 Tab 1   • traZODone (DESYREL) 50 MG Tab TAKE ONE TABLET BY MOUTH EVERY NIGHT AT BEDTIME AS NEEDED FOR SLEEP 30 Tab 3   • potassium chloride SA (K-DUR) 10 MEQ Tab CR TAKE ONE TABLET BY MOUTH DAILY ON SUNDAY, TUESDAY, WEDNESDAY, THURSDAY, AND SATURDAY 20 Each 1   • varenicline (CHANTIX STARTING MONTH MIRELA) 0.5 MG X 11 & 1 MG X 42 tablet Per packet 60 Tab 3   • Misc. Devices Misc As directed 90 Application 03   • Misc. Devices Misc As directed 1 Application 0   • meclizine (ANTIVERT) 12.5 MG Tab TAKE ONE TABLET BY MOUTH DAILY AS NEEDED 30 Tab 0   • potassium chloride SA (K-DUR) 10 MEQ Tab CR TAKE 1 TABLET BY MOUTH ON SUNDAY, TUESDAY, WEDNESDAY, THURSDAY AND SATURDAY 20 Tab 0   • Cholecalciferol (VITAMIN D3) 1000 units Cap Take 4,000 Units by mouth every day. (Patient taking differently: Take 5,000 Units by mouth every day. Indications: supplement) 90 Cap 0   • albuterol (PROVENTIL HFA) 108 (90 Base) MCG/ACT Aero Soln inhalation aerosol Inhale 2 Puffs by mouth every 6 hours as needed for Shortness of Breath. 3 Inhaler 0   • Misc. Devices Misc TENS unit pads to be used as directed 10 Device 1   • Misc. Devices Misc Incontinence supplies for incontinence to be changed once to twice daily 60 Device 11   • Misc. Devices Misc Back brace to use as directed 1 Device 0   • Multiple Vitamin (DAILY-BRANDON) Tab Take 1 Tab by mouth every  "day.     • polyethylene glycol 3350 (MIRALAX) Powder Take 17 g by mouth 3 times a day as needed (bowel care).     • Calcium Carbonate Antacid (TUMS PO) Take 3,200 mg by mouth every day. Needs to take BERRY flavor       No current facility-administered medications for this visit.          Review Of Systems  As documented in HPI above  PHYSICAL EXAMINATION:    /90 (BP Location: Right arm, Patient Position: Sitting, BP Cuff Size: Adult)   Pulse 69   Temp 36.7 °C (98 °F) (Temporal)   Resp 16   Ht 1.575 m (5' 2\")   Wt 74 kg (163 lb 3.2 oz)   LMP 10/02/1980   SpO2 98%   BMI 29.85 kg/m²   Gen.: Well-developed, well-nourished, no apparent distress, pleasant and cooperative with the examination  HEENT: Normocephalic/atraumatic,     Neck: No JVD or bruits, no adenopathy  Cor: Regular rate and rhythm without murmur gallop or rub  Lungs: Clear to auscultation with equal breath sounds bilaterally. No wheezes, rhonchi.  Abdomen: Soft nontender without hepatosplenomegaly or masses appreciated, normoactive bowel sounds  Extremities: No cyanosis, clubbing or edema            ASSESSMENT/Plan:  1. Muscle spasm   advised to follow-up with her back specialist and pain management, discussed with the patient rule out other causes of muscle spasms like electrolytes low potassium and abnormal calcium metabolism recheck calcium level and potassium level, discussed with the patient to start her on a muscle relaxant like tizanidine until she is able to see her pain management.  Also encouraged to follow-up with her endocrinologist, referral was placed 3 months ago, and it was processed to [facility] at [facility].  Information was given to patient and encouraged to call and make an appointment.  Also advised to continue with physical therapy  Comp Metabolic Panel    CALCIUM IONIZED    CALCIUM,TOTAL   2. Chronic back pain, unspecified back location, unspecified back pain laterality   will change gabapentin since " it is not helping the patient pain control, change to Lyrica 75 g 3 times a day, added Zanaflex, I declined to refill for Soma because of above-mentioned possible side effects and high risk of dependency, advised patient to follow-up with pain management as directed, continue physical therapy    pregabalin (LYRICA) 75 MG Cap    tizanidine (ZANAFLEX) 4 MG Tab    REFERRAL TO PHYSICAL THERAPY Reason for Therapy: Eval/Treat/Report   3. Hypoparathyroidism, unspecified hypoparathyroidism type (HCC)   patient to recheck her calcium and potassium level.  Follow-up with endocrinology as directed.  Continue calcium and Calcitrol supplementation as directed.       Please note that this dictation was created using voice recognition software. I have made every reasonable attempt to correct obvious errors but there may be errors of grammar and content that I may have overlooked prior to finalization of this note.

## 2020-03-11 RX ORDER — EPINEPHRINE 0.3 MG/.3ML
0.3 INJECTION SUBCUTANEOUS
Qty: 2 EACH | Refills: 1 | Status: SHIPPED | OUTPATIENT
Start: 2020-03-11 | End: 2021-04-05 | Stop reason: SDUPTHER

## 2020-03-12 ENCOUNTER — HOSPITAL ENCOUNTER (OUTPATIENT)
Dept: LAB | Facility: MEDICAL CENTER | Age: 65
End: 2020-03-12
Attending: FAMILY MEDICINE
Payer: COMMERCIAL

## 2020-03-12 DIAGNOSIS — M62.838 MUSCLE SPASM: ICD-10-CM

## 2020-03-12 LAB
ALBUMIN SERPL BCP-MCNC: 4.1 G/DL (ref 3.2–4.9)
ALBUMIN/GLOB SERPL: 1.3 G/DL
ALP SERPL-CCNC: 90 U/L (ref 30–99)
ALT SERPL-CCNC: 16 U/L (ref 2–50)
ANION GAP SERPL CALC-SCNC: 12 MMOL/L (ref 7–16)
AST SERPL-CCNC: 22 U/L (ref 12–45)
BILIRUB SERPL-MCNC: 0.6 MG/DL (ref 0.1–1.5)
BUN SERPL-MCNC: 7 MG/DL (ref 8–22)
CA-I SERPL-SCNC: 1.1 MMOL/L (ref 1.1–1.3)
CALCIUM SERPL-MCNC: 10 MG/DL (ref 8.5–10.5)
CHLORIDE SERPL-SCNC: 103 MMOL/L (ref 96–112)
CO2 SERPL-SCNC: 26 MMOL/L (ref 20–33)
CREAT SERPL-MCNC: 0.81 MG/DL (ref 0.5–1.4)
GLOBULIN SER CALC-MCNC: 3.2 G/DL (ref 1.9–3.5)
GLUCOSE SERPL-MCNC: 100 MG/DL (ref 65–99)
POTASSIUM SERPL-SCNC: 3.9 MMOL/L (ref 3.6–5.5)
PROT SERPL-MCNC: 7.3 G/DL (ref 6–8.2)
SODIUM SERPL-SCNC: 141 MMOL/L (ref 135–145)

## 2020-03-12 PROCEDURE — 82330 ASSAY OF CALCIUM: CPT

## 2020-03-12 PROCEDURE — 80053 COMPREHEN METABOLIC PANEL: CPT

## 2020-03-12 PROCEDURE — 36415 COLL VENOUS BLD VENIPUNCTURE: CPT

## 2020-03-20 ENCOUNTER — TELEPHONE (OUTPATIENT)
Dept: MEDICAL GROUP | Facility: MEDICAL CENTER | Age: 65
End: 2020-03-20

## 2020-03-20 NOTE — TELEPHONE ENCOUNTER
1. Caller Name: Marium Renteria      Call Back Number: [phone] (home) [phone] (work)        How would the patient prefer to be contacted with a response: Phone call OK to leave a detailed message    Patient called and states that she would like to have a standing order to have her calcium and albiuin checked as needed. States that she used to have one previously.    Please advise.     Thank you,     Addis Paulino  Medical Assistant

## 2020-03-25 ENCOUNTER — TELEPHONE (OUTPATIENT)
Dept: MEDICAL GROUP | Facility: MEDICAL CENTER | Age: 65
End: 2020-03-25

## 2020-03-25 NOTE — TELEPHONE ENCOUNTER
FINAL PRIOR AUTHORIZATION STATUS:    1.  Name of Medication & Dose: Lyrica 75 mg      2. Prior Auth Status: Denied.  Reason: Lyrica is not a food and drug administration and only approved to treat fibromyalgia     3. Action Taken: Pharmacy Notified: yes Patient Notified: yes    Patient would like to switch back to gabapentin and would like to know if she may get all medication at 60 or 90 day supply

## 2020-03-26 DIAGNOSIS — E20.89 OTHER HYPOPARATHYROIDISM (HCC): ICD-10-CM

## 2020-03-27 DIAGNOSIS — E20.89 OTHER HYPOPARATHYROIDISM (HCC): ICD-10-CM

## 2020-03-27 DIAGNOSIS — G89.29 CHRONIC BACK PAIN, UNSPECIFIED BACK LOCATION, UNSPECIFIED BACK PAIN LATERALITY: ICD-10-CM

## 2020-03-27 DIAGNOSIS — M10.9 GOUT, ARTHRITIS: ICD-10-CM

## 2020-03-27 DIAGNOSIS — Z76.0 PRESCRIPTION REFILL: ICD-10-CM

## 2020-03-27 DIAGNOSIS — M54.9 CHRONIC BACK PAIN, UNSPECIFIED BACK LOCATION, UNSPECIFIED BACK PAIN LATERALITY: ICD-10-CM

## 2020-03-27 RX ORDER — ALLOPURINOL 300 MG/1
TABLET ORAL
Qty: 90 TAB | Refills: 1 | Status: SHIPPED | OUTPATIENT
Start: 2020-03-27 | End: 2022-05-02 | Stop reason: SDUPTHER

## 2020-03-27 RX ORDER — CALCITRIOL 0.25 UG/1
0.25 CAPSULE, LIQUID FILLED ORAL DAILY
Qty: 90 CAP | Refills: 0 | Status: SHIPPED
Start: 2020-03-27 | End: 2020-04-10

## 2020-03-27 RX ORDER — POTASSIUM CHLORIDE 750 MG/1
TABLET, EXTENDED RELEASE ORAL
Qty: 60 EACH | Refills: 0 | Status: SHIPPED | OUTPATIENT
Start: 2020-03-27 | End: 2020-04-10 | Stop reason: SDUPTHER

## 2020-03-27 RX ORDER — LEVOTHYROXINE SODIUM 88 MCG
88 TABLET ORAL
Qty: 90 TAB | Refills: 0 | Status: SHIPPED | OUTPATIENT
Start: 2020-03-27 | End: 2020-07-28

## 2020-03-27 RX ORDER — PREGABALIN 75 MG/1
75 CAPSULE ORAL 3 TIMES DAILY
Qty: 90 CAP | Refills: 0 | Status: SHIPPED | OUTPATIENT
Start: 2020-03-27 | End: 2020-04-10

## 2020-03-27 RX ORDER — LISINOPRIL 20 MG/1
20 TABLET ORAL DAILY
Qty: 90 TAB | Refills: 0 | Status: SHIPPED | OUTPATIENT
Start: 2020-03-27 | End: 2020-11-25

## 2020-03-27 NOTE — TELEPHONE ENCOUNTER
Would you please call the patient and ask her if she did not receive Lyrica because of insurance disapproval.  I can refill her gabapentin until she is able to see pain management.  Thank you

## 2020-03-27 NOTE — TELEPHONE ENCOUNTER
Received request via: Pharmacy    Was the patient seen in the last year in this department? Yes  Future Appointments       Provider Department Center    4/10/2020 1:40 PM Nito Yousif M.D. [facility]          Does the patient have an active prescription (recently filled or refills available) for medication(s) requested? No     Patient requesting 90 day supply of medications

## 2020-03-31 DIAGNOSIS — G62.9 NEUROPATHY: ICD-10-CM

## 2020-03-31 DIAGNOSIS — G25.9 EXTRAPYRAMIDAL AND MOVEMENT DISORDER: ICD-10-CM

## 2020-03-31 RX ORDER — GABAPENTIN 300 MG/1
600 CAPSULE ORAL 3 TIMES DAILY
Qty: 180 CAP | Refills: 1 | Status: SHIPPED | OUTPATIENT
Start: 2020-03-31 | End: 2020-06-09

## 2020-03-31 RX ORDER — CARISOPRODOL 350 MG/1
350 TABLET ORAL EVERY 8 HOURS PRN
Qty: 30 TAB | Refills: 0 | OUTPATIENT
Start: 2020-03-31 | End: 2020-04-30

## 2020-04-08 ENCOUNTER — HOSPITAL ENCOUNTER (OUTPATIENT)
Dept: LAB | Facility: MEDICAL CENTER | Age: 65
End: 2020-04-08
Attending: FAMILY MEDICINE
Payer: COMMERCIAL

## 2020-04-08 DIAGNOSIS — E20.89 OTHER HYPOPARATHYROIDISM (HCC): ICD-10-CM

## 2020-04-08 LAB
ALBUMIN SERPL BCP-MCNC: 4.1 G/DL (ref 3.2–4.9)
CALCIUM SERPL-MCNC: 8.7 MG/DL (ref 8.5–10.5)

## 2020-04-08 PROCEDURE — 36415 COLL VENOUS BLD VENIPUNCTURE: CPT

## 2020-04-08 PROCEDURE — 82040 ASSAY OF SERUM ALBUMIN: CPT

## 2020-04-08 PROCEDURE — 82310 ASSAY OF CALCIUM: CPT

## 2020-04-10 ENCOUNTER — OFFICE VISIT (OUTPATIENT)
Dept: MEDICAL GROUP | Facility: MEDICAL CENTER | Age: 65
End: 2020-04-10
Attending: FAMILY MEDICINE
Payer: COMMERCIAL

## 2020-04-10 DIAGNOSIS — G89.4 CHRONIC PAIN SYNDROME: ICD-10-CM

## 2020-04-10 DIAGNOSIS — G25.81 RESTLESS LEG SYNDROME: ICD-10-CM

## 2020-04-10 DIAGNOSIS — R42 VERTIGO: ICD-10-CM

## 2020-04-10 DIAGNOSIS — E20.89 OTHER HYPOPARATHYROIDISM (HCC): ICD-10-CM

## 2020-04-10 DIAGNOSIS — Z76.0 PRESCRIPTION REFILL: ICD-10-CM

## 2020-04-10 PROCEDURE — 99212 OFFICE O/P EST SF 10 MIN: CPT | Mod: CR | Performed by: FAMILY MEDICINE

## 2020-04-10 RX ORDER — TRAZODONE HYDROCHLORIDE 100 MG/1
100 TABLET ORAL
Qty: 90 TAB | Refills: 0 | Status: SHIPPED | OUTPATIENT
Start: 2020-04-10 | End: 2022-05-17

## 2020-04-10 RX ORDER — TRAZODONE HYDROCHLORIDE 50 MG/1
1 TABLET ORAL DAILY
COMMUNITY
End: 2020-05-03

## 2020-04-10 RX ORDER — POTASSIUM CHLORIDE 750 MG/1
TABLET, EXTENDED RELEASE ORAL
Qty: 30 EACH | Refills: 7 | Status: SHIPPED | OUTPATIENT
Start: 2020-04-10 | End: 2020-06-30

## 2020-04-10 NOTE — ASSESSMENT & PLAN NOTE
Patient is aware that if she needs to use Soma she needs to discuss it with pain management.  Patient was referred and she established with pain management.  No refills of Soma from our office.

## 2020-04-10 NOTE — PROGRESS NOTES
Telephone Appointment Visit   As a means of avoiding spread of COVID-19, this visit is being conducted by telephone. This telephone visit was initiated by the patient and they verbally consented.    Time at start of call: 9:43 PM    Reason for Call: Medication refills and management    Patient Comments / History:       Prescription refill  Patient is asking for all her prescriptions to be refilled for 90-day supply, so that she can restrict her visits to the pharmacy.     Vertigo  Patient reports that she has chronic vertigo issues and she uses meclizine on daily basis as needed for vertigo.  Discussed with the patient side effects of the medication and placed referral today encouraged to follow-up with the ENT for further evaluation of the problem instead of taking the meclizine on daily basis.     Other hypoparathyroidism   Reviewed with the patient her lab work results which shows normal calcium level and normal albumin level.    Chronic pain syndrome  Patient with history of chronic pain and muscle spasms, we had several discussions in previous visits that the patient will follow-up and establish with pain management.  She said she said she already has established with them and they gave her a cortisone injection.  Yet the patient is still requesting Soma refills from my office.  Today she was encouraged to discuss the use of Soma muscle relaxants and pain medication with her pain management.  I can still continue to refill her gabapentin     Restless leg syndrome  Patient is requesting to refill her carbidopa that she uses for restless leg syndrome at night only.  She said it is helping her symptoms and she would like it to be refilled.      Labs / Images Reviewed   Relayed and discussed with the patient most recent calcium result and albumin result.    Assessment and Plan:     1. Prescription refill  - traZODone (DESYREL) 50 MG Tab; Take 1 Application by mouth every day.  - traZODone (DESYREL) 100 MG Tab;  Take 1 Tab by mouth 1 time daily as needed for Sleep.  Dispense: 90 Tab; Refill: 0  - carbidopa-levodopa (SINEMET)  MG Tab; Take 1 Tab by mouth 1 time daily as needed.  Dispense: 90 Tab; Refill: 0  - potassium chloride SA (K-DUR) 10 MEQ Tab CR; TAKE ONE TABLET BY MOUTH DAILY ON SUNDAY, TUESDAY, WEDNESDAY, THURSDAY, AND SATURDAY  Dispense: 30 Each; Refill: 7    2. Vertigo  Chronic problem, uncontrolled, encouraged to establish with neurology and ENT for further evaluation of her vertigo refill her meclizine and advised to use it only as needed to prevent side effects  3. Other hypoparathyroidism (HCC)  Chronic problem, controlled related with the patient and discussed her calcium lab work results today.  Continue the use of Calcitrol and other medications as directed  4. Chronic pain syndrome  Patient to follow-up with her pain management, discussed with the patient that I would not be able to refill her Soma and she needs to discuss it with pain management.  Encouraged not to call for refills for Soma from our office.  Voices understanding  5. Restless leg syndrome  Chronic problem, controlled refilled medication today  - carbidopa-levodopa (SINEMET)  MG Tab; Take 1 Tab by mouth 1 time daily as needed.  Dispense: 90 Tab; Refill: 0      Follow-up: No follow-ups on file.    Time at end of call: 2:06 PM  Total Time Spent: 21-30 minutes    Nito Yousif M.D.

## 2020-05-03 ENCOUNTER — HOSPITAL ENCOUNTER (EMERGENCY)
Facility: MEDICAL CENTER | Age: 65
End: 2020-05-03
Attending: EMERGENCY MEDICINE
Payer: COMMERCIAL

## 2020-05-03 VITALS
OXYGEN SATURATION: 94 % | WEIGHT: 163.14 LBS | RESPIRATION RATE: 16 BRPM | HEIGHT: 62 IN | HEART RATE: 72 BPM | SYSTOLIC BLOOD PRESSURE: 129 MMHG | DIASTOLIC BLOOD PRESSURE: 89 MMHG | TEMPERATURE: 97.4 F | BODY MASS INDEX: 30.02 KG/M2

## 2020-05-03 DIAGNOSIS — L03.211 FACIAL CELLULITIS: ICD-10-CM

## 2020-05-03 LAB
ANION GAP SERPL CALC-SCNC: 15 MMOL/L (ref 7–16)
BASOPHILS # BLD AUTO: 0.2 % (ref 0–1.8)
BASOPHILS # BLD: 0.01 K/UL (ref 0–0.12)
BUN SERPL-MCNC: 3 MG/DL (ref 8–22)
CALCIUM SERPL-MCNC: 9.3 MG/DL (ref 8.5–10.5)
CHLORIDE SERPL-SCNC: 100 MMOL/L (ref 96–112)
CO2 SERPL-SCNC: 27 MMOL/L (ref 20–33)
CREAT SERPL-MCNC: 0.56 MG/DL (ref 0.5–1.4)
EOSINOPHIL # BLD AUTO: 0.02 K/UL (ref 0–0.51)
EOSINOPHIL NFR BLD: 0.4 % (ref 0–6.9)
ERYTHROCYTE [DISTWIDTH] IN BLOOD BY AUTOMATED COUNT: 53.1 FL (ref 35.9–50)
GLUCOSE SERPL-MCNC: 101 MG/DL (ref 65–99)
HCT VFR BLD AUTO: 45.1 % (ref 37–47)
HGB BLD-MCNC: 15.5 G/DL (ref 12–16)
IMM GRANULOCYTES # BLD AUTO: 0.03 K/UL (ref 0–0.11)
IMM GRANULOCYTES NFR BLD AUTO: 0.6 % (ref 0–0.9)
LYMPHOCYTES # BLD AUTO: 1.27 K/UL (ref 1–4.8)
LYMPHOCYTES NFR BLD: 23.3 % (ref 22–41)
MCH RBC QN AUTO: 37.3 PG (ref 27–33)
MCHC RBC AUTO-ENTMCNC: 34.4 G/DL (ref 33.6–35)
MCV RBC AUTO: 108.4 FL (ref 81.4–97.8)
MONOCYTES # BLD AUTO: 0.35 K/UL (ref 0–0.85)
MONOCYTES NFR BLD AUTO: 6.4 % (ref 0–13.4)
NEUTROPHILS # BLD AUTO: 3.76 K/UL (ref 2–7.15)
NEUTROPHILS NFR BLD: 69.1 % (ref 44–72)
NRBC # BLD AUTO: 0 K/UL
NRBC BLD-RTO: 0 /100 WBC
PLATELET # BLD AUTO: 141 K/UL (ref 164–446)
PMV BLD AUTO: 11 FL (ref 9–12.9)
POTASSIUM SERPL-SCNC: 3.4 MMOL/L (ref 3.6–5.5)
RBC # BLD AUTO: 4.16 M/UL (ref 4.2–5.4)
SODIUM SERPL-SCNC: 142 MMOL/L (ref 135–145)
WBC # BLD AUTO: 5.4 K/UL (ref 4.8–10.8)

## 2020-05-03 PROCEDURE — 700101 HCHG RX REV CODE 250: Performed by: EMERGENCY MEDICINE

## 2020-05-03 PROCEDURE — 96375 TX/PRO/DX INJ NEW DRUG ADDON: CPT

## 2020-05-03 PROCEDURE — 87040 BLOOD CULTURE FOR BACTERIA: CPT

## 2020-05-03 PROCEDURE — 87150 DNA/RNA AMPLIFIED PROBE: CPT | Mod: 91

## 2020-05-03 PROCEDURE — 85025 COMPLETE CBC W/AUTO DIFF WBC: CPT

## 2020-05-03 PROCEDURE — 87077 CULTURE AEROBIC IDENTIFY: CPT | Mod: 91

## 2020-05-03 PROCEDURE — 96365 THER/PROPH/DIAG IV INF INIT: CPT

## 2020-05-03 PROCEDURE — 96376 TX/PRO/DX INJ SAME DRUG ADON: CPT

## 2020-05-03 PROCEDURE — 80048 BASIC METABOLIC PNL TOTAL CA: CPT

## 2020-05-03 PROCEDURE — 700111 HCHG RX REV CODE 636 W/ 250 OVERRIDE (IP): Performed by: EMERGENCY MEDICINE

## 2020-05-03 PROCEDURE — 99284 EMERGENCY DEPT VISIT MOD MDM: CPT

## 2020-05-03 PROCEDURE — 36415 COLL VENOUS BLD VENIPUNCTURE: CPT

## 2020-05-03 RX ORDER — CLINDAMYCIN PHOSPHATE 600 MG/50ML
600 INJECTION, SOLUTION INTRAVENOUS ONCE
Status: COMPLETED | OUTPATIENT
Start: 2020-05-03 | End: 2020-05-03

## 2020-05-03 RX ORDER — OXYCODONE HYDROCHLORIDE 5 MG/1
5 TABLET ORAL EVERY 4 HOURS PRN
Qty: 15 TAB | Refills: 0 | Status: SHIPPED | OUTPATIENT
Start: 2020-05-03 | End: 2020-05-06

## 2020-05-03 RX ORDER — CLINDAMYCIN HYDROCHLORIDE 300 MG/1
300 CAPSULE ORAL 4 TIMES DAILY
Qty: 40 CAP | Refills: 0 | Status: SHIPPED | OUTPATIENT
Start: 2020-05-03 | End: 2020-05-13

## 2020-05-03 RX ADMIN — FENTANYL CITRATE 50 MCG: 0.05 INJECTION, SOLUTION INTRAMUSCULAR; INTRAVENOUS at 15:03

## 2020-05-03 RX ADMIN — FENTANYL CITRATE 50 MCG: 0.05 INJECTION, SOLUTION INTRAMUSCULAR; INTRAVENOUS at 13:57

## 2020-05-03 RX ADMIN — CLINDAMYCIN IN 5 PERCENT DEXTROSE 600 MG: 12 INJECTION, SOLUTION INTRAVENOUS at 14:56

## 2020-05-03 ASSESSMENT — FIBROSIS 4 INDEX: FIB4 SCORE: 2.04

## 2020-05-03 NOTE — ED NOTES
Discharge instructions reviewed with patient and signed. IV removed from her arm. She has all her belongings and ambulates with a steady gait

## 2020-05-03 NOTE — ED PROVIDER NOTES
"ED Provider Note    CHIEF COMPLAINT  Chief Complaint   Patient presents with   • Facial Swelling     s/p dental procedure on Friday       [facility]  Marium Renteria is a 64 y.o. female who presents to the emergency department for facial swelling.  Past medical history as documented below.  Most pertinently is the fact that she has had chronic poor dentition.  Over the last 6 months she has been preparing for denture evaluation fitting.  Within the 6-month.  She had prior dental removal of many of her maxillary teeth.  She states that some of them were \"broken off with gums 7 over \".  This was causing her some significant discomfort so over this time.  She also purchased an X-Acto knife and was cutting her gums to get the tooth fragments out herself.  3 days ago she saw Dr. Alston with oral maxillofacial surgery and had a revision of her maxillary gums.  She has been on ibuprofen and oxycodone over the weekend but she has had increasing pain and swelling of the left face.  Due to this abnormality she decided to come the emerge department for further evaluation.  She is not have any throat discomfort.  No difficulty breathing or swallowing.  No fever chills.    REVIEW OF SYSTEMS  See HPI for further details. All other systems are negative.     PAST MEDICAL HISTORY   has a past medical history of Alcohol abuse, continuous drinking behavior (12/11/2009), Anesthesia (4-26-17), Anxiety, Arthritis (4-26-17), Chronic diarrhea (9/30/2013), Dental disorder (4-26-17), Gout, Gynecological disorder, Helicobacter pylori (H. pylori) infection (9/11/2012), Hemorrhagic disorder (HCC), Hyperlipidemia (6/5/2015), Hypoparathyroidism (HCC) (12/11/2009), Lumbago (2017), Mixed hyperlipidemia (12/11/2009), Osteoporosis, unspecified (12/11/2009), Other B-complex deficiencies (12/11/2009), Parotid cyst, Postsurgical hypothyroidism (12/11/2009), Psychiatric problem (4-26-17), Restless leg syndrome, S/P tooth extraction (4-25-17), Serum " calcium elevated (10/8/2012), Sialolithiasis, ductal (7/21/2012), Snoring, Symptomatic menopausal or female climacteric states (12/11/2009), Tobacco abuse (12/11/2009), Unspecified essential hypertension (12/11/2009), and Urinary incontinence.    SOCIAL HISTORY  Social History     Tobacco Use   • Smoking status: Current Every Day Smoker     Packs/day: 0.50     Years: 47.00     Pack years: 23.50     Types: Cigarettes     Start date: 4/25/1970   • Smokeless tobacco: Never Used   • Tobacco comment: quit during pregnancy, now 2-3 cig daily   Substance and Sexual Activity   • Alcohol use: No     Alcohol/week: 1.2 oz     Types: 2 Cans of beer per week     Comment: quit drinking daily 3 years ago, now drinks 3-4 beers on weekends   • Drug use: No   • Sexual activity: Not Currently       SURGICAL HISTORY   has a past surgical history that includes thyroidectomy (2001); parathyroidectomy (2001); salpingo-oophorectomy, unilateral; hysterectomy, vaginal; dental surgery; colonoscopy with polyp (8/1/2012); gastroscopy with biopsy (8/1/2012); lumbar fusion posterior (12/17/2015); lumbar laminectomy diskectomy (12/17/2015); primary c section; lumbar decompression (12/17/2015); colon resection (1985); appendectomy; lumbar fusion posterior (8/7/2017); hardware removal neuro (8/7/2017); exploratory laparotomy (9/22/2018); lysis adhesions general (9/22/2018); bowel resection (9/22/2018); and ventral hernia repair (10/4/2018).    CURRENT MEDICATIONS  Home Medications     Reviewed by Kamilah Pederson R.N. (Registered Nurse) on 05/03/20 at 1321  Med List Status: Complete   Medication Last Dose Status   albuterol (PROVENTIL HFA) 108 (90 Base) MCG/ACT Aero Soln inhalation aerosol  Active   allopurinol (ZYLOPRIM) 300 MG Tab  Active   Calcium Carbonate Antacid (TUMS PO)  Active   carbidopa-levodopa (SINEMET)  MG Tab  Active   Cholecalciferol (VITAMIN D3) 1000 units Cap  Active   cholestyramine (QUESTRAN) 4 g packet  Active  "  EPINEPHrine (EPIPEN 2-MIRELA) 0.3 MG/0.3ML Solution Auto-injector solution for injection  Active   gabapentin (NEURONTIN) 300 MG Cap  Active   lisinopril (PRINIVIL) 20 MG Tab  Active   meclizine (ANTIVERT) 12.5 MG Tab  Active   Misc. Devices Misc  Active   Misc. Devices Misc  Active   Misc. Devices Misc  Active   Misc. Devices Misc  Active   Misc. Devices Misc  Active   Misc. Devices Misc  Active   Misc. Devices Misc  Active   Multiple Vitamin (DAILY-BRANDON) Tab  Active   polyethylene glycol 3350 (MIRALAX) Powder  Active   potassium chloride SA (K-DUR) 10 MEQ Tab CR  Active   potassium chloride SA (K-DUR) 10 MEQ Tab CR  Active   potassium chloride SA (K-DUR) 10 MEQ Tab CR  Active   SYNTHROID 88 MCG Tab  Active   traZODone (DESYREL) 100 MG Tab  Active   traZODone (DESYREL) 50 MG Tab  Active                ALLERGIES  Allergies   Allergen Reactions   • Bee Venom Anaphylaxis   • Cillins [Penicillins] Anaphylaxis and Hives     Appears to have tolerated unasyn 9/13/18   • Latex Rash     rash   • Asa [Aspirin] Unspecified     Pt states \"It tears up my stomach\".   • Coconut Oil Itching and Swelling     Raw coconut   • Codeine Itching     Pt states \"I itch so much\".   • Contrast Media With Iodine [Iodine] Vomiting and Nausea     Not sure of reaction   • Hydrocodone-Ibuprofen      \"Extreme itching\"   • Hydroxyzine Unspecified     \"loss of memory\"   • Ibuprofen Unspecified     Pt states \"It tears up my stomach\".   • Norco [Apap-Fd&C Yellow #10 [place]-Hydrocodone] Itching     Pt states \"I itch all over\".   • Other Food Unspecified     sourdough bread  Pt states \"My face and mouth gets all tingling\".    Lactose intolerant per patient.    • Sulfa Drugs Hives   • Talwin Unspecified     Pt states \"I get forgetful\".   • Tape Swelling     Red swelling   • Vicodin [Hydrocodone-Acetaminophen] Itching     Pt states \"I itch so much\".       PHYSICAL EXAM  VITAL SIGNS: /89   Pulse 72   Temp 36.3 °C (97.4 °F)   Resp 16   Ht 1.575 m " "(5' 2\")   Wt 74 kg (163 lb 2.3 oz)   LMP 10/02/1980   SpO2 94%   BMI 29.84 kg/m²  @MELISSA[[id number]::@  Pulse ox interpretation: I interpret this pulse ox as normal.  Constitutional: Alert in no apparent distress.  HENT: Normocephalic, Atraumatic, Bilateral external ears normal. Nose normal.  Left facial soft tissue swelling to the left cheek and infraorbital tissues.  No areas of induration or significant erythema.  There is no increased warmth.  The upper maxillary gingiva has diffuse exudates.  Posterior pharynx is clear.  Mandibular teeth are in poor condition.  Eyes: Pupils are equal and reactive. Conjunctiva normal, non-icteric.   Heart: Regular rate and rythm, no murmurs.    Lungs: Clear to auscultation bilaterally.  Skin: Warm, Dry, No erythema, No rash.   Neurologic: Alert, Grossly non-focal.   Psychiatric: Affect normal, Judgment normal, Mood normal, Appears appropriate and not intoxicated.       0 220: Discussed the case with Dr. miles.  Recommends initial dose of IV antibiotics followed by oral antibiotics at home as long as the patient's does not have a significant elevated white count and no fever and is tolerating p.o. fluids.  Will await further conversation with patient's primary FS physician if callback is available.    In prescribing controlled substances to this patient, I certify that I have obtained and reviewed the medical history of Marium Renteria. I have also made a good dung effort to obtain applicable records from other providers who have treated the patient and records did not demonstrate any increased risk of substance abuse that would prevent me from prescribing controlled substances.     I have conducted a physical exam and documented it. I have reviewed Ms. Renteria’s prescription history as maintained by the [place] Prescription Monitoring Program.     I have assessed the patient’s risk for abuse, dependency, and addiction using the validated Opioid Risk Tool available at " https://www.mdcalc.com/vnxiuq-zvda-yctv-ort-narcotic-abuse.     Given the above, I believe the benefits of controlled substance therapy outweigh the risks. The reasons for prescribing controlled substances include non-narcotic, oral analgesic alternatives have been inadequate for pain control. Accordingly, I have discussed the risk and benefits, treatment plan, and alternative therapies with the patient.       COURSE & MEDICAL DECISION MAKING  Pertinent Labs & Imaging studies reviewed. (See chart for details)  64-year-old female presented the emerge department the above complaint.  Do believe that she likely has a postoperative infection.  I was unable to get a hold of her primary surgeon however was able to get a hold of Dr. miles on-call for oral surgery.  He recommends initial dose of IV antibiotics and outpatient oral antibiotics.  I do believe this is appropriate.  Laboratory evaluation was reassuring.  Patient will be given extension on her narcotic medications due to her current pain and inflammatory state.  She is understanding she is going to have to call her primary surgeon tomorrow for outpatient follow-up care instructions and timing.  She is also understanding return precautions here to the ER if needed.  The patient will not drink alcohol nor drive with prescribed medications. The patient will return for worsening symptoms and is stable at the time of discharge. The patient verbalizes understanding and will comply.    FINAL IMPRESSION  1. Facial cellulitis               Electronically signed by: Mayo Ardon M.D., 5/3/2020 1:49 PM

## 2020-05-03 NOTE — ED TRIAGE NOTES
"Mariumgeorge Nunesise Dexter  Chief Complaint   Patient presents with   • Facial Swelling     s/p dental procedure on Friday     Pt  to triage in personal w/c, with above complaint.  VSS, no acute distress, speaking in full sentences.   Facial swelling increasing since yesterday after dental procedure,  R>L,  Pt states \"upper jaw scrapped\".    Pt denies fever/ cough or contact with anyone positive for Covid/ Corona.  Pt/staff masked and in appropriate PPE during encounter.   Pt returned to lobby, educated on triage process, and to inform staff of any changes or concerns.     "

## 2020-05-03 NOTE — ED NOTES
Patient had dental surgery on Friday and has had increased swelling in left cheek/throat. Speaking clear, full sentences. No distress noted. MD at bedside for evaluation. VSS on monitor, will continue to assess

## 2020-05-05 NOTE — ED NOTES
ED Positive Culture Follow-up/Notification Note:    Date: 5/5/2020     Patient seen in the ED on 5/3/2020 for L facial swelling s/p maxillary gums revision after prior removal of maxillary teeth 2 days prior to presentation. No induration or significant erythema on exam, no increased warmth but diffuse exudates on upper maxillary gingiva. Mandibular teeth in poor condition. Oral surgeon Dr. Cohen recommended IV followed by oral antibiotics if no leukocytosis or fever and tolerating PO. Patient instructed to follow up with primary surgeon day afte presentation.   1. Facial cellulitis       Discharge Medication List as of 5/3/2020  3:36 PM      START taking these medications    Details   clindamycin (CLEOCIN) 300 MG Cap Take 1 Cap by mouth 4 times a day for 10 days., Disp-40 Cap, R-0, Print Rx Paper      oxyCODONE immediate-release (ROXICODONE) 5 MG Tab Take 1 Tab by mouth every four hours as needed for Severe Pain for up to 3 days., Disp-15 Tab, R-0, Print Rx Paper             Allergies: Bee venom; Cillins [penicillins]; Latex; Asa [aspirin]; Coconut oil; Codeine; Contrast media with iodine [iodine]; Hydrocodone-ibuprofen; Hydroxyzine; Ibuprofen; Norco [apap-fd&c yellow #10 [place]-hydrocodone]; Other food; Sulfa drugs; Talwin; Tape; and Vicodin [hydrocodone-acetaminophen]     Vitals:    05/03/20 1343 05/03/20 1401 05/03/20 1432 05/03/20 1531   BP: (Abnormal) 170/90 158/96 (Abnormal) 165/93 129/89   Pulse: 87 84 80 72   Resp:       Temp:       SpO2: 94% 93% 96% 94%   Weight:       Height:           Final cultures:   Results     Procedure Component Value Units Date/Time    BLOOD CULTURE [[id number]]  (Abnormal) Collected:  05/03/20 1444    Order Status:  Completed Specimen:  Blood from Peripheral Updated:  05/05/20 0616     Significant Indicator POS     Source BLD     Site PERIPHERAL     Culture Result Growth detected by Bactec instrument. 05/05/2020  06:13  Gram Stain: Gram positive rods.      Narrative:       Per  "Hospital Policy: Only change Specimen Src: to \"Line\" if  specified by physician order.  Right AC    BLOOD CULTURE [[id number]] Collected:  05/03/20 1414    Order Status:  Completed Specimen:  Blood from Peripheral Updated:  05/04/20 0745     Significant Indicator NEG     Source BLD     Site PERIPHERAL     Culture Result No Growth  Note: Blood cultures are incubated for 5 days and  are monitored continuously.Positive blood cultures  are called to the RN and reported as soon as  they are identified.      Narrative:       Per Hospital Policy: Only change Specimen Src: to \"Line\" if  specified by physician order.  No site indicated          Plan:   Patient growing GPR in 1 set of BCx, possibly contaminant. However, patient did have recent dental surgery, so could have had bacteremia introduced. Unable to reach patient by phone so left message. Upon callback, will ascertain if patient experiencing symptoms of fever, chill/malaise, hypotension, tachycardia, and tachypnea. If patient with those symptoms, will instruct to return to the ED or have medical follow up and repeat blood cultures. If not, will  on seeking medical follow up if experience those symptoms at a later time. Will continue to follow up.     Genna Diaz, DeisyD    Patient called back and reports was able to  antibiotic, and swelling and warmth over face has improved, no fever, chills, hypotension, no tachycardia, and breathing has improved with the swelling. Notified patient of result and instructed returning to the ED or urgent care if starts to experience any of aforementioned symptoms or existing symptoms worsen. Patient expressed understanding and agreement. Will f/u on culture results.     Genna Diaz, PharmD    "

## 2020-05-08 LAB
BACTERIA BLD CULT: NORMAL
SIGNIFICANT IND 70042: NORMAL
SITE SITE: NORMAL
SOURCE SOURCE: NORMAL

## 2020-05-09 LAB
BACTERIA BLD CULT: ABNORMAL
SIGNIFICANT IND 70042: ABNORMAL
SITE SITE: ABNORMAL
SOURCE SOURCE: ABNORMAL

## 2020-05-10 NOTE — ED NOTES
ED Culture Follow-up:    The pt's blood culture is now growing a CoNS and GPRs resulted as diptheroids. I called and informed the pt of results. She reported that her symptoms from visit had improved and that she is feeling fine. She denied any fevers. I counseled her to return to the ED for further evaluation should she experience any fevers or worsening of symptoms. She stated understanding and had no further questions.       Roberto Escobedo, DeisyD

## 2020-05-14 DIAGNOSIS — Z76.0 PRESCRIPTION REFILL: ICD-10-CM

## 2020-05-15 RX ORDER — CHOLESTYRAMINE 4 G/9G
POWDER, FOR SUSPENSION ORAL
Qty: 30 EACH | Refills: 1 | Status: SHIPPED | OUTPATIENT
Start: 2020-05-15 | End: 2023-02-14

## 2020-06-10 ENCOUNTER — HOSPITAL ENCOUNTER (OUTPATIENT)
Dept: LAB | Facility: MEDICAL CENTER | Age: 65
End: 2020-06-10
Attending: FAMILY MEDICINE
Payer: COMMERCIAL

## 2020-06-10 DIAGNOSIS — E20.89 OTHER HYPOPARATHYROIDISM (HCC): ICD-10-CM

## 2020-06-10 PROCEDURE — 82310 ASSAY OF CALCIUM: CPT

## 2020-06-10 PROCEDURE — 82040 ASSAY OF SERUM ALBUMIN: CPT

## 2020-06-10 PROCEDURE — 36415 COLL VENOUS BLD VENIPUNCTURE: CPT

## 2020-06-11 LAB
ALBUMIN SERPL BCP-MCNC: 4.2 G/DL (ref 3.2–4.9)
CALCIUM SERPL-MCNC: 8.5 MG/DL (ref 8.5–10.5)

## 2020-06-24 ENCOUNTER — HOSPITAL ENCOUNTER (OUTPATIENT)
Dept: LAB | Facility: MEDICAL CENTER | Age: 65
End: 2020-06-24
Attending: FAMILY MEDICINE
Payer: COMMERCIAL

## 2020-06-24 LAB
ALBUMIN SERPL BCP-MCNC: 4 G/DL (ref 3.2–4.9)
ALBUMIN/GLOB SERPL: 1.7 G/DL
ALP SERPL-CCNC: 65 U/L (ref 30–99)
ALT SERPL-CCNC: 23 U/L (ref 2–50)
ANION GAP SERPL CALC-SCNC: 15 MMOL/L (ref 7–16)
AST SERPL-CCNC: 16 U/L (ref 12–45)
BASOPHILS # BLD AUTO: 0.4 % (ref 0–1.8)
BASOPHILS # BLD: 0.03 K/UL (ref 0–0.12)
BILIRUB SERPL-MCNC: 0.4 MG/DL (ref 0.1–1.5)
BUN SERPL-MCNC: 18 MG/DL (ref 8–22)
CALCIUM SERPL-MCNC: 8.7 MG/DL (ref 8.5–10.5)
CHLORIDE SERPL-SCNC: 100 MMOL/L (ref 96–112)
CHOLEST SERPL-MCNC: 186 MG/DL (ref 100–199)
CO2 SERPL-SCNC: 24 MMOL/L (ref 20–33)
CREAT SERPL-MCNC: 0.86 MG/DL (ref 0.5–1.4)
EOSINOPHIL # BLD AUTO: 0.07 K/UL (ref 0–0.51)
EOSINOPHIL NFR BLD: 1 % (ref 0–6.9)
ERYTHROCYTE [DISTWIDTH] IN BLOOD BY AUTOMATED COUNT: 52.3 FL (ref 35.9–50)
EST. AVERAGE GLUCOSE BLD GHB EST-MCNC: 100 MG/DL
FASTING STATUS PATIENT QL REPORTED: NORMAL
GLOBULIN SER CALC-MCNC: 2.3 G/DL (ref 1.9–3.5)
GLUCOSE SERPL-MCNC: 91 MG/DL (ref 65–99)
HBA1C MFR BLD: 5.1 % (ref 0–5.6)
HCT VFR BLD AUTO: 44.9 % (ref 37–47)
HDLC SERPL-MCNC: 83 MG/DL
HGB BLD-MCNC: 14.9 G/DL (ref 12–16)
IMM GRANULOCYTES # BLD AUTO: 0.03 K/UL (ref 0–0.11)
IMM GRANULOCYTES NFR BLD AUTO: 0.4 % (ref 0–0.9)
LDLC SERPL CALC-MCNC: 67 MG/DL
LYMPHOCYTES # BLD AUTO: 1.99 K/UL (ref 1–4.8)
LYMPHOCYTES NFR BLD: 27.3 % (ref 22–41)
MCH RBC QN AUTO: 35.9 PG (ref 27–33)
MCHC RBC AUTO-ENTMCNC: 33.2 G/DL (ref 33.6–35)
MCV RBC AUTO: 108.2 FL (ref 81.4–97.8)
MONOCYTES # BLD AUTO: 0.38 K/UL (ref 0–0.85)
MONOCYTES NFR BLD AUTO: 5.2 % (ref 0–13.4)
NEUTROPHILS # BLD AUTO: 4.8 K/UL (ref 2–7.15)
NEUTROPHILS NFR BLD: 65.7 % (ref 44–72)
NRBC # BLD AUTO: 0 K/UL
NRBC BLD-RTO: 0 /100 WBC
PLATELET # BLD AUTO: 142 K/UL (ref 164–446)
PMV BLD AUTO: 11.4 FL (ref 9–12.9)
POTASSIUM SERPL-SCNC: 3.8 MMOL/L (ref 3.6–5.5)
PROT SERPL-MCNC: 6.3 G/DL (ref 6–8.2)
RBC # BLD AUTO: 4.15 M/UL (ref 4.2–5.4)
SODIUM SERPL-SCNC: 139 MMOL/L (ref 135–145)
TRIGL SERPL-MCNC: 182 MG/DL (ref 0–149)
TSH SERPL DL<=0.005 MIU/L-ACNC: 0.67 UIU/ML (ref 0.38–5.33)
WBC # BLD AUTO: 7.3 K/UL (ref 4.8–10.8)

## 2020-06-24 PROCEDURE — 85025 COMPLETE CBC W/AUTO DIFF WBC: CPT

## 2020-06-24 PROCEDURE — 80061 LIPID PANEL: CPT

## 2020-06-24 PROCEDURE — 83036 HEMOGLOBIN GLYCOSYLATED A1C: CPT

## 2020-06-24 PROCEDURE — 36415 COLL VENOUS BLD VENIPUNCTURE: CPT

## 2020-06-24 PROCEDURE — 80053 COMPREHEN METABOLIC PANEL: CPT

## 2020-06-24 PROCEDURE — 84443 ASSAY THYROID STIM HORMONE: CPT

## 2020-08-07 ENCOUNTER — HOSPITAL ENCOUNTER (OUTPATIENT)
Dept: LAB | Facility: MEDICAL CENTER | Age: 65
End: 2020-08-07
Attending: FAMILY MEDICINE
Payer: COMMERCIAL

## 2020-08-07 DIAGNOSIS — E20.89 OTHER HYPOPARATHYROIDISM (HCC): ICD-10-CM

## 2020-08-07 LAB
ALBUMIN SERPL BCP-MCNC: 3.7 G/DL (ref 3.2–4.9)
CALCIUM SERPL-MCNC: 8.8 MG/DL (ref 8.5–10.5)

## 2020-08-07 PROCEDURE — 36415 COLL VENOUS BLD VENIPUNCTURE: CPT

## 2020-08-07 PROCEDURE — 82310 ASSAY OF CALCIUM: CPT

## 2020-08-07 PROCEDURE — 82040 ASSAY OF SERUM ALBUMIN: CPT

## 2020-08-25 ENCOUNTER — HOSPITAL ENCOUNTER (OUTPATIENT)
Dept: LAB | Facility: MEDICAL CENTER | Age: 65
End: 2020-08-25
Attending: FAMILY MEDICINE
Payer: COMMERCIAL

## 2020-08-25 DIAGNOSIS — E20.89 OTHER HYPOPARATHYROIDISM (HCC): ICD-10-CM

## 2020-08-25 LAB
ALBUMIN SERPL BCP-MCNC: 3.8 G/DL (ref 3.2–4.9)
CALCIUM SERPL-MCNC: 9.1 MG/DL (ref 8.5–10.5)

## 2020-08-25 PROCEDURE — 82310 ASSAY OF CALCIUM: CPT

## 2020-08-25 PROCEDURE — 82040 ASSAY OF SERUM ALBUMIN: CPT

## 2020-08-25 PROCEDURE — 36415 COLL VENOUS BLD VENIPUNCTURE: CPT

## 2020-11-24 DIAGNOSIS — Z76.0 PRESCRIPTION REFILL: ICD-10-CM

## 2020-11-24 DIAGNOSIS — E20.89 OTHER HYPOPARATHYROIDISM (HCC): ICD-10-CM

## 2020-11-24 DIAGNOSIS — G62.9 NEUROPATHY: ICD-10-CM

## 2020-11-25 RX ORDER — GABAPENTIN 300 MG/1
CAPSULE ORAL
Qty: 180 CAP | Refills: 0 | Status: SHIPPED | OUTPATIENT
Start: 2020-11-25 | End: 2020-12-31

## 2020-11-25 RX ORDER — LISINOPRIL 20 MG/1
TABLET ORAL
Qty: 90 TAB | Refills: 0 | Status: SHIPPED | OUTPATIENT
Start: 2020-11-25 | End: 2021-03-01

## 2020-11-25 RX ORDER — CALCITRIOL 0.25 UG/1
CAPSULE, LIQUID FILLED ORAL
Qty: 15 CAP | Refills: 0 | Status: SHIPPED | OUTPATIENT
Start: 2020-11-25 | End: 2020-12-31

## 2020-11-28 NOTE — TELEPHONE ENCOUNTER
1. Caller Name: Marium Renteria                                           Call Back Number: [phone] (home)         Patient approves a detailed voicemail message: N\A    Patient is requesting results for xray. Please advise.    
She has arthritis in her shoulder. Can review in more detail at her next appt.   Thanks   
11.2    )-----------( 252      (  @ 23:25 )             34.0      @ 23:25    138  |  104  |  6   ----------------------------<  104  4.0   |  22  |  0.60    Urinalysis Basic - ( 2020 23:52 )    Color: Yellow / Appearance: Clear / S.019 / pH: x  Gluc: x / Ketone: Trace  / Bili: Negative / Urobili: Negative   Blood: x / Protein: Negative / Nitrite: Negative   Leuk Esterase: Negative / RBC: x / WBC x   Sq Epi: x / Non Sq Epi: x / Bacteria: x      < from: CT Abdomen and Pelvis w/ IV Cont (20 @ 23:54) >    EXAM:  CT ABDOMEN AND PELVIS IC                        PROCEDURE DATE:  2020      INTERPRETATION:  CLINICAL INFORMATION: Abdominal pain and fever. Status post myomectomy. Evaluate for abscess. Surgical incision site infection/cellulitis, not started antibiotics.    COMPARISON: CT abdomen pelvis 2020.    PROCEDURE: CT of the Abdomen and Pelvis was performed with intravenous contrast.  Intravenous contrast: 90 ml Omnipaque 350. 10 ml discarded.  Oral contrast: None.  Sagittal and coronal reformats were performed.    FINDINGS:  LOWER CHEST: Trace bilateral effusions. Bilateral lower lobe and lingular subsegmental atelectasis.  LIVER: Within normal limits.  BILE DUCTS: Normal caliber.  GALLBLADDER: Within normal limits.  SPLEEN: Within normal limits.  PANCREAS: Within normal limits.    ADRENALS: Within normal limits.  KIDNEYS/URETERS: Within normal limits.  BLADDER: Underdistended, limiting evaluation.    REPRODUCTIVE ORGANS: Intrauterine device. Status post myomectomy with a few foci of intra-abdominal air, unchanged.  Large pelvic hematoma is redemonstrated. Surrounding fat stranding due to blood products, but superimposed infection cannot be excluded.    BOWEL: No bowel obstruction. Appendix is normal.  PERITONEUM: Small volume complex ascites reflecting small hemoperitoneum.  VESSELS: Within normal limits.  RETROPERITONEUM/LYMPH NODES: No lymphadenopathy. Small amount of free fluid in the right paracolic gutter.  ABDOMINAL WALL: Postsurgical changes.  BONES: Within normal limits.    IMPRESSION:  No significant change in large pelvic hematoma displacing the uterus left of midline. No new site of bleeding or progressive mass effect. Superinfected hematoma cannot be excluded given extensive surrounding fat stranding and blood products.      JIMBO QUINONEZ MD; Resident Radiology  This document has been electronically signed.  PATRICIA SOTO MD; Attending Radiologist  This document has been electronically signed. 2020  3:01AM    < end of copied text >

## 2020-12-29 DIAGNOSIS — G62.9 NEUROPATHY: ICD-10-CM

## 2020-12-29 DIAGNOSIS — E20.89 OTHER HYPOPARATHYROIDISM (HCC): ICD-10-CM

## 2020-12-31 NOTE — TELEPHONE ENCOUNTER
Received request via: Pharmacy    Was the patient seen in the last year in this department? Yes    Does the patient have an active prescription (recently filled or refills available) for medication(s) requested? No     No appointment scheduled.

## 2021-01-04 RX ORDER — CALCITRIOL 0.25 UG/1
CAPSULE, LIQUID FILLED ORAL
Qty: 15 CAP | Refills: 0 | Status: SHIPPED | OUTPATIENT
Start: 2021-01-04 | End: 2021-01-29 | Stop reason: SDUPTHER

## 2021-01-04 RX ORDER — GABAPENTIN 300 MG/1
CAPSULE ORAL
Qty: 180 CAP | Refills: 0 | Status: SHIPPED | OUTPATIENT
Start: 2021-01-04 | End: 2021-03-31

## 2021-01-26 DIAGNOSIS — E20.89 OTHER HYPOPARATHYROIDISM (HCC): ICD-10-CM

## 2021-01-26 RX ORDER — CALCITRIOL 0.25 UG/1
0.25 CAPSULE, LIQUID FILLED ORAL DAILY
Qty: 90 CAP | Refills: 0 | OUTPATIENT
Start: 2021-01-26

## 2021-01-29 DIAGNOSIS — E20.89 OTHER HYPOPARATHYROIDISM (HCC): ICD-10-CM

## 2021-01-29 RX ORDER — CALCITRIOL 0.25 UG/1
0.25 CAPSULE, LIQUID FILLED ORAL DAILY
Qty: 15 CAP | Refills: 0 | Status: SHIPPED | OUTPATIENT
Start: 2021-01-29 | End: 2021-10-07 | Stop reason: SDUPTHER

## 2021-03-04 ENCOUNTER — TELEPHONE (OUTPATIENT)
Dept: MEDICAL GROUP | Facility: LAB | Age: 66
End: 2021-03-04

## 2021-03-04 NOTE — TELEPHONE ENCOUNTER
DOCUMENTATION OF PAR STATUS:    1. Name of Medication & Dose: SYNTHROID 88MCG     2. Name of Prescription Coverage Company & phone #: [employer]    3. Date Prior Auth Submitted: 3/4/2021    4. What information was given to obtain insurance decision? OV notes faxed    5. Prior Auth Status? Pending    6. Patient Notified: no

## 2021-03-30 ENCOUNTER — IMMUNIZATION (OUTPATIENT)
Dept: FAMILY PLANNING/WOMEN'S HEALTH CLINIC | Facility: IMMUNIZATION CENTER | Age: 66
End: 2021-03-30
Payer: COMMERCIAL

## 2021-03-30 DIAGNOSIS — Z23 ENCOUNTER FOR VACCINATION: Primary | ICD-10-CM

## 2021-03-30 PROCEDURE — 0001A PFIZER SARS-COV-2 VACCINE: CPT | Performed by: NURSE PRACTITIONER

## 2021-03-30 PROCEDURE — 91300 PFIZER SARS-COV-2 VACCINE: CPT | Performed by: NURSE PRACTITIONER

## 2021-04-05 DIAGNOSIS — T78.40XD ALLERGIC REACTION, SUBSEQUENT ENCOUNTER: ICD-10-CM

## 2021-04-05 NOTE — TELEPHONE ENCOUNTER
Pt really needs her Epipen. Highly allergic to Bees. She will call to establish with PCP at [facility]

## 2021-04-06 RX ORDER — EPINEPHRINE 0.3 MG/.3ML
0.3 INJECTION SUBCUTANEOUS
Qty: 2 EACH | Refills: 1 | Status: SHIPPED | OUTPATIENT
Start: 2021-04-06 | End: 2022-05-24

## 2021-04-24 ENCOUNTER — IMMUNIZATION (OUTPATIENT)
Dept: FAMILY PLANNING/WOMEN'S HEALTH CLINIC | Facility: IMMUNIZATION CENTER | Age: 66
End: 2021-04-24
Attending: INTERNAL MEDICINE
Payer: COMMERCIAL

## 2021-04-24 DIAGNOSIS — Z23 ENCOUNTER FOR VACCINATION: Primary | ICD-10-CM

## 2021-04-24 PROCEDURE — 91300 PFIZER SARS-COV-2 VACCINE: CPT

## 2021-04-24 PROCEDURE — 0002A PFIZER SARS-COV-2 VACCINE: CPT

## 2021-09-22 ENCOUNTER — HOSPITAL ENCOUNTER (OUTPATIENT)
Dept: LAB | Facility: MEDICAL CENTER | Age: 66
End: 2021-09-22
Attending: FAMILY MEDICINE
Payer: COMMERCIAL

## 2021-09-22 LAB
25(OH)D3 SERPL-MCNC: 56 NG/ML (ref 30–100)
ALBUMIN SERPL BCP-MCNC: 4.2 G/DL (ref 3.2–4.9)
ALBUMIN/GLOB SERPL: 1.9 G/DL
ALP SERPL-CCNC: 128 U/L (ref 30–99)
ALT SERPL-CCNC: 9 U/L (ref 2–50)
ANION GAP SERPL CALC-SCNC: 13 MMOL/L (ref 7–16)
AST SERPL-CCNC: 8 U/L (ref 12–45)
BASOPHILS # BLD AUTO: 0.1 % (ref 0–1.8)
BASOPHILS # BLD: 0.01 K/UL (ref 0–0.12)
BILIRUB SERPL-MCNC: 0.4 MG/DL (ref 0.1–1.5)
BUN SERPL-MCNC: 19 MG/DL (ref 8–22)
CA-I SERPL-SCNC: 0.9 MMOL/L (ref 1.1–1.3)
CALCIUM SERPL-MCNC: 7.1 MG/DL (ref 8.5–10.5)
CHLORIDE SERPL-SCNC: 101 MMOL/L (ref 96–112)
CO2 SERPL-SCNC: 24 MMOL/L (ref 20–33)
CREAT SERPL-MCNC: 0.64 MG/DL (ref 0.5–1.4)
EOSINOPHIL # BLD AUTO: 0.03 K/UL (ref 0–0.51)
EOSINOPHIL NFR BLD: 0.4 % (ref 0–6.9)
ERYTHROCYTE [DISTWIDTH] IN BLOOD BY AUTOMATED COUNT: 53.1 FL (ref 35.9–50)
EST. AVERAGE GLUCOSE BLD GHB EST-MCNC: 103 MG/DL
GLOBULIN SER CALC-MCNC: 2.2 G/DL (ref 1.9–3.5)
GLUCOSE SERPL-MCNC: 136 MG/DL (ref 65–99)
HBA1C MFR BLD: 5.2 % (ref 4–5.6)
HCT VFR BLD AUTO: 45.5 % (ref 37–47)
HGB BLD-MCNC: 15.7 G/DL (ref 12–16)
IMM GRANULOCYTES # BLD AUTO: 0.11 K/UL (ref 0–0.11)
IMM GRANULOCYTES NFR BLD AUTO: 1.4 % (ref 0–0.9)
LYMPHOCYTES # BLD AUTO: 1.71 K/UL (ref 1–4.8)
LYMPHOCYTES NFR BLD: 21.7 % (ref 22–41)
MCH RBC QN AUTO: 34.5 PG (ref 27–33)
MCHC RBC AUTO-ENTMCNC: 34.5 G/DL (ref 33.6–35)
MCV RBC AUTO: 100 FL (ref 81.4–97.8)
MONOCYTES # BLD AUTO: 0.48 K/UL (ref 0–0.85)
MONOCYTES NFR BLD AUTO: 6.1 % (ref 0–13.4)
NEUTROPHILS # BLD AUTO: 5.54 K/UL (ref 2–7.15)
NEUTROPHILS NFR BLD: 70.3 % (ref 44–72)
NRBC # BLD AUTO: 0 K/UL
NRBC BLD-RTO: 0 /100 WBC
PLATELET # BLD AUTO: 175 K/UL (ref 164–446)
PMV BLD AUTO: 10.6 FL (ref 9–12.9)
POTASSIUM SERPL-SCNC: 3.9 MMOL/L (ref 3.6–5.5)
PROT SERPL-MCNC: 6.4 G/DL (ref 6–8.2)
RBC # BLD AUTO: 4.55 M/UL (ref 4.2–5.4)
SODIUM SERPL-SCNC: 138 MMOL/L (ref 135–145)
TSH SERPL DL<=0.005 MIU/L-ACNC: 0.02 UIU/ML (ref 0.38–5.33)
WBC # BLD AUTO: 7.9 K/UL (ref 4.8–10.8)

## 2021-09-22 PROCEDURE — 82330 ASSAY OF CALCIUM: CPT

## 2021-09-22 PROCEDURE — 80053 COMPREHEN METABOLIC PANEL: CPT

## 2021-09-22 PROCEDURE — 85025 COMPLETE CBC W/AUTO DIFF WBC: CPT

## 2021-09-22 PROCEDURE — 83036 HEMOGLOBIN GLYCOSYLATED A1C: CPT

## 2021-09-22 PROCEDURE — 36415 COLL VENOUS BLD VENIPUNCTURE: CPT

## 2021-09-22 PROCEDURE — 82306 VITAMIN D 25 HYDROXY: CPT

## 2021-09-22 PROCEDURE — 84443 ASSAY THYROID STIM HORMONE: CPT

## 2021-09-27 ENCOUNTER — HOSPITAL ENCOUNTER (OUTPATIENT)
Dept: LAB | Facility: MEDICAL CENTER | Age: 66
End: 2021-09-27
Attending: FAMILY MEDICINE
Payer: COMMERCIAL

## 2021-09-27 LAB
ANION GAP SERPL CALC-SCNC: 12 MMOL/L (ref 7–16)
BUN SERPL-MCNC: 19 MG/DL (ref 8–22)
CA-I SERPL-SCNC: 1 MMOL/L (ref 1.1–1.3)
CALCIUM SERPL-MCNC: 8.1 MG/DL (ref 8.5–10.5)
CHLORIDE SERPL-SCNC: 99 MMOL/L (ref 96–112)
CO2 SERPL-SCNC: 30 MMOL/L (ref 20–33)
CREAT SERPL-MCNC: 0.87 MG/DL (ref 0.5–1.4)
GLUCOSE SERPL-MCNC: 105 MG/DL (ref 65–99)
POTASSIUM SERPL-SCNC: 4 MMOL/L (ref 3.6–5.5)
SODIUM SERPL-SCNC: 141 MMOL/L (ref 135–145)
TSH SERPL DL<=0.005 MIU/L-ACNC: 0.08 UIU/ML (ref 0.38–5.33)

## 2021-09-27 PROCEDURE — 36415 COLL VENOUS BLD VENIPUNCTURE: CPT

## 2021-09-27 PROCEDURE — 82652 VIT D 1 25-DIHYDROXY: CPT

## 2021-09-27 PROCEDURE — 84443 ASSAY THYROID STIM HORMONE: CPT

## 2021-09-27 PROCEDURE — 82330 ASSAY OF CALCIUM: CPT

## 2021-09-27 PROCEDURE — 80048 BASIC METABOLIC PNL TOTAL CA: CPT

## 2021-09-29 LAB — 1,25(OH)2D3 SERPL-MCNC: 46.2 PG/ML (ref 19.9–79.3)

## 2021-10-04 ENCOUNTER — HOSPITAL ENCOUNTER (OUTPATIENT)
Dept: LAB | Facility: MEDICAL CENTER | Age: 66
End: 2021-10-04
Attending: FAMILY MEDICINE
Payer: COMMERCIAL

## 2021-10-04 LAB
ANION GAP SERPL CALC-SCNC: 12 MMOL/L (ref 7–16)
BUN SERPL-MCNC: 16 MG/DL (ref 8–22)
CA-I SERPL-SCNC: 1.1 MMOL/L (ref 1.1–1.3)
CALCIUM SERPL-MCNC: 9.5 MG/DL (ref 8.5–10.5)
CHLORIDE SERPL-SCNC: 102 MMOL/L (ref 96–112)
CO2 SERPL-SCNC: 28 MMOL/L (ref 20–33)
CREAT SERPL-MCNC: 0.71 MG/DL (ref 0.5–1.4)
GLUCOSE SERPL-MCNC: 106 MG/DL (ref 65–99)
POTASSIUM SERPL-SCNC: 3.9 MMOL/L (ref 3.6–5.5)
SODIUM SERPL-SCNC: 142 MMOL/L (ref 135–145)

## 2021-10-04 PROCEDURE — 36415 COLL VENOUS BLD VENIPUNCTURE: CPT

## 2021-10-04 PROCEDURE — 82340 ASSAY OF CALCIUM IN URINE: CPT

## 2021-10-04 PROCEDURE — 80048 BASIC METABOLIC PNL TOTAL CA: CPT

## 2021-10-04 PROCEDURE — 82330 ASSAY OF CALCIUM: CPT

## 2021-10-05 ENCOUNTER — TELEPHONE (OUTPATIENT)
Dept: MEDICAL GROUP | Facility: OTHER | Age: 66
End: 2021-10-05

## 2021-10-05 NOTE — TELEPHONE ENCOUNTER
----- Message from Vanessa Romo M.D. sent at 10/5/2021 12:16 PM PDT -----  Please let patient know her most recent calcium was normal.  Continue with her current regimen.

## 2021-10-06 ENCOUNTER — APPOINTMENT (OUTPATIENT)
Dept: RADIOLOGY | Facility: IMAGING CENTER | Age: 66
End: 2021-10-06
Attending: PHYSICIAN ASSISTANT
Payer: COMMERCIAL

## 2021-10-06 ENCOUNTER — HOSPITAL ENCOUNTER (EMERGENCY)
Facility: MEDICAL CENTER | Age: 66
End: 2021-10-06
Payer: COMMERCIAL

## 2021-10-06 ENCOUNTER — TELEPHONE (OUTPATIENT)
Dept: MEDICAL GROUP | Facility: CLINIC | Age: 66
End: 2021-10-06

## 2021-10-06 ENCOUNTER — OFFICE VISIT (OUTPATIENT)
Dept: URGENT CARE | Facility: CLINIC | Age: 66
End: 2021-10-06
Payer: COMMERCIAL

## 2021-10-06 VITALS
HEART RATE: 66 BPM | TEMPERATURE: 97.4 F | DIASTOLIC BLOOD PRESSURE: 82 MMHG | BODY MASS INDEX: 26.68 KG/M2 | HEIGHT: 62 IN | RESPIRATION RATE: 18 BRPM | WEIGHT: 145 LBS | OXYGEN SATURATION: 99 % | SYSTOLIC BLOOD PRESSURE: 120 MMHG

## 2021-10-06 VITALS
BODY MASS INDEX: 29.37 KG/M2 | WEIGHT: 159.61 LBS | OXYGEN SATURATION: 98 % | TEMPERATURE: 97.5 F | HEART RATE: 60 BPM | DIASTOLIC BLOOD PRESSURE: 80 MMHG | SYSTOLIC BLOOD PRESSURE: 148 MMHG | HEIGHT: 62 IN | RESPIRATION RATE: 14 BRPM

## 2021-10-06 DIAGNOSIS — R07.81 RIB PAIN ON LEFT SIDE: ICD-10-CM

## 2021-10-06 DIAGNOSIS — E89.2 HYPOPARATHYROIDISM AFTER PROCEDURE (HCC): Primary | ICD-10-CM

## 2021-10-06 DIAGNOSIS — S09.90XA INJURY OF HEAD, INITIAL ENCOUNTER: ICD-10-CM

## 2021-10-06 DIAGNOSIS — S22.42XA CLOSED FRACTURE OF MULTIPLE RIBS OF LEFT SIDE, INITIAL ENCOUNTER: ICD-10-CM

## 2021-10-06 PROBLEM — E16.2 HYPOGLYCEMIA: Status: ACTIVE | Noted: 2021-10-06

## 2021-10-06 PROBLEM — Z71.6 TOBACCO ABUSE COUNSELING: Status: ACTIVE | Noted: 2019-05-21

## 2021-10-06 PROBLEM — D75.89 MACROCYTOSIS: Status: ACTIVE | Noted: 2019-05-21

## 2021-10-06 PROBLEM — M19.90 OSTEOARTHROSIS: Status: ACTIVE | Noted: 2021-10-06

## 2021-10-06 LAB
CALCIUM 24H UR-MCNC: 21 MG/DL
CALCIUM 24H UR-MRATE: NORMAL MG/D
CALCIUM/CREAT 24H UR: 273 MG/G (ref 20–300)
COLLECT DURATION TIME SPEC: NORMAL HRS
CREAT 24H UR-MCNC: 77 MG/DL
CREAT 24H UR-MRATE: NORMAL MG/D (ref 500–1400)
SPECIMEN VOL ?TM UR: NORMAL ML

## 2021-10-06 PROCEDURE — 302449 STATCHG TRIAGE ONLY (STATISTIC)

## 2021-10-06 PROCEDURE — 71101 X-RAY EXAM UNILAT RIBS/CHEST: CPT | Mod: TC,LT | Performed by: PHYSICIAN ASSISTANT

## 2021-10-06 PROCEDURE — 99215 OFFICE O/P EST HI 40 MIN: CPT | Performed by: PHYSICIAN ASSISTANT

## 2021-10-06 ASSESSMENT — ENCOUNTER SYMPTOMS
SEIZURES: 0
HEADACHES: 1
BLURRED VISION: 1
DIZZINESS: 0
LOSS OF CONSCIOUSNESS: 1
MYALGIAS: 1
FALLS: 1

## 2021-10-06 ASSESSMENT — FIBROSIS 4 INDEX
FIB4 SCORE: 0.99
FIB4 SCORE: 0.99
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
## 2021-10-07 DIAGNOSIS — E20.89 OTHER HYPOPARATHYROIDISM (HCC): ICD-10-CM

## 2021-10-07 RX ORDER — CALCITRIOL 0.25 UG/1
0.25 CAPSULE, LIQUID FILLED ORAL 2 TIMES DAILY
Qty: 180 CAPSULE | Refills: 3 | Status: SHIPPED | OUTPATIENT
Start: 2021-10-07 | End: 2023-02-14

## 2021-10-07 NOTE — TELEPHONE ENCOUNTER
I called pt to inform her of her referral to endo and lab order. She also went to a [facility] ER and had an xray, she has 3 broken ribs and a CT scan of head was recommended, she was unable to get it done yesterday. She is asking if Dr Romo can put in an order for a CT scan, but she asking also for  a xanax to take for this due to anxiety and claustrophobia.    Pt also asked to know what to lower your calcium intake too, and should she take less vitamin D?  She states she was asked to double her calcitrol, but a script was not sent in so she will run out.
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
## 2021-10-08 ENCOUNTER — TELEPHONE (OUTPATIENT)
Dept: MEDICAL GROUP | Facility: CLINIC | Age: 66
End: 2021-10-08

## 2021-10-08 NOTE — TELEPHONE ENCOUNTER
I have asked  to contact pt and get her scheduled here for a head CT eval also. Pt agreed to this.

## 2021-10-15 ENCOUNTER — HOSPITAL ENCOUNTER (OUTPATIENT)
Dept: LAB | Facility: MEDICAL CENTER | Age: 66
End: 2021-10-15
Attending: INTERNAL MEDICINE
Payer: COMMERCIAL

## 2021-10-15 LAB
ALBUMIN SERPL BCP-MCNC: 4.3 G/DL (ref 3.2–4.9)
CALCIUM SERPL-MCNC: 8.9 MG/DL (ref 8.5–10.5)

## 2021-10-15 PROCEDURE — 82040 ASSAY OF SERUM ALBUMIN: CPT

## 2021-10-15 PROCEDURE — 36415 COLL VENOUS BLD VENIPUNCTURE: CPT

## 2021-10-15 PROCEDURE — 82310 ASSAY OF CALCIUM: CPT

## 2021-12-02 ENCOUNTER — HOSPITAL ENCOUNTER (OUTPATIENT)
Dept: LAB | Facility: MEDICAL CENTER | Age: 66
End: 2021-12-02
Attending: FAMILY MEDICINE
Payer: COMMERCIAL

## 2021-12-02 ENCOUNTER — HOSPITAL ENCOUNTER (OUTPATIENT)
Dept: LAB | Facility: MEDICAL CENTER | Age: 66
End: 2021-12-02
Attending: INTERNAL MEDICINE
Payer: COMMERCIAL

## 2021-12-02 DIAGNOSIS — E89.2 HYPOPARATHYROIDISM AFTER PROCEDURE (HCC): ICD-10-CM

## 2021-12-02 LAB
ALBUMIN SERPL BCP-MCNC: 4.1 G/DL (ref 3.2–4.9)
ANION GAP SERPL CALC-SCNC: 13 MMOL/L (ref 7–16)
BUN SERPL-MCNC: 7 MG/DL (ref 8–22)
BUN SERPL-MCNC: 7 MG/DL (ref 8–22)
CA-I SERPL-SCNC: 1.1 MMOL/L (ref 1.1–1.3)
CALCIUM SERPL-MCNC: 9 MG/DL (ref 8.5–10.5)
CALCIUM SERPL-MCNC: 9.1 MG/DL (ref 8.5–10.5)
CHLORIDE SERPL-SCNC: 104 MMOL/L (ref 96–112)
CHLORIDE SERPL-SCNC: 105 MMOL/L (ref 96–112)
CO2 SERPL-SCNC: 24 MMOL/L (ref 20–33)
CO2 SERPL-SCNC: 25 MMOL/L (ref 20–33)
CREAT SERPL-MCNC: 0.81 MG/DL (ref 0.5–1.4)
CREAT SERPL-MCNC: 0.83 MG/DL (ref 0.5–1.4)
GLUCOSE SERPL-MCNC: 89 MG/DL (ref 65–99)
GLUCOSE SERPL-MCNC: 94 MG/DL (ref 65–99)
PHOSPHATE SERPL-MCNC: 3.9 MG/DL (ref 2.5–4.5)
POTASSIUM SERPL-SCNC: 4.3 MMOL/L (ref 3.6–5.5)
POTASSIUM SERPL-SCNC: 4.4 MMOL/L (ref 3.6–5.5)
SODIUM SERPL-SCNC: 141 MMOL/L (ref 135–145)
SODIUM SERPL-SCNC: 143 MMOL/L (ref 135–145)
T4 FREE SERPL-MCNC: 1.56 NG/DL (ref 0.93–1.7)
TSH SERPL DL<=0.005 MIU/L-ACNC: 0.01 UIU/ML (ref 0.38–5.33)

## 2021-12-02 PROCEDURE — 80048 BASIC METABOLIC PNL TOTAL CA: CPT

## 2021-12-02 PROCEDURE — 82330 ASSAY OF CALCIUM: CPT

## 2021-12-02 PROCEDURE — 84443 ASSAY THYROID STIM HORMONE: CPT

## 2021-12-02 PROCEDURE — 36415 COLL VENOUS BLD VENIPUNCTURE: CPT

## 2021-12-02 PROCEDURE — 80069 RENAL FUNCTION PANEL: CPT

## 2021-12-02 PROCEDURE — 84439 ASSAY OF FREE THYROXINE: CPT

## 2021-12-04 ENCOUNTER — HOSPITAL ENCOUNTER (OUTPATIENT)
Facility: MEDICAL CENTER | Age: 66
End: 2021-12-04
Attending: INTERNAL MEDICINE
Payer: COMMERCIAL

## 2021-12-04 PROCEDURE — 82340 ASSAY OF CALCIUM IN URINE: CPT

## 2021-12-06 DIAGNOSIS — R42 VERTIGO: ICD-10-CM

## 2021-12-07 RX ORDER — MECLIZINE HCL 12.5 MG/1
TABLET ORAL
Qty: 30 TABLET | Refills: 5 | Status: SHIPPED
Start: 2021-12-07 | End: 2022-01-07

## 2021-12-07 NOTE — TELEPHONE ENCOUNTER
Received request via: Pharmacy    Was the patient seen in the last year in this department? Yes, 04/28/2021    Does the patient have an active prescription (recently filled or refills available) for medication(s) requested? no

## 2021-12-08 LAB
CALCIUM 24H UR-MCNC: 3.8 MG/DL
CALCIUM 24H UR-MRATE: 114 MG/D
CALCIUM/CREAT 24H UR: 181 MG/G (ref 20–300)
COLLECT DURATION TIME SPEC: 24 HRS
CREAT 24H UR-MCNC: 21 MG/DL
CREAT 24H UR-MRATE: 630 MG/D (ref 500–1400)
SPECIMEN VOL ?TM UR: NORMAL ML

## 2021-12-08 RX ORDER — MECLIZINE HCL 12.5 MG/1
12.5 TABLET ORAL 3 TIMES DAILY PRN
COMMUNITY
End: 2021-12-08 | Stop reason: SDUPTHER

## 2021-12-09 RX ORDER — MECLIZINE HCL 12.5 MG/1
12.5 TABLET ORAL 3 TIMES DAILY PRN
Qty: 90 TABLET | Refills: 3 | Status: SHIPPED | OUTPATIENT
Start: 2021-12-09 | End: 2022-01-07 | Stop reason: SDUPTHER

## 2021-12-09 NOTE — TELEPHONE ENCOUNTER
VOICEMAIL  1. Caller Name:Marium Renteria                     Call Back Number:[phone] (home) [phone] (work)     2. Message:   Patient state she did the buster vaccine for covid-19, and she is been experience very bad headaches. Also she whants a refill on Meclizine 12.5 mg tab.    3. Patient approves office to leave a detailed voicemail/MyChart message: yes.

## 2021-12-14 ENCOUNTER — OFFICE VISIT (OUTPATIENT)
Dept: MEDICAL GROUP | Facility: CLINIC | Age: 66
End: 2021-12-14
Payer: COMMERCIAL

## 2021-12-14 VITALS
TEMPERATURE: 97.2 F | BODY MASS INDEX: 28.52 KG/M2 | HEIGHT: 62 IN | DIASTOLIC BLOOD PRESSURE: 84 MMHG | RESPIRATION RATE: 16 BRPM | OXYGEN SATURATION: 91 % | SYSTOLIC BLOOD PRESSURE: 151 MMHG | HEART RATE: 80 BPM | WEIGHT: 155 LBS

## 2021-12-14 DIAGNOSIS — G25.9 EXTRAPYRAMIDAL AND MOVEMENT DISORDER: ICD-10-CM

## 2021-12-14 DIAGNOSIS — Z76.0 PRESCRIPTION REFILL: ICD-10-CM

## 2021-12-14 DIAGNOSIS — E03.9 HYPOTHYROIDISM, UNSPECIFIED TYPE: ICD-10-CM

## 2021-12-14 DIAGNOSIS — R51.9 BILATERAL HEADACHES: ICD-10-CM

## 2021-12-14 PROCEDURE — 99214 OFFICE O/P EST MOD 30 MIN: CPT | Mod: GC | Performed by: STUDENT IN AN ORGANIZED HEALTH CARE EDUCATION/TRAINING PROGRAM

## 2021-12-14 RX ORDER — AMITRIPTYLINE HYDROCHLORIDE 10 MG/1
10 TABLET, FILM COATED ORAL
Qty: 90 TABLET | Refills: 1 | Status: SHIPPED | OUTPATIENT
Start: 2021-12-14 | End: 2021-12-16 | Stop reason: SDUPTHER

## 2021-12-14 RX ORDER — LEVOTHYROXINE SODIUM 88 MCG
75 TABLET ORAL
Qty: 90 TABLET | Refills: 0 | Status: SHIPPED
Start: 2021-12-14 | End: 2022-05-17

## 2021-12-14 ASSESSMENT — FIBROSIS 4 INDEX: FIB4 SCORE: 0.99

## 2021-12-15 NOTE — ASSESSMENT & PLAN NOTE
- patient w/ history of hypothyroidism - had thyroid and parathyroid removed   - last TSH 0.01 with normal T4  - Has had medicine adjusted recently     - recently decreased synthroid to 75mcg  - continue medicine
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
## 2021-12-15 NOTE — PROGRESS NOTES
[facility]     PATIENT ID:  NAME:  Marium Renteria  MRN:               [id number]  YOB: 1955    Date: 4:08 PM      Resident: Richard Mcintosh DO    CC:  headache    HPI: Marium Renteria is a 66 y.o. female who presented with headaches    Problem   Bilateral Headaches    - Patient w/ recent fall off her electric wheelchair in October. She had a LOC after the event  - Patient received Pfizer booster in November and notes that 3 days later started having headaches daily which hasve lasted since then.  - She has been taking Motgrin, Tylenol, aspirin,   - Headache temporal, bandlike, squeezing and going all day long. 8/10 pain and wakes her up at night  - has not had brain imaging for years     Hypothyroidism    - patient w/ history of hypothyroidism - had thyroid and parathyroid removed   - last TSH 0.01 with normal T4  - Has had medicine adjusted recently          REVIEW OF SYSTEMS:   Ten systems reviewed and were negative except as noted in the HPI.                PROBLEM LIST  Patient Active Problem List   Diagnosis   • Mixed hyperlipidemia   • HTN (hypertension)   • Postsurgical hypothyroidism   • Osteoporosis, senile   • Extrapyramidal and movement disorder   • Symptomatic menopausal or female climacteric states   • Hypoparathyroidism (HCC)   • Cigarette nicotine dependence, uncomplicated   • Gout, arthritis   • ELISHA (generalized anxiety disorder)   • Chronic diarrhea   • Insomnia   • Hyperlipidemia   • History of alcohol abuse   • Lumbar scoliosis   • Left lumbar radiculopathy   • Neuropathy   • Depression   • Osteoarthritis of cervical spine with myelopathy   • Failed back syndrome   • Cervical lymphadenopathy   • Benzodiazepine dependence, continuous (HCC)   • Pain due to hip joint prosthesis (HCC)   • Major depressive disorder   • Tobacco use   • Hypothyroidism   • Ulcerative esophagitis   • Hypomagnesemia   • Debility   • Restless leg syndrome   • Chronic pain syndrome   • Incontinence in  female   • Hypoglycemia   • Macrocytosis   • Osteoarthrosis   • Tobacco abuse counseling   • Bilateral headaches        PAST SURGICAL HISTORY:  Past Surgical History:   Procedure Laterality Date   • VENTRAL HERNIA REPAIR  10/4/2018    Procedure: ABDOMINAL WOUND EXPLORATION AND SEROMA DRAINAGE;  Surgeon: Houston Amanda D.O.;  Location: SURGERY [facility];  Service: General   • EXPLORATORY LAPAROTOMY  9/22/2018    Procedure: EXPLORATORY LAPAROTOMY;  Surgeon: Houston Amanda D.O.;  Location: SURGERY [facility];  Service: General   • LYSIS ADHESIONS GENERAL  9/22/2018    Procedure: LYSIS ADHESIONS GENERAL;  Surgeon: Houston Amanda D.O.;  Location: SURGERY [facility];  Service: General   • BOWEL RESECTION  9/22/2018    Procedure: BOWEL RESECTION- POSSIBLE;  Surgeon: Houston Amanda D.O.;  Location: SURGERY [facility];  Service: General   • LUMBAR FUSION POSTERIOR  8/7/2017    Procedure: LUMBAR FUSION POSTERIOR EXPLORATION;  Surgeon: Grarett Sexton M.D.;  Location: SURGERY [facility];  Service:    • HARDWARE REMOVAL NEURO  8/7/2017    Procedure: HARDWARE REMOVAL NEURO DEEP L1-L3, L4-S1, SEROMA REMOVAL;  Surgeon: Garrett Sexton M.D.;  Location: SURGERY [facility];  Service:    • LUMBAR FUSION POSTERIOR  12/17/2015    Procedure: LUMBAR FUSION POSTERIOR L1-S1;  Surgeon: Garrett Sexton M.D.;  Location: SURGERY [facility];  Service:    • LUMBAR LAMINECTOMY DISKECTOMY  12/17/2015    Procedure: LUMBAR LAMINECTOMY DISKECTOMY;  Surgeon: Garrett Sexton M.D.;  Location: SURGERY [facility];  Service:    • LUMBAR DECOMPRESSION  12/17/2015    Procedure: LUMBAR DECOMPRESSION L1-L3;  Surgeon: Garrett Sexton M.D.;  Location: SURGERY [facility];  Service:    • COLONOSCOPY WITH POLYP  8/1/2012    Performed by ARIA EMERSON at [facility]OSCOPY [facility]   • GASTROSCOPY WITH BIOPSY  8/1/2012    Performed by ARIA EMERSON at ENDOSCOPY [facility]   • THYROIDECTOMY  2001   • PARATHYROIDECTOMY   "2001   • COLON RESECTION  1985    7 feet of intestine   • APPENDECTOMY     • DENTAL SURGERY     • HYSTERECTOMY, VAGINAL      partial, benign   • PRIMARY C SECTION     • SALPINGO-OOPHORECTOMY, UNILATERAL         FAMILY HISTORY:  Family History   Problem Relation Age of Onset   • Genetic Disorder Mother         Alzheimer's   • Alcohol/Drug Father        SOCIAL HISTORY:   Social History     Tobacco Use   • Smoking status: Current Every Day Smoker     Packs/day: 0.50     Years: 47.00     Pack years: 23.50     Types: Cigarettes     Start date: 4/25/1970   • Smokeless tobacco: Never Used   • Tobacco comment: quit during pregnancy, now 2-3 cig daily   Substance Use Topics   • Alcohol use: Yes     Alcohol/week: 1.2 oz     Types: 2 Cans of beer per week     Comment: quit drinking daily 3 years ago, now drinks 3-4 beers on weekends       ALLERGIES:  Allergies   Allergen Reactions   • Bee Venom Anaphylaxis   • Cillins [Penicillins] Anaphylaxis and Hives     Appears to have tolerated unasyn 9/13/18   • Latex Rash     rash   • Asa [Aspirin] Unspecified     Pt states \"It tears up my stomach\".   • Coconut Oil Itching and Swelling     Raw coconut   • Codeine Itching     Pt states \"I itch so much\".   • Contrast Media With Iodine [Iodine] Vomiting and Nausea     Not sure of reaction   • Hydrocodone-Ibuprofen      \"Extreme itching\"   • Hydroxyzine Unspecified     \"loss of memory\"   • Ibuprofen Unspecified     Pt states \"It tears up my stomach\".   • Norco [Apap-Fd&C Yellow #10 [place]-Hydrocodone] Itching     Pt states \"I itch all over\".   • Other Food Unspecified     sourdough bread  Pt states \"My face and mouth gets all tingling\".    Lactose intolerant per patient.    • Sulfa Drugs Hives   • Talwin Unspecified     Pt states \"I get forgetful\".   • Tape Swelling     Red swelling   • Vicodin [Hydrocodone-Acetaminophen] Itching     Pt states \"I itch so much\".       OUTPATIENT MEDICATIONS:    Current Outpatient Medications:   •  SYNTHROID " 88 MCG Tab, Take 1 Tablet by mouth every morning on an empty stomach., Disp: 90 Tablet, Rfl: 0  •  carbidopa-levodopa (SINEMET)  MG Tab, Take 2 Tablets by mouth every day. TAKE ONE TABLET BY MOUTH DAILY, Disp: 90 Tablet, Rfl: 1  •  amitriptyline (ELAVIL) 10 MG Tab, Take 1 Tablet by mouth at bedtime., Disp: 90 Tablet, Rfl: 1  •  meclizine (ANTIVERT) 12.5 MG Tab, Take 1 Tablet by mouth 3 times a day as needed for up to 30 days., Disp: 90 Tablet, Rfl: 3  •  meclizine (ANTIVERT) 12.5 MG Tab, Take one PO BID PRN vertigo, Disp: 30 Tablet, Rfl: 5  •  calcitRIOL (ROCALTROL) 0.25 MCG Cap, Take 1 Capsule by mouth 2 times a day., Disp: 180 Capsule, Rfl: 3  •  EPINEPHrine (EPIPEN 2-MIRELA) 0.3 MG/0.3ML Solution Auto-injector solution for injection, Inject 0.3 mL under the skin 1 time a day as needed. Only for bee stings, Disp: 2 Each, Rfl: 1  •  gabapentin (NEURONTIN) 300 MG Cap, TAKE TWO CAPSULES BY MOUTH THREE TIMES A DAY, Disp: 180 capsule, Rfl: 0  •  lisinopril (PRINIVIL) 20 MG Tab, TAKE ONE TABLET BY MOUTH DAILY, Disp: 90 tablet, Rfl: 3  •  calcitRIOL (ROCALTROL) 0.25 MCG Cap, TAKE ONE CAPSULE BY MOUTH DAILY, Disp: 15 Cap, Rfl: 3  •  potassium chloride SA (K-DUR) 10 MEQ Tab CR, TAKE 1 TABLET BY MOUTH ONCE DAILY ON SUNDAY, TUESDAY, WEDNESDAY, THURSDAY AND SATURDAY, Disp: 20 Tab, Rfl: 3  •  cholestyramine (QUESTRAN) 4 g packet, MIX 1 PACKET WITH LIQUID AND DRINK ONCE DAILY, Disp: 30 Each, Rfl: 1  •  traZODone (DESYREL) 100 MG Tab, Take 1 Tab by mouth 1 time daily as needed for Sleep., Disp: 90 Tab, Rfl: 0  •  allopurinol (ZYLOPRIM) 300 MG Tab, TAKE ONE TABLET BY MOUTH DAILY, Disp: 90 Tab, Rfl: 1  •  potassium chloride SA (K-DUR) 10 MEQ Tab CR, TAKE ONE TABLET BY MOUTH DAILY, Disp: 15 Tab, Rfl: 0  •  Misc. Devices Misc, As needed, Disp: 1 Application, Rfl: 0  •  Misc. Devices Misc, As needed, Disp: 1 Application, Rfl: 0  •  traZODone (DESYREL) 50 MG Tab, TAKE ONE TABLET BY MOUTH EVERY NIGHT AT BEDTIME AS NEEDED FOR SLEEP,  "Disp: 30 Tab, Rfl: 3  •  Misc. Devices Misc, As directed, Disp: 90 Application, Rfl: 03  •  Misc. Devices Misc, As directed, Disp: 1 Application, Rfl: 0  •  potassium chloride SA (K-DUR) 10 MEQ Tab CR, TAKE 1 TABLET BY MOUTH ON SUNDAY, TUESDAY, WEDNESDAY, THURSDAY AND SATURDAY, Disp: 20 Tab, Rfl: 0  •  Cholecalciferol (VITAMIN D3) 1000 units Cap, Take 4,000 Units by mouth every day. (Patient taking differently: Take 5,000 Units by mouth every day. Indications: supplement), Disp: 90 Cap, Rfl: 0  •  albuterol (PROVENTIL HFA) 108 (90 Base) MCG/ACT Aero Soln inhalation aerosol, Inhale 2 Puffs by mouth every 6 hours as needed for Shortness of Breath., Disp: 3 Inhaler, Rfl: 0  •  Misc. Devices Misc, TENS unit pads to be used as directed, Disp: 10 Device, Rfl: 1  •  Misc. Devices Misc, Incontinence supplies for incontinence to be changed once to twice daily, Disp: 60 Device, Rfl: 11  •  Misc. Devices Misc, Back brace to use as directed, Disp: 1 Device, Rfl: 0  •  Multiple Vitamin (DAILY-BRANDON) Tab, Take 1 Tab by mouth every day., Disp: , Rfl:   •  polyethylene glycol 3350 (MIRALAX) Powder, Take 17 g by mouth 3 times a day as needed (bowel care)., Disp: , Rfl:   •  Calcium Carbonate Antacid (TUMS PO), Take 3,200 mg by mouth every day. Needs to take BERRY flavor, Disp: , Rfl:     PHYSICAL EXAM:  Vitals:    12/14/21 1448   BP: 151/84   BP Location: Right arm   Patient Position: Sitting   BP Cuff Size: Adult   Pulse: 80   Resp: 16   Temp: 36.2 °C (97.2 °F)   TempSrc: Tympanic   SpO2: 91%   Weight: 70.3 kg (155 lb)   Height: 1.575 m (5' 2\")       General: Pt resting in NAD, cooperative   Skin:  Pink, warm and dry.  HEENT: NC/AT. EOMI.  Lungs:  Symmetrical.  CTAB, good air movement   Cardiovascular:  S1/S2 RRR   Abdomen:  Abdomen is soft, nontender  Extremities:  Full range of motion.  CNS:  Muscle tone is normal. No gross focal neurologic deficits, sitting in a wheelchair      ASSESSMENT/PLAN:   66 y.o. female     Problem List " Items Addressed This Visit     Extrapyramidal and movement disorder    Relevant Medications    carbidopa-levodopa (SINEMET)  MG Tab    Hypothyroidism     - patient w/ history of hypothyroidism - had thyroid and parathyroid removed   - last TSH 0.01 with normal T4  - Has had medicine adjusted recently     - recently decreased synthroid to 75mcg  - continue medicine         Relevant Medications    SYNTHROID 88 MCG Tab    Bilateral headaches     - Patient w/ recent fall off her electric wheelchair in October. She had a LOC after the event  - Patient received Pfizer booster in November and notes that 3 days later started having headaches daily which hasve lasted since then.  - She has been taking Motgrin, Tylenol, aspirin,   - Headache temporal, bandlike, squeezing and going all day long. 8/10 pain and wakes her up at night  - has not had brain imaging for years    -Benign exam, no focal deficits  - Amitrypline 10mg nightly prescribed  - MRI amador w/ and w/o ordered  - F/u in 2-4 weeks         Relevant Medications    amitriptyline (ELAVIL) 10 MG Tab    Other Relevant Orders    MR-BRAIN-WITH & W/O      Other Visit Diagnoses     Prescription refill        Relevant Medications    SYNTHROID 88 MCG Tab          Richard Mcintosh DO  PGY-3  [facility]

## 2021-12-16 DIAGNOSIS — R51.9 BILATERAL HEADACHES: ICD-10-CM

## 2021-12-16 RX ORDER — AMITRIPTYLINE HYDROCHLORIDE 10 MG/1
10 TABLET, FILM COATED ORAL
Qty: 90 TABLET | Refills: 1 | Status: SHIPPED
Start: 2021-12-16 | End: 2022-01-07

## 2021-12-30 ENCOUNTER — TELEPHONE (OUTPATIENT)
Dept: RADIOLOGY | Facility: MEDICAL CENTER | Age: 66
End: 2021-12-30

## 2021-12-31 ENCOUNTER — PRE-ADMISSION TESTING (OUTPATIENT)
Dept: ADMISSIONS | Facility: MEDICAL CENTER | Age: 66
End: 2021-12-31
Attending: STUDENT IN AN ORGANIZED HEALTH CARE EDUCATION/TRAINING PROGRAM
Payer: COMMERCIAL

## 2021-12-31 DIAGNOSIS — Z01.811 PRE-OPERATIVE RESPIRATORY EXAMINATION: ICD-10-CM

## 2021-12-31 LAB — COVID ORDER STATUS COVID19: NORMAL

## 2021-12-31 PROCEDURE — U0003 INFECTIOUS AGENT DETECTION BY NUCLEIC ACID (DNA OR RNA); SEVERE ACUTE RESPIRATORY SYNDROME CORONAVIRUS 2 (SARS-COV-2) (CORONAVIRUS DISEASE [COVID-19]), AMPLIFIED PROBE TECHNIQUE, MAKING USE OF HIGH THROUGHPUT TECHNOLOGIES AS DESCRIBED BY CMS-2020-01-R: HCPCS

## 2021-12-31 PROCEDURE — U0005 INFEC AGEN DETEC AMPLI PROBE: HCPCS

## 2022-01-01 LAB
SARS-COV-2 RNA RESP QL NAA+PROBE: NOTDETECTED
SPECIMEN SOURCE: NORMAL

## 2022-01-04 ENCOUNTER — ANESTHESIA EVENT (OUTPATIENT)
Dept: RADIOLOGY | Facility: MEDICAL CENTER | Age: 67
End: 2022-01-04
Payer: COMMERCIAL

## 2022-01-05 ENCOUNTER — ANESTHESIA (OUTPATIENT)
Dept: RADIOLOGY | Facility: MEDICAL CENTER | Age: 67
End: 2022-01-05
Payer: COMMERCIAL

## 2022-01-05 ENCOUNTER — HOSPITAL ENCOUNTER (OUTPATIENT)
Dept: RADIOLOGY | Facility: MEDICAL CENTER | Age: 67
End: 2022-01-05
Attending: STUDENT IN AN ORGANIZED HEALTH CARE EDUCATION/TRAINING PROGRAM
Payer: COMMERCIAL

## 2022-01-05 VITALS
DIASTOLIC BLOOD PRESSURE: 74 MMHG | TEMPERATURE: 97 F | SYSTOLIC BLOOD PRESSURE: 153 MMHG | OXYGEN SATURATION: 92 % | WEIGHT: 154.32 LBS | HEART RATE: 72 BPM | BODY MASS INDEX: 28.4 KG/M2 | HEIGHT: 62 IN | RESPIRATION RATE: 18 BRPM

## 2022-01-05 DIAGNOSIS — R51.9 BILATERAL HEADACHES: ICD-10-CM

## 2022-01-05 LAB
BUN BLD-MCNC: 6 MG/DL (ref 8–22)
CA-I BLD ISE-SCNC: 1.14 MMOL/L (ref 1.1–1.3)
CHLORIDE BLD-SCNC: 103 MMOL/L (ref 96–112)
CO2 BLD-SCNC: 27 MMOL/L (ref 20–33)
CREAT BLD-MCNC: 0.6 MG/DL (ref 0.5–1.4)
EKG IMPRESSION: NORMAL
GLUCOSE BLD-MCNC: 93 MG/DL (ref 65–99)
HCT VFR BLD CALC: 50 % (ref 37–47)
HGB BLD-MCNC: 17 G/DL (ref 12–16)
POTASSIUM BLD-SCNC: 5.5 MMOL/L (ref 3.6–5.5)
SODIUM BLD-SCNC: 140 MMOL/L (ref 135–145)

## 2022-01-05 PROCEDURE — 700117 HCHG RX CONTRAST REV CODE 255: Performed by: STUDENT IN AN ORGANIZED HEALTH CARE EDUCATION/TRAINING PROGRAM

## 2022-01-05 PROCEDURE — 85014 HEMATOCRIT: CPT

## 2022-01-05 PROCEDURE — 93010 ELECTROCARDIOGRAM REPORT: CPT | Performed by: INTERNAL MEDICINE

## 2022-01-05 PROCEDURE — 80047 BASIC METABLC PNL IONIZED CA: CPT

## 2022-01-05 PROCEDURE — A9576 INJ PROHANCE MULTIPACK: HCPCS | Performed by: STUDENT IN AN ORGANIZED HEALTH CARE EDUCATION/TRAINING PROGRAM

## 2022-01-05 PROCEDURE — 700111 HCHG RX REV CODE 636 W/ 250 OVERRIDE (IP)

## 2022-01-05 PROCEDURE — 4410588 MR-BRAIN-WITH & W/O

## 2022-01-05 PROCEDURE — 93005 ELECTROCARDIOGRAM TRACING: CPT | Performed by: ANESTHESIOLOGY

## 2022-01-05 PROCEDURE — 700101 HCHG RX REV CODE 250

## 2022-01-05 RX ORDER — MIDAZOLAM HYDROCHLORIDE 1 MG/ML
INJECTION INTRAMUSCULAR; INTRAVENOUS
Status: DISCONTINUED
Start: 2022-01-05 | End: 2022-01-06 | Stop reason: HOSPADM

## 2022-01-05 RX ORDER — SODIUM CHLORIDE, SODIUM LACTATE, POTASSIUM CHLORIDE, CALCIUM CHLORIDE 600; 310; 30; 20 MG/100ML; MG/100ML; MG/100ML; MG/100ML
INJECTION, SOLUTION INTRAVENOUS CONTINUOUS
Status: DISCONTINUED | OUTPATIENT
Start: 2022-01-05 | End: 2022-01-06 | Stop reason: HOSPADM

## 2022-01-05 RX ORDER — ONDANSETRON 2 MG/ML
4 INJECTION INTRAMUSCULAR; INTRAVENOUS
Status: DISCONTINUED | OUTPATIENT
Start: 2022-01-05 | End: 2022-01-06 | Stop reason: HOSPADM

## 2022-01-05 RX ORDER — HALOPERIDOL 5 MG/ML
1 INJECTION INTRAMUSCULAR
Status: DISCONTINUED | OUTPATIENT
Start: 2022-01-05 | End: 2022-01-06 | Stop reason: HOSPADM

## 2022-01-05 RX ORDER — DIPHENHYDRAMINE HYDROCHLORIDE 50 MG/ML
12.5 INJECTION INTRAMUSCULAR; INTRAVENOUS
Status: DISCONTINUED | OUTPATIENT
Start: 2022-01-05 | End: 2022-01-06 | Stop reason: HOSPADM

## 2022-01-05 RX ORDER — LABETALOL HYDROCHLORIDE 5 MG/ML
5 INJECTION, SOLUTION INTRAVENOUS
Status: DISCONTINUED | OUTPATIENT
Start: 2022-01-05 | End: 2022-01-06 | Stop reason: HOSPADM

## 2022-01-05 RX ADMIN — GADOTERIDOL 15 ML: 279.3 INJECTION, SOLUTION INTRAVENOUS at 15:55

## 2022-01-05 ASSESSMENT — FIBROSIS 4 INDEX: FIB4 SCORE: 0.99

## 2022-01-05 ASSESSMENT — PAIN SCALES - GENERAL: PAIN_LEVEL: 3

## 2022-01-05 NOTE — ANESTHESIA PREPROCEDURE EVALUATION
Date/Time: 01/05/22 1515    Procedure: MR-BRAIN-WITH & W/O    Diagnosis:       Bilateral headaches [R51.9]          Indications: recent fall w/ LOC and daily headaches since    Location: [facility] - MRI - [address]          Relevant Problems   NEURO   (positive) Bilateral headaches   (positive) History of alcohol abuse      CARDIAC   (positive) HTN (hypertension)      GI   (positive) Ulcerative esophagitis      ENDO   (positive) Hypothyroidism   (positive) Postsurgical hypothyroidism      Other   (positive) Cervical lymphadenopathy   (positive) Gout, arthritis       Physical Exam    Airway   Mallampati: II  TM distance: >3 FB  Neck ROM: full       Cardiovascular - normal exam  Rhythm: regular  Rate: normal  (-) murmur     Dental - normal exam      Very poor dentition   Pulmonary - normal exam  Breath sounds clear to auscultation     Abdominal    Neurological - normal exam             Pt uses electric wheelchair due to left sided numbness.  Minimally ambulatory. COPD    Anesthesia Plan    ASA 3   ASA physical status 3 criteria: other (comment)    Plan - general       Airway plan will be LMA          Induction: intravenous    Postoperative Plan: Postoperative administration of opioids is intended.    Pertinent diagnostic labs and testing reviewed    Informed Consent:    Anesthetic plan and risks discussed with patient.    Use of blood products discussed with: patient whom consented to blood products.

## 2022-01-06 NOTE — ANESTHESIA POSTPROCEDURE EVALUATION
Patient: Marium Renteria    Procedure Summary     Date: 01/05/22 Room / Location: [facility] MRI  [address]    Anesthesia Start:  Anesthesia Stop:     Procedure: MR-BRAIN-WITH & W/O Diagnosis:       Bilateral headaches            (recent fall w/ LOC and daily headaches since)    Scheduled Providers:  Responsible Provider:     Anesthesia Type: general ASA Status: 3          Final Anesthesia Type: general  Last vitals  BP   Blood Pressure : 153/74    Temp   36.1 °C (97 °F)    Pulse   72   Resp   18    SpO2   92 %      Anesthesia Post Evaluation    Patient location during evaluation: PACU  Patient participation: complete - patient participated  Level of consciousness: awake and alert  Pain score: 3    Airway patency: patent  Anesthetic complications: no  Cardiovascular status: hemodynamically stable  Respiratory status: acceptable  Hydration status: euvolemic    PONV: none          No complications documented.

## 2022-01-06 NOTE — ANESTHESIA TIME REPORT
Anesthesia Start and Stop Event Times     Date Time Event    1/5/2022 1514 Ready for Procedure        Responsible Staff    No responsible staff documented.       Preop Diagnosis (Free Text):  Pre-op Diagnosis             Preop Diagnosis (Codes):    Premium Reason  Non-Premium    Comments:

## 2022-01-06 NOTE — DISCHARGE INSTRUCTIONS
MRI ADULT DISCHARGE INSTRUCTIONS    You have been medicated today for your scan. Please follow the instructions below to ensure your safe recovery. If you have any questions or problems, feel free to call us at [phone] or [phone].     1.   Have someone stay with you to assist you as needed.    2.   Do not drive or operate any mechanical devices.    3.   Do not perform any activity that requires concentration. Make no major decisions over the next 24 hours.     4.   Be careful changing positions from laying down to sitting or standing, as you may become dizzy.     5.   Do not drink alcohol for 48 hours.    6.   There are no restrictions for eating your normal meals. Drink fluids.    7.   You may continue your usual medications for pain, tranquilizers, muscle relaxants or sedatives when awake.     8.   Tomorrow, you may continue your normal daily activities.     9.   Pressure dressing on 10 - 15 minutes. If swelling or bleeding occurs when removed, continue placing direct pressure on injection site for another 5 minutes, or until bleeding stops.   Midazolam (VERSED)  What is this medicine?  You were given MIDAZOLAM (ASUNCION peterson) for your procedure today. This medication is a benzodiazepine. It is used to cause relaxation or sleep before surgery and to block the memory of the procedure.  This medicine may be used for other purposes; ask your health care provider or pharmacist if you have questions.  What side effects may I notice from receiving this medicine?  Side effects that you should report to your doctor or health care professional as soon as possible:  • allergic reactions like skin rash, itching or hives, swelling of the face, lips, or tongue  • breathing problems  • confusion  • dizziness or lightheadedness  • fast, irregular heartbeat  • halluninations during recovery  • numbness or tingling in the hands or feet  • pain, redness, or swelling at site where injected  • seizures  Side effects that usually  do not require medical attention (report to your doctor or health care professional if they continue or are bothersome):  • coughing  • headache  • hiccups  • involuntary eye and muscle movements  • loss of memory of events just before, during, and after use  • nausea, vomiting  • speech problems  • tiredness  • trouble sleeping or nightmares  This list may not describe all possible side effects. Call your doctor for medical advice about side effects. You may report side effects to FDA at 3-456-KPL-8507.    Fentanyl  What is this medicine?  You were given FENTANYL (FEN ta nil) for your procedure today, it is a pain reliever. It is used to treat breakthrough pain that your long acting pain medicine does not control. Do not use this medicine for a pain that will go away in a few days like pain from surgery, doctor or dentist visits.   This medicine may be used for other purposes; ask your health care provider or pharmacist if you have questions.  What side effects may I notice from receiving this medicine?  Side effects that you should report to your doctor or health care professional as soon as possible:  • allergic reactions like skin rash, itching or hives, swelling of the face, lips, or tongue  • breathing problems  • changes in vision  • confusion  • dry mouth  • feeling faint, lightheaded  • hallucination  • irregular heartbeat  • mouth pain, sores  • problems with balance, talking, walking  • trouble passing urine or change in the amount of urine  • unusual bleeding or bruising  • unusually weak or tired  Side effects that usually do not require medical attention (report to your doctor or health care professional if they continue or are bothersome):  • dizzy  • headache  • loss of appetite  • nausea, vomiting  • sweating  • tingling in mouth  This list may not describe all possible side effects. Call your doctor for medical advice about side effects. You may report side effects to FDA at 5-699-FDA-7339.    I  have been informed of and understand the above discharge instructions.

## 2022-01-07 ENCOUNTER — OFFICE VISIT (OUTPATIENT)
Dept: MEDICAL GROUP | Facility: CLINIC | Age: 67
End: 2022-01-07
Payer: COMMERCIAL

## 2022-01-07 VITALS
HEART RATE: 74 BPM | DIASTOLIC BLOOD PRESSURE: 70 MMHG | WEIGHT: 155 LBS | RESPIRATION RATE: 20 BRPM | OXYGEN SATURATION: 97 % | TEMPERATURE: 97.8 F | SYSTOLIC BLOOD PRESSURE: 107 MMHG | BODY MASS INDEX: 28.52 KG/M2 | HEIGHT: 62 IN

## 2022-01-07 DIAGNOSIS — E03.9 HYPOTHYROIDISM, UNSPECIFIED TYPE: ICD-10-CM

## 2022-01-07 DIAGNOSIS — R42 VERTIGO: ICD-10-CM

## 2022-01-07 DIAGNOSIS — G25.81 RESTLESS LEG SYNDROME: ICD-10-CM

## 2022-01-07 DIAGNOSIS — R51.9 BILATERAL HEADACHES: ICD-10-CM

## 2022-01-07 PROCEDURE — 99214 OFFICE O/P EST MOD 30 MIN: CPT | Mod: GC | Performed by: STUDENT IN AN ORGANIZED HEALTH CARE EDUCATION/TRAINING PROGRAM

## 2022-01-07 RX ORDER — VENLAFAXINE HYDROCHLORIDE 37.5 MG/1
37.5 CAPSULE, EXTENDED RELEASE ORAL DAILY
Qty: 30 CAPSULE | Refills: 3 | Status: SHIPPED
Start: 2022-01-07 | End: 2022-05-17

## 2022-01-07 RX ORDER — MECLIZINE HCL 12.5 MG/1
12.5 TABLET ORAL 3 TIMES DAILY PRN
Qty: 90 TABLET | Refills: 3 | Status: SHIPPED | OUTPATIENT
Start: 2022-01-07 | End: 2022-02-06

## 2022-01-07 RX ORDER — SUMATRIPTAN 25 MG/1
25 TABLET, FILM COATED ORAL
Qty: 30 TABLET | Refills: 3 | Status: SHIPPED
Start: 2022-01-07 | End: 2022-05-17

## 2022-01-07 ASSESSMENT — FIBROSIS 4 INDEX: FIB4 SCORE: 0.99

## 2022-01-07 NOTE — ASSESSMENT & PLAN NOTE
"- Hx of vertigo for years  - patient w/ horizontal \"yo-yo\" sensation  - no vertigo since on meclizine   - requesting refill    - Refilled meclizine  "

## 2022-01-07 NOTE — ASSESSMENT & PLAN NOTE
- Patient w/ recent fall off her electric wheelchair in October. She had a LOC after the event  - Patient received Pfizer booster in November and notes that 3 days later started having headaches daily which hasve lasted since then.  - She has been taking Motgrin, Tylenol, aspirin,   - Headache temporal, bandlike, squeezing and going all day long. 8/10 pain and wakes her up at night   Sometimes wakes up with pain    Comes and goes but always present    Light and sound sensitivity    MRI brain w/o abnormalities    Tried amitrypline at night w/o benefit    -Starting Effexor 37.5mg daily for headache prevention and mood  -Sumatriptan PRN for migraine abortive treatment  - F/u in 1 month

## 2022-01-07 NOTE — PROGRESS NOTES
"[facility] [place]     PATIENT ID:  NAME:  Marium Renteria  MRN:               [id number]  YOB: 1955    Date: 3:10 PM      Resident: Richard Mcintosh DO    CC:  F/u headaches      HPI: Marium Renteria is a 66 y.o. female who presented for headache f/u     Problem   Vertigo    - Hx of vertigo for years  - patient w/ horizontal \"yo-yo\" sensation  - no vertigo since on meclizine   - requesting refill     Bilateral Headaches    - Patient w/ recent fall off her electric wheelchair in October. She had a LOC after the event  - Patient received Pfizer booster in November and notes that 3 days later started having headaches daily which hasve lasted since then.  - She has been taking Motgrin, Tylenol, aspirin,   - Headache temporal, bandlike, squeezing and going all day long. 8/10 pain and wakes her up at night   Sometimes wakes up with pain    Comes and goes but always present    Light and sound sensitivity    MRI brain w/o abnormalities    Tried amitrypline at night w/o benefit     Restless Leg Syndrome    - hx of RLS  - Patient takes Sinimet 2 tabs daily  - Helped significantly but now symptoms improving  - complaining of vibrating since in hands now  - requesting to decrease dose     Hypothyroidism    - patient w/ history of hypothyroidism - had thyroid and parathyroid removed   - last TSH 0.01 with normal T4  - Has had medicine adjusted recently   - Patient taking 75mcg daily - treated by endocrinologist          REVIEW OF SYSTEMS:   Ten systems reviewed and were negative except as noted in the HPI.                PROBLEM LIST  Patient Active Problem List   Diagnosis   • Mixed hyperlipidemia   • HTN (hypertension)   • Postsurgical hypothyroidism   • Osteoporosis, senile   • Extrapyramidal and movement disorder   • Symptomatic menopausal or female climacteric states   • Hypoparathyroidism (HCC)   • Cigarette nicotine dependence, uncomplicated   • Gout, arthritis   • ELISHA (generalized anxiety disorder)   • " Chronic diarrhea   • Insomnia   • Hyperlipidemia   • History of alcohol abuse   • Lumbar scoliosis   • Left lumbar radiculopathy   • Neuropathy   • Depression   • Osteoarthritis of cervical spine with myelopathy   • Failed back syndrome   • Cervical lymphadenopathy   • Benzodiazepine dependence, continuous (HCC)   • Pain due to hip joint prosthesis (HCC)   • Major depressive disorder   • Tobacco use   • Hypothyroidism   • Ulcerative esophagitis   • Hypomagnesemia   • Debility   • Restless leg syndrome   • Chronic pain syndrome   • Incontinence in female   • Hypoglycemia   • Macrocytosis   • Osteoarthrosis   • Tobacco abuse counseling   • Bilateral headaches   • Vertigo        PAST SURGICAL HISTORY:  Past Surgical History:   Procedure Laterality Date   • VENTRAL HERNIA REPAIR  10/4/2018    Procedure: ABDOMINAL WOUND EXPLORATION AND SEROMA DRAINAGE;  Surgeon: Houston Amanda D.O.;  Location: SURGERY [facility];  Service: General   • EXPLORATORY LAPAROTOMY  9/22/2018    Procedure: EXPLORATORY LAPAROTOMY;  Surgeon: Houston Amanda D.O.;  Location: SURGERY [facility];  Service: General   • LYSIS ADHESIONS GENERAL  9/22/2018    Procedure: LYSIS ADHESIONS GENERAL;  Surgeon: Houston Amanda D.O.;  Location: SURGERY [facility];  Service: General   • BOWEL RESECTION  9/22/2018    Procedure: BOWEL RESECTION- POSSIBLE;  Surgeon: Houston Amanda D.O.;  Location: SURGERY [facility];  Service: General   • FUSION, SPINE, LUMBAR, PLIF  8/7/2017    Procedure: LUMBAR FUSION POSTERIOR EXPLORATION;  Surgeon: Garrett Sexton M.D.;  Location: [facility];  Service:    • HARDWARE REMOVAL NEURO  8/7/2017    Procedure: HARDWARE REMOVAL NEURO DEEP L1-L3, L4-S1, SEROMA REMOVAL;  Surgeon: Garrett Sexton M.D.;  Location: SURGERY [facility];  Service:    • FUSION, SPINE, LUMBAR, PLIF  12/17/2015    Procedure: LUMBAR FUSION POSTERIOR L1-S1;  Surgeon: Garrett Sexton M.D.;  Location: SURGERY [facility];  Service:  "   • LUMBAR LAMINECTOMY DISKECTOMY  12/17/2015    Procedure: LUMBAR LAMINECTOMY DISKECTOMY;  Surgeon: Garrett Sexton M.D.;  Location: SURGERY [facility];  Service:    • LUMBAR DECOMPRESSION  12/17/2015    Procedure: LUMBAR DECOMPRESSION L1-L3;  Surgeon: Garrett Sexton M.D.;  Location: SURGERY [facility];  Service:    • COLONOSCOPY WITH POLYP  8/1/2012    Performed by RAIA EMERSON at ENDOSCOPY [facility]   • GASTROSCOPY WITH BIOPSY  8/1/2012    Performed by ARIA EMERSON at ENDOSCOPY [facility]   • THYROIDECTOMY  2001   • PARATHYROIDECTOMY  2001   • COLON RESECTION  1985    7 feet of intestine   • APPENDECTOMY     • DENTAL SURGERY     • HYSTERECTOMY, VAGINAL      partial, benign   • PRIMARY C SECTION     • SALPINGO-OOPHORECTOMY, UNILATERAL         FAMILY HISTORY:  Family History   Problem Relation Age of Onset   • Genetic Disorder Mother         Alzheimer's   • Alcohol/Drug Father        SOCIAL HISTORY:   Social History     Tobacco Use   • Smoking status: Current Every Day Smoker     Packs/day: 0.50     Years: 47.00     Pack years: 23.50     Types: Cigarettes     Start date: 4/25/1970   • Smokeless tobacco: Never Used   • Tobacco comment: quit during pregnancy, now 2-3 cig daily   Substance Use Topics   • Alcohol use: Yes     Alcohol/week: 1.2 oz     Types: 2 Cans of beer per week     Comment: quit drinking daily 3 years ago, now drinks 3-4 beers on weekends       ALLERGIES:  Allergies   Allergen Reactions   • Bee Venom Anaphylaxis   • Cillins [Penicillins] Anaphylaxis and Hives     Appears to have tolerated unasyn 9/13/18   • Latex Rash     rash   • Asa [Aspirin] Unspecified     Pt states \"It tears up my stomach\".   • Coconut Oil Itching and Swelling     Raw coconut   • Codeine Itching     Pt states \"I itch so much\".   • Contrast Media With Iodine [Iodine] Vomiting and Nausea     Not sure of reaction   • Hydrocodone-Ibuprofen      \"Extreme itching\"   • Hydroxyzine Unspecified     \"loss of " "memory\"   • Ibuprofen Unspecified     Pt states \"It tears up my stomach\".   • Norco [Apap-Fd&C Yellow #10 [place]-Hydrocodone] Itching     Pt states \"I itch all over\".   • Other Food Unspecified     sourdough bread  Pt states \"My face and mouth gets all tingling\".    Lactose intolerant per patient.    • Sulfa Drugs Hives   • Talwin Unspecified     Pt states \"I get forgetful\".   • Tape Swelling     Red swelling   • Vicodin [Hydrocodone-Acetaminophen] Itching     Pt states \"I itch so much\".       OUTPATIENT MEDICATIONS:    Current Outpatient Medications:   •  meclizine (ANTIVERT) 12.5 MG Tab, Take 1 Tablet by mouth 3 times a day as needed for up to 30 days., Disp: 90 Tablet, Rfl: 3  •  venlafaxine XR (EFFEXOR XR) 37.5 MG CAPSULE SR 24 HR, Take 1 Capsule by mouth every day., Disp: 30 Capsule, Rfl: 3  •  SUMAtriptan (IMITREX) 25 MG Tab tablet, Take 1 Tablet by mouth one time as needed for Migraine for up to 1 dose., Disp: 30 Tablet, Rfl: 3  •  SYNTHROID 88 MCG Tab, Take 1 Tablet by mouth every morning on an empty stomach., Disp: 90 Tablet, Rfl: 0  •  carbidopa-levodopa (SINEMET)  MG Tab, Take 2 Tablets by mouth every day. TAKE ONE TABLET BY MOUTH DAILY, Disp: 90 Tablet, Rfl: 1  •  calcitRIOL (ROCALTROL) 0.25 MCG Cap, Take 1 Capsule by mouth 2 times a day., Disp: 180 Capsule, Rfl: 3  •  EPINEPHrine (EPIPEN 2-MIRELA) 0.3 MG/0.3ML Solution Auto-injector solution for injection, Inject 0.3 mL under the skin 1 time a day as needed. Only for bee stings, Disp: 2 Each, Rfl: 1  •  gabapentin (NEURONTIN) 300 MG Cap, TAKE TWO CAPSULES BY MOUTH THREE TIMES A DAY, Disp: 180 capsule, Rfl: 0  •  lisinopril (PRINIVIL) 20 MG Tab, TAKE ONE TABLET BY MOUTH DAILY, Disp: 90 tablet, Rfl: 3  •  calcitRIOL (ROCALTROL) 0.25 MCG Cap, TAKE ONE CAPSULE BY MOUTH DAILY, Disp: 15 Cap, Rfl: 3  •  potassium chloride SA (K-DUR) 10 MEQ Tab CR, TAKE 1 TABLET BY MOUTH ONCE DAILY ON SUNDAY, TUESDAY, WEDNESDAY, THURSDAY AND SATURDAY, Disp: 20 Tab, Rfl: " 3  •  cholestyramine (QUESTRAN) 4 g packet, MIX 1 PACKET WITH LIQUID AND DRINK ONCE DAILY, Disp: 30 Each, Rfl: 1  •  traZODone (DESYREL) 100 MG Tab, Take 1 Tab by mouth 1 time daily as needed for Sleep., Disp: 90 Tab, Rfl: 0  •  allopurinol (ZYLOPRIM) 300 MG Tab, TAKE ONE TABLET BY MOUTH DAILY, Disp: 90 Tab, Rfl: 1  •  potassium chloride SA (K-DUR) 10 MEQ Tab CR, TAKE ONE TABLET BY MOUTH DAILY, Disp: 15 Tab, Rfl: 0  •  Misc. Devices Misc, As needed, Disp: 1 Application, Rfl: 0  •  Misc. Devices Misc, As needed, Disp: 1 Application, Rfl: 0  •  traZODone (DESYREL) 50 MG Tab, TAKE ONE TABLET BY MOUTH EVERY NIGHT AT BEDTIME AS NEEDED FOR SLEEP, Disp: 30 Tab, Rfl: 3  •  Misc. Devices Misc, As directed, Disp: 90 Application, Rfl: 03  •  Misc. Devices Misc, As directed, Disp: 1 Application, Rfl: 0  •  potassium chloride SA (K-DUR) 10 MEQ Tab CR, TAKE 1 TABLET BY MOUTH ON SUNDAY, TUESDAY, WEDNESDAY, THURSDAY AND SATURDAY, Disp: 20 Tab, Rfl: 0  •  Cholecalciferol (VITAMIN D3) 1000 units Cap, Take 4,000 Units by mouth every day. (Patient taking differently: Take 5,000 Units by mouth every day. Indications: supplement), Disp: 90 Cap, Rfl: 0  •  albuterol (PROVENTIL HFA) 108 (90 Base) MCG/ACT Aero Soln inhalation aerosol, Inhale 2 Puffs by mouth every 6 hours as needed for Shortness of Breath., Disp: 3 Inhaler, Rfl: 0  •  Misc. Devices Misc, TENS unit pads to be used as directed, Disp: 10 Device, Rfl: 1  •  Misc. Devices Misc, Incontinence supplies for incontinence to be changed once to twice daily, Disp: 60 Device, Rfl: 11  •  Misc. Devices Misc, Back brace to use as directed, Disp: 1 Device, Rfl: 0  •  Multiple Vitamin (DAILY-BRANDON) Tab, Take 1 Tab by mouth every day., Disp: , Rfl:   •  polyethylene glycol 3350 (MIRALAX) Powder, Take 17 g by mouth 3 times a day as needed (bowel care)., Disp: , Rfl:   •  Calcium Carbonate Antacid (TUMS PO), Take 3,200 mg by mouth every day. Needs to take BERRY flavor, Disp: , Rfl:     PHYSICAL  "EXAM:  Vitals:    01/07/22 1440   BP: 107/70   BP Location: Right arm   Patient Position: Sitting   BP Cuff Size: Adult   Pulse: 74   Resp: 20   Temp: 36.6 °C (97.8 °F)   TempSrc: Tympanic   SpO2: 97%   Weight: 70.3 kg (155 lb)   Height: 1.575 m (5' 2\")       General: Pt resting in NAD, cooperative   Skin:  Pink, warm and dry.  HEENT: NC/AT. EOMI.  Lungs:  Symmetrical.  CTAB, good air movement   Cardiovascular:  S1/S2 RRR   Abdomen:  Abdomen is soft, nontender  Extremities:  Full range of motion.  CNS:  Muscle tone is normal. Using an electric wheelchair      ASSESSMENT/PLAN:   66 y.o. female     Problem List Items Addressed This Visit     Hypothyroidism     - patient w/ history of hypothyroidism - had thyroid and parathyroid removed   - last TSH 0.01 with normal T4  - Has had medicine adjusted recently   - Patient taking 75mcg daily - treated by endocrinologist     - No adjustments          Restless leg syndrome     - hx of RLS  - Patient takes Sinimet 2 tabs daily  - Helped significantly but now symptoms improving  - complaining of vibrating since in hands now  - requesting to decrease dose    - Sinemet decrease to 1 tab per day          Bilateral headaches     - Patient w/ recent fall off her electric wheelchair in October. She had a LOC after the event  - Patient received Pfizer booster in November and notes that 3 days later started having headaches daily which hasve lasted since then.  - She has been taking Motgrin, Tylenol, aspirin,   - Headache temporal, bandlike, squeezing and going all day long. 8/10 pain and wakes her up at night   Sometimes wakes up with pain    Comes and goes but always present    Light and sound sensitivity    MRI brain w/o abnormalities    Tried amitrypline at night w/o benefit    -Starting Effexor 37.5mg daily for headache prevention and mood  -Sumatriptan PRN for migraine abortive treatment  - F/u in 1 month         Vertigo     - Hx of vertigo for years  - patient w/ horizontal " "\"yo-yo\" sensation  - no vertigo since on meclizine   - requesting refill    - Refilled meclizine               Richard Mcintosh, DO  PGY-3  [facility]     "

## 2022-01-07 NOTE — ASSESSMENT & PLAN NOTE
- hx of RLS  - Patient takes Sinimet 2 tabs daily  - Helped significantly but now symptoms improving  - complaining of vibrating since in hands now  - requesting to decrease dose    - Sinemet decrease to 1 tab per day

## 2022-01-07 NOTE — ASSESSMENT & PLAN NOTE
- patient w/ history of hypothyroidism - had thyroid and parathyroid removed   - last TSH 0.01 with normal T4  - Has had medicine adjusted recently   - Patient taking 75mcg daily - treated by endocrinologist     - No adjustments

## 2022-02-24 ENCOUNTER — HOSPITAL ENCOUNTER (OUTPATIENT)
Dept: LAB | Facility: MEDICAL CENTER | Age: 67
End: 2022-02-24
Attending: INTERNAL MEDICINE
Payer: COMMERCIAL

## 2022-02-24 LAB
ALBUMIN SERPL BCP-MCNC: 4.2 G/DL (ref 3.2–4.9)
CALCIUM SERPL-MCNC: 10.5 MG/DL (ref 8.5–10.5)

## 2022-02-24 PROCEDURE — 36415 COLL VENOUS BLD VENIPUNCTURE: CPT

## 2022-02-24 PROCEDURE — 82040 ASSAY OF SERUM ALBUMIN: CPT

## 2022-02-24 PROCEDURE — 82310 ASSAY OF CALCIUM: CPT

## 2022-03-07 ENCOUNTER — HOSPITAL ENCOUNTER (OUTPATIENT)
Dept: LAB | Facility: MEDICAL CENTER | Age: 67
End: 2022-03-07
Attending: INTERNAL MEDICINE
Payer: COMMERCIAL

## 2022-03-07 LAB
ALBUMIN SERPL BCP-MCNC: 4.4 G/DL (ref 3.2–4.9)
BUN SERPL-MCNC: 9 MG/DL (ref 8–22)
CALCIUM SERPL-MCNC: 12 MG/DL (ref 8.5–10.5)
CHLORIDE SERPL-SCNC: 99 MMOL/L (ref 96–112)
CO2 SERPL-SCNC: 32 MMOL/L (ref 20–33)
CREAT SERPL-MCNC: 0.91 MG/DL (ref 0.5–1.4)
GLUCOSE SERPL-MCNC: 97 MG/DL (ref 65–99)
PHOSPHATE SERPL-MCNC: 3.9 MG/DL (ref 2.5–4.5)
POTASSIUM SERPL-SCNC: 3.9 MMOL/L (ref 3.6–5.5)
SODIUM SERPL-SCNC: 143 MMOL/L (ref 135–145)
T4 FREE SERPL-MCNC: 1.42 NG/DL (ref 0.93–1.7)
TSH SERPL DL<=0.005 MIU/L-ACNC: 0.04 UIU/ML (ref 0.38–5.33)

## 2022-03-07 PROCEDURE — 84439 ASSAY OF FREE THYROXINE: CPT

## 2022-03-07 PROCEDURE — 84443 ASSAY THYROID STIM HORMONE: CPT

## 2022-03-07 PROCEDURE — 36415 COLL VENOUS BLD VENIPUNCTURE: CPT

## 2022-03-07 PROCEDURE — 80069 RENAL FUNCTION PANEL: CPT

## 2022-03-14 ENCOUNTER — HOSPITAL ENCOUNTER (OUTPATIENT)
Dept: LAB | Facility: MEDICAL CENTER | Age: 67
End: 2022-03-14
Attending: INTERNAL MEDICINE
Payer: COMMERCIAL

## 2022-03-14 LAB
ALBUMIN SERPL BCP-MCNC: 4 G/DL (ref 3.2–4.9)
CALCIUM SERPL-MCNC: 10.2 MG/DL (ref 8.5–10.5)

## 2022-03-14 PROCEDURE — 82040 ASSAY OF SERUM ALBUMIN: CPT

## 2022-03-14 PROCEDURE — 82310 ASSAY OF CALCIUM: CPT

## 2022-03-14 PROCEDURE — 36415 COLL VENOUS BLD VENIPUNCTURE: CPT

## 2022-03-28 ENCOUNTER — HOSPITAL ENCOUNTER (OUTPATIENT)
Dept: LAB | Facility: MEDICAL CENTER | Age: 67
End: 2022-03-28
Attending: INTERNAL MEDICINE
Payer: COMMERCIAL

## 2022-03-28 LAB
ALBUMIN SERPL BCP-MCNC: 4.4 G/DL (ref 3.2–4.9)
CALCIUM SERPL-MCNC: 9.3 MG/DL (ref 8.5–10.5)

## 2022-03-28 PROCEDURE — 36415 COLL VENOUS BLD VENIPUNCTURE: CPT

## 2022-03-28 PROCEDURE — 82040 ASSAY OF SERUM ALBUMIN: CPT

## 2022-03-28 PROCEDURE — 82310 ASSAY OF CALCIUM: CPT

## 2022-04-12 NOTE — DIETARY
Completed a telephone post-test results disclosure discussion with patient. The patient's testing was Negative for a clinically actionable gene mutation. However, the patients Tyrer-Cuzick score was greater than 20%. Patient's TC Score showed 28.9%. When we dove a little deeper with the specifics on who in her family had Breast Cancer and at what ages, pt stated that she thinks both of her half-sisters had it at 28, however, she isn't 100% sure. I told her that this score is a reflection of her personal history and her families, and if they did not in fact have a breast cancer at age 28 then this score could be lower, which would change her recommendations and management. She is going to find out for certain and let me know. If they did not have cancer, I will recalculate her TC risk score and we will move forward from there. The following are recommendations and management guidelines for a TC score greater than 20%      This indicates that the patient is considered HIGH RISK. [employer] of Radiology Guidelines for Screening Women at  Elevated Breast Cancer Risk    Higher-risk women need supplemental and earlier screeningRsk Mammography/  Tomosynthesis      Patient voiced understanding. Patient was offered a referral to our High Risk Breast Program and breast health management with Jarod Pop PA-C and accepted. Appointment made for: We will wait for a final score before referral is made. A copy of this report and elevated Risk Score can be viewed under the Media tab. Nutrition Services: Update   Pt is currently on clear liquid diet x1 day. Pt has been NPO/CLD x9 days. Pt states her appetite is terrible. Pt is tolerating clears. Pt denies N/V. Discussed nutrition supplements, pt refuses at this time. Adjusted food preferences in compnutrition. Per chart pt PO 25-50% 2 meals documented. Wt 9/20: 84.1 kg via bed scale.     Labs: K 2.9, BUN < 3, Creatinine 0.48  Meds: adoxa, lovenox, synthroid, magnesium sulfate, dolophine, protonix, KCL, dilaudid (prn), ativan (prn), roxicodone (prn)  Last BM: PTA    Recommendations/Plan:  1. Advance diet past clears as feasible. If unable to advance recommend nutrition support.   2. Encourage intake of meals  3. Document intake of all meals as % taken in ADL's to provide interdisciplinary communication across all shifts.   4. Monitor weight.  5. Obtain supplement order per RD as needed.    RD following

## 2022-05-02 DIAGNOSIS — G62.9 NEUROPATHY: ICD-10-CM

## 2022-05-02 DIAGNOSIS — M10.9 GOUT, ARTHRITIS: ICD-10-CM

## 2022-05-02 DIAGNOSIS — Z76.0 PRESCRIPTION REFILL: ICD-10-CM

## 2022-05-02 RX ORDER — LISINOPRIL 20 MG/1
20 TABLET ORAL DAILY
COMMUNITY
End: 2022-05-02 | Stop reason: SDUPTHER

## 2022-05-02 RX ORDER — METHOCARBAMOL 750 MG/1
750 TABLET, FILM COATED ORAL 4 TIMES DAILY
COMMUNITY
End: 2022-05-02 | Stop reason: SDUPTHER

## 2022-05-03 RX ORDER — GABAPENTIN 300 MG/1
600 CAPSULE ORAL 3 TIMES DAILY
Qty: 180 CAPSULE | Refills: 1 | Status: SHIPPED | OUTPATIENT
Start: 2022-05-03 | End: 2022-08-05 | Stop reason: SDUPTHER

## 2022-05-03 RX ORDER — METHOCARBAMOL 750 MG/1
750 TABLET, FILM COATED ORAL 4 TIMES DAILY
Qty: 120 TABLET | Refills: 0 | Status: SHIPPED | OUTPATIENT
Start: 2022-05-03 | End: 2022-05-04 | Stop reason: SDUPTHER

## 2022-05-03 RX ORDER — ALLOPURINOL 300 MG/1
TABLET ORAL
Qty: 90 TABLET | Refills: 1 | Status: SHIPPED | OUTPATIENT
Start: 2022-05-03 | End: 2022-12-06 | Stop reason: SDUPTHER

## 2022-05-03 RX ORDER — LISINOPRIL 20 MG/1
20 TABLET ORAL DAILY
Qty: 90 TABLET | Refills: 1 | Status: SHIPPED
Start: 2022-05-03 | End: 2022-05-17

## 2022-05-03 RX ORDER — POTASSIUM CHLORIDE 750 MG/1
10 TABLET, EXTENDED RELEASE ORAL DAILY
Qty: 90 TABLET | Refills: 1 | Status: SHIPPED
Start: 2022-05-03 | End: 2022-05-17

## 2022-05-04 ENCOUNTER — HOSPITAL ENCOUNTER (OUTPATIENT)
Dept: LAB | Facility: MEDICAL CENTER | Age: 67
End: 2022-05-04
Attending: INTERNAL MEDICINE
Payer: COMMERCIAL

## 2022-05-04 ENCOUNTER — TELEPHONE (OUTPATIENT)
Dept: MEDICAL GROUP | Facility: CLINIC | Age: 67
End: 2022-05-04

## 2022-05-04 LAB
ALBUMIN SERPL BCP-MCNC: 4.1 G/DL (ref 3.2–4.9)
CALCIUM SERPL-MCNC: 9.3 MG/DL (ref 8.5–10.5)

## 2022-05-04 PROCEDURE — 82310 ASSAY OF CALCIUM: CPT

## 2022-05-04 PROCEDURE — 82040 ASSAY OF SERUM ALBUMIN: CPT

## 2022-05-04 PROCEDURE — 36415 COLL VENOUS BLD VENIPUNCTURE: CPT

## 2022-05-04 RX ORDER — METHOCARBAMOL 750 MG/1
750 TABLET, FILM COATED ORAL 3 TIMES DAILY
Qty: 90 TABLET | Refills: 0 | Status: SHIPPED | OUTPATIENT
Start: 2022-05-04 | End: 2022-05-27

## 2022-05-04 NOTE — TELEPHONE ENCOUNTER
VOICEMAIL  1. Caller Name: [facility]                       Call Back Number: [phone]    2. Message: Please send new RX with SIG clarification for Methocarbamol 750 mg. The prescription that was sent in has 2 different SIGS.     3. Patient approves office to leave a detailed voicemail/MyChart message: N\A

## 2022-05-17 ENCOUNTER — OFFICE VISIT (OUTPATIENT)
Dept: MEDICAL GROUP | Facility: CLINIC | Age: 67
End: 2022-05-17
Payer: COMMERCIAL

## 2022-05-17 VITALS
SYSTOLIC BLOOD PRESSURE: 134 MMHG | DIASTOLIC BLOOD PRESSURE: 95 MMHG | OXYGEN SATURATION: 92 % | WEIGHT: 164 LBS | TEMPERATURE: 98 F | RESPIRATION RATE: 16 BRPM | HEIGHT: 62 IN | HEART RATE: 80 BPM | BODY MASS INDEX: 30.18 KG/M2

## 2022-05-17 DIAGNOSIS — R53.81 DEBILITY: ICD-10-CM

## 2022-05-17 DIAGNOSIS — R51.9 BILATERAL HEADACHES: ICD-10-CM

## 2022-05-17 DIAGNOSIS — I10 PRIMARY HYPERTENSION: ICD-10-CM

## 2022-05-17 PROCEDURE — 99214 OFFICE O/P EST MOD 30 MIN: CPT | Mod: GC | Performed by: STUDENT IN AN ORGANIZED HEALTH CARE EDUCATION/TRAINING PROGRAM

## 2022-05-17 RX ORDER — TRAZODONE HYDROCHLORIDE 50 MG/1
TABLET ORAL
Qty: 180 TABLET | Refills: 1 | Status: SHIPPED | OUTPATIENT
Start: 2022-05-17 | End: 2022-07-21 | Stop reason: SDUPTHER

## 2022-05-17 RX ORDER — MECLIZINE HCL 12.5 MG/1
TABLET ORAL
COMMUNITY
Start: 2022-04-28 | End: 2022-09-27

## 2022-05-17 RX ORDER — LEVOTHYROXINE SODIUM 75 MCG
TABLET ORAL
COMMUNITY
Start: 2022-04-23 | End: 2023-01-20 | Stop reason: SDUPTHER

## 2022-05-17 RX ORDER — POTASSIUM CHLORIDE 750 MG/1
TABLET, FILM COATED, EXTENDED RELEASE ORAL
COMMUNITY
Start: 2022-03-29 | End: 2022-05-17

## 2022-05-17 ASSESSMENT — FIBROSIS 4 INDEX: FIB4 SCORE: 0.99

## 2022-05-17 NOTE — ASSESSMENT & PLAN NOTE
- Hx of fall in 2021   - Hx of back surgery 2006 and been in wheelchair since then   - Patient requesting new electric wheelchair    - DME order for electric wheelchair given  - Request patient f/u in 1 month to see if she got her wheelchair and asked her to bring in all her home meds to review her med list fully and check her BP

## 2022-05-17 NOTE — ASSESSMENT & PLAN NOTE
12/2021  - Patient w/ recent fall off her electric wheelchair in October. She had a LOC after the event  - Patient received Pfizer booster in November and notes that 3 days later started having headaches daily which hasve lasted since then.  - She has been taking Motgrin, Tylenol, aspirin,   - Headache temporal, bandlike, squeezing and going all day long. 8/10 pain and wakes her up at night   Sometimes wakes up with pain    Comes and goes but always present    Light and sound sensitivity    MRI brain w/o abnormalities    Tried amitrypline at night w/o benefit    05/2022  - Patient prescribed effexor last visit but not taking it  - currently headaches well controlled  - not using effexor and not using sumatriptan   - not interested in new medicine at this time       - Continue trazodone for sleep  - Not using sumatriptan or effexor for headaches  - f/u PRN for headaches

## 2022-05-17 NOTE — ASSESSMENT & PLAN NOTE
- Patient w/ history of HTN  - BP today 134/95  - Patient taking 20mg lisinopril daily   - Recommend she take BP at home     - Check BP at home  - Continue lisinopril

## 2022-05-17 NOTE — PROGRESS NOTES
[facility]     PATIENT ID:  NAME:  Marium Renteria  MRN:               [id number]  YOB: 1955    Resident: Richard Mcintosh DO    CC:  F/u migraines      HPI: Marium Renteria is a 66 y.o. female who presented for headache f/u     Problem   Bilateral Headaches    12/2021  - Patient w/ recent fall off her electric wheelchair in October. She had a LOC after the event  - Patient received Pfizer booster in November and notes that 3 days later started having headaches daily which hasve lasted since then.  - She has been taking Motgrin, Tylenol, aspirin,   - Headache temporal, bandlike, squeezing and going all day long. 8/10 pain and wakes her up at night   Sometimes wakes up with pain    Comes and goes but always present    Light and sound sensitivity    MRI brain w/o abnormalities    Tried amitrypline at night w/o benefit    05/2022  - Patient prescribed effexor last visit but not taking it  - currently headaches well controlled  - not using effexor and not using sumatriptan   - not interested in new medicine at this time      Debility    - Hx of fall in 2021   - Hx of back surgery 2006 and been in wheelchair since then   - Patient requesting new electric wheelchair     Htn (Hypertension)    - Patient w/ history of HTN  - BP today 134/95  - Patient taking 20mg lisinopril daily   - Recommend she take BP at home          REVIEW OF SYSTEMS:   Ten systems reviewed and were negative except as noted in the HPI.                PROBLEM LIST  Patient Active Problem List   Diagnosis   • Mixed hyperlipidemia   • HTN (hypertension)   • Postsurgical hypothyroidism   • Osteoporosis, senile   • Extrapyramidal and movement disorder   • Symptomatic menopausal or female climacteric states   • Hypoparathyroidism (HCC)   • Cigarette nicotine dependence, uncomplicated   • Gout, arthritis   • ELISHA (generalized anxiety disorder)   • Chronic diarrhea   • Insomnia   • Hyperlipidemia   • History of alcohol abuse   • Lumbar  scoliosis   • Left lumbar radiculopathy   • Neuropathy   • Depression   • Osteoarthritis of cervical spine with myelopathy   • Failed back syndrome   • Cervical lymphadenopathy   • Benzodiazepine dependence, continuous (HCC)   • Pain due to hip joint prosthesis (HCC)   • Major depressive disorder   • Tobacco use   • Hypothyroidism   • Ulcerative esophagitis   • Hypomagnesemia   • Debility   • Restless leg syndrome   • Chronic pain syndrome   • Incontinence in female   • Hypoglycemia   • Macrocytosis   • Osteoarthrosis   • Tobacco abuse counseling   • Bilateral headaches   • Vertigo        PAST SURGICAL HISTORY:  Past Surgical History:   Procedure Laterality Date   • VENTRAL HERNIA REPAIR  10/4/2018    Procedure: ABDOMINAL WOUND EXPLORATION AND SEROMA DRAINAGE;  Surgeon: Houston Amanda D.O.;  Location: [facility];  Service: General   • EXPLORATORY LAPAROTOMY  9/22/2018    Procedure: EXPLORATORY LAPAROTOMY;  Surgeon: Houston Amanda D.O.;  Location: [facility];  Service: General   • LYSIS ADHESIONS GENERAL  9/22/2018    Procedure: LYSIS ADHESIONS GENERAL;  Surgeon: Houston Amanda D.O.;  Location: [facility];  Service: General   • BOWEL RESECTION  9/22/2018    Procedure: BOWEL RESECTION- POSSIBLE;  Surgeon: Houston Amanda D.O.;  Location: [facility];  Service: General   • FUSION, SPINE, LUMBAR, PLIF  8/7/2017    Procedure: LUMBAR FUSION POSTERIOR EXPLORATION;  Surgeon: Garrett Sexton M.D.;  Location: [facility];  Service:    • HARDWARE REMOVAL NEURO  8/7/2017    Procedure: HARDWARE REMOVAL NEURO DEEP L1-L3, L4-S1, SEROMA REMOVAL;  Surgeon: Garrett Sexton M.D.;  Location: [facility];  Service:    • FUSION, SPINE, LUMBAR, PLIF  12/17/2015    Procedure: LUMBAR FUSION POSTERIOR L1-S1;  Surgeon: Garrett Sexton M.D.;  Location: [facility];  Service:    • LUMBAR LAMINECTOMY DISKECTOMY  12/17/2015    Procedure: LUMBAR LAMINECTOMY  "DISKECTOMY;  Surgeon: Garrett Sexton M.D.;  Location: SURGERY [facility];  Service:    • LUMBAR DECOMPRESSION  12/17/2015    Procedure: LUMBAR DECOMPRESSION L1-L3;  Surgeon: Garrett Sexton M.D.;  Location: SURGERY [facility];  Service:    • COLONOSCOPY WITH POLYP  8/1/2012    Performed by ARIA EMERSON at [facility]   • GASTROSCOPY WITH BIOPSY  8/1/2012    Performed by ARIA EMERSON at [facility]COPY [facility]   • THYROIDECTOMY  2001   • PARATHYROIDECTOMY  2001   • COLON RESECTION  1985    7 feet of intestine   • APPENDECTOMY     • DENTAL SURGERY     • HYSTERECTOMY, VAGINAL      partial, benign   • PRIMARY C SECTION     • SALPINGO-OOPHORECTOMY, UNILATERAL         FAMILY HISTORY:  Family History   Problem Relation Age of Onset   • Genetic Disorder Mother         Alzheimer's   • Alcohol/Drug Father        SOCIAL HISTORY:   Social History     Tobacco Use   • Smoking status: Current Every Day Smoker     Packs/day: 0.50     Years: 47.00     Pack years: 23.50     Types: Cigarettes     Start date: 4/25/1970   • Smokeless tobacco: Never Used   • Tobacco comment: quit during pregnancy, now 2-3 cig daily   Substance Use Topics   • Alcohol use: Yes     Alcohol/week: 1.2 oz     Types: 2 Cans of beer per week     Comment: quit drinking daily 3 years ago, now drinks 3-4 beers on weekends       ALLERGIES:  Allergies   Allergen Reactions   • Bee Venom Anaphylaxis   • Cillins [Penicillins] Anaphylaxis and Hives     Appears to have tolerated unasyn 9/13/18   • Latex Rash     rash   • Asa [Aspirin] Unspecified     Pt states \"It tears up my stomach\".   • Coconut Oil Itching and Swelling     Raw coconut   • Codeine Itching     Pt states \"I itch so much\".   • Contrast Media With Iodine [Iodine] Vomiting and Nausea     Not sure of reaction   • Hydrocodone-Ibuprofen      \"Extreme itching\"   • Hydroxyzine Unspecified     \"loss of memory\"   • Ibuprofen Unspecified     Pt states \"It tears up my stomach\".   • " "Norco [Apap-Fd&C Yellow #10 [place]-Hydrocodone] Itching     Pt states \"I itch all over\".   • Other Food Unspecified     sourdough bread  Pt states \"My face and mouth gets all tingling\".    Lactose intolerant per patient.    • Sulfa Drugs Hives   • Talwin Unspecified     Pt states \"I get forgetful\".   • Tape Swelling     Red swelling   • Vicodin [Hydrocodone-Acetaminophen] Itching     Pt states \"I itch so much\".       OUTPATIENT MEDICATIONS:    Current Outpatient Medications:   •  SYNTHROID 75 MCG Tab, , Disp: , Rfl:   •  traZODone (DESYREL) 50 MG Tab, Take 2 tabs at night for sleep, Disp: 180 Tablet, Rfl: 1  •  methocarbamol (ROBAXIN) 750 MG Tab, Take 1 Tablet by mouth 3 times a day. Take 1 tab PO 3 times daily, Disp: 90 Tablet, Rfl: 0  •  gabapentin (NEURONTIN) 300 MG Cap, Take 2 Capsules by mouth 3 times a day., Disp: 180 Capsule, Rfl: 1  •  allopurinol (ZYLOPRIM) 300 MG Tab, TAKE ONE TABLET BY MOUTH DAILY, Disp: 90 Tablet, Rfl: 1  •  EPINEPHrine (EPIPEN 2-MIRELA) 0.3 MG/0.3ML Solution Auto-injector solution for injection, Inject 0.3 mL under the skin 1 time a day as needed. Only for bee stings, Disp: 2 Each, Rfl: 1  •  lisinopril (PRINIVIL) 20 MG Tab, TAKE ONE TABLET BY MOUTH DAILY, Disp: 90 tablet, Rfl: 3  •  cholestyramine (QUESTRAN) 4 g packet, MIX 1 PACKET WITH LIQUID AND DRINK ONCE DAILY, Disp: 30 Each, Rfl: 1  •  potassium chloride SA (K-DUR) 10 MEQ Tab CR, TAKE ONE TABLET BY MOUTH DAILY, Disp: 15 Tab, Rfl: 0  •  Misc. Devices Misc, As needed, Disp: 1 Application, Rfl: 0  •  Misc. Devices Misc, As needed, Disp: 1 Application, Rfl: 0  •  Misc. Devices Misc, As directed, Disp: 90 Application, Rfl: 03  •  Misc. Devices Misc, As directed, Disp: 1 Application, Rfl: 0  •  albuterol (PROVENTIL HFA) 108 (90 Base) MCG/ACT Aero Soln inhalation aerosol, Inhale 2 Puffs by mouth every 6 hours as needed for Shortness of Breath., Disp: 3 Inhaler, Rfl: 0  •  Misc. Devices Misc, Incontinence supplies for incontinence to be " "changed once to twice daily, Disp: 60 Device, Rfl: 11  •  Misc. Devices Misc, Back brace to use as directed, Disp: 1 Device, Rfl: 0  •  Multiple Vitamin (DAILY-BRANDON) Tab, Take 1 Tab by mouth every day., Disp: , Rfl:   •  polyethylene glycol 3350 (MIRALAX) 17 GM/SCOOP Powder, Take 17 g by mouth 3 times a day as needed (bowel care)., Disp: , Rfl:   •  meclizine (ANTIVERT) 12.5 MG Tab, , Disp: , Rfl:   •  carbidopa-levodopa (SINEMET)  MG Tab, Take 2 Tablets by mouth every day. TAKE ONE TABLET BY MOUTH DAILY, Disp: 90 Tablet, Rfl: 1  •  calcitRIOL (ROCALTROL) 0.25 MCG Cap, Take 1 Capsule by mouth 2 times a day., Disp: 180 Capsule, Rfl: 3  •  calcitRIOL (ROCALTROL) 0.25 MCG Cap, TAKE ONE CAPSULE BY MOUTH DAILY, Disp: 15 Cap, Rfl: 3  •  Misc. Devices Misc, TENS unit pads to be used as directed, Disp: 10 Device, Rfl: 1    PHYSICAL EXAM:  Vitals:    05/17/22 1119   BP: (!) 134/95   Pulse: 80   Resp: 16   Temp: 36.7 °C (98 °F)   TempSrc: Temporal   SpO2: 92%   Weight: 74.4 kg (164 lb)   Height: 1.575 m (5' 2\")       General: Pt resting in NAD, cooperative   Skin:  Pink, warm and dry.  HEENT: NC/AT. EOMI.  Lungs:  Symmetrical.  CTAB, good air movement   Cardiovascular:  S1/S2 RRR   Extremities:  Full range of motion.  CNS:  Muscle tone is normal. Wheelchair bound     ASSESSMENT/PLAN:   66 y.o. female     Problem List Items Addressed This Visit     HTN (hypertension)     - Patient w/ history of HTN  - BP today 134/95  - Patient taking 20mg lisinopril daily   - Recommend she take BP at home     - Check BP at home  - Continue lisinopril            Debility     - Hx of fall in 2021   - Hx of back surgery 2006 and been in wheelchair since then   - Patient requesting new electric wheelchair    - DME order for electric wheelchair given  - Request patient f/u in 1 month to see if she got her wheelchair and asked her to bring in all her home meds to review her med list fully and check her BP           Relevant Orders    DME " Wheelchair    Bilateral headaches     12/2021  - Patient w/ recent fall off her electric wheelchair in October. She had a LOC after the event  - Patient received Pfizer booster in November and notes that 3 days later started having headaches daily which hasve lasted since then.  - She has been taking Motgrin, Tylenol, aspirin,   - Headache temporal, bandlike, squeezing and going all day long. 8/10 pain and wakes her up at night   Sometimes wakes up with pain    Comes and goes but always present    Light and sound sensitivity    MRI brain w/o abnormalities    Tried amitrypline at night w/o benefit    05/2022  - Patient prescribed effexor last visit but not taking it  - currently headaches well controlled  - not using effexor and not using sumatriptan   - not interested in new medicine at this time       - Continue trazodone for sleep  - Not using sumatriptan or effexor for headaches  - f/u PRN for headaches                 Richard Mcintosh, DO  PGY-3  [facility]

## 2022-05-18 DIAGNOSIS — T78.40XD ALLERGIC REACTION, SUBSEQUENT ENCOUNTER: ICD-10-CM

## 2022-05-24 RX ORDER — EPINEPHRINE 0.3 MG/.3ML
INJECTION SUBCUTANEOUS
Qty: 2 EACH | Refills: 1 | Status: SHIPPED | OUTPATIENT
Start: 2022-05-24 | End: 2023-04-18 | Stop reason: SDUPTHER

## 2022-05-31 ENCOUNTER — HOSPITAL ENCOUNTER (OUTPATIENT)
Dept: LAB | Facility: MEDICAL CENTER | Age: 67
End: 2022-05-31
Attending: INTERNAL MEDICINE
Payer: COMMERCIAL

## 2022-05-31 LAB
ALBUMIN SERPL BCP-MCNC: 4.2 G/DL (ref 3.2–4.9)
BUN SERPL-MCNC: 6 MG/DL (ref 8–22)
CALCIUM SERPL-MCNC: 8.8 MG/DL (ref 8.5–10.5)
CHLORIDE SERPL-SCNC: 101 MMOL/L (ref 96–112)
CO2 SERPL-SCNC: 27 MMOL/L (ref 20–33)
CREAT SERPL-MCNC: 1.03 MG/DL (ref 0.5–1.4)
GFR SERPLBLD CREATININE-BSD FMLA CKD-EPI: 60 ML/MIN/1.73 M 2
GLUCOSE SERPL-MCNC: 104 MG/DL (ref 65–99)
PHOSPHATE SERPL-MCNC: 3.5 MG/DL (ref 2.5–4.5)
POTASSIUM SERPL-SCNC: 4.2 MMOL/L (ref 3.6–5.5)
SODIUM SERPL-SCNC: 140 MMOL/L (ref 135–145)
T4 FREE SERPL-MCNC: 1.03 NG/DL (ref 0.93–1.7)
TSH SERPL DL<=0.005 MIU/L-ACNC: 1.99 UIU/ML (ref 0.38–5.33)

## 2022-05-31 PROCEDURE — 80069 RENAL FUNCTION PANEL: CPT

## 2022-05-31 PROCEDURE — 84443 ASSAY THYROID STIM HORMONE: CPT

## 2022-05-31 PROCEDURE — 84439 ASSAY OF FREE THYROXINE: CPT

## 2022-05-31 PROCEDURE — 36415 COLL VENOUS BLD VENIPUNCTURE: CPT

## 2022-06-01 ENCOUNTER — HOSPITAL ENCOUNTER (OUTPATIENT)
Facility: MEDICAL CENTER | Age: 67
End: 2022-06-01
Attending: INTERNAL MEDICINE
Payer: COMMERCIAL

## 2022-06-01 PROCEDURE — 82340 ASSAY OF CALCIUM IN URINE: CPT

## 2022-06-03 LAB
CALCIUM 24H UR-MCNC: 4.7 MG/DL
CALCIUM 24H UR-MRATE: 183 MG/D (ref 100–250)
CALCIUM/CREAT 24H UR: 134 MG/G (ref 20–300)
COLLECT DURATION TIME SPEC: 24 HRS
CREAT 24H UR-MCNC: 35 MG/DL
CREAT 24H UR-MRATE: 1365 MG/D (ref 500–1400)
SPECIMEN VOL ?TM UR: 3900 ML

## 2022-06-17 ENCOUNTER — APPOINTMENT (OUTPATIENT)
Dept: MEDICAL GROUP | Facility: CLINIC | Age: 67
End: 2022-06-17
Payer: COMMERCIAL

## 2022-06-23 ENCOUNTER — HOSPITAL ENCOUNTER (OUTPATIENT)
Dept: LAB | Facility: MEDICAL CENTER | Age: 67
End: 2022-06-23
Attending: INTERNAL MEDICINE
Payer: COMMERCIAL

## 2022-06-23 LAB
ALBUMIN SERPL BCP-MCNC: 4.4 G/DL (ref 3.2–4.9)
CALCIUM SERPL-MCNC: 8.1 MG/DL (ref 8.5–10.5)

## 2022-06-23 PROCEDURE — 82310 ASSAY OF CALCIUM: CPT

## 2022-06-23 PROCEDURE — 82040 ASSAY OF SERUM ALBUMIN: CPT

## 2022-06-23 PROCEDURE — 36415 COLL VENOUS BLD VENIPUNCTURE: CPT

## 2022-07-06 ENCOUNTER — HOSPITAL ENCOUNTER (OUTPATIENT)
Dept: LAB | Facility: MEDICAL CENTER | Age: 67
End: 2022-07-06
Attending: INTERNAL MEDICINE
Payer: COMMERCIAL

## 2022-07-06 LAB
ALBUMIN SERPL BCP-MCNC: 4.1 G/DL (ref 3.2–4.9)
CALCIUM SERPL-MCNC: 7.9 MG/DL (ref 8.5–10.5)

## 2022-07-06 PROCEDURE — 36415 COLL VENOUS BLD VENIPUNCTURE: CPT

## 2022-07-06 PROCEDURE — 82040 ASSAY OF SERUM ALBUMIN: CPT

## 2022-07-06 PROCEDURE — 82310 ASSAY OF CALCIUM: CPT

## 2022-07-18 ENCOUNTER — HOSPITAL ENCOUNTER (OUTPATIENT)
Dept: LAB | Facility: MEDICAL CENTER | Age: 67
End: 2022-07-18
Attending: INTERNAL MEDICINE
Payer: COMMERCIAL

## 2022-07-18 LAB
ALBUMIN SERPL BCP-MCNC: 4.2 G/DL (ref 3.2–4.9)
CALCIUM SERPL-MCNC: 8.9 MG/DL (ref 8.5–10.5)

## 2022-07-18 PROCEDURE — 82040 ASSAY OF SERUM ALBUMIN: CPT

## 2022-07-18 PROCEDURE — 82310 ASSAY OF CALCIUM: CPT

## 2022-07-18 PROCEDURE — 36415 COLL VENOUS BLD VENIPUNCTURE: CPT

## 2022-07-20 ENCOUNTER — TELEPHONE (OUTPATIENT)
Dept: MEDICAL GROUP | Facility: CLINIC | Age: 67
End: 2022-07-20
Payer: COMMERCIAL

## 2022-07-20 NOTE — TELEPHONE ENCOUNTER
Caller Name: Marium Renteria  Call Back Number: [phone] (home)     How would the patient prefer to be contacted with a response: Phone call OK to leave a detailed message    2. SPECIFIC Action To Be Taken: Pt called and would like a referral to derm for her rash, she states she has had this before but needs a new referral. Her insurance has given her where they would cover. The office is [facility] ph. [phone]. thank you    3. Diagnosis/Clinical Reason for Request: Unknown     4. Specialty & Provider Name/Lab/Imaging Location: [facility] dermatology     5. Is appointment scheduled for requested order/referral: no    Patient was informed they will receive a return phone call from the office ONLY if there are any questions before processing their request. Advised to call back if they haven't received a call from the referral department in 5 days.

## 2022-07-21 ENCOUNTER — OFFICE VISIT (OUTPATIENT)
Dept: MEDICAL GROUP | Facility: CLINIC | Age: 67
End: 2022-07-21
Payer: COMMERCIAL

## 2022-07-21 VITALS
SYSTOLIC BLOOD PRESSURE: 147 MMHG | HEIGHT: 62 IN | OXYGEN SATURATION: 94 % | WEIGHT: 160 LBS | BODY MASS INDEX: 29.44 KG/M2 | DIASTOLIC BLOOD PRESSURE: 90 MMHG | HEART RATE: 73 BPM

## 2022-07-21 DIAGNOSIS — M54.16 LEFT LUMBAR RADICULOPATHY: ICD-10-CM

## 2022-07-21 DIAGNOSIS — R21 RASH: ICD-10-CM

## 2022-07-21 DIAGNOSIS — Z51.81 THERAPEUTIC DRUG MONITORING: ICD-10-CM

## 2022-07-21 DIAGNOSIS — I10 PRIMARY HYPERTENSION: ICD-10-CM

## 2022-07-21 DIAGNOSIS — G47.00 INSOMNIA, UNSPECIFIED TYPE: ICD-10-CM

## 2022-07-21 PROCEDURE — 99214 OFFICE O/P EST MOD 30 MIN: CPT | Mod: GC

## 2022-07-21 RX ORDER — TRAZODONE HYDROCHLORIDE 50 MG/1
TABLET ORAL
Qty: 180 TABLET | Refills: 1 | Status: SHIPPED | OUTPATIENT
Start: 2022-07-21 | End: 2022-11-07 | Stop reason: SDUPTHER

## 2022-07-21 RX ORDER — GABAPENTIN 300 MG/1
CAPSULE ORAL
COMMUNITY
End: 2022-10-14

## 2022-07-21 RX ORDER — LISINOPRIL 40 MG/1
40 TABLET ORAL DAILY
Qty: 90 TABLET | Refills: 2 | Status: SHIPPED | OUTPATIENT
Start: 2022-07-21 | End: 2023-04-18 | Stop reason: SDUPTHER

## 2022-07-21 ASSESSMENT — FIBROSIS 4 INDEX: FIB4 SCORE: 0.99

## 2022-07-21 NOTE — ASSESSMENT & PLAN NOTE
Patient BP today, 7/21/2022 was 147/90  Patient admits to previous reading of 180/100, out of office  Increase lisinopril to 40 mg daily (from 20 mg)  Ordered BMP, with instructions to acquire in 6 weeks to assess kidney function.

## 2022-07-21 NOTE — ASSESSMENT & PLAN NOTE
Declined patient's request to refill Soma  Patient to continue methocarbamol and gabapentin  Patient to be seen by Dr. Lynch, PM&R- we will follow treatment plan

## 2022-07-21 NOTE — PROGRESS NOTES
Subjective:     CC: Rash on arms    HPI:   Rash on left arm:  Marium presents today with chief complaint of a new rash on her left arm, ongoing for the last week and a half, is enlarging and is now appeared in a small area on her right lateral elbow.  Patient is on no blood thinners.  The rash does not hurt or itch but does sting a bit.  Nothing including sun, lotion or soap seems to make it better or worse.  The only new change she has made that could be related is starting a supplement called dermal repair for collagen strengthening and vitamin supplementation.  Patient is requesting referral to dermatology.  She has previously been referred to dermatology by [place] but states there were no openings.    Left-sided sciatica pain and leg spasms:  Patient is in electric wheelchair secondary to a fall and 3 back surgeries 23 years ago with resulting significant left-sided weakness and accompanying leg spasms and pain.  Patient states she used to be on Soma but a previous doctor took her off this and started her on gabapentin, Robaxin was later added to her regimen.  Patient states Soma worked the best for her and she finds no relief from the gabapentin or Robaxin.  Patient to be seen by Dr. Suarez's, PM&R, for treatment.  Patient is also on carbidopa-levodopa for restless legs, which helps.    Insomnia and anxiety:  Patient states trazodone works well for this and she is out of this medication and requesting refill.  Patient used to take Ativan and would like more but understands that she will likely not be able to be prescribed medication.    Hypertension:  Patient's blood pressure today was 147/90, patient takes her blood pressure at the pharmacy and states recently it was 180/100.  Patient has been compliant with her lisinopril 20 mg.  Patient is agreeable to increasing the dose of this medication.    History of thyroidectomy and parathyroidectomy secondary to Hashimoto's disease:  Patient is currently being  followed by Dr. Juarez, endocrinology, for calcium levels and medication management.    Social history:  Smoking: Tobacco smoker for the last 52 years currently 1 pack a day, previously 3 packs a day.  Multiple attempts to quit, failed.  Patient lives at home with .    Problem   Therapeutic Drug Monitoring   Rash   Left Lumbar Radiculopathy   Insomnia    Followed by psychiatry. Managed with trazodone  mg.     Htn (Hypertension)    - Patient w/ history of HTN  - BP today 134/95  - Patient taking 20mg lisinopril daily   - Recommend she take BP at home          Current Outpatient Medications Ordered in Epic   Medication Sig Dispense Refill   • Cholecalciferol (VITAMIN D-3) 125 MCG (5000 UT) Tab as directed     • gabapentin (NEURONTIN) 300 MG Cap 2     • lisinopril (PRINIVIL) 40 MG tablet Take 1 Tablet by mouth every day. 90 Tablet 2   • traZODone (DESYREL) 50 MG Tab Take 2 tabs at night for sleep 180 Tablet 1   • methocarbamol (ROBAXIN) 750 MG Tab TAKE ONE TABLET BY MOUTH THREE TIMES A DAY 90 Tablet 0   • carbidopa-levodopa (SINEMET)  MG Tab Take 1 Tablet by mouth every day. TAKE ONE TABLET BY MOUTH DAILY 90 Tablet 1   • EPINEPHrine (EPIPEN) 0.3 MG/0.3ML Solution Auto-injector solution for injection INJECT INTO THE MIDDLE OF THE OUTER THIGH AND HOLD FOR 3 SECONDS AS NEEDED FOR SEVERE ALLERGIC REACTION THEN CALL 911 IF USED. (ONLY FOR BEE STING) 2 Each 1   • SYNTHROID 75 MCG Tab      • meclizine (ANTIVERT) 12.5 MG Tab      • gabapentin (NEURONTIN) 300 MG Cap Take 2 Capsules by mouth 3 times a day. 180 Capsule 1   • allopurinol (ZYLOPRIM) 300 MG Tab TAKE ONE TABLET BY MOUTH DAILY 90 Tablet 1   • calcitRIOL (ROCALTROL) 0.25 MCG Cap Take 1 Capsule by mouth 2 times a day. 180 Capsule 3   • calcitRIOL (ROCALTROL) 0.25 MCG Cap TAKE ONE CAPSULE BY MOUTH DAILY 15 Cap 3   • cholestyramine (QUESTRAN) 4 g packet MIX 1 PACKET WITH LIQUID AND DRINK ONCE DAILY 30 Each 1   • potassium chloride SA (K-DUR) 10 MEQ Tab  "CR TAKE ONE TABLET BY MOUTH DAILY 15 Tab 0   • albuterol (PROVENTIL HFA) 108 (90 Base) MCG/ACT Aero Soln inhalation aerosol Inhale 2 Puffs by mouth every 6 hours as needed for Shortness of Breath. 3 Inhaler 0   • Misc. Devices Misc Back brace to use as directed 1 Device 0   • Multiple Vitamin (DAILY-BRANDON) Tab Take 1 Tab by mouth every day.     • polyethylene glycol 3350 (MIRALAX) 17 GM/SCOOP Powder Take 17 g by mouth 3 times a day as needed (bowel care).     • Misc. Devices Misc As needed 1 Application 0   • Misc. Devices Misc As needed 1 Application 0   • Misc. Devices Misc As directed 90 Application 03   • Misc. Devices Misc As directed 1 Application 0   • Misc. Devices Misc TENS unit pads to be used as directed 10 Device 1   • Misc. Devices Misc Incontinence supplies for incontinence to be changed once to twice daily 60 Device 11     No current Epic-ordered facility-administered medications on file.       Past Medical History:   Diagnosis Date   • Alcohol abuse, continuous drinking behavior 12/11/2009   • Anesthesia 4-26-17    \"Hard to put to sleep\"   • Anxiety    • Arthritis 4-26-17    \"Hands\"   • Chronic diarrhea 9/30/2013   • Dental disorder 4-26-17    \"Upper permanent right side bridge\"   • Gout    • Gynecological disorder     \"HX Gonorrhea, was treated\"   • Helicobacter pylori (H. pylori) infection 9/11/2012   • Hemorrhagic disorder (HCC)     \"needed a lot of blood with back surgery\"   • Hyperlipidemia 6/5/2015   • Hypoparathyroidism (HCC) 12/11/2009   • Lumbago 2017    back and neck   • Mixed hyperlipidemia 12/11/2009   • Osteoporosis, unspecified 12/11/2009   • Other B-complex deficiencies 12/11/2009   • Parotid cyst    • Postsurgical hypothyroidism 12/11/2009   • Psychiatric problem 4-26-17    depression, anxiety   • Restless leg syndrome    • S/P tooth extraction 4-25-17    Pt. states Dr. Sexton is aware.   • Serum calcium elevated 10/8/2012   • Sialolithiasis, ductal 7/21/2012   • Snoring    • " Symptomatic menopausal or female climacteric states 12/11/2009   • Tobacco abuse 12/11/2009   • Unspecified essential hypertension 12/11/2009   • Urinary incontinence     urgency        Past Surgical History:   Procedure Laterality Date   • VENTRAL HERNIA REPAIR  10/4/2018    Procedure: ABDOMINAL WOUND EXPLORATION AND SEROMA DRAINAGE;  Surgeon: Houston Amanda D.O.;  Location: SURGERY [facility];  Service: General   • EXPLORATORY LAPAROTOMY  9/22/2018    Procedure: EXPLORATORY LAPAROTOMY;  Surgeon: Houston Amanda D.O.;  Location: SURGERY [facility];  Service: General   • LYSIS ADHESIONS GENERAL  9/22/2018    Procedure: LYSIS ADHESIONS GENERAL;  Surgeon: Houston Amanda D.O.;  Location: SURGERY [facility];  Service: General   • BOWEL RESECTION  9/22/2018    Procedure: BOWEL RESECTION- POSSIBLE;  Surgeon: Houston Amanda D.O.;  Location: SURGERY [facility];  Service: General   • FUSION, SPINE, LUMBAR, PLIF  8/7/2017    Procedure: LUMBAR FUSION POSTERIOR EXPLORATION;  Surgeon: Garrett Sexton M.D.;  Location: SURGERY [facility];  Service:    • HARDWARE REMOVAL NEURO  8/7/2017    Procedure: HARDWARE REMOVAL NEURO DEEP L1-L3, L4-S1, SEROMA REMOVAL;  Surgeon: Garrett Sexton M.D.;  Location: SURGERY [facility];  Service:    • FUSION, SPINE, LUMBAR, PLIF  12/17/2015    Procedure: LUMBAR FUSION POSTERIOR L1-S1;  Surgeon: Garrett Sexton M.D.;  Location: SURGERY [facility];  Service:    • LUMBAR LAMINECTOMY DISKECTOMY  12/17/2015    Procedure: LUMBAR LAMINECTOMY DISKECTOMY;  Surgeon: Garrett Sexton M.D.;  Location: SURGERY [facility];  Service:    • LUMBAR DECOMPRESSION  12/17/2015    Procedure: LUMBAR DECOMPRESSION L1-L3;  Surgeon: Garrett Sexton M.D.;  Location: SURGERY [facility];  Service:    • COLONOSCOPY WITH POLYP  8/1/2012    Performed by ARIA EMERSON at [facility]   • GASTROSCOPY WITH BIOPSY  8/1/2012    Performed by ARIA EMERSON at [facility]  "ORS   • THYROIDECTOMY  2001   • PARATHYROIDECTOMY  2001   • COLON RESECTION  1985    7 feet of intestine   • APPENDECTOMY     • DENTAL SURGERY     • HYSTERECTOMY, VAGINAL      partial, benign   • PRIMARY C SECTION     • SALPINGO-OOPHORECTOMY, UNILATERAL          Family History   Problem Relation Age of Onset   • Genetic Disorder Mother         Alzheimer's   • Alcohol/Drug Father           ROS:  Gen: no fevers/chills, no weight loss  Pulm: no sob, no cough  CV: no chest pain, no palpitations  GI: no nausea/vomiting, no diarrhea, \"All my insides are great\"   MSK: LEFT SIDED SCIATIC PAIN AND CALF CRAMPS.    Skin: no rash  Neuro: LEFT SIDED WEAKNESS (CHRONIC)       Objective:     Exam:  BP (!) 147/90 (BP Location: Left arm, Patient Position: Sitting, BP Cuff Size: Adult)   Pulse 73   Ht 1.575 m (5' 2\")   Wt 72.6 kg (160 lb)   LMP 10/02/1980   SpO2 94%   BMI 29.26 kg/m²  Body mass index is 29.26 kg/m².    Gen: SITTING IN ELECTRIC WHEELCHAIR, WEARING BACK BRACE.  Alert and oriented, No apparent distress.  Head:  NCAT, EOMI, sclera clear without discharge  Neck: Neck is supple without lymphadenopathy.  FAINT THYROIDECTOMY SCAR.    Lungs: Normal effort, CTA bilaterally, no wheezes, rhonchi, or rales  CV:    Regular rate and rhythm. No murmurs, rubs, or gallops.  Abd:   Non-distended, soft.  MULTIPLE SURGICAL SCARS.    Ext: No clubbing, cyanosis, edema.  MSK: Unassisted gait  Derm: BLOTCHY, NON-BLANCHING DARK RED LESIONS ON POSTERIOR SURFACE OF LEFT FOREARM THAT APPEAR TO BE BELOW THE EPIDERMIS.  SMALLER SIMILAR APPEARING LESIONS ON PROXIMAL LATERAL RIGHT FOREARM.  No other lesions on exposed skin  Neuro: 4/5 STRENGTH ON LEFT UE AND LE.  5/5 STRENGTH ON RIGHT UE AND LE.    Psych: normal mood and affect        Assessment & Plan:     66 y.o. female with the following -     Problem List Items Addressed This Visit     HTN (hypertension)     Patient BP today, 7/21/2022 was 147/90  Patient admits to previous reading of " 180/100, out of office  Increase lisinopril to 40 mg daily (from 20 mg)  Ordered BMP, with instructions to acquire in 6 weeks to assess kidney function.           Relevant Medications    lisinopril (PRINIVIL) 40 MG tablet    Insomnia     Refill trazodone two 50 mg tabs per night           Left lumbar radiculopathy     Declined patient's request to refill Soma  Patient to continue methocarbamol and gabapentin  Patient to be seen by Dr. Lynch, PM&R- we will follow treatment plan           Relevant Medications    gabapentin (NEURONTIN) 300 MG Cap    traZODone (DESYREL) 50 MG Tab    Therapeutic drug monitoring    Relevant Orders    Basic Metabolic Panel    Rash     Blotchy, nonblanching dark red lesions on posterior surface of left forearm that appear to be below the epidermis  Smaller similar looking lesions on proximal lateral right forearm.  Patient denies being on any blood thinners  Patient referred to dermatology  CBC to assess platelet count           Relevant Orders    CBC WITH DIFFERENTIAL    Referral to Dermatology          Follow-up: 1-3 months.

## 2022-07-21 NOTE — ASSESSMENT & PLAN NOTE
Blotchy, nonblanching dark red lesions on posterior surface of left forearm that appear to be below the epidermis  Smaller similar looking lesions on proximal lateral right forearm.  Patient denies being on any blood thinners  Patient referred to dermatology  CBC to assess platelet count

## 2022-07-22 ENCOUNTER — HOSPITAL ENCOUNTER (OUTPATIENT)
Dept: LAB | Facility: MEDICAL CENTER | Age: 67
End: 2022-07-22
Payer: COMMERCIAL

## 2022-07-22 DIAGNOSIS — R21 RASH: ICD-10-CM

## 2022-07-22 LAB
BASOPHILS # BLD AUTO: 0.5 % (ref 0–1.8)
BASOPHILS # BLD: 0.03 K/UL (ref 0–0.12)
EOSINOPHIL # BLD AUTO: 0 K/UL (ref 0–0.51)
EOSINOPHIL NFR BLD: 0 % (ref 0–6.9)
ERYTHROCYTE [DISTWIDTH] IN BLOOD BY AUTOMATED COUNT: 53.5 FL (ref 35.9–50)
HCT VFR BLD AUTO: 43.1 % (ref 37–47)
HGB BLD-MCNC: 14.9 G/DL (ref 12–16)
IMM GRANULOCYTES # BLD AUTO: 0.06 K/UL (ref 0–0.11)
IMM GRANULOCYTES NFR BLD AUTO: 0.9 % (ref 0–0.9)
LYMPHOCYTES # BLD AUTO: 1.87 K/UL (ref 1–4.8)
LYMPHOCYTES NFR BLD: 28.7 % (ref 22–41)
MCH RBC QN AUTO: 36.1 PG (ref 27–33)
MCHC RBC AUTO-ENTMCNC: 34.6 G/DL (ref 33.6–35)
MCV RBC AUTO: 104.4 FL (ref 81.4–97.8)
MONOCYTES # BLD AUTO: 0.47 K/UL (ref 0–0.85)
MONOCYTES NFR BLD AUTO: 7.2 % (ref 0–13.4)
NEUTROPHILS # BLD AUTO: 4.09 K/UL (ref 2–7.15)
NEUTROPHILS NFR BLD: 62.7 % (ref 44–72)
NRBC # BLD AUTO: 0 K/UL
NRBC BLD-RTO: 0 /100 WBC
PLATELET # BLD AUTO: 151 K/UL (ref 164–446)
PMV BLD AUTO: 10.7 FL (ref 9–12.9)
RBC # BLD AUTO: 4.13 M/UL (ref 4.2–5.4)
WBC # BLD AUTO: 6.5 K/UL (ref 4.8–10.8)

## 2022-07-22 PROCEDURE — 85025 COMPLETE CBC W/AUTO DIFF WBC: CPT

## 2022-07-22 PROCEDURE — 36415 COLL VENOUS BLD VENIPUNCTURE: CPT

## 2022-08-03 ENCOUNTER — HOSPITAL ENCOUNTER (OUTPATIENT)
Dept: LAB | Facility: MEDICAL CENTER | Age: 67
End: 2022-08-03
Attending: INTERNAL MEDICINE
Payer: COMMERCIAL

## 2022-08-03 LAB
ALBUMIN SERPL BCP-MCNC: 4.3 G/DL (ref 3.2–4.9)
BUN SERPL-MCNC: 10 MG/DL (ref 8–22)
CALCIUM SERPL-MCNC: 8.7 MG/DL (ref 8.5–10.5)
CHLORIDE SERPL-SCNC: 106 MMOL/L (ref 96–112)
CO2 SERPL-SCNC: 23 MMOL/L (ref 20–33)
CREAT SERPL-MCNC: 0.9 MG/DL (ref 0.5–1.4)
GFR SERPLBLD CREATININE-BSD FMLA CKD-EPI: 71 ML/MIN/1.73 M 2
GLUCOSE SERPL-MCNC: 98 MG/DL (ref 65–99)
PHOSPHATE SERPL-MCNC: 3.4 MG/DL (ref 2.5–4.5)
POTASSIUM SERPL-SCNC: 3.8 MMOL/L (ref 3.6–5.5)
SODIUM SERPL-SCNC: 142 MMOL/L (ref 135–145)
TSH SERPL DL<=0.005 MIU/L-ACNC: 3.5 UIU/ML (ref 0.38–5.33)

## 2022-08-03 PROCEDURE — 80069 RENAL FUNCTION PANEL: CPT

## 2022-08-03 PROCEDURE — 84443 ASSAY THYROID STIM HORMONE: CPT

## 2022-08-03 PROCEDURE — 36415 COLL VENOUS BLD VENIPUNCTURE: CPT

## 2022-08-05 DIAGNOSIS — G62.9 NEUROPATHY: ICD-10-CM

## 2022-08-17 ENCOUNTER — TELEPHONE (OUTPATIENT)
Dept: INTERNAL MEDICINE | Facility: OTHER | Age: 67
End: 2022-08-17
Payer: COMMERCIAL

## 2022-08-17 NOTE — TELEPHONE ENCOUNTER
1. Caller Name: Pt                      Call Back Number: [phone] (home)     2. Message: Patient lvm on [facility] voicemails stating someone by the name of yana called her and told her she needs to get a referral to a cardiologist due to her legs swelling. She states the referral needs to be placed as a STAT referral. She states she has called multiple times and can never get a hold of the office she has tried by Dr. Solares, Dr Akhil Pemberton, she would like a call back    3. Patient approves office to leave a detailed voicemail/MyChart message: N\A

## 2022-08-18 DIAGNOSIS — M79.89 LEG SWELLING: ICD-10-CM

## 2022-08-18 NOTE — PROGRESS NOTES
Received a message that pt's legs were swelling and requesting a cardiology referral.  Leg swelling was not noted or addressed at our last visit.    Supplied pt with cardiology referral  Pt should follow up in clinic if swelling has persisted.

## 2022-08-22 ENCOUNTER — HOSPITAL ENCOUNTER (OUTPATIENT)
Dept: LAB | Facility: MEDICAL CENTER | Age: 67
End: 2022-08-22
Attending: INTERNAL MEDICINE
Payer: COMMERCIAL

## 2022-08-22 DIAGNOSIS — Z51.81 THERAPEUTIC DRUG MONITORING: ICD-10-CM

## 2022-08-22 LAB
ALBUMIN SERPL BCP-MCNC: 4.2 G/DL (ref 3.2–4.9)
ANION GAP SERPL CALC-SCNC: 13 MMOL/L (ref 7–16)
BUN SERPL-MCNC: 5 MG/DL (ref 8–22)
CALCIUM SERPL-MCNC: 8.6 MG/DL (ref 8.5–10.5)
CALCIUM SERPL-MCNC: 8.6 MG/DL (ref 8.5–10.5)
CHLORIDE SERPL-SCNC: 104 MMOL/L (ref 96–112)
CO2 SERPL-SCNC: 23 MMOL/L (ref 20–33)
CREAT SERPL-MCNC: 0.67 MG/DL (ref 0.5–1.4)
GFR SERPLBLD CREATININE-BSD FMLA CKD-EPI: 96 ML/MIN/1.73 M 2
GLUCOSE SERPL-MCNC: 100 MG/DL (ref 65–99)
POTASSIUM SERPL-SCNC: 3.8 MMOL/L (ref 3.6–5.5)
SODIUM SERPL-SCNC: 140 MMOL/L (ref 135–145)

## 2022-08-22 PROCEDURE — 80048 BASIC METABOLIC PNL TOTAL CA: CPT

## 2022-08-22 PROCEDURE — 82040 ASSAY OF SERUM ALBUMIN: CPT

## 2022-08-22 PROCEDURE — 82310 ASSAY OF CALCIUM: CPT

## 2022-08-22 PROCEDURE — 36415 COLL VENOUS BLD VENIPUNCTURE: CPT

## 2022-08-23 RX ORDER — GABAPENTIN 300 MG/1
600 CAPSULE ORAL 3 TIMES DAILY
Qty: 180 CAPSULE | Refills: 1 | Status: SHIPPED | OUTPATIENT
Start: 2022-08-23 | End: 2022-11-07 | Stop reason: SDUPTHER

## 2022-08-24 ENCOUNTER — TELEPHONE (OUTPATIENT)
Dept: MEDICAL GROUP | Facility: CLINIC | Age: 67
End: 2022-08-24
Payer: COMMERCIAL

## 2022-08-25 ENCOUNTER — TELEPHONE (OUTPATIENT)
Dept: INTERNAL MEDICINE | Facility: OTHER | Age: 67
End: 2022-08-25
Payer: COMMERCIAL

## 2022-08-25 NOTE — TELEPHONE ENCOUNTER
Please review referral and let me know if we can schedule pt, big red blotches on nose, hx of skin cancer, concerned about right side of nose.

## 2022-08-25 NOTE — TELEPHONE ENCOUNTER
Per Dr Claudio's request, I spoke with Marium on the phone this evening. She states that she has been having cramping in her calves that is so bad that it keep her up at night.    I explained to her that Dr Claudio had put in an order for a STAT referral to cardiology. The patient is unable to use MyChart, so I asked her to make sure that she answers her phone in the next couple of days as [facility] will be contacting her once thay have found a provider for her referral. I provided the patient with the [phone] number to call if she has not heard from Referrals in 2 days.    I instructed the patient to go to the ER if her symptoms worsen before her referral is arranged.

## 2022-08-31 ENCOUNTER — APPOINTMENT (OUTPATIENT)
Dept: RADIOLOGY | Facility: IMAGING CENTER | Age: 67
End: 2022-08-31
Payer: COMMERCIAL

## 2022-08-31 ENCOUNTER — OFFICE VISIT (OUTPATIENT)
Dept: URGENT CARE | Facility: CLINIC | Age: 67
End: 2022-08-31
Payer: COMMERCIAL

## 2022-08-31 VITALS
BODY MASS INDEX: 31.72 KG/M2 | TEMPERATURE: 98.3 F | SYSTOLIC BLOOD PRESSURE: 106 MMHG | WEIGHT: 168 LBS | DIASTOLIC BLOOD PRESSURE: 74 MMHG | HEIGHT: 61 IN | HEART RATE: 83 BPM | OXYGEN SATURATION: 99 % | RESPIRATION RATE: 16 BRPM

## 2022-08-31 DIAGNOSIS — S99.912A ANKLE INJURIES, LEFT, INITIAL ENCOUNTER: ICD-10-CM
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
## 2022-08-31 DIAGNOSIS — S82.62XA CLOSED DISPLACED FRACTURE OF LATERAL MALLEOLUS OF LEFT FIBULA, INITIAL ENCOUNTER: ICD-10-CM

## 2022-08-31 PROCEDURE — 99214 OFFICE O/P EST MOD 30 MIN: CPT

## 2022-08-31 PROCEDURE — 73610 X-RAY EXAM OF ANKLE: CPT | Mod: TC,LT

## 2022-08-31 RX ORDER — MELOXICAM 7.5 MG/1
7.5 TABLET ORAL DAILY
Qty: 30 TABLET | Status: CANCELLED | OUTPATIENT
Start: 2022-08-31

## 2022-08-31 RX ORDER — METHOCARBAMOL 750 MG/1
750 TABLET, FILM COATED ORAL 3 TIMES DAILY
Qty: 20 TABLET | Refills: 0 | Status: SHIPPED | OUTPATIENT
Start: 2022-08-31 | End: 2022-09-06 | Stop reason: SDUPTHER

## 2022-08-31 ASSESSMENT — ENCOUNTER SYMPTOMS
TINGLING: 0
LOSS OF MOTION: 1
LOSS OF SENSATION: 0
NUMBNESS: 0
INABILITY TO BEAR WEIGHT: 0

## 2022-08-31 ASSESSMENT — FIBROSIS 4 INDEX: FIB4 SCORE: 1.15

## 2022-08-31 NOTE — PROGRESS NOTES
Subjective     Marium Renteria is a 66 y.o. female who presents with Ankle Injury ((L) Ankle injury last night. Left leg is swelling, hurts to put pressure on it. Ankle pain does not radiate to hip.)            Ankle Injury   The incident occurred 12 to 24 hours ago. The incident occurred at home. The injury mechanism was a twisting injury and a fall. The pain is present in the left ankle. The quality of the pain is described as stabbing. The pain is at a severity of 10/10. The pain has been Constant since onset. Associated symptoms include a loss of motion (decreased ROM). Pertinent negatives include no inability to bear weight, loss of sensation, numbness or tingling. She reports no foreign bodies present. The symptoms are aggravated by movement, palpation and weight bearing. She has tried NSAIDs, ice and elevation for the symptoms. The treatment provided mild relief.     Patient reports that she went to the bathroom, fell asleep on the toilet seat.  When she tried to get up, her left leg felt weak and so she ended up falling and in the process twisting her left ankle.  Patient reports pain and decreased range of motion.  She took some Aleve, ice the area and kept it elevated.  She reports minimal relief from this modalities.    Patient's current problem list, medications, and past medical/surgical history were reviewed in Epic.    PMH:  has a past medical history of Alcohol abuse, continuous drinking behavior (12/11/2009), Anesthesia (4-26-17), Anxiety, Arthritis (4-26-17), Chronic diarrhea (9/30/2013), Dental disorder (4-26-17), Gout, Gynecological disorder, Helicobacter pylori (H. pylori) infection (9/11/2012), Hemorrhagic disorder (HCC), Hyperlipidemia (6/5/2015), Hypoparathyroidism (HCC) (12/11/2009), Lumbago (2017), Mixed hyperlipidemia (12/11/2009), Osteoporosis, unspecified (12/11/2009), Other B-complex deficiencies (12/11/2009), Parotid cyst, Postsurgical hypothyroidism (12/11/2009), Psychiatric  problem (4-26-17), Restless leg syndrome, S/P tooth extraction (4-25-17), Serum calcium elevated (10/8/2012), Sialolithiasis, ductal (7/21/2012), Snoring, Symptomatic menopausal or female climacteric states (12/11/2009), Tobacco abuse (12/11/2009), Unspecified essential hypertension (12/11/2009), and Urinary incontinence.  MEDS:   Current Outpatient Medications:     gabapentin (NEURONTIN) 300 MG Cap, Take 2 Capsules by mouth 3 times a day., Disp: 180 Capsule, Rfl: 1    Cholecalciferol (VITAMIN D-3) 125 MCG (5000 UT) Tab, as directed, Disp: , Rfl:     gabapentin (NEURONTIN) 300 MG Cap, 2, Disp: , Rfl:     lisinopril (PRINIVIL) 40 MG tablet, Take 1 Tablet by mouth every day., Disp: 90 Tablet, Rfl: 2    traZODone (DESYREL) 50 MG Tab, Take 2 tabs at night for sleep, Disp: 180 Tablet, Rfl: 1    methocarbamol (ROBAXIN) 750 MG Tab, TAKE ONE TABLET BY MOUTH THREE TIMES A DAY, Disp: 90 Tablet, Rfl: 0    carbidopa-levodopa (SINEMET)  MG Tab, Take 1 Tablet by mouth every day. TAKE ONE TABLET BY MOUTH DAILY, Disp: 90 Tablet, Rfl: 1    EPINEPHrine (EPIPEN) 0.3 MG/0.3ML Solution Auto-injector solution for injection, INJECT INTO THE MIDDLE OF THE OUTER THIGH AND HOLD FOR 3 SECONDS AS NEEDED FOR SEVERE ALLERGIC REACTION THEN CALL 911 IF USED. (ONLY FOR BEE STING), Disp: 2 Each, Rfl: 1    SYNTHROID 75 MCG Tab, , Disp: , Rfl:     meclizine (ANTIVERT) 12.5 MG Tab, , Disp: , Rfl:     allopurinol (ZYLOPRIM) 300 MG Tab, TAKE ONE TABLET BY MOUTH DAILY, Disp: 90 Tablet, Rfl: 1    calcitRIOL (ROCALTROL) 0.25 MCG Cap, Take 1 Capsule by mouth 2 times a day., Disp: 180 Capsule, Rfl: 3    calcitRIOL (ROCALTROL) 0.25 MCG Cap, TAKE ONE CAPSULE BY MOUTH DAILY, Disp: 15 Cap, Rfl: 3    cholestyramine (QUESTRAN) 4 g packet, MIX 1 PACKET WITH LIQUID AND DRINK ONCE DAILY, Disp: 30 Each, Rfl: 1    potassium chloride SA (K-DUR) 10 MEQ Tab CR, TAKE ONE TABLET BY MOUTH DAILY, Disp: 15 Tab, Rfl: 0    Misc. Devices Misc, As needed, Disp: 1 Application,  "Rfl: 0    Misc. Devices Misc, As needed, Disp: 1 Application, Rfl: 0    Misc. Devices Misc, As directed, Disp: 90 Application, Rfl: 03    Misc. Devices Misc, As directed, Disp: 1 Application, Rfl: 0    albuterol (PROVENTIL HFA) 108 (90 Base) MCG/ACT Aero Soln inhalation aerosol, Inhale 2 Puffs by mouth every 6 hours as needed for Shortness of Breath., Disp: 3 Inhaler, Rfl: 0    Misc. Devices Misc, TENS unit pads to be used as directed, Disp: 10 Device, Rfl: 1    Misc. Devices Misc, Incontinence supplies for incontinence to be changed once to twice daily, Disp: 60 Device, Rfl: 11    Misc. Devices Misc, Back brace to use as directed, Disp: 1 Device, Rfl: 0    Multiple Vitamin (DAILY-BRANDON) Tab, Take 1 Tab by mouth every day., Disp: , Rfl:     polyethylene glycol 3350 (MIRALAX) 17 GM/SCOOP Powder, Take 17 g by mouth 3 times a day as needed (bowel care)., Disp: , Rfl:   ALLERGIES:   Allergies   Allergen Reactions    Bee Venom Anaphylaxis    Cillins [Penicillins] Anaphylaxis and Hives     Appears to have tolerated unasyn 9/13/18    Latex Rash     rash    Asa [Aspirin] Unspecified     Pt states \"It tears up my stomach\".    Coconut Oil Itching and Swelling     Raw coconut    Codeine Itching     Pt states \"I itch so much\".    Contrast Media With Iodine [Iodine] Vomiting and Nausea     Not sure of reaction    Hydrocodone-Ibuprofen      \"Extreme itching\"    Hydroxyzine Unspecified     \"loss of memory\"    Ibuprofen Unspecified     Pt states \"It tears up my stomach\".    Norco [Apap-Fd&C Yellow #10 [place]-Hydrocodone] Itching     Pt states \"I itch all over\".    Other Food Unspecified     sourdough bread  Pt states \"My face and mouth gets all tingling\".    Lactose intolerant per patient.     Sulfa Drugs Hives    Talwin Unspecified     Pt states \"I get forgetful\".    Tape Swelling     Red swelling    Vicodin [Hydrocodone-Acetaminophen] Itching     Pt states \"I itch so much\".     SURGHX:   Past Surgical History:   Procedure " Laterality Date    VENTRAL HERNIA REPAIR  10/4/2018    Procedure: ABDOMINAL WOUND EXPLORATION AND SEROMA DRAINAGE;  Surgeon: Houston Amanda D.O.;  Location: SURGERY [facility];  Service: General    EXPLORATORY LAPAROTOMY  9/22/2018    Procedure: EXPLORATORY LAPAROTOMY;  Surgeon: Houston Amanda D.O.;  Location: SURGERY [facility];  Service: General    LYSIS ADHESIONS GENERAL  9/22/2018    Procedure: LYSIS ADHESIONS GENERAL;  Surgeon: Houston Amanda D.O.;  Location: SURGERY [facility];  Service: General    BOWEL RESECTION  9/22/2018    Procedure: BOWEL RESECTION- POSSIBLE;  Surgeon: Houston Amanda D.O.;  Location: SURGERY [facility];  Service: General    FUSION, SPINE, LUMBAR, PLIF  8/7/2017    Procedure: LUMBAR FUSION POSTERIOR EXPLORATION;  Surgeon: Garrett Sexton M.D.;  Location: SURGERY [facility];  Service:     HARDWARE REMOVAL NEURO  8/7/2017    Procedure: HARDWARE REMOVAL NEURO DEEP L1-L3, L4-S1, SEROMA REMOVAL;  Surgeon: Garrett Sexton M.D.;  Location: SURGERY [facility];  Service:     FUSION, SPINE, LUMBAR, PLIF  12/17/2015    Procedure: LUMBAR FUSION POSTERIOR L1-S1;  Surgeon: Garrett Sexton M.D.;  Location: SURGERY [facility];  Service:     LUMBAR LAMINECTOMY DISKECTOMY  12/17/2015    Procedure: LUMBAR LAMINECTOMY DISKECTOMY;  Surgeon: Garrett Sexton M.D.;  Location: SURGERY [facility];  Service:     LUMBAR DECOMPRESSION  12/17/2015    Procedure: LUMBAR DECOMPRESSION L1-L3;  Surgeon: Garrett Sexton M.D.;  Location: SURGERY [facility];  Service:     COLONOSCOPY WITH POLYP  8/1/2012    Performed by ARIA EMERSON at ENDOSCOPY [facility]    GASTROSCOPY WITH BIOPSY  8/1/2012    Performed by ARIA EMERSON at ENDOSCOPY [facility]    THYROIDECTOMY  2001    PARATHYROIDECTOMY  2001    COLON RESECTION  1985    7 feet of intestine    APPENDECTOMY      DENTAL SURGERY      HYSTERECTOMY, VAGINAL      partial, benign    PRIMARY C SECTION       "SALPINGO-OOPHORECTOMY, UNILATERAL       SOCHX:  reports that she has been smoking cigarettes. She started smoking about 52 years ago. She has a 23.50 pack-year smoking history. She has never used smokeless tobacco. She reports current alcohol use of about 1.2 oz per week. She reports that she does not use drugs.  FH: Reviewed with patient, not pertinent to this visit.       Review of Systems   Neurological:  Negative for tingling and numbness.            Objective     /74 (BP Location: Left arm, Patient Position: Sitting, BP Cuff Size: Small adult)   Pulse 83   Temp 36.8 °C (98.3 °F) (Temporal)   Resp 16   Ht 1.56 m (5' 1.42\")   Wt 76.2 kg (168 lb)   LMP 10/02/1980   SpO2 99%   BMI 31.31 kg/m²      Physical Exam  Constitutional:       Appearance: Normal appearance.   HENT:      Head: Normocephalic.      Nose: Nose normal.   Eyes:      Extraocular Movements: Extraocular movements intact.   Cardiovascular:      Rate and Rhythm: Normal rate.      Pulses: Normal pulses.   Pulmonary:      Effort: Pulmonary effort is normal.   Musculoskeletal:      Cervical back: Normal range of motion.      Right ankle: Normal.      Left ankle: Swelling present. Tenderness present. Decreased range of motion.        Legs:    Skin:     General: Skin is warm and dry.   Neurological:      General: No focal deficit present.      Mental Status: She is alert.   Psychiatric:         Mood and Affect: Mood normal.         Behavior: Behavior normal.         Judgment: Judgment normal.     Left foot x-ray:       FINDINGS:     MINERALIZATION: Mineralization is unremarkable for age.     INJURY: There is minimally displaced fracture through the lateral malleolus. There is overlying soft tissue swelling.     JOINTS: No erosive arthropathy is evident.  Ankle mortise is symmetric.           IMPRESSION:     Minimally displaced fracture of the lateral malleolus                 Assessment & Plan     1. Closed displaced fracture of lateral " malleolus of left fibula, initial encounter    - Referral to Orthopedics  - methocarbamol (ROBAXIN) 750 MG Tab; Take 1 Tablet by mouth 3 times a day.  Dispense: 20 Tablet; Refill: 0    2. Ankle injuries, left, initial encounter    - DX-ANKLE 3+ VIEWS LEFT; Future  - methocarbamol (ROBAXIN) 750 MG Tab; Take 1 Tablet by mouth 3 times a day.  Dispense: 20 Tablet; Refill: 0    3. Fall, initial encounter    - DX-ANKLE 3+ VIEWS LEFT; Future  - methocarbamol (ROBAXIN) 750 MG Tab; Take 1 Tablet by mouth 3 times a day.  Dispense: 20 Tablet; Refill: 0    Discussed x-ray results with patient, which revealed closed fracture of the lateral malleolus of the left fibula.  Patient is allergic to NSAIDs and multiple other narcotic medications.  She has tried methocarbamol in the past so this is prescribed for her today for pain relief.  Patient is advised on nonweightbearing on affected left foot.  Patient is referred to orthopedics.  Recommended RICE (rest, ice, compression, elevation). Advised to apply ice or heat to the area.  May apply topical analgesics or patches for additional pain relief. Discussed treatment plan with patient, she is agreeable and verbalized understanding.  Educated patient on signs and symptoms watch out for, when to return to clinic or go to the ER.    Electronically Signed by BEATRIZ Elliott

## 2022-09-06 DIAGNOSIS — S82.62XA CLOSED DISPLACED FRACTURE OF LATERAL MALLEOLUS OF LEFT FIBULA, INITIAL ENCOUNTER: ICD-10-CM
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
## 2022-09-06 DIAGNOSIS — S99.912A ANKLE INJURIES, LEFT, INITIAL ENCOUNTER: ICD-10-CM

## 2022-09-07 RX ORDER — METHOCARBAMOL 750 MG/1
750 TABLET, FILM COATED ORAL 3 TIMES DAILY
Qty: 20 TABLET | Refills: 0 | Status: SHIPPED | OUTPATIENT
Start: 2022-09-07 | End: 2023-04-18 | Stop reason: SDUPTHER

## 2022-09-27 ENCOUNTER — APPOINTMENT (OUTPATIENT)
Dept: LAB | Facility: MEDICAL CENTER | Age: 67
End: 2022-09-27
Payer: COMMERCIAL

## 2022-09-27 ENCOUNTER — HOSPITAL ENCOUNTER (OUTPATIENT)
Dept: LAB | Facility: MEDICAL CENTER | Age: 67
End: 2022-09-27
Attending: INTERNAL MEDICINE
Payer: COMMERCIAL

## 2022-09-27 LAB
ALBUMIN SERPL BCP-MCNC: 4.5 G/DL (ref 3.2–4.9)
CALCIUM SERPL-MCNC: 9 MG/DL (ref 8.5–10.5)

## 2022-09-27 PROCEDURE — 36415 COLL VENOUS BLD VENIPUNCTURE: CPT

## 2022-09-27 PROCEDURE — 82040 ASSAY OF SERUM ALBUMIN: CPT

## 2022-09-27 PROCEDURE — 82310 ASSAY OF CALCIUM: CPT

## 2022-09-27 RX ORDER — MECLIZINE HCL 12.5 MG/1
TABLET ORAL
Qty: 90 TABLET | Refills: 1 | Status: SHIPPED | OUTPATIENT
Start: 2022-09-27 | End: 2023-01-20 | Stop reason: SDUPTHER

## 2022-10-14 ENCOUNTER — OFFICE VISIT (OUTPATIENT)
Dept: MEDICAL GROUP | Facility: CLINIC | Age: 67
End: 2022-10-14
Payer: COMMERCIAL

## 2022-10-14 VITALS
OXYGEN SATURATION: 95 % | DIASTOLIC BLOOD PRESSURE: 79 MMHG | SYSTOLIC BLOOD PRESSURE: 125 MMHG | BODY MASS INDEX: 30.73 KG/M2 | HEART RATE: 80 BPM | HEIGHT: 62 IN

## 2022-10-14 DIAGNOSIS — Z12.12 SCREENING FOR COLORECTAL CANCER: ICD-10-CM

## 2022-10-14 DIAGNOSIS — Z12.11 SCREENING FOR COLORECTAL CANCER: ICD-10-CM

## 2022-10-14 DIAGNOSIS — G89.4 CHRONIC PAIN SYNDROME: ICD-10-CM

## 2022-10-14 DIAGNOSIS — S82.62XA CLOSED DISPLACED FRACTURE OF LATERAL MALLEOLUS OF LEFT FIBULA, INITIAL ENCOUNTER: ICD-10-CM

## 2022-10-14 PROCEDURE — 99213 OFFICE O/P EST LOW 20 MIN: CPT | Mod: GE | Performed by: STUDENT IN AN ORGANIZED HEALTH CARE EDUCATION/TRAINING PROGRAM

## 2022-10-14 RX ORDER — CARISOPRODOL 350 MG/1
350 TABLET ORAL 4 TIMES DAILY
Qty: 30 TABLET | Refills: 0 | Status: CANCELLED | OUTPATIENT
Start: 2022-10-14

## 2022-10-14 RX ORDER — ACETAMINOPHEN 500 MG
TABLET ORAL
COMMUNITY
Start: 2022-08-03 | End: 2023-04-18 | Stop reason: SDUPTHER

## 2022-10-14 RX ORDER — NAPROXEN 500 MG/1
TABLET ORAL
COMMUNITY
Start: 2022-08-29 | End: 2023-04-18 | Stop reason: SDUPTHER

## 2022-10-14 RX ORDER — METAXALONE 400 MG/1
800 TABLET ORAL 3 TIMES DAILY PRN
Qty: 120 TABLET | Refills: 3 | Status: SHIPPED
Start: 2022-10-14 | End: 2023-04-18

## 2022-10-14 RX ORDER — LIDOCAINE 4 G/G
1 PATCH TOPICAL
Qty: 30 PATCH | Refills: 3 | Status: SHIPPED
Start: 2022-10-14 | End: 2023-02-14

## 2022-10-14 NOTE — ASSESSMENT & PLAN NOTE
Patient follows up with orthopedic surgery.  Per her report, no surgery is planned at this time.  She has scheduled follow-up for repeat imaging.

## 2022-10-14 NOTE — ASSESSMENT & PLAN NOTE
Chronic, uncontrolled.  Patient has chronic pain that has been managed with multiple surgeries and spinal ablations.  She is established with pain management, and I encouraged her to discuss medication options with them.  In addition, she was requesting a prescription for Soma.  However after extensive discussion, this is not  a great option for pain control.  We will trial a course of metaxalone and lidocaine patches.  In addition encouraged her to follow-up with her pain management specialist.

## 2022-10-18 ENCOUNTER — TELEPHONE (OUTPATIENT)
Dept: MEDICAL GROUP | Facility: CLINIC | Age: 67
End: 2022-10-18
Payer: COMMERCIAL

## 2022-10-18 NOTE — TELEPHONE ENCOUNTER
MEDICATION PRIOR AUTHORIZATION NEEDED:    1. Name of Medication: Metaxalone 400MG tablets    2. Requested By (Name of Pharmacy): [facility] pharmacy     3. Is insurance on file current? Yes    4. What is the name & phone number of the 3rd party payor? Covermymeds

## 2022-10-20 ENCOUNTER — HOSPITAL ENCOUNTER (OUTPATIENT)
Dept: LAB | Facility: MEDICAL CENTER | Age: 67
End: 2022-10-20
Attending: INTERNAL MEDICINE
Payer: COMMERCIAL

## 2022-10-20 ENCOUNTER — TELEPHONE (OUTPATIENT)
Dept: MEDICAL GROUP | Facility: CLINIC | Age: 67
End: 2022-10-20
Payer: COMMERCIAL

## 2022-10-20 LAB
ALBUMIN SERPL BCP-MCNC: 4.1 G/DL (ref 3.2–4.9)
CALCIUM SERPL-MCNC: 9.6 MG/DL (ref 8.5–10.5)

## 2022-10-20 PROCEDURE — 36415 COLL VENOUS BLD VENIPUNCTURE: CPT

## 2022-10-20 PROCEDURE — 82040 ASSAY OF SERUM ALBUMIN: CPT

## 2022-10-20 PROCEDURE — 82310 ASSAY OF CALCIUM: CPT

## 2022-10-20 NOTE — TELEPHONE ENCOUNTER
FINAL PRIOR AUTHORIZATION STATUS:    1.  Name of Medication & Dose: Metaxalone 400mg tabs     2. Prior Auth Status: Denied.  Reason: Health Plans Preferred Products:  CYCLOBENZAPR TAB 10MG  TIZANIDINE TAB 4MG  METHOCARBAM TAB 750MG  CHLORZOXAZON TAB 500MG    3. Action Taken: Pharmacy Notified: no Patient Notified: N\A

## 2022-10-24 ENCOUNTER — HOSPITAL ENCOUNTER (OUTPATIENT)
Dept: LAB | Facility: MEDICAL CENTER | Age: 67
End: 2022-10-24
Attending: INTERNAL MEDICINE
Payer: COMMERCIAL

## 2022-10-24 LAB
ALBUMIN SERPL BCP-MCNC: 3.9 G/DL (ref 3.2–4.9)
BUN SERPL-MCNC: 11 MG/DL (ref 8–22)
CALCIUM SERPL-MCNC: 9.6 MG/DL (ref 8.5–10.5)
CHLORIDE SERPL-SCNC: 103 MMOL/L (ref 96–112)
CO2 SERPL-SCNC: 25 MMOL/L (ref 20–33)
CREAT SERPL-MCNC: 0.83 MG/DL (ref 0.5–1.4)
GFR SERPLBLD CREATININE-BSD FMLA CKD-EPI: 78 ML/MIN/1.73 M 2
GLUCOSE SERPL-MCNC: 104 MG/DL (ref 65–99)
PHOSPHATE SERPL-MCNC: 3.8 MG/DL (ref 2.5–4.5)
POTASSIUM SERPL-SCNC: 3.7 MMOL/L (ref 3.6–5.5)
SODIUM SERPL-SCNC: 141 MMOL/L (ref 135–145)
T4 FREE SERPL-MCNC: 1.29 NG/DL (ref 0.93–1.7)
TSH SERPL DL<=0.005 MIU/L-ACNC: 0.27 UIU/ML (ref 0.38–5.33)

## 2022-10-24 PROCEDURE — 36415 COLL VENOUS BLD VENIPUNCTURE: CPT

## 2022-10-24 PROCEDURE — 80069 RENAL FUNCTION PANEL: CPT

## 2022-10-24 PROCEDURE — 84443 ASSAY THYROID STIM HORMONE: CPT

## 2022-10-24 PROCEDURE — 84439 ASSAY OF FREE THYROXINE: CPT

## 2022-10-25 DIAGNOSIS — M10.9 GOUT, ARTHRITIS: ICD-10-CM

## 2022-10-25 RX ORDER — ALLOPURINOL 300 MG/1
TABLET ORAL
Qty: 90 TABLET | Refills: 1 | OUTPATIENT
Start: 2022-10-25

## 2022-11-04 DIAGNOSIS — E87.6 HYPOKALEMIA: ICD-10-CM

## 2022-11-04 RX ORDER — POTASSIUM CHLORIDE 750 MG/1
10 TABLET, EXTENDED RELEASE ORAL DAILY
Qty: 90 TABLET | Refills: 3 | Status: SHIPPED | OUTPATIENT
Start: 2022-11-04

## 2022-11-07 ENCOUNTER — TELEPHONE (OUTPATIENT)
Dept: MEDICAL GROUP | Facility: CLINIC | Age: 67
End: 2022-11-07
Payer: COMMERCIAL

## 2022-11-07 DIAGNOSIS — G62.9 NEUROPATHY: ICD-10-CM

## 2022-11-07 DIAGNOSIS — G25.9 EXTRAPYRAMIDAL AND MOVEMENT DISORDER: ICD-10-CM

## 2022-11-07 DIAGNOSIS — M54.50 LOW BACK PAIN, UNSPECIFIED BACK PAIN LATERALITY, UNSPECIFIED CHRONICITY, UNSPECIFIED WHETHER SCIATICA PRESENT: ICD-10-CM

## 2022-11-07 RX ORDER — TRAZODONE HYDROCHLORIDE 50 MG/1
TABLET ORAL
Qty: 180 TABLET | Refills: 1 | Status: SHIPPED | OUTPATIENT
Start: 2022-11-07 | End: 2023-04-18 | Stop reason: SDUPTHER

## 2022-11-07 RX ORDER — GABAPENTIN 300 MG/1
600 CAPSULE ORAL 3 TIMES DAILY
Qty: 180 CAPSULE | Refills: 1 | Status: SHIPPED | OUTPATIENT
Start: 2022-11-07 | End: 2023-04-18 | Stop reason: SDUPTHER

## 2022-11-07 NOTE — TELEPHONE ENCOUNTER
Pt requesting referral for Physical Therapy for low back pain. Pt is currently doing PT for her fractured ankle. She would like to be treated for low back pain as well. She would like to go to [facility] with Kassi Cortez.   Ph: [phone]  Please advise.

## 2022-11-15 ENCOUNTER — HOSPITAL ENCOUNTER (OUTPATIENT)
Dept: LAB | Facility: MEDICAL CENTER | Age: 67
End: 2022-11-15
Attending: INTERNAL MEDICINE
Payer: COMMERCIAL

## 2022-11-15 ENCOUNTER — OFFICE VISIT (OUTPATIENT)
Dept: MEDICAL GROUP | Facility: CLINIC | Age: 67
End: 2022-11-15
Payer: COMMERCIAL

## 2022-11-15 ENCOUNTER — HOSPITAL ENCOUNTER (OUTPATIENT)
Dept: RADIOLOGY | Facility: MEDICAL CENTER | Age: 67
End: 2022-11-15
Attending: STUDENT IN AN ORGANIZED HEALTH CARE EDUCATION/TRAINING PROGRAM
Payer: COMMERCIAL

## 2022-11-15 ENCOUNTER — HOSPITAL ENCOUNTER (OUTPATIENT)
Dept: LAB | Facility: MEDICAL CENTER | Age: 67
End: 2022-11-15
Attending: STUDENT IN AN ORGANIZED HEALTH CARE EDUCATION/TRAINING PROGRAM
Payer: COMMERCIAL

## 2022-11-15 DIAGNOSIS — L03.119 CELLULITIS OF HAND: ICD-10-CM

## 2022-11-15 DIAGNOSIS — M79.89 SWELLING OF RIGHT HAND: ICD-10-CM

## 2022-11-15 LAB
ALBUMIN SERPL BCP-MCNC: 3.9 G/DL (ref 3.2–4.9)
BASOPHILS # BLD AUTO: 0.6 % (ref 0–1.8)
BASOPHILS # BLD: 0.04 K/UL (ref 0–0.12)
CALCIUM SERPL-MCNC: 9.3 MG/DL (ref 8.5–10.5)
EOSINOPHIL # BLD AUTO: 0 K/UL (ref 0–0.51)
EOSINOPHIL NFR BLD: 0 % (ref 0–6.9)
ERYTHROCYTE [DISTWIDTH] IN BLOOD BY AUTOMATED COUNT: 50.4 FL (ref 35.9–50)
HCT VFR BLD AUTO: 51.8 % (ref 37–47)
HGB BLD-MCNC: 17.9 G/DL (ref 12–16)
IMM GRANULOCYTES # BLD AUTO: 0.05 K/UL (ref 0–0.11)
IMM GRANULOCYTES NFR BLD AUTO: 0.8 % (ref 0–0.9)
LYMPHOCYTES # BLD AUTO: 1.54 K/UL (ref 1–4.8)
LYMPHOCYTES NFR BLD: 23.7 % (ref 22–41)
MCH RBC QN AUTO: 36.3 PG (ref 27–33)
MCHC RBC AUTO-ENTMCNC: 34.6 G/DL (ref 33.6–35)
MCV RBC AUTO: 105.1 FL (ref 81.4–97.8)
MONOCYTES # BLD AUTO: 0.49 K/UL (ref 0–0.85)
MONOCYTES NFR BLD AUTO: 7.5 % (ref 0–13.4)
NEUTROPHILS # BLD AUTO: 4.39 K/UL (ref 2–7.15)
NEUTROPHILS NFR BLD: 67.4 % (ref 44–72)
NRBC # BLD AUTO: 0 K/UL
NRBC BLD-RTO: 0 /100 WBC
PLATELET # BLD AUTO: 151 K/UL (ref 164–446)
PMV BLD AUTO: 10.6 FL (ref 9–12.9)
RBC # BLD AUTO: 4.93 M/UL (ref 4.2–5.4)
URATE SERPL-MCNC: 2 MG/DL (ref 1.9–8.2)
WBC # BLD AUTO: 6.5 K/UL (ref 4.8–10.8)

## 2022-11-15 PROCEDURE — 99213 OFFICE O/P EST LOW 20 MIN: CPT | Mod: GC | Performed by: STUDENT IN AN ORGANIZED HEALTH CARE EDUCATION/TRAINING PROGRAM

## 2022-11-15 PROCEDURE — 82040 ASSAY OF SERUM ALBUMIN: CPT

## 2022-11-15 PROCEDURE — 84550 ASSAY OF BLOOD/URIC ACID: CPT

## 2022-11-15 PROCEDURE — 73110 X-RAY EXAM OF WRIST: CPT | Mod: RT

## 2022-11-15 PROCEDURE — 85025 COMPLETE CBC W/AUTO DIFF WBC: CPT

## 2022-11-15 PROCEDURE — 82310 ASSAY OF CALCIUM: CPT

## 2022-11-15 PROCEDURE — 36415 COLL VENOUS BLD VENIPUNCTURE: CPT

## 2022-11-15 RX ORDER — CLINDAMYCIN HYDROCHLORIDE 300 MG/1
300 CAPSULE ORAL 3 TIMES DAILY
Qty: 15 CAPSULE | Refills: 0 | Status: SHIPPED
Start: 2022-11-15 | End: 2022-11-19

## 2022-11-15 RX ORDER — LEVOTHYROXINE SODIUM 50 MCG
TABLET ORAL
COMMUNITY
Start: 2022-11-02 | End: 2023-01-20 | Stop reason: SDUPTHER

## 2022-11-15 RX ORDER — CLINDAMYCIN HYDROCHLORIDE 300 MG/1
300 CAPSULE ORAL 3 TIMES DAILY
Qty: 5 CAPSULE | Refills: 0 | Status: SHIPPED
Start: 2022-11-15 | End: 2022-11-15

## 2022-11-15 NOTE — PROGRESS NOTES
"Subjective:     CC: Right hand swelling     HPI:   Marium presents today with    Problem   Swelling of Right Hand    Right hand swelling   5 days, staying the same   Severe pain, aching, feels warm, reduced range of motion of hand flexion   Reports history of spider bite and gout which have caused similar symptoms   No previous surgeries on hand   Thinks it may be related to bug bite   Denies fevers or chills   Tried icing without relief of symptoms        Patient does have low blood pressure in office today.  She does report taking her blood pressure medications.  She denies experiencing associated dizziness.    ROS: See HPI.     Objective:     Exam:  BP (!) (P) 90/67 (BP Location: Left arm, Patient Position: Sitting, BP Cuff Size: Adult)   Pulse (P) 87   Ht (P) 1.575 m (5' 2\")   LMP 10/02/1980   SpO2 (P) 93%   BMI (P) 30.73 kg/m²  Body mass index is 30.73 kg/m² (pended).    Physical Exam:  General: Pt resting in NAD, cooperative   Skin:  Faint erythema surrounding dorsal and volar surface of right hand and wrist with streaking erythema up to proximal forearm. Warm to touch.   HEENT: NC/AT. EOMI. No conjunctival injection or sclera icterus.   Lungs:  CTAB, good air movement. Non-labored.   Cardiovascular:  S1/S2 RRR   CNS:  No gross focal neurologic deficits  Psych: Appropriate mood and affect   MSK: TTP right MCPs and and wrist. Reduced range of motion in hand flexion.       Assessment & Plan:     67 y.o. female with the following -     Problem List Items Addressed This Visit       Swelling of right hand     Acute.  Patient presents with 5 days of atraumatic swelling and redness of right hand with no systemic symptoms of fevers or chills.  Patient likely has cellulitis.  Given absence of hemodynamic instability unlikely to be osteomyelitis.  Patient does have remote history of gout, uric acid ordered for evaluation though this would be unlikely given multiple joints are involved.  -CBC, uric acid and x-ray " hand and wrist  -Clindamycin 5-day course (penicillin and sulfa allergy)  -Close follow-up in clinic 3 days  -Return precautions discussed         Relevant Orders    DX-HAND 3+ RIGHT    URIC ACID, SERUM     Other Visit Diagnoses       Cellulitis of hand        Relevant Medications    clindamycin (CLEOCIN) 300 MG Cap    Other Relevant Orders    DX-HAND 3+ RIGHT    URIC ACID, SERUM

## 2022-11-16 NOTE — ASSESSMENT & PLAN NOTE
Acute.  Patient presents with 5 days of atraumatic swelling and redness of right hand with no systemic symptoms of fevers or chills.  Patient likely has cellulitis.  Given absence of hemodynamic instability unlikely to be osteomyelitis.  Patient does have remote history of gout, uric acid ordered for evaluation though this would be unlikely given multiple joints are involved.  -CBC, uric acid and x-ray hand and wrist  -Clindamycin 5-day course (penicillin and sulfa allergy)  -Close follow-up in clinic 3 days  -Return precautions discussed

## 2022-11-18 ENCOUNTER — OFFICE VISIT (OUTPATIENT)
Dept: MEDICAL GROUP | Facility: CLINIC | Age: 67
End: 2022-11-18
Payer: MEDICARE

## 2022-11-18 VITALS
BODY MASS INDEX: 30.18 KG/M2 | HEIGHT: 62 IN | HEART RATE: 90 BPM | WEIGHT: 164 LBS | TEMPERATURE: 98.3 F | OXYGEN SATURATION: 96 % | SYSTOLIC BLOOD PRESSURE: 122 MMHG | DIASTOLIC BLOOD PRESSURE: 78 MMHG

## 2022-11-18 DIAGNOSIS — M79.89 SWELLING OF RIGHT HAND: ICD-10-CM

## 2022-11-18 PROCEDURE — 99213 OFFICE O/P EST LOW 20 MIN: CPT | Mod: GE | Performed by: STUDENT IN AN ORGANIZED HEALTH CARE EDUCATION/TRAINING PROGRAM

## 2022-11-18 ASSESSMENT — FIBROSIS 4 INDEX: FIB4 SCORE: 1.17

## 2022-11-20 NOTE — ASSESSMENT & PLAN NOTE
Given rapid resolution of symptoms with inadequate abx, unlikely infection. X-ray of hand unremarkable. Unclear etiology, consider possible soft tissue trauma.   -Discontinue abx  -Topical voltaren gel for pain at right thumb MCP, possible component of arthritis

## 2022-11-20 NOTE — PROGRESS NOTES
[facility]    Chief Complaint   Patient presents with    Follow-Up     Hand swelling         HISTORY OF PRESENT ILLNESS: Patient is a 67 y.o. female established patient who presents today to discuss the medical issues below:    Problem   Swelling of Right Hand    Right hand swelling   5 days, staying the same   Severe pain, aching, feels warm, reduced range of motion of hand flexion   Reports history of spider bite and gout which have caused similar symptoms   No previous surgeries on hand   Thinks it may be related to bug bite   Denies fevers or chills   Tried icing without relief of symptoms     11/18: Patient's pain and swelling has completely resolved at this point. She admits that she did not  the clindamycin until the following day, and took only 1 dose on the first day and 2 doses on the 2nd day. Normal range of motion, very mild tenderness at right thumb MCP with resisted motion, particularly with abduction of the thumb.          Patient Active Problem List    Diagnosis Date Noted    Swelling of right hand 11/15/2022    Closed displaced fracture of lateral malleolus of left fibula 10/14/2022    Therapeutic drug monitoring 07/21/2022    Rash 07/21/2022    Vertigo 01/07/2022    Bilateral headaches 12/14/2021    Hypoglycemia 10/06/2021    Osteoarthrosis 10/06/2021    Macrocytosis 05/21/2019    Tobacco abuse counseling 05/21/2019    Incontinence in female 03/28/2019    Chronic pain syndrome 03/21/2019    Restless leg syndrome 10/10/2018    Debility 09/27/2018    Hypomagnesemia 09/22/2018    Hypothyroidism 09/20/2018    Ulcerative esophagitis 09/20/2018    Tobacco use 09/13/2018    Major depressive disorder 09/11/2017    Pain due to hip joint prosthesis (HCC) 08/07/2017    Benzodiazepine dependence, continuous (HCC) 04/26/2017    Cervical lymphadenopathy 10/07/2016    Osteoarthritis of cervical spine with myelopathy 09/14/2016    Failed back syndrome 09/14/2016    Depression 06/27/2016    Left  lumbar radiculopathy 06/23/2016    Neuropathy 06/23/2016    Lumbar scoliosis 12/17/2015    History of alcohol abuse 12/07/2015    Hyperlipidemia 06/05/2015    Insomnia 05/23/2014    Chronic diarrhea 09/30/2013    ELISHA (generalized anxiety disorder) 08/28/2013    Gout, arthritis 05/24/2013    Mixed hyperlipidemia 12/11/2009    HTN (hypertension) 12/11/2009    Postsurgical hypothyroidism 12/11/2009    Osteoporosis, senile 12/11/2009    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    Symptomatic menopausal or female climacteric states 12/11/2009    Hypoparathyroidism (HCC) 12/11/2009    Cigarette nicotine dependence, uncomplicated 12/11/2009       Allergies:Bee venom, Cillins [penicillins], Latex, Asa [aspirin], Coconut oil, Codeine, Contrast media with iodine [iodine], Hydrocodone-ibuprofen, Hydroxyzine, Ibuprofen, Norco [apap-fd&c yellow #10 [place]-hydrocodone], Other food, Sulfa drugs, Talwin, Tape, and Vicodin [hydrocodone-acetaminophen]    Current Outpatient Medications   Medication Sig Dispense Refill    SYNTHROID 50 MCG Tab       Cholecalciferol (VITAMIN D-3) 125 MCG (5000 UT) Tab as directed Strength: 125 MCG (5000 UT) 30 Tablet 3    gabapentin (NEURONTIN) 300 MG Cap Take 2 Capsules by mouth 3 times a day. 180 Capsule 1    traZODone (DESYREL) 50 MG Tab Take 2 tabs at night for sleep 180 Tablet 1    carbidopa-levodopa (SINEMET)  MG Tab Take 1 Tablet by mouth every day. TAKE ONE TABLET BY MOUTH DAILY 90 Tablet 1    potassium chloride SA (K-DUR) 10 MEQ Tab CR Take 1 Tablet by mouth every day. 90 Tablet 3    acetaminophen (TYLENOL) 500 MG Tab       naproxen (NAPROSYN) 500 MG Tab       Lidocaine 4 % Patch Apply 1 Application topically 1 time a day as needed (Pain). 30 Patch 3    metaxalone (SKELAXIN) 400 MG Tab Take 2 Tablets by mouth 3 times a day as needed (Pain). 120 Tablet 3    meclizine (ANTIVERT) 12.5 MG Tab TAKE ONE TABLET BY MOUTH THREE TIMES A DAY AS NEEDED FOR UP TO 30DAYS 90 Tablet 1     "methocarbamol (ROBAXIN) 750 MG Tab Take 1 Tablet by mouth 3 times a day. 20 Tablet 0    lisinopril (PRINIVIL) 40 MG tablet Take 1 Tablet by mouth every day. 90 Tablet 2    EPINEPHrine (EPIPEN) 0.3 MG/0.3ML Solution Auto-injector solution for injection INJECT INTO THE MIDDLE OF THE OUTER THIGH AND HOLD FOR 3 SECONDS AS NEEDED FOR SEVERE ALLERGIC REACTION THEN CALL 911 IF USED. (ONLY FOR BEE STING) 2 Each 1    SYNTHROID 75 MCG Tab       allopurinol (ZYLOPRIM) 300 MG Tab TAKE ONE TABLET BY MOUTH DAILY 90 Tablet 1    calcitRIOL (ROCALTROL) 0.25 MCG Cap Take 1 Capsule by mouth 2 times a day. 180 Capsule 3    calcitRIOL (ROCALTROL) 0.25 MCG Cap TAKE ONE CAPSULE BY MOUTH DAILY 15 Cap 3    cholestyramine (QUESTRAN) 4 g packet MIX 1 PACKET WITH LIQUID AND DRINK ONCE DAILY 30 Each 1    Misc. Devices Misc As needed 1 Application 0    Misc. Devices Misc As needed 1 Application 0    Misc. Devices Misc As directed 90 Application 03    Misc. Devices Misc As directed 1 Application 0    albuterol (PROVENTIL HFA) 108 (90 Base) MCG/ACT Aero Soln inhalation aerosol Inhale 2 Puffs by mouth every 6 hours as needed for Shortness of Breath. 3 Inhaler 0    Misc. Devices Misc TENS unit pads to be used as directed 10 Device 1    Misc. Devices Misc Incontinence supplies for incontinence to be changed once to twice daily 60 Device 11    Misc. Devices Misc Back brace to use as directed 1 Device 0    Multiple Vitamin (DAILY-BRANDON) Tab Take 1 Tab by mouth every day.      polyethylene glycol 3350 (MIRALAX) 17 GM/SCOOP Powder Take 17 g by mouth 3 times a day as needed (bowel care).       No current facility-administered medications for this visit.         Past Medical History:   Diagnosis Date    Alcohol abuse, continuous drinking behavior 12/11/2009    Anesthesia 4-26-17    \"Hard to put to sleep\"    Anxiety     Arthritis 4-26-17    \"Hands\"    Chronic diarrhea 9/30/2013    Dental disorder 4-26-17    \"Upper permanent right side bridge\"    Gout     " "Gynecological disorder     \"HX Gonorrhea, was treated\"    Helicobacter pylori (H. pylori) infection 2012    Hemorrhagic disorder (HCC)     \"needed a lot of blood with back surgery\"    Hyperlipidemia 2015    Hypoparathyroidism (HCC) 2009    Lumbago 2017    back and neck    Mixed hyperlipidemia 2009    Osteoporosis, unspecified 2009    Other B-complex deficiencies 2009    Parotid cyst     Postsurgical hypothyroidism 2009    Psychiatric problem 17    depression, anxiety    Restless leg syndrome     S/P tooth extraction 17    Pt. states Dr. Sexton is aware.    Serum calcium elevated 10/8/2012    Sialolithiasis, ductal 2012    Snoring     Symptomatic menopausal or female climacteric states 2009    Tobacco abuse 2009    Unspecified essential hypertension 2009    Urinary incontinence     urgency       Social History     Tobacco Use    Smoking status: Every Day     Packs/day: 0.50     Years: 47.00     Pack years: 23.50     Types: Cigarettes     Start date: 1970    Smokeless tobacco: Never    Tobacco comments:     quit during pregnancy, now 2-3 cig daily   Vaping Use    Vaping Use: Never used   Substance Use Topics    Alcohol use: Yes     Alcohol/week: 1.2 oz     Types: 2 Cans of beer per week     Comment: quit drinking daily 3 years ago, now drinks 3-4 beers on weekends    Drug use: No       Family Status   Relation Name Status    Mo   at age 74        pneumo, alzheimers    Fa   at age 37        etoh    Laureano  Alive    Sis  Alive        estranged    Bro  Alive        estranged - never met     Family History   Problem Relation Age of Onset    Genetic Disorder Mother         Alzheimer's    Alcohol/Drug Father        ROS:  Negative except as stated above.      Exam:   /78 (BP Location: Left arm, Patient Position: Sitting, BP Cuff Size: Adult)   Pulse 90   Temp 36.8 °C (98.3 °F) (Temporal)   Ht 1.575 m (5' 2\")   Wt 74.4 kg " (164 lb)   SpO2 96%  Body mass index is 30 kg/m².  General:  Well nourished, well developed female in NAD.  HENT: Normocephalic,  Pulmonary: Clear to ausculation.  Normal effort. No rales, rhonchi, or wheezing.  Cardiovascular: Regular rate and rhythm without murmur.   Abdomen: Normal bowel sounds, soft and nontender, no palpable liver, spleen, or masses.  Extremities: No LE edema noted. 5/5 strength in all extremities. Unrestricted ROM of bilateral wrists and fingers. No edema or erythema noted, no lesions. Mild TTP at right thumb MCP. Mild pain with abduction of thumb with resistance.  Neuro: Grossly nonfocal.  Skin: No lesions, erythema, or other abnormalities noted.  Psych: Alert and oriented to person, place, and time. Appropriate mood and conversation.    Assessment/Plan:    Swelling of right hand  Given rapid resolution of symptoms with inadequate abx, unlikely infection. X-ray of hand unremarkable. Unclear etiology, consider possible soft tissue trauma.   -Discontinue abx  -Topical voltaren gel for pain at right thumb MCP, possible component of arthritis        Ferdinand Jacome, DO  [facility]  PGY-3

## 2022-11-24 NOTE — PROGRESS NOTES
"Subjective:     CC: Pain     HPI:   Marium presents today with    Problem   Closed Displaced Fracture of Lateral Malleolus of Left Fibula   Chronic Pain Syndrome    Patient has history of chronic back pain. She reports having multiple surgeries. She was treated in the past with \"soma\" muscle relaxer which helped her pain. She has been on other muscle relaxers but did not resolve symptoms.  She says she has tried multiple other medications without resolution of symptoms.  She is established with pain clinic with Dr. Suarez, who is performed nerve ablations.  She states that she has discussed medications with one of his associates, who has been only prescribing Tylenol for pain control.         ROS: See HPI.     Objective:     Exam:  /79 (BP Location: Left arm, Patient Position: Sitting, BP Cuff Size: Adult)   Pulse 80   Ht 1.575 m (5' 2\")   LMP 10/02/1980   SpO2 95%   BMI 30.73 kg/m²  Body mass index is 30.73 kg/m².    Physical Exam:  General: Pt resting in scooter in NAD, cooperative.   Skin:  No cyanosis or jaundice   HEENT: NC/AT. EOMI. No conjunctival injection or sclera icterus.   Lungs:  CTAB, good air movement. Non-labored.   Cardiovascular:  S1/S2 RRR   Extremities: Ankle boot left leg.  CNS:  No gross focal neurologic deficits  Psych: Appropriate mood and affect     Assessment & Plan:     67 y.o. female with the following -     Problem List Items Addressed This Visit       Chronic pain syndrome     Chronic, uncontrolled.  Patient has chronic pain that has been managed with multiple surgeries and spinal ablations.  She is established with pain management, and I encouraged her to discuss medication options with them.  In addition, she was requesting a prescription for Soma.  However after extensive discussion, this is not  a great option for pain control.  We will trial a course of metaxalone and lidocaine patches.  In addition encouraged her to follow-up with her pain management specialist.         " Relevant Medications    acetaminophen (TYLENOL) 500 MG Tab    naproxen (NAPROSYN) 500 MG Tab    Closed displaced fracture of lateral malleolus of left fibula     Patient follows up with orthopedic surgery.  Per her report, no surgery is planned at this time.  She has scheduled follow-up for repeat imaging.         Relevant Medications    acetaminophen (TYLENOL) 500 MG Tab    naproxen (NAPROSYN) 500 MG Tab     Other Visit Diagnoses       Screening for colorectal cancer                     full weight-bearing

## 2022-11-29 ENCOUNTER — HOSPITAL ENCOUNTER (OUTPATIENT)
Dept: LAB | Facility: MEDICAL CENTER | Age: 67
End: 2022-11-29
Attending: INTERNAL MEDICINE
Payer: COMMERCIAL

## 2022-11-29 ENCOUNTER — OFFICE VISIT (OUTPATIENT)
Dept: INTERNAL MEDICINE | Facility: OTHER | Age: 67
End: 2022-11-29
Payer: MEDICARE

## 2022-11-29 VITALS
DIASTOLIC BLOOD PRESSURE: 72 MMHG | HEIGHT: 62 IN | TEMPERATURE: 98.4 F | SYSTOLIC BLOOD PRESSURE: 110 MMHG | WEIGHT: 171.8 LBS | BODY MASS INDEX: 31.62 KG/M2 | HEART RATE: 76 BPM | OXYGEN SATURATION: 91 %

## 2022-11-29 DIAGNOSIS — D69.2 PURPURA (HCC): ICD-10-CM

## 2022-11-29 DIAGNOSIS — D18.01 HEMANGIOMA OF SKIN: ICD-10-CM

## 2022-11-29 DIAGNOSIS — L82.1 SEBORRHEIC KERATOSES: ICD-10-CM

## 2022-11-29 DIAGNOSIS — Z85.828 HISTORY OF SKIN CANCER: ICD-10-CM

## 2022-11-29 LAB
ALBUMIN SERPL BCP-MCNC: 4 G/DL (ref 3.2–4.9)
CALCIUM SERPL-MCNC: 9.2 MG/DL (ref 8.5–10.5)

## 2022-11-29 PROCEDURE — 82310 ASSAY OF CALCIUM: CPT

## 2022-11-29 PROCEDURE — 36415 COLL VENOUS BLD VENIPUNCTURE: CPT

## 2022-11-29 PROCEDURE — 82040 ASSAY OF SERUM ALBUMIN: CPT

## 2022-11-29 PROCEDURE — 99203 OFFICE O/P NEW LOW 30 MIN: CPT | Performed by: DERMATOLOGY

## 2022-11-29 ASSESSMENT — PATIENT HEALTH QUESTIONNAIRE - PHQ9
4. FEELING TIRED OR HAVING LITTLE ENERGY: SEVERAL DAYS
9. THOUGHTS THAT YOU WOULD BE BETTER OFF DEAD, OR OF HURTING YOURSELF: NOT AT ALL
2. FEELING DOWN, DEPRESSED, IRRITABLE, OR HOPELESS: NOT AT ALL
6. FEELING BAD ABOUT YOURSELF - OR THAT YOU ARE A FAILURE OR HAVE LET YOURSELF OR YOUR FAMILY DOWN: NOT AL ALL
8. MOVING OR SPEAKING SO SLOWLY THAT OTHER PEOPLE COULD HAVE NOTICED. OR THE OPPOSITE, BEING SO FIGETY OR RESTLESS THAT YOU HAVE BEEN MOVING AROUND A LOT MORE THAN USUAL: NOT AT ALL
SUM OF ALL RESPONSES TO PHQ9 QUESTIONS 1 AND 2: 3
SUM OF ALL RESPONSES TO PHQ QUESTIONS 1-9: 7
3. TROUBLE FALLING OR STAYING ASLEEP OR SLEEPING TOO MUCH: NEARLY EVERY DAY
5. POOR APPETITE OR OVEREATING: NOT AT ALL
1. LITTLE INTEREST OR PLEASURE IN DOING THINGS: NEARLY EVERY DAY
7. TROUBLE CONCENTRATING ON THINGS, SUCH AS READING THE NEWSPAPER OR WATCHING TELEVISION: NOT AT ALL

## 2022-11-29 ASSESSMENT — FIBROSIS 4 INDEX: FIB4 SCORE: 1.17

## 2022-11-29 NOTE — PROGRESS NOTES
"           Marium Renteria  1955  [id number]    Consultation             Vitals:    11/29/22 1201   BP: 110/72   BP Location: Left arm   Patient Position: Sitting   BP Cuff Size: Adult   Pulse: 76   Temp: 36.9 °C (98.4 °F)   TempSrc: Temporal   SpO2: 91%   Weight: 77.9 kg (171 lb 12.8 oz)   Height: 1.575 m (5' 2\")       Chief Complaint   Patient presents with    New Patient    Nevus     HX of skin cancer/skin check     Other     Spots (red) on arms, nose is getting smaller on the right side     ___________________________________________________________________            History of Present Illness (HPI)  check nose - h/o skin cancer many years ago. Pt thinks R side of nose is smaller.          Dr. Claudio has referred for a consultation to evaluate rash    Current Outpatient Medications on File Prior to Visit   Medication Sig Dispense Refill    SYNTHROID 50 MCG Tab       Cholecalciferol (VITAMIN D-3) 125 MCG (5000 UT) Tab as directed Strength: 125 MCG (5000 UT) 30 Tablet 3    gabapentin (NEURONTIN) 300 MG Cap Take 2 Capsules by mouth 3 times a day. 180 Capsule 1    traZODone (DESYREL) 50 MG Tab Take 2 tabs at night for sleep 180 Tablet 1    carbidopa-levodopa (SINEMET)  MG Tab Take 1 Tablet by mouth every day. TAKE ONE TABLET BY MOUTH DAILY 90 Tablet 1    potassium chloride SA (K-DUR) 10 MEQ Tab CR Take 1 Tablet by mouth every day. 90 Tablet 3    acetaminophen (TYLENOL) 500 MG Tab       meclizine (ANTIVERT) 12.5 MG Tab TAKE ONE TABLET BY MOUTH THREE TIMES A DAY AS NEEDED FOR UP TO 30DAYS 90 Tablet 1    lisinopril (PRINIVIL) 40 MG tablet Take 1 Tablet by mouth every day. 90 Tablet 2    EPINEPHrine (EPIPEN) 0.3 MG/0.3ML Solution Auto-injector solution for injection INJECT INTO THE MIDDLE OF THE OUTER THIGH AND HOLD FOR 3 SECONDS AS NEEDED FOR SEVERE ALLERGIC REACTION THEN CALL 911 IF USED. (ONLY FOR BEE STING) 2 Each 1    SYNTHROID 75 MCG Tab       allopurinol (ZYLOPRIM) 300 MG Tab TAKE ONE TABLET BY " MOUTH DAILY 90 Tablet 1    Misc. Devices Misc As needed 1 Application 0    Misc. Devices Misc As needed 1 Application 0    Misc. Devices Misc As directed 90 Application 03    Misc. Devices Misc As directed 1 Application 0    albuterol (PROVENTIL HFA) 108 (90 Base) MCG/ACT Aero Soln inhalation aerosol Inhale 2 Puffs by mouth every 6 hours as needed for Shortness of Breath. 3 Inhaler 0    Misc. Devices Misc TENS unit pads to be used as directed 10 Device 1    Misc. Devices Misc Incontinence supplies for incontinence to be changed once to twice daily 60 Device 11    Misc. Devices Misc Back brace to use as directed 1 Device 0    polyethylene glycol 3350 (MIRALAX) 17 GM/SCOOP Powder Take 17 g by mouth 3 times a day as needed (bowel care).      naproxen (NAPROSYN) 500 MG Tab  (Patient not taking: Reported on 11/29/2022)      Lidocaine 4 % Patch Apply 1 Application topically 1 time a day as needed (Pain). (Patient not taking: Reported on 11/29/2022) 30 Patch 3    metaxalone (SKELAXIN) 400 MG Tab Take 2 Tablets by mouth 3 times a day as needed (Pain). 120 Tablet 3    methocarbamol (ROBAXIN) 750 MG Tab Take 1 Tablet by mouth 3 times a day. 20 Tablet 0    calcitRIOL (ROCALTROL) 0.25 MCG Cap Take 1 Capsule by mouth 2 times a day. (Patient not taking: Reported on 11/29/2022) 180 Capsule 3    calcitRIOL (ROCALTROL) 0.25 MCG Cap TAKE ONE CAPSULE BY MOUTH DAILY (Patient not taking: Reported on 11/29/2022) 15 Cap 3    cholestyramine (QUESTRAN) 4 g packet MIX 1 PACKET WITH LIQUID AND DRINK ONCE DAILY (Patient not taking: Reported on 11/29/2022) 30 Each 1    Multiple Vitamin (DAILY-BRANDON) Tab Take 1 Tab by mouth every day. (Patient not taking: Reported on 11/29/2022)       No current facility-administered medications on file prior to visit.     Bee venom, Cillins [penicillins], Latex, Asa [aspirin], Coconut oil, Codeine, Contrast media with iodine [iodine], Hydrocodone-ibuprofen, Hydroxyzine, Ibuprofen, Norco [apap-fd&c yellow #10 al  "lake-hydrocodone], Other food, Sulfa drugs, Talwin, Tape, and Vicodin [hydrocodone-acetaminophen]      Review of Systems:        General: Denies fever      Hematologic: no excessive bleeding problems              Past Medical History:   Diagnosis Date    Alcohol abuse, continuous drinking behavior 12/11/2009    Anesthesia 4-26-17    \"Hard to put to sleep\"    Anxiety     Arthritis 4-26-17    \"Hands\"    Chronic diarrhea 9/30/2013    Dental disorder 4-26-17    \"Upper permanent right side bridge\"    Gout     Gynecological disorder     \"HX Gonorrhea, was treated\"    Helicobacter pylori (H. pylori) infection 9/11/2012    Hemorrhagic disorder (HCC)     \"needed a lot of blood with back surgery\"    Hyperlipidemia 6/5/2015    Hypoparathyroidism (HCC) 12/11/2009    Lumbago 2017    back and neck    Mixed hyperlipidemia 12/11/2009    Osteoporosis, unspecified 12/11/2009    Other B-complex deficiencies 12/11/2009    Parotid cyst     Postsurgical hypothyroidism 12/11/2009    Psychiatric problem 4-26-17    depression, anxiety    Restless leg syndrome     S/P tooth extraction 4-25-17    Pt. states Dr. Sexton is aware.    Serum calcium elevated 10/8/2012    Sialolithiasis, ductal 7/21/2012    Snoring     Symptomatic menopausal or female climacteric states 12/11/2009    Tobacco abuse 12/11/2009    Unspecified essential hypertension 12/11/2009    Urinary incontinence     urgency     Skin Cancer h/o skin cancer on nose R side about 10 years ago   Social History     Tobacco Use    Smoking status: Every Day     Packs/day: 1.00     Years: 47.00     Pack years: 47.00     Types: Cigarettes     Start date: 4/25/1970    Smokeless tobacco: Never    Tobacco comments:     quit during pregnancy, 1 pack to 1.5 pack per day   Vaping Use    Vaping Use: Never used   Substance Use Topics    Alcohol use: Not Currently     Alcohol/week: 1.2 oz     Types: 2 Cans of beer per week     Comment: quit drinking daily 3 years ago, now drinks 3-4 beers on weekends "    Drug use: No     Sunscreen Use:yes    Past Surgical History:   Procedure Laterality Date    VENTRAL HERNIA REPAIR  10/4/2018    Procedure: ABDOMINAL WOUND EXPLORATION AND SEROMA DRAINAGE;  Surgeon: Houston Amanda D.O.;  Location: SURGERY [facility];  Service: General    EXPLORATORY LAPAROTOMY  9/22/2018    Procedure: EXPLORATORY LAPAROTOMY;  Surgeon: Houston Amanda D.O.;  Location: SURGERY [facility];  Service: General    LYSIS ADHESIONS GENERAL  9/22/2018    Procedure: LYSIS ADHESIONS GENERAL;  Surgeon: Houston Amanda D.O.;  Location: SURGERY [facility];  Service: General    BOWEL RESECTION  9/22/2018    Procedure: BOWEL RESECTION- POSSIBLE;  Surgeon: Houston Amanda D.O.;  Location: SURGERY [facility];  Service: General    FUSION, SPINE, LUMBAR, PLIF  8/7/2017    Procedure: LUMBAR FUSION POSTERIOR EXPLORATION;  Surgeon: Garrett Sexton M.D.;  Location: SURGERY [facility];  Service:     HARDWARE REMOVAL NEURO  8/7/2017    Procedure: HARDWARE REMOVAL NEURO DEEP L1-L3, L4-S1, SEROMA REMOVAL;  Surgeon: Garrett Sexton M.D.;  Location: SURGERY [facility];  Service:     FUSION, SPINE, LUMBAR, PLIF  12/17/2015    Procedure: LUMBAR FUSION POSTERIOR L1-S1;  Surgeon: Garrett Sexton M.D.;  Location: SURGERY [facility];  Service:     LUMBAR LAMINECTOMY DISKECTOMY  12/17/2015    Procedure: LUMBAR LAMINECTOMY DISKECTOMY;  Surgeon: Garrett Sexton M.D.;  Location: SURGERY [facility];  Service:     LUMBAR DECOMPRESSION  12/17/2015    Procedure: LUMBAR DECOMPRESSION L1-L3;  Surgeon: Garrett Sexton M.D.;  Location: SURGERY [facility];  Service:     COLONOSCOPY WITH POLYP  8/1/2012    Performed by ARIA EMERSON at ENDOSCOPY [facility]    GASTROSCOPY WITH BIOPSY  8/1/2012    Performed by ARIA EMERSON at ENDOSCOPY [facility]    THYROIDECTOMY  2001    PARATHYROIDECTOMY  2001    COLON RESECTION  1985    7 feet of intestine    APPENDECTOMY      DENTAL SURGERY      HYSTERECTOMY,  VAGINAL      partial, benign    PRIMARY C SECTION      SALPINGO-OOPHORECTOMY, UNILATERAL             Family History   Problem Relation Age of Onset    Genetic Disorder Mother         Alzheimer's    Alcohol/Drug Father          Physical Exam:   Constitutional: Well nourished, NAD, pleasant  alert and cooperative; normal mood and affect; normal attention span and concentration.    Skin Full body exam done: no suspicious lesions on eyelids, lips, face, ears, neck, chest, back, abdomen, upper extremities, fingernails, lower extremities except as noted   R side nose with bilobed flap scar slightly smaller nostril   Face few brown macs  Abdomen few red vascular paps  Back, L shoulder, R side under breast few brown stuck on paps  R forearm few purpuric macs  R lower leg post diagonal scar              Assessment and Plan:    1. Purpura (HCC)  Pt has purpura on forearms./ pt not taking MVI, aspirin, NSAIDS. Explained supplements can cause easy bruising. Pt had CBC done 8/2022 with slightly high HB and slightly low platelets. Recommend f/u with PCP to see if further workup needed.      2. History of skin cancer  Well healed scaron R nose and R lweor leg. Recommend follow for changes. ABCD's of changing skin lesions were reviewed, and the appropriate use of sunscreen was outlined and recommended.     R nose - pt is concerned about R side being smaller. Photo taken with pt's cell phone. Recheck 6 months. Spot does not appear to be skin cancer coming back.    3. Hemangioma of skin  Explained spot is a blood vessel. Recommend follow for changes. ABCD's of changing skin lesions were reviewed, and the appropriate use of sunscreen was outlined and recommended.     4. Seborrheic keratoses  Explained spot is a wisdom spot. Recommend follow for changes. ABCD's of changing skin lesions were reviewed, and the appropriate use of sunscreen was outlined and recommended.          Mague Hernandez M.D.   Return in about 6 months (around  5/29/2023).

## 2022-12-06 DIAGNOSIS — M10.9 GOUT, ARTHRITIS: ICD-10-CM

## 2022-12-06 RX ORDER — ALLOPURINOL 300 MG/1
TABLET ORAL
Qty: 90 TABLET | Refills: 1 | Status: SHIPPED | OUTPATIENT
Start: 2022-12-06 | End: 2023-04-18 | Stop reason: SDUPTHER

## 2022-12-06 NOTE — TELEPHONE ENCOUNTER
Received request via: Patient    Was the patient seen in the last year in this department? Yes    Does the patient have an active prescription (recently filled or refills available) for medication(s) requested? No    Does the patient have [facility] and need 100 day supply (blood pressure, diabetes and cholesterol meds only)? Patient does not have SCP

## 2022-12-21 ENCOUNTER — HOSPITAL ENCOUNTER (OUTPATIENT)
Dept: LAB | Facility: MEDICAL CENTER | Age: 67
End: 2022-12-21
Attending: INTERNAL MEDICINE
Payer: COMMERCIAL

## 2022-12-21 LAB
ALBUMIN SERPL BCP-MCNC: 4.1 G/DL (ref 3.2–4.9)
ALBUMIN/GLOB SERPL: 1.6 G/DL
ALP SERPL-CCNC: 92 U/L (ref 30–99)
ALT SERPL-CCNC: 27 U/L (ref 2–50)
ANION GAP SERPL CALC-SCNC: 11 MMOL/L (ref 7–16)
AST SERPL-CCNC: 20 U/L (ref 12–45)
BILIRUB SERPL-MCNC: 0.6 MG/DL (ref 0.1–1.5)
BUN SERPL-MCNC: 21 MG/DL (ref 8–22)
CALCIUM ALBUM COR SERPL-MCNC: 7 MG/DL (ref 8.5–10.5)
CALCIUM SERPL-MCNC: 7.1 MG/DL (ref 8.5–10.5)
CHLORIDE SERPL-SCNC: 104 MMOL/L (ref 96–112)
CO2 SERPL-SCNC: 24 MMOL/L (ref 20–33)
CREAT SERPL-MCNC: 1.34 MG/DL (ref 0.5–1.4)
GFR SERPLBLD CREATININE-BSD FMLA CKD-EPI: 44 ML/MIN/1.73 M 2
GLOBULIN SER CALC-MCNC: 2.5 G/DL (ref 1.9–3.5)
GLUCOSE SERPL-MCNC: 96 MG/DL (ref 65–99)
PHOSPHATE SERPL-MCNC: 4.3 MG/DL (ref 2.5–4.5)
POTASSIUM SERPL-SCNC: 4.7 MMOL/L (ref 3.6–5.5)
PROT SERPL-MCNC: 6.6 G/DL (ref 6–8.2)
SODIUM SERPL-SCNC: 139 MMOL/L (ref 135–145)

## 2022-12-21 PROCEDURE — 84443 ASSAY THYROID STIM HORMONE: CPT

## 2022-12-21 PROCEDURE — 84100 ASSAY OF PHOSPHORUS: CPT

## 2022-12-21 PROCEDURE — 80053 COMPREHEN METABOLIC PANEL: CPT

## 2022-12-21 PROCEDURE — 84439 ASSAY OF FREE THYROXINE: CPT

## 2022-12-21 PROCEDURE — 36415 COLL VENOUS BLD VENIPUNCTURE: CPT

## 2022-12-22 ENCOUNTER — TELEPHONE (OUTPATIENT)
Dept: MEDICAL GROUP | Facility: CLINIC | Age: 67
End: 2022-12-22
Payer: COMMERCIAL

## 2022-12-22 LAB
T4 FREE SERPL-MCNC: 1.2 NG/DL (ref 0.93–1.7)
TSH SERPL DL<=0.005 MIU/L-ACNC: 0.47 UIU/ML (ref 0.38–5.33)

## 2022-12-22 NOTE — TELEPHONE ENCOUNTER
Phone Number Called: [phone] (home)       Call outcome: Spoke to patient regarding message below.    Message: I have called the patient with results and she stated she has appointment with the Endocrinology appt. And she does not want to stop taking her stopping allopurinol. She would wait to go see Endocrinology.

## 2022-12-22 NOTE — TELEPHONE ENCOUNTER
----- Message from Edilberto Stanford D.O. sent at 12/22/2022  2:06 PM PST -----  Please call patient. Inform patient kidney function has worsened. Looks like she has seen [facility], recommend patient schedule appointment soon to review medication. If patient is planning to continue to see [facility] recommend scheduling with them or patient can follow up here. Recommend stopping allopurinol medication.     Thank you.

## 2022-12-27 ENCOUNTER — HOSPITAL ENCOUNTER (OUTPATIENT)
Dept: LAB | Facility: MEDICAL CENTER | Age: 67
End: 2022-12-27
Attending: STUDENT IN AN ORGANIZED HEALTH CARE EDUCATION/TRAINING PROGRAM
Payer: MEDICARE

## 2022-12-27 ENCOUNTER — HOSPITAL ENCOUNTER (OUTPATIENT)
Dept: LAB | Facility: MEDICAL CENTER | Age: 67
End: 2022-12-27
Attending: INTERNAL MEDICINE
Payer: MEDICARE

## 2022-12-27 LAB
ALBUMIN SERPL BCP-MCNC: 4.3 G/DL (ref 3.2–4.9)
CALCIUM SERPL-MCNC: 8 MG/DL (ref 8.5–10.5)
URATE SERPL-MCNC: 2.6 MG/DL (ref 1.9–8.2)

## 2022-12-27 PROCEDURE — 82310 ASSAY OF CALCIUM: CPT

## 2022-12-27 PROCEDURE — 82040 ASSAY OF SERUM ALBUMIN: CPT

## 2022-12-27 PROCEDURE — 36415 COLL VENOUS BLD VENIPUNCTURE: CPT

## 2022-12-27 PROCEDURE — 84550 ASSAY OF BLOOD/URIC ACID: CPT

## 2023-01-11 RX ORDER — MECLIZINE HCL 12.5 MG/1
TABLET ORAL
Qty: 90 TABLET | Refills: 1 | OUTPATIENT
Start: 2023-01-11

## 2023-01-20 ENCOUNTER — OFFICE VISIT (OUTPATIENT)
Dept: MEDICAL GROUP | Facility: CLINIC | Age: 68
End: 2023-01-20
Payer: MEDICARE

## 2023-01-20 ENCOUNTER — HOSPITAL ENCOUNTER (OUTPATIENT)
Dept: LAB | Facility: MEDICAL CENTER | Age: 68
End: 2023-01-20
Attending: STUDENT IN AN ORGANIZED HEALTH CARE EDUCATION/TRAINING PROGRAM
Payer: MEDICARE

## 2023-01-20 DIAGNOSIS — R42 VERTIGO: ICD-10-CM

## 2023-01-20 DIAGNOSIS — R22.41 LOCALIZED SWELLING OF RIGHT LOWER EXTREMITY: ICD-10-CM

## 2023-01-20 DIAGNOSIS — R06.2 WHEEZING: ICD-10-CM

## 2023-01-20 DIAGNOSIS — E89.0 POSTSURGICAL HYPOTHYROIDISM: ICD-10-CM

## 2023-01-20 DIAGNOSIS — E03.9 HYPOTHYROIDISM, UNSPECIFIED TYPE: ICD-10-CM

## 2023-01-20 DIAGNOSIS — N18.9 CHRONIC KIDNEY DISEASE, UNSPECIFIED CKD STAGE: ICD-10-CM

## 2023-01-20 LAB
ANION GAP SERPL CALC-SCNC: 12 MMOL/L (ref 7–16)
APPEARANCE UR: CLEAR
BILIRUB UR QL STRIP.AUTO: NEGATIVE
BUN SERPL-MCNC: 16 MG/DL (ref 8–22)
CALCIUM SERPL-MCNC: 7.5 MG/DL (ref 8.5–10.5)
CHLORIDE SERPL-SCNC: 105 MMOL/L (ref 96–112)
CO2 SERPL-SCNC: 25 MMOL/L (ref 20–33)
COLOR UR: YELLOW
CREAT SERPL-MCNC: 0.98 MG/DL (ref 0.5–1.4)
CREAT UR-MCNC: 58.05 MG/DL
GFR SERPLBLD CREATININE-BSD FMLA CKD-EPI: 64 ML/MIN/1.73 M 2
GLUCOSE SERPL-MCNC: 100 MG/DL (ref 65–99)
GLUCOSE UR STRIP.AUTO-MCNC: NEGATIVE MG/DL
KETONES UR STRIP.AUTO-MCNC: NEGATIVE MG/DL
LEUKOCYTE ESTERASE UR QL STRIP.AUTO: NEGATIVE
MICRO URNS: NORMAL
MICROALBUMIN UR-MCNC: <1.2 MG/DL
MICROALBUMIN/CREAT UR: NORMAL MG/G (ref 0–30)
NITRITE UR QL STRIP.AUTO: NEGATIVE
PH UR STRIP.AUTO: 7 [PH] (ref 5–8)
POTASSIUM SERPL-SCNC: 4.1 MMOL/L (ref 3.6–5.5)
PROT UR QL STRIP: NEGATIVE MG/DL
RBC UR QL AUTO: NEGATIVE
SODIUM SERPL-SCNC: 142 MMOL/L (ref 135–145)
SP GR UR STRIP.AUTO: 1.01
UROBILINOGEN UR STRIP.AUTO-MCNC: 0.2 MG/DL

## 2023-01-20 PROCEDURE — 80048 BASIC METABOLIC PNL TOTAL CA: CPT

## 2023-01-20 PROCEDURE — 82570 ASSAY OF URINE CREATININE: CPT

## 2023-01-20 PROCEDURE — 81003 URINALYSIS AUTO W/O SCOPE: CPT

## 2023-01-20 PROCEDURE — 36415 COLL VENOUS BLD VENIPUNCTURE: CPT

## 2023-01-20 PROCEDURE — 99214 OFFICE O/P EST MOD 30 MIN: CPT | Mod: GC | Performed by: STUDENT IN AN ORGANIZED HEALTH CARE EDUCATION/TRAINING PROGRAM

## 2023-01-20 PROCEDURE — 82043 UR ALBUMIN QUANTITATIVE: CPT

## 2023-01-20 RX ORDER — LISINOPRIL 40 MG/1
TABLET ORAL
COMMUNITY
End: 2023-01-26

## 2023-01-20 RX ORDER — ALBUTEROL SULFATE 90 UG/1
2 AEROSOL, METERED RESPIRATORY (INHALATION) EVERY 6 HOURS PRN
Qty: 1 EACH | Refills: 1 | Status: SHIPPED | OUTPATIENT
Start: 2023-01-20 | End: 2023-04-18 | Stop reason: SDUPTHER

## 2023-01-20 RX ORDER — GABAPENTIN 300 MG/1
CAPSULE ORAL
COMMUNITY
End: 2023-01-26

## 2023-01-20 RX ORDER — LEVOTHYROXINE SODIUM 75 MCG
75 TABLET ORAL DAILY
Qty: 30 TABLET | Refills: 1 | Status: SHIPPED | OUTPATIENT
Start: 2023-01-20 | End: 2023-04-18 | Stop reason: SDUPTHER

## 2023-01-20 RX ORDER — MECLIZINE HCL 12.5 MG/1
12.5 TABLET ORAL
Qty: 90 TABLET | Refills: 1 | Status: SHIPPED | OUTPATIENT
Start: 2023-01-20 | End: 2023-04-18 | Stop reason: SDUPTHER

## 2023-01-20 RX ORDER — LEVOTHYROXINE SODIUM 50 MCG
50 TABLET ORAL DAILY
Qty: 30 TABLET | Refills: 1 | Status: SHIPPED | OUTPATIENT
Start: 2023-01-20 | End: 2023-04-18 | Stop reason: SDUPTHER

## 2023-01-20 ASSESSMENT — FIBROSIS 4 INDEX: FIB4 SCORE: 1.68

## 2023-01-20 NOTE — PROGRESS NOTES
"Subjective:     CC: Follow-up    HPI:   Marium presents today with    Right knee pain and swelling concerning for DVT   Patient was recently seen at physical therapy and there were swelling concerning for DVT  She has had swelling over her right medial knee  No redness or warmth  No hormone replacement therapy other than levothyroxine, no active cancer, no hemoptysis       Hypothyrodidsm   History of thyroidectomy  Previously managed by endocrinologist Dr. Juarez  Currently managed with levothyroxine 50 mcg 2 days a week, 75 mcg 5 days a week  Last TSH 12/2022 within normal limits  She is requesting refill of her medications due to insurance challenges with endocrinologist    Hypocalcemia   History of parathyroidectomy  Previously managed by endocrinologist Dr. Juarez  She is taking calcium for replacement      Vertigo  Patient has history of paroxysmal vertigo  She is chronically managed with meclizine as needed      Recent labs  Labs 12/2022: GFR reduction from 78-44, creatinine increased from 0.83-1.34  Denies any recent changes in medications      ROS: See HPI.     Objective:     Exam:  BP (!) (P) 129/91 (BP Location: Right arm, Patient Position: Sitting, BP Cuff Size: Adult)   Pulse (P) 79   Temp (P) 36.6 °C (97.9 °F) (Temporal)   Ht (P) 1.575 m (5' 2\")   Wt (P) 72.6 kg (160 lb)   LMP 10/02/1980   SpO2 (P) 93%   BMI (P) 29.26 kg/m²  Body mass index is 29.26 kg/m² (pended).    Physical Exam:  General: Pt resting in NAD, cooperative   Skin:  No cyanosis or jaundice   HEENT: NC/AT. EOMI. No conjunctival injection or sclera icterus.   Lungs: Bilateral expiratory wheezing at lung bases, nonlabored  Cardiovascular:  S1/S2 RRR   Abdomen:  Abdomen is soft, non-tender, non-distended, +BS  Extremities: Right medial knee swelling and tenderness to palpation, no erythema or warmth.  CNS:  No gross focal neurologic deficits  Psych: Appropriate mood and affect       Assessment & Plan:     67 y.o. female with the " following -     1. Chronic kidney disease, unspecified CKD stage  Acute.  Patient has reduction in GFR concerning for JASE.  Will evaluate with urinalysis, urine microalbumin and repeat labs.  Discussed nephrotoxic medications to avoid.  -Referral to pharmacotherapy  -Check labs   -Close follow up 1-2 weeks     2. Postsurgical hypothyroidism  Chronic.  Controlled.  Continue current regimen.  Will provide referral to new endocrinologist for future management.    3. Vertigo  Chronic.  Symptoms consistent with BPV. Controlled with meclizine.      4. Localized swelling of right lower extremity  Acute.  Patient has localized right lower extremity swelling concerning for DVT.  There is no redness or warmth however, unlikely to be DVT but will evaluate with stat ultrasound.  Discussed with patient that our clinic will be closed after 5 PM, and if she does not hear from anyone about her results after completing imaging studies to call our office to resume reviewe the results.  -STAT Ultrasound     5. Wheezing  Chronic.  Patient has wheezing on examination, no increased work of breathing.  She says that she was recently managed with albuterol.  Will provide new prescription for albuterol.

## 2023-01-21 ENCOUNTER — HOSPITAL ENCOUNTER (OUTPATIENT)
Dept: RADIOLOGY | Facility: MEDICAL CENTER | Age: 68
End: 2023-01-21
Attending: STUDENT IN AN ORGANIZED HEALTH CARE EDUCATION/TRAINING PROGRAM
Payer: MEDICARE

## 2023-01-21 DIAGNOSIS — R22.41 LOCALIZED SWELLING OF RIGHT LOWER EXTREMITY: ICD-10-CM

## 2023-01-21 PROCEDURE — 93970 EXTREMITY STUDY: CPT

## 2023-01-26 ENCOUNTER — OFFICE VISIT (OUTPATIENT)
Dept: MEDICAL GROUP | Facility: CLINIC | Age: 68
End: 2023-01-26
Payer: MEDICARE

## 2023-01-26 VITALS
TEMPERATURE: 97.5 F | RESPIRATION RATE: 16 BRPM | HEIGHT: 62 IN | DIASTOLIC BLOOD PRESSURE: 74 MMHG | BODY MASS INDEX: 29.44 KG/M2 | HEART RATE: 83 BPM | OXYGEN SATURATION: 94 % | WEIGHT: 160 LBS | SYSTOLIC BLOOD PRESSURE: 116 MMHG

## 2023-01-26 DIAGNOSIS — M25.561 PAIN AND SWELLING OF RIGHT KNEE: ICD-10-CM

## 2023-01-26 DIAGNOSIS — M25.461 PAIN AND SWELLING OF RIGHT KNEE: ICD-10-CM

## 2023-01-26 PROCEDURE — 20610 DRAIN/INJ JOINT/BURSA W/O US: CPT | Performed by: FAMILY MEDICINE

## 2023-01-26 RX ORDER — METHYLPREDNISOLONE ACETATE 40 MG/ML
80 INJECTION, SUSPENSION INTRA-ARTICULAR; INTRALESIONAL; INTRAMUSCULAR; SOFT TISSUE ONCE
Status: COMPLETED | OUTPATIENT
Start: 2023-01-26 | End: 2023-01-26

## 2023-01-26 RX ORDER — TRAZODONE HYDROCHLORIDE 100 MG/1
TABLET ORAL
COMMUNITY
End: 2023-01-26

## 2023-01-26 RX ORDER — POTASSIUM CHLORIDE 750 MG/1
TABLET, EXTENDED RELEASE ORAL
COMMUNITY
End: 2023-01-26

## 2023-01-26 RX ADMIN — METHYLPREDNISOLONE ACETATE 80 MG: 40 INJECTION, SUSPENSION INTRA-ARTICULAR; INTRALESIONAL; INTRAMUSCULAR; SOFT TISSUE at 13:04

## 2023-01-26 ASSESSMENT — FIBROSIS 4 INDEX: FIB4 SCORE: 1.68

## 2023-01-26 NOTE — PROGRESS NOTES
Subjective:   Marium Renteria is a 67 y.o. female here for the evaluation and management of Cyst (Rt knee cyst )      Problem   Pain and Swelling of Right Knee    Patient presents for follow-up of right knee swelling.  Was seen on 1/20 as her physical therapist had concerns that there was a DVT present.  Ultrasound revealed no DVT present, but there was a moderately large Baker's cyst reported.  Patient continues to endorse some pain with any knee flexion.  Has had a history of problems with her left leg, but believes her right knee has always been okay.  No redness/warmth.  Would like the area drained if possible.         ROS    Current Outpatient Medications   Medication Sig Dispense Refill    Cholecalciferol (VITAMIN D-3) 125 MCG (5000 UT) Tab as directed Orally Once a day      SYNTHROID 50 MCG Tab Take 1 Tablet by mouth every day. 30 Tablet 1    SYNTHROID 75 MCG Tab Take 1 Tablet by mouth every day. 30 Tablet 1    meclizine (ANTIVERT) 12.5 MG Tab Take 1 Tablet by mouth 1 time a day as needed (Vertigo). 90 Tablet 1    allopurinol (ZYLOPRIM) 300 MG Tab TAKE ONE TABLET BY MOUTH DAILY 90 Tablet 1    Cholecalciferol (VITAMIN D-3) 125 MCG (5000 UT) Tab as directed Strength: 125 MCG (5000 UT) 30 Tablet 3    gabapentin (NEURONTIN) 300 MG Cap Take 2 Capsules by mouth 3 times a day. 180 Capsule 1    traZODone (DESYREL) 50 MG Tab Take 2 tabs at night for sleep 180 Tablet 1    carbidopa-levodopa (SINEMET)  MG Tab Take 1 Tablet by mouth every day. TAKE ONE TABLET BY MOUTH DAILY 90 Tablet 1    potassium chloride SA (K-DUR) 10 MEQ Tab CR Take 1 Tablet by mouth every day. 90 Tablet 3    acetaminophen (TYLENOL) 500 MG Tab       metaxalone (SKELAXIN) 400 MG Tab Take 2 Tablets by mouth 3 times a day as needed (Pain). 120 Tablet 3    methocarbamol (ROBAXIN) 750 MG Tab Take 1 Tablet by mouth 3 times a day. 20 Tablet 0    lisinopril (PRINIVIL) 40 MG tablet Take 1 Tablet by mouth every day. 90 Tablet 2    EPINEPHrine  (EPIPEN) 0.3 MG/0.3ML Solution Auto-injector solution for injection INJECT INTO THE MIDDLE OF THE OUTER THIGH AND HOLD FOR 3 SECONDS AS NEEDED FOR SEVERE ALLERGIC REACTION THEN CALL 911 IF USED. (ONLY FOR BEE STING) 2 Each 1    albuterol (PROVENTIL HFA) 108 (90 Base) MCG/ACT Aero Soln inhalation aerosol Inhale 2 Puffs by mouth every 6 hours as needed for Shortness of Breath. 3 Inhaler 0    polyethylene glycol 3350 (MIRALAX) 17 GM/SCOOP Powder Take 17 g by mouth 3 times a day as needed (bowel care).      albuterol 108 (90 Base) MCG/ACT Aero Soln inhalation aerosol Inhale 2 Puffs every 6 hours as needed for Shortness of Breath. 1 Each 1    naproxen (NAPROSYN) 500 MG Tab  (Patient not taking: Reported on 1/20/2023)      Lidocaine 4 % Patch Apply 1 Application topically 1 time a day as needed (Pain). (Patient not taking: Reported on 11/29/2022) 30 Patch 3    calcitRIOL (ROCALTROL) 0.25 MCG Cap Take 1 Capsule by mouth 2 times a day. (Patient not taking: Reported on 11/29/2022) 180 Capsule 3    calcitRIOL (ROCALTROL) 0.25 MCG Cap TAKE ONE CAPSULE BY MOUTH DAILY (Patient not taking: Reported on 11/29/2022) 15 Cap 3    cholestyramine (QUESTRAN) 4 g packet MIX 1 PACKET WITH LIQUID AND DRINK ONCE DAILY (Patient not taking: Reported on 11/29/2022) 30 Each 1    Misc. Devices Misc As needed 1 Application 0    Misc. Devices Misc As needed (Patient not taking: Reported on 1/26/2023) 1 Application 0    Misc. Devices Misc As directed (Patient not taking: Reported on 1/26/2023) 90 Application 03    Misc. Devices Misc As directed (Patient not taking: Reported on 1/26/2023) 1 Application 0    Misc. Devices Misc TENS unit pads to be used as directed (Patient not taking: Reported on 1/26/2023) 10 Device 1    Misc. Devices Misc Incontinence supplies for incontinence to be changed once to twice daily (Patient not taking: Reported on 1/26/2023) 60 Device 11    Misc. Devices Misc Back brace to use as directed (Patient not taking: Reported on  "1/26/2023) 1 Device 0    Multiple Vitamin (DAILY-BRANDON) Tab Take 1 Tab by mouth every day. (Patient not taking: Reported on 1/20/2023)       No current facility-administered medications for this visit.       Allergies  Bee venom, Cillins [penicillins], Latex, Asa [aspirin], Coconut oil, Codeine, Contrast media with iodine [iodine], Hydrocodone-ibuprofen, Hydroxyzine, Ibuprofen, Norco [apap-fd&c yellow #10 [place]-hydrocodone], Other food, Sulfa drugs, Talwin, Tape, and Vicodin [hydrocodone-acetaminophen]    Past Medical History:   Diagnosis Date    Alcohol abuse, continuous drinking behavior 12/11/2009    Anesthesia 4-26-17    \"Hard to put to sleep\"    Anxiety     Arthritis 4-26-17    \"Hands\"    Chronic diarrhea 9/30/2013    Dental disorder 4-26-17    \"Upper permanent right side bridge\"    Gout     Gynecological disorder     \"HX Gonorrhea, was treated\"    Helicobacter pylori (H. pylori) infection 9/11/2012    Hemorrhagic disorder (HCC)     \"needed a lot of blood with back surgery\"    Hyperlipidemia 6/5/2015    Hypoparathyroidism (HCC) 12/11/2009    Lumbago 2017    back and neck    Mixed hyperlipidemia 12/11/2009    Osteoporosis, unspecified 12/11/2009    Other B-complex deficiencies 12/11/2009    Parotid cyst     Postsurgical hypothyroidism 12/11/2009    Psychiatric problem 4-26-17    depression, anxiety    Restless leg syndrome     S/P tooth extraction 4-25-17    Pt. states Dr. Sexton is aware.    Serum calcium elevated 10/8/2012    Sialolithiasis, ductal 7/21/2012    Snoring     Symptomatic menopausal or female climacteric states 12/11/2009    Tobacco abuse 12/11/2009    Unspecified essential hypertension 12/11/2009    Urinary incontinence     urgency       Patient Active Problem List    Diagnosis Date Noted    Pain and swelling of right knee 01/26/2023    Wheezing 01/20/2023    Swelling of right hand 11/15/2022    Closed displaced fracture of lateral malleolus of left fibula 10/14/2022    Therapeutic drug " monitoring 07/21/2022    Rash 07/21/2022    Vertigo 01/07/2022    Bilateral headaches 12/14/2021    Hypoglycemia 10/06/2021    Osteoarthrosis 10/06/2021    Macrocytosis 05/21/2019    Tobacco abuse counseling 05/21/2019    Incontinence in female 03/28/2019    Chronic pain syndrome 03/21/2019    Restless leg syndrome 10/10/2018    Debility 09/27/2018    Hypomagnesemia 09/22/2018    Hypothyroidism 09/20/2018    Ulcerative esophagitis 09/20/2018    Tobacco use 09/13/2018    Major depressive disorder 09/11/2017    Pain due to hip joint prosthesis (HCC) 08/07/2017    Benzodiazepine dependence, continuous (HCC) 04/26/2017    Cervical lymphadenopathy 10/07/2016    Osteoarthritis of cervical spine with myelopathy 09/14/2016    Failed back syndrome 09/14/2016    Depression 06/27/2016    Left lumbar radiculopathy 06/23/2016    Neuropathy 06/23/2016    Lumbar scoliosis 12/17/2015    History of alcohol abuse 12/07/2015    Hyperlipidemia 06/05/2015    Insomnia 05/23/2014    Chronic diarrhea 09/30/2013    ELISHA (generalized anxiety disorder) 08/28/2013    Gout, arthritis 05/24/2013    Mixed hyperlipidemia 12/11/2009    HTN (hypertension) 12/11/2009    Postsurgical hypothyroidism 12/11/2009    Osteoporosis, senile 12/11/2009    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    Symptomatic menopausal or female climacteric states 12/11/2009    Hypoparathyroidism (HCC) 12/11/2009    Cigarette nicotine dependence, uncomplicated 12/11/2009       Past Surgical History  Past Surgical History:   Procedure Laterality Date    VENTRAL HERNIA REPAIR  10/4/2018    Procedure: ABDOMINAL WOUND EXPLORATION AND SEROMA DRAINAGE;  Surgeon: Houston Amanda D.O.;  Location: SURGERY [facility];  Service: General    EXPLORATORY LAPAROTOMY  9/22/2018    Procedure: EXPLORATORY LAPAROTOMY;  Surgeon: Houston Amanda D.O.;  Location: SURGERY [facility];  Service: General    LYSIS ADHESIONS GENERAL  9/22/2018    Procedure: LYSIS ADHESIONS GENERAL;   Surgeon: Houston Amanda D.O.;  Location: SURGERY [facility];  Service: General    BOWEL RESECTION  9/22/2018    Procedure: BOWEL RESECTION- POSSIBLE;  Surgeon: Houston Amanda D.O.;  Location: SURGERY [facility];  Service: General    FUSION, SPINE, LUMBAR, PLIF  8/7/2017    Procedure: LUMBAR FUSION POSTERIOR EXPLORATION;  Surgeon: Garrett Sexton M.D.;  Location: SURGERY [facility];  Service:     HARDWARE REMOVAL NEURO  8/7/2017    Procedure: HARDWARE REMOVAL NEURO DEEP L1-L3, L4-S1, SEROMA REMOVAL;  Surgeon: Garrett Sexton M.D.;  Location: SURGERY [facility];  Service:     FUSION, SPINE, LUMBAR, PLIF  12/17/2015    Procedure: LUMBAR FUSION POSTERIOR L1-S1;  Surgeon: Garrett Sexton M.D.;  Location: SURGERY [facility];  Service:     LUMBAR LAMINECTOMY DISKECTOMY  12/17/2015    Procedure: LUMBAR LAMINECTOMY DISKECTOMY;  Surgeon: Garrett Sexton M.D.;  Location: SURGERY [facility];  Service:     LUMBAR DECOMPRESSION  12/17/2015    Procedure: LUMBAR DECOMPRESSION L1-L3;  Surgeon: Garrett Sexton M.D.;  Location: SURGERY [facility];  Service:     COLONOSCOPY WITH POLYP  8/1/2012    Performed by ARIA EMERSON at ENDOSCOPY [facility]    GASTROSCOPY WITH BIOPSY  8/1/2012    Performed by ARIA EMERSON at ENDOSCOPY [facility]    THYROIDECTOMY  2001    PARATHYROIDECTOMY  2001    COLON RESECTION  1985    7 feet of intestine    APPENDECTOMY      DENTAL SURGERY      HYSTERECTOMY, VAGINAL      partial, benign    PRIMARY C SECTION      SALPINGO-OOPHORECTOMY, UNILATERAL         Social History     Socioeconomic History    Marital status:     Number of children: 1   Occupational History    Occupation: none     Employer: OTHER   Tobacco Use    Smoking status: Every Day     Packs/day: 1.00     Years: 47.00     Pack years: 47.00     Types: Cigarettes     Start date: 4/25/1970    Smokeless tobacco: Never    Tobacco comments:     quit during pregnancy, 1 pack to 1.5 pack per day  "  Vaping Use    Vaping Use: Never used   Substance and Sexual Activity    Alcohol use: Not Currently     Alcohol/week: 1.2 oz     Types: 2 Cans of beer per week     Comment: quit drinking daily 3 years ago, now drinks 3-4 beers on weekends    Drug use: No    Sexual activity: Not Currently        Objective:     Vitals:    01/26/23 1101   BP: 116/74   BP Location: Left arm   Patient Position: Sitting   BP Cuff Size: Adult   Pulse: 83   Resp: 16   Temp: 36.4 °C (97.5 °F)   TempSrc: Temporal   SpO2: 94%   Weight: 72.6 kg (160 lb)   Height: 1.575 m (5' 2\")     Body mass index is 29.26 kg/m².     Physical Exam  GEN: Alert and oriented, NAD  Knee: Normal to inspection w/ no ecchymoses; mild amount of knee effusion present.  Some fullness to the posterior aspect of the knee.  Pulses appreciated.  No warmth/erythema.    Assessment and Plan:   Marium Renteria is a 67 y.o. female with a Cyst (Rt knee cyst )     The following was discussed with the patient today.    Problem List Items Addressed This Visit       Pain and swelling of right knee     Acute, worsening  Personally reviewed ultrasound imaging report from 1/21: No DVTs present - Moderately large right Baker's cyst   point-of-care ultrasound performed in clinic on 1/26 revealed a small Baker's cyst present.  There was increased fluid when patient was supine over the suprapatellar recess.  Shared decision making with the patient led to cortisone injection of the suprapatellar recess.    Dose:         2%  3mL Lidocaine without Epi & 2 mL and Steroid 80 mg DepoMedrol              Prep: Chloraprep     Procedure:     Risks, benefits, and alternatives discussed prior to the procedure.  Informed consent was obtained (please see scanned consent forms).  The area was prepped in the usual sterile manner. The needle was inserted into the suprapatellar recess under ultrasound guidance, and the steroid/lidocaine mixture was injected using a 25 gauge 1-1/2in needle, a total of " 5mL fluid injected.  There were no complications during this procedure. The patient tolerated the procedure well without complications.     Followup:   Standard post-procedure care is explained and return precautions are given.  Patient to notify MA in a week how she is doing.    Ordering 4 view x-ray of the right knee for evaluation         Relevant Orders    Consent for all Surgical, Special Diagnostic or Therapeutic Procedures    DX-KNEE COMPLETE 4+ RIGHT       Followup: Return if symptoms worsen or fail to improve.    Onur Montes De Oca M.D.    Patient seen/discussed with Dr. Angel MD.    Please note that this dictation was created using voice recognition software. I have made every reasonable attempt to correct obvious errors, but I expect that there are errors of grammar and possibly content that I did not discover before finalizing the note.

## 2023-01-26 NOTE — ASSESSMENT & PLAN NOTE
Acute, worsening  Personally reviewed ultrasound imaging report from 1/21: No DVTs present - Moderately large right Baker's cyst   point-of-care ultrasound performed in clinic on 1/26 revealed a small Baker's cyst present.  There was increased fluid when patient was supine over the suprapatellar recess.  Shared decision making with the patient led to cortisone injection of the suprapatellar recess.    Dose:         2%  3mL Lidocaine without Epi & 2 mL and Steroid 80 mg DepoMedrol              Prep: Chloraprep     Procedure:     Risks, benefits, and alternatives discussed prior to the procedure.  Informed consent was obtained (please see scanned consent forms).  The area was prepped in the usual sterile manner. The needle was inserted into the suprapatellar recess under ultrasound guidance, and the steroid/lidocaine mixture was injected using a 25 gauge 1-1/2in needle, a total of 5mL fluid injected.  There were no complications during this procedure. The patient tolerated the procedure well without complications.     Followup:   Standard post-procedure care is explained and return precautions are given.  Patient to notify MA in a week how she is doing.    Ordering 4 view x-ray of the right knee for evaluation

## 2023-02-14 ENCOUNTER — HOSPITAL ENCOUNTER (OUTPATIENT)
Dept: LAB | Facility: MEDICAL CENTER | Age: 68
End: 2023-02-14
Attending: STUDENT IN AN ORGANIZED HEALTH CARE EDUCATION/TRAINING PROGRAM
Payer: MEDICARE

## 2023-02-14 ENCOUNTER — OFFICE VISIT (OUTPATIENT)
Dept: MEDICAL GROUP | Facility: CLINIC | Age: 68
End: 2023-02-14
Payer: MEDICARE

## 2023-02-14 DIAGNOSIS — R42 VERTIGO: ICD-10-CM

## 2023-02-14 DIAGNOSIS — Z12.11 SCREENING FOR COLORECTAL CANCER: ICD-10-CM

## 2023-02-14 DIAGNOSIS — M71.21 SYNOVIAL CYST OF RIGHT POPLITEAL SPACE: ICD-10-CM

## 2023-02-14 DIAGNOSIS — M81.0 OSTEOPOROSIS, UNSPECIFIED OSTEOPOROSIS TYPE, UNSPECIFIED PATHOLOGICAL FRACTURE PRESENCE: ICD-10-CM

## 2023-02-14 DIAGNOSIS — E20.89 OTHER HYPOPARATHYROIDISM (HCC): ICD-10-CM

## 2023-02-14 DIAGNOSIS — M81.8 OTHER OSTEOPOROSIS WITHOUT CURRENT PATHOLOGICAL FRACTURE: ICD-10-CM

## 2023-02-14 DIAGNOSIS — Z13.820 SCREENING FOR OSTEOPOROSIS: ICD-10-CM

## 2023-02-14 DIAGNOSIS — Z12.12 SCREENING FOR COLORECTAL CANCER: ICD-10-CM

## 2023-02-14 LAB
ALBUMIN SERPL BCP-MCNC: 4.3 G/DL (ref 3.2–4.9)
ANION GAP SERPL CALC-SCNC: 13 MMOL/L (ref 7–16)
BUN SERPL-MCNC: 8 MG/DL (ref 8–22)
CALCIUM SERPL-MCNC: 8.4 MG/DL (ref 8.5–10.5)
CHLORIDE SERPL-SCNC: 103 MMOL/L (ref 96–112)
CO2 SERPL-SCNC: 26 MMOL/L (ref 20–33)
CREAT SERPL-MCNC: 0.66 MG/DL (ref 0.5–1.4)
GFR SERPLBLD CREATININE-BSD FMLA CKD-EPI: 96 ML/MIN/1.73 M 2
GLUCOSE SERPL-MCNC: 92 MG/DL (ref 65–99)
POTASSIUM SERPL-SCNC: 3.9 MMOL/L (ref 3.6–5.5)
SODIUM SERPL-SCNC: 142 MMOL/L (ref 135–145)

## 2023-02-14 PROCEDURE — 99214 OFFICE O/P EST MOD 30 MIN: CPT | Mod: GC | Performed by: STUDENT IN AN ORGANIZED HEALTH CARE EDUCATION/TRAINING PROGRAM

## 2023-02-14 PROCEDURE — 82040 ASSAY OF SERUM ALBUMIN: CPT

## 2023-02-14 PROCEDURE — 80048 BASIC METABOLIC PNL TOTAL CA: CPT

## 2023-02-14 PROCEDURE — 36415 COLL VENOUS BLD VENIPUNCTURE: CPT

## 2023-02-14 ASSESSMENT — FIBROSIS 4 INDEX: FIB4 SCORE: 1.68

## 2023-02-14 NOTE — ASSESSMENT & PLAN NOTE
Chronic.  Pain controlled.  Recommend following up with orthopedic surgery as she may need future injections.

## 2023-02-14 NOTE — ASSESSMENT & PLAN NOTE
Chronic.  Unclear when last DEXA scan was completed.  She is not currently being treated for osteoporosis.  Will evaluate with DEXA scan.

## 2023-02-14 NOTE — PATIENT INSTRUCTIONS
REFERRAL INFORMATION    Referral #:  [id number]   Referred-To Provider     Referred-By Provider:   Endocrinology    Edilberto Stanford D.O.    [facility])       [address] [place] [address]  [phone] [address] [place] [address]  [phone]     Referral Start Date:  01/20/2023   Referral End Date:   01/20/2024          REFERRAL INFORMATION    Referral #:  [id number]   Referred-To Service Location     Referred-By Provider:   Physical Therapy    Edilberto Stanford D.O.    [facility] PHYSICAL [facility]APY - [facility]       [address] [place] [address]  [phone] [address] [facility]  [address]  [place] [address]  [phone]     Referral Start Date:  11/07/2022   Referral End Date:   11/07/2023

## 2023-02-14 NOTE — PROGRESS NOTES
"Subjective:     CC: Follow up     HPI:   Marium presents today with    Problem   Other Osteoporosis Without Current Pathological Fracture    Patient reports history of osteoporosis  She reports that her last DEXA scan was multiple years ago  She is not currently on a bisphosphonate or medications for osteoporosis  She is currently taking calcium and vitamin D for hypoparathyroidism     Vertigo    Patient has history of chronic vertigo  She has been managing her symptoms with meclizine       Hypoparathyroidism (Hcc)    Previously managed by endocrinology.  She has been referred to an endocrinologist, but has not called to schedule an appointment.  She has a history of parathyroidectomy and calcium in albumin levels are monitored frequently.    She is currently managing hypocalcemia with Tums 2000 mg daily and vitamin D3.     Synovial Cyst of Right Popliteal Space (Resolved)    Patient has history of right Bakers cyst  Treated with steroid injection 1/26/2023  She reports improvement of her pain and swelling after injection             ROS: See HPI.     Objective:     Exam:  BP (P) 117/82 (BP Location: Right arm, Patient Position: Sitting, BP Cuff Size: Adult)   Pulse (P) 90   Temp (P) 36.7 °C (98 °F) (Temporal)   Ht (P) 1.575 m (5' 2\")   Wt (P) 72.6 kg (160 lb)   LMP 10/02/1980   BMI (P) 29.26 kg/m²  Body mass index is 29.26 kg/m² (pended).    Physical Exam:  General: Pt resting in NAD, cooperative   Skin:  No cyanosis or jaundice   HEENT: NC/AT. EOMI. No conjunctival injection or sclera icterus.   Lungs:  CTAB, good air movement. Non-labored.   Cardiovascular:  S1/S2 RRR   MSK: Right knee: Minimal swelling, no redness.   CNS:  No gross focal neurologic deficits  Psych: Appropriate mood and affect       Assessment & Plan:     67 y.o. female with the following -     Problem List Items Addressed This Visit       Hypoparathyroidism (HCC)     Chronic.  Labs ordered.  We will continue to manage until she establishes " with endocrinology.  Continue current regimen.   -Tums 2000 mg daily  -Vitamin D3   -Follow up with endocrinology          Relevant Orders    Basic Metabolic Panel    ALBUMIN    Vertigo     Chronic.  Controlled.  Continue meclizine.         Other osteoporosis without current pathological fracture     Chronic.  Unclear when last DEXA scan was completed.  She is not currently being treated for osteoporosis.  Will evaluate with DEXA scan.         RESOLVED: Synovial cyst of right popliteal space     Chronic.  Pain controlled.  Recommend following up with orthopedic surgery as she may need future injections.          Other Visit Diagnoses       Screening for colorectal cancer        Relevant Orders    COLOGUARD (FIT DNA)    Screening for osteoporosis        Osteoporosis, unspecified osteoporosis type, unspecified pathological fracture presence        Relevant Orders    DS-BONE DENSITY STUDY (DEXA)

## 2023-02-14 NOTE — ASSESSMENT & PLAN NOTE
Chronic.  Labs ordered.  We will continue to manage until she establishes with endocrinology.  Continue current regimen.   -Tums 2000 mg daily  -Vitamin D3   -Follow up with endocrinology    Hip pain

## 2023-03-24 ENCOUNTER — HOSPITAL ENCOUNTER (EMERGENCY)
Facility: MEDICAL CENTER | Age: 68
End: 2023-03-24
Attending: EMERGENCY MEDICINE
Payer: MEDICARE

## 2023-03-24 ENCOUNTER — APPOINTMENT (OUTPATIENT)
Dept: RADIOLOGY | Facility: MEDICAL CENTER | Age: 68
End: 2023-03-24
Attending: EMERGENCY MEDICINE
Payer: MEDICARE

## 2023-03-24 VITALS
WEIGHT: 155 LBS | BODY MASS INDEX: 28.52 KG/M2 | RESPIRATION RATE: 18 BRPM | SYSTOLIC BLOOD PRESSURE: 121 MMHG | HEART RATE: 80 BPM | OXYGEN SATURATION: 93 % | TEMPERATURE: 98.4 F | DIASTOLIC BLOOD PRESSURE: 68 MMHG | HEIGHT: 62 IN

## 2023-03-24 DIAGNOSIS — T07.XXXA MULTIPLE CONTUSIONS: ICD-10-CM

## 2023-03-24 PROCEDURE — 700102 HCHG RX REV CODE 250 W/ 637 OVERRIDE(OP): Performed by: EMERGENCY MEDICINE

## 2023-03-24 PROCEDURE — A9270 NON-COVERED ITEM OR SERVICE: HCPCS | Performed by: EMERGENCY MEDICINE

## 2023-03-24 PROCEDURE — 73630 X-RAY EXAM OF FOOT: CPT | Mod: LT

## 2023-03-24 PROCEDURE — 73564 X-RAY EXAM KNEE 4 OR MORE: CPT | Mod: RT

## 2023-03-24 PROCEDURE — 99284 EMERGENCY DEPT VISIT MOD MDM: CPT

## 2023-03-24 PROCEDURE — 73030 X-RAY EXAM OF SHOULDER: CPT | Mod: LT

## 2023-03-24 PROCEDURE — 72100 X-RAY EXAM L-S SPINE 2/3 VWS: CPT

## 2023-03-24 RX ORDER — OXYCODONE HYDROCHLORIDE AND ACETAMINOPHEN 5; 325 MG/1; MG/1
1 TABLET ORAL EVERY 4 HOURS PRN
Qty: 15 TABLET | Refills: 0 | Status: CANCELLED | OUTPATIENT
Start: 2023-03-24 | End: 2023-03-31

## 2023-03-24 RX ORDER — IBUPROFEN 600 MG/1
600 TABLET ORAL ONCE
Status: COMPLETED | OUTPATIENT
Start: 2023-03-24 | End: 2023-03-24

## 2023-03-24 RX ADMIN — IBUPROFEN 600 MG: 600 TABLET, FILM COATED ORAL at 11:33

## 2023-03-24 ASSESSMENT — PAIN DESCRIPTION - PAIN TYPE: TYPE: CHRONIC PAIN;ACUTE PAIN

## 2023-03-24 ASSESSMENT — FIBROSIS 4 INDEX: FIB4 SCORE: 1.68

## 2023-03-24 NOTE — ED PROVIDER NOTES
ED Provider Note    CHIEF COMPLAINT  Chief Complaint   Patient presents with    Fall     3days ago    Leg Pain     Right x3days    Knee Pain     Right        EXTERNAL RECORDS REVIEWED  Outpatient Notes outpatient encounter with primary doctor [facility].  Patient has osteoporosis, vertigo hypoparathyroid and a synovial cyst in the right knee    HPI/ROS    OUTSIDE HISTORIAN(S):   reports that the patient has had pain in her knee and it seems to give out.    Marium Renteria is a 67 y.o. female who presents after ground-level fall.  Patient was walking felt like her right knee gave out she fell backwards caught herself with her left arm as she was landing on a .  She has pain in her left shoulder, low back, right knee, left foot.  Noticed bruising over the back of the right knee and over the dorsum of the left foot.  She has chronic back pain and has had a number of surgeries.  The pain seems worse.  She tried taking Tylenol at home without relief.  She has no new weakness numbness or neurologic symptoms, but has motor and sensory problems that are chronic from her previous back surgery.  Denies syncope, chest pain shortness of breath abdominal pain.    PAST MEDICAL HISTORY   has a past medical history of Alcohol abuse, continuous drinking behavior (12/11/2009), Anesthesia (4-26-17), Anxiety, Arthritis (4-26-17), Chronic diarrhea (9/30/2013), Dental disorder (4-26-17), Gout, Gynecological disorder, Helicobacter pylori (H. pylori) infection (9/11/2012), Hemorrhagic disorder (HCC), Hyperlipidemia (6/5/2015), Hypoparathyroidism (HCC) (12/11/2009), Lumbago (2017), Mixed hyperlipidemia (12/11/2009), Osteoporosis, unspecified (12/11/2009), Other B-complex deficiencies (12/11/2009), Parotid cyst, Postsurgical hypothyroidism (12/11/2009), Psychiatric problem (4-26-17), Restless leg syndrome, S/P tooth extraction (4-25-17), Serum calcium elevated (10/8/2012), Sialolithiasis, ductal  "(7/21/2012), Snoring, Symptomatic menopausal or female climacteric states (12/11/2009), Tobacco abuse (12/11/2009), Unspecified essential hypertension (12/11/2009), and Urinary incontinence.    SURGICAL HISTORY   has a past surgical history that includes thyroidectomy (2001); parathyroidectomy (2001); salpingo-oophorectomy, unilateral; hysterectomy, vaginal; dental surgery; colonoscopy with polyp (8/1/2012); gastroscopy with biopsy (8/1/2012); fusion, spine, lumbar, plif (12/17/2015); lumbar laminectomy diskectomy (12/17/2015); primary c section; lumbar decompression (12/17/2015); colon resection (1985); appendectomy; fusion, spine, lumbar, plif (8/7/2017); hardware removal neuro (8/7/2017); exploratory laparotomy (9/22/2018); lysis adhesions general (9/22/2018); bowel resection (9/22/2018); and ventral hernia repair (10/4/2018).    FAMILY HISTORY  Family History   Problem Relation Age of Onset    Genetic Disorder Mother         Alzheimer's    Alcohol/Drug Father        SOCIAL HISTORY  Social History     Tobacco Use    Smoking status: Every Day     Packs/day: 1.00     Years: 47.00     Pack years: 47.00     Types: Cigarettes     Start date: 4/25/1970    Smokeless tobacco: Never    Tobacco comments:     quit during pregnancy, 1 pack to 1.5 pack per day   Vaping Use    Vaping Use: Never used   Substance and Sexual Activity    Alcohol use: Not Currently     Alcohol/week: 1.2 oz     Types: 2 Cans of beer per week     Comment: quit drinking daily 3 years ago, now drinks 3-4 beers on weekends    Drug use: No    Sexual activity: Not Currently       CURRENT MEDICATIONS  Home Medications    **Home medications have not yet been reviewed for this encounter**         ALLERGIES  Allergies   Allergen Reactions    Bee Venom Anaphylaxis    Cillins [Penicillins] Anaphylaxis and Hives     Appears to have tolerated unasyn 9/13/18    Latex Rash     rash    Asa [Aspirin] Unspecified     Pt states \"It tears up my stomach\".    Coconut Oil " "Itching and Swelling     Raw coconut    Codeine Itching     Pt states \"I itch so much\".    Contrast Media With Iodine [Iodine] Vomiting and Nausea     Not sure of reaction    Hydrocodone-Ibuprofen      \"Extreme itching\"    Hydroxyzine Unspecified     \"loss of memory\"    Ibuprofen Unspecified     Pt states \"It tears up my stomach\".    Norco [Apap-Fd&C Yellow #10 [place]-Hydrocodone] Itching     Pt states \"I itch all over\".    Other Food Unspecified     sourdough bread  Pt states \"My face and mouth gets all tingling\".    Lactose intolerant per patient.     Sulfa Drugs Hives    Talwin Unspecified     Pt states \"I get forgetful\".    Tape Swelling     Red swelling    Vicodin [Hydrocodone-Acetaminophen] Itching     Pt states \"I itch so much\".       PHYSICAL EXAM  VITAL SIGNS: /88   Pulse 80   Temp 36.7 °C (98.1 °F) (Temporal)   Resp 18   Ht 1.575 m (5' 2\")   Wt 70.3 kg (155 lb)   LMP 10/02/1980   SpO2 95%   BMI 28.35 kg/m²    Constitutional: Awake and alert  HENT: Normal inspection  Eyes: Normal inspection  Neck:   Nontender full range of motion  Cardiovascular: Normal heart rate, Normal rhythm.  Symmetric peripheral pulses.   Thorax & Lungs: No respiratory distress, No wheezing, No rales, No rhonchi, No chest tenderness.   Abdomen: Bowel sounds normal, soft, non-distended, nontender, no mass  Skin: No obvious rash.  Back: No tenderness, No CVA tenderness.   Extremities: Anterior shoulder tenderness without deformity ecchymosis.  Normal range of motion but with discomfort.  Remainder of left upper extremity nontender.  Right upper extremity nontender.  Bilateral hips nontender.  Pelvis stable.  There is tenderness and ecchymosis right posterior knee.  There is no desi ligamentous instability although some discomfort with medial lateral stress.  There is a bruise in the back of the left knee but without any bony tenderness or pain with range of motion.  Bruising over the left dorsal foot with tenderness.  No " other focal bony tenderness  Neurologic: Grossly normal   Psychiatric: Normal for situation    DIAGNOSTIC STUDIES / PROCEDURES    RADIOLOGY  I have independently interpreted the diagnostic imaging associated with this visit and am waiting the final reading from the radiologist.   My preliminary interpretation is as follows: Left shoulder x-ray negative for fracture  Radiologist interpretation:   DX-SHOULDER 2+ LEFT   Final Result      1.  No radiographic evidence of acute traumatic injury.   2.  Degenerative changes of the glenohumeral joint.      DX-LUMBAR SPINE-2 OR 3 VIEWS   Final Result      1.  Decreased osseous mineralization without evidence of displaced fracture. If there is concern for occult fracture, cross-sectional imaging can be obtained.   2.  Extensive degenerative and surgical changes of the lumbar spine.      DX-KNEE COMPLETE 4+ RIGHT   Final Result      1.  No radiographic evidence of acute traumatic injury.   2.  Mild joint space loss of the medial compartment.   3.  Likely enchondroma or bone infarct of the distal femoral diametaphysis. Consider further evaluation as indicated.      DX-FOOT-COMPLETE 3+ LEFT   Final Result      No acute fracture or dislocation is noted.            COURSE & MEDICAL DECISION MAKING    INITIAL ASSESSMENT, COURSE AND PLAN  Care Narrative: Patient presents with multiple areas of injury after ground-level fall.  All areas of injury were radiographically.  No displaced fractures are identified.  Patient has multiple nonemergent findings.  Of note patient reports having chronic knee pain.  An x-ray shows either enchondroma versus bone infarct of the distal femoral diametaphysis.  This should be evaluated further on an outpatient basis.  I will refer to orthopedics.  Dr. Mckay on-call.  Patient is given a prescription for a few Norco tablets to take as needed.  Precautions were given.  Patient to return to the ER for uncontrolled pain or concern      ADDITIONAL PROBLEM  LIST  Back pain  DISPOSITION AND DISCUSSIONS    Discussion of management with other Hospitals in [place] or appropriate source(s): Pharmacy reviewed medications prescribed    Escalation of care considered, and ultimately not performed: Considered CT scan or MRI.  Does not appear to be indicated emergently.  No suggestion of infection toxicity or indication for laboratory data.    Barriers to care at this time, including but not limited to: Patient reports problem with her insurance and that few specialists except this..     Decision tools and prescription drugs considered including, but not limited to: Prescribed Norco for pain..    FINAL DIAGNOSIS  Shoulder strain  Lumbar strain  Right knee sprain  Bilateral leg contusions  Left foot contusion  Abnormal radiographs of the right knee requiring follow-up       Electronically signed by: Bennett Montes M.D., 3/24/2023 10:43 AM

## 2023-03-24 NOTE — ED TRIAGE NOTES
Chief Complaint   Patient presents with    Fall     3days ago    Leg Pain     Right x3days    Knee Pain     Right      Pt to triage reports falling 3days ago on open , pt said that she has large bruise back of her lower leg. Unable to place pressure on her light leg, using walker at home.

## 2023-04-18 ENCOUNTER — OFFICE VISIT (OUTPATIENT)
Dept: MEDICAL GROUP | Facility: CLINIC | Age: 68
End: 2023-04-18
Payer: MEDICARE

## 2023-04-18 DIAGNOSIS — G25.9 EXTRAPYRAMIDAL AND MOVEMENT DISORDER: ICD-10-CM

## 2023-04-18 DIAGNOSIS — E20.9 HYPOPARATHYROIDISM, UNSPECIFIED HYPOPARATHYROIDISM TYPE (HCC): ICD-10-CM

## 2023-04-18 DIAGNOSIS — R42 VERTIGO: ICD-10-CM

## 2023-04-18 DIAGNOSIS — G25.81 RESTLESS LEG SYNDROME: ICD-10-CM

## 2023-04-18 DIAGNOSIS — G62.9 NEUROPATHY: ICD-10-CM

## 2023-04-18 DIAGNOSIS — R06.2 WHEEZING: ICD-10-CM
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
## 2023-04-18 DIAGNOSIS — I10 PRIMARY HYPERTENSION: ICD-10-CM

## 2023-04-18 DIAGNOSIS — S82.62XA CLOSED DISPLACED FRACTURE OF LATERAL MALLEOLUS OF LEFT FIBULA, INITIAL ENCOUNTER: ICD-10-CM

## 2023-04-18 DIAGNOSIS — M10.9 GOUT, ARTHRITIS: ICD-10-CM

## 2023-04-18 DIAGNOSIS — E03.9 HYPOTHYROIDISM, UNSPECIFIED TYPE: ICD-10-CM

## 2023-04-18 DIAGNOSIS — S99.912A ANKLE INJURIES, LEFT, INITIAL ENCOUNTER: ICD-10-CM

## 2023-04-18 DIAGNOSIS — T78.40XD ALLERGIC REACTION, SUBSEQUENT ENCOUNTER: ICD-10-CM

## 2023-04-18 DIAGNOSIS — E89.0 POSTSURGICAL HYPOTHYROIDISM: ICD-10-CM

## 2023-04-18 PROCEDURE — 99213 OFFICE O/P EST LOW 20 MIN: CPT | Mod: GE | Performed by: STUDENT IN AN ORGANIZED HEALTH CARE EDUCATION/TRAINING PROGRAM

## 2023-04-18 RX ORDER — ACETAMINOPHEN 500 MG
500 TABLET ORAL EVERY 6 HOURS PRN
Qty: 90 TABLET | Refills: 1 | Status: SHIPPED | OUTPATIENT
Start: 2023-04-18

## 2023-04-18 RX ORDER — OFLOXACIN 3 MG/ML
SOLUTION/ DROPS OPHTHALMIC
COMMUNITY
Start: 2023-03-13 | End: 2024-01-24

## 2023-04-18 RX ORDER — PREDNISOLONE ACETATE 10 MG/ML
SUSPENSION/ DROPS OPHTHALMIC
COMMUNITY
Start: 2023-03-13 | End: 2023-06-21

## 2023-04-18 RX ORDER — ALBUTEROL SULFATE 90 UG/1
2 AEROSOL, METERED RESPIRATORY (INHALATION) EVERY 6 HOURS PRN
Qty: 1 EACH | Refills: 1 | Status: SHIPPED | OUTPATIENT
Start: 2023-04-18 | End: 2023-06-20 | Stop reason: SDUPTHER

## 2023-04-18 RX ORDER — EPINEPHRINE 0.3 MG/.3ML
0.3 INJECTION SUBCUTANEOUS ONCE
Qty: 2 EACH | Refills: 1 | Status: SHIPPED | OUTPATIENT
Start: 2023-04-18 | End: 2023-04-18

## 2023-04-18 RX ORDER — GABAPENTIN 300 MG/1
600 CAPSULE ORAL 3 TIMES DAILY
Qty: 180 CAPSULE | Refills: 1 | Status: SHIPPED | OUTPATIENT
Start: 2023-04-18 | End: 2023-06-20 | Stop reason: SDUPTHER

## 2023-04-18 RX ORDER — LEVOTHYROXINE SODIUM 50 MCG
50 TABLET ORAL
Qty: 90 TABLET | Refills: 1 | Status: SHIPPED | OUTPATIENT
Start: 2023-04-19 | End: 2023-10-27 | Stop reason: SDUPTHER

## 2023-04-18 RX ORDER — LISINOPRIL 40 MG/1
40 TABLET ORAL DAILY
Qty: 90 TABLET | Refills: 2 | Status: SHIPPED | OUTPATIENT
Start: 2023-04-18

## 2023-04-18 RX ORDER — METHOCARBAMOL 750 MG/1
750 TABLET, FILM COATED ORAL 3 TIMES DAILY
Qty: 20 TABLET | Refills: 0 | Status: SHIPPED | OUTPATIENT
Start: 2023-04-18 | End: 2023-05-22 | Stop reason: SDUPTHER

## 2023-04-18 RX ORDER — ALLOPURINOL 300 MG/1
TABLET ORAL
Qty: 90 TABLET | Refills: 1 | Status: SHIPPED | OUTPATIENT
Start: 2023-04-18 | End: 2023-12-01 | Stop reason: SDUPTHER

## 2023-04-18 RX ORDER — LEVOTHYROXINE SODIUM 75 MCG
75 TABLET ORAL DAILY
Qty: 90 TABLET | Refills: 1 | Status: SHIPPED | OUTPATIENT
Start: 2023-04-18 | End: 2023-10-27 | Stop reason: SDUPTHER

## 2023-04-18 RX ORDER — TRAZODONE HYDROCHLORIDE 50 MG/1
TABLET ORAL
Qty: 180 TABLET | Refills: 1 | Status: SHIPPED | OUTPATIENT
Start: 2023-04-18 | End: 2023-07-12 | Stop reason: SDUPTHER

## 2023-04-18 RX ORDER — MECLIZINE HCL 12.5 MG/1
12.5 TABLET ORAL
Qty: 90 TABLET | Refills: 1 | Status: SHIPPED | OUTPATIENT
Start: 2023-04-18 | End: 2024-01-09 | Stop reason: SDUPTHER

## 2023-04-18 RX ORDER — NAPROXEN 500 MG/1
500 TABLET ORAL
Qty: 60 TABLET | Refills: 1 | Status: SHIPPED | OUTPATIENT
Start: 2023-04-18

## 2023-04-18 ASSESSMENT — FIBROSIS 4 INDEX: FIB4 SCORE: 1.68

## 2023-04-18 NOTE — PROGRESS NOTES
Subjective:     CC: ER Follow up     HPI:   Marium presents today with    ER follow-up  Patient presented the emergency department 3/24/2023 for ground-level fall.  Per patient she did not experience any preceding symptoms of chest pain, shortness of breath or palpitations.  She believes this is related to weakness in her knee.  She was evaluated with x-rays which showed enchondroma versus bone infarct of the distal femoral diametaphysis.  She was referred to orthopedic surgery for follow-up.  She was seen at orthopedic surgery 3/28/2023 with recommendation for MRI for further evaluation.  She has not completed the MRI and expressed concerns that she has had difficulty contacting their office.  Discussed with patient that I recommend scheduling appointment with orthopedic group to discuss her other orthopedic complaints that she has been experiencing and follow-up on MRI that has been ordered.    Hypothyroidism  Patient has history of hypothyroidism.  She currently takes levothyroxine 75 mcg Monday, Tuesday, Thursday, Friday and Saturday and 50 mcg Wednesday and Sunday.    Hypertension  Patient has history of hypertension.  Current regimen lisinopril 40 mg daily.    Hyperparathyroidism  Patient has history of hypoparathyroidism previously managed by endocrinology.  She has been referred to new endocrinologist but has not been able to schedule appointment.  She is currently taking Tums 200 mg daily and vitamin D3.     Restless leg syndrome  Patient has history of restless leg syndrome which has been managed with carbidopa levodopa.    Gout  Patient has history of gout, current regimen allopurinol 300 mg daily.    Chronic pain  Patient has history of chronic pain has been managed with Robaxin, gabapentin, and naproxen.    Vertigo  Patient has history of vertigo is managed with meclizine.      ROS: See HPI.     Objective:     Exam:  BP (P) 132/85 (BP Location: Right arm, Patient Position: Sitting, BP Cuff Size: Adult)   " Pulse (P) 77   Temp (P) 36.8 °C (98.3 °F) (Temporal)   Ht (P) 1.575 m (5' 2\")   Wt (P) 70.3 kg (155 lb)   LMP 10/02/1980   SpO2 (P) 96%   BMI (P) 28.35 kg/m²  Body mass index is 28.35 kg/m² (pended).    Physical Exam:  General: Pt resting in NAD, cooperative   Skin:  No cyanosis or jaundice   HEENT: NC/AT. EOMI. No conjunctival injection or sclera icterus.   Lungs:  CTAB, good air movement. Non-labored.   Cardiovascular:  S1/S2 RRR   CNS:  No gross focal neurologic deficits  Psych: Appropriate mood and affect       Assessment & Plan:     67 y.o. female with the following -     1. Fall, initial encounter  Patient here for follow-up after experiencing ground-level fall.  She was seen in the emergency department and evaluated with x-rays which showed abnormalities on femur, she has subsequently followed up with orthopedic surgery.  Recommend following up with orthopedic surgery for completion of MRI.  I also provided referral to new orthopedic surgery if she is unable to follow-up.  - Referral to Orthopedics  - methocarbamol (ROBAXIN) 750 MG Tab; Take 1 Tablet by mouth 3 times a day.  Dispense: 20 Tablet; Refill: 0    2. Vertigo  Chronic.  Continue current regimen.  - meclizine (ANTIVERT) 12.5 MG Tab; Take 1 Tablet by mouth 1 time a day as needed (Vertigo).  Dispense: 90 Tablet; Refill: 1    3. Postsurgical hypothyroidism  Chronic.  Continue current regimen.  Levothyroxine 75 mcg Monday, Tuesday, Thursday, Friday, Saturday and levothyroxine 50 mcg Sunday and Wednesday.  Check TSH.  Recommend follow-up in 1 month.    4. Hypoparathyroidism, unspecified hypoparathyroidism type (HCC)  Chronic.  Recommend establishing care with new endocrinologist.  Discussed with patient we will continue to manage until she is able to establish with new endocrinologist.  Recommend continuing current regimen.  We will continue to monitor with labs.  Recommend follow-up in 1 month.    5. Gout, arthritis  Chronic.  Continue current " regimen.  - allopurinol (ZYLOPRIM) 300 MG Tab; TAKE ONE TABLET BY MOUTH DAILY  Dispense: 90 Tablet; Refill: 1    6. Restless leg syndrome  Chronic.  Continue current regimen.    7. Neuropathy  Chronic.  Continue current regimen.  - gabapentin (NEURONTIN) 300 MG Cap; Take 2 Capsules by mouth 3 times a day.  Dispense: 180 Capsule; Refill: 1    8. Primary hypertension  Chronic.  Continue current regimen.  Levothyroxine 40 mg daily.    9. Allergic reaction, subsequent encounter  EpiPen refilled per patient request.  - EPINEPHrine (EPIPEN) 0.3 MG/0.3ML Solution Auto-injector solution for injection; Inject 0.3 mL into the shoulder, thigh, or buttocks one time for 1 dose.  Dispense: 2 Each; Refill: 1    11. Wheezing  Albuterol refilled per patient request.  - albuterol 108 (90 Base) MCG/ACT Aero Soln inhalation aerosol; Inhale 2 Puffs every 6 hours as needed for Shortness of Breath.  Dispense: 1 Each; Refill: 1    Refilled all of patient's medications per request.  Discussed with patient that she is on multiple medications and is at risk for adverse effects due to polypharmacy.  She is tolerating medications well without any adverse effects.  Recommend following up in 1 month for further discussions regarding medications.

## 2023-04-18 NOTE — PATIENT INSTRUCTIONS
REFERRAL APPROVAL NOTICE                                                                     Sent on January 26, 2023                                                                                                                                   Marium Hartmann Dexter  [id number]  [place] [address]                    Dear Ms. Renteria,     After a careful review of the medical information and benefit coverage, [facility] has processed your referral. See below for additional details.     If applicable, you must be actively enrolled with your insurance for coverage of the authorized service. If you have any questions regarding your coverage, please contact your insurance directly.     REFERRAL INFORMATION    Referral #:  [id number]   Referred-To Provider     Referred-By Provider:   Endocrinology    Edilberto Stanford D.O.    [facility] ([facility])       [address] [place] [address]  [phone] [address] [place] [address]  [phone]     Referral Start Date:  01/20/2023   Referral End Date:   01/20/2024

## 2023-05-22 ENCOUNTER — OFFICE VISIT (OUTPATIENT)
Dept: MEDICAL GROUP | Facility: CLINIC | Age: 68
End: 2023-05-22
Payer: MEDICARE

## 2023-05-22 VITALS — DIASTOLIC BLOOD PRESSURE: 74 MMHG | SYSTOLIC BLOOD PRESSURE: 119 MMHG

## 2023-05-22 DIAGNOSIS — M25.561 CHRONIC PAIN OF RIGHT KNEE: ICD-10-CM

## 2023-05-22 DIAGNOSIS — R93.7 ABNORMAL X-RAY OF BONE: ICD-10-CM

## 2023-05-22 DIAGNOSIS — G89.29 CHRONIC PAIN OF RIGHT KNEE: ICD-10-CM

## 2023-05-22 DIAGNOSIS — G89.29 CHRONIC LEFT SHOULDER PAIN: ICD-10-CM

## 2023-05-22 DIAGNOSIS — M25.512 CHRONIC LEFT SHOULDER PAIN: ICD-10-CM

## 2023-05-22 DIAGNOSIS — M54.50 LOW BACK PAIN, UNSPECIFIED BACK PAIN LATERALITY, UNSPECIFIED CHRONICITY, UNSPECIFIED WHETHER SCIATICA PRESENT: ICD-10-CM

## 2023-05-22 PROCEDURE — 3074F SYST BP LT 130 MM HG: CPT | Performed by: STUDENT IN AN ORGANIZED HEALTH CARE EDUCATION/TRAINING PROGRAM

## 2023-05-22 PROCEDURE — 3078F DIAST BP <80 MM HG: CPT | Performed by: STUDENT IN AN ORGANIZED HEALTH CARE EDUCATION/TRAINING PROGRAM

## 2023-05-22 PROCEDURE — 99213 OFFICE O/P EST LOW 20 MIN: CPT | Mod: GE | Performed by: STUDENT IN AN ORGANIZED HEALTH CARE EDUCATION/TRAINING PROGRAM

## 2023-05-22 RX ORDER — EPINEPHRINE 0.3 MG/.3ML
INJECTION SUBCUTANEOUS
COMMUNITY
Start: 2023-04-18

## 2023-05-22 RX ORDER — METHOCARBAMOL 750 MG/1
750 TABLET, FILM COATED ORAL 3 TIMES DAILY PRN
Qty: 90 TABLET | Refills: 0 | Status: SHIPPED | OUTPATIENT
Start: 2023-05-22 | End: 2023-06-21

## 2023-05-22 ASSESSMENT — FIBROSIS 4 INDEX: FIB4 SCORE: 1.68

## 2023-05-22 NOTE — PROGRESS NOTES
Subjective:     CC: follow-up    HPI:   Marium presents today for a follow-up. Patient states she was unable to get labs done due to the lab facility not being able to get a good draw.   #Left shoulder pain  Patient reports she continues to have left shoulder pain that she rates out of 12 out of 10.  She states this does wake her up at night.  She was recently seen by the [facility] clinic and they had recommended getting MRI.  She reports she is having difficulty getting MRI as she is requiring sedation and [facility] is no longer taking her insurance.  #Right knee pain  Patient reports that this is worsening over time.  She does state that anytime she tries to weight-bear it is now popping out to the side.  She denies any pain medications working for her.  She reports she is unable to bear weight due to the popping as well as instability.  She does state there is a prior history of trauma.  She was recently seen at the [facility] clinic who had further recommended an MRI.    Patient also states that she has been on methocarbamol 3 times daily for approximately the last 3 years.  She was started on this medication after she had an injury which does help with the muscle spasms.      No problems updated.    Current Outpatient Medications Ordered in Epic   Medication Sig Dispense Refill    ofloxacin (OCUFLOX) 0.3 % Solution       prednisoLONE acetate (PRED FORTE) 1 % Suspension       meclizine (ANTIVERT) 12.5 MG Tab Take 1 Tablet by mouth 1 time a day as needed (Vertigo). 90 Tablet 1    SYNTHROID 75 MCG Tab Take 1 Tablet by mouth every day. Monday, Tuesday, Thursday, Friday, Saturday 90 Tablet 1    SYNTHROID 50 MCG Tab Take 1 Tablet by mouth Every Sunday and Wednesday. 90 Tablet 1    traZODone (DESYREL) 50 MG Tab Take 2 tabs at night for sleep 180 Tablet 1    naproxen (NAPROSYN) 500 MG Tab Take 1 Tablet by mouth 1 time a day as needed (Pain). 60 Tablet 1    methocarbamol (ROBAXIN) 750 MG Tab Take 1 Tablet by mouth 3 times a day. 20  "Tablet 0    lisinopril (PRINIVIL) 40 MG tablet Take 1 Tablet by mouth every day. 90 Tablet 2    gabapentin (NEURONTIN) 300 MG Cap Take 2 Capsules by mouth 3 times a day. 180 Capsule 1    Cholecalciferol (VITAMIN D-3) 125 MCG (5000 UT) Tab as directed Strength: 125 MCG (5000 UT) 30 Tablet 3    carbidopa-levodopa (SINEMET)  MG Tab Take 1 Tablet by mouth every day. TAKE ONE TABLET BY MOUTH DAILY 90 Tablet 1    allopurinol (ZYLOPRIM) 300 MG Tab TAKE ONE TABLET BY MOUTH DAILY 90 Tablet 1    albuterol 108 (90 Base) MCG/ACT Aero Soln inhalation aerosol Inhale 2 Puffs every 6 hours as needed for Shortness of Breath. 1 Each 1    acetaminophen (TYLENOL) 500 MG Tab Take 1 Tablet by mouth every 6 hours as needed for Moderate Pain. 90 Tablet 1    potassium chloride SA (K-DUR) 10 MEQ Tab CR Take 1 Tablet by mouth every day. 90 Tablet 3    Misc. Devices Misc Back brace to use as directed 1 Device 0    polyethylene glycol 3350 (MIRALAX) 17 GM/SCOOP Powder Take 17 g by mouth 3 times a day as needed (bowel care).       No current [facility]-ordered facility-administered medications on file.       ROS:  Review of Systems   Musculoskeletal:  Positive for joint pain (chronic. shoulder, knee).   All other systems reviewed and are negative.      Objective:     Exam:  Pulse (P) 75   Temp (P) 36.7 °C (98.1 °F) (Temporal)   Ht (P) 1.626 m (5' 4\")   Wt (P) 70.3 kg (155 lb)   LMP 10/02/1980   SpO2 (P) 95%   BMI (P) 26.61 kg/m²  Body mass index is 26.61 kg/m² (pended).    Physical Exam  Vitals and nursing note reviewed.   Constitutional:       General: She is not in acute distress.  HENT:      Head: Normocephalic and atraumatic.      Nose: Nose normal.   Eyes:      Extraocular Movements: Extraocular movements intact.      Conjunctiva/sclera: Conjunctivae normal.   Cardiovascular:      Rate and Rhythm: Normal rate and regular rhythm.      Heart sounds: No murmur heard.  Pulmonary:      Effort: Pulmonary effort is normal. No respiratory " distress.      Breath sounds: No wheezing.   Abdominal:      General: There is no distension.   Musculoskeletal:      Cervical back: No rigidity.      Comments: In electric scooter.  Right knee is slightly swollen.  There is tenderness to palpation at the medial joint line.  There is no tender to palpation to the posterior knee.  Patient is wearing a brace of the right knee.  Active abduction of left shoulder to approximately 30 degrees.  Exam limited due to pain.   Neurological:      Mental Status: She is alert.         Labs: Patient was unable to have labs done as the lab was unable to draw at time of visit.    Assessment & Plan:     67 y.o. female with the following -     #Right knee pain  Chronic.  Patient has right knee pain and was seen by the [facility] clinic who had recommended an MRI of the right knee at the same time as an MRI of the left shoulder.  X-ray done on 3/24/2023 showed likely enchondroma or bone infarct of the distal femur diametaphysis.  These results were discussed with patient today.  Discussed recommendation of aquatic physical therapy as patient is nonweightbearing on the right knee due to pain and instability currently.  She was previously referred to aquatic PT however insurance no longer does this.  Patient is comfortable doing PT exercises in the pool at the gym until she gets new insurance to be able to go to aquatic PT.  She declined PT referral today until she has new insurance.  Discussed with patient the importance of the MRI and that she should call a facility that covers her insurance and is able to do an MRI under sedation.  She is agreeable to this.  -Continue to follow-up with [facility]  -Follow-up following MRI    #left shoulder pain  Chronic.  Has a history of osteoarthritis of the left shoulder.  Was recently seen by the [facility] who had recommended an MRI of the left shoulder to evaluate for possible intervention.  She has been having a hard time getting this as she cannot find a  place that takes her insurance that we will do an MRI under sedation.  Recommended places for patient to call in order to schedule an MRI under sedation for the left shoulder as well as the right knee.  Also discussed with patient about doing PT exercises in the pool at the gym.  She is agreeable to this.  -Follow-up after MRI    #Chronic pain  Patient had a previous ankle injury and she was prescribed Robaxin at that time for which she has been taking for the last 3 years for muscle spasm and this works well for her.  She does need a refill on this today.  Counseled on patient that this is not typically used long-term and that is recommended that she be on this for a short course and only as needed.  She is agreeable to this.  There was a refill given today for a 30-day supply.  Patient is high risk for other medications due to polypharmacy as well as drug allergies.    Problem List Items Addressed This Visit    None  Visit Diagnoses       Low back pain, unspecified back pain laterality, unspecified chronicity, unspecified whether sciatica present        Relevant Medications    methocarbamol (ROBAXIN) 750 MG Tab    Chronic pain of right knee        Chronic left shoulder pain        Relevant Medications    methocarbamol (ROBAXIN) 750 MG Tab    Abnormal x-ray of bone                I spent a total of 55 minutes with record review, exam, communication with the patient, communication with other providers, and documentation of this encounter.      Return for Follow-up after MRI.      Diana Nice MD  [facility] PGY-2

## 2023-06-20 ENCOUNTER — OFFICE VISIT (OUTPATIENT)
Dept: MEDICAL GROUP | Facility: CLINIC | Age: 68
End: 2023-06-20
Payer: MEDICARE

## 2023-06-20 VITALS
WEIGHT: 155 LBS | HEART RATE: 93 BPM | BODY MASS INDEX: 28.52 KG/M2 | HEIGHT: 62 IN | OXYGEN SATURATION: 93 % | TEMPERATURE: 98.2 F | SYSTOLIC BLOOD PRESSURE: 102 MMHG | DIASTOLIC BLOOD PRESSURE: 68 MMHG

## 2023-06-20 DIAGNOSIS — G62.9 NEUROPATHY: ICD-10-CM

## 2023-06-20 DIAGNOSIS — R06.2 WHEEZING: ICD-10-CM

## 2023-06-20 DIAGNOSIS — E83.51 HYPOCALCEMIA: ICD-10-CM

## 2023-06-20 PROCEDURE — 99213 OFFICE O/P EST LOW 20 MIN: CPT | Mod: GE | Performed by: STUDENT IN AN ORGANIZED HEALTH CARE EDUCATION/TRAINING PROGRAM

## 2023-06-20 PROCEDURE — 3078F DIAST BP <80 MM HG: CPT | Performed by: STUDENT IN AN ORGANIZED HEALTH CARE EDUCATION/TRAINING PROGRAM

## 2023-06-20 PROCEDURE — 3074F SYST BP LT 130 MM HG: CPT | Performed by: STUDENT IN AN ORGANIZED HEALTH CARE EDUCATION/TRAINING PROGRAM

## 2023-06-20 RX ORDER — ALBUTEROL SULFATE 90 UG/1
2 AEROSOL, METERED RESPIRATORY (INHALATION) EVERY 6 HOURS PRN
Qty: 1 EACH | Refills: 1 | Status: SHIPPED | OUTPATIENT
Start: 2023-06-20 | End: 2024-01-24 | Stop reason: SDUPTHER

## 2023-06-20 RX ORDER — GABAPENTIN 300 MG/1
600 CAPSULE ORAL 3 TIMES DAILY
Qty: 180 CAPSULE | Refills: 1 | Status: SHIPPED | OUTPATIENT
Start: 2023-06-20 | End: 2023-08-21

## 2023-06-20 ASSESSMENT — FIBROSIS 4 INDEX: FIB4 SCORE: 1.68

## 2023-06-20 NOTE — PROGRESS NOTES
Subjective:     CC: Follow-up    HPI:   Marium presents today for 1 month follow-up.  Patient reports that she still has been unable to get the MRI done but she did find that she was able to have sedation at [place] so she is planning to schedule this.  She reports that she was seen at [facility] again and they expressed concern of possible cancer in the bone although they were initially not concerned about cancer.  She reports continuing to have pain in the right knee as well as in the left shoulder.  She states that now that she will be sitting without even moving the right leg and her knee will dislocate and then pop back in.  She states that she is having some issues with sleep when the pain is severe.  Patient also reports that she has had a lump in her right forearm for a long time now and reports that this is slightly increased in size as well as mid upper arm.  She denies any pain.  She denies any other symptoms.  She was able to have her labs done.    No problems updated.    Current Outpatient Medications Ordered in Epic   Medication Sig Dispense Refill    gabapentin (NEURONTIN) 300 MG Cap Take 2 Capsules by mouth 3 times a day. 180 Capsule 1    EPINEPHrine (EPIPEN) 0.3 MG/0.3ML Solution Auto-injector solution for injection       methocarbamol (ROBAXIN) 750 MG Tab Take 1 Tablet by mouth 3 times a day as needed (Pain, muscle spasms) for up to 30 days. 90 Tablet 0    ofloxacin (OCUFLOX) 0.3 % Solution       meclizine (ANTIVERT) 12.5 MG Tab Take 1 Tablet by mouth 1 time a day as needed (Vertigo). 90 Tablet 1    SYNTHROID 75 MCG Tab Take 1 Tablet by mouth every day. Monday, Tuesday, Thursday, Friday, Saturday 90 Tablet 1    SYNTHROID 50 MCG Tab Take 1 Tablet by mouth Every Sunday and Wednesday. 90 Tablet 1    traZODone (DESYREL) 50 MG Tab Take 2 tabs at night for sleep 180 Tablet 1    naproxen (NAPROSYN) 500 MG Tab Take 1 Tablet by mouth 1 time a day as needed (Pain). 60 Tablet 1    lisinopril (PRINIVIL)  "40 MG tablet Take 1 Tablet by mouth every day. 90 Tablet 2    Cholecalciferol (VITAMIN D-3) 125 MCG (5000 UT) Tab as directed Strength: 125 MCG (5000 UT) 30 Tablet 3    carbidopa-levodopa (SINEMET)  MG Tab Take 1 Tablet by mouth every day. TAKE ONE TABLET BY MOUTH DAILY 90 Tablet 1    allopurinol (ZYLOPRIM) 300 MG Tab TAKE ONE TABLET BY MOUTH DAILY 90 Tablet 1    albuterol 108 (90 Base) MCG/ACT Aero Soln inhalation aerosol Inhale 2 Puffs every 6 hours as needed for Shortness of Breath. 1 Each 1    acetaminophen (TYLENOL) 500 MG Tab Take 1 Tablet by mouth every 6 hours as needed for Moderate Pain. 90 Tablet 1    potassium chloride SA (K-DUR) 10 MEQ Tab CR Take 1 Tablet by mouth every day. 90 Tablet 3    Misc. Devices Misc Back brace to use as directed 1 Device 0    polyethylene glycol 3350 (MIRALAX) 17 GM/SCOOP Powder Take 17 g by mouth 3 times a day as needed (bowel care).      prednisoLONE acetate (PRED FORTE) 1 % Suspension  (Patient not taking: Reported on 6/20/2023)       No current [facility]-ordered facility-administered medications on file.       Health Maintenance: Completed    ROS:  Review of Systems   All other systems reviewed and are negative.      Objective:     Exam:  /68 (BP Location: Left arm, Patient Position: Sitting, BP Cuff Size: Adult)   Pulse 93   Temp 36.8 °C (98.2 °F) (Temporal)   Ht 1.575 m (5' 2\")   Wt 70.3 kg (155 lb)   LMP 10/02/1980   SpO2 93%   BMI 28.35 kg/m²  Body mass index is 28.35 kg/m².    Physical Exam  Vitals and nursing note reviewed.   Constitutional:       General: She is not in acute distress.  HENT:      Head: Normocephalic and atraumatic.      Nose: Nose normal.   Eyes:      Extraocular Movements: Extraocular movements intact.      Conjunctiva/sclera: Conjunctivae normal.   Pulmonary:      Effort: Pulmonary effort is normal. No respiratory distress.   Abdominal:      General: Abdomen is flat. There is no distension.   Musculoskeletal:      Cervical back: " Normal range of motion and neck supple.      Right lower leg: No edema.      Left lower leg: No edema.      Comments: Patient in electric scooter.  Right knee in brace.  Decreased range of motion of the left shoulder.  Decreased range of motion of the right knee.   Skin:     Comments: Multiple pigmented scattered lesions throughout upper extremities.  Pigmented lesion on right temple.   Neurological:      General: No focal deficit present.      Mental Status: She is alert.   Psychiatric:         Mood and Affect: Mood normal.         Behavior: Behavior normal.         Thought Content: Thought content normal.         Judgment: Judgment normal.         Labs: Labs from 5/26 reviewed.  CMP showed mildly low calcium at 8.3.  Albumin within normal.  TSH low at 0.21, normal T4.    Assessment & Plan:     67 y.o. female with the following -     #Right knee pain  Has been occurring for some time now after she had a fall.  [facility] and had an x-ray done.  She is still waiting to have the MRI done as she was having issues finding a place that would do it with her insurance because she required sedation.  She continues to have the right knee pain and now is having possible dislocation even at rest.  She is continuing to wear the brace.  Recommended patient continue to see the [facility] and wait for results of MRI of her further discussion of possible intervention.  She is agreeable to this.    #Left shoulder pain  Chronic.  Does occasionally lock and she has issues moving it.  She has daily pain.  She is a poor candidate for other pain medications due to polypharmacy as well as allergies.  She is waiting to have the MRI scheduled on the left shoulder for further evaluation by orthopedic for possible intervention.    #Wheezing  #Nicotine dependence  Does request a refill on her albuterol inhaler today.  Reports she is doing well with this.    #History of melanoma  #Skin lesions  Patient does have a history  of melanoma on her nose that she required a Mohs procedure.  She has not been following up with dermatology as she had a poor experience with cancer specialist.  She does have lesions on her right temple and forehead as well as scattered throughout her upper extremities that have increased in size and she is concerned about them.  -Return to clinic for skin biopsy    Problem List Items Addressed This Visit       Neuropathy    Relevant Medications    gabapentin (NEURONTIN) 300 MG Cap    Wheezing    Relevant Medications    albuterol 108 (90 Base) MCG/ACT Aero Soln inhalation aerosol     Other Visit Diagnoses       Hypocalcemia        Relevant Orders    Comp Metabolic Panel            I spent a total of 45 minutes with record review, exam, communication with the patient, communication with other providers, and documentation of this encounter.      Return for skin biopsy.      Diana Nice MD  [facility] PGY-2

## 2023-07-12 ENCOUNTER — OFFICE VISIT (OUTPATIENT)
Dept: MEDICAL GROUP | Facility: CLINIC | Age: 68
End: 2023-07-12
Payer: MEDICARE

## 2023-07-12 ENCOUNTER — HOSPITAL ENCOUNTER (OUTPATIENT)
Facility: MEDICAL CENTER | Age: 68
End: 2023-07-12
Attending: STUDENT IN AN ORGANIZED HEALTH CARE EDUCATION/TRAINING PROGRAM
Payer: MEDICARE

## 2023-07-12 VITALS
OXYGEN SATURATION: 91 % | HEIGHT: 62 IN | DIASTOLIC BLOOD PRESSURE: 74 MMHG | BODY MASS INDEX: 29.44 KG/M2 | SYSTOLIC BLOOD PRESSURE: 109 MMHG | TEMPERATURE: 98.7 F | WEIGHT: 160 LBS | HEART RATE: 75 BPM

## 2023-07-12 DIAGNOSIS — L98.9 SKIN LESION: ICD-10-CM

## 2023-07-12 LAB
FORWARD REASON: SPWHY: NORMAL
FORWARDED TO LAB: SPWHR: NORMAL
SPECIMEN SENT: SPWT1: NORMAL

## 2023-07-12 PROCEDURE — 3078F DIAST BP <80 MM HG: CPT | Performed by: STUDENT IN AN ORGANIZED HEALTH CARE EDUCATION/TRAINING PROGRAM

## 2023-07-12 PROCEDURE — 11104 PUNCH BX SKIN SINGLE LESION: CPT | Mod: XS | Performed by: STUDENT IN AN ORGANIZED HEALTH CARE EDUCATION/TRAINING PROGRAM

## 2023-07-12 PROCEDURE — 3074F SYST BP LT 130 MM HG: CPT | Performed by: STUDENT IN AN ORGANIZED HEALTH CARE EDUCATION/TRAINING PROGRAM

## 2023-07-12 PROCEDURE — 17110 DESTRUCTION B9 LES UP TO 14: CPT | Performed by: STUDENT IN AN ORGANIZED HEALTH CARE EDUCATION/TRAINING PROGRAM

## 2023-07-12 RX ORDER — MELATONIN
5000 DAILY
COMMUNITY

## 2023-07-12 RX ORDER — ALLOPURINOL 300 MG/1
300 TABLET ORAL DAILY
COMMUNITY
End: 2024-01-24

## 2023-07-12 RX ORDER — ACETAMINOPHEN 500 MG
500 TABLET ORAL
COMMUNITY
End: 2024-01-24

## 2023-07-12 RX ORDER — TRAZODONE HYDROCHLORIDE 50 MG/1
TABLET ORAL
Qty: 180 TABLET | Refills: 1 | Status: SHIPPED | OUTPATIENT
Start: 2023-07-12 | End: 2023-12-26

## 2023-07-12 RX ORDER — POTASSIUM CHLORIDE 750 MG/1
10 TABLET, FILM COATED, EXTENDED RELEASE ORAL DAILY
COMMUNITY
End: 2023-11-06 | Stop reason: SDUPTHER

## 2023-07-12 RX ORDER — NAPROXEN SODIUM 220 MG
220 TABLET ORAL
COMMUNITY
End: 2024-01-24

## 2023-07-12 ASSESSMENT — ENCOUNTER SYMPTOMS: ROS SKIN COMMENTS: + SKIN LESION

## 2023-07-12 ASSESSMENT — FIBROSIS 4 INDEX: FIB4 SCORE: 1.68

## 2023-07-12 NOTE — PROCEDURES
Excision of Benign Skin Lesion Procedure Note  PRE-OP DIAGNOSIS: viral wart   POST-OP DIAGNOSIS: Same   PROCEDURE: skin lesion excision/cryotherapy   Performing Physician: Diana Nice M.D.  Supervising Physician (if applicable): Bennett Lockett MD    PROCEDURE:   Cryotherapy    The area surrounding the skin lesion was prepared and draped in the  usual sterile manner. The lesion was removed in the usual manner by the  biopsy method noted above. Hemostasis was assured.    Closure: None    Followup: The patient tolerated the procedure well without  complications.  Standard post-procedure care is explained and return  precautions are given.    Dr. Lockett was the attending physician was present throughout entire procedure.

## 2023-07-12 NOTE — PROGRESS NOTES
Subjective:     CC: imaging results, procedure    HPI:   Marium presents today to discuss imaging results.  She reports that she continues to have pain in her left shoulder and right knee and that occasionally her left shoulder will freeze. She reports having appointment with orthopedic surgery today.     No problems updated.    Current Outpatient Medications Ordered in Epic   Medication Sig Dispense Refill    acetaminophen (TYLENOL) 500 MG Tab Take 500 mg by mouth.      vitamin D (CHOLECALCIFEROL) 1000 Unit Tab Take 5,000 Units by mouth every day.      naproxen (ANAPROX) 220 MG tablet Take 220 mg by mouth.      potassium chloride ER (KLOR-CON) 10 MEQ tablet Take 10 mEq by mouth every day.      albuterol (PROVENTIL) 2.5mg/0.5ml Nebu Soln Inhale 2.5 mg.      traZODone (DESYREL) 50 MG Tab Take 2 tabs at night for sleep 180 Tablet 1    gabapentin (NEURONTIN) 300 MG Cap Take 2 Capsules by mouth 3 times a day. 180 Capsule 1    albuterol 108 (90 Base) MCG/ACT Aero Soln inhalation aerosol Inhale 2 Puffs every 6 hours as needed for Shortness of Breath. 1 Each 1    EPINEPHrine (EPIPEN) 0.3 MG/0.3ML Solution Auto-injector solution for injection       ofloxacin (OCUFLOX) 0.3 % Solution       meclizine (ANTIVERT) 12.5 MG Tab Take 1 Tablet by mouth 1 time a day as needed (Vertigo). 90 Tablet 1    SYNTHROID 75 MCG Tab Take 1 Tablet by mouth every day. Monday, Tuesday, Thursday, Friday, Saturday 90 Tablet 1    SYNTHROID 50 MCG Tab Take 1 Tablet by mouth Every Sunday and Wednesday. 90 Tablet 1    naproxen (NAPROSYN) 500 MG Tab Take 1 Tablet by mouth 1 time a day as needed (Pain). 60 Tablet 1    lisinopril (PRINIVIL) 40 MG tablet Take 1 Tablet by mouth every day. 90 Tablet 2    Cholecalciferol (VITAMIN D-3) 125 MCG (5000 UT) Tab as directed Strength: 125 MCG (5000 UT) 30 Tablet 3    carbidopa-levodopa (SINEMET)  MG Tab Take 1 Tablet by mouth every day. TAKE ONE TABLET BY MOUTH DAILY 90 Tablet 1    allopurinol (ZYLOPRIM) 300 MG  "Tab TAKE ONE TABLET BY MOUTH DAILY 90 Tablet 1    acetaminophen (TYLENOL) 500 MG Tab Take 1 Tablet by mouth every 6 hours as needed for Moderate Pain. 90 Tablet 1    potassium chloride SA (K-DUR) 10 MEQ Tab CR Take 1 Tablet by mouth every day. 90 Tablet 3    Misc. Devices Misc Back brace to use as directed 1 Device 0    polyethylene glycol 3350 (MIRALAX) 17 GM/SCOOP Powder Take 17 g by mouth 3 times a day as needed (bowel care).      allopurinol (ZYLOPRIM) 300 MG Tab Take 300 mg by mouth every day.       No current [facility]-ordered facility-administered medications on file.     ROS:  Review of Systems   Musculoskeletal:  Positive for joint pain (knee, shoulder, chronic).   Skin:         + skin lesion    All other systems reviewed and are negative.      Objective:     Exam:  /74 (BP Location: Right arm, Patient Position: Sitting, BP Cuff Size: Adult)   Pulse 75   Temp 37.1 °C (98.7 °F) (Temporal)   Ht 1.575 m (5' 2\")   Wt 72.6 kg (160 lb)   LMP 10/02/1980   SpO2 91%   BMI 29.26 kg/m²  Body mass index is 29.26 kg/m².    Physical Exam  Vitals and nursing note reviewed.   Constitutional:       General: She is not in acute distress.     Appearance: Normal appearance. She is not ill-appearing or toxic-appearing.   HENT:      Head: Normocephalic and atraumatic.   Eyes:      Extraocular Movements: Extraocular movements intact.      Conjunctiva/sclera: Conjunctivae normal.   Pulmonary:      Effort: Pulmonary effort is normal. No respiratory distress.   Abdominal:      General: There is no distension.   Musculoskeletal:      Comments: Decreased range of motion of left shoulder.   Skin:     Comments: Hyperpigmented irregular shaped lesion on right bicep. Raised, centrally ulcerated lesion on left cheek. Viral wart to right thumb.    Neurological:      General: No focal deficit present.      Mental Status: She is alert.   Psychiatric:         Mood and Affect: Mood normal.         Behavior: Behavior normal.         " Thought Content: Thought content normal.         Judgment: Judgment normal.         Assessment & Plan:     67 y.o. female with the following -     #skin lesion  Hyperpigmented irregular shaped skin lesion on right bicep. Due to history of melanoma and significant sun exposure, lesion was biopsied. Consent obtained.   See procedure note for further detail.     #viral wart  On right thumb, requesting cryotherapy. Consent obtained. Tolerated well. See procedure note for further detail.     #left shoulder pain  Chronic, following with orthopedic surgery, has appointment this afternoon. Discussed MRI results with patient. Will further discuss with orthopedic surgery this afternoon at scheduled appointment.    Problem List Items Addressed This Visit    None  Visit Diagnoses       Skin lesion        Relevant Orders    Consent for all Surgical, Special Diagnostic or Therapeutic Procedures    Pathology Specimen            Return in about 3 months (around 10/12/2023) for medication follow-up.      Diana Nice MD  [facility] PGY-2

## 2023-07-12 NOTE — PROCEDURES
Skin Biopsy Procedure Note    PRE-OP DIAGNOSIS: Skin lesion   POST-OP DIAGNOSIS: Same   PROCEDURE: skin biopsy  Performing Physician: Diana Nice M.D.  Supervising Physician (if applicable): Bennett Lockett MD    PROCEDURE:   Punch Biopsy (Size 4mm)    The area surrounding the skin lesion was prepared and draped in the  usual sterile manner. The lesion was removed in the usual manner by the  biopsy method noted above. Hemostasis was assured. The patient tolerated  the procedure well.    Closure: Steri Strip     Followup: The patient tolerated the procedure well without  complications.  Standard post-procedure care is explained and return  precautions are given.    Dr. Lockett was the attending physician and was present throughout entire procedure.

## 2023-08-04 NOTE — ED NOTES
[facility] - Medical Surgical Unit  Hospital Medicine  Discharge Summary      Patient Name: Tosin Cortez  MRN: [id number]  JANET: [id number]  Patient Class: IP- Swing  Admission Date: 7/13/2023  Hospital Length of Stay: 22 days  Discharge Date and Time:  08/04/2023 2:12 PM  Attending Physician: Won Plasencia DO   Discharging Provider: Won Plasencia DO  Primary Care Provider: Zechariah Camacho MD    Primary Care Team: Networked reference to record PCT     HPI:   Mrs. Tosin Cortez is a pleasant 89 y/o  female who has admitted to [facility] for skilled nursing services, PT/OT s/p surgical reduction of a right hip dislocation on 7/11/2023 by Dr. Pena of [facility]. She is doing well this am, No major pain.  Tolerating diet.       * No surgery found *      Hospital Course:   7/17 Seems to be doing well, UA was abnormal and started on Keflex.     7/21 Urine culture came back E. Coli, S to Cephalosporins.  Finished course.  She states she is real weak today and feels bad.  No elaboration though.  Just legs weak.     7/28 Doing well, still weak but she does participate with therapy     7/31 Doing better strength wise.  Asking about going home.     8/4 DC home, she has been on O2.  Checked RA sats and were low.  But when she coughed up secretions and took deep breath she came up 94%.  Will send home with duonebs.  Son says he has a nebulizer.  She has no hx of COPD but may need a workup at some point.        Goals of Care Treatment Preferences:  Code Status: DNR      Consults:   Consults (From admission, onward)        Status Ordering Provider     Inpatient consult to Registered Dietitian/Nutritionist  Once        Provider:  (Not yet assigned)    Completed BRITANY ESTRADA          No new Assessment & Plan notes have been filed under this hospital service since the last note was generated.  Service: Hospital Medicine    Final Active Diagnoses:    Diagnosis Date Noted POA     Pt roomed and placed in gown   PRINCIPAL PROBLEM:  Frailty syndrome in geriatric patient [R54] 02/21/2023 Yes    Current mild episode of major depressive disorder without prior episode [F32.0] 02/01/2023 Yes     Chronic    HTN (hypertension) [I10] 06/14/2021 Yes      Problems Resolved During this Admission:    Diagnosis Date Noted Date Resolved POA    Hip dislocation, right, initial encounter [S73.004A] 07/14/2023 08/04/2023 Yes    Chronic low back pain without sciatica [M54.50, G89.29] 10/25/2021 08/04/2023 Yes       Discharged Condition: good    [place] [facility]    Follow Up:   Follow-up Information     [facility] - Follow up.    Specialty: Home Health Services  Contact information:  [address]  [place] [address]  [phone]             Cora Jiang MD Follow up on 9/25/2023.    Specialty: Neurology  Why: APPOINTMENT TIME: 10:30am.  Contact information:  [address] [facility]  [address] [place] [id number]  [phone]             Zechariah Camacho MD. Go on 8/11/2023.    Specialty: Geriatric Medicine  Why: appointment time: 10:00am.  Contact information:  [address] [facility]  [place] [address]  [phone]                       Patient Instructions:   No discharge procedures on file.    Significant Diagnostic Studies: N/A    Pending Diagnostic Studies:     None         Medications:  Reconciled Home Medications:      Medication List      START taking these medications    albuterol-ipratropium 2.5 mg-0.5 mg/3 mL nebulizer solution  Commonly known as: DUO-NEB  Take 3 mLs by nebulization every 6 (six) hours as needed for Wheezing or Shortness of Breath (use in am upon waking). Rescue     guaiFENesin 600 mg 12 hr tablet  Commonly known as: MUCINEX  Take 1 tablet (600 mg total) by mouth 2 (two) times daily.        CONTINUE taking these medications    aspirin 81 MG Chew  Take 81 mg by mouth once daily.     atorvastatin 80 MG tablet  Commonly known as: LIPITOR  Take 1 tablet (80 mg  total) by mouth once daily.     clopidogreL 75 mg tablet  Commonly known as: PLAVIX  Take 75 mg by mouth once daily.     colesevelam 625 mg tablet  Commonly known as: WELCHOL  Take 625 mg by mouth 2 (two) times daily with meals.     famotidine 20 MG tablet  Commonly known as: PEPCID  Take 20 mg by mouth once daily.     magnesium oxide 250 mg magnesium Tab  Commonly known as: MAG-OX  Take 250 mg by mouth once daily.     metoprolol tartrate 25 MG tablet  Commonly known as: LOPRESSOR  Take 1 tablet (25 mg total) by mouth 2 (two) times daily.     oxyCODONE-acetaminophen 5-325 mg per tablet  Commonly known as: PERCOCET  Take 1 tablet by mouth every 4 (four) hours as needed for Pain.     traZODone 100 MG tablet  Commonly known as: DESYREL  Take 100 mg by mouth every evening.     valsartan 80 MG tablet  Commonly known as: DIOVAN  Take 1 tablet (80 mg total) by mouth once daily.            Indwelling Lines/Drains at time of discharge:   Lines/Drains/Airways     None                 Time spent on the discharge of patient: 33 minutes         Won Plasencia DO  Department of Hospital Medicine  [facility]

## 2023-08-07 ENCOUNTER — OFFICE VISIT (OUTPATIENT)
Dept: MEDICAL GROUP | Facility: CLINIC | Age: 68
End: 2023-08-07
Payer: MEDICARE

## 2023-08-07 VITALS
DIASTOLIC BLOOD PRESSURE: 72 MMHG | SYSTOLIC BLOOD PRESSURE: 105 MMHG | HEIGHT: 62 IN | HEART RATE: 73 BPM | WEIGHT: 155 LBS | OXYGEN SATURATION: 90 % | TEMPERATURE: 98.7 F | BODY MASS INDEX: 28.52 KG/M2

## 2023-08-07 DIAGNOSIS — R10.9 FLANK PAIN: ICD-10-CM

## 2023-08-07 DIAGNOSIS — R10.84 GENERALIZED ABDOMINAL PAIN: ICD-10-CM

## 2023-08-07 DIAGNOSIS — Z09 HOSPITAL DISCHARGE FOLLOW-UP: ICD-10-CM

## 2023-08-07 DIAGNOSIS — Z87.19 HX SBO: ICD-10-CM

## 2023-08-07 LAB
APPEARANCE UR: CLEAR
BILIRUB UR STRIP-MCNC: NORMAL MG/DL
COLOR UR AUTO: YELLOW
GLUCOSE UR STRIP.AUTO-MCNC: NORMAL MG/DL
KETONES UR STRIP.AUTO-MCNC: NORMAL MG/DL
LEUKOCYTE ESTERASE UR QL STRIP.AUTO: NORMAL
NITRITE UR QL STRIP.AUTO: NORMAL
PH UR STRIP.AUTO: 7 [PH] (ref 5–8)
PROT UR QL STRIP: NORMAL MG/DL
RBC UR QL AUTO: NORMAL
SP GR UR STRIP.AUTO: 1.01
UROBILINOGEN UR STRIP-MCNC: 1 MG/DL

## 2023-08-07 PROCEDURE — 81002 URINALYSIS NONAUTO W/O SCOPE: CPT

## 2023-08-07 PROCEDURE — 99213 OFFICE O/P EST LOW 20 MIN: CPT | Mod: GE

## 2023-08-07 PROCEDURE — 3078F DIAST BP <80 MM HG: CPT

## 2023-08-07 PROCEDURE — 3074F SYST BP LT 130 MM HG: CPT

## 2023-08-07 ASSESSMENT — FIBROSIS 4 INDEX: FIB4 SCORE: 3.3

## 2023-08-07 NOTE — ASSESSMENT & PLAN NOTE
Hospital follow-up for 4-day hospitalization concerning for SBO given the patient's past medical history of SBO and adhesions.  Small bowel follow-through studies were negative for obstruction.  Patient has had 2 bowel movements since being discharged but is still experiencing abdominal discomfort.  No concerns for obstruction at this time per physical exam and history of 2 recent bowel movements.    Plan:  -Advance diet to soft diet  -Continue MiraLAX titratable to 1-2 small bowel movements daily, hold for diarrhea  -Follow-up in 1 month  -Referral to general surgery to reestablish

## 2023-08-07 NOTE — PROGRESS NOTES
Subjective:     CC:   Chief Complaint   Patient presents with    Follow-Up     Hospital F/u       HPI:   Marium presents today for hospital follow-up.  Patient was hospitalized at [facility] for 4 days with concerns for small bowel obstruction.  CT abdomen and pelvis showed mildly dilated small intestines with air-fluid levels and a small bowel follow-through was completed which was negative.  Patient was medically managed and discharged on a clear liquid diet.  Since then, she has had 2 large bowel movements which have primarily consisted of diarrhea.  She continues to experience diffuse abdominal pain and also notes flank pain.  She denies any dysuria or nausea.  Patient has had subjective fever since discharge.  Patient would like to know if she can be prescribed lorazepam as this was given to her in the hospital and provided her with some relief.    Problem   Flank Pain   Hospital Discharge Follow-Up       Current Outpatient Medications Ordered in Epic   Medication Sig Dispense Refill    traZODone (DESYREL) 50 MG Tab Take 2 tabs at night for sleep 180 Tablet 1    gabapentin (NEURONTIN) 300 MG Cap Take 2 Capsules by mouth 3 times a day. 180 Capsule 1    albuterol 108 (90 Base) MCG/ACT Aero Soln inhalation aerosol Inhale 2 Puffs every 6 hours as needed for Shortness of Breath. 1 Each 1    EPINEPHrine (EPIPEN) 0.3 MG/0.3ML Solution Auto-injector solution for injection       meclizine (ANTIVERT) 12.5 MG Tab Take 1 Tablet by mouth 1 time a day as needed (Vertigo). 90 Tablet 1    SYNTHROID 75 MCG Tab Take 1 Tablet by mouth every day. Monday, Tuesday, Thursday, Friday, Saturday 90 Tablet 1    SYNTHROID 50 MCG Tab Take 1 Tablet by mouth Every Sunday and Wednesday. 90 Tablet 1    naproxen (NAPROSYN) 500 MG Tab Take 1 Tablet by mouth 1 time a day as needed (Pain). 60 Tablet 1    lisinopril (PRINIVIL) 40 MG tablet Take 1 Tablet by mouth every day. 90 Tablet 2    Cholecalciferol (VITAMIN D-3) 125 MCG (5000 UT) Tab as  "directed Strength: 125 MCG (5000 UT) 30 Tablet 3    carbidopa-levodopa (SINEMET)  MG Tab Take 1 Tablet by mouth every day. TAKE ONE TABLET BY MOUTH DAILY 90 Tablet 1    allopurinol (ZYLOPRIM) 300 MG Tab TAKE ONE TABLET BY MOUTH DAILY 90 Tablet 1    acetaminophen (TYLENOL) 500 MG Tab Take 1 Tablet by mouth every 6 hours as needed for Moderate Pain. 90 Tablet 1    potassium chloride SA (K-DUR) 10 MEQ Tab CR Take 1 Tablet by mouth every day. 90 Tablet 3    Misc. Devices Misc Back brace to use as directed 1 Device 0    polyethylene glycol 3350 (MIRALAX) 17 GM/SCOOP Powder Take 17 g by mouth 3 times a day as needed (bowel care).      acetaminophen (TYLENOL) 500 MG Tab Take 500 mg by mouth. (Patient not taking: Reported on 8/7/2023)      allopurinol (ZYLOPRIM) 300 MG Tab Take 300 mg by mouth every day. (Patient not taking: Reported on 8/7/2023)      vitamin D (CHOLECALCIFEROL) 1000 Unit Tab Take 5,000 Units by mouth every day. (Patient not taking: Reported on 8/7/2023)      naproxen (ANAPROX) 220 MG tablet Take 220 mg by mouth. (Patient not taking: Reported on 8/7/2023)      potassium chloride ER (KLOR-CON) 10 MEQ tablet Take 10 mEq by mouth every day. (Patient not taking: Reported on 8/7/2023)      albuterol (PROVENTIL) 2.5mg/0.5ml Nebu Soln Inhale 2.5 mg. (Patient not taking: Reported on 8/7/2023)      ofloxacin (OCUFLOX) 0.3 % Solution  (Patient not taking: Reported on 8/7/2023)       No current [facility]-ordered facility-administered medications on file.       ROS:  Negative except for above in HPI    Objective:     Exam:  /72 (BP Location: Left arm, Patient Position: Sitting, BP Cuff Size: Adult)   Pulse 73   Temp 37.1 °C (98.7 °F) (Temporal)   Ht 1.575 m (5' 2\")   Wt 70.3 kg (155 lb)   LMP 10/02/1980   SpO2 90%   BMI 28.35 kg/m²  Body mass index is 28.35 kg/m².    Gen: Alert and oriented, No apparent distress, sitting in scooter  HEENT: NCAT, MMM, No lymphadenopathy  Lungs: Normal effort, CTA " bilaterally, no wheezes, rhonchi, or rales  CV: Regular rate and rhythm. No murmurs, rubs, or gallops. Radial pulses palpable bilat  Abd: Soft, non-distended, no guarding, no rebound, non-tender to palpation, bilateral flank tenderness to palpation  Ext: No clubbing, cyanosis, edema.  Neuro: Non-focal    Labs: Urinalysis POCT: Negative for glucose, bilirubin, ketones, blood, protein, nitrogen and leukocyte esterase.    Assessment & Plan:     67 y.o. female with the following -     Problem List Items Addressed This Visit       Flank pain     Likely secondary to chronic low back pain and ongoing diffuse abdominal pain.  POCT urinalysis normal, with no dysuria or concerns for UTI.    Plan:  -Manage abdominal pain and chronic low back pain  -Return precautions provided         Relevant Orders    POCT Urinalysis (Completed)    Hospital discharge follow-up     Hospital follow-up for 4-day hospitalization concerning for SBO given the patient's past medical history of SBO and adhesions.  Small bowel follow-through studies were negative for obstruction.  Patient has had 2 bowel movements since being discharged but is still experiencing abdominal discomfort.  No concerns for obstruction at this time per physical exam and history of 2 recent bowel movements.    Plan:  -Advance diet to soft diet  -Continue MiraLAX titratable to 1-2 small bowel movements daily, hold for diarrhea  -Follow-up in 1 month  -Referral to general surgery to reestablish          Other Visit Diagnoses       Hx SBO        Relevant Orders    Referral to Colorectal Surgery    Generalized abdominal pain        Relevant Orders    Referral to Colorectal Surgery            Return in about 1 month (around 9/7/2023).    Libertad Ramirez M.D.

## 2023-08-08 NOTE — ASSESSMENT & PLAN NOTE
Likely secondary to chronic low back pain and ongoing diffuse abdominal pain.  POCT urinalysis normal, with no dysuria or concerns for UTI.    Plan:  -Manage abdominal pain and chronic low back pain  -Return precautions provided

## 2023-08-15 DIAGNOSIS — G62.9 NEUROPATHY: ICD-10-CM

## 2023-08-21 RX ORDER — GABAPENTIN 300 MG/1
600 CAPSULE ORAL 3 TIMES DAILY
Qty: 180 CAPSULE | Refills: 1 | Status: SHIPPED | OUTPATIENT
Start: 2023-08-21 | End: 2023-10-20

## 2023-09-07 ENCOUNTER — OFFICE VISIT (OUTPATIENT)
Dept: MEDICAL GROUP | Facility: CLINIC | Age: 68
End: 2023-09-07
Payer: MEDICARE

## 2023-09-07 VITALS
SYSTOLIC BLOOD PRESSURE: 126 MMHG | OXYGEN SATURATION: 93 % | HEIGHT: 62 IN | DIASTOLIC BLOOD PRESSURE: 82 MMHG | TEMPERATURE: 98 F | WEIGHT: 160 LBS | HEART RATE: 74 BPM | BODY MASS INDEX: 29.44 KG/M2

## 2023-09-07 DIAGNOSIS — G89.29 CHRONIC BILATERAL LOW BACK PAIN WITH RIGHT-SIDED SCIATICA: ICD-10-CM

## 2023-09-07 DIAGNOSIS — M54.41 CHRONIC BILATERAL LOW BACK PAIN WITH RIGHT-SIDED SCIATICA: ICD-10-CM

## 2023-09-07 DIAGNOSIS — M41.9 SCOLIOSIS OF LUMBAR SPINE, UNSPECIFIED SCOLIOSIS TYPE: ICD-10-CM

## 2023-09-07 DIAGNOSIS — R26.2 DIFFICULTY WALKING: ICD-10-CM

## 2023-09-07 DIAGNOSIS — G62.9 NEUROPATHY: ICD-10-CM

## 2023-09-07 DIAGNOSIS — K59.00 CONSTIPATION, UNSPECIFIED CONSTIPATION TYPE: ICD-10-CM

## 2023-09-07 DIAGNOSIS — M54.16 LEFT LUMBAR RADICULOPATHY: ICD-10-CM

## 2023-09-07 PROCEDURE — 99214 OFFICE O/P EST MOD 30 MIN: CPT | Mod: GC

## 2023-09-07 PROCEDURE — 3079F DIAST BP 80-89 MM HG: CPT

## 2023-09-07 PROCEDURE — 3074F SYST BP LT 130 MM HG: CPT

## 2023-09-07 RX ORDER — CYCLOBENZAPRINE HCL 10 MG
10 TABLET ORAL 3 TIMES DAILY PRN
Qty: 30 TABLET | Refills: 1 | Status: SHIPPED | OUTPATIENT
Start: 2023-09-07

## 2023-09-07 ASSESSMENT — FIBROSIS 4 INDEX: FIB4 SCORE: 3.3

## 2023-09-07 ASSESSMENT — PATIENT HEALTH QUESTIONNAIRE - PHQ9: CLINICAL INTERPRETATION OF PHQ2 SCORE: 0

## 2023-09-07 NOTE — ASSESSMENT & PLAN NOTE
Chronic, uncontrolled, likely secondary to extensive back pathology.    Plan:  -Referral to spine surgery  -Continue gabapentin

## 2023-09-07 NOTE — ASSESSMENT & PLAN NOTE
Chronic, symptomatic, symptoms uncontrolled, s/p multiple back surgeries.  Patient was previously being managed with opioids and Soma.  She is agreeable to trying noncontrolled muscle relaxant and having a referral to spine surgery to reestablish.  In the past, patient has been informed that an ablation would be appropriate.    Plan:  -Flexeril 10 mg 3 times daily as needed  -Referral to spine surgery  -Continue to use back brace as needed  -Follow-up in 3 months

## 2023-09-07 NOTE — PROGRESS NOTES
Subjective:     CC:   Chief Complaint   Patient presents with    Follow-Up     3 week follow up       HPI:   Marium presents today for follow-up on constipation in setting of recurrent SBO.  Since her last hospitalization, patient continues to have difficulty with titrating MiraLAX to have 1-2 soft BMs daily.  On days she does not take MiraLAX, she is unable to have a bowel movement and often finds herself over correcting resulting in multiple loose stools the following day.  Patient denies any abdominal discomfort at this time.  She has an upcoming appointment with colorectal surgery to establish care.  Additionally, patient would like to address concerns with difficulty sleeping.  Believed to be primarily secondary to chronic low back pain.  Patient was previously being managed with opioids and Soma however, this was discontinued years ago.  Since then, her symptoms have been largely unmanaged and patient would like to be restarted on Soma however, after lengthy discussion, she was agreeable to trying noncontrolled muscle relaxants as well as a referral to spine surgery.  She has extensive back pathology and is status post multiple spine surgeries.  Patient reports she has been informed in the past that she would likely benefit from an ablation.    Problem   Constipation   Left Lumbar Radiculopathy   Neuropathy    Followed by neurology/pain management.         Current Outpatient Medications Ordered in Epic   Medication Sig Dispense Refill    cyclobenzaprine (FLEXERIL) 10 mg Tab Take 1 Tablet by mouth 3 times a day as needed for Muscle Spasms or Moderate Pain. 30 Tablet 1    gabapentin (NEURONTIN) 300 MG Cap TAKE TWO CAPSULES BY MOUTH THREE TIMES A  Capsule 1    acetaminophen (TYLENOL) 500 MG Tab Take 500 mg by mouth. (Patient not taking: Reported on 8/7/2023)      allopurinol (ZYLOPRIM) 300 MG Tab Take 300 mg by mouth every day. (Patient not taking: Reported on 8/7/2023)      vitamin D (CHOLECALCIFEROL)  1000 Unit Tab Take 5,000 Units by mouth every day. (Patient not taking: Reported on 8/7/2023)      naproxen (ANAPROX) 220 MG tablet Take 220 mg by mouth. (Patient not taking: Reported on 8/7/2023)      potassium chloride ER (KLOR-CON) 10 MEQ tablet Take 10 mEq by mouth every day. (Patient not taking: Reported on 8/7/2023)      albuterol (PROVENTIL) 2.5mg/0.5ml Nebu Soln Inhale 2.5 mg. (Patient not taking: Reported on 8/7/2023)      traZODone (DESYREL) 50 MG Tab Take 2 tabs at night for sleep 180 Tablet 1    albuterol 108 (90 Base) MCG/ACT Aero Soln inhalation aerosol Inhale 2 Puffs every 6 hours as needed for Shortness of Breath. 1 Each 1    EPINEPHrine (EPIPEN) 0.3 MG/0.3ML Solution Auto-injector solution for injection       ofloxacin (OCUFLOX) 0.3 % Solution  (Patient not taking: Reported on 8/7/2023)      meclizine (ANTIVERT) 12.5 MG Tab Take 1 Tablet by mouth 1 time a day as needed (Vertigo). 90 Tablet 1    SYNTHROID 75 MCG Tab Take 1 Tablet by mouth every day. Monday, Tuesday, Thursday, Friday, Saturday 90 Tablet 1    SYNTHROID 50 MCG Tab Take 1 Tablet by mouth Every Sunday and Wednesday. 90 Tablet 1    naproxen (NAPROSYN) 500 MG Tab Take 1 Tablet by mouth 1 time a day as needed (Pain). 60 Tablet 1    lisinopril (PRINIVIL) 40 MG tablet Take 1 Tablet by mouth every day. 90 Tablet 2    Cholecalciferol (VITAMIN D-3) 125 MCG (5000 UT) Tab as directed Strength: 125 MCG (5000 UT) 30 Tablet 3    carbidopa-levodopa (SINEMET)  MG Tab Take 1 Tablet by mouth every day. TAKE ONE TABLET BY MOUTH DAILY 90 Tablet 1    allopurinol (ZYLOPRIM) 300 MG Tab TAKE ONE TABLET BY MOUTH DAILY 90 Tablet 1    acetaminophen (TYLENOL) 500 MG Tab Take 1 Tablet by mouth every 6 hours as needed for Moderate Pain. 90 Tablet 1    potassium chloride SA (K-DUR) 10 MEQ Tab CR Take 1 Tablet by mouth every day. 90 Tablet 3    Misc. Devices Misc Back brace to use as directed 1 Device 0    polyethylene glycol 3350 (MIRALAX) 17 GM/SCOOP Powder  "Take 17 g by mouth 3 times a day as needed (bowel care).       No current [facility]-ordered facility-administered medications on file.       ROS:  Negative except for above in HPI    Objective:     Exam:  /82   Pulse 74   Temp 36.7 °C (98 °F)   Ht 1.575 m (5' 2\")   Wt 72.6 kg (160 lb)   LMP 10/02/1980   SpO2 93%   BMI 29.26 kg/m²  Body mass index is 29.26 kg/m².    Gen: Alert and oriented, No apparent distress, limited ambulation, depends on scooter  HEENT: NCAT, MMM, No lymphadenopathy  Lungs: Normal effort, CTA bilaterally, no wheezes, rhonchi, or rales  CV: Regular rate and rhythm. No murmurs, rubs, or gallops. Radial pulses palpable bilat  Abd: Soft, non-distended, no guarding, no rebound, non-tender to palpation  Ext: No clubbing, cyanosis, edema.  Neuro: Non-focal    Labs: No new labs    Assessment & Plan:     67 y.o. female with the following -     Problem List Items Addressed This Visit       Lumbar scoliosis    Relevant Orders    Referral to Spine Surgery    Left lumbar radiculopathy     Chronic, symptomatic, symptoms uncontrolled, s/p multiple back surgeries.  Patient was previously being managed with opioids and Soma.  She is agreeable to trying noncontrolled muscle relaxant and having a referral to spine surgery to reestablish.  In the past, patient has been informed that an ablation would be appropriate.    Plan:  -Flexeril 10 mg 3 times daily as needed  -Referral to spine surgery  -Continue to use back brace as needed  -Follow-up in 3 months         Relevant Medications    cyclobenzaprine (FLEXERIL) 10 mg Tab    Other Relevant Orders    Referral to Spine Surgery    DME Cane    Neuropathy     Chronic, uncontrolled, likely secondary to extensive back pathology.    Plan:  -Referral to spine surgery  -Continue gabapentin         Relevant Medications    cyclobenzaprine (FLEXERIL) 10 mg Tab    Other Relevant Orders    Referral to Spine Surgery    Constipation     Chronic, moderately controlled in the " setting of multiple SBO's/ileus, currently self titrating MiraLAX to produce 1-2 soft bowel movements daily.    Plan:  -Continue self titrating MiraLAX  -Discussed lifestyle and dietary modifications to promote healthy gut  -Consider referral to GI          Other Visit Diagnoses       Chronic bilateral low back pain with right-sided sciatica        Relevant Medications    cyclobenzaprine (FLEXERIL) 10 mg Tab    Other Relevant Orders    DME Cane    Difficulty walking        Relevant Orders    DME Cane            Return in about 3 months (around 12/7/2023).    Libertad Ramirez M.D.

## 2023-09-07 NOTE — ASSESSMENT & PLAN NOTE
Chronic, moderately controlled in the setting of multiple SBO's/ileus, currently self titrating MiraLAX to produce 1-2 soft bowel movements daily.    Plan:  -Continue self titrating MiraLAX  -Discussed lifestyle and dietary modifications to promote healthy gut  -Consider referral to GI

## 2023-10-18 DIAGNOSIS — G62.9 NEUROPATHY: ICD-10-CM

## 2023-10-20 RX ORDER — GABAPENTIN 300 MG/1
600 CAPSULE ORAL 3 TIMES DAILY
Qty: 180 CAPSULE | Refills: 1 | Status: SHIPPED | OUTPATIENT
Start: 2023-10-20 | End: 2023-12-22

## 2023-10-27 DIAGNOSIS — E89.0 POSTSURGICAL HYPOTHYROIDISM: ICD-10-CM

## 2023-10-27 RX ORDER — LEVOTHYROXINE SODIUM 50 MCG
50 TABLET ORAL
Qty: 90 TABLET | Refills: 1 | Status: SHIPPED | OUTPATIENT
Start: 2023-10-29

## 2023-10-27 RX ORDER — LEVOTHYROXINE SODIUM 75 MCG
75 TABLET ORAL DAILY
Qty: 90 TABLET | Refills: 1 | Status: SHIPPED | OUTPATIENT
Start: 2023-10-27

## 2023-11-06 DIAGNOSIS — G25.9 EXTRAPYRAMIDAL AND MOVEMENT DISORDER: ICD-10-CM

## 2023-11-07 RX ORDER — POTASSIUM CHLORIDE 750 MG/1
10 TABLET, FILM COATED, EXTENDED RELEASE ORAL DAILY
Qty: 60 TABLET | Refills: 1 | Status: SHIPPED | OUTPATIENT
Start: 2023-11-07 | End: 2024-01-09 | Stop reason: SDUPTHER

## 2023-12-01 DIAGNOSIS — M10.9 GOUT, ARTHRITIS: ICD-10-CM

## 2023-12-04 RX ORDER — ALLOPURINOL 300 MG/1
TABLET ORAL
Qty: 90 TABLET | Refills: 1 | Status: SHIPPED | OUTPATIENT
Start: 2023-12-04

## 2023-12-22 DIAGNOSIS — G62.9 NEUROPATHY: ICD-10-CM

## 2023-12-22 RX ORDER — GABAPENTIN 300 MG/1
600 CAPSULE ORAL 3 TIMES DAILY
Qty: 180 CAPSULE | Refills: 1 | Status: SHIPPED | OUTPATIENT
Start: 2023-12-22 | End: 2024-01-09 | Stop reason: SDUPTHER

## 2023-12-26 RX ORDER — TRAZODONE HYDROCHLORIDE 50 MG/1
TABLET ORAL
Qty: 180 TABLET | Refills: 1 | Status: SHIPPED | OUTPATIENT
Start: 2023-12-26

## 2024-01-09 DIAGNOSIS — R42 VERTIGO: ICD-10-CM

## 2024-01-09 DIAGNOSIS — G62.9 NEUROPATHY: ICD-10-CM

## 2024-01-09 DIAGNOSIS — M54.50 LOW BACK PAIN, UNSPECIFIED BACK PAIN LATERALITY, UNSPECIFIED CHRONICITY, UNSPECIFIED WHETHER SCIATICA PRESENT: ICD-10-CM

## 2024-01-16 RX ORDER — GABAPENTIN 300 MG/1
600 CAPSULE ORAL 3 TIMES DAILY
Qty: 180 CAPSULE | Refills: 1 | Status: SHIPPED | OUTPATIENT
Start: 2024-01-16

## 2024-01-16 RX ORDER — MECLIZINE HCL 12.5 MG/1
12.5 TABLET ORAL 3 TIMES DAILY PRN
Qty: 90 TABLET | Refills: 2 | Status: SHIPPED | OUTPATIENT
Start: 2024-01-16

## 2024-01-16 RX ORDER — POTASSIUM CHLORIDE 750 MG/1
10 TABLET, FILM COATED, EXTENDED RELEASE ORAL DAILY
Qty: 60 TABLET | Refills: 1 | Status: SHIPPED | OUTPATIENT
Start: 2024-01-16

## 2024-01-16 RX ORDER — METHOCARBAMOL 750 MG/1
TABLET, FILM COATED ORAL
Qty: 72 TABLET | OUTPATIENT
Start: 2024-01-16

## 2024-01-23 DIAGNOSIS — G25.9 EXTRAPYRAMIDAL AND MOVEMENT DISORDER: ICD-10-CM

## 2024-01-23 RX ORDER — CARISOPRODOL 350 MG/1
TABLET ORAL
Qty: 30 TABLET | Refills: 0 | OUTPATIENT
Start: 2024-01-23 | End: 2024-02-22

## 2024-01-24 ENCOUNTER — OFFICE VISIT (OUTPATIENT)
Dept: MEDICAL GROUP | Facility: CLINIC | Age: 69
End: 2024-01-24
Payer: COMMERCIAL

## 2024-01-24 VITALS
RESPIRATION RATE: 16 BRPM | HEART RATE: 73 BPM | DIASTOLIC BLOOD PRESSURE: 87 MMHG | SYSTOLIC BLOOD PRESSURE: 118 MMHG | HEIGHT: 62 IN | OXYGEN SATURATION: 92 % | TEMPERATURE: 98 F | BODY MASS INDEX: 29.26 KG/M2

## 2024-01-24 DIAGNOSIS — R26.2 DIFFICULTY WALKING: ICD-10-CM

## 2024-01-24 DIAGNOSIS — R07.81 RIB PAIN ON LEFT SIDE: ICD-10-CM

## 2024-01-24 DIAGNOSIS — E20.89 OTHER HYPOPARATHYROIDISM (HCC): ICD-10-CM

## 2024-01-24 DIAGNOSIS — E03.9 HYPOTHYROIDISM, UNSPECIFIED TYPE: ICD-10-CM

## 2024-01-24 DIAGNOSIS — R06.2 WHEEZING: ICD-10-CM

## 2024-01-24 DIAGNOSIS — M54.50 LOW BACK PAIN, UNSPECIFIED BACK PAIN LATERALITY, UNSPECIFIED CHRONICITY, UNSPECIFIED WHETHER SCIATICA PRESENT: ICD-10-CM

## 2024-01-24 PROCEDURE — 3079F DIAST BP 80-89 MM HG: CPT

## 2024-01-24 PROCEDURE — 3074F SYST BP LT 130 MM HG: CPT

## 2024-01-24 PROCEDURE — 99214 OFFICE O/P EST MOD 30 MIN: CPT | Mod: GC

## 2024-01-24 RX ORDER — ALBUTEROL SULFATE 90 UG/1
2 AEROSOL, METERED RESPIRATORY (INHALATION) EVERY 6 HOURS PRN
Qty: 1 EACH | Refills: 1 | Status: SHIPPED | OUTPATIENT
Start: 2024-01-24

## 2024-01-25 NOTE — PROGRESS NOTES
Subjective:     CC: rib pain     HPI:   Marium presents today with rib pain that started a couple days ago. The pain started after her  tried to popping her back due to her having back pain and unable to catch her breath. She felt her rib pop at this time. She denies any bruising along her ribs at this time. She has the pain on her left side.     Patient reports that she has been falling a lot more. Tripping on items. She has been using a walker in her house.     She also is requesting a referral to endocrinology. She had her thyroid removed 20 years ago for a goiter. She states her parathyroids.    She reports poor sleep. She has taken lorazepam and soma in the past but these were discontinued. She currently is taking gabapentin and trazodone.       Current Outpatient Medications Ordered in Epic   Medication Sig Dispense Refill    meclizine (ANTIVERT) 12.5 MG Tab Take 1 Tablet by mouth 3 times a day as needed (Vertigo). 90 Tablet 2    potassium chloride ER (KLOR-CON) 10 MEQ tablet Take 1 Tablet by mouth every day. 60 Tablet 1    gabapentin (NEURONTIN) 300 MG Cap Take 2 Capsules by mouth 3 times a day. 180 Capsule 1    traZODone (DESYREL) 50 MG Tab TAKE TWO TABLETS BY MOUTH EVERY NIGHT AT BEDTIME FOR SLEEP 180 Tablet 1    allopurinol (ZYLOPRIM) 300 MG Tab TAKE ONE TABLET BY MOUTH DAILY 90 Tablet 1    carbidopa-levodopa (SINEMET)  MG Tab Take 1 Tablet by mouth every day. TAKE ONE TABLET BY MOUTH DAILY 90 Tablet 1    SYNTHROID 75 MCG Tab Take 1 Tablet by mouth every day. Monday, Tuesday, Thursday, Friday, Saturday 90 Tablet 1    SYNTHROID 50 MCG Tab Take 1 Tablet by mouth Every Sunday and Wednesday. 90 Tablet 1    vitamin D (CHOLECALCIFEROL) 1000 Unit Tab Take 5,000 Units by mouth every day.      albuterol 108 (90 Base) MCG/ACT Aero Soln inhalation aerosol Inhale 2 Puffs every 6 hours as needed for Shortness of Breath. 1 Each 1    EPINEPHrine (EPIPEN) 0.3 MG/0.3ML Solution Auto-injector solution for  injection       naproxen (NAPROSYN) 500 MG Tab Take 1 Tablet by mouth 1 time a day as needed (Pain). 60 Tablet 1    lisinopril (PRINIVIL) 40 MG tablet Take 1 Tablet by mouth every day. 90 Tablet 2    Cholecalciferol (VITAMIN D-3) 125 MCG (5000 UT) Tab as directed Strength: 125 MCG (5000 UT) 30 Tablet 3    acetaminophen (TYLENOL) 500 MG Tab Take 1 Tablet by mouth every 6 hours as needed for Moderate Pain. 90 Tablet 1    cyclobenzaprine (FLEXERIL) 10 mg Tab Take 1 Tablet by mouth 3 times a day as needed for Muscle Spasms or Moderate Pain. 30 Tablet 1    potassium chloride SA (K-DUR) 10 MEQ Tab CR Take 1 Tablet by mouth every day. 90 Tablet 3    Misc. Devices Misc Back brace to use as directed 1 Device 0     No current Epic-ordered facility-administered medications on file.     Family History   Problem Relation Age of Onset    Genetic Disorder Mother         Alzheimer's    Alcohol/Drug Father       Past Surgical History:   Procedure Laterality Date    VENTRAL HERNIA REPAIR  10/4/2018    Procedure: ABDOMINAL WOUND EXPLORATION AND SEROMA DRAINAGE;  Surgeon: Houston Amanda D.O.;  Location: [facility];  Service: General    EXPLORATORY LAPAROTOMY  9/22/2018    Procedure: EXPLORATORY LAPAROTOMY;  Surgeon: Houston Amanda D.O.;  Location: [facility];  Service: General    LYSIS ADHESIONS GENERAL  9/22/2018    Procedure: LYSIS ADHESIONS GENERAL;  Surgeon: Houston Amanda D.O.;  Location: [facility];  Service: General    BOWEL RESECTION  9/22/2018    Procedure: BOWEL RESECTION- POSSIBLE;  Surgeon: Houston Amanda D.O.;  Location: SURGERY [facility];  Service: General    FUSION, SPINE, LUMBAR, PLIF  8/7/2017    Procedure: LUMBAR FUSION POSTERIOR EXPLORATION;  Surgeon: Garrett Sexton M.D.;  Location: SURGERY [facility];  Service:     HARDWARE REMOVAL NEURO  8/7/2017    Procedure: HARDWARE REMOVAL NEURO DEEP L1-L3, L4-S1, SEROMA REMOVAL;  Surgeon: Garrett Sexton M.D.;  Location: SURGERY  "[facility];  Service:     FUSION, SPINE, LUMBAR, PLIF  12/17/2015    Procedure: LUMBAR FUSION POSTERIOR L1-S1;  Surgeon: Garrett Sexton M.D.;  Location: SURGERY [facility];  Service:     LUMBAR LAMINECTOMY DISKECTOMY  12/17/2015    Procedure: LUMBAR LAMINECTOMY DISKECTOMY;  Surgeon: Garrett Sexton M.D.;  Location: SURGERY [facility];  Service:     LUMBAR DECOMPRESSION  12/17/2015    Procedure: LUMBAR DECOMPRESSION L1-L3;  Surgeon: Garrett Sexton M.D.;  Location: SURGERY [facility];  Service:     COLONOSCOPY WITH POLYP  8/1/2012    Performed by ARIA EMERSON at ENDOSCOPY [facility]    GASTROSCOPY WITH BIOPSY  8/1/2012    Performed by ARIA EMERSON at ENDOSCOPY [facility]    THYROIDECTOMY  2001    PARATHYROIDECTOMY  2001    COLON RESECTION  1985    7 feet of intestine    APPENDECTOMY      DENTAL SURGERY      HYSTERECTOMY, VAGINAL      partial, benign    PRIMARY C SECTION      SALPINGO-OOPHORECTOMY, UNILATERAL        Social History     Tobacco Use    Smoking status: Every Day     Current packs/day: 1.00     Average packs/day: 1 pack/day for 53.7 years (53.7 ttl pk-yrs)     Types: Cigarettes     Start date: 4/25/1970    Smokeless tobacco: Never    Tobacco comments:     quit during pregnancy, 1 pack to 1.5 pack per day   Vaping Use    Vaping Use: Never used   Substance Use Topics    Alcohol use: Not Currently     Alcohol/week: 1.2 oz     Types: 2 Cans of beer per week     Comment: quit drinking daily 3 years ago, now drinks 3-4 beers on weekends    Drug use: No        ROS:  Gen: no fevers/chills, no changes in weight  Pulm: no sob, no cough  CV: no chest pain, no palpitations  GI: no nausea/vomiting, no diarrhea    Objective:     Exam:  /87 (BP Location: Left arm, Patient Position: Sitting, BP Cuff Size: Adult)   Pulse 73   Temp 36.7 °C (98 °F) (Temporal)   Resp 16   Ht 1.575 m (5' 2\")   LMP 10/02/1980   SpO2 92%   BMI 29.26 kg/m²  Body mass index is 29.26 " kg/m².    Gen: Alert and oriented, No apparent distress.  Neck: Neck is supple without lymphadenopathy.  Lungs: Normal effort, CTA bilaterally, no wheezes, rhonchi, or rales  CV: Regular rate and rhythm. No murmurs, rubs, or gallops.  Ext: No clubbing, cyanosis, edema.      Assessment & Plan:     68 y.o. female with the following -     1. Wheezing  - albuterol 108 (90 Base) MCG/ACT Aero Soln inhalation aerosol    2. Rib pain on left side  - Discussed alternating tylenol and naproxen for pain control  - discussed decreasing smoking   - DX-CHEST-2 VIEWS; Future    3. Difficulty walking  - Discussed making household safer for walking such as removing rugs, appropriate lighting at night and walker use   - Referral to Physical Therapy    4. Other hypoparathyroidism  5. Hypothyroidism, unspecified type  - Referral to Endocrinology    6. Low back pain, unspecified back pain laterality, unspecified chronicity, unspecified whether sciatica present  - Referral to Pain Management    7. Sleep   - likely pain related, referral to pain management  - reviewed sleep hygiene   - reviewed early morning sunlight and exercise   - continue dinesh Wild M.D.  PGY1

## 2024-04-14 DIAGNOSIS — G62.9 NEUROPATHY: ICD-10-CM

## 2024-04-15 NOTE — TELEPHONE ENCOUNTER
Received request via: Pharmacy    Was the patient seen in the last year in this department? Yes    Does the patient have an active prescription (recently filled or refills available) for medication(s) requested? No    Pharmacy Name: [facility]    Does the patient have [facility] Plus and need 100 day supply (blood pressure, diabetes and cholesterol meds only)? Patient does not have SCP

## 2024-04-16 RX ORDER — GABAPENTIN 300 MG/1
600 CAPSULE ORAL 3 TIMES DAILY
Qty: 180 CAPSULE | Refills: 1 | Status: SHIPPED | OUTPATIENT
Start: 2024-04-16

## 2024-04-22 DIAGNOSIS — Z87.892 HISTORY OF ANAPHYLAXIS: ICD-10-CM

## 2024-04-22 NOTE — TELEPHONE ENCOUNTER
Received request via: Patient    Was the patient seen in the last year in this department? Yes    Does the patient have an active prescription (recently filled or refills available) for medication(s) requested? No    [facility]    Does the patient have [facility] Plus and need 100 day supply (blood pressure, diabetes and cholesterol meds only)? Patient does not have SCP

## 2024-04-23 RX ORDER — EPINEPHRINE 0.3 MG/.3ML
0.3 INJECTION SUBCUTANEOUS
Qty: 1 EACH | Refills: 1 | Status: SHIPPED | OUTPATIENT
Start: 2024-04-23

## 2024-05-02 DIAGNOSIS — G25.9 EXTRAPYRAMIDAL AND MOVEMENT DISORDER: ICD-10-CM

## 2024-05-02 NOTE — TELEPHONE ENCOUNTER
Received request via: Pharmacy    Was the patient seen in the last year in this department? Yes    Does the patient have an active prescription (recently filled or refills available) for medication(s) requested? No    Pharmacy Name: [facility]    Does the patient have [facility] Plus and need 100 day supply (blood pressure, diabetes and cholesterol meds only)? Patient does not have SCP

## 2024-05-17 DIAGNOSIS — E89.0 POSTSURGICAL HYPOTHYROIDISM: ICD-10-CM

## 2024-05-17 NOTE — TELEPHONE ENCOUNTER
Received request via: Pharmacy    Was the patient seen in the last year in this department? Yes    Does the patient have an active prescription (recently filled or refills available) for medication(s) requested? No    Pharmacy Name: [facility]

## 2024-05-20 DIAGNOSIS — M10.9 GOUT, ARTHRITIS: ICD-10-CM

## 2024-05-20 RX ORDER — LEVOTHYROXINE SODIUM 75 MCG
TABLET ORAL
Qty: 72 TABLET | Refills: 1 | Status: SHIPPED | OUTPATIENT
Start: 2024-05-20

## 2024-05-21 RX ORDER — ALLOPURINOL 300 MG/1
TABLET ORAL
Qty: 90 TABLET | Refills: 1 | Status: SHIPPED | OUTPATIENT
Start: 2024-05-21

## 2024-06-05 RX ORDER — TRAZODONE HYDROCHLORIDE 50 MG/1
TABLET ORAL
Qty: 180 TABLET | Refills: 1 | Status: SHIPPED | OUTPATIENT
Start: 2024-06-05

## 2024-06-07 DIAGNOSIS — R42 VERTIGO: ICD-10-CM

## 2024-06-07 NOTE — TELEPHONE ENCOUNTER
Received request via: Pharmacy    Was the patient seen in the last year in this department? Yes    Does the patient have an active prescription (recently filled or refills available) for medication(s) requested? No    [facility]    Does the patient have [facility] Plus and need 100 day supply (blood pressure, diabetes and cholesterol meds only)? Patient does not have SCP

## 2024-06-10 RX ORDER — LISINOPRIL 40 MG/1
40 TABLET ORAL DAILY
Qty: 90 TABLET | Refills: 2 | Status: SHIPPED | OUTPATIENT
Start: 2024-06-10

## 2024-06-10 RX ORDER — MECLIZINE HCL 12.5 MG/1
12.5 TABLET ORAL 3 TIMES DAILY PRN
Qty: 90 TABLET | Refills: 2 | Status: SHIPPED | OUTPATIENT
Start: 2024-06-10

## 2024-06-11 DIAGNOSIS — G62.9 NEUROPATHY: ICD-10-CM

## 2024-06-11 NOTE — TELEPHONE ENCOUNTER
Received request via: Pharmacy    Was the patient seen in the last year in this department? Yes    Does the patient have an active prescription (recently filled or refills available) for medication(s) requested?  Requesting refill    Pharmacy Name: [facility] on file    Does the patient have [facility] Plus and need 100 day supply (blood pressure, diabetes and cholesterol meds only)? Patient does not have SCP

## 2024-06-12 RX ORDER — GABAPENTIN 300 MG/1
600 CAPSULE ORAL 3 TIMES DAILY
Qty: 540 CAPSULE | Refills: 1 | Status: SHIPPED | OUTPATIENT
Start: 2024-06-12

## 2024-07-08 RX ORDER — POTASSIUM CHLORIDE 750 MG/1
10 TABLET, FILM COATED, EXTENDED RELEASE ORAL DAILY
Qty: 30 TABLET | Refills: 5 | Status: SHIPPED | OUTPATIENT
Start: 2024-07-08

## 2024-07-12 DIAGNOSIS — R06.2 WHEEZING: ICD-10-CM

## 2024-07-12 RX ORDER — ALBUTEROL SULFATE 90 UG/1
2 AEROSOL, METERED RESPIRATORY (INHALATION) EVERY 6 HOURS PRN
Qty: 1 EACH | Refills: 1 | Status: SHIPPED | OUTPATIENT
Start: 2024-07-12

## 2024-10-09 DIAGNOSIS — R42 VERTIGO: ICD-10-CM

## 2024-10-09 RX ORDER — MECLIZINE HCL 12.5 MG 12.5 MG/1
TABLET ORAL
Qty: 90 TABLET | Refills: 2 | Status: SHIPPED | OUTPATIENT
Start: 2024-10-09

## 2024-10-14 NOTE — TELEPHONE ENCOUNTER
Pt has appt with you tomorrow for possible broken rib. She is requesting a refill on her carisoprodol (SOMA) 350 MG Tab as it has worked well for her in the past. Please advise.   
[email or web address]

## 2024-10-30 DIAGNOSIS — G25.9 EXTRAPYRAMIDAL AND MOVEMENT DISORDER: ICD-10-CM

## 2024-10-30 RX ORDER — CARBIDOPA AND LEVODOPA 25; 100 MG/1; MG/1
1 TABLET ORAL DAILY
Qty: 90 TABLET | Refills: 1 | Status: SHIPPED | OUTPATIENT
Start: 2024-10-30

## 2024-11-08 DIAGNOSIS — E89.0 POSTSURGICAL HYPOTHYROIDISM: ICD-10-CM

## 2024-11-09 NOTE — TELEPHONE ENCOUNTER
Received request via: Pharmacy    Was the patient seen in the last year in this department? Yes    Does the patient have an active prescription (recently filled or refills available) for medication(s) requested? No    Pharmacy Name: [facility]    Does the patient have intermediate Plus and need 100-day supply? (This applies to ALL medications) Patient does not have SCP

## 2024-11-11 RX ORDER — LEVOTHYROXINE SODIUM 50 MCG
50 TABLET ORAL
Qty: 30 TABLET | Refills: 1 | Status: SHIPPED | OUTPATIENT
Start: 2024-11-13

## 2024-11-14 DIAGNOSIS — M10.9 GOUT, ARTHRITIS: ICD-10-CM

## 2024-11-15 RX ORDER — ALLOPURINOL 300 MG/1
TABLET ORAL
Qty: 90 TABLET | Refills: 1 | Status: SHIPPED | OUTPATIENT
Start: 2024-11-15

## 2024-11-15 NOTE — TELEPHONE ENCOUNTER
Received request via: Pharmacy    Was the patient seen in the last year in this department? Yes    Does the patient have an active prescription (recently filled or refills available) for medication(s) requested? No    [id number] [place] - [address]       Does the patient have prison Plus and need 100-day supply? (This applies to ALL medications) Patient does not have SCP

## 2024-11-16 DIAGNOSIS — E89.0 POSTSURGICAL HYPOTHYROIDISM: ICD-10-CM

## 2024-11-19 NOTE — TELEPHONE ENCOUNTER
Received request via: Pharmacy    Was the patient seen in the last year in this department? Yes    Does the patient have an active prescription (recently filled or refills available) for medication(s) requested? No    [facility]    Does the patient have halfway Plus and need 100-day supply? (This applies to ALL medications) Patient does not have SCP

## 2024-11-20 RX ORDER — LEVOTHYROXINE SODIUM 75 MCG
TABLET ORAL
Qty: 65 TABLET | Refills: 2 | Status: SHIPPED | OUTPATIENT
Start: 2024-11-20

## 2024-11-26 DIAGNOSIS — R06.2 WHEEZING: ICD-10-CM

## 2024-11-27 RX ORDER — ALBUTEROL SULFATE 90 UG/1
2 INHALANT RESPIRATORY (INHALATION) EVERY 6 HOURS PRN
Qty: 18 G | Refills: 2 | Status: SHIPPED | OUTPATIENT
Start: 2024-11-27

## 2024-11-27 NOTE — TELEPHONE ENCOUNTER
Received request via: Pharmacy    Was the patient seen in the last year in this department? Yes    Does the patient have an active prescription (recently filled or refills available) for medication(s) requested? No    [id number] [place] - [address]       Does the patient have penitentiary Plus and need 100-day supply? (This applies to ALL medications) Patient does not have SCP

## 2024-12-03 NOTE — TELEPHONE ENCOUNTER
Received request via: Pharmacy    Was the patient seen in the last year in this department? Yes    Does the patient have an active prescription (recently filled or refills available) for medication(s) requested? No    [id number] [place] - [address]

## 2024-12-03 NOTE — IP AVS SNAPSHOT
8/11/2017    Marium Renteria  [address] [place] [address]    Dear Marium:    [facility] wants to ensure your discharge home is safe and you or your loved ones have had all of your questions answered regarding your care after you leave the hospital.    Below is a list of resources and contact information should you have any questions regarding your hospital stay, follow-up instructions, or active medical symptoms.    Questions or Concerns Regarding… Contact   Medical Questions Related to Your Discharge  (7 days a week, 8am-5pm) Contact a Nurse Care Coordinator   [phone]   Medical Questions Not Related to Your Discharge  (24 hours a day / 7 days a week)  Contact the Nurse Health Line   [phone]    Medications or Discharge Instructions Refer to your discharge packet   or contact your [facility] Primary Care Provider   [phone]   Follow-up Appointment(s) Schedule your appointment via [employer]   or contact Scheduling [phone]   Billing Review your statement via [employer]  or contact Billing [phone]   Medical Records Review your records via [employer]   or contact Medical Records [phone]     You may receive a telephone call within two days of discharge. This call is to make certain you understand your discharge instructions and have the opportunity to have any questions answered. You can also easily access your medical information, test results and upcoming appointments via the [employer] free online health management tool. You can learn more and sign up at [facility]/[employer]. For assistance setting up your [employer] account, please call [phone].    Once again, we want to ensure your discharge home is safe and that you have a clear understanding of any next steps in your care. If you have any questions or concerns, please do not hesitate to contact us, we are here for you. Thank you for choosing [facility] for your healthcare needs.    Sincerely,    Your [facility] Healthcare Team          Ileana-    Please see below notes    Thank you

## 2024-12-04 RX ORDER — TRAZODONE HYDROCHLORIDE 50 MG/1
TABLET, FILM COATED ORAL
Qty: 180 TABLET | Refills: 1 | Status: SHIPPED | OUTPATIENT
Start: 2024-12-04

## 2024-12-29 DIAGNOSIS — G62.9 NEUROPATHY: ICD-10-CM

## 2024-12-30 RX ORDER — POTASSIUM CHLORIDE 750 MG/1
10 TABLET, EXTENDED RELEASE ORAL DAILY
Qty: 30 TABLET | Refills: 0 | Status: SHIPPED | OUTPATIENT
Start: 2024-12-30

## 2024-12-30 RX ORDER — GABAPENTIN 300 MG/1
600 CAPSULE ORAL 3 TIMES DAILY
Qty: 540 CAPSULE | Refills: 1 | Status: SHIPPED | OUTPATIENT
Start: 2024-12-30

## 2024-12-30 NOTE — TELEPHONE ENCOUNTER
Received request via: Pharmacy    Was the patient seen in the last year in this department? Yes    Does the patient have an active prescription (recently filled or refills available) for medication(s) requested? No    [facility]    Does the patient have detention Plus and need 100-day supply? (This applies to ALL medications) Patient does not have SCP

## 2024-12-30 NOTE — TELEPHONE ENCOUNTER
PLEASE CONTACT PATIENT  30 day supply sent to pharmacy. Patient needs appointment with any available provider to discuss labs as patient has not had labs since 8/2023.

## 2024-12-30 NOTE — TELEPHONE ENCOUNTER
Received request via: Pharmacy    Was the patient seen in the last year in this department? Yes    Does the patient have an active prescription (recently filled or refills available) for medication(s) requested? No    [facility]    Does the patient have [facility] Plus and need 100-day supply? (This applies to ALL medications) Patient does not have SCP

## 2025-01-27 NOTE — TELEPHONE ENCOUNTER
Received request via: Patient    Was the patient seen in the last year in this department? Yes    Does the patient have an active prescription (recently filled or refills available) for medication(s) requested? No     Patients insurance doesn't cover Lyrica. Please discontinue. Patient would like to refill gabapentin instead.    Render Risk Assessment In Note?: no Detail Level: Simple Additional Notes: Dr yepez recommended not to do a close shave.  As that will make it worse.

## 2025-02-03 RX ORDER — POTASSIUM CHLORIDE 750 MG/1
10 TABLET, EXTENDED RELEASE ORAL DAILY
Qty: 30 TABLET | Refills: 0 | Status: SHIPPED | OUTPATIENT
Start: 2025-02-03

## 2025-02-04 NOTE — TELEPHONE ENCOUNTER
Received request via: Pharmacy    Was the patient seen in the last year in this department? Yes    Does the patient have an active prescription (recently filled or refills available) for medication(s) requested? No    [id number] [place] - [address]    Does the patient have jail Plus and need 100-day supply? (This applies to ALL medications) Patient does not have SCP

## 2025-03-03 RX ORDER — POTASSIUM CHLORIDE 750 MG/1
10 TABLET, EXTENDED RELEASE ORAL DAILY
Qty: 30 TABLET | Refills: 0 | Status: SHIPPED | OUTPATIENT
Start: 2025-03-03

## 2025-03-03 NOTE — TELEPHONE ENCOUNTER
Received request via: Pharmacy    Was the patient seen in the last year in this department? Yes    Does the patient have an active prescription (recently filled or refills available) for medication(s) requested? No    [facility]     Does the patient have [facility] Plus and need 100-day supply? (This applies to ALL medications) Patient does not have SCP

## 2025-03-10 RX ORDER — LISINOPRIL 40 MG/1
40 TABLET ORAL DAILY
Qty: 90 TABLET | Refills: 2 | Status: SHIPPED | OUTPATIENT
Start: 2025-03-10

## 2025-03-10 NOTE — TELEPHONE ENCOUNTER
Received request via: Pharmacy    Was the patient seen in the last year in this department? No    Does the patient have an active prescription (recently filled or refills available) for medication(s) requested? No    [id number] [place] - [address] [[id number]]     Does the patient have assisted Plus and need 100-day supply? (This applies to ALL medications) Patient does not have SCP

## 2025-04-09 NOTE — TELEPHONE ENCOUNTER
Received request via: Pharmacy    Was the patient seen in the last year in this department? No    Does the patient have an active prescription (recently filled or refills available) for medication(s) requested? No    [id number] [place] - [address]    Does the patient have long term Plus and need 100-day supply? (This applies to ALL medications) Patient does not have SCP

## 2025-04-10 RX ORDER — POTASSIUM CHLORIDE 750 MG/1
10 TABLET, EXTENDED RELEASE ORAL DAILY
Qty: 30 TABLET | Refills: 0 | Status: SHIPPED | OUTPATIENT
Start: 2025-04-10

## 2025-04-10 NOTE — TELEPHONE ENCOUNTER
30-day supply sent to pharmacy.  Patient needs appointment with available provider for additional refills.

## 2025-04-26 DIAGNOSIS — G25.9 EXTRAPYRAMIDAL AND MOVEMENT DISORDER: ICD-10-CM

## 2025-04-28 RX ORDER — CARBIDOPA AND LEVODOPA 25; 100 MG/1; MG/1
1 TABLET ORAL DAILY
Qty: 90 TABLET | Refills: 0 | Status: SHIPPED | OUTPATIENT
Start: 2025-04-28

## 2025-04-28 NOTE — TELEPHONE ENCOUNTER
Received request via: Pharmacy    Was the patient seen in the last year in this department? No    Does the patient have an active prescription (recently filled or refills available) for medication(s) requested? No    [id number] [place] - [address]     Does the patient have [facility] Plus and need 100-day supply? (This applies to ALL medications) Patient does not have SCP

## 2025-05-20 DIAGNOSIS — M10.9 GOUT, ARTHRITIS: ICD-10-CM

## 2025-05-20 RX ORDER — ALLOPURINOL 300 MG/1
300 TABLET ORAL DAILY
Qty: 90 TABLET | Refills: 0 | Status: SHIPPED | OUTPATIENT
Start: 2025-05-20

## 2025-05-20 NOTE — TELEPHONE ENCOUNTER
Received request via: Pharmacy    Was the patient seen in the last year in this department? No (Has an appointment coming up on 5/27)    Does the patient have an active prescription (recently filled or refills available) for medication(s) requested? No    [facility]  [facility] [id number] [place] - [address]     Does the patient have [facility] Plus and need 100-day supply? (This applies to ALL medications) Patient does not have SCP

## 2025-05-21 ENCOUNTER — TELEPHONE (OUTPATIENT)
Dept: MEDICAL GROUP | Facility: CLINIC | Age: 70
End: 2025-05-21

## 2025-05-27 ENCOUNTER — OFFICE VISIT (OUTPATIENT)
Dept: MEDICAL GROUP | Facility: CLINIC | Age: 70
End: 2025-05-27
Payer: COMMERCIAL

## 2025-05-27 VITALS
RESPIRATION RATE: 16 BRPM | HEART RATE: 88 BPM | SYSTOLIC BLOOD PRESSURE: 134 MMHG | DIASTOLIC BLOOD PRESSURE: 81 MMHG | WEIGHT: 178 LBS | BODY MASS INDEX: 32.76 KG/M2 | HEIGHT: 62 IN | OXYGEN SATURATION: 93 % | TEMPERATURE: 97.5 F

## 2025-05-27 DIAGNOSIS — M54.41 CHRONIC BILATERAL LOW BACK PAIN WITH RIGHT-SIDED SCIATICA: ICD-10-CM

## 2025-05-27 DIAGNOSIS — Z87.892 HISTORY OF ANAPHYLAXIS: ICD-10-CM

## 2025-05-27 DIAGNOSIS — E03.9 HYPOTHYROIDISM, UNSPECIFIED TYPE: ICD-10-CM

## 2025-05-27 DIAGNOSIS — G25.9 EXTRAPYRAMIDAL AND MOVEMENT DISORDER: ICD-10-CM

## 2025-05-27 DIAGNOSIS — M41.9 SCOLIOSIS OF LUMBAR SPINE, UNSPECIFIED SCOLIOSIS TYPE: ICD-10-CM

## 2025-05-27 DIAGNOSIS — L98.9 SKIN LESION OF FACE: ICD-10-CM

## 2025-05-27 DIAGNOSIS — G89.29 CHRONIC BILATERAL LOW BACK PAIN WITH RIGHT-SIDED SCIATICA: ICD-10-CM

## 2025-05-27 DIAGNOSIS — G62.9 NEUROPATHY: ICD-10-CM

## 2025-05-27 DIAGNOSIS — E53.8 VITAMIN B12 DEFICIENCY: ICD-10-CM

## 2025-05-27 DIAGNOSIS — I10 PRIMARY HYPERTENSION: ICD-10-CM

## 2025-05-27 DIAGNOSIS — R42 VERTIGO: ICD-10-CM

## 2025-05-27 DIAGNOSIS — M54.16 LEFT LUMBAR RADICULOPATHY: ICD-10-CM

## 2025-05-27 DIAGNOSIS — R26.2 DIFFICULTY WALKING: Primary | ICD-10-CM

## 2025-05-27 PROCEDURE — 3075F SYST BP GE 130 - 139MM HG: CPT | Performed by: STUDENT IN AN ORGANIZED HEALTH CARE EDUCATION/TRAINING PROGRAM

## 2025-05-27 PROCEDURE — 99214 OFFICE O/P EST MOD 30 MIN: CPT | Performed by: STUDENT IN AN ORGANIZED HEALTH CARE EDUCATION/TRAINING PROGRAM

## 2025-05-27 PROCEDURE — 3079F DIAST BP 80-89 MM HG: CPT | Performed by: STUDENT IN AN ORGANIZED HEALTH CARE EDUCATION/TRAINING PROGRAM

## 2025-05-27 RX ORDER — MECLIZINE HCL 12.5 MG 12.5 MG/1
12.5 TABLET ORAL 3 TIMES DAILY PRN
Qty: 90 TABLET | Refills: 0 | Status: SHIPPED | OUTPATIENT
Start: 2025-05-27

## 2025-05-27 RX ORDER — LEVOTHYROXINE SODIUM 50 MCG
50 TABLET ORAL
Qty: 30 TABLET | Refills: 1 | Status: CANCELLED | OUTPATIENT
Start: 2025-05-28

## 2025-05-27 RX ORDER — LEVOTHYROXINE SODIUM 75 MCG
75 TABLET ORAL
Qty: 65 TABLET | Refills: 2 | Status: CANCELLED | OUTPATIENT
Start: 2025-05-27

## 2025-05-27 RX ORDER — POTASSIUM CHLORIDE 750 MG/1
10 TABLET, EXTENDED RELEASE ORAL DAILY
Qty: 30 TABLET | Refills: 0 | Status: SHIPPED | OUTPATIENT
Start: 2025-05-27

## 2025-05-27 RX ORDER — GABAPENTIN 300 MG/1
600 CAPSULE ORAL 3 TIMES DAILY
Qty: 540 CAPSULE | Refills: 1 | Status: SHIPPED | OUTPATIENT
Start: 2025-05-27

## 2025-05-27 RX ORDER — CARBIDOPA AND LEVODOPA 25; 100 MG/1; MG/1
1 TABLET ORAL DAILY
Qty: 90 TABLET | Refills: 0 | Status: SHIPPED | OUTPATIENT
Start: 2025-05-27

## 2025-05-27 RX ORDER — EPINEPHRINE 0.3 MG/.3ML
0.3 INJECTION SUBCUTANEOUS
Qty: 1 EACH | Refills: 1 | Status: SHIPPED | OUTPATIENT
Start: 2025-05-27

## 2025-05-27 RX ORDER — CYCLOBENZAPRINE HCL 10 MG
10 TABLET ORAL NIGHTLY PRN
Qty: 90 TABLET | Refills: 0 | Status: SHIPPED | OUTPATIENT
Start: 2025-05-27

## 2025-05-27 RX ORDER — TRAZODONE HYDROCHLORIDE 50 MG/1
TABLET ORAL
Qty: 180 TABLET | Refills: 1 | Status: SHIPPED | OUTPATIENT
Start: 2025-05-27

## 2025-05-27 ASSESSMENT — FIBROSIS 4 INDEX: FIB4 SCORE: 3.4

## 2025-05-27 NOTE — PROGRESS NOTES
Subjective:     CC: Med refill, spider bite, sleep troubles    HPI:   Maruim presents today with below complaints.    #Needs DME equipment  She claims that her new battery for her scooter does not last long enough to get from her house, into the car, to the store, and then back to the car. The battery frequently dies before she is able to make it back to her car. Her current wheelchair does not works sufficiently (brakes do not work and she has been putting spare parts on it to keep it going). She claims her current walker is too tall, so she would like to find one that is shorter and easier for her to walk with.     #medication refills   She needs medication refills for her EpiPen ( 2025), meclizine, lidocaine patches, vitamin D3, KCl, Carbidopa/levodopa, allopurinol, Synthroid.  Calcium is too large for her to swallow, would like med that is easier to swallow. She has been taking TUMS as her current supplement (prefers labs for calcium to recheck).     #insomnia  She feels that the gabapentin has been helping her sleep for years. She wakes every 1-2 hours at night. She is getting maybe 3 hours of sleep per night. She is unable to sleep because of the pain, feels Soma helped the best in the past. She is currently on trazodone.    #spider bite  Last week had a spider bite. Her hand swelled up very large and was unable to move her fingers. Swelling extended from wrist to the first finger, and claims it was very hot to the touch. No fever reported. She was getting very dizzy and very nauseous. Took Riley-O to aid with this. She has been taking benadryl and using ice packs which helped the swelling. Pain and swelling have since resolved. She denies any further concerns.     No problems updated.    Current Medications and Prescriptions Ordered in [facility][1]    Health Maintenance: Completed    ROS:  See HPI    Objective:     Exam:  /81 (BP Location: Right arm, Patient Position: Sitting, BP Cuff Size: Large  "adult)   Pulse 88   Temp 36.4 °C (97.5 °F) (Temporal)   Resp 16   Ht 1.575 m (5' 2\")   Wt 80.7 kg (178 lb)   LMP 10/02/1980   SpO2 93%   BMI 32.56 kg/m²  Body mass index is 32.56 kg/m².    Physical Exam  Vitals and nursing note reviewed.   Constitutional:       General: She is not in acute distress.     Comments: Sitting in wheelchair   HENT:      Head: Normocephalic and atraumatic.   Eyes:      Extraocular Movements: Extraocular movements intact.      Conjunctiva/sclera: Conjunctivae normal.   Pulmonary:      Effort: No respiratory distress.   Musculoskeletal:      Cervical back: Neck supple.      Comments: Mobility at baseline. No significant swelling or erythema at site of previous spider bite.     Skin:     Comments: 10mm skin lesion with rolled borders on left cheek. Skin color change with irregularity to right temple.    Neurological:      General: No focal deficit present.      Mental Status: She is alert.   Psychiatric:         Mood and Affect: Mood normal.         Behavior: Behavior normal.         Thought Content: Thought content normal.         Judgment: Judgment normal.           Assessment & Plan:     69 y.o. female with the following -     Assessment & Plan  Difficulty walking  History of left lumbar radiculopathy, scoliosis. Also with severe chronic low back pain that limits mobility requiring patient to use walker and electric wheelchair. History of extrapyramidal and movement disorder limiting mobility. Requesting new order as her walker is too tall and she needs a new scooter as hers is no longer functional.   Orders:    DME Walker    DME Wheelchair    DME Other; Future    Extrapyramidal and movement disorder  Requesting refill of Sinimet. Doing well on current dose.   Orders:    DME Walker    DME Wheelchair    DME Other; Future    carbidopa-levodopa (SINEMET)  MG Tab; Take 1 Tablet by mouth every day.    Neuropathy  History of neuropathy. Better controlled with Gabapentin. Does need " "a refill of this. Refill sent today.   Orders:    gabapentin (NEURONTIN) 300 MG Cap; Take 2 Capsules by mouth 3 times a day.    Vertigo  History of vertigo, improves with Meclizine which is used as needed. Needs refill of this today.   Orders:    meclizine (ANTIVERT) 12.5 MG Tab; Take 1 Tablet by mouth 3 times a day as needed (vertigo).    History of anaphylaxis  History of anaphylaxis requiring epipen. Her previous one . Does need a refill of this medication.   Orders:    EPINEPHrine (EPIPEN 2-MIRELA) 0.3 MG/0.3ML Solution Auto-injector solution for injection; Inject 0.3 mL into the shoulder, thigh, or buttocks one time as needed (anaphylaxis) for up to 1 dose. \"Only for bee stings\"    Primary hypertension  Chronic, currently on lisinopril and has been using 20mg at home. Does need refill today. Discussed below labs and she is agreeable.   Orders:    Comp Metabolic Panel; Future    CBC WITH DIFFERENTIAL; Future    Hypothyroidism, unspecified type  History of hypothyroidism. Feels well on current dose of medications. Has not had recent TSH. Discussed recommendations for repeat labs and patient is agreeable. Will keep current dose and change if needed following lab results.   Orders:    TSH WITH REFLEX TO FT4; Future    Skin lesion of face  Left cheek with 10mm skin lesion with rolled borders concerning for basal cell carcinoma. Discussed since this is large and on the face that a referral to dermatology will be placed and she is agreeable.   Orders:    Referral to Dermatology    Vitamin B12 deficiency  History of vitamin B12 deficiency not currently on supplementation. Discussed recommendation for repeat and she is agreeable.   Orders:    VITAMIN B12; Future        Due to concern for time during appointment, patient to return in 2 weeks to continue discussion.    Return in about 2 weeks (around 6/10/2025) for follow-up, discuss preventative care.    Diana Nice MD  [facility]           Face to Face " Note  -  Durable Medical Equipment    Diana Nice M.D. - NPI: [id number]  I certify that this patient is under my care and that they had a durable medical equipment(DME)face to face encounter by myself that meets the physician DME face-to-face encounter requirements with this patient on:    Date of encounter:   Patient:                    MRN:                       YOB: 2024  Marium Renteria  [id number]  1955     The encounter with the patient was in whole, or in part, for the following medical condition, which is the primary reason for durable medical equipment:  Other - difficulty with ambulation, weakness    I certify that, based on my findings, the following durable medical equipment is medically necessary:  Walkers and Wheelchair   Patient needs manual wheelchair for use inside the home based on the above diagnosis. Per guidelines patient meets criteria in the following ways:   A.  Patient has significant impairment in the following Toileting, Feeding, and ADLs.   B.  The patient's mobility limitations cannot be sufficiently resolved by use of  fitted cane or walker.   C.  The patient reports his home provides adequate access between rooms,  maneuvering space, and surfaces for use of the manual wheelchair that is  provided.   D.  The use of the manual wheelchair will significantly improve the patient's  ability to participate in MRADLs and the patient will use it on a regular basis in  the home.   E.  The patient has not expressed an unwillingness to use the manual  wheelchair.   F. The patient has limitations of strength, endurance, range of motion, or coordination per OT notes:.      My Clinical findings support the need for the above equipment due to:  Abnormal Gait         [1]   Current Outpatient Medications Ordered in Epic   Medication Sig Dispense Refill    allopurinol (ZYLOPRIM) 300 MG Tab TAKE 1 TABLET BY MOUTH DAILY 90 Tablet 0    carbidopa-levodopa (SINEMET)  " MG Tab TAKE 1 TABLET BY MOUTH DAILY 90 Tablet 0    potassium chloride ER (KLOR-CON) 10 MEQ tablet Take 1 Tablet by mouth every day. 30 Tablet 0    lisinopril (PRINIVIL) 40 MG tablet Take 1 Tablet by mouth every day. 90 Tablet 2    gabapentin (NEURONTIN) 300 MG Cap TAKE TWO CAPSULES BY MOUTH THREE TIMES A  Capsule 1    traZODone (DESYREL) 50 MG Tab TAKE TWO TABLETS BY MOUTH EVERY NIGHT AT BEDTIME FOR SLEEP 180 Tablet 1    albuterol 108 (90 Base) MCG/ACT Aero Soln inhalation aerosol INHALE 2 PUFFS BY MOUTH EVERY 6 HOURS AS NEEDED FOR SHORTNESS OF BREATH 18 g 2    SYNTHROID 75 MCG Tab TAKE 1 TABLET BY MOUTH DAILY ON MONDAY, TUESDAY, THURSDAY, FRIDAY, SATURDAY. 65 Tablet 2    SYNTHROID 50 MCG Tab TAKE 1 TABLET BY MOUTH EVERY SUNDAY AND WEDNESDAY 30 Tablet 1    meclizine (ANTIVERT) 12.5 MG Tab TAKE 1 TABLET BY MOUTH 3 TIMES A DAY AS NEEDED FOR VERTIGO 90 Tablet 2    EPINEPHrine (EPIPEN 2-MIRELA) 0.3 MG/0.3ML Solution Auto-injector solution for injection Inject 0.3 mL into the shoulder, thigh, or buttocks one time as needed (anaphylaxis) for up to 1 dose. \"Only for bee stings\" 1 Each 1    cyclobenzaprine (FLEXERIL) 10 mg Tab Take 1 Tablet by mouth 3 times a day as needed for Muscle Spasms or Moderate Pain. 30 Tablet 1    vitamin D (CHOLECALCIFEROL) 1000 Unit Tab Take 5,000 Units by mouth every day.      EPINEPHrine (EPIPEN) 0.3 MG/0.3ML Solution Auto-injector solution for injection       naproxen (NAPROSYN) 500 MG Tab Take 1 Tablet by mouth 1 time a day as needed (Pain). 60 Tablet 1    Cholecalciferol (VITAMIN D-3) 125 MCG (5000 UT) Tab as directed Strength: 125 MCG (5000 UT) 30 Tablet 3    acetaminophen (TYLENOL) 500 MG Tab Take 1 Tablet by mouth every 6 hours as needed for Moderate Pain. 90 Tablet 1    Misc. Devices Misc Back brace to use as directed 1 Device 0     No current Epic-ordered facility-administered medications on file.     "

## 2025-05-28 DIAGNOSIS — E89.0 POSTSURGICAL HYPOTHYROIDISM: ICD-10-CM

## 2025-05-28 RX ORDER — LEVOTHYROXINE SODIUM 50 MCG
50 TABLET ORAL
Qty: 30 TABLET | Refills: 1 | Status: SHIPPED | OUTPATIENT
Start: 2025-05-28

## 2025-05-28 NOTE — TELEPHONE ENCOUNTER
Received request via: Pharmacy    Was the patient seen in the last year in this department? Yes    Does the patient have an active prescription (recently filled or refills available) for medication(s) requested? No    [id number] [place] - [address]      Does the patient have nursing home Plus and need 100-day supply? (This applies to ALL medications) Patient does not have SCP

## 2025-05-28 NOTE — ASSESSMENT & PLAN NOTE
History of hypothyroidism. Feels well on current dose of medications. Has not had recent TSH. Discussed recommendations for repeat labs and patient is agreeable. Will keep current dose and change if needed following lab results.   Orders:    TSH WITH REFLEX TO FT4; Future

## 2025-05-28 NOTE — ASSESSMENT & PLAN NOTE
History of vertigo, improves with Meclizine which is used as needed. Needs refill of this today.   Orders:    meclizine (ANTIVERT) 12.5 MG Tab; Take 1 Tablet by mouth 3 times a day as needed (vertigo).

## 2025-05-28 NOTE — ASSESSMENT & PLAN NOTE
History of neuropathy. Better controlled with Gabapentin. Does need a refill of this. Refill sent today.   Orders:    gabapentin (NEURONTIN) 300 MG Cap; Take 2 Capsules by mouth 3 times a day.

## 2025-05-28 NOTE — ASSESSMENT & PLAN NOTE
Requesting refill of Sinimet. Doing well on current dose.   Orders:    DME Walker    DME Wheelchair    DME Other; Future    carbidopa-levodopa (SINEMET)  MG Tab; Take 1 Tablet by mouth every day.

## 2025-05-28 NOTE — ASSESSMENT & PLAN NOTE
Chronic, currently on lisinopril and has been using 20mg at home. Does need refill today. Discussed below labs and she is agreeable.   Orders:    Comp Metabolic Panel; Future    CBC WITH DIFFERENTIAL; Future

## 2025-05-29 ENCOUNTER — HOSPITAL ENCOUNTER (OUTPATIENT)
Dept: LAB | Facility: MEDICAL CENTER | Age: 70
End: 2025-05-29
Attending: STUDENT IN AN ORGANIZED HEALTH CARE EDUCATION/TRAINING PROGRAM
Payer: COMMERCIAL

## 2025-05-29 DIAGNOSIS — E53.8 VITAMIN B12 DEFICIENCY: ICD-10-CM

## 2025-05-29 DIAGNOSIS — E03.9 HYPOTHYROIDISM, UNSPECIFIED TYPE: ICD-10-CM

## 2025-05-29 DIAGNOSIS — I10 PRIMARY HYPERTENSION: ICD-10-CM

## 2025-05-29 LAB
ALBUMIN SERPL BCP-MCNC: 4 G/DL (ref 3.2–4.9)
ALBUMIN/GLOB SERPL: 1.3 G/DL
ALP SERPL-CCNC: 102 U/L (ref 30–99)
ALT SERPL-CCNC: 20 U/L (ref 2–50)
ANION GAP SERPL CALC-SCNC: 14 MMOL/L (ref 7–16)
AST SERPL-CCNC: 34 U/L (ref 12–45)
BASOPHILS # BLD AUTO: 0.7 % (ref 0–1.8)
BASOPHILS # BLD: 0.03 K/UL (ref 0–0.12)
BILIRUB SERPL-MCNC: 1 MG/DL (ref 0.1–1.5)
BUN SERPL-MCNC: 4 MG/DL (ref 8–22)
CALCIUM ALBUM COR SERPL-MCNC: 9 MG/DL (ref 8.5–10.5)
CALCIUM SERPL-MCNC: 9 MG/DL (ref 8.5–10.5)
CHLORIDE SERPL-SCNC: 101 MMOL/L (ref 96–112)
CO2 SERPL-SCNC: 26 MMOL/L (ref 20–33)
COMMENT 1642: NORMAL
CREAT SERPL-MCNC: 0.68 MG/DL (ref 0.5–1.4)
EOSINOPHIL # BLD AUTO: 0.02 K/UL (ref 0–0.51)
EOSINOPHIL NFR BLD: 0.5 % (ref 0–6.9)
ERYTHROCYTE [DISTWIDTH] IN BLOOD BY AUTOMATED COUNT: 56.6 FL (ref 35.9–50)
GFR SERPLBLD CREATININE-BSD FMLA CKD-EPI: 94 ML/MIN/1.73 M 2
GLOBULIN SER CALC-MCNC: 3 G/DL (ref 1.9–3.5)
GLUCOSE SERPL-MCNC: 99 MG/DL (ref 65–99)
HCT VFR BLD AUTO: 59.1 % (ref 37–47)
HGB BLD-MCNC: 20.5 G/DL (ref 12–16)
IMM GRANULOCYTES # BLD AUTO: 0.03 K/UL (ref 0–0.11)
IMM GRANULOCYTES NFR BLD AUTO: 0.7 % (ref 0–0.9)
LYMPHOCYTES # BLD AUTO: 1.4 K/UL (ref 1–4.8)
LYMPHOCYTES NFR BLD: 34.7 % (ref 22–41)
MCH RBC QN AUTO: 37 PG (ref 27–33)
MCHC RBC AUTO-ENTMCNC: 34.7 G/DL (ref 32.2–35.5)
MCV RBC AUTO: 106.7 FL (ref 81.4–97.8)
MONOCYTES # BLD AUTO: 0.28 K/UL (ref 0–0.85)
MONOCYTES NFR BLD AUTO: 6.9 % (ref 0–13.4)
MORPHOLOGY BLD-IMP: NORMAL
NEUTROPHILS # BLD AUTO: 2.28 K/UL (ref 1.82–7.42)
NEUTROPHILS NFR BLD: 56.5 % (ref 44–72)
NRBC # BLD AUTO: 0 K/UL
NRBC BLD-RTO: 0 /100 WBC (ref 0–0.2)
PLATELET # BLD AUTO: 97 K/UL (ref 164–446)
PLATELET BLD QL SMEAR: NORMAL
PLATELETS.RETICULATED NFR BLD AUTO: 3.8 % (ref 0.6–13.1)
PMV BLD AUTO: 10.2 FL (ref 9–12.9)
POTASSIUM SERPL-SCNC: 3.8 MMOL/L (ref 3.6–5.5)
PROT SERPL-MCNC: 7 G/DL (ref 6–8.2)
RBC # BLD AUTO: 5.54 M/UL (ref 4.2–5.4)
RBC BLD AUTO: NORMAL
SODIUM SERPL-SCNC: 141 MMOL/L (ref 135–145)
TSH SERPL DL<=0.005 MIU/L-ACNC: 0.61 UIU/ML (ref 0.38–5.33)
VIT B12 SERPL-MCNC: 440 PG/ML (ref 211–911)
WBC # BLD AUTO: 4 K/UL (ref 4.8–10.8)

## 2025-05-29 PROCEDURE — 85025 COMPLETE CBC W/AUTO DIFF WBC: CPT

## 2025-05-29 PROCEDURE — 80053 COMPREHEN METABOLIC PANEL: CPT

## 2025-05-29 PROCEDURE — 84443 ASSAY THYROID STIM HORMONE: CPT

## 2025-05-29 PROCEDURE — 82607 VITAMIN B-12: CPT

## 2025-05-29 PROCEDURE — 85055 RETICULATED PLATELET ASSAY: CPT

## 2025-05-29 PROCEDURE — 36415 COLL VENOUS BLD VENIPUNCTURE: CPT

## 2025-06-05 DIAGNOSIS — R42 VERTIGO: ICD-10-CM

## 2025-06-05 NOTE — TELEPHONE ENCOUNTER
Received request via: Pharmacy    Was the patient seen in the last year in this department? Yes    Does the patient have an active prescription (recently filled or refills available) for medication(s) requested? No    [id number] [place] - [address]     Does the patient have penitentiary Plus and need 100-day supply? (This applies to ALL medications) Patient does not have SCP

## 2025-06-06 ENCOUNTER — RESULTS FOLLOW-UP (OUTPATIENT)
Dept: HOSPITALIST | Facility: MEDICAL CENTER | Age: 70
End: 2025-06-06

## 2025-06-06 RX ORDER — MECLIZINE HCL 12.5 MG 12.5 MG/1
12.5 TABLET ORAL 3 TIMES DAILY PRN
Qty: 90 TABLET | Refills: 0 | Status: SHIPPED | OUTPATIENT
Start: 2025-06-06

## 2025-06-11 ENCOUNTER — APPOINTMENT (OUTPATIENT)
Dept: URBAN - METROPOLITAN AREA CLINIC 6 | Facility: CLINIC | Age: 70
Setting detail: DERMATOLOGY
End: 2025-06-11

## 2025-06-11 DIAGNOSIS — L72.8 OTHER FOLLICULAR CYSTS OF THE SKIN AND SUBCUTANEOUS TISSUE: ICD-10-CM

## 2025-06-11 DIAGNOSIS — Z71.89 OTHER SPECIFIED COUNSELING: ICD-10-CM

## 2025-06-11 DIAGNOSIS — L82.0 INFLAMED SEBORRHEIC KERATOSIS: ICD-10-CM

## 2025-06-11 DIAGNOSIS — L82.1 OTHER SEBORRHEIC KERATOSIS: ICD-10-CM

## 2025-06-11 DIAGNOSIS — D485 NEOPLASM OF UNCERTAIN BEHAVIOR OF SKIN: ICD-10-CM

## 2025-06-11 PROBLEM — D48.5 NEOPLASM OF UNCERTAIN BEHAVIOR OF SKIN: Status: ACTIVE | Noted: 2025-06-11

## 2025-06-11 PROCEDURE — ? COUNSELING

## 2025-06-11 PROCEDURE — ? BIOPSY BY SHAVE METHOD

## 2025-06-11 PROCEDURE — ? LIQUID NITROGEN

## 2025-06-11 ASSESSMENT — LOCATION SIMPLE DESCRIPTION DERM
LOCATION SIMPLE: LEFT CHEEK
LOCATION SIMPLE: POSTERIOR NECK
LOCATION SIMPLE: ABDOMEN
LOCATION SIMPLE: RIGHT TEMPLE
LOCATION SIMPLE: LEFT POSTERIOR THIGH

## 2025-06-11 ASSESSMENT — LOCATION DETAILED DESCRIPTION DERM
LOCATION DETAILED: MID POSTERIOR NECK
LOCATION DETAILED: LEFT MEDIAL MALAR CHEEK
LOCATION DETAILED: RIGHT RIB CAGE
LOCATION DETAILED: PERIUMBILICAL SKIN
LOCATION DETAILED: LEFT PROXIMAL LATERAL POSTERIOR THIGH
LOCATION DETAILED: RIGHT CENTRAL TEMPLE

## 2025-06-11 ASSESSMENT — LOCATION ZONE DERM
LOCATION ZONE: NECK
LOCATION ZONE: FACE
LOCATION ZONE: TRUNK
LOCATION ZONE: LEG

## 2025-06-13 ENCOUNTER — APPOINTMENT (OUTPATIENT)
Dept: MEDICAL GROUP | Facility: CLINIC | Age: 70
End: 2025-06-13
Payer: COMMERCIAL

## 2025-06-17 NOTE — PROGRESS NOTES
Patient to MRI Outpatient Dept.  Patient informed process and plan of care during this visit.  Anesthesiologist, Dr Jones spoke with patient and discussed provider's plan of care.  MRI brain with and without contrast completed.  Patient taken to recovery. Patient tolerated water once awake and appropriate.  VSS. No complaints of pain or nausea. DC instructions discussed, all questions answered.  Patient discharged in stable condition with responsible adult,  Govind.     Detail Level: Generalized Detail Level: Zone Detail Level: Detailed

## 2025-06-19 ENCOUNTER — APPOINTMENT (OUTPATIENT)
Dept: MEDICAL GROUP | Facility: CLINIC | Age: 70
End: 2025-06-19
Payer: COMMERCIAL

## 2025-06-24 RX ORDER — POTASSIUM CHLORIDE 750 MG/1
10 TABLET, EXTENDED RELEASE ORAL DAILY
Qty: 30 TABLET | Refills: 0 | Status: SHIPPED | OUTPATIENT
Start: 2025-06-24

## 2025-06-26 ENCOUNTER — APPOINTMENT (OUTPATIENT)
Dept: URBAN - METROPOLITAN AREA CLINIC 36 | Facility: CLINIC | Age: 70
Setting detail: DERMATOLOGY
End: 2025-06-26

## 2025-06-26 ENCOUNTER — OFFICE VISIT (OUTPATIENT)
Dept: MEDICAL GROUP | Facility: CLINIC | Age: 70
End: 2025-06-26
Payer: COMMERCIAL

## 2025-06-26 VITALS
WEIGHT: 188 LBS | OXYGEN SATURATION: 92 % | HEIGHT: 61 IN | HEART RATE: 86 BPM | DIASTOLIC BLOOD PRESSURE: 68 MMHG | TEMPERATURE: 98 F | SYSTOLIC BLOOD PRESSURE: 116 MMHG | BODY MASS INDEX: 35.5 KG/M2

## 2025-06-26 DIAGNOSIS — R06.2 WHEEZING: ICD-10-CM

## 2025-06-26 DIAGNOSIS — D75.1 POLYCYTHEMIA: ICD-10-CM

## 2025-06-26 DIAGNOSIS — R53.81 DEBILITY: Primary | ICD-10-CM

## 2025-06-26 DIAGNOSIS — Z87.892 HISTORY OF ANAPHYLAXIS: ICD-10-CM

## 2025-06-26 PROBLEM — C44.319 BASAL CELL CARCINOMA OF SKIN OF OTHER PARTS OF FACE: Status: ACTIVE | Noted: 2025-06-26

## 2025-06-26 PROBLEM — D69.6 THROMBOCYTOPENIA (HCC): Status: ACTIVE | Noted: 2025-06-26

## 2025-06-26 PROBLEM — D69.6 THROMBOCYTOPENIA (HCC): Status: RESOLVED | Noted: 2025-06-26 | Resolved: 2025-06-26

## 2025-06-26 PROCEDURE — ? MOHS SURGERY

## 2025-06-26 RX ORDER — EPINEPHRINE 0.3 MG/.3ML
0.3 INJECTION SUBCUTANEOUS
Qty: 1 EACH | Refills: 1 | Status: SHIPPED | OUTPATIENT
Start: 2025-06-26

## 2025-06-26 RX ORDER — ALBUTEROL SULFATE 90 UG/1
2 INHALANT RESPIRATORY (INHALATION) EVERY 6 HOURS PRN
Qty: 18 G | Refills: 2 | Status: SHIPPED | OUTPATIENT
Start: 2025-06-26

## 2025-06-26 ASSESSMENT — PATIENT HEALTH QUESTIONNAIRE - PHQ9
7. TROUBLE CONCENTRATING ON THINGS, SUCH AS READING THE NEWSPAPER OR WATCHING TELEVISION: NOT AT ALL
1. LITTLE INTEREST OR PLEASURE IN DOING THINGS: NOT AT ALL
3. TROUBLE FALLING OR STAYING ASLEEP OR SLEEPING TOO MUCH: NOT AT ALL
2. FEELING DOWN, DEPRESSED, IRRITABLE, OR HOPELESS: NOT AT ALL
6. FEELING BAD ABOUT YOURSELF - OR THAT YOU ARE A FAILURE OR HAVE LET YOURSELF OR YOUR FAMILY DOWN: NOT AL ALL
8. MOVING OR SPEAKING SO SLOWLY THAT OTHER PEOPLE COULD HAVE NOTICED. OR THE OPPOSITE, BEING SO FIGETY OR RESTLESS THAT YOU HAVE BEEN MOVING AROUND A LOT MORE THAN USUAL: NOT AT ALL
SUM OF ALL RESPONSES TO PHQ QUESTIONS 1-9: 0
SUM OF ALL RESPONSES TO PHQ9 QUESTIONS 1 AND 2: 0
5. POOR APPETITE OR OVEREATING: NOT AT ALL
9. THOUGHTS THAT YOU WOULD BE BETTER OFF DEAD, OR OF HURTING YOURSELF: NOT AT ALL
4. FEELING TIRED OR HAVING LITTLE ENERGY: NOT AT ALL

## 2025-06-26 ASSESSMENT — FIBROSIS 4 INDEX: FIB4 SCORE: 5.33

## 2025-06-26 NOTE — PROCEDURE: MOHS SURGERY
Surgeon Performing Repair (Optional): Kelli Frazier MD
Abbe Flap (Lower To Upper Lip) Text: The defect of the upper lip was assessed and measured.  Given the location and size of the defect, an Abbe flap was deemed most appropriate.  Using a sterile surgical marker, an appropriate Abbe flap was measured and drawn on the lower lip. Local anesthesia was then infiltrated. A scalpel was then used to incise the upper lip through and through the skin, vermilion, muscle and mucosa, leaving the flap pedicled on the opposite side.  The flap was then rotated and transferred to the lower lip defect.  The flap was then sutured into place with a three layer technique, closing the orbicularis oris muscle layer with subcutaneous buried sutures, followed by a mucosal layer and an epidermal layer.
Dressing: pressure dressing with telfa
Muscle Hinge Flap Text: The defect edges were debeveled with a #15 scalpel blade.  Given the size, depth and location of the defect and the proximity to free margins a muscle hinge flap was deemed most appropriate.  Using a sterile surgical marker, an appropriate hinge flap was drawn incorporating the defect. The area thus outlined was incised with a #15 scalpel blade.  The skin margins were undermined to an appropriate distance in all directions utilizing iris scissors.
Stage 6: Additional Anesthesia Volume In Cc: 0
Rapid Mohs Report (Note: If The Tumor Is Complex, Or If Any Stage Is Divided Into More Than 5 Pieces Or If You Want A More Detailed Report, Select No And Proceed To The Individual Stages Below): no
Intermediate Repair Preamble Text (Leave Blank If You Do Not Want): Undermining was performed with blunt dissection.
Add Superficial Fascia When Documenting Dermal Sutures?: Yes
Consent (Ear)/Introductory Paragraph: The rationale for Mohs was explained to the patient and consent was obtained. The risks, benefits and alternatives to therapy were discussed in detail. Specifically, the risks of ear deformity, infection, scarring, bleeding, prolonged wound healing, incomplete removal, allergy to anesthesia, nerve injury and recurrence were addressed. Prior to the procedure, the treatment site was clearly identified and confirmed by the patient. All components of Universal Protocol/PAUSE Rule completed.
Special Stains Stage 14 - Results: Base On Clearance Noted Above
Modified Advancement Flap Text: The defect edges were debeveled with a #15 scalpel blade.  Given the location of the defect, shape of the defect and the proximity to free margins a modified advancement flap was deemed most appropriate.  Using a sterile surgical marker, an appropriate advancement flap was drawn incorporating the defect and placing the expected incisions within the relaxed skin tension lines where possible.    The area thus outlined was incised deep to adipose tissue with a #15 scalpel blade.  The skin margins were undermined to an appropriate distance in all directions utilizing iris scissors.
Retention Suture Bite Size: 1 mm
Cheiloplasty (Complex) Text: A decision was made to reconstruct the defect with a  cheiloplasty.  The defect was undermined extensively.  Additional obicularis oris muscle was excised with a 15 blade scalpel.  The defect was converted into a full thickness wedge to facilite a better cosmetic result.  Small vessels were then tied off with 5-0 monocyrl. The obicularis oris, superficial fascia, adipose and dermis were then reapproximated.  After the deeper layers were approximated the epidermis was reapproximated with particular care given to realign the vermilion border.
Mohs Rapid Report Verbiage: The area of clinically evident tumor was marked with skin marking ink and appropriately hatched.  The initial incision was made following the Mohs approach through the skin.  The specimen was taken to the lab, divided into the necessary number of pieces, chromacoded and processed according to the Mohs protocol.  This was repeated in successive stages until a tumor free defect was achieved.
Epidermal Autograft Text: The defect edges were debeveled with a #15 scalpel blade.  Given the location of the defect, shape of the defect and the proximity to free margins an epidermal autograft was deemed most appropriate.  Using a sterile surgical marker, the primary defect shape was transferred to the donor site. The epidermal graft was then harvested.  The skin graft was then placed in the primary defect and oriented appropriately.
Tissue Cultured Epidermal Autograft Text: The defect edges were debeveled with a #15 scalpel blade.  Given the location of the defect, shape of the defect and the proximity to free margins a tissue cultured epidermal autograft was deemed most appropriate.  The graft was then trimmed to fit the size of the defect.  The graft was then placed in the primary defect and oriented appropriately.
Mid-Level Procedure Text (B): After obtaining clear surgical margins the patient was sent to a mid-level provider for surgical repair.  The patient understands they will receive post-surgical care and follow-up from the mid-level provider.
Previous Accession (Optional): [id number]
Non-Graft Cartilage Fenestration Text: The cartilage was fenestrated with a 2mm punch biopsy to help facilitate healing.
Stage 11: Additional Anesthesia Type: 1% lidocaine with epinephrine
Nasalis Myocutaneous Flap Text: Using a #15 blade, an incision was made around the donor flap to the level of the nasalis muscle. Wide lateral undermining was then performed in both the subcutaneous plane above the nasalis muscle, and in a submuscular plane just above periosteum. This allowed the formation of a free nasalis muscle axial pedicle which was still attached to the actual cutaneous flap, increasing its mobility and vascular viability. Hemostasis was obtained with pinpoint electrocoagulation. The flap was mobilized into position and the pivotal anchor points positioned and stabilized with buried interrupted sutures. Subcutaneous and dermal tissues were closed in a multilayered fashion with sutures. Tissue redundancies were excised, and the epidermal edges were apposed without significant tension and sutured with sutures.
Xenograft Text: The defect edges were debeveled with a #15 scalpel blade.  Given the location of the defect, shape of the defect and the proximity to free margins a xenograft was deemed most appropriate.  The graft was then trimmed to fit the size of the defect.  The graft was then placed in the primary defect and oriented appropriately.
Estlander Flap (Lower To Upper Lip) Text: The defect of the lower lip was assessed and measured.  Given the location and size of the defect, an Estlander flap was deemed most appropriate.  Using a sterile surgical marker, an appropriate Estlander flap was measured and drawn on the upper lip. Local anesthesia was then infiltrated. A scalpel was then used to incise the lateral aspect of the flap, through skin, muscle and mucosa, leaving the flap pedicled medially.  The flap was then rotated and positioned to fill the lower lip defect.  The flap was then sutured into place with a three layer technique, closing the orbicularis oris muscle layer with subcutaneous buried sutures, followed by a mucosal layer and an epidermal layer.
Ear Wedge Repair Text: A wedge excision was completed by carrying down an excision through the full thickness of the ear and cartilage with an inward facing Burow's triangle. The wound was then closed in a layered fashion.
Referred To Plastics For Closure Text (Leave Blank If You Do Not Want): After obtaining clear surgical margins the patient was sent to plastics for surgical repair.  The patient understands they will receive post-surgical care and follow-up from the referring physician's office.
Primary Defect Length In Cm (Final Defect Size - Required For Flaps/Grafts): 2
Provider Procedure Text (A): After obtaining clear surgical margins the defect was repaired by another provider.
Eyelid Full Thickness Repair - 71984: The eyelid defect was full thickness which required a wedge repair of the eyelid. Special care was taken to ensure that the eyelid margin was realligned when placing sutures.
Inflammation Suggestive Of Cancer Camouflage Histology Text: There was a dense lymphocytic infiltrate which prevented adequate histologic evaluation of adjacent structures.
Estimated Blood Loss (Cc): minimal
Complex Repair And Graft Additional Text (Will Appearing After The Standard Complex Repair Text): The complex repair was not sufficient to completely close the primary defect. The remaining additional defect was repaired with the graft mentioned below.
Otolaryngologist Procedure Text (B): After obtaining clear surgical margins the patient was sent to otolaryngology for surgical repair.  The patient understands they will receive post-surgical care and follow-up from the referring physician's office.
Retention Suture Text: Retention sutures were placed to support the closure and prevent dehiscence.
Suturegard Retention Suture: 0-0 Nylon
Referred To Oculoplastics For Closure Text (Leave Blank If You Do Not Want): After obtaining clear surgical margins the patient was sent to oculoplastics for surgical repair.  The patient understands they will receive post-surgical care and follow-up from the referring physician's office.
Repair Anesthesia Method: local infiltration
Anesthesia Volume In Cc: 3
Advancement-Rotation Flap Text: The defect edges were debeveled with a #15 scalpel blade.  Given the location of the defect, shape of the defect and the proximity to free margins an advancement-rotation flap was deemed most appropriate.  Using a sterile surgical marker, an appropriate flap was drawn incorporating the defect and placing the expected incisions within the relaxed skin tension lines where possible. The area thus outlined was incised deep to adipose tissue with a #15 scalpel blade.  The skin margins were undermined to an appropriate distance in all directions utilizing iris scissors.
Peng Advancement Flap Text: The defect edges were debeveled with a #15 scalpel blade.  Given the location of the defect, shape of the defect and the proximity to free margins a Peng advancement flap was deemed most appropriate.  Using a sterile surgical marker, an appropriate advancement flap was drawn incorporating the defect and placing the expected incisions within the relaxed skin tension lines where possible. The area thus outlined was incised deep to adipose tissue with a #15 scalpel blade.  The skin margins were undermined to an appropriate distance in all directions utilizing iris scissors.
Simple / Intermediate / Complex Repair - Final Wound Length In Cm: 3.5
Ear Star Wedge Flap Text: The defect edges were debeveled with a #15 blade scalpel.  Given the location of the defect and the proximity to free margins (helical rim) an ear star wedge flap was deemed most appropriate.  Using a sterile surgical marker, the appropriate flap was drawn incorporating the defect and placing the expected incisions between the helical rim and antihelix where possible.  The area thus outlined was incised through and through with a #15 scalpel blade.
Rotation Flap Text: The defect edges were debeveled with a #15 scalpel blade.  Given the location of the defect, shape of the defect and the proximity to free margins a rotation flap was deemed most appropriate.  Using a sterile surgical marker, an appropriate rotation flap was drawn incorporating the defect and placing the expected incisions within the relaxed skin tension lines where possible.    The area thus outlined was incised deep to adipose tissue with a #15 scalpel blade.  The skin margins were undermined to an appropriate distance in all directions utilizing iris scissors.
Staging Info: By selecting yes to the question above you will include information on AJCC 8 tumor staging in your Mohs note. Information on tumor staging will be automatically added for SCCs on the head and neck. AJCC 8 includes tumor size, tumor depth, perineural involvement and bone invasion.
Burow's Advancement Flap Text: The defect edges were debeveled with a #15 scalpel blade.  Given the location of the defect and the proximity to free margins a Burow's advancement flap was deemed most appropriate.  Using a sterile surgical marker, the appropriate advancement flap was drawn incorporating the defect and placing the expected incisions within the relaxed skin tension lines where possible.    The area thus outlined was incised deep to adipose tissue with a #15 scalpel blade.  The skin margins were undermined to an appropriate distance in all directions utilizing iris scissors.
Dorsal Nasal Flap Text: The defect edges were debeveled with a #15 scalpel blade.  Given the location of the defect and the proximity to free margins a dorsal nasal flap was deemed most appropriate.  Using a sterile surgical marker, an appropriate dorsal nasal flap was drawn around the defect.    The area thus outlined was incised deep to adipose tissue with a #15 scalpel blade.  The skin margins were undermined to an appropriate distance in all directions utilizing iris scissors.
Partial Purse String (Intermediate) Text: Given the location of the defect and the characteristics of the surrounding skin an intermediate purse string closure was deemed most appropriate.  Undermining was performed circumfirentially around the surgical defect.  A purse string suture was then placed and tightened. Wound tension only allowed a partial closure of the circular defect.
Complex Repair Preamble Text (Leave Blank If You Do Not Want): Extensive wide undermining was performed.
Advancement Flap (Double) Text: The defect edges were debeveled with a #15 scalpel blade.  Given the location of the defect and the proximity to free margins a double advancement flap was deemed most appropriate.  Using a sterile surgical marker, the appropriate advancement flaps were drawn incorporating the defect and placing the expected incisions within the relaxed skin tension lines where possible.    The area thus outlined was incised deep to adipose tissue with a #15 scalpel blade.  The skin margins were undermined to an appropriate distance in all directions utilizing iris scissors.
Island Pedicle Flap-Requiring Vessel Identification Text: The defect edges were debeveled with a #15 scalpel blade.  Given the location of the defect, shape of the defect and the proximity to free margins an island pedicle advancement flap was deemed most appropriate.  Using a sterile surgical marker, an appropriate advancement flap was drawn, based on the axial vessel mentioned above, incorporating the defect, outlining the appropriate donor tissue and placing the expected incisions within the relaxed skin tension lines where possible.    The area thus outlined was incised deep to adipose tissue with a #15 scalpel blade.  The skin margins were undermined to an appropriate distance in all directions around the primary defect and laterally outward around the island pedicle utilizing iris scissors.  There was minimal undermining beneath the pedicle flap.
Posterior Auricular Interpolation Flap Text: A decision was made to reconstruct the defect utilizing an interpolation axial flap and a staged reconstruction.  A telfa template was made of the defect.  This telfa template was then used to outline the posterior auricular interpolation flap.  The donor area for the pedicle flap was then injected with anesthesia.  The flap was excised through the skin and subcutaneous tissue down to the layer of the underlying musculature.  The pedicle flap was carefully excised within this deep plane to maintain its blood supply.  The edges of the donor site were undermined.   The donor site was closed in a primary fashion.  The pedicle was then rotated into position and sutured.  Once the tube was sutured into place, adequate blood supply was confirmed with blanching and refill.  The pedicle was then wrapped with xeroform gauze and dressed appropriately with a telfa and gauze bandage to ensure continued blood supply and protect the attached pedicle.
Mastoid Interpolation Flap Text: A decision was made to reconstruct the defect utilizing an interpolation axial flap and a staged reconstruction.  A telfa template was made of the defect.  This telfa template was then used to outline the mastoid interpolation flap.  The donor area for the pedicle flap was then injected with anesthesia.  The flap was excised through the skin and subcutaneous tissue down to the layer of the underlying musculature.  The pedicle flap was carefully excised within this deep plane to maintain its blood supply.  The edges of the donor site were undermined.   The donor site was closed in a primary fashion.  The pedicle was then rotated into position and sutured.  Once the tube was sutured into place, adequate blood supply was confirmed with blanching and refill.  The pedicle was then wrapped with xeroform gauze and dressed appropriately with a telfa and gauze bandage to ensure continued blood supply and protect the attached pedicle.
Paramedian Forehead Flap Text: A decision was made to reconstruct the defect utilizing an interpolation axial flap and a staged reconstruction.  A telfa template was made of the defect.  This telfa template was then used to outline the paramedian forehead pedicle flap.  The donor area for the pedicle flap was then injected with anesthesia.  The flap was excised through the skin and subcutaneous tissue down to the layer of the underlying musculature.  The pedicle flap was carefully excised within this deep plane to maintain its blood supply.  The edges of the donor site were undermined.   The donor site was closed in a primary fashion.  The pedicle was then rotated into position and sutured.  Once the tube was sutured into place, adequate blood supply was confirmed with blanching and refill.  The pedicle was then wrapped with xeroform gauze and dressed appropriately with a telfa and gauze bandage to ensure continued blood supply and protect the attached pedicle.
Closure 4 Information: This tab is for additional flaps and grafts above and beyond our usual structured repairs.  Please note if you enter information here it will not currently bill and you will need to add the billing information manually.
Same Histology In Subsequent Stages Text: The pattern and morphology of the tumor is as described in the first stage.
Asc Procedure Text (F): After obtaining clear surgical margins the patient was sent to an ASC for surgical repair.  The patient understands they will receive post-surgical care and follow-up from the ASC physician.
Transposition Flap Text: The defect edges were debeveled with a #15 scalpel blade.  Given the location of the defect and the proximity to free margins a transposition flap was deemed most appropriate.  Using a sterile surgical marker, an appropriate transposition flap was drawn incorporating the defect.    The area thus outlined was incised deep to adipose tissue with a #15 scalpel blade.  The skin margins were undermined to an appropriate distance in all directions utilizing iris scissors.
Localized Dermabrasion With Wire Brush Text: The patient was draped in routine manner.  Localized dermabrasion using 3 x 17 mm wire brush was performed in routine manner to papillary dermis. This spot dermabrasion is being performed to complete skin cancer reconstruction. It also will eliminate the other sun damaged precancerous cells that are known to be part of the regional effect of a lifetime's worth of sun exposure. This localized dermabrasion is therapeutic and should not be considered cosmetic in any regard.
Nasal Turnover Hinge Flap Text: The defect edges were debeveled with a #15 scalpel blade.  Given the size, depth, location of the defect and the defect being full thickness a nasal turnover hinge flap was deemed most appropriate.  Using a sterile surgical marker, an appropriate hinge flap was drawn incorporating the defect. The area thus outlined was incised with a #15 scalpel blade. The flap was designed to recreate the nasal mucosal lining and the alar rim. The skin margins were undermined to an appropriate distance in all directions utilizing iris scissors.
Rhomboid Transposition Flap Text: The defect edges were debeveled with a #15 scalpel blade.  Given the location of the defect and the proximity to free margins a rhomboid transposition flap was deemed most appropriate.  Using a sterile surgical marker, an appropriate rhomboid flap was drawn incorporating the defect.    The area thus outlined was incised deep to adipose tissue with a #15 scalpel blade.  The skin margins were undermined to an appropriate distance in all directions utilizing iris scissors.
Consent Type: Consent 1 (Standard)
Chonodrocutaneous Helical Advancement Flap Text: The defect edges were debeveled with a #15 scalpel blade.  Given the location of the defect and the proximity to free margins a chondrocutaneous helical advancement flap was deemed most appropriate.  Using a sterile surgical marker, the appropriate advancement flap was drawn incorporating the defect and placing the expected incisions within the relaxed skin tension lines where possible.    The area thus outlined was incised deep to adipose tissue with a #15 scalpel blade.  The skin margins were undermined to an appropriate distance in all directions utilizing iris scissors.
Zygomaticofacial Flap Text: Given the location of the defect, shape of the defect and the proximity to free margins a zygomaticofacial flap was deemed most appropriate for repair.  Using a sterile surgical marker, the appropriate flap was drawn incorporating the defect and placing the expected incisions within the relaxed skin tension lines where possible. The area thus outlined was incised deep to adipose tissue with a #15 scalpel blade with preservation of a vascular pedicle.  The skin margins were undermined to an appropriate distance in all directions utilizing iris scissors.  The flap was then placed into the defect and anchored with interrupted buried subcutaneous sutures.
Which Instrument Did You Use For Dermabrasion?: Wire Brush
Cheek Interpolation Flap Text: A decision was made to reconstruct the defect utilizing an interpolation axial flap and a staged reconstruction.  A telfa template was made of the defect.  This telfa template was then used to outline the Cheek Interpolation flap.  The donor area for the pedicle flap was then injected with anesthesia.  The flap was excised through the skin and subcutaneous tissue down to the layer of the underlying musculature.  The interpolation flap was carefully excised within this deep plane to maintain its blood supply.  The edges of the donor site were undermined.   The donor site was closed in a primary fashion.  The pedicle was then rotated into position and sutured.  Once the tube was sutured into place, adequate blood supply was confirmed with blanching and refill.  The pedicle was then wrapped with xeroform gauze and dressed appropriately with a telfa and gauze bandage to ensure continued blood supply and protect the attached pedicle.
Nasalis-Muscle-Based Myocutaneous Island Pedicle Flap Text: Using a #15 blade, an incision was made around the donor flap to the level of the nasalis muscle. Wide lateral undermining was then performed in both the subcutaneous plane above the nasalis muscle, and in a submuscular plane just above periosteum. This allowed the formation of a free nasalis muscle axial pedicle (based on the angular artery) which was still attached to the actual cutaneous flap, increasing its mobility and vascular viability. Hemostasis was obtained with pinpoint electrocoagulation. The flap was mobilized into position and the pivotal anchor points positioned and stabilized with buried interrupted sutures. Subcutaneous and dermal tissues were closed in a multilayered fashion with sutures. Tissue redundancies were excised, and the epidermal edges were apposed without significant tension and sutured with sutures.
Medical Necessity Statement: Based on my medical judgement, Mohs surgery is the most appropriate treatment for this cancer compared to other treatments.
Alternatives Discussed Intro (Do Not Add Period): I discussed alternative treatments to Mohs surgery and specifically discussed the risks and benefits of
Island Pedicle Flap Text: The defect edges were debeveled with a #15 scalpel blade.  Given the location of the defect, shape of the defect and the proximity to free margins an island pedicle advancement flap was deemed most appropriate.  Using a sterile surgical marker, an appropriate advancement flap was drawn incorporating the defect, outlining the appropriate donor tissue and placing the expected incisions within the relaxed skin tension lines where possible.    The area thus outlined was incised deep to adipose tissue with a #15 scalpel blade.  The skin margins were undermined to an appropriate distance in all directions around the primary defect and laterally outward around the island pedicle utilizing iris scissors.  There was minimal undermining beneath the pedicle flap.
Double Island Pedicle Flap Text: The defect edges were debeveled with a #15 scalpel blade.  Given the location of the defect, shape of the defect and the proximity to free margins a double island pedicle advancement flap was deemed most appropriate.  Using a sterile surgical marker, an appropriate advancement flap was drawn incorporating the defect, outlining the appropriate donor tissue and placing the expected incisions within the relaxed skin tension lines where possible.    The area thus outlined was incised deep to adipose tissue with a #15 scalpel blade.  The skin margins were undermined to an appropriate distance in all directions around the primary defect and laterally outward around the island pedicle utilizing iris scissors.  There was minimal undermining beneath the pedicle flap.
Depth Of Tumor Invasion (For Histology): dermis
O-Z Flap Text: The defect edges were debeveled with a #15 scalpel blade.  Given the location of the defect, shape of the defect and the proximity to free margins an O-Z flap was deemed most appropriate.  Using a sterile surgical marker, an appropriate transposition flap was drawn incorporating the defect and placing the expected incisions within the relaxed skin tension lines where possible. The area thus outlined was incised deep to adipose tissue with a #15 scalpel blade.  The skin margins were undermined to an appropriate distance in all directions utilizing iris scissors.
Consent 2/Introductory Paragraph: Mohs surgery was explained to the patient and consent was obtained. The risks, benefits and alternatives to therapy were discussed in detail. Specifically, the risks of infection, scarring, bleeding, prolonged wound healing, incomplete removal, allergy to anesthesia, nerve injury and recurrence were addressed. Prior to the procedure, the treatment site was clearly identified and confirmed by the patient. All components of Universal Protocol/PAUSE Rule completed.
Initial Size Of Lesion: 1.4
Perineural Invasion (For Histology - Be Specific If Possible): absent
Hemigard Postcare Instructions: The HEMIGARD strips are to remain completely dry for at least 5-7 days.
Manual Repair Warning Statement: We plan on removing the manually selected variable below in favor of our much easier automatic structured text blocks found in the previous tab. We decided to do this to help make the flow better and give you the full power of structured data. Manual selection is never going to be ideal in our platform and I would encourage you to avoid using manual selection from this point on, especially since I will be sunsetting this feature. It is important that you do one of two things with the customized text below. First, you can save all of the text in a word file so you can have it for future reference. Second, transfer the text to the appropriate area in the Library tab. Lastly, if there is a flap or graft type which we do not have you need to let us know right away so I can add it in before the variable is hidden. No need to panic, we plan to give you roughly 6 months to make the change.
Consent (Scalp)/Introductory Paragraph: The rationale for Mohs was explained to the patient and consent was obtained. The risks, benefits and alternatives to therapy were discussed in detail. Specifically, the risks of changes in hair growth pattern secondary to repair, infection, scarring, bleeding, prolonged wound healing, incomplete removal, allergy to anesthesia, nerve injury and recurrence were addressed. Prior to the procedure, the treatment site was clearly identified and confirmed by the patient. All components of Universal Protocol/PAUSE Rule completed.
Consent (Lip)/Introductory Paragraph: The rationale for Mohs was explained to the patient and consent was obtained. The risks, benefits and alternatives to therapy were discussed in detail. Specifically, the risks of lip deformity, changes in the oral aperture, infection, scarring, bleeding, prolonged wound healing, incomplete removal, allergy to anesthesia, nerve injury and recurrence were addressed. Prior to the procedure, the treatment site was clearly identified and confirmed by the patient. All components of Universal Protocol/PAUSE Rule completed.
O-T Plasty Text: The defect edges were debeveled with a #15 scalpel blade.  Given the location of the defect, shape of the defect and the proximity to free margins an O-T plasty was deemed most appropriate.  Using a sterile surgical marker, an appropriate O-T plasty was drawn incorporating the defect and placing the expected incisions within the relaxed skin tension lines where possible.    The area thus outlined was incised deep to adipose tissue with a #15 scalpel blade.  The skin margins were undermined to an appropriate distance in all directions utilizing iris scissors.
Where Do You Want The Question To Include Opioid Counseling Located?: Case Summary Tab
Island Pedicle Flap With Canthal Suspension Text: The defect edges were debeveled with a #15 scalpel blade.  Given the location of the defect, shape of the defect and the proximity to free margins an island pedicle advancement flap was deemed most appropriate.  Using a sterile surgical marker, an appropriate advancement flap was drawn incorporating the defect, outlining the appropriate donor tissue and placing the expected incisions within the relaxed skin tension lines where possible. The area thus outlined was incised deep to adipose tissue with a #15 scalpel blade.  The skin margins were undermined to an appropriate distance in all directions around the primary defect and laterally outward around the island pedicle utilizing iris scissors.  There was minimal undermining beneath the pedicle flap. A suspension suture was placed in the canthal tendon to prevent tension and prevent ectropion.
Primary Defect Width In Cm (Final Defect Size - Required For Flaps/Grafts): 1.2
Rectangular Flap Text: The defect edges were debeveled with a #15 scalpel blade. Given the location of the defect and the proximity to free margins a rectangular flap was deemed most appropriate. Using a sterile surgical marker, an appropriate rectangular flap was drawn incorporating the defect. The area thus outlined was incised deep to adipose tissue with a #15 scalpel blade. The skin margins were undermined to an appropriate distance in all directions utilizing iris scissors. Following this, the designed flap was carried over into the primary defect and sutured into place.
Double O-Z Plasty Text: The defect edges were debeveled with a #15 scalpel blade.  Given the location of the defect, shape of the defect and the proximity to free margins a Double O-Z plasty (double transposition flap) was deemed most appropriate.  Using a sterile surgical marker, the appropriate transposition flaps were drawn incorporating the defect and placing the expected incisions within the relaxed skin tension lines where possible. The area thus outlined was incised deep to adipose tissue with a #15 scalpel blade.  The skin margins were undermined to an appropriate distance in all directions utilizing iris scissors.  Hemostasis was achieved with electrocautery.  The flaps were then transposed into place, one clockwise and the other counterclockwise, and anchored with interrupted buried subcutaneous sutures.
Eye Protection Verbiage: Before proceeding with the stage, a plastic scleral shield was inserted. The globe was anesthetized with a few drops of 1% lidocaine with 1:100,000 epinephrine. Then, an appropriate sized scleral shield was chosen and coated with lacrilube ointment. The shield was gently inserted and left in place for the duration of each stage. After the stage was completed, the shield was gently removed.
Helical Rim Advancement Flap Text: The defect edges were debeveled with a #15 blade scalpel.  Given the location of the defect and the proximity to free margins (helical rim) a double helical rim advancement flap was deemed most appropriate.  Using a sterile surgical marker, the appropriate advancement flaps were drawn incorporating the defect and placing the expected incisions between the helical rim and antihelix where possible.  The area thus outlined was incised through and through with a #15 scalpel blade.  With a skin hook and iris scissors, the flaps were gently and sharply undermined and freed up.
Ftsg Text: The defect edges were debeveled with a #15 scalpel blade.  Given the location of the defect, shape of the defect and the proximity to free margins a full thickness skin graft was deemed most appropriate.  Using a sterile surgical marker, the primary defect shape was transferred to the donor site. The area thus outlined was incised deep to adipose tissue with a #15 scalpel blade.  The harvested graft was then trimmed of adipose tissue until only dermis and epidermis was left.  The skin margins of the secondary defect were undermined to an appropriate distance in all directions utilizing iris scissors.  The secondary defect was closed with interrupted buried subcutaneous sutures.  The skin edges were then re-apposed with running  sutures.  The skin graft was then placed in the primary defect and oriented appropriately.
Mohs Method Verbiage: An incision at a 45 degree angle following the standard Mohs approach was done and the specimen was harvested as a microscopic controlled layer.
Repair Hemostasis (Optional): Pinpoint electrocautery
Referring Physician (Optional): Marcella BORDEN
Nasolabial Transposition Flap Text: The defect edges were debeveled with a #15 scalpel blade.  Given the size, depth and location of the defect and the proximity to free margins a nasolabial transposition flap was deemed most appropriate. Using a sterile surgical marker, an appropriate flap was drawn incorporating the defect. The area thus outlined was incised with a #15 scalpel blade. The skin margins were undermined to an appropriate distance in all directions utilizing iris scissors. Following this, the designed flap was carried into the primary defect and sutured into place.
Advancement Flap (Single) Text: The defect edges were debeveled with a #15 scalpel blade.  Given the location of the defect and the proximity to free margins a single advancement flap was deemed most appropriate.  Using a sterile surgical marker, an appropriate advancement flap was drawn incorporating the defect and placing the expected incisions within the relaxed skin tension lines where possible.    The area thus outlined was incised deep to adipose tissue with a #15 scalpel blade.  The skin margins were undermined to an appropriate distance in all directions utilizing iris scissors.
Mohs Histo Method Verbiage: Each section was then chromacoded and processed in the Mohs lab using the Mohs protocol and submitted for frozen section.
H Plasty Text: Given the location of the defect, shape of the defect and the proximity to free margins a H-plasty was deemed most appropriate for repair.  Using a sterile surgical marker, the appropriate advancement arms of the H-plasty were drawn incorporating the defect and placing the expected incisions within the relaxed skin tension lines where possible. The area thus outlined was incised deep to adipose tissue with a #15 scalpel blade. The skin margins were undermined to an appropriate distance in all directions utilizing iris scissors.  The opposing advancement arms were then advanced into place in opposite direction and anchored with interrupted buried subcutaneous sutures.
Debridement Text: The wound edges were debrided prior to proceeding with the closure to facilitate wound healing.
Partial Purse String (Simple) Text: Given the location of the defect and the characteristics of the surrounding skin a simple purse string closure was deemed most appropriate.  Undermining was performed circumfirentially around the surgical defect.  A purse string suture was then placed and tightened. Wound tension only allowed a partial closure of the circular defect.
Bilobed Transposition Flap Text: The defect edges were debeveled with a #15 scalpel blade.  Given the location of the defect and the proximity to free margins a bilobed transposition flap was deemed most appropriate.  Using a sterile surgical marker, an appropriate bilobe flap drawn around the defect.    The area thus outlined was incised deep to adipose tissue with a #15 scalpel blade.  The skin margins were undermined to an appropriate distance in all directions utilizing iris scissors.
Full Thickness Lip Wedge Repair (Flap) Text: Given the location of the defect and the proximity to free margins a full thickness wedge repair was deemed most appropriate.  Using a sterile surgical marker, the appropriate repair was drawn incorporating the defect and placing the expected incisions perpendicular to the vermilion border.  The vermilion border was also meticulously outlined to ensure appropriate reapproximation during the repair.  The area thus outlined was incised through and through with a #15 scalpel blade.  The muscularis and dermis were reaproximated with deep sutures following hemostasis. Care was taken to realign the vermilion border before proceeding with the superficial closure.  Once the vermilion was realigned the superfical and mucosal closure was finished.
Epidermal Sutures: 5-0 Fast Absorbing Gut
Information: Selecting Yes will display possible errors in your note based on the variables you have selected. This validation is only offered as a suggestion for you. PLEASE NOTE THAT THE VALIDATION TEXT WILL BE REMOVED WHEN YOU FINALIZE YOUR NOTE. IF YOU WANT TO FAX A PRELIMINARY NOTE YOU WILL NEED TO TOGGLE THIS TO 'NO' IF YOU DO NOT WANT IT IN YOUR FAXED NOTE.
Consent 3/Introductory Paragraph: I gave the patient a chance to ask questions they had about the procedure.  Following this I explained the Mohs procedure and consent was obtained. The risks, benefits and alternatives to therapy were discussed in detail. Specifically, the risks of infection, scarring, bleeding, prolonged wound healing, incomplete removal, allergy to anesthesia, nerve injury and recurrence were addressed. Prior to the procedure, the treatment site was clearly identified and confirmed by the patient. All components of Universal Protocol/PAUSE Rule completed.
O-T Advancement Flap Text: The defect edges were debeveled with a #15 scalpel blade.  Given the location of the defect, shape of the defect and the proximity to free margins an O-T advancement flap was deemed most appropriate.  Using a sterile surgical marker, an appropriate advancement flap was drawn incorporating the defect and placing the expected incisions within the relaxed skin tension lines where possible.    The area thus outlined was incised deep to adipose tissue with a #15 scalpel blade.  The skin margins were undermined to an appropriate distance in all directions utilizing iris scissors.
Bcc Histology Text: There were numerous aggregates of basaloid cells.
Dermal Autograft Text: The defect edges were debeveled with a #15 scalpel blade.  Given the location of the defect, shape of the defect and the proximity to free margins a dermal autograft was deemed most appropriate.  Using a sterile surgical marker, the primary defect shape was transferred to the donor site. The area thus outlined was incised deep to adipose tissue with a #15 scalpel blade.  The harvested graft was then trimmed of adipose and epidermal tissue until only dermis was left.  The skin graft was then placed in the primary defect and oriented appropriately.
Brow Lift Text: A midfrontal incision was made medially to the defect to allow access to the tissues just superior to the left eyebrow. Following careful dissection inferiorly in a supraperiosteal plane to the level of the left eyebrow, several 3-0 monocryl sutures were used to resuspend the eyebrow orbicularis oculi muscular unit to the superior frontal bone periosteum. This resulted in an appropriate reapproximation of static eyebrow symmetry and correction of the left brow ptosis.
Bilobed Flap Text: The defect edges were debeveled with a #15 scalpel blade.  Given the location of the defect and the proximity to free margins a bilobe flap was deemed most appropriate.  Using a sterile surgical marker, an appropriate bilobe flap drawn around the defect.    The area thus outlined was incised deep to adipose tissue with a #15 scalpel blade.  The skin margins were undermined to an appropriate distance in all directions utilizing iris scissors.
Staged Advancement Flap Text: The defect edges were debeveled with a #15 scalpel blade.  Given the location of the defect, shape of the defect and the proximity to free margins a staged advancement flap was deemed most appropriate.  Using a sterile surgical marker, an appropriate advancement flap was drawn incorporating the defect and placing the expected incisions within the relaxed skin tension lines where possible. The area thus outlined was incised deep to adipose tissue with a #15 scalpel blade.  The skin margins were undermined to an appropriate distance in all directions utilizing iris scissors.
Mart-1 - Positive Histology Text: MART-1 staining demonstrates areas of higher density and clustering of melanocytes with Pagetoid spread upwards within the epidermis. The surgical margins are positive for tumor cells.
Interpolation Flap Text: A decision was made to reconstruct the defect utilizing an interpolation axial flap and a staged reconstruction.  A telfa template was made of the defect.  This telfa template was then used to outline the interpolation flap.  The donor area for the pedicle flap was then injected with anesthesia.  The flap was excised through the skin and subcutaneous tissue down to the layer of the underlying musculature.  The interpolation flap was carefully excised within this deep plane to maintain its blood supply.  The edges of the donor site were undermined.   The donor site was closed in a primary fashion.  The pedicle was then rotated into position and sutured.  Once the tube was sutured into place, adequate blood supply was confirmed with blanching and refill.  The pedicle was then wrapped with xeroform gauze and dressed appropriately with a telfa and gauze bandage to ensure continued blood supply and protect the attached pedicle.
Hatchet Flap Text: The defect edges were debeveled with a #15 scalpel blade.  Given the location of the defect, shape of the defect and the proximity to free margins a hatchet flap was deemed most appropriate.  Using a sterile surgical marker, an appropriate hatchet flap was drawn incorporating the defect and placing the expected incisions within the relaxed skin tension lines where possible.    The area thus outlined was incised deep to adipose tissue with a #15 scalpel blade.  The skin margins were undermined to an appropriate distance in all directions utilizing iris scissors.
Graft Donor Site Dermal Sutures (Optional): 5-0 Monocryl
Detail Level: Detailed
Keystone Flap Text: The defect edges were debeveled with a #15 scalpel blade.  Given the location of the defect, shape of the defect a keystone flap was deemed most appropriate.  Using a sterile surgical marker, an appropriate keystone flap was drawn incorporating the defect, outlining the appropriate donor tissue and placing the expected incisions within the relaxed skin tension lines where possible. The area thus outlined was incised deep to adipose tissue with a #15 scalpel blade.  The skin margins were undermined to an appropriate distance in all directions around the primary defect and laterally outward around the flap utilizing iris scissors.
Localized Dermabrasion With Sand Papertext: The patient was draped in routine manner.  Localized dermabrasion using sterile sand paper was performed in routine manner to papillary dermis. This spot dermabrasion is being performed to complete skin cancer reconstruction. It also will eliminate the other sun damaged precancerous cells that are known to be part of the regional effect of a lifetime's worth of sun exposure. This localized dermabrasion is therapeutic and should not be considered cosmetic in any regard.
Surgeon/Pathologist Verbiage (Will Incorporate Name Of Surgeon From Intro If Not Blank): operated in two distinct and integrated capacities as the surgeon and pathologist.
Helical Rim Text: The closure involved the helical rim.
Alar Island Pedicle Flap Text: The defect edges were debeveled with a #15 scalpel blade.  Given the location of the defect, shape of the defect and the proximity to the alar rim an island pedicle advancement flap was deemed most appropriate.  Using a sterile surgical marker, an appropriate advancement flap was drawn incorporating the defect, outlining the appropriate donor tissue and placing the expected incisions within the nasal ala running parallel to the alar rim. The area thus outlined was incised with a #15 scalpel blade.  The skin margins were undermined minimally to an appropriate distance in all directions around the primary defect and laterally outward around the island pedicle utilizing iris scissors.  There was minimal undermining beneath the pedicle flap.
Secondary Intention Text (Leave Blank If You Do Not Want): The defect will heal with secondary intention.
Eyelid Partial Thickness Repair - 31399: The eyelid defect was partial thickness which required a wedge repair of the eyelid. Special care was taken to ensure that the eyelid margin was realligned when placing sutures.
Postop Diagnosis: same
Area M Indication Text: Tumors in this location are included in Area M (cheek, forehead, scalp, neck, jawline and pretibial skin).  Mohs surgery is indicated for tumors in these anatomic locations.
W Plasty Text: The lesion was extirpated to the level of the fat with a #15 scalpel blade.  Given the location of the defect, shape of the defect and the proximity to free margins a W-plasty was deemed most appropriate for repair.  Using a sterile surgical marker, the appropriate transposition arms of the W-plasty were drawn incorporating the defect and placing the expected incisions within the relaxed skin tension lines where possible.    The area thus outlined was incised deep to adipose tissue with a #15 scalpel blade.  The skin margins were undermined to an appropriate distance in all directions utilizing iris scissors.  The opposing transposition arms were then transposed into place in opposite direction and anchored with interrupted buried subcutaneous sutures.
No Repair - Repaired With Adjacent Surgical Defect Text (Leave Blank If You Do Not Want): After obtaining clear surgical margins the defect was repaired concurrently with another surgical defect which was in close approximation.
Hemigard Intro: Due to skin fragility and wound tension, it was decided to use HEMIGARD adhesive retention suture devices to permit a linear closure. The skin was cleaned and dried for a 6cm distance away from the wound. Excessive hair, if present, was removed to allow for adhesion.
Wound Care: Vaseline
Mauc Instructions: By selecting yes to the question below the MAUC number will be added into the note.  This will be calculated automatically based on the diagnosis chosen, the size entered, the body zone selected (H,M,L) and the specific indications you chose. You will also have the option to override the Mohs AUC if you disagree with the automatically calculated number and this option is found in the Case Summary tab.
Bi-Rhombic Flap Text: The defect edges were debeveled with a #15 scalpel blade.  Given the location of the defect and the proximity to free margins a bi-rhombic flap was deemed most appropriate.  Using a sterile surgical marker, an appropriate rhombic flap was drawn incorporating the defect. The area thus outlined was incised deep to adipose tissue with a #15 scalpel blade.  The skin margins were undermined to an appropriate distance in all directions utilizing iris scissors.
Composite Graft Text: The defect edges were debeveled with a #15 scalpel blade.  Given the location of the defect, shape of the defect, the proximity to free margins and the fact the defect was full thickness a composite graft was deemed most appropriate.  The defect was outline and then transferred to the donor site.  A full thickness graft was then excised from the donor site. The graft was then placed in the primary defect, oriented appropriately and then sutured into place.  The secondary defect was then repaired using a primary closure.
Tumor Depth: Less than 6mm from granular layer and no invasion beyond the subcutaneous fat
Intermediate Repair And Graft Additional Text (Will Appearing After The Standard Complex Repair Text): The intermediate repair was not sufficient to completely close the primary defect. The remaining additional defect was repaired with the graft mentioned below.
Why Was The Change Made?: Please Select the Appropriate Response
Suturegard Body: The suture ends were repeatedly re-tightened and re-clamped to achieve the desired tissue expansion.
Wound Care (No Sutures): Petrolatum
Tumor Debulked?: curette
Suturegard Intro: Intraoperative tissue expansion was performed, utilizing the SUTUREGARD device, in order to reduce wound tension.
Consent (Near Eyelid Margin)/Introductory Paragraph: The rationale for Mohs was explained to the patient and consent was obtained. The risks, benefits and alternatives to therapy were discussed in detail. Specifically, the risks of ectropion or eyelid deformity, infection, scarring, bleeding, prolonged wound healing, incomplete removal, allergy to anesthesia, nerve injury and recurrence were addressed. Prior to the procedure, the treatment site was clearly identified and confirmed by the patient. All components of Universal Protocol/PAUSE Rule completed.
Closure 2 Information: This tab is for additional flaps and grafts, including complex repair and grafts and complex repair and flaps. You can also specify a different location for the additional defect, if the location is the same you do not need to select a new one. We will insert the automated text for the repair you select below just as we do for solitary flaps and grafts. Please note that at this time if you select a location with a different insurance zone you will need to override the ICD10 and CPT if appropriate.
Double Z Plasty Text: The lesion was extirpated to the level of the fat with a #15 scalpel blade. Given the location of the defect, shape of the defect and the proximity to free margins a double Z-plasty was deemed most appropriate for repair. Using a sterile surgical marker, the appropriate transposition arms of the double Z-plasty were drawn incorporating the defect and placing the expected incisions within the relaxed skin tension lines where possible. The area thus outlined was incised deep to adipose tissue with a #15 scalpel blade. The skin margins were undermined to an appropriate distance in all directions utilizing iris scissors. The opposing transposition arms were then transposed and carried over into place in opposite direction and anchored with interrupted buried subcutaneous sutures.
Anesthesia Type: 1% lidocaine with epinephrine and a 1:10 solution of 8.4% sodium bicarbonate
Z Plasty Text: The lesion was extirpated to the level of the fat with a #15 scalpel blade.  Given the location of the defect, shape of the defect and the proximity to free margins a Z-plasty was deemed most appropriate for repair.  Using a sterile surgical marker, the appropriate transposition arms of the Z-plasty were drawn incorporating the defect and placing the expected incisions within the relaxed skin tension lines where possible.    The area thus outlined was incised deep to adipose tissue with a #15 scalpel blade.  The skin margins were undermined to an appropriate distance in all directions utilizing iris scissors.  The opposing transposition arms were then transposed into place in opposite direction and anchored with interrupted buried subcutaneous sutures.
Consent (Temporal Branch)/Introductory Paragraph: The rationale for Mohs was explained to the patient and consent was obtained. The risks, benefits and alternatives to therapy were discussed in detail. Specifically, the risks of damage to the temporal branch of the facial nerve, infection, scarring, bleeding, prolonged wound healing, incomplete removal, allergy to anesthesia, and recurrence were addressed. Prior to the procedure, the treatment site was clearly identified and confirmed by the patient. All components of Universal Protocol/PAUSE Rule completed.
No Residual Tumor Seen Histology Text: There were no malignant cells seen in the sections examined.
Skin Substitute Text: Given the location of the defect, shape/depth of the defect and the underlying tissue, a skin substitute graft was deemed most appropriate.  The graft was placed in the primary defect and oriented appropriately.
Mucosal Advancement Flap Text: Given the location of the defect, shape of the defect and the proximity to free margins a mucosal advancement flap was deemed most appropriate. Incisions were made with a 15 blade scalpel in the appropriate fashion along the cutaneous vermilion border and the mucosal lip. The remaining actinically damaged mucosal tissue was excised.  The mucosal advancement flap was then elevated to the gingival sulcus with care taken to preserve the neurovascular structures and advanced into the primary defect. Care was taken to ensure that precise realignment of the vermilion border was achieved.
Crescentic Advancement Flap Text: The defect edges were debeveled with a #15 scalpel blade.  Given the location of the defect and the proximity to free margins a crescentic advancement flap was deemed most appropriate.  Using a sterile surgical marker, the appropriate advancement flap was drawn incorporating the defect and placing the expected incisions within the relaxed skin tension lines where possible.    The area thus outlined was incised deep to adipose tissue with a #15 scalpel blade.  The skin margins were undermined to an appropriate distance in all directions utilizing iris scissors.
O-Z Plasty Text: The defect edges were debeveled with a #15 scalpel blade.  Given the location of the defect, shape of the defect and the proximity to free margins an O-Z plasty (double transposition flap) was deemed most appropriate.  Using a sterile surgical marker, the appropriate transposition flaps were drawn incorporating the defect and placing the expected incisions within the relaxed skin tension lines where possible.    The area thus outlined was incised deep to adipose tissue with a #15 scalpel blade.  The skin margins were undermined to an appropriate distance in all directions utilizing iris scissors.  Hemostasis was achieved with electrocautery.  The flaps were then transposed into place, one clockwise and the other counterclockwise, and anchored with interrupted buried subcutaneous sutures.
Trilobed Flap Text: The defect edges were debeveled with a #15 scalpel blade.  Given the location of the defect and the proximity to free margins a trilobed flap was deemed most appropriate.  Using a sterile surgical marker, an appropriate trilobed flap drawn around the defect.    The area thus outlined was incised deep to adipose tissue with a #15 scalpel blade.  The skin margins were undermined to an appropriate distance in all directions utilizing iris scissors.
Stage 1: Number Of Blocks?: 1
Orbicularis Oris Muscle Flap Text: The defect edges were debeveled with a #15 scalpel blade.  Given that the defect affected the competency of the oral sphincter an orbicularis oris muscle flap was deemed most appropriate to restore this competency and normal muscle function.  Using a sterile surgical marker, an appropriate flap was drawn incorporating the defect. The area thus outlined was incised with a #15 scalpel blade.
Area H Indication Text: Tumors in this location are included in Area H (eyelids, eyebrows, nose, lips, chin, ear, pre-auricular, post-auricular, temple, genitalia, hands, feet, ankles and areola).  Tissue conservation is critical in these anatomic locations.
Hemostasis: Electrocautery
Unna Boot Text: An Unna boot was placed to help immobilize the limb and facilitate more rapid healing.
Consent (Marginal Mandibular)/Introductory Paragraph: The rationale for Mohs was explained to the patient and consent was obtained. The risks, benefits and alternatives to therapy were discussed in detail. Specifically, the risks of damage to the marginal mandibular branch of the facial nerve, infection, scarring, bleeding, prolonged wound healing, incomplete removal, allergy to anesthesia, and recurrence were addressed. Prior to the procedure, the treatment site was clearly identified and confirmed by the patient. All components of Universal Protocol/PAUSE Rule completed.
Double O-Z Flap Text: The defect edges were debeveled with a #15 scalpel blade.  Given the location of the defect, shape of the defect and the proximity to free margins a Double O-Z flap was deemed most appropriate.  Using a sterile surgical marker, an appropriate transposition flap was drawn incorporating the defect and placing the expected incisions within the relaxed skin tension lines where possible. The area thus outlined was incised deep to adipose tissue with a #15 scalpel blade.  The skin margins were undermined to an appropriate distance in all directions utilizing iris scissors.
Consent 1/Introductory Paragraph: The rationale for Mohs was explained to the patient and consent was obtained. The risks, benefits and alternatives to therapy were discussed in detail. Specifically, the risks of infection, scarring, bleeding, prolonged wound healing, incomplete removal, allergy to anesthesia, nerve injury and recurrence were addressed. Prior to the procedure, the treatment site was clearly identified and confirmed by the patient. All components of Universal Protocol/PAUSE Rule completed.
Graft Cartilage Fenestration Text: The cartilage was fenestrated with a 2mm punch biopsy to help facilitate graft survival and healing.
Graft Donor Site Bandage (Optional-Leave Blank If You Don't Want In Note): A bolster was fitted to the graft site and sewn into place. A pressure bandage were applied to the donor site and over the bolster.
Presence Of Scar Tissue (For Histology): present
Mohs Case Number: [id number]
A-T Advancement Flap Text: The defect edges were debeveled with a #15 scalpel blade.  Given the location of the defect, shape of the defect and the proximity to free margins an A-T advancement flap was deemed most appropriate.  Using a sterile surgical marker, an appropriate advancement flap was drawn incorporating the defect and placing the expected incisions within the relaxed skin tension lines where possible.    The area thus outlined was incised deep to adipose tissue with a #15 scalpel blade.  The skin margins were undermined to an appropriate distance in all directions utilizing iris scissors.
Bill 59 Modifier?: No - Continue to Bill 79 Modifier
Purse String (Intermediate) Text: Given the location of the defect and the characteristics of the surrounding skin a purse string intermediate closure was deemed most appropriate.  Undermining was performed circumfirentially around the surgical defect.  A purse string suture was then placed and tightened.
Pain Refusal Text: I offered to prescribe pain medication but the patient refused to take this medication.
Localized Dermabrasion With 15 Blade Text: The patient was draped in routine manner.  Localized dermabrasion using a 15 blade was performed in routine manner to papillary dermis. This spot dermabrasion is being performed to complete skin cancer reconstruction. It also will eliminate the other sun damaged precancerous cells that are known to be part of the regional effect of a lifetime's worth of sun exposure. This localized dermabrasion is therapeutic and should not be considered cosmetic in any regard.
Tarsorrhaphy Text: A tarsorrhaphy was performed using Frost sutures.
Cartilage Graft Text: The defect edges were debeveled with a #15 scalpel blade.  Given the location of the defect, shape of the defect, the fact the defect involved a full thickness cartilage defect a cartilage graft was deemed most appropriate.  An appropriate donor site was identified, cleansed, and anesthetized. The cartilage graft was then harvested and transferred to the recipient site, oriented appropriately and then sutured into place.  The secondary defect was then repaired using a primary closure.
Graft Donor Site Epidermal Sutures (Optional): Dermabond
Banner Transposition Flap Text: The defect edges were debeveled with a #15 scalpel blade.  Given the location of the defect and the proximity to free margins a Banner transposition flap was deemed most appropriate.  Using a sterile surgical marker, an appropriate flap drawn around the defect. The area thus outlined was incised deep to adipose tissue with a #15 scalpel blade.  The skin margins were undermined to an appropriate distance in all directions utilizing iris scissors.
Spiral Flap Text: The defect edges were debeveled with a #15 scalpel blade.  Given the location of the defect, shape of the defect and the proximity to free margins a spiral flap was deemed most appropriate.  Using a sterile surgical marker, an appropriate rotation flap was drawn incorporating the defect and placing the expected incisions within the relaxed skin tension lines where possible. The area thus outlined was incised deep to adipose tissue with a #15 scalpel blade.  The skin margins were undermined to an appropriate distance in all directions utilizing iris scissors.
Pinch Graft Text: The defect edges were debeveled with a #15 scalpel blade. Given the location of the defect, shape of the defect and the proximity to free margins a pinch graft was deemed most appropriate. Using a sterile surgical marker, the primary defect shape was transferred to the donor site. The area thus outlined was incised deep to adipose tissue with a #15 scalpel blade.  The harvested graft was then trimmed of adipose tissue until only dermis and epidermis was left. The skin margins of the secondary defect were undermined to an appropriate distance in all directions utilizing iris scissors.  The secondary defect was closed with interrupted buried subcutaneous sutures.  The skin edges were then re-apposed with running  sutures.  The skin graft was then placed in the primary defect and oriented appropriately.
Undermining Location (Optional): in the superficial subcutaneous fat
Donor Site Anesthesia Type: same as repair anesthesia
Nostril Rim Text: The closure involved the nostril rim.
Consent (Spinal Accessory)/Introductory Paragraph: The rationale for Mohs was explained to the patient and consent was obtained. The risks, benefits and alternatives to therapy were discussed in detail. Specifically, the risks of damage to the spinal accessory nerve, infection, scarring, bleeding, prolonged wound healing, incomplete removal, allergy to anesthesia, and recurrence were addressed. Prior to the procedure, the treatment site was clearly identified and confirmed by the patient. All components of Universal Protocol/PAUSE Rule completed.
X Size Of Lesion In Cm (Optional): 1.1
Intermediate Repair And Flap Additional Text (Will Appearing After The Standard Complex Repair Text): The intermediate repair was not sufficient to completely close the primary defect. The remaining additional defect was repaired with the flap mentioned below.
Bilateral Helical Rim Advancement Flap Text: The defect edges were debeveled with a #15 blade scalpel.  Given the location of the defect and the proximity to free margins (helical rim) a bilateral helical rim advancement flap was deemed most appropriate.  Using a sterile surgical marker, the appropriate advancement flaps were drawn incorporating the defect and placing the expected incisions between the helical rim and antihelix where possible.  The area thus outlined was incised through and through with a #15 scalpel blade.  With a skin hook and iris scissors, the flaps were gently and sharply undermined and freed up.
Repair Type: Complex Repair
Purse String (Simple) Text: Given the location of the defect and the characteristics of the surrounding skin a purse string closure was deemed most appropriate.  Undermining was performed circumfirentially around the surgical defect.  A purse string suture was then placed and tightened.
Cheek-To-Nose Interpolation Flap Text: A decision was made to reconstruct the defect utilizing an interpolation axial flap and a staged reconstruction.  A telfa template was made of the defect.  This telfa template was then used to outline the Cheek-To-Nose Interpolation flap.  The donor area for the pedicle flap was then injected with anesthesia.  The flap was excised through the skin and subcutaneous tissue down to the layer of the underlying musculature.  The interpolation flap was carefully excised within this deep plane to maintain its blood supply.  The edges of the donor site were undermined.   The donor site was closed in a primary fashion.  The pedicle was then rotated into position and sutured.  Once the tube was sutured into place, adequate blood supply was confirmed with blanching and refill.  The pedicle was then wrapped with xeroform gauze and dressed appropriately with a telfa and gauze bandage to ensure continued blood supply and protect the attached pedicle.
Abbe Flap (Upper To Lower Lip) Text: The defect of the lower lip was assessed and measured.  Given the location and size of the defect, an Abbe flap was deemed most appropriate.  Using a sterile surgical marker, an appropriate Abbe flap was measured and drawn on the upper lip. Local anesthesia was then infiltrated.  A scalpel was then used to incise the upper lip through and through the skin, vermilion, muscle and mucosa, leaving the flap pedicled on the opposite side.  The flap was then rotated and transferred to the lower lip defect.  The flap was then sutured into place with a three layer technique, closing the orbicularis oris muscle layer with subcutaneous buried sutures, followed by a mucosal layer and an epidermal layer.
Consent (Nose)/Introductory Paragraph: The rationale for Mohs was explained to the patient and consent was obtained. The risks, benefits and alternatives to therapy were discussed in detail. Specifically, the risks of nasal deformity, changes in the flow of air through the nose, infection, scarring, bleeding, prolonged wound healing, incomplete removal, allergy to anesthesia, nerve injury and recurrence were addressed. Prior to the procedure, the treatment site was clearly identified and confirmed by the patient. All components of Universal Protocol/PAUSE Rule completed.
Cheiloplasty (Less Than 50%) Text: A decision was made to reconstruct the defect with a  cheiloplasty.  The defect was undermined extensively.  Additional obicularis oris muscle was excised with a 15 blade scalpel.  The defect was converted into a full thickness wedge, of less than 50% of the vertical height of the lip, to facilite a better cosmetic result.  Small vessels were then tied off with 5-0 monocyrl. The obicularis oris, superficial fascia, adipose and dermis were then reapproximated.  After the deeper layers were approximated the epidermis was reapproximated with particular care given to realign the vermilion border.
Adjacent Tissue Transfer Text: The defect edges were debeveled with a #15 scalpel blade.  Given the location of the defect and the proximity to free margins an adjacent tissue transfer was deemed most appropriate.  Using a sterile surgical marker, an appropriate flap was drawn incorporating the defect and placing the expected incisions within the relaxed skin tension lines where possible.    The area thus outlined was incised deep to adipose tissue with a #15 scalpel blade.  The skin margins were undermined to an appropriate distance in all directions utilizing iris scissors.
Undermining Type: Entire Wound
Home Suture Removal Text: Patient was provided instructions on removing sutures and will remove their sutures at home.  If they have any questions or difficulties they will call the office.
Mustarde Flap Text: The defect edges were debeveled with a #15 scalpel blade.  Given the size, depth and location of the defect and the proximity to free margins a Mustarde flap was deemed most appropriate.  Using a sterile surgical marker, an appropriate flap was drawn incorporating the defect. The area thus outlined was incised with a #15 scalpel blade.  The skin margins were undermined to an appropriate distance in all directions utilizing iris scissors.
Split-Thickness Skin Graft Text: The defect edges were debeveled with a #15 scalpel blade.  Given the location of the defect, shape of the defect and the proximity to free margins a split thickness skin graft was deemed most appropriate.  Using a sterile surgical marker, the primary defect shape was transferred to the donor site. The split thickness graft was then harvested.  The skin graft was then placed in the primary defect and oriented appropriately.
O-L Flap Text: The defect edges were debeveled with a #15 scalpel blade.  Given the location of the defect, shape of the defect and the proximity to free margins an O-L flap was deemed most appropriate.  Using a sterile surgical marker, an appropriate advancement flap was drawn incorporating the defect and placing the expected incisions within the relaxed skin tension lines where possible.    The area thus outlined was incised deep to adipose tissue with a #15 scalpel blade.  The skin margins were undermined to an appropriate distance in all directions utilizing iris scissors.
Bcc Infiltrative Histology Text: There were numerous aggregates of basaloid cells demonstrating an infiltrative pattern.
Epidermal Closure: running and interrupted
Melolabial Interpolation Flap Text: A decision was made to reconstruct the defect utilizing an interpolation axial flap and a staged reconstruction.  A telfa template was made of the defect.  This telfa template was then used to outline the melolabial interpolation flap.  The donor area for the pedicle flap was then injected with anesthesia.  The flap was excised through the skin and subcutaneous tissue down to the layer of the underlying musculature.  The pedicle flap was carefully excised within this deep plane to maintain its blood supply.  The edges of the donor site were undermined.   The donor site was closed in a primary fashion.  The pedicle was then rotated into position and sutured.  Once the tube was sutured into place, adequate blood supply was confirmed with blanching and refill.  The pedicle was then wrapped with xeroform gauze and dressed appropriately with a telfa and gauze bandage to ensure continued blood supply and protect the attached pedicle.
V-Y Flap Text: The defect edges were debeveled with a #15 scalpel blade.  Given the location of the defect, shape of the defect and the proximity to free margins a V-Y flap was deemed most appropriate.  Using a sterile surgical marker, an appropriate advancement flap was drawn incorporating the defect and placing the expected incisions within the relaxed skin tension lines where possible.    The area thus outlined was incised deep to adipose tissue with a #15 scalpel blade.  The skin margins were undermined to an appropriate distance in all directions utilizing iris scissors.
Vermilion Border Text: The closure involved the vermilion border.
Flip-Flop Flap Text: The defect edges were debeveled with a #15 blade scalpel.  Given the location of the defect and the proximity to free margins a flip-flop flap was deemed most appropriate. Using a sterile surgical marker, the appropriate flap was drawn incorporating the defect and placing the expected incisions between the helical rim and antihelix where possible.  The area thus outlined was incised through and through with a #15 scalpel blade. Following this, the designed flap was carried over into the primary defect and sutured into place.
Bilateral Rotation Flap Text: The defect edges were debeveled with a #15 scalpel blade. Given the location of the defect, shape of the defect and the proximity to free margins a bilateral rotation flap was deemed most appropriate. Using a sterile surgical marker, an appropriate rotation flap was drawn incorporating the defect and placing the expected incisions within the relaxed skin tension lines where possible. The area thus outlined was incised deep to adipose tissue with a #15 scalpel blade. The skin margins were undermined to an appropriate distance in all directions utilizing iris scissors. Following this, the designed flap was carried over into the primary defect and sutured into place.
Melolabial Transposition Flap Text: The defect edges were debeveled with a #15 scalpel blade.  Given the location of the defect and the proximity to free margins a melolabial flap was deemed most appropriate.  Using a sterile surgical marker, an appropriate melolabial transposition flap was drawn incorporating the defect.    The area thus outlined was incised deep to adipose tissue with a #15 scalpel blade.  The skin margins were undermined to an appropriate distance in all directions utilizing iris scissors.
V-Y Plasty Text: The defect edges were debeveled with a #15 scalpel blade.  Given the location of the defect, shape of the defect and the proximity to free margins an V-Y advancement flap was deemed most appropriate.  Using a sterile surgical marker, an appropriate advancement flap was drawn incorporating the defect and placing the expected incisions within the relaxed skin tension lines where possible.    The area thus outlined was incised deep to adipose tissue with a #15 scalpel blade.  The skin margins were undermined to an appropriate distance in all directions utilizing iris scissors.
Area L Indication Text: Tumors in this location are included in Area L (trunk and extremities).  Mohs surgery is indicated for larger tumors, or tumors with aggressive histologic features, in these anatomic locations.
Complex Repair And Flap Additional Text (Will Appearing After The Standard Complex Repair Text): The complex repair was not sufficient to completely close the primary defect. The remaining additional defect was repaired with the flap mentioned below.
Star Wedge Flap Text: The defect edges were debeveled with a #15 scalpel blade.  Given the location of the defect, shape of the defect and the proximity to free margins a star wedge flap was deemed most appropriate.  Using a sterile surgical marker, an appropriate rotation flap was drawn incorporating the defect and placing the expected incisions within the relaxed skin tension lines where possible. The area thus outlined was incised deep to adipose tissue with a #15 scalpel blade.  The skin margins were undermined to an appropriate distance in all directions utilizing iris scissors.
Rhombic Flap Text: The defect edges were debeveled with a #15 scalpel blade.  Given the location of the defect and the proximity to free margins a rhombic flap was deemed most appropriate.  Using a sterile surgical marker, an appropriate rhombic flap was drawn incorporating the defect.    The area thus outlined was incised deep to adipose tissue with a #15 scalpel blade.  The skin margins were undermined to an appropriate distance in all directions utilizing iris scissors.
Mart-1 - Negative Histology Text: MART-1 staining demonstrates a normal density and pattern of melanocytes along the dermal-epidermal junction. The surgical margins are negative for tumor cells.
Which Eyelid Repair Cpt Are You Using?: [id number]
Burow's Graft Text: The defect edges were debeveled with a #15 scalpel blade.  Given the location of the defect, shape of the defect, the proximity to free margins and the presence of a standing cone deformity a Burow's skin graft was deemed most appropriate. The standing cone was removed and this tissue was then trimmed to the shape of the primary defect. The adipose tissue was also removed until only dermis and epidermis were left.  The skin margins of the secondary defect were undermined to an appropriate distance in all directions utilizing iris scissors.  The secondary defect was closed with interrupted buried subcutaneous sutures.  The skin edges were then re-apposed with running  sutures.  The skin graft was then placed in the primary defect and oriented appropriately.
Subsequent Stages Histo Method Verbiage: Using a similar technique to that described above, a thin layer of tissue was removed from all areas where tumor was visible on the previous stage.  The tissue was again oriented, mapped, dyed, and processed as above.
Post-Care Instructions: I reviewed with the patient in detail post-care instructions. Patient is not to engage in any heavy lifting, exercise, or swimming for the next 14 days. Should the patient develop any fevers, chills, bleeding, severe pain patient will contact the office immediately.
Mercedes Flap Text: The defect edges were debeveled with a #15 scalpel blade.  Given the location of the defect, shape of the defect and the proximity to free margins a Mercedes flap was deemed most appropriate.  Using a sterile surgical marker, an appropriate advancement flap was drawn incorporating the defect and placing the expected incisions within the relaxed skin tension lines where possible. The area thus outlined was incised deep to adipose tissue with a #15 scalpel blade.  The skin margins were undermined to an appropriate distance in all directions utilizing iris scissors.

## 2025-06-26 NOTE — PROGRESS NOTES
"CC:  Chief Complaint   Patient presents with    Lab Results    DME Question       HISTORY OF PRESENT ILLNESS: Patient is a 69 y.o. female established patient who presents today with:    Problem   Debility    Hx of multiple back surgeries between 2685-5217 and been in wheelchair since then due to LLE hemiplegia. Patient had seen Dr. Nice last month who ordered new DME equipment - walker, wheelchair and electric scooter but patient has not heard from anyone regarding this. She is hoping the have the equipment by 4th of July.               She also would like to follow up on the labs that Dr. Nice ordered.    Allergies:Bee venom, Cillins [penicillins], Latex, Asa [aspirin], Coconut oil, Codeine, Contrast media with iodine [iodine], Diagnostic x-ray materials, Hydrocodone-acetaminophen, Hydrocodone-ibuprofen, Hydroxyzine, Ibuprofen, Other food, Sulfa drugs, Talwin, and Tape    Current Medications[1]    Social History[2]  Social History     Social History Narrative    Not on file       Family History   Problem Relation Age of Onset    Genetic Disorder Mother         Alzheimer's    Alcohol/Drug Father        Exam:    /68 (BP Location: Right arm, Patient Position: Sitting, BP Cuff Size: Adult)   Pulse 86   Temp 36.7 °C (98 °F)   Ht 1.549 m (5' 1\")   Wt 85.3 kg (188 lb)   LMP 10/02/1980   SpO2 92%   BMI 35.52 kg/m²  Body mass index is 35.52 kg/m².    Gen: Alert and oriented, No apparent distress. Obese.  Lungs: Normal effort, CTA bilaterally, no wheezes, rhonchi, or rales  CV: Regular rate and rhythm. No murmurs, rubs, or gallops. Pulse palpable  Abd: Soft, non-tender, non-distended. No hepatosplenomegaly.   Ext: No clubbing, cyanosis, edema.  Skin: Warm/dry, without rashes  MSK:   At baseline mobility in electric scooter  Neuro: A/O x 4, No focal deficit   Psych: Normal behavior, normal affect      LABS: Results reviewed and discussed with the patient, questions " answered.    Assessment/Plan:    #Thrombocytopenia  #Polycythemia  #Leukopenia  Her thrombocytopenia is chronic on labs for the past 5 years. She denies any spontaneous bleeding or bruising. Leukopenia is mild and she has normal cell lines on the differential. Polycythemia likely secondary to underlying COPD vs undiagnosed sleep apnea, could be underlying polycythemia vera. I offered a referral to sleep medicine but patient declined. No hepatosplenomegaly on exam. Pt denies constitutional symptoms - fevers, night sweats, weight loss. Constellation of findings can be consistent with bone marrow disorder/neoplasm, will send referral to Hem/Onc for further evaluation/recommendations.         Problem List Items Addressed This Visit       Debility - Primary    Informed patient that Dr. Benson has already ordered her DME equipment. My MA Jessica said she will work on getting the patient's preference for DME company and faxing over the required paperwork.          Wheezing    Relevant Medications    albuterol 108 (90 Base) MCG/ACT Aero Soln inhalation aerosol     Other Visit Diagnoses         History of anaphylaxis        Relevant Medications    EPINEPHrine (EPIPEN 2-MIRELA) 0.3 MG/0.3ML Solution Auto-injector solution for injection      Polycythemia        Relevant Orders    Referral to Hematology Oncology           Orders Placed This Encounter    Referral to Hematology Oncology    albuterol 108 (90 Base) MCG/ACT Aero Soln inhalation aerosol    EPINEPHrine (EPIPEN 2-MIRELA) 0.3 MG/0.3ML Solution Auto-injector solution for injection       No future appointments.    Patient Active Problem List    Diagnosis Date Noted    Flank pain 08/07/2023    Hospital discharge follow-up 08/07/2023    Other osteoporosis without current pathological fracture 02/14/2023    Wheezing 01/20/2023    Swelling of right hand 11/15/2022    Closed displaced fracture of lateral malleolus of left fibula 10/14/2022    Therapeutic drug monitoring 07/21/2022  "   Rash 07/21/2022    Vertigo 01/07/2022    Bilateral headaches 12/14/2021    Hypoglycemia 10/06/2021    Osteoarthrosis 10/06/2021    Macrocytosis 05/21/2019    Tobacco abuse counseling 05/21/2019    Incontinence in female 03/28/2019    Chronic pain syndrome 03/21/2019    Restless leg syndrome 10/10/2018    Debility 09/27/2018    Hypomagnesemia 09/22/2018    Hypothyroidism 09/20/2018    Ulcerative esophagitis 09/20/2018    Tobacco use 09/13/2018    Major depressive disorder 09/11/2017    Pain due to hip joint prosthesis (HCC) 08/07/2017    Benzodiazepine dependence, continuous ([facility]) 04/26/2017    Cervical lymphadenopathy 10/07/2016    Osteoarthritis of cervical spine with myelopathy 09/14/2016    Failed back syndrome 09/14/2016    Depression 06/27/2016    Constipation 06/27/2016    Left lumbar radiculopathy 06/23/2016    Neuropathy 06/23/2016    Lumbar scoliosis 12/17/2015    History of alcohol abuse 12/07/2015    Hyperlipidemia 06/05/2015    Insomnia 05/23/2014    Chronic diarrhea 09/30/2013    ELISHA (generalized anxiety disorder) 08/28/2013    Gout, arthritis 05/24/2013    Mixed hyperlipidemia 12/11/2009    HTN (hypertension) 12/11/2009    Postsurgical hypothyroidism 12/11/2009    Osteoporosis, senile 12/11/2009    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 18 g 2    EPINEPHrine (EPIPEN 2-MIRELA) 0.3 MG/0.3ML Solution Auto-injector solution for injection Inject 0.3 mL into the shoulder, thigh, or buttocks one time as needed (anaphylaxis) for up to 1 dose. \"Only for bee stings\" 1 Each 1    potassium " chloride ER (KLOR-CON) 10 MEQ tablet TAKE 1 TABLET BY MOUTH DAILY 30 Tablet 0    meclizine (ANTIVERT) 12.5 MG Tab TAKE 1 TABLET BY MOUTH 3 TIMES A DAY AS NEEDED (VERTIGO) 90 Tablet 0    SYNTHROID 50 MCG Tab Take 1 Tablet by mouth Every Sunday and Wednesday. 30 Tablet 1    traZODone (DESYREL) 50 MG Tab TAKE TWO TABLETS BY MOUTH EVERY NIGHT AT BEDTIME FOR SLEEP 180 Tablet 1    gabapentin (NEURONTIN) 300 MG Cap Take 2 Capsules by mouth 3 times a day. 540 Capsule 1    carbidopa-levodopa (SINEMET)  MG Tab Take 1 Tablet by mouth every day. 90 Tablet 0    cyclobenzaprine (FLEXERIL) 10 MG Tab Take 1 Tablet by mouth at bedtime as needed for Muscle Spasms. 90 Tablet 0    allopurinol (ZYLOPRIM) 300 MG Tab TAKE 1 TABLET BY MOUTH DAILY 90 Tablet 0    lisinopril (PRINIVIL) 40 MG tablet Take 1 Tablet by mouth every day. 90 Tablet 2    SYNTHROID 75 MCG Tab TAKE 1 TABLET BY MOUTH DAILY ON MONDAY, TUESDAY, THURSDAY, FRIDAY, SATURDAY. 65 Tablet 2    vitamin D (CHOLECALCIFEROL) 1000 Unit Tab Take 5,000 Units by mouth every day.      EPINEPHrine (EPIPEN) 0.3 MG/0.3ML Solution Auto-injector solution for injection       naproxen (NAPROSYN) 500 MG Tab Take 1 Tablet by mouth 1 time a day as needed (Pain). 60 Tablet 1    Cholecalciferol (VITAMIN D-3) 125 MCG (5000 UT) Tab as directed Strength: 125 MCG (5000 UT) 30 Tablet 3    acetaminophen (TYLENOL) 500 MG Tab Take 1 Tablet by mouth every 6 hours as needed for Moderate Pain. 90 Tablet 1    Misc. Devices Misc Back brace to use as directed 1 Device 0     No current facility-administered medications for this visit.   [2]   Social History  Tobacco Use    Smoking status: Every Day     Current packs/day: 1.00     Average packs/day: 1 pack/day for 55.2 years (55.2 ttl pk-yrs)     Types: Cigarettes     Start date: 4/25/1970    Smokeless tobacco: Never    Tobacco comments:     quit during pregnancy, 1 pack to 1.5 pack per day   Vaping Use    Vaping status: Never Used   Substance Use Topics     Alcohol use: Not Currently     Alcohol/week: 1.2 oz     Types: 2 Cans of beer per week     Comment: quit drinking daily 3 years ago, now drinks 3-4 beers on weekends    Drug use: No

## 2025-07-21 RX ORDER — POTASSIUM CHLORIDE 750 MG/1
10 TABLET, EXTENDED RELEASE ORAL DAILY
Qty: 30 TABLET | Refills: 0 | Status: SHIPPED | OUTPATIENT
Start: 2025-07-21

## 2025-07-24 ENCOUNTER — APPOINTMENT (OUTPATIENT)
Dept: URBAN - METROPOLITAN AREA CLINIC 6 | Facility: CLINIC | Age: 70
Setting detail: DERMATOLOGY
End: 2025-07-24

## 2025-07-24 DIAGNOSIS — D22 MELANOCYTIC NEVI: ICD-10-CM

## 2025-07-24 DIAGNOSIS — Z85.828 PERSONAL HISTORY OF OTHER MALIGNANT NEOPLASM OF SKIN: ICD-10-CM

## 2025-07-24 DIAGNOSIS — L82.0 INFLAMED SEBORRHEIC KERATOSIS: ICD-10-CM

## 2025-07-24 DIAGNOSIS — D492 NEOPLASM OF UNSPECIFIED NATURE OF BONE, SOFT TISSUE, AND SKIN: ICD-10-CM

## 2025-07-24 DIAGNOSIS — D69.2 OTHER NONTHROMBOCYTOPENIC PURPURA: ICD-10-CM

## 2025-07-24 DIAGNOSIS — Z71.89 OTHER SPECIFIED COUNSELING: ICD-10-CM

## 2025-07-24 DIAGNOSIS — L82.1 OTHER SEBORRHEIC KERATOSIS: ICD-10-CM

## 2025-07-24 DIAGNOSIS — L91.8 OTHER HYPERTROPHIC DISORDERS OF THE SKIN: ICD-10-CM

## 2025-07-24 DIAGNOSIS — L81.4 OTHER MELANIN HYPERPIGMENTATION: ICD-10-CM

## 2025-07-24 DIAGNOSIS — D18.0 HEMANGIOMA: ICD-10-CM

## 2025-07-24 PROBLEM — R22.42 LOCALIZED SWELLING, MASS AND LUMP, LEFT LOWER LIMB: Status: ACTIVE | Noted: 2025-07-24

## 2025-07-24 PROBLEM — D18.01 HEMANGIOMA OF SKIN AND SUBCUTANEOUS TISSUE: Status: ACTIVE | Noted: 2025-07-24

## 2025-07-24 PROBLEM — D22.5 MELANOCYTIC NEVI OF TRUNK: Status: ACTIVE | Noted: 2025-07-24

## 2025-07-24 PROCEDURE — ? SUNSCREEN RECOMMENDATIONS

## 2025-07-24 PROCEDURE — ? OBSERVATION

## 2025-07-24 PROCEDURE — ? COUNSELING

## 2025-07-24 PROCEDURE — ? PHOTO-DOCUMENTATION

## 2025-07-24 PROCEDURE — ? DIAGNOSIS COMMENT

## 2025-07-24 PROCEDURE — ? LIQUID NITROGEN

## 2025-07-24 ASSESSMENT — LOCATION SIMPLE DESCRIPTION DERM
LOCATION SIMPLE: LEFT HAND
LOCATION SIMPLE: RIGHT HAND
LOCATION SIMPLE: RIGHT INGUINAL FOLD
LOCATION SIMPLE: LEFT ANTERIOR NECK
LOCATION SIMPLE: NECK
LOCATION SIMPLE: RIGHT THIGH
LOCATION SIMPLE: ABDOMEN
LOCATION SIMPLE: LEFT CHEEK
LOCATION SIMPLE: CHEST
LOCATION SIMPLE: RIGHT LOWER BACK
LOCATION SIMPLE: LEFT INGUINAL FOLD
LOCATION SIMPLE: RIGHT ANTERIOR NECK
LOCATION SIMPLE: RIGHT BREAST
LOCATION SIMPLE: LEFT POSTERIOR THIGH

## 2025-07-24 ASSESSMENT — LOCATION DETAILED DESCRIPTION DERM
LOCATION DETAILED: RIGHT RADIAL DORSAL HAND
LOCATION DETAILED: LEFT INFERIOR MEDIAL MALAR CHEEK
LOCATION DETAILED: RIGHT ANTERIOR MEDIAL DISTAL THIGH
LOCATION DETAILED: RIGHT INGUINAL FOLD
LOCATION DETAILED: EPIGASTRIC SKIN
LOCATION DETAILED: LEFT INFERIOR LATERAL NECK
LOCATION DETAILED: PERIUMBILICAL SKIN
LOCATION DETAILED: LEFT ULNAR DORSAL HAND
LOCATION DETAILED: RIGHT INFERIOR LATERAL NECK
LOCATION DETAILED: LEFT LATERAL MANDIBULAR CHEEK
LOCATION DETAILED: LEFT PROXIMAL POSTERIOR THIGH
LOCATION DETAILED: LEFT CENTRAL MALAR CHEEK
LOCATION DETAILED: RIGHT INFERIOR LATERAL MIDBACK
LOCATION DETAILED: RIGHT MEDIAL SUPERIOR CHEST
LOCATION DETAILED: LEFT SUPERIOR LATERAL NECK
LOCATION DETAILED: LEFT INGUINAL FOLD
LOCATION DETAILED: LEFT MEDIAL MALAR CHEEK
LOCATION DETAILED: RIGHT MEDIAL BREAST 4-5:00 REGION

## 2025-07-24 ASSESSMENT — LOCATION ZONE DERM
LOCATION ZONE: FACE
LOCATION ZONE: TRUNK
LOCATION ZONE: NECK
LOCATION ZONE: LEG
LOCATION ZONE: HAND

## 2025-07-24 NOTE — PROCEDURE: LIQUID NITROGEN
Add 52 Modifier (Optional): no
Consent: The patient's consent was obtained including but not limited to risks of crusting, scabbing, blistering, scarring, darker or lighter pigmentary change, recurrence, incomplete removal and infection.
Medical Necessity Clause: This procedure was medically necessary because the lesions that were treated were:
Show Topical Anesthesia Variable?: Yes
Detail Level: Detailed
Medical Necessity Information: It is in your best interest to select a reason for this procedure from the list below. All of these items fulfill various CMS LCD requirements except the new and changing color options.
Spray Paint Text: The liquid nitrogen was applied to the skin utilizing a spray paint frosting technique.
Post-Care Instructions: I reviewed with the patient in detail post-care instructions. Patient is to wear sunprotection, and avoid picking at any of the treated lesions. Pt may apply Vaseline to crusted or scabbing areas.

## 2025-07-24 NOTE — PROCEDURE: DIAGNOSIS COMMENT
Render Risk Assessment In Note?: no
Detail Level: Simple
room air
Comment: Accession #[id number]. Mohs 6/26/25.
Comment: Will refer to Dr. Vikash Dominguez at [facility].

## 2025-07-31 ENCOUNTER — TELEPHONE (OUTPATIENT)
Dept: HEMATOLOGY ONCOLOGY | Facility: MEDICAL CENTER | Age: 70
End: 2025-07-31
Payer: COMMERCIAL

## 2025-07-31 NOTE — TELEPHONE ENCOUNTER
Left message to reschedule with [facility] because patient no showed to 7/31/2025 appointment.

## 2025-08-06 DIAGNOSIS — R42 VERTIGO: ICD-10-CM

## 2025-08-06 DIAGNOSIS — M10.9 GOUT, ARTHRITIS: ICD-10-CM

## 2025-08-06 DIAGNOSIS — G25.9 EXTRAPYRAMIDAL AND MOVEMENT DISORDER: ICD-10-CM

## 2025-08-06 DIAGNOSIS — E89.0 POSTSURGICAL HYPOTHYROIDISM: ICD-10-CM

## 2025-08-07 RX ORDER — LEVOTHYROXINE SODIUM 50 MCG
50 TABLET ORAL
Qty: 30 TABLET | Refills: 0 | Status: SHIPPED | OUTPATIENT
Start: 2025-08-10

## 2025-08-07 RX ORDER — POTASSIUM CHLORIDE 750 MG/1
10 TABLET, EXTENDED RELEASE ORAL DAILY
Qty: 30 TABLET | Refills: 0 | Status: SHIPPED | OUTPATIENT
Start: 2025-08-07

## 2025-08-07 RX ORDER — ALLOPURINOL 300 MG/1
300 TABLET ORAL DAILY
Qty: 90 TABLET | Refills: 0 | Status: SHIPPED | OUTPATIENT
Start: 2025-08-07

## 2025-08-07 RX ORDER — CARBIDOPA AND LEVODOPA 25; 100 MG/1; MG/1
1 TABLET ORAL DAILY
Qty: 90 TABLET | Refills: 0 | Status: SHIPPED | OUTPATIENT
Start: 2025-08-07

## 2025-08-07 RX ORDER — MECLIZINE HCL 12.5 MG 12.5 MG/1
TABLET ORAL
Qty: 90 TABLET | Refills: 0 | Status: SHIPPED | OUTPATIENT
Start: 2025-08-07

## 2025-08-26 DIAGNOSIS — E89.0 POSTSURGICAL HYPOTHYROIDISM: ICD-10-CM

## 2025-08-27 RX ORDER — LEVOTHYROXINE SODIUM 75 MCG
TABLET ORAL
Qty: 65 TABLET | Refills: 2 | Status: SHIPPED | OUTPATIENT
Start: 2025-08-27

## (undated) DEVICE — GOWN SURGEONS LARGE - (32/CA)

## (undated) DEVICE — SODIUM CHL. IRRIGATION 0.9% 3000ML (4EA/CA 65CA/PF)

## (undated) DEVICE — GOWN WARMING STANDARD FLEX - (30/CA)

## (undated) DEVICE — BANDAGEESMARK  4X9' BLUE LF - 20/CS"

## (undated) DEVICE — CLIP MED LG INTNL HRZN TI ESCP - (20/BX)

## (undated) DEVICE — SUTURE 2-0 VICRYL PLUS CT-1 - 8 X 18 INCH(12/BX)

## (undated) DEVICE — PAD LAP STERILE 18 X 18 - (5/PK 40PK/CA)

## (undated) DEVICE — BOVIE BLADE COATED &INSULATED - 25/PK

## (undated) DEVICE — CANISTER SUCTION 3000ML MECHANICAL FILTER AUTO SHUTOFF MEDI-VAC NONSTERILE LF DISP  (40EA/CA)

## (undated) DEVICE — GLOVE BIOGEL SZ 8.5 SURGICAL PF LTX - (50PR/BX 4BX/CA)

## (undated) DEVICE — SLEEVE, VASO, THIGH, MED

## (undated) DEVICE — MASK ANESTHESIA ADULT  - (100/CA)

## (undated) DEVICE — KIT ROOM DECONTAMINATION

## (undated) DEVICE — GLOVE BIOGEL INDICATOR SZ 7SURGICAL PF LTX - (50/BX 4BX/CA)

## (undated) DEVICE — MASK AIRWAY SIZE 3 UNIQUE SILICON (10/BX)

## (undated) DEVICE — DRAPE MAYO STAND - (30/CA)

## (undated) DEVICE — GOWN SURGICAL XX-LARGE - (28EA/CA) SIRUS NON REINFORCED

## (undated) DEVICE — SUTURE 4-0 MONOCRYL PLUS PS-2 - 27 INCH (36/BX)

## (undated) DEVICE — GLOVE BIOGEL PI INDICATOR SZ 6.5 SURGICAL PF LF - (50/BX 4BX/CA)

## (undated) DEVICE — DRESSING TRANSPARENT FILM TEGADERM 4 X 4.75" (50EA/BX)"

## (undated) DEVICE — SET SUCT.W/SLEEVE VIA-GUARD - (10/BX10BX/CA)

## (undated) DEVICE — CHLORAPREP 26 ML APPLICATOR - ORANGE TINT(25/CA)

## (undated) DEVICE — TUBE CONNECT SUCTION CLEAR 120 X 1/4" (50EA/CA)"

## (undated) DEVICE — DETERGENT RENUZYME PLUS 10 OZ PACKET (50/BX)

## (undated) DEVICE — KIT ANESTHESIA W/CIRCUIT & 3/LT BAG W/FILTER (20EA/CA)

## (undated) DEVICE — PACK JACKSON TABLE KIT W/OUT - HR (6EA/CA)

## (undated) DEVICE — SODIUM CHL IRRIGATION 0.9% 1000ML (12EA/CA)

## (undated) DEVICE — ELECTRODE 850 FOAM ADHESIVE - HYDROGEL RADIOTRNSPRNT (50/PK)

## (undated) DEVICE — NEPTUNE 4 PORT MANIFOLD - (20/PK)

## (undated) DEVICE — GVL 3 STAT DISPOSABLE - (10/BX)

## (undated) DEVICE — STAPLER 75MM LINEAR OPEN (3EA/BX)

## (undated) DEVICE — SET FLUID WARMING STANDARD FLOW - (10/CA)

## (undated) DEVICE — TUBING CLEARLINK DUO-VENT - C-FLO (48EA/CA)

## (undated) DEVICE — ELECTRODE DUAL RETURN W/ CORD - (50/PK)

## (undated) DEVICE — PACK MINOR BASIN - (2EA/CA)

## (undated) DEVICE — DRAPE LARGE 3 QUARTER - (20/CA)

## (undated) DEVICE — DRAPE STRLE REG TOWEL 18X24 - (10/BX 4BX/CA)"

## (undated) DEVICE — KIT SURGIFLO W/OUT THROMBIN - (6EA/CA)

## (undated) DEVICE — STAPLE 75MM LINEAR (12EA/BX)

## (undated) DEVICE — SUTURE 1 VICRYL PLUS CTX - 36 INCH (36/BX)

## (undated) DEVICE — SPONGE GAUZESTER 4 X 4 4PLY - (128PK/CA)

## (undated) DEVICE — DRAPE MEDI-SLUSH STERILE  - (40/CA)

## (undated) DEVICE — LENS/HOOD FOR SPACESUIT - (32/PK) PEEL AWAY FACE

## (undated) DEVICE — PACK MAJOR BASIN - (2EA/CA)

## (undated) DEVICE — GOWN SURGEONS X-LARGE - DISP. (30/CA)

## (undated) DEVICE — NEEDLE 11GA FOR KYPHOPLASTY

## (undated) DEVICE — LIGASURE TISSUE FUSION  - SINGLE USE (6/CA)

## (undated) DEVICE — TUBING C&T SET FLYING LEADS DRAIN TUBING (10EA/BX)

## (undated) DEVICE — LACTATED RINGERS INJ 1000 ML - (14EA/CA 60CA/PF)

## (undated) DEVICE — SUCTION TIP STRAIGHT ARGYLE - 50EA/CA

## (undated) DEVICE — SUTURE GENERAL

## (undated) DEVICE — TOOL DISSECT MATCH HEAD

## (undated) DEVICE — ARMREST CRADLE FOAM - (2PR/PK 12PR/CA)

## (undated) DEVICE — HEADREST PRONEVIEW LARGE - (10/CA)

## (undated) DEVICE — SUCTION INSTRUMENT YANKAUER BULBOUS TIP W/O VENT (50EA/CA)

## (undated) DEVICE — TOWELS CLOTH SURGICAL - (4/PK 20PK/CA)

## (undated) DEVICE — PROTECTOR ULNA NERVE - (36PR/CA)

## (undated) DEVICE — HEAD HOLDER JUNIOR/ADULT

## (undated) DEVICE — SOD. CHL. INJ. 0.9% 1000 ML - (14EA/CA 60CA/PF)

## (undated) DEVICE — SYRINGE 3 CC 22 GA X 1-1/2 - NDL SAFETY (50/BX 8BX/CA)

## (undated) DEVICE — SET EXTENSION WITH 2 PORTS (48EA/CA) ***PART #2C8610 IS A SUBSTITUTE*****

## (undated) DEVICE — CLIP LG INTNL HRZN TI ESCP LGT - (20/BX)

## (undated) DEVICE — DRAPE LAPAROTOMY T SHEET - (12EA/CA)

## (undated) DEVICE — PACK NEURO - (2EA/CA)

## (undated) DEVICE — DRESSING LEUKOMED STERILE 11.75X4IN - (50/CA)

## (undated) DEVICE — DRAPE MICROSCOPE X-LONG (10EA/CA)

## (undated) DEVICE — SENSOR SPO2 NEO LNCS ADHESIVE (20/BX) SEE USER NOTES

## (undated) DEVICE — GLOVE BIOGEL ECLIPSE PF LATEX SIZE 8.0  (50PR/BX)

## (undated) DEVICE — TUBE ET REINFORCED 7.0 - (5EA/BX)

## (undated) DEVICE — GLOVE BIOGEL ECLIPSE PF LATEX SIZE 8.5 (50PR/BX)

## (undated) DEVICE — GLOVE BIOGEL PI ORTHO SZ 8 PF LF (40PR/BX)

## (undated) DEVICE — HANDPIECE 10FT INTPLS SCT PLS IRRIGATION HAND CONTROL SET (6/PK)

## (undated) DEVICE — SET LEADWIRE 5 LEAD BEDSIDE DISPOSABLE ECG (1SET OF 5/EA)

## (undated) DEVICE — SYRINGE SAFETY 10 ML 18 GA X 1 1/2 BLUNT LL (100/BX 4BX/CA)

## (undated) DEVICE — SUTURE 2-0 VICRYL PLUS SH - 27 INCH (36/BX)

## (undated) DEVICE — LACTATED RINGERS INJ. 500 ML - (24EA/CA)

## (undated) DEVICE — BLADE SURGICAL #15 - (50/BX 3BX/CA)

## (undated) DEVICE — SUTURE 0 PDS CT-2 (36PK/BX)

## (undated) DEVICE — SUTURE 0 COATED VICRYL 6-18IN - (12PK/BX)

## (undated) DEVICE — STAPLE BLUE 30MM X 3.5MM LEG (12EA/BX)

## (undated) DEVICE — GRAFT MESH SEPRAFILM PRO PACK - 5/BX CONTAINS 6 3X5 PIECES

## (undated) DEVICE — SUTURE 3-0 SILK SH C/R 18 IN - (12/BX)

## (undated) DEVICE — TRAY CATHETER FOLEY URINE METER W/STATLOCK 350ML (10EA/CA)

## (undated) DEVICE — STAPLER SKIN DISP - (6/BX 10BX/CA) VISISTAT

## (undated) DEVICE — FOLEY SLIPPERY 5CC 16 FR LF ALL SILICONE (12EA/CA)

## (undated) DEVICE — CLIP MED INTNL HRZN TI ESCP - (25/BX)

## (undated) DEVICE — SUTURE 3-0 VICRYL PLUS SH - 8X 18 INCH (12/BX)

## (undated) DEVICE — SYRINGE 30 ML LL (56/BX)

## (undated) DEVICE — GLOVE SZ 6.5 BIOGEL PI MICRO - PF LF (50PR/BX)

## (undated) DEVICE — MIDAS LUBRICATOR DIFFUSER PACK (4EA/CA)

## (undated) DEVICE — SUTURE 3-0 VICRYL PLUS SH - 27 INCH (36/BX)

## (undated) DEVICE — SUTURE 2-0 VICRYL PLUS CT-1 36 (36PK/BX)"

## (undated) DEVICE — DRAPE C-ARM LARGE 41IN X 74 IN - (10/BX 2BX/CA)